# Patient Record
Sex: MALE | Race: OTHER | Employment: OTHER | URBAN - METROPOLITAN AREA
[De-identification: names, ages, dates, MRNs, and addresses within clinical notes are randomized per-mention and may not be internally consistent; named-entity substitution may affect disease eponyms.]

---

## 2017-01-11 ENCOUNTER — APPOINTMENT (OUTPATIENT)
Dept: LAB | Facility: CLINIC | Age: 82
End: 2017-01-11
Payer: MEDICARE

## 2017-01-11 ENCOUNTER — TRANSCRIBE ORDERS (OUTPATIENT)
Dept: LAB | Facility: CLINIC | Age: 82
End: 2017-01-11

## 2017-01-11 DIAGNOSIS — C61 MALIGNANT NEOPLASM OF PROSTATE (HCC): ICD-10-CM

## 2017-01-11 DIAGNOSIS — C61 MALIGNANT NEOPLASM OF PROSTATE (HCC): Primary | ICD-10-CM

## 2017-01-11 LAB — PSA SERPL-MCNC: <0.1 NG/ML (ref 0–4)

## 2017-01-11 PROCEDURE — 84153 ASSAY OF PSA TOTAL: CPT

## 2017-01-19 ENCOUNTER — GENERIC CONVERSION - ENCOUNTER (OUTPATIENT)
Dept: OTHER | Facility: OTHER | Age: 82
End: 2017-01-19

## 2017-03-06 ENCOUNTER — ALLSCRIPTS OFFICE VISIT (OUTPATIENT)
Dept: OTHER | Facility: OTHER | Age: 82
End: 2017-03-06

## 2017-03-06 ENCOUNTER — TRANSCRIBE ORDERS (OUTPATIENT)
Dept: ADMINISTRATIVE | Facility: HOSPITAL | Age: 82
End: 2017-03-06

## 2017-03-06 DIAGNOSIS — I10 ESSENTIAL HYPERTENSION, MALIGNANT: Primary | ICD-10-CM

## 2017-03-17 ENCOUNTER — ALLSCRIPTS OFFICE VISIT (OUTPATIENT)
Dept: OTHER | Facility: OTHER | Age: 82
End: 2017-03-17

## 2017-03-17 DIAGNOSIS — E78.00 PURE HYPERCHOLESTEROLEMIA: ICD-10-CM

## 2017-04-17 ENCOUNTER — TRANSCRIBE ORDERS (OUTPATIENT)
Dept: LAB | Facility: CLINIC | Age: 82
End: 2017-04-17

## 2017-04-17 ENCOUNTER — APPOINTMENT (OUTPATIENT)
Dept: LAB | Facility: CLINIC | Age: 82
End: 2017-04-17
Payer: MEDICARE

## 2017-04-17 DIAGNOSIS — C61 MALIGNANT NEOPLASM OF PROSTATE (HCC): ICD-10-CM

## 2017-04-17 DIAGNOSIS — C61 MALIGNANT NEOPLASM OF PROSTATE (HCC): Primary | ICD-10-CM

## 2017-04-17 LAB — PSA SERPL-MCNC: <0.1 NG/ML (ref 0–4)

## 2017-04-17 PROCEDURE — 84153 ASSAY OF PSA TOTAL: CPT

## 2017-04-24 ENCOUNTER — GENERIC CONVERSION - ENCOUNTER (OUTPATIENT)
Dept: OTHER | Facility: OTHER | Age: 82
End: 2017-04-24

## 2017-06-30 ENCOUNTER — APPOINTMENT (OUTPATIENT)
Dept: LAB | Facility: CLINIC | Age: 82
End: 2017-06-30
Payer: MEDICARE

## 2017-06-30 ENCOUNTER — GENERIC CONVERSION - ENCOUNTER (OUTPATIENT)
Dept: OTHER | Facility: OTHER | Age: 82
End: 2017-06-30

## 2017-06-30 DIAGNOSIS — E78.00 PURE HYPERCHOLESTEROLEMIA: ICD-10-CM

## 2017-06-30 DIAGNOSIS — D64.9 ANEMIA: ICD-10-CM

## 2017-06-30 LAB
ALBUMIN SERPL BCP-MCNC: 3.6 G/DL (ref 3.5–5)
ALP SERPL-CCNC: 56 U/L (ref 46–116)
ALT SERPL W P-5'-P-CCNC: 23 U/L (ref 12–78)
ANION GAP SERPL CALCULATED.3IONS-SCNC: 4 MMOL/L (ref 4–13)
AST SERPL W P-5'-P-CCNC: 19 U/L (ref 5–45)
BASOPHILS # BLD AUTO: 0.02 THOUSANDS/ΜL (ref 0–0.1)
BASOPHILS NFR BLD AUTO: 0 % (ref 0–1)
BILIRUB SERPL-MCNC: 0.52 MG/DL (ref 0.2–1)
BUN SERPL-MCNC: 12 MG/DL (ref 5–25)
CALCIUM SERPL-MCNC: 9.4 MG/DL (ref 8.3–10.1)
CHLORIDE SERPL-SCNC: 105 MMOL/L (ref 100–108)
CHOLEST SERPL-MCNC: 164 MG/DL (ref 50–200)
CO2 SERPL-SCNC: 27 MMOL/L (ref 21–32)
CREAT SERPL-MCNC: 0.9 MG/DL (ref 0.6–1.3)
EOSINOPHIL # BLD AUTO: 0.17 THOUSAND/ΜL (ref 0–0.61)
EOSINOPHIL NFR BLD AUTO: 3 % (ref 0–6)
ERYTHROCYTE [DISTWIDTH] IN BLOOD BY AUTOMATED COUNT: 15.6 % (ref 11.6–15.1)
GFR SERPL CREATININE-BSD FRML MDRD: >60 ML/MIN/1.73SQ M
GLUCOSE P FAST SERPL-MCNC: 116 MG/DL (ref 65–99)
HCT VFR BLD AUTO: 32.5 % (ref 36.5–49.3)
HDLC SERPL-MCNC: 46 MG/DL (ref 40–60)
HGB BLD-MCNC: 10.3 G/DL (ref 12–17)
LDLC SERPL CALC-MCNC: 91 MG/DL (ref 0–100)
LYMPHOCYTES # BLD AUTO: 2.53 THOUSANDS/ΜL (ref 0.6–4.47)
LYMPHOCYTES NFR BLD AUTO: 41 % (ref 14–44)
MCH RBC QN AUTO: 22.8 PG (ref 26.8–34.3)
MCHC RBC AUTO-ENTMCNC: 31.7 G/DL (ref 31.4–37.4)
MCV RBC AUTO: 72 FL (ref 82–98)
MONOCYTES # BLD AUTO: 0.46 THOUSAND/ΜL (ref 0.17–1.22)
MONOCYTES NFR BLD AUTO: 8 % (ref 4–12)
NEUTROPHILS # BLD AUTO: 2.98 THOUSANDS/ΜL (ref 1.85–7.62)
NEUTS SEG NFR BLD AUTO: 48 % (ref 43–75)
NRBC BLD AUTO-RTO: 0 /100 WBCS
PLATELET # BLD AUTO: 184 THOUSANDS/UL (ref 149–390)
PMV BLD AUTO: 10.8 FL (ref 8.9–12.7)
POTASSIUM SERPL-SCNC: 4.3 MMOL/L (ref 3.5–5.3)
PROT SERPL-MCNC: 6.6 G/DL (ref 6.4–8.2)
RBC # BLD AUTO: 4.51 MILLION/UL (ref 3.88–5.62)
SODIUM SERPL-SCNC: 136 MMOL/L (ref 136–145)
TRIGL SERPL-MCNC: 135 MG/DL
TSH SERPL DL<=0.05 MIU/L-ACNC: 1.91 UIU/ML (ref 0.36–3.74)
WBC # BLD AUTO: 6.17 THOUSAND/UL (ref 4.31–10.16)

## 2017-06-30 PROCEDURE — 85025 COMPLETE CBC W/AUTO DIFF WBC: CPT

## 2017-06-30 PROCEDURE — 80053 COMPREHEN METABOLIC PANEL: CPT

## 2017-06-30 PROCEDURE — 84443 ASSAY THYROID STIM HORMONE: CPT

## 2017-06-30 PROCEDURE — 80061 LIPID PANEL: CPT

## 2017-06-30 PROCEDURE — 36415 COLL VENOUS BLD VENIPUNCTURE: CPT

## 2017-07-10 ENCOUNTER — ALLSCRIPTS OFFICE VISIT (OUTPATIENT)
Dept: OTHER | Facility: OTHER | Age: 82
End: 2017-07-10

## 2017-07-10 ENCOUNTER — GENERIC CONVERSION - ENCOUNTER (OUTPATIENT)
Dept: OTHER | Facility: OTHER | Age: 82
End: 2017-07-10

## 2017-07-10 LAB — OCCULT BLD, FECAL IMMUNOLOGICAL (HISTORICAL): NEGATIVE

## 2017-07-11 ENCOUNTER — TRANSCRIBE ORDERS (OUTPATIENT)
Dept: LAB | Facility: CLINIC | Age: 82
End: 2017-07-11

## 2017-07-11 ENCOUNTER — APPOINTMENT (OUTPATIENT)
Dept: LAB | Facility: CLINIC | Age: 82
End: 2017-07-11
Payer: MEDICARE

## 2017-07-11 DIAGNOSIS — D64.9 ANEMIA: ICD-10-CM

## 2017-07-11 DIAGNOSIS — C61 MALIGNANT NEOPLASM OF PROSTATE (HCC): Primary | ICD-10-CM

## 2017-07-11 LAB
ALBUMIN SERPL BCP-MCNC: 3.6 G/DL (ref 3.5–5)
ALP SERPL-CCNC: 55 U/L (ref 46–116)
ALT SERPL W P-5'-P-CCNC: 22 U/L (ref 12–78)
AST SERPL W P-5'-P-CCNC: 22 U/L (ref 5–45)
BASOPHILS # BLD AUTO: 0.03 THOUSANDS/ΜL (ref 0–0.1)
BASOPHILS NFR BLD AUTO: 1 % (ref 0–1)
BILIRUB DIRECT SERPL-MCNC: 0.11 MG/DL (ref 0–0.2)
BILIRUB SERPL-MCNC: 0.44 MG/DL (ref 0.2–1)
BUN SERPL-MCNC: 11 MG/DL (ref 5–25)
CALCIUM SERPL-MCNC: 9.6 MG/DL (ref 8.3–10.1)
CREAT SERPL-MCNC: 0.94 MG/DL (ref 0.6–1.3)
EOSINOPHIL # BLD AUTO: 0.2 THOUSAND/ΜL (ref 0–0.61)
EOSINOPHIL NFR BLD AUTO: 4 % (ref 0–6)
ERYTHROCYTE [DISTWIDTH] IN BLOOD BY AUTOMATED COUNT: 15.7 % (ref 11.6–15.1)
FOLATE SERPL-MCNC: 14.5 NG/ML (ref 3.1–17.5)
GFR SERPL CREATININE-BSD FRML MDRD: >60 ML/MIN/1.73SQ M
HCT VFR BLD AUTO: 31.9 % (ref 36.5–49.3)
HGB BLD-MCNC: 10.2 G/DL (ref 12–17)
IRON SERPL-MCNC: 33 UG/DL (ref 65–175)
LYMPHOCYTES # BLD AUTO: 2 THOUSANDS/ΜL (ref 0.6–4.47)
LYMPHOCYTES NFR BLD AUTO: 36 % (ref 14–44)
MCH RBC QN AUTO: 22.9 PG (ref 26.8–34.3)
MCHC RBC AUTO-ENTMCNC: 32 G/DL (ref 31.4–37.4)
MCV RBC AUTO: 72 FL (ref 82–98)
MONOCYTES # BLD AUTO: 0.39 THOUSAND/ΜL (ref 0.17–1.22)
MONOCYTES NFR BLD AUTO: 7 % (ref 4–12)
NEUTROPHILS # BLD AUTO: 2.96 THOUSANDS/ΜL (ref 1.85–7.62)
NEUTS SEG NFR BLD AUTO: 52 % (ref 43–75)
NRBC BLD AUTO-RTO: 0 /100 WBCS
PLATELET # BLD AUTO: 180 THOUSANDS/UL (ref 149–390)
PMV BLD AUTO: 10.6 FL (ref 8.9–12.7)
PROT SERPL-MCNC: 6.7 G/DL (ref 6.4–8.2)
PSA SERPL-MCNC: <0.1 NG/ML (ref 0–4)
RBC # BLD AUTO: 4.46 MILLION/UL (ref 3.88–5.62)
TESTOST SERPL-MCNC: 26.3 NG/DL (ref 241–827)
VIT B12 SERPL-MCNC: 251 PG/ML (ref 100–900)
WBC # BLD AUTO: 5.6 THOUSAND/UL (ref 4.31–10.16)

## 2017-07-11 PROCEDURE — 82607 VITAMIN B-12: CPT

## 2017-07-11 PROCEDURE — 82565 ASSAY OF CREATININE: CPT

## 2017-07-11 PROCEDURE — 36415 COLL VENOUS BLD VENIPUNCTURE: CPT

## 2017-07-11 PROCEDURE — 80076 HEPATIC FUNCTION PANEL: CPT

## 2017-07-11 PROCEDURE — 82746 ASSAY OF FOLIC ACID SERUM: CPT

## 2017-07-11 PROCEDURE — 83540 ASSAY OF IRON: CPT

## 2017-07-11 PROCEDURE — 84403 ASSAY OF TOTAL TESTOSTERONE: CPT

## 2017-07-11 PROCEDURE — 82310 ASSAY OF CALCIUM: CPT

## 2017-07-11 PROCEDURE — G0103 PSA SCREENING: HCPCS

## 2017-07-11 PROCEDURE — 85025 COMPLETE CBC W/AUTO DIFF WBC: CPT

## 2017-07-11 PROCEDURE — 84520 ASSAY OF UREA NITROGEN: CPT

## 2017-07-21 ENCOUNTER — ALLSCRIPTS OFFICE VISIT (OUTPATIENT)
Dept: OTHER | Facility: OTHER | Age: 82
End: 2017-07-21

## 2017-07-24 ENCOUNTER — GENERIC CONVERSION - ENCOUNTER (OUTPATIENT)
Dept: OTHER | Facility: OTHER | Age: 82
End: 2017-07-24

## 2017-08-04 ENCOUNTER — ALLSCRIPTS OFFICE VISIT (OUTPATIENT)
Dept: OTHER | Facility: OTHER | Age: 82
End: 2017-08-04

## 2017-09-01 ENCOUNTER — ALLSCRIPTS OFFICE VISIT (OUTPATIENT)
Dept: OTHER | Facility: OTHER | Age: 82
End: 2017-09-01

## 2017-10-02 ENCOUNTER — ALLSCRIPTS OFFICE VISIT (OUTPATIENT)
Dept: OTHER | Facility: OTHER | Age: 82
End: 2017-10-02

## 2017-10-17 ENCOUNTER — TRANSCRIBE ORDERS (OUTPATIENT)
Dept: LAB | Facility: CLINIC | Age: 82
End: 2017-10-17

## 2017-10-17 ENCOUNTER — APPOINTMENT (OUTPATIENT)
Dept: LAB | Facility: CLINIC | Age: 82
End: 2017-10-17
Payer: MEDICARE

## 2017-10-17 DIAGNOSIS — C61 MALIGNANT NEOPLASM OF PROSTATE (HCC): ICD-10-CM

## 2017-10-17 DIAGNOSIS — D64.9 ANEMIA, UNSPECIFIED TYPE: ICD-10-CM

## 2017-10-17 DIAGNOSIS — D64.9 ANEMIA, UNSPECIFIED TYPE: Primary | ICD-10-CM

## 2017-10-17 LAB
BASOPHILS # BLD AUTO: 0.03 THOUSANDS/ΜL (ref 0–0.1)
BASOPHILS NFR BLD AUTO: 0 % (ref 0–1)
BUN SERPL-MCNC: 12 MG/DL (ref 5–25)
CREAT SERPL-MCNC: 0.89 MG/DL (ref 0.6–1.3)
EOSINOPHIL # BLD AUTO: 0.2 THOUSAND/ΜL (ref 0–0.61)
EOSINOPHIL NFR BLD AUTO: 3 % (ref 0–6)
ERYTHROCYTE [DISTWIDTH] IN BLOOD BY AUTOMATED COUNT: 17.6 % (ref 11.6–15.1)
GFR SERPL CREATININE-BSD FRML MDRD: 79 ML/MIN/1.73SQ M
HCT VFR BLD AUTO: 38 % (ref 36.5–49.3)
HGB BLD-MCNC: 12.3 G/DL (ref 12–17)
IRON SERPL-MCNC: 57 UG/DL (ref 65–175)
LYMPHOCYTES # BLD AUTO: 3.12 THOUSANDS/ΜL (ref 0.6–4.47)
LYMPHOCYTES NFR BLD AUTO: 42 % (ref 14–44)
MCH RBC QN AUTO: 25.5 PG (ref 26.8–34.3)
MCHC RBC AUTO-ENTMCNC: 32.4 G/DL (ref 31.4–37.4)
MCV RBC AUTO: 79 FL (ref 82–98)
MONOCYTES # BLD AUTO: 0.56 THOUSAND/ΜL (ref 0.17–1.22)
MONOCYTES NFR BLD AUTO: 8 % (ref 4–12)
NEUTROPHILS # BLD AUTO: 3.52 THOUSANDS/ΜL (ref 1.85–7.62)
NEUTS SEG NFR BLD AUTO: 47 % (ref 43–75)
NRBC BLD AUTO-RTO: 0 /100 WBCS
PLATELET # BLD AUTO: 189 THOUSANDS/UL (ref 149–390)
PMV BLD AUTO: 10.5 FL (ref 8.9–12.7)
PSA SERPL-MCNC: <0.1 NG/ML (ref 0–4)
RBC # BLD AUTO: 4.82 MILLION/UL (ref 3.88–5.62)
VIT B12 SERPL-MCNC: 3117 PG/ML (ref 100–900)
WBC # BLD AUTO: 7.44 THOUSAND/UL (ref 4.31–10.16)

## 2017-10-17 PROCEDURE — 85025 COMPLETE CBC W/AUTO DIFF WBC: CPT

## 2017-10-17 PROCEDURE — 82607 VITAMIN B-12: CPT

## 2017-10-17 PROCEDURE — 82565 ASSAY OF CREATININE: CPT

## 2017-10-17 PROCEDURE — 84520 ASSAY OF UREA NITROGEN: CPT

## 2017-10-17 PROCEDURE — G0103 PSA SCREENING: HCPCS

## 2017-10-17 PROCEDURE — 36415 COLL VENOUS BLD VENIPUNCTURE: CPT

## 2017-10-17 PROCEDURE — 83540 ASSAY OF IRON: CPT

## 2017-10-18 ENCOUNTER — GENERIC CONVERSION - ENCOUNTER (OUTPATIENT)
Dept: OTHER | Facility: OTHER | Age: 82
End: 2017-10-18

## 2017-10-24 ENCOUNTER — GENERIC CONVERSION - ENCOUNTER (OUTPATIENT)
Dept: OTHER | Facility: OTHER | Age: 82
End: 2017-10-24

## 2017-11-08 ENCOUNTER — ALLSCRIPTS OFFICE VISIT (OUTPATIENT)
Dept: OTHER | Facility: OTHER | Age: 82
End: 2017-11-08

## 2018-01-12 NOTE — RESULT NOTES
Discussion/Summary   Please call patient's wife Petty Mayers  Blood work shows patient has mild anemia  He would require further blood work to investigate excessive source of anemia  I would also recommend checking Hemoccults Ã3 on  separate days to check for any possible bleeding and stool that he is unable to see with the naked eye  I will printout blood work prescription and please provide Hemoccults Ã3     Verified Results  (1) CBC/PLT/DIFF 62XSX7129 00:07VS Alissa Davis Order Number: [id number]     Test Name Result Flag Reference   WBC COUNT 6 17 Thousand/uL  4 31-10 16   RBC COUNT 4 51 Million/uL  3 88-5 62   HEMOGLOBIN 10 3 g/dL L 12 0-17 0   HEMATOCRIT 32 5 % L 36 5-49 3   MCV 72 fL L 82-98   MCH 22 8 pg L 26 8-34 3   MCHC 31 7 g/dL  31 4-37 4   RDW 15 6 % H 11 6-15 1   MPV 10 8 fL  8 9-12 7   PLATELET COUNT 544 Thousands/uL  149-390   nRBC AUTOMATED 0 /100 WBCs     NEUTROPHILS RELATIVE PERCENT 48 %  43-75   LYMPHOCYTES RELATIVE PERCENT 41 %  14-44   MONOCYTES RELATIVE PERCENT 8 %  4-12   EOSINOPHILS RELATIVE PERCENT 3 %  0-6   BASOPHILS RELATIVE PERCENT 0 %  0-1   NEUTROPHILS ABSOLUTE COUNT 2 98 Thousands/? ??L  1 85-7 62   LYMPHOCYTES ABSOLUTE COUNT 2 53 Thousands/? ??L  0 60-4 47   MONOCYTES ABSOLUTE COUNT 0 46 Thousand/? ??L  0 17-1 22   EOSINOPHILS ABSOLUTE COUNT 0 17 Thousand/? ??L  0 00-0 61   BASOPHILS ABSOLUTE COUNT 0 02 Thousands/? ??L  0 00-0 10   - Patient Instructions: This bloodwork is non-fasting  Please drink two glasses of water morning of bloodwork       (1) COMPREHENSIVE METABOLIC PANEL 91KXQ3343 45:80WD Alissa Davis Order Number: [id number]     Test Name Result Flag Reference   SODIUM 136 mmol/L  136-145   POTASSIUM 4 3 mmol/L  3 5-5 3   CHLORIDE 105 mmol/L  100-108   CARBON DIOXIDE 27 mmol/L  21-32   ANION GAP (CALC) 4 mmol/L  4-13   BLOOD UREA NITROGEN 12 mg/dL  5-25   CREATININE 0 90 mg/dL  0 60-1 30   Standardized to IDMS reference method   CALCIUM 9 4 mg/dL  8 3-10 1   BILI, TOTAL 0 52 mg/dL  0 20-1 00   ALK PHOSPHATAS 56 U/L     ALT (SGPT) 23 U/L  12-78   AST(SGOT) 19 U/L  5-45   ALBUMIN 3 6 g/dL  3 5-5 0   TOTAL PROTEIN 6 6 g/dL  6 4-8 2   eGFR Non-African American      >60 0 ml/min/1 73sq m   [facility] Disease Education Program recommendations are as follows:  GFR calculation is accurate only with a steady state creatinine  Chronic Kidney disease less than 60 ml/min/1 73 sq  meters  Kidney failure less than 15 ml/min/1 73 sq  meters  GLUCOSE FASTING 116 mg/dL H 65-99     (1) TSH 26YGQ5563 27:92WU Luizamanjit Srinath Order Number: [id number]     Test Name Result Flag Reference   TSH 1 910 uIU/mL  0 358-3 740   - Patient Instructions: This bloodwork is non-fasting  Please drink two glasses of water morning of bloodwork  Patients undergoing fluorescein dye angiography may retain small amounts of fluorescein in the body for 48-72 hours post procedure  Samples containing fluorescein can produce falsely depressed TSH values  If the patient had this procedure,a specimen should be resubmitted post fluorescein clearance  (1) LIPID PANEL, FASTING 38WBV2959 16:09NL Luizamanjit Srinath Order Number: [id number]     Test Name Result Flag Reference   CHOLESTEROL 164 mg/dL     HDL,DIRECT 46 mg/dL  40-60   Specimen collection should occur prior to Metamizole administration due to the potential for falsely depressed results  LDL CHOLESTEROL CALCULATED 91 mg/dL  0-100   - Patient Instructions:  This is a fasting blood test  Water,black tea or black  coffee only after 9:00pm the night before test   Drink 2 glasses of water the morning of test       Triglyceride:         Normal              <150 mg/dl       Borderline High    150-199 mg/dl       High               200-499 mg/dl       Very High          >499 mg/dl  Cholesterol:         Desirable        <200 mg/dl      Borderline High  200-239 mg/dl      High             >239 mg/dl  HDL Cholesterol:        High    >59 mg/dL      Low     <41 mg/dL  LDL CALCULATED:    This screening LDL is a calculated result  It does not have the accuracy of the Direct Measured LDL in the monitoring of patients with hyperlipidemia and/or statin therapy  Direct Measure LDL (JXW437) must be ordered separately in these patients  TRIGLYCERIDES 135 mg/dL  <=150   Specimen collection should occur prior to N-Acetylcysteine or Metamizole administration due to the potential for falsely depressed results  Plan  Anemia    · (1) FOLATE; Status:Active; Requested CARINA:[id number];    · (1) IRON; Status:Active; Requested QRE:[id number];    · (1) VITAMIN B12; Status:Active;  Requested BBI:[id number];

## 2018-01-12 NOTE — PROGRESS NOTES
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)   12  Chronic pain syndrome (338 4) (G89 4)   13  Depression (311) (F32 9)   14  Depression with anxiety (300 4) (F41 8)   15  Dizziness and giddiness (780 4) (R42)   16  Dysphagia (787 20) (R13 10)   17  Epistaxis (784 7) (R04 0)   18  Esophageal reflux (530 81) (K21 9)   19  Fatigue (780 79) (R53 83)   20  Headache (784 0) (R51)   21  Heart murmur (785 2) (R01 1)   22  Hypercholesteremia (272 0) (E78 00)   23  Hyperlipidemia (272 4) (E78 5)   24  Insomnia (780 52) (G47 00)   25  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)   26  Lumbosacral radiculopathy (724 4) (M54 17)   27  Lumbosacral spinal stenosis (724 02) (M48 07)   28  Need for immunization against influenza (V04 81) (Z23)   29  Need for pneumococcal vaccination (V03 82) (Z23)   30  Need for prophylactic vaccination and inoculation against influenza (V04 81) (Z23)   31  Postlaminectomy syndrome, lumbar (722 83) (M96 1)   32  Preoperative evaluation of a medical condition to rule out surgical contraindications (TAR    required) (V72 83) (Z01 818)   33  Shortness of breath (786 05) (R06 02)    Current Meds   1  Aspirin 81 MG TABS; Take 1 tablet daily; Therapy: (Recorded:17Mar2017) to Recorded   2  Enalapril Maleate 2 5 MG Oral Tablet; Take 1 tablet by mouth  twice a day;    Therapy: 71PCD1788 to (Adali Escobedo)  Requested for: 11UJE6755; Last   Rx:03Jun2017 Ordered   3  Metoprolol Tartrate 25 MG Oral Tablet; Take 1 tablet by mouth in  the morning and 1   tablet by mouth in the evening; Therapy: 91KZX5270 to ([facility])  Requested for: 46IOP0792; Last   Rx:05Jun2017 Ordered   4  Omeprazole 40 MG Oral Capsule Delayed Release; Take 1 capsule twice daily; Therapy: (Recorded:08Mar2016) to Recorded   5  Sertraline HCl - 50 MG Oral Tablet; Take 1 tablet by mouth  every day; Therapy: 21YMN7519 to ([facility])  Requested for: 35ZGY4793; Last   Rx:05Jun2017 Ordered   6  Simvastatin 80 MG Oral Tablet; Take 1 tablet by mouth  daily; Therapy: 42TOQ0919 to (Derek Mayen)  Requested for: 66FGP2047; Last   DE:38TGR0722 Ordered   7  Zolpidem Tartrate 5 MG Oral Tablet; TAKE 1 TABLET AT BEDTIME; Therapy: 01Apr2013 to (Evaluate:18Jun2017); Last Rx:52Kma0793 Ordered    Allergies    1  Lipitor TABS   2  Niacin TABS    Signatures   Electronically signed by :  JONATHON Andrew ; Jul 10 2017  5:14PM EST                       (Author)

## 2018-01-12 NOTE — PROGRESS NOTES
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)   12  Chronic pain syndrome (338 4) (G89 4)   13  Depression (311) (F32 9)   14  Depression with anxiety (300 4) (F41 8)   15  Dizziness and giddiness (780 4) (R42)   16  Dysphagia (787 20) (R13 10)   17  Epistaxis (784 7) (R04 0)   18  Esophageal reflux (530 81) (K21 9)   19  Fatigue (780 79) (R53 83)   20  Headache (784 0) (R51)   21  Heart murmur (785 2) (R01 1)   22  Hypercholesteremia (272 0) (E78 00)   23  Hyperlipidemia (272 4) (E78 5)   24  Insomnia (780 52) (G47 00)   25  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)   26  Lumbosacral radiculopathy (724 4) (M54 17)   27  Lumbosacral spinal stenosis (724 02) (M48 07)   28  Need for immunization against influenza (V04 81) (Z23)   29  Need for pneumococcal vaccination (V03 82) (Z23)   30  Need for prophylactic vaccination and inoculation against influenza (V04 81) (Z23)   31  Postlaminectomy syndrome, lumbar (722 83) (M96 1)   32  Preoperative evaluation of a medical condition to rule out surgical contraindications (TAR    required) (V72 83) (Z01 818)   33  Shortness of breath (786 05) (R06 02)   34  Vitamin B12 deficiency (266 2) (E53 8)    Current Meds   1  Aspirin 81 MG TABS; Take 1 tablet daily; Therapy: (Recorded:17Mar2017) to Recorded   2  Enalapril Maleate 2 5 MG Oral Tablet; Take 1 tablet by mouth  twice a day;    Therapy: 27OGL9770 to (Evaluate:19Nov2017)  Requested for: 65Xmp5765; Last   Rx:80Fgg9230 Ordered   3  Ferrous Sulfate 325 (65 Fe) MG Oral Tablet; TAKE 1 TABLET DAILY; Therapy: 79FEU6285 to (Chelsi Fernández)  Requested for: 31Oct2017; Last   Rx:31Oct2017 Ordered   4  Metoprolol Tartrate 25 MG Oral Tablet; Take 1 tablet by mouth in  the morning and 1   tablet by mouth in the evening; Therapy: 88RXN1274 to (Nick Olmos)  Requested for: 05EFG4991; Last   Rx:05Jun2017 Ordered   5  Omeprazole 40 MG Oral Capsule Delayed Release; Take 1 capsule twice daily; Therapy: (Recorded:08Mar2016) to Recorded   6  Sertraline HCl - 50 MG Oral Tablet; Take 1 tablet by mouth  every day; Therapy: 02MNR0755 to (Nick Olmos)  Requested for: 28HQG9481; Last   Rx:05Jun2017 Ordered   7  Simvastatin 80 MG Oral Tablet; Take 1 tablet by mouth  daily; Therapy: 76KYX3050 to (Evaluate:19Nov2017)  Requested for: 97Mny4048; Last   Rx:26Gmy4370 Ordered   8  Zolpidem Tartrate 5 MG Oral Tablet; TAKE 1 TABLET AT BEDTIME; Therapy: 01Apr2013 to (Evaluate:07Jan2018); Last Rx:23Jtq9187 Ordered    Allergies    1  Lipitor TABS   2   Niacin TABS    Vitals  Signs    Temperature: 96 8 F    Plan  Need for prophylactic vaccination and inoculation against influenza    · Fluzone High-Dose 0 5 ML Intramuscular Suspension Prefilled Syringe    Signatures   Electronically signed by : JONATHON Valencia ; Nov 8 9956  7:51PM EST                       (Author)

## 2018-01-13 VITALS
DIASTOLIC BLOOD PRESSURE: 58 MMHG | TEMPERATURE: 97.3 F | WEIGHT: 165 LBS | SYSTOLIC BLOOD PRESSURE: 126 MMHG | HEART RATE: 76 BPM | BODY MASS INDEX: 24.44 KG/M2 | HEIGHT: 69 IN

## 2018-01-13 VITALS
WEIGHT: 168 LBS | HEART RATE: 83 BPM | BODY MASS INDEX: 24.88 KG/M2 | DIASTOLIC BLOOD PRESSURE: 60 MMHG | HEIGHT: 69 IN | SYSTOLIC BLOOD PRESSURE: 120 MMHG

## 2018-01-13 VITALS
SYSTOLIC BLOOD PRESSURE: 126 MMHG | DIASTOLIC BLOOD PRESSURE: 60 MMHG | BODY MASS INDEX: 24.88 KG/M2 | HEART RATE: 74 BPM | WEIGHT: 168 LBS | TEMPERATURE: 97.1 F | HEIGHT: 69 IN | RESPIRATION RATE: 16 BRPM

## 2018-01-13 NOTE — RESULT NOTES
Verified Results  Hemoccult Screening - POC 37VPG4044 02:07PM Layo Wilde     Test Name Result Flag Reference   Hemoccult Negative         Signatures   Electronically signed by :  JONATHON Mcpherson ; Jul 10 2017  5:14PM EST                       (Author)

## 2018-01-13 NOTE — PROGRESS NOTES
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)   12  Chronic pain syndrome (338 4) (G89 4)   13  Depression (311) (F32 9)   14  Depression with anxiety (300 4) (F41 8)   15  Dizziness and giddiness (780 4) (R42)   16  Dysphagia (787 20) (R13 10)   17  Epistaxis (784 7) (R04 0)   18  Esophageal reflux (530 81) (K21 9)   19  Fatigue (780 79) (R53 83)   20  Headache (784 0) (R51)   21  Heart murmur (785 2) (R01 1)   22  Hypercholesteremia (272 0) (E78 00)   23  Hyperlipidemia (272 4) (E78 5)   24  Insomnia (780 52) (G47 00)   25  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)   26  Lumbosacral radiculopathy (724 4) (M54 17)   27  Lumbosacral spinal stenosis (724 02) (M48 07)   28  Need for immunization against influenza (V04 81) (Z23)   29  Need for pneumococcal vaccination (V03 82) (Z23)   30  Need for prophylactic vaccination and inoculation against influenza (V04 81) (Z23)   31  Postlaminectomy syndrome, lumbar (722 83) (M96 1)   32  Preoperative evaluation of a medical condition to rule out surgical contraindications (TAR    required) (V72 83) (Z01 818)   33  Shortness of breath (786 05) (R06 02)   34  Vitamin B12 deficiency (266 2) (E53 8)    Current Meds   1  Aspirin 81 MG TABS; Take 1 tablet daily; Therapy: (Recorded:17Mar2017) to Recorded   2  Enalapril Maleate 2 5 MG Oral Tablet; Take 1 tablet by mouth  twice a day;    Therapy: 39GGL7934 to (Evaluate:19Nov2017)  Requested for: 19Rbc7347; Last Rx: 88Kln9574 Ordered   3  Metoprolol Tartrate 25 MG Oral Tablet; Take 1 tablet by mouth in  the morning and 1   tablet by mouth in the evening; Therapy: 76LPN7613 to (Wagner Burgess)  Requested for: 36UPD8673; Last   Rx:05Jun2017 Ordered   4  Omeprazole 40 MG Oral Capsule Delayed Release; Take 1 capsule twice daily; Therapy: (Recorded:08Mar2016) to Recorded   5  Sertraline HCl - 50 MG Oral Tablet; Take 1 tablet by mouth  every day; Therapy: 23BGR7573 to (Wagner Burgess)  Requested for: 10DER3301; Last   Rx:05Jun2017 Ordered   6  Simvastatin 80 MG Oral Tablet; Take 1 tablet by mouth  daily; Therapy: 29FMG0139 to (Evaluate:19Nov2017)  Requested for: 02Sjo2531; Last   Rx:09Cfm3577 Ordered   7  Zolpidem Tartrate 5 MG Oral Tablet; TAKE 1 TABLET AT BEDTIME; Therapy: 01Apr2013 to (Evaluate:07Jan2018); Last Rx:98Jue9511 Ordered    Allergies    1  Lipitor TABS   2   Niacin TABS    Plan  Vitamin B12 deficiency    · Cyanocobalamin 1000 MCG/ML Injection Solution    Future Appointments    Date/Time Provider Specialty Site   11/02/2017 01:30 PM [facility], Nurse Schedule  [facility]     Signatures   Electronically signed by : JONATHON Browning ; Oct  2 2748  2:47PM EST                       (Author)

## 2018-01-14 VITALS — TEMPERATURE: 96.8 F

## 2018-01-15 NOTE — RESULT NOTES
Discussion/Summary   please call wife  bw shows iron improved from 33 to 57 but this is still below normal of 65  cont  with iron tab daily  Vit b12 well above normal and he should hold any more B12 injections for at least 3 mos  Verified Results  (1) CBC/PLT/DIFF 33BGK6006 50:47MF Radha Campbell     Test Name Result Flag Reference   WBC COUNT 7 44 Thousand/uL  4 31-10 16   RBC COUNT 4 82 Million/uL  3 88-5 62   HEMOGLOBIN 12 3 g/dL  12 0-17 0   HEMATOCRIT 38 0 %  36 5-49 3   MCV 79 fL L 82-98   MCH 25 5 pg L 26 8-34 3   MCHC 32 4 g/dL  31 4-37 4   RDW 17 6 % H 11 6-15 1   MPV 10 5 fL  8 9-12 7   PLATELET COUNT 449 Thousands/uL  149-390   nRBC AUTOMATED 0 /100 WBCs     NEUTROPHILS RELATIVE PERCENT 47 %  43-75   LYMPHOCYTES RELATIVE PERCENT 42 %  14-44   MONOCYTES RELATIVE PERCENT 8 %  4-12   EOSINOPHILS RELATIVE PERCENT 3 %  0-6   BASOPHILS RELATIVE PERCENT 0 %  0-1   NEUTROPHILS ABSOLUTE COUNT 3 52 Thousands/? ??L  1 85-7 62   LYMPHOCYTES ABSOLUTE COUNT 3 12 Thousands/? ??L  0 60-4 47   MONOCYTES ABSOLUTE COUNT 0 56 Thousand/? ??L  0 17-1 22   EOSINOPHILS ABSOLUTE COUNT 0 20 Thousand/? ??L  0 00-0 61   BASOPHILS ABSOLUTE COUNT 0 03 Thousands/? ??L  0 00-0 10   This is a patient instruction: This test is non-fasting  Please drink two glasses of water morning of bloodwork  (1) VITAMIN B12 31WZK5478 70:48WN Radha Campbell     Test Name Result Flag Reference   VITAMIN B12 3117 pg/mL H 100-900     (1) IRON 40MZB7389 22:68KH Radha Campbell     Test Name Result Flag Reference   IRON 57 ug/dL L    Slightly Hemolyzed; Results May be Affected  Patients treated with metal-binding drugs (ie  Deferoxamine) may have depressed iron values

## 2018-01-15 NOTE — PROGRESS NOTES
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)   11  Chronic pain syndrome (338 4) (G89 4)   12  Depression (311) (F32 9)   13  Depression with anxiety (300 4) (F41 8)   14  Dizziness and giddiness (780 4) (R42)   15  Dysphagia (787 20) (R13 10)   16  Epistaxis (784 7) (R04 0)   17  Esophageal reflux (530 81) (K21 9)   18  Fatigue (780 79) (R53 83)   19  Headache (784 0) (R51)   20  Heart murmur (785 2) (R01 1)   21  Hypercholesteremia (272 0) (E78 00)   22  Hyperlipidemia (272 4) (E78 5)   23  Insomnia (780 52) (G47 00)   24  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)   25  Lumbosacral radiculopathy (724 4) (M54 17)   26  Lumbosacral spinal stenosis (724 02) (M48 07)   27  Need for immunization against influenza (V04 81) (Z23)   28  Need for pneumococcal vaccination (V03 82) (Z23)   29  Need for prophylactic vaccination and inoculation against influenza (V04 81) (Z23)   30  Postlaminectomy syndrome, lumbar (722 83) (M96 1)   31  Preoperative evaluation of a medical condition to rule out surgical contraindications (TAR    required) (V72 83) (Z01 818)   32  Shortness of breath (786 05) (R06 02)    Current Meds   1  Aspirin 81 MG TABS; Take 1 tablet daily; Therapy: (Recorded:62Ubd4503) to Recorded   2  Enalapril Maleate 2 5 MG Oral Tablet; One po BID; Therapy: 55CNR5191 to (Yazmin Pickard)  Requested for: 11JZQ0146; Last   Rx:25Jan2016 Ordered   3   Metoprolol Tartrate 25 MG Oral Tablet; TAKE 1 TABLET IN THE MORNING AND 1   TABLET IN THE EVENING; Therapy: 08ZED2546 to (Macoluzma CrandallPari)  Requested for: 20UAN4300; Last   Rx:28Jan2016 Ordered   4  Omeprazole 40 MG Oral Capsule Delayed Release; Take 1 capsule twice daily; Therapy: (Recorded:08Mar2016) to Recorded   5  Sertraline HCl - 50 MG Oral Tablet; take 1 tablet every day; Therapy: 84OCA8839 to (Evaluate:03Jul2017)  Requested for: 11ZBB7946; Last   Rx:27Dwv7663 Ordered   6  Simvastatin 80 MG Oral Tablet; TAKE 1 TABLET DAILY; Therapy: 60YOU4000 to (Evaluate:20Jan2017)  Requested for: 43TPK6238; Last   Rx:26Jan2016 Ordered   7  TraZODone HCl - 50 MG Oral Tablet; Take 1 tablet daily; Therapy: 77VEA1984 to (Evaluate:07Jun2016)  Requested for: 31WXM1903; Last   Rx:47Nrj0477 Ordered   8  Zolpidem Tartrate 5 MG Oral Tablet; TAKE 1 TABLET AT BEDTIME; Therapy: 01Apr2013 to (Evaluate:15Jan2017); Last Rx:57Zaw5374 Ordered    Allergies    1  Lipitor TABS   2   Niacin TABS    Vitals  Signs    Temperature: 96 9 F    Plan  Need for prophylactic vaccination and inoculation against influenza    · Fluzone High-Dose 0 5 ML Intramuscular Suspension Prefilled Syringe    Signatures   Electronically signed by : JONATHON Ferguson ; Nov 22 9344  8:30PM EST                       (Author)

## 2018-01-15 NOTE — PROGRESS NOTES
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)   12  Chronic pain syndrome (338 4) (G89 4)   13  Depression (311) (F32 9)   14  Depression with anxiety (300 4) (F41 8)   15  Dizziness and giddiness (780 4) (R42)   16  Dysphagia (787 20) (R13 10)   17  Epistaxis (784 7) (R04 0)   18  Esophageal reflux (530 81) (K21 9)   19  Fatigue (780 79) (R53 83)   20  Headache (784 0) (R51)   21  Heart murmur (785 2) (R01 1)   22  Hypercholesteremia (272 0) (E78 00)   23  Hyperlipidemia (272 4) (E78 5)   24  Insomnia (780 52) (G47 00)   25  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)   26  Lumbosacral radiculopathy (724 4) (M54 17)   27  Lumbosacral spinal stenosis (724 02) (M48 07)   28  Need for immunization against influenza (V04 81) (Z23)   29  Need for pneumococcal vaccination (V03 82) (Z23)   30  Need for prophylactic vaccination and inoculation against influenza (V04 81) (Z23)   31  Postlaminectomy syndrome, lumbar (722 83) (M96 1)   32  Preoperative evaluation of a medical condition to rule out surgical contraindications (TAR    required) (V72 83) (Z01 818)   33  Shortness of breath (786 05) (R06 02)   34  Vitamin B12 deficiency (266 2) (E53 8)    Current Meds   1  Aspirin 81 MG TABS; Take 1 tablet daily; Therapy: (Recorded:17Mar2017) to Recorded   2  Enalapril Maleate 2 5 MG Oral Tablet; Take 1 tablet by mouth  twice a day;    Therapy: 34MSI6257 to (Evaluate:19Nov2017)  Requested for: 99Unp5403; Last Rx: 50Hzo4194 Ordered   3  Metoprolol Tartrate 25 MG Oral Tablet; Take 1 tablet by mouth in  the morning and 1   tablet by mouth in the evening; Therapy: 55RMT8764 to (Slick Armas)  Requested for: 99YOV6031; Last   Rx:05Jun2017 Ordered   4  Omeprazole 40 MG Oral Capsule Delayed Release; Take 1 capsule twice daily; Therapy: (Recorded:08Mar2016) to Recorded   5  Sertraline HCl - 50 MG Oral Tablet; Take 1 tablet by mouth  every day; Therapy: 71XCI5514 to (Slick Armas)  Requested for: 82IVY2390; Last   Rx:05Jun2017 Ordered   6  Simvastatin 80 MG Oral Tablet; Take 1 tablet by mouth  daily; Therapy: 08EDU2105 to (Evaluate:19Nov2017)  Requested for: 49Gmw8068; Last   Rx:66Vos9794 Ordered   7  Zolpidem Tartrate 5 MG Oral Tablet; TAKE 1 TABLET AT BEDTIME; Therapy: 01Apr2013 to (Evaluate:07Jan2018); Last Rx:27Qle8034 Ordered    Allergies    1  Lipitor TABS   2   Niacin TABS    Future Appointments    Date/Time Provider Specialty Site   10/02/2017 02:00 PM [address] [facility], Nurse Schedule  [address]     Signatures   Electronically signed by : JONATHON Malave ; Sep  1 5727  1:31PM EST                       (Author)

## 2018-01-15 NOTE — PROGRESS NOTES
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)   12  Chronic pain syndrome (338 4) (G89 4)   13  Depression (311) (F32 9)   14  Depression with anxiety (300 4) (F41 8)   15  Dizziness and giddiness (780 4) (R42)   16  Dysphagia (787 20) (R13 10)   17  Epistaxis (784 7) (R04 0)   18  Esophageal reflux (530 81) (K21 9)   19  Fatigue (780 79) (R53 83)   20  Headache (784 0) (R51)   21  Heart murmur (785 2) (R01 1)   22  Hypercholesteremia (272 0) (E78 00)   23  Hyperlipidemia (272 4) (E78 5)   24  Insomnia (780 52) (G47 00)   25  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)   26  Lumbosacral radiculopathy (724 4) (M54 17)   27  Lumbosacral spinal stenosis (724 02) (M48 07)   28  Need for immunization against influenza (V04 81) (Z23)   29  Need for pneumococcal vaccination (V03 82) (Z23)   30  Need for prophylactic vaccination and inoculation against influenza (V04 81) (Z23)   31  Postlaminectomy syndrome, lumbar (722 83) (M96 1)   32  Preoperative evaluation of a medical condition to rule out surgical contraindications (TAR    required) (V72 83) (Z01 818)   33  Shortness of breath (786 05) (R06 02)   34  Vitamin B12 deficiency (266 2) (E53 8)    Current Meds   1  Aspirin 81 MG TABS; Take 1 tablet daily; Therapy: (Recorded:17Mar2017) to Recorded   2  Enalapril Maleate 2 5 MG Oral Tablet; Take 1 tablet by mouth  twice a day;    Therapy: 46FDS7662 to (Henna Murrell)  Requested for: 15CEZ6490; Last Rx: [id number] Ordered   3  Metoprolol Tartrate 25 MG Oral Tablet; Take 1 tablet by mouth in  the morning and 1   tablet by mouth in the evening; Therapy: 30WYP8264 to (Maricel Maharaj)  Requested for: 19XBQ9111; Last   Rx:05Jun2017 Ordered   4  Omeprazole 40 MG Oral Capsule Delayed Release; Take 1 capsule twice daily; Therapy: (Recorded:08Mar2016) to Recorded   5  Sertraline HCl - 50 MG Oral Tablet; Take 1 tablet by mouth  every day; Therapy: 06NQA1762 to (Maricel Maharaj)  Requested for: 13FEV5991; Last   Rx:05Jun2017 Ordered   6  Simvastatin 80 MG Oral Tablet; Take 1 tablet by mouth  daily; Therapy: 76QRF0198 to (Shaan Johnson)  Requested for: 08BNA1820; Last   UN:[id number] Ordered   7  Zolpidem Tartrate 5 MG Oral Tablet; TAKE 1 TABLET AT BEDTIME; Therapy: 01Apr2013 to (Evaluate:07Jan2018); Last Rx:[id number] Ordered    Allergies    1  Lipitor TABS   2   Niacin TABS    Plan  Vitamin B12 deficiency    · Cyanocobalamin 1000 MCG/ML Injection Solution    Future Appointments    Date/Time Provider Specialty Site   09/01/2017 01:00 PM [facility], Nurse Schedule  [facility]     Signatures   Electronically signed by : JONATHON Baez ; Aug  4 8544  3:27PM EST                       (Author)

## 2018-01-16 ENCOUNTER — APPOINTMENT (OUTPATIENT)
Dept: LAB | Facility: CLINIC | Age: 83
End: 2018-01-16
Payer: MEDICARE

## 2018-01-16 ENCOUNTER — TRANSCRIBE ORDERS (OUTPATIENT)
Dept: LAB | Facility: CLINIC | Age: 83
End: 2018-01-16

## 2018-01-16 DIAGNOSIS — C61 MALIGNANT NEOPLASM OF PROSTATE (HCC): ICD-10-CM

## 2018-01-16 DIAGNOSIS — C61 MALIGNANT NEOPLASM OF PROSTATE (HCC): Primary | ICD-10-CM

## 2018-01-16 LAB — PSA SERPL-MCNC: 0.1 NG/ML (ref 0–4)

## 2018-01-16 PROCEDURE — 84153 ASSAY OF PSA TOTAL: CPT

## 2018-01-25 ENCOUNTER — OFFICE VISIT (OUTPATIENT)
Dept: FAMILY MEDICINE CLINIC | Facility: CLINIC | Age: 83
End: 2018-01-25
Payer: MEDICARE

## 2018-01-25 VITALS
DIASTOLIC BLOOD PRESSURE: 58 MMHG | TEMPERATURE: 96.3 F | WEIGHT: 162 LBS | SYSTOLIC BLOOD PRESSURE: 114 MMHG | HEART RATE: 70 BPM | HEIGHT: 69 IN | BODY MASS INDEX: 23.99 KG/M2

## 2018-01-25 DIAGNOSIS — K59.00 CONSTIPATION, UNSPECIFIED CONSTIPATION TYPE: ICD-10-CM

## 2018-01-25 DIAGNOSIS — E61.1 IRON DEFICIENCY: Primary | ICD-10-CM

## 2018-01-25 PROBLEM — K59.09 OTHER CONSTIPATION: Status: ACTIVE | Noted: 2018-01-25

## 2018-01-25 PROCEDURE — 99213 OFFICE O/P EST LOW 20 MIN: CPT | Performed by: FAMILY MEDICINE

## 2018-01-25 RX ORDER — ZOLPIDEM TARTRATE 5 MG/1
1 TABLET ORAL DAILY
COMMUNITY
Start: 2013-04-01 | End: 2018-05-10 | Stop reason: SDUPTHER

## 2018-01-25 RX ORDER — ENALAPRIL MALEATE 2.5 MG/1
1 TABLET ORAL 2 TIMES DAILY
COMMUNITY
Start: 2013-06-05 | End: 2018-04-10 | Stop reason: SDUPTHER

## 2018-01-25 RX ORDER — OMEPRAZOLE 40 MG/1
1 CAPSULE, DELAYED RELEASE ORAL DAILY
COMMUNITY
End: 2018-05-10 | Stop reason: SDUPTHER

## 2018-01-25 RX ORDER — SIMVASTATIN 80 MG
1 TABLET ORAL DAILY
COMMUNITY
Start: 2013-09-27 | End: 2018-04-10 | Stop reason: SDUPTHER

## 2018-01-25 RX ORDER — FERROUS SULFATE 325(65) MG
1 TABLET ORAL DAILY
COMMUNITY
Start: 2017-10-31 | End: 2018-08-14 | Stop reason: SDUPTHER

## 2018-01-25 NOTE — ASSESSMENT & PLAN NOTE
Iron deficiency  Patient will have blood work as ordered for further evaluation     We will make further recommendations pending results of test

## 2018-01-25 NOTE — PROGRESS NOTES
FAMILY PRACTICE OFFICE VISIT       NAME: Marixa Moore  AGE: 80 y o  SEX: male       : 1933        MRN: [id number]    DATE: 2018  TIME: 5:00 PM    Assessment and Plan     Problem List Items Addressed This Visit     Constipation     Constipation  I suspect patient's symptoms are mainly related to his iron tablet  He was advised to hold his current dose of ferrous sulfate and have blood work as ordered for iron panel  We will make further recommendations pending results of test          Iron deficiency - Primary     Iron deficiency  Patient will have blood work as ordered for further evaluation  We will make further recommendations pending results of test          Relevant Orders    Iron Panel        Patient was instructed to call should he develops any new symptoms over this next weekend  There are no Patient Instructions on file for this visit  Chief Complaint     Chief Complaint   Patient presents with    Cold Like Symptoms     Sore throat, constipation, vomiting this morning        History of Present Illness     Patient states he will this morning with some scratchy throat and biggest complaint was upset stomach where he had 1 episode of vomiting  He believes his biggest concern is constipation related to his iron tablet  He has been using Colace and laxatives to try and stay regular despite the use of Metamucil  Review of Systems   Review of Systems   Constitutional: Positive for fatigue  HENT: Negative  Respiratory: Negative  Cardiovascular: Negative  Gastrointestinal: Positive for abdominal pain, nausea and vomiting  Negative for constipation and rectal pain  Genitourinary: Negative  Skin: Negative  Active Problem List     Patient Active Problem List   Diagnosis    Constipation    Iron deficiency    Other constipation       Past Medical History:  No past medical history on file      Past Surgical History:  No past surgical history on file     Family History:  No family history on file  Social History:  Social History     Social History    Marital status: /Civil Union     Spouse name: N/A    Number of children: N/A    Years of education: N/A     Occupational History    Not on file  Social History Main Topics    Smoking status: Former Smoker     Quit date: 1991    Smokeless tobacco: Never Used    Alcohol use No    Drug use: No    Sexual activity: Not on file     Other Topics Concern    Not on file     Social History Narrative    No narrative on file       Objective     Vitals:    01/25/18 1617   BP: 114/58   Pulse: 70   Temp: (!) 96 3 °F (35 7 °C)     Wt Readings from Last 3 Encounters:   01/25/18 73 5 kg (162 lb)   07/21/17 74 8 kg (165 lb)   03/17/17 76 2 kg (168 lb)       Physical Exam   Constitutional:   Patient no acute distress   HENT:   Right Ear: External ear normal    Left Ear: External ear normal    Mouth/Throat: Oropharynx is clear and moist    Cardiovascular: Normal rate, regular rhythm and normal heart sounds  Pulmonary/Chest: Effort normal and breath sounds normal    Abdominal: Soft  Bowel sounds are normal  He exhibits no distension  There is no tenderness  There is no rebound and no guarding         Pertinent Laboratory/Diagnostic Studies:  Lab Results   Component Value Date    GLUCOSE 134 03/18/2016    BUN 12 10/17/2017    CREATININE 0 89 10/17/2017    CALCIUM 9 6 07/11/2017     06/30/2017    K 4 3 06/30/2017    CO2 27 06/30/2017     06/30/2017     Lab Results   Component Value Date    ALT 22 07/11/2017    AST 22 07/11/2017    ALKPHOS 55 07/11/2017    BILITOT 0 44 07/11/2017       Lab Results   Component Value Date    WBC 7 44 10/17/2017    HGB 12 3 10/17/2017    HCT 38 0 10/17/2017    MCV 79 (L) 10/17/2017     10/17/2017       No results found for: TSH    Lab Results   Component Value Date    CHOL 164 06/30/2017     Lab Results   Component Value Date    TRIG 135 06/30/2017     Lab Results   Component Value Date    HDL 46 06/30/2017     Lab Results   Component Value Date    LDLCALC 91 06/30/2017     No results found for: HGBA1C    Results for orders placed or performed in visit on 01/16/18   PSA Total, Diagnostic   Result Value Ref Range    PSA 0 1 0 0 - 4 0 ng/mL       No orders of the defined types were placed in this encounter  ALLERGIES:  Allergies   Allergen Reactions    Atorvastatin Myalgia, Dizziness and Headache    Niacin Other (See Comments)     unknown       Current Medications     Current Outpatient Prescriptions   Medication Sig Dispense Refill    aspirin 81 MG tablet Take 1 tablet by mouth daily      enalapril (VASOTEC) 2 5 mg tablet Take 1 tablet by mouth 2 (two) times a day      ferrous sulfate 325 (65 Fe) mg tablet Take 1 tablet by mouth daily      metoprolol tartrate (LOPRESSOR) 25 mg tablet Take 25 mg by mouth 2 (two) times a day      omeprazole (PriLOSEC) 40 MG capsule Take 1 capsule by mouth 2 (two) times a day      sertraline (ZOLOFT) 50 mg tablet Take 1 tablet by mouth daily      simvastatin (ZOCOR) 80 mg tablet Take 1 tablet by mouth daily      zolpidem (AMBIEN) 5 mg tablet Take 1 tablet by mouth daily       No current facility-administered medications for this visit            Health Maintenance     Health Maintenance   Topic Date Due    SLP PLAN OF CARE  1933    DTaP,Tdap,and Td Vaccines (1 - Tdap) 01/16/1940    GLAUCOMA SCREENING 67+ YR  01/16/2000    INFLUENZA VACCINE  Completed    PNEUMOCOCCAL POLYSACCHARIDE VACCINE AGE 65 AND OVER  Completed     Immunization History   Administered Date(s) Administered    Influenza Split High Dose Preservative Free IM 11/10/2014, 01/12/2016, 11/22/2016, 11/08/2017    Influenza TIV (IM) 10/01/2007, 11/01/2009, 11/02/2009, 11/10/2010, 12/17/2012    Pneumococcal Conjugate 13-Valent 01/12/2016    Pneumococcal Polysaccharide PPV23 27/50/8933       Tam Serrano MD

## 2018-01-25 NOTE — ASSESSMENT & PLAN NOTE
Constipation  I suspect patient's symptoms are mainly related to his iron tablet  He was advised to hold his current dose of ferrous sulfate and have blood work as ordered for iron panel    We will make further recommendations pending results of test

## 2018-02-01 ENCOUNTER — APPOINTMENT (OUTPATIENT)
Dept: LAB | Facility: CLINIC | Age: 83
End: 2018-02-01
Payer: MEDICARE

## 2018-02-01 ENCOUNTER — TRANSCRIBE ORDERS (OUTPATIENT)
Dept: LAB | Facility: CLINIC | Age: 83
End: 2018-02-01

## 2018-02-01 DIAGNOSIS — E61.1 IRON DEFICIENCY: ICD-10-CM

## 2018-02-01 LAB
FERRITIN SERPL-MCNC: 36 NG/ML (ref 8–388)
IRON SATN MFR SERPL: 14 %
IRON SERPL-MCNC: 47 UG/DL (ref 65–175)
TIBC SERPL-MCNC: 344 UG/DL (ref 250–450)

## 2018-02-01 PROCEDURE — 83540 ASSAY OF IRON: CPT

## 2018-02-01 PROCEDURE — 36415 COLL VENOUS BLD VENIPUNCTURE: CPT

## 2018-02-01 PROCEDURE — 82728 ASSAY OF FERRITIN: CPT

## 2018-02-01 PROCEDURE — 83550 IRON BINDING TEST: CPT

## 2018-02-06 DIAGNOSIS — R94.31 ABNORMAL ELECTROCARDIOGRAM: ICD-10-CM

## 2018-02-06 DIAGNOSIS — I10 ESSENTIAL (PRIMARY) HYPERTENSION: ICD-10-CM

## 2018-02-06 DIAGNOSIS — I25.10 ATHEROSCLEROTIC HEART DISEASE OF NATIVE CORONARY ARTERY WITHOUT ANGINA PECTORIS: ICD-10-CM

## 2018-02-07 ENCOUNTER — TELEPHONE (OUTPATIENT)
Dept: FAMILY MEDICINE CLINIC | Facility: CLINIC | Age: 83
End: 2018-02-07

## 2018-02-07 NOTE — TELEPHONE ENCOUNTER
----- Message from Charlene Almanza MD sent at 9/2/1765  8:50 AM EST -----  Please call wife  Recent blood test shows iron level still low at 47  Recommended ranges     I would try to continue with current regimen iron tablet once daily

## 2018-02-09 ENCOUNTER — HOSPITAL ENCOUNTER (OUTPATIENT)
Dept: NON INVASIVE DIAGNOSTICS | Facility: CLINIC | Age: 83
Discharge: HOME | End: 2018-02-09
Payer: MEDICARE

## 2018-02-09 DIAGNOSIS — R94.31 ABNORMAL ELECTROCARDIOGRAM: ICD-10-CM

## 2018-02-09 DIAGNOSIS — I10 ESSENTIAL (PRIMARY) HYPERTENSION: ICD-10-CM

## 2018-02-09 DIAGNOSIS — I25.10 ATHEROSCLEROTIC HEART DISEASE OF NATIVE CORONARY ARTERY WITHOUT ANGINA PECTORIS: ICD-10-CM

## 2018-02-09 PROCEDURE — 93306 TTE W/DOPPLER COMPLETE: CPT

## 2018-02-09 PROCEDURE — 93306 TTE W/DOPPLER COMPLETE: CPT | Performed by: INTERNAL MEDICINE

## 2018-02-19 ENCOUNTER — TELEPHONE (OUTPATIENT)
Dept: FAMILY MEDICINE CLINIC | Facility: CLINIC | Age: 83
End: 2018-02-19

## 2018-02-19 DIAGNOSIS — G60.9 IDIOPATHIC PERIPHERAL NEUROPATHY: Primary | ICD-10-CM

## 2018-02-19 RX ORDER — GABAPENTIN 100 MG/1
100 CAPSULE ORAL 3 TIMES DAILY
Qty: 90 CAPSULE | Refills: 3 | Status: SHIPPED | OUTPATIENT
Start: 2018-02-19 | End: 2018-04-12 | Stop reason: SDUPTHER

## 2018-02-19 NOTE — TELEPHONE ENCOUNTER
Re: Gabapentin  I spoke to you about my  last week & you said you would be able to call a prescription into [facility] Can you please?

## 2018-02-19 NOTE — TELEPHONE ENCOUNTER
I will send to pharmacy  Please notify patient's wife with instructions that we discussed earlier  He will start with 1 at bedtime for 5 days if symptoms persist he will increase to 2 tablets at bedtime for 5 days if symptoms persist she will increase to 3 tablets at bedtime    Call if any questions

## 2018-03-28 ENCOUNTER — OFFICE VISIT (OUTPATIENT)
Dept: CARDIOLOGY CLINIC | Facility: CLINIC | Age: 83
End: 2018-03-28
Payer: MEDICARE

## 2018-03-28 VITALS
HEART RATE: 66 BPM | BODY MASS INDEX: 23.62 KG/M2 | SYSTOLIC BLOOD PRESSURE: 130 MMHG | HEIGHT: 70 IN | WEIGHT: 165 LBS | DIASTOLIC BLOOD PRESSURE: 76 MMHG

## 2018-03-28 DIAGNOSIS — I10 ESSENTIAL (PRIMARY) HYPERTENSION: Primary | ICD-10-CM

## 2018-03-28 DIAGNOSIS — R94.31 ABNORMAL EKG: ICD-10-CM

## 2018-03-28 DIAGNOSIS — E78.00 PURE HYPERCHOLESTEROLEMIA: ICD-10-CM

## 2018-03-28 DIAGNOSIS — I25.10 CORONARY ARTERIOSCLEROSIS: ICD-10-CM

## 2018-03-28 PROCEDURE — 99214 OFFICE O/P EST MOD 30 MIN: CPT | Performed by: INTERNAL MEDICINE

## 2018-03-28 NOTE — PROGRESS NOTES
Cardiology Follow Up    George Kebede  1933  [id number]  [address] [facility]  [address] [place] [address] [address]    1  Essential (primary) hypertension     2  Pure hypercholesterolemia     3  Coronary arteriosclerosis     4  Abnormal EKG         Interval History:  The patient is here for a follow-up visit  He was last seen by me in March of last year  He has a prior history of remote coronary artery bypass graft surgery more than 10 years ago  His echocardiogram done in February of this year demonstrated preserved LV systolic function with an inferior wall motion abnormality  He had mild concentric left ventricular hypertrophy and mild mitral regurgitation with no significant change compared to a prior study done June of 2015  He has been feeling well  He has had no chest pain or significant dyspnea  As usual he is here today with his wife  Patient Active Problem List   Diagnosis    Constipation    Iron deficiency    Other constipation     No past medical history on file  Social History     Social History    Marital status: /Civil Union     Spouse name: N/A    Number of children: N/A    Years of education: N/A     Occupational History    Not on file  Social History Main Topics    Smoking status: Former Smoker     Quit date: 1991    Smokeless tobacco: Never Used    Alcohol use No    Drug use: No    Sexual activity: Not on file     Other Topics Concern    Not on file     Social History Narrative    No narrative on file      No family history on file  No past surgical history on file      Current Outpatient Prescriptions:     aspirin 81 MG tablet, Take 1 tablet by mouth daily, Disp: , Rfl:     enalapril (VASOTEC) 2 5 mg tablet, Take 1 tablet by mouth 2 (two) times a day, Disp: , Rfl:     ferrous sulfate 325 (65 Fe) mg tablet, Take 1 tablet by mouth daily, Disp: , Rfl:     gabapentin (NEURONTIN) 100 mg capsule, Take 1 capsule (100 mg total) by mouth 3 (three) times a day, Disp: 90 capsule, Rfl: 3    metoprolol tartrate (LOPRESSOR) 25 mg tablet, Take 25 mg by mouth 2 (two) times a day, Disp: , Rfl:     omeprazole (PriLOSEC) 40 MG capsule, Take 1 capsule by mouth 2 (two) times a day, Disp: , Rfl:     sertraline (ZOLOFT) 50 mg tablet, Take 1 tablet by mouth daily, Disp: , Rfl:     simvastatin (ZOCOR) 80 mg tablet, Take 1 tablet by mouth daily, Disp: , Rfl:     zolpidem (AMBIEN) 5 mg tablet, Take 1 tablet by mouth daily, Disp: , Rfl:   Allergies   Allergen Reactions    Atorvastatin Myalgia, Dizziness and Headache    Niacin Other (See Comments)     unknown       Labs:not applicable  Imaging: No results found  Review of Systems:  Review of Systems   All other systems reviewed and are negative  Physical Exam:  Physical Exam   Constitutional: He is oriented to person, place, and time  He appears well-developed and well-nourished  HENT:   Head: Normocephalic and atraumatic  Eyes: Conjunctivae are normal  Pupils are equal, round, and reactive to light  Neck: Normal range of motion  Neck supple  Cardiovascular: Normal rate and normal heart sounds  Pulmonary/Chest: Effort normal and breath sounds normal    Neurological: He is alert and oriented to person, place, and time  Skin: Skin is warm and dry  Psychiatric: He has a normal mood and affect  Vitals reviewed  Discussion/Summary:I will continue the patient's present medical regimen  Patient appears well compensated  I have asked the patient to call if there is a problem in the interim otherwise I will see the patient in one years time

## 2018-03-29 ENCOUNTER — OFFICE VISIT (OUTPATIENT)
Dept: FAMILY MEDICINE CLINIC | Facility: CLINIC | Age: 83
End: 2018-03-29
Payer: MEDICARE

## 2018-03-29 VITALS
HEART RATE: 72 BPM | TEMPERATURE: 96.9 F | DIASTOLIC BLOOD PRESSURE: 50 MMHG | SYSTOLIC BLOOD PRESSURE: 102 MMHG | WEIGHT: 165 LBS | BODY MASS INDEX: 24.44 KG/M2 | RESPIRATION RATE: 16 BRPM | HEIGHT: 69 IN

## 2018-03-29 DIAGNOSIS — E61.1 IRON DEFICIENCY: Primary | ICD-10-CM

## 2018-03-29 DIAGNOSIS — G60.9 IDIOPATHIC PERIPHERAL NEUROPATHY: ICD-10-CM

## 2018-03-29 DIAGNOSIS — I10 BENIGN ESSENTIAL HYPERTENSION: ICD-10-CM

## 2018-03-29 DIAGNOSIS — D50.9 IRON DEFICIENCY ANEMIA, UNSPECIFIED IRON DEFICIENCY ANEMIA TYPE: ICD-10-CM

## 2018-03-29 PROBLEM — D64.9 ANEMIA: Status: ACTIVE | Noted: 2017-06-30

## 2018-03-29 PROBLEM — E53.8 VITAMIN B12 DEFICIENCY: Status: ACTIVE | Noted: 2017-07-21

## 2018-03-29 PROCEDURE — G0439 PPPS, SUBSEQ VISIT: HCPCS | Performed by: FAMILY MEDICINE

## 2018-03-29 PROCEDURE — 99214 OFFICE O/P EST MOD 30 MIN: CPT | Performed by: FAMILY MEDICINE

## 2018-03-29 NOTE — PROGRESS NOTES
HPI:  Mata Maxwell is a 80 y o  male here for his Subsequent Wellness Visit  Patient Active Problem List   Diagnosis    Constipation    Iron deficiency    Other constipation     No past medical history on file  No past surgical history on file  No family history on file  History   Smoking Status    Former Smoker    Quit date: 1991   Smokeless Tobacco    Never Used     History   Alcohol Use No      History   Drug Use No     /50 (BP Location: Left arm, Patient Position: Sitting, Cuff Size: Standard)   Pulse 72   Temp (!) 96 9 °F (36 1 °C) (Tympanic)   Resp 16   Ht 5' 9" (1 753 m)   Wt 74 8 kg (165 lb)   BMI 24 37 kg/m²       Current Outpatient Prescriptions   Medication Sig Dispense Refill    aspirin 81 MG tablet Take 1 tablet by mouth daily      enalapril (VASOTEC) 2 5 mg tablet Take 1 tablet by mouth 2 (two) times a day      ferrous sulfate 325 (65 Fe) mg tablet Take 1 tablet by mouth daily      gabapentin (NEURONTIN) 100 mg capsule Take 1 capsule (100 mg total) by mouth 3 (three) times a day 90 capsule 3    metoprolol tartrate (LOPRESSOR) 25 mg tablet Take 25 mg by mouth 2 (two) times a day      omeprazole (PriLOSEC) 40 MG capsule Take 1 capsule by mouth daily        sertraline (ZOLOFT) 50 mg tablet Take 1 tablet by mouth daily      simvastatin (ZOCOR) 80 mg tablet Take 1 tablet by mouth daily      zolpidem (AMBIEN) 5 mg tablet Take 1 tablet by mouth daily       No current facility-administered medications for this visit        Allergies   Allergen Reactions    Atorvastatin Myalgia, Dizziness and Headache    Niacin Other (See Comments)     unknown     Immunization History   Administered Date(s) Administered    Influenza Split High Dose Preservative Free IM 11/10/2014, 01/12/2016, 11/22/2016, 11/08/2017    Influenza TIV (IM) 10/01/2007, 11/01/2009, 11/02/2009, 11/10/2010, 12/17/2012    Pneumococcal Conjugate 13-Valent 01/12/2016    Pneumococcal Polysaccharide PPV23 10/01/2007       Patient Care Team:  Sana Fry MD as PCP - MD Sana Castaneda MD Bobetta Carrion, MD    Medicare Screening Tests and Risk Assessments:  AWV Clinical     ISAR:   Previous hospitalizations?:  No       Once in a Lifetime Medicare Screening:   EKG performed?:  No    AAA screening performed? (if performed, please add date to Health Maintenance):  No       Medicare Screening Tests and Risk Assessment:   AAA Risk Assessment    Age over 72 (males only):  Yes    Osteoporosis Risk Assessment     Female:  No   :  Yes :  No   Age over 48:  Yes Low body weight (<127lbs):  No   Tobacco use:  No Alcohol use:  Yes   Low calcium diet:  No PMHX of fractures:  Yes   FHX of fractures:  Yes    HIV Risk Assessment    None indicated:  Yes        Drug and Alcohol Use:   Tobacco use    Cigarettes:  former smoker    Quit date:  1/1/1991   Smokeless:  never used smokeless tobacco    Tobacco use duration    Tobacco Cessation Readiness    Alcohol use    Alcohol use:  rare use    Concern about alcohol use:  No    Alcohol Treatment Readiness   Illicit Drug Use    Drug use:  never    Drug type:  no sedative use       Diet & Exercise:   Diet   What is your diet?:  Regular, Low Saturated Fat, No Added Salt   How many servings a day of the following:   Fruits and Vegetables:  1-2 Meat:  0   Whole Grains:  1 Simple Carbs:  0   Dairy:  0 Soda:  0   Coffee:  2 Tea:  1   Exercise    Do you currently exercise?:  yes    Minutes per day:  45   Times per week:  3     Type of exercise:  walking, cardio, weights       Cognitive Impairment Screening:   Depression screening preformed:  Yes     PHQ-9 Depression scale score:  5   Depression screening results:  mild to moderate symptoms   Cognitive Impairment Screening    Do you have difficulty learning or retaining new information?:  No Do you have difficulty handling new tasks?:  No   Do you have difficulty with reasoning?:  No Do you have difficulty with spatial ability and orientation?:  No   Do you have difficulty with language?:  No Do you have difficulty with behavior?:  No       Functional Ability/Level of Safety:   Hearing    Hearing difficulties:  Yes    Left:  slightly decreased Right:  significantly decreased   Hearing aid:  Yes    Hearing Impairment Assessment    Hearing status:  Hard of hearing   Current Activities    Status:  unlimited ADL's, unlimited IADL's, unlimited social activities, unlimited driving   Help needed with the folllowing:    Using the phone:  No Transportation:  No   Shopping:  No Preparing Meals:  No   Doing Housework:  No Doing Laundry:  No   Managing Medications:  No Managing Money:  No   ADL    Feeding:  Independant   Oral hygiene and Facial grooming:  Independant   Bathing:  Independant   Upper Body Dressing:  Independant   Lower Body Dressing:  Independant   Toileting:  Independant   Bed Mobility:  Independant   Fall Risk   Have you fallen in the last 12 months?:  No Are you unsteady on your feet?:  Yes    Are you taking any medications that may cause fatigue or dizziness?:  Yes   Do you have any chronic conditions that may contribute to a fall?:  Neuopathy, Arthritis Do you rush to the bathroom potentially risking a fall?:  No   Injury History   Polypharmacy:  Yes Antidepressant Use:  Yes   Sedative Use:  No Antihypertensive Use:  Yes   Previous Fall:  No Alcohol Use:  Yes   Deconditioning:  Yes Visual Impairment:  No   Cogitive Impairment:  No Mmobility Impairment:  Yes    Urinary Incontinence:  Yes       Home Safety:   Are there hazards in your environment?:  No   Home Safety Risk Factors   Unfamilar with surroundings:  No Uneven floors:  No   Stairs or handrail saftey risk:  No Loose rugs:  No   Household clutter:  No Poor household lighting:  No   No grab bars in bathroom:  No Further evaluation needed:  No       Advanced Directives:   Advanced Directives    Living Will:  Yes Durable POA for healthcare:   Yes   Advanced directive:  Yes    Patient's End of Life Decisions        Urinary Incontinence:   Do you have urinary incontinence?:  No        Glaucoma:            Provider Screening     Preventative Screening/Counseling:   Cardiovascular Screening/Counseling:   (Labs Q5 years, EKG optional one-time)         Diabetes Screening/Counseling:   (2 tests/year if Pre-Diabetes or 1 test/year if no Diabetes)         Colorectal Cancer Screening/Counseling:   (FOBT Q1 yr; Flex Sig Q4 yrs or Q10 yrs after Screening Colonoscopy; Screening Colonoscpy Q2 yrs High Risk or Q10 yrs Low Risk; Barium Enema Q2 yrs High Risk or Q4 yrs Low Risk)         Prostate Cancer Screening/Counseling:   (Annual)          Breast Cancer Screening/Counseling:   (Baseline Age 28 - 43; Annual Age 36+)         Cervical Cancer Screening/Counseling:   (Annual for High Risk or Childbearing Age with Abnormal Pap in Last 3 yrs; Every 2 all others)         Osteoporosis Screening/Counseling:   (Every 2 Yrs if at risk or more if medically necessary)         AAA Screening/Counseling:   (Once per Lifetime with risk factors)     Age over 72 (males only):  Yes          Glaucoma Screening/Counseling:   (Annual)         HIV Screening/Counseling:   (Voluntary; Once annually for high risk OR 3 times for Pregnancy at diagnosis of IUP; 3rd trimester; and at Labor         Hepatitis C Screening:             Immunizations: Other Preventative Couseling (Non-Medicare Wellness Visit Required):       Referrals (Non-Medicare Wellness Visit Required):       Medical Equipment/Suppliers:           No exam data present    Physical Exam : MMSE=30  Physical Exam    Reviewed Updated [facility] Prior Wellness Visits:   Last Medicare wellness visit information was reviewed, patient interviewed , no change since last AWVyes  Last Medicare wellness visit information was reviewed, patient interviewed and updates made to the record today yes    Assessment and Plan:  No diagnosis found      Health Maintenance Due   Topic Date Due    SLP PLAN OF CARE  1933    DTaP,Tdap,and Td Vaccines (1 - Tdap) 01/16/1954    Fall Risk  01/16/1998    GLAUCOMA SCREENING 67+ YR  01/16/2000

## 2018-03-30 NOTE — ASSESSMENT & PLAN NOTE
Anemia  Patient with history of low iron anemia  He will obtain blood work as ordered for further evaluation    He will continue with his iron supplement once daily and counteract constipation by using over-the-counter Colace once daily and continue with his Citrucel use once daily

## 2018-03-30 NOTE — PROGRESS NOTES
FAMILY PRACTICE OFFICE VISIT       NAME: Francesca Miller  AGE: 80 y o  SEX: male       : 1933        MRN: [id number]    DATE: 3/29/2018  TIME: 8:23 PM    Assessment and Plan     Problem List Items Addressed This Visit     Iron deficiency - Primary    Anemia     Anemia  Patient with history of low iron anemia  He will obtain blood work as ordered for further evaluation  He will continue with his iron supplement once daily and counteract constipation by using over-the-counter Colace once daily and continue with his Citrucel use once daily         Benign essential hypertension     Hypertension  Patient's blood pressure is stable at this time he will continue with current regimen of medications         Idiopathic peripheral neuropathy     Neuropathy  I had a long discussion with the patient and his wife regarding titrating gabapentin by 100 mg every 5 days until symptoms improved and patient is sleeping better or he reaches 600 mg at bedtime  There are no Patient Instructions on file for this visit  Chief Complaint     Chief Complaint   Patient presents with    Follow-up    Medicare Wellness Visit     subsequent       History of Present Illness     Patient states he continues to have difficulties with sleeping at night despite using 300 mg of gabapentin  He does get intermittent neuropathy symptoms  Unfortunately patient often plays on a computer or watching television prior to trying to go to bed at 11 o'clock at night and often ends up staying awake longer hours  Review of Systems   Review of Systems   Constitutional: Positive for fatigue  Negative for unexpected weight change  HENT: Negative  Respiratory: Negative  Cardiovascular: Negative  Gastrointestinal: Negative  Musculoskeletal:        Chronic intermittent back pain from degenerative joint disease   Neurological: Positive for weakness and numbness     Psychiatric/Behavioral: Positive for sleep disturbance  The patient is nervous/anxious  Active Problem List     Patient Active Problem List   Diagnosis    Constipation    Iron deficiency    Other constipation    Anemia    Arteriosclerotic cardiovascular disease    Backache    Benign essential hypertension    Cervical spinal stenosis    Depression    Esophageal reflux    Hyperlipidemia    Insomnia    Spinal stenosis of lumbar region with neurogenic claudication    Vitamin B12 deficiency    Idiopathic peripheral neuropathy    Iron deficiency anemia       Past Medical History:  No past medical history on file  Past Surgical History:  No past surgical history on file  Family History:  No family history on file  Social History:  Social History     Social History    Marital status: /Civil Union     Spouse name: N/A    Number of children: N/A    Years of education: N/A     Occupational History    Not on file  Social History Main Topics    Smoking status: Former Smoker     Quit date: 1991    Smokeless tobacco: Never Used    Alcohol use No    Drug use: No    Sexual activity: Not on file     Other Topics Concern    Not on file     Social History Narrative    No narrative on file       Objective     Vitals:    03/29/18 1428   BP: 102/50   Pulse: 72   Resp: 16   Temp: (!) 96 9 °F (36 1 °C)     Wt Readings from Last 3 Encounters:   03/29/18 74 8 kg (165 lb)   03/28/18 74 8 kg (165 lb)   01/25/18 73 5 kg (162 lb)       Physical Exam   Constitutional: No distress  HENT:   Mouth/Throat: Oropharynx is clear and moist  No oropharyngeal exudate  Neck:   No carotid bruits   Cardiovascular:   Regular rate and rhythm with no murmurs   Pulmonary/Chest:   Lungs are clear to auscultation without wheezes,rales, or rhonchi   Musculoskeletal: He exhibits no edema  Lymphadenopathy:     He has no cervical adenopathy  Psychiatric: He has a normal mood and affect   His behavior is normal  Judgment and thought content normal  Pertinent Laboratory/Diagnostic Studies:  Lab Results   Component Value Date    GLUCOSE 134 03/18/2016    BUN 12 10/17/2017    CREATININE 0 89 10/17/2017    CALCIUM 9 6 07/11/2017     06/30/2017    K 4 3 06/30/2017    CO2 27 06/30/2017     06/30/2017     Lab Results   Component Value Date    ALT 22 07/11/2017    AST 22 07/11/2017    ALKPHOS 55 07/11/2017    BILITOT 0 44 07/11/2017       Lab Results   Component Value Date    WBC 7 44 10/17/2017    HGB 12 3 10/17/2017    HCT 38 0 10/17/2017    MCV 79 (L) 10/17/2017     10/17/2017       No results found for: TSH    Lab Results   Component Value Date    CHOL 164 06/30/2017     Lab Results   Component Value Date    TRIG 135 06/30/2017     Lab Results   Component Value Date    HDL 46 06/30/2017     Lab Results   Component Value Date    LDLCALC 91 06/30/2017     No results found for: HGBA1C    Results for orders placed or performed in visit on 02/01/18   Iron Saturation %   Result Value Ref Range    Iron Saturation 14 %    TIBC 344 250 - 450 ug/dL    Iron 47 (L) 65 - 175 ug/dL   Ferritin   Result Value Ref Range    Ferritin 36 8 - 388 ng/mL       No orders of the defined types were placed in this encounter        ALLERGIES:  Allergies   Allergen Reactions    Atorvastatin Myalgia, Dizziness and Headache    Niacin Other (See Comments)     unknown       Current Medications     Current Outpatient Prescriptions   Medication Sig Dispense Refill    aspirin 81 MG tablet Take 1 tablet by mouth daily      enalapril (VASOTEC) 2 5 mg tablet Take 1 tablet by mouth 2 (two) times a day      ferrous sulfate 325 (65 Fe) mg tablet Take 1 tablet by mouth daily      gabapentin (NEURONTIN) 100 mg capsule Take 1 capsule (100 mg total) by mouth 3 (three) times a day 90 capsule 3    metoprolol tartrate (LOPRESSOR) 25 mg tablet Take 25 mg by mouth 2 (two) times a day      omeprazole (PriLOSEC) 40 MG capsule Take 1 capsule by mouth daily        sertraline (ZOLOFT) 50 mg tablet Take 1 tablet by mouth daily      simvastatin (ZOCOR) 80 mg tablet Take 1 tablet by mouth daily      zolpidem (AMBIEN) 5 mg tablet Take 1 tablet by mouth daily       No current facility-administered medications for this visit            Health Maintenance     Health Maintenance   Topic Date Due    SLP PLAN OF CARE  1933    DTaP,Tdap,and Td Vaccines (1 - Tdap) 01/16/1954    Fall Risk  01/16/1998    GLAUCOMA SCREENING 67+ YR  01/16/2000    Depression Screening PHQ-9  01/25/2019    INFLUENZA VACCINE  Completed    PNEUMOCOCCAL POLYSACCHARIDE VACCINE AGE 72 AND OVER  Completed     Immunization History   Administered Date(s) Administered    Influenza Split High Dose Preservative Free IM 11/10/2014, 01/12/2016, 11/22/2016, 11/08/2017    Influenza TIV (IM) 10/01/2007, 11/01/2009, 11/02/2009, 11/10/2010, 12/17/2012    Pneumococcal Conjugate 13-Valent 01/12/2016    Pneumococcal Polysaccharide PPV23 28/99/9958       Brigid Diaz MD

## 2018-03-30 NOTE — ASSESSMENT & PLAN NOTE
Neuropathy  I had a long discussion with the patient and his wife regarding titrating gabapentin by 100 mg every 5 days until symptoms improved and patient is sleeping better or he reaches 600 mg at bedtime

## 2018-03-30 NOTE — ASSESSMENT & PLAN NOTE
Hypertension    Patient's blood pressure is stable at this time he will continue with current regimen of medications

## 2018-04-09 ENCOUNTER — TELEPHONE (OUTPATIENT)
Dept: FAMILY MEDICINE CLINIC | Facility: CLINIC | Age: 83
End: 2018-04-09

## 2018-04-09 NOTE — TELEPHONE ENCOUNTER
Re: gabapentin     Mrs Alessandra Mancilla wants to remind you that when it's time to refill this medication that you had stated you would up the mg to 300 mgs to see if that would work better for her   Also she stated that when they were here in March that you didn't give either of them a script to have labwork done  Were you going to wait for their next appointment to have it done or did you forget to give it to them  If you forgot, can you print it & take it next door to the lab so they can go get it done  Please call to advise

## 2018-04-10 DIAGNOSIS — E78.00 PURE HYPERCHOLESTEROLEMIA: ICD-10-CM

## 2018-04-10 DIAGNOSIS — I10 ESSENTIAL (PRIMARY) HYPERTENSION: Primary | ICD-10-CM

## 2018-04-10 RX ORDER — SIMVASTATIN 80 MG
TABLET ORAL DAILY
Qty: 90 TABLET | Refills: 3 | Status: SHIPPED | OUTPATIENT
Start: 2018-04-10 | End: 2019-06-11 | Stop reason: SDUPTHER

## 2018-04-10 RX ORDER — ENALAPRIL MALEATE 2.5 MG/1
TABLET ORAL
Qty: 180 TABLET | Refills: 3 | Status: SHIPPED | OUTPATIENT
Start: 2018-04-10 | End: 2018-05-30 | Stop reason: HOSPADM

## 2018-04-12 ENCOUNTER — TELEPHONE (OUTPATIENT)
Dept: FAMILY MEDICINE CLINIC | Facility: CLINIC | Age: 83
End: 2018-04-12

## 2018-04-12 DIAGNOSIS — G60.9 IDIOPATHIC PERIPHERAL NEUROPATHY: ICD-10-CM

## 2018-04-12 RX ORDER — GABAPENTIN 300 MG/1
CAPSULE ORAL
Qty: 180 CAPSULE | Refills: 3 | Status: SHIPPED | OUTPATIENT
Start: 2018-04-12 | End: 2018-04-12 | Stop reason: SDUPTHER

## 2018-04-12 RX ORDER — GABAPENTIN 300 MG/1
CAPSULE ORAL
Qty: 20 CAPSULE | Refills: 0 | Status: ON HOLD | OUTPATIENT
Start: 2018-04-12 | End: 2018-05-30

## 2018-04-12 NOTE — TELEPHONE ENCOUNTER
Re: Gabapentin  Mrs Angela Sauer states she called the other day also & her  only has enough for 1 day because he's been taking 600 mgs & they go quicker than just taking 300 mgs  Can you call a prescription into [facility] but a small supply to to [facility] in [place] to last til the 90 day supply mail order comes  Please call

## 2018-04-23 ENCOUNTER — LAB (OUTPATIENT)
Dept: LAB | Facility: CLINIC | Age: 83
End: 2018-04-23
Payer: MEDICARE

## 2018-04-23 ENCOUNTER — TRANSCRIBE ORDERS (OUTPATIENT)
Dept: LAB | Facility: CLINIC | Age: 83
End: 2018-04-23

## 2018-04-23 DIAGNOSIS — C61 MALIGNANT NEOPLASM OF PROSTATE (HCC): ICD-10-CM

## 2018-04-23 DIAGNOSIS — C61 MALIGNANT NEOPLASM OF PROSTATE (HCC): Primary | ICD-10-CM

## 2018-04-23 LAB — PSA SERPL-MCNC: <0.1 NG/ML (ref 0–4)

## 2018-04-23 PROCEDURE — 36415 COLL VENOUS BLD VENIPUNCTURE: CPT

## 2018-04-23 PROCEDURE — G0103 PSA SCREENING: HCPCS

## 2018-05-10 DIAGNOSIS — G47.00 INSOMNIA, UNSPECIFIED TYPE: Primary | ICD-10-CM

## 2018-05-10 DIAGNOSIS — K21.9 GASTROESOPHAGEAL REFLUX DISEASE, ESOPHAGITIS PRESENCE NOT SPECIFIED: ICD-10-CM

## 2018-05-10 RX ORDER — OMEPRAZOLE 40 MG/1
40 CAPSULE, DELAYED RELEASE ORAL DAILY
Qty: 90 CAPSULE | Refills: 0 | Status: SHIPPED | OUTPATIENT
Start: 2018-05-10 | End: 2018-06-28 | Stop reason: SDUPTHER

## 2018-05-10 RX ORDER — ZOLPIDEM TARTRATE 5 MG/1
5 TABLET ORAL DAILY
Qty: 90 TABLET | Refills: 0 | Status: SHIPPED | OUTPATIENT
Start: 2018-05-10 | End: 2018-07-02

## 2018-05-21 ENCOUNTER — APPOINTMENT (EMERGENCY)
Dept: RADIOLOGY | Facility: HOSPITAL | Age: 83
DRG: 057 | End: 2018-05-21
Payer: MEDICARE

## 2018-05-21 ENCOUNTER — HOSPITAL ENCOUNTER (INPATIENT)
Facility: HOSPITAL | Age: 83
LOS: 1 days | Discharge: HOME | DRG: 057 | End: 2018-05-23
Attending: EMERGENCY MEDICINE | Admitting: INTERNAL MEDICINE
Payer: MEDICARE

## 2018-05-21 ENCOUNTER — OFFICE VISIT (OUTPATIENT)
Dept: FAMILY MEDICINE CLINIC | Facility: CLINIC | Age: 83
End: 2018-05-21
Payer: MEDICARE

## 2018-05-21 VITALS
TEMPERATURE: 96.6 F | RESPIRATION RATE: 18 BRPM | WEIGHT: 167.4 LBS | DIASTOLIC BLOOD PRESSURE: 74 MMHG | SYSTOLIC BLOOD PRESSURE: 150 MMHG | HEIGHT: 69 IN | BODY MASS INDEX: 24.79 KG/M2 | HEART RATE: 66 BPM

## 2018-05-21 DIAGNOSIS — R41.82 ALTERED MENTAL STATUS: Primary | ICD-10-CM

## 2018-05-21 DIAGNOSIS — G93.89 FRONTAL MASS OF BRAIN: ICD-10-CM

## 2018-05-21 DIAGNOSIS — R41.0 CONFUSION: Primary | ICD-10-CM

## 2018-05-21 PROBLEM — Z86.018 HISTORY OF MENINGIOMA: Status: ACTIVE | Noted: 2018-05-21

## 2018-05-21 LAB
ALBUMIN SERPL BCP-MCNC: 3.7 G/DL (ref 3.5–5)
ALP SERPL-CCNC: 59 U/L (ref 46–116)
ALT SERPL W P-5'-P-CCNC: 24 U/L (ref 12–78)
ANION GAP SERPL CALCULATED.3IONS-SCNC: 5 MMOL/L (ref 4–13)
AST SERPL W P-5'-P-CCNC: 19 U/L (ref 5–45)
ATRIAL RATE: 67 BPM
BASOPHILS # BLD AUTO: 0.03 THOUSANDS/ΜL (ref 0–0.1)
BASOPHILS NFR BLD AUTO: 0 % (ref 0–1)
BILIRUB SERPL-MCNC: 0.49 MG/DL (ref 0.2–1)
BILIRUB UR QL STRIP: NEGATIVE
BUN SERPL-MCNC: 12 MG/DL (ref 5–25)
CALCIUM SERPL-MCNC: 9.7 MG/DL (ref 8.3–10.1)
CHLORIDE SERPL-SCNC: 106 MMOL/L (ref 100–108)
CLARITY UR: CLEAR
CO2 SERPL-SCNC: 27 MMOL/L (ref 21–32)
COLOR UR: YELLOW
COLOR, POC: NORMAL
CREAT SERPL-MCNC: 0.92 MG/DL (ref 0.6–1.3)
EOSINOPHIL # BLD AUTO: 0.11 THOUSAND/ΜL (ref 0–0.61)
EOSINOPHIL NFR BLD AUTO: 2 % (ref 0–6)
ERYTHROCYTE [DISTWIDTH] IN BLOOD BY AUTOMATED COUNT: 14.4 % (ref 11.6–15.1)
GFR SERPL CREATININE-BSD FRML MDRD: 76 ML/MIN/1.73SQ M
GLUCOSE SERPL-MCNC: 127 MG/DL (ref 65–140)
GLUCOSE UR STRIP-MCNC: NEGATIVE MG/DL
HCT VFR BLD AUTO: 37.1 % (ref 36.5–49.3)
HGB BLD-MCNC: 12.7 G/DL (ref 12–17)
HGB UR QL STRIP.AUTO: NEGATIVE
KETONES UR STRIP-MCNC: NEGATIVE MG/DL
LEUKOCYTE ESTERASE UR QL STRIP: NEGATIVE
LYMPHOCYTES # BLD AUTO: 2.31 THOUSANDS/ΜL (ref 0.6–4.47)
LYMPHOCYTES NFR BLD AUTO: 34 % (ref 14–44)
MCH RBC QN AUTO: 27.5 PG (ref 26.8–34.3)
MCHC RBC AUTO-ENTMCNC: 34.2 G/DL (ref 31.4–37.4)
MCV RBC AUTO: 80 FL (ref 82–98)
MONOCYTES # BLD AUTO: 0.49 THOUSAND/ΜL (ref 0.17–1.22)
MONOCYTES NFR BLD AUTO: 7 % (ref 4–12)
NEUTROPHILS # BLD AUTO: 3.93 THOUSANDS/ΜL (ref 1.85–7.62)
NEUTS SEG NFR BLD AUTO: 57 % (ref 43–75)
NITRITE UR QL STRIP: NEGATIVE
NRBC BLD AUTO-RTO: 0 /100 WBCS
P AXIS: 34 DEGREES
PH UR STRIP.AUTO: 7.5 [PH] (ref 4.5–8)
PLATELET # BLD AUTO: 173 THOUSANDS/UL (ref 149–390)
PMV BLD AUTO: 10.1 FL (ref 8.9–12.7)
POTASSIUM SERPL-SCNC: 4.2 MMOL/L (ref 3.5–5.3)
PR INTERVAL: 184 MS
PROT SERPL-MCNC: 6.9 G/DL (ref 6.4–8.2)
PROT UR STRIP-MCNC: NEGATIVE MG/DL
QRS AXIS: -2 DEGREES
QRSD INTERVAL: 108 MS
QT INTERVAL: 432 MS
QTC INTERVAL: 456 MS
RBC # BLD AUTO: 4.62 MILLION/UL (ref 3.88–5.62)
SL AMB  POCT GLUCOSE, UA: NORMAL
SL AMB LEUKOCYTE ESTERASE,UA: NORMAL
SL AMB POCT BILIRUBIN,UA: NORMAL
SL AMB POCT BLOOD,UA: NORMAL
SL AMB POCT CLARITY,UA: CLEAR
SL AMB POCT COLOR,UA: YELLOW
SL AMB POCT KETONES,UA: NORMAL
SL AMB POCT NITRITE,UA: NORMAL
SL AMB POCT PH,UA: 6
SL AMB POCT SPECIFIC GRAVITY,UA: 1.01
SL AMB POCT URINE PROTEIN: NORMAL
SL AMB POCT UROBILINOGEN: 0.2
SODIUM SERPL-SCNC: 138 MMOL/L (ref 136–145)
SP GR UR STRIP.AUTO: 1.01 (ref 1–1.03)
T WAVE AXIS: 26 DEGREES
TROPONIN I SERPL-MCNC: <0.02 NG/ML
TSH SERPL DL<=0.05 MIU/L-ACNC: 1.88 UIU/ML (ref 0.36–3.74)
UROBILINOGEN UR QL STRIP.AUTO: 0.2 E.U./DL
VENTRICULAR RATE: 67 BPM
WBC # BLD AUTO: 6.88 THOUSAND/UL (ref 4.31–10.16)

## 2018-05-21 PROCEDURE — 81002 URINALYSIS NONAUTO W/O SCOPE: CPT | Performed by: NURSE PRACTITIONER

## 2018-05-21 PROCEDURE — 71046 X-RAY EXAM CHEST 2 VIEWS: CPT

## 2018-05-21 PROCEDURE — 70470 CT HEAD/BRAIN W/O & W/DYE: CPT

## 2018-05-21 PROCEDURE — 80053 COMPREHEN METABOLIC PANEL: CPT | Performed by: EMERGENCY MEDICINE

## 2018-05-21 PROCEDURE — 93010 ELECTROCARDIOGRAM REPORT: CPT | Performed by: INTERNAL MEDICINE

## 2018-05-21 PROCEDURE — 99213 OFFICE O/P EST LOW 20 MIN: CPT | Performed by: NURSE PRACTITIONER

## 2018-05-21 PROCEDURE — 84484 ASSAY OF TROPONIN QUANT: CPT | Performed by: EMERGENCY MEDICINE

## 2018-05-21 PROCEDURE — 93005 ELECTROCARDIOGRAM TRACING: CPT

## 2018-05-21 PROCEDURE — 81003 URINALYSIS AUTO W/O SCOPE: CPT

## 2018-05-21 PROCEDURE — 99285 EMERGENCY DEPT VISIT HI MDM: CPT

## 2018-05-21 PROCEDURE — 99220 PR INITIAL OBSERVATION CARE/DAY 70 MINUTES: CPT | Performed by: INTERNAL MEDICINE

## 2018-05-21 PROCEDURE — 85025 COMPLETE CBC W/AUTO DIFF WBC: CPT | Performed by: EMERGENCY MEDICINE

## 2018-05-21 PROCEDURE — 84443 ASSAY THYROID STIM HORMONE: CPT | Performed by: INTERNAL MEDICINE

## 2018-05-21 PROCEDURE — 36415 COLL VENOUS BLD VENIPUNCTURE: CPT

## 2018-05-21 PROCEDURE — 81002 URINALYSIS NONAUTO W/O SCOPE: CPT | Performed by: STUDENT IN AN ORGANIZED HEALTH CARE EDUCATION/TRAINING PROGRAM

## 2018-05-21 RX ORDER — NICOTINE 21 MG/24HR
1 PATCH, TRANSDERMAL 24 HOURS TRANSDERMAL DAILY
Status: DISCONTINUED | OUTPATIENT
Start: 2018-05-22 | End: 2018-05-23 | Stop reason: HOSPADM

## 2018-05-21 RX ORDER — ACETAMINOPHEN 325 MG/1
650 TABLET ORAL EVERY 6 HOURS PRN
Status: DISCONTINUED | OUTPATIENT
Start: 2018-05-21 | End: 2018-05-23 | Stop reason: HOSPADM

## 2018-05-21 RX ORDER — PANTOPRAZOLE SODIUM 40 MG/1
40 TABLET, DELAYED RELEASE ORAL
Status: DISCONTINUED | OUTPATIENT
Start: 2018-05-22 | End: 2018-05-23 | Stop reason: HOSPADM

## 2018-05-21 RX ORDER — ONDANSETRON 2 MG/ML
4 INJECTION INTRAMUSCULAR; INTRAVENOUS EVERY 6 HOURS PRN
Status: DISCONTINUED | OUTPATIENT
Start: 2018-05-21 | End: 2018-05-23 | Stop reason: HOSPADM

## 2018-05-21 RX ORDER — GABAPENTIN 300 MG/1
600 CAPSULE ORAL
Status: DISCONTINUED | OUTPATIENT
Start: 2018-05-21 | End: 2018-05-23 | Stop reason: HOSPADM

## 2018-05-21 RX ORDER — ASPIRIN 81 MG/1
81 TABLET, CHEWABLE ORAL DAILY
Status: DISCONTINUED | OUTPATIENT
Start: 2018-05-22 | End: 2018-05-23 | Stop reason: HOSPADM

## 2018-05-21 RX ORDER — DEXAMETHASONE SODIUM PHOSPHATE 10 MG/ML
10 INJECTION, SOLUTION INTRAMUSCULAR; INTRAVENOUS ONCE
Status: COMPLETED | OUTPATIENT
Start: 2018-05-21 | End: 2018-05-21

## 2018-05-21 RX ORDER — PRAVASTATIN SODIUM 40 MG
40 TABLET ORAL
Status: DISCONTINUED | OUTPATIENT
Start: 2018-05-22 | End: 2018-05-23 | Stop reason: HOSPADM

## 2018-05-21 RX ORDER — MAGNESIUM HYDROXIDE/ALUMINUM HYDROXICE/SIMETHICONE 120; 1200; 1200 MG/30ML; MG/30ML; MG/30ML
15 SUSPENSION ORAL EVERY 6 HOURS PRN
Status: DISCONTINUED | OUTPATIENT
Start: 2018-05-21 | End: 2018-05-23 | Stop reason: HOSPADM

## 2018-05-21 RX ORDER — DOCUSATE SODIUM 100 MG/1
100 CAPSULE, LIQUID FILLED ORAL 2 TIMES DAILY PRN
Status: DISCONTINUED | OUTPATIENT
Start: 2018-05-21 | End: 2018-05-23 | Stop reason: HOSPADM

## 2018-05-21 RX ORDER — ZOLPIDEM TARTRATE 5 MG/1
5 TABLET ORAL DAILY
Status: DISCONTINUED | OUTPATIENT
Start: 2018-05-21 | End: 2018-05-23 | Stop reason: HOSPADM

## 2018-05-21 RX ORDER — ENALAPRIL MALEATE 2.5 MG/1
2.5 TABLET ORAL 2 TIMES DAILY
Status: DISCONTINUED | OUTPATIENT
Start: 2018-05-22 | End: 2018-05-23 | Stop reason: HOSPADM

## 2018-05-21 RX ORDER — FERROUS SULFATE 325(65) MG
325 TABLET ORAL DAILY
Status: DISCONTINUED | OUTPATIENT
Start: 2018-05-22 | End: 2018-05-23 | Stop reason: HOSPADM

## 2018-05-21 RX ADMIN — DEXAMETHASONE SODIUM PHOSPHATE 10 MG: 10 INJECTION, SOLUTION INTRAMUSCULAR; INTRAVENOUS at 20:20

## 2018-05-21 RX ADMIN — IOHEXOL 85 ML: 350 INJECTION, SOLUTION INTRAVENOUS at 19:19

## 2018-05-21 NOTE — PROGRESS NOTES
FAMILY PRACTICE OFFICE VISIT       NAME: Fariha Zafar  AGE: 80 y o  SEX: male       : 1933        MRN: [id number]    DATE: 2018  TIME: 4:41 PM    Assessment and Plan     Problem List Items Addressed This Visit     None      Visit Diagnoses     Confusion    -  Primary    Relevant Orders    POCT urine dip (Completed)                1  Confusion  POCT urine dip     Patient presents today for confusion that began 2 days ago  He has needed his wife to tell him how and when to do basic daily activities and has difficulty following directions at times  In office urine dip negative  Neurological exam unremarkable  Significant history of benign brain tumor removed in   Unclear etiology of confusion  Discussed with patient and his wife, I would like him to have further evaluation in the emergency room at this time  They are both agreeable  PACS called and made aware they will go to the [address] [place] [address] at this time  Chief Complaint     Chief Complaint   Patient presents with    Altered Mental Status     Patient presents today with confusion since saturday  History of Present Illness     Patient presents today with confusion that began on Saturday morning when he woke up  He is accompanied by his wife today  Over the past 2 days has required his wife to tell him how to do basic daily activities  Has had moments of talking about nonsensical topics, such as speaking as if he were playing a card game, when he clearly is not  2 days ago he was driving to a wedding reception with his wife  He was supposed to follow his son to the reception, instead he went in a different direction  His wife kept telling him what roads to turn on, but he seemed to be having a very difficult time following directions  Wife states he does have some short term memory loss, but not like this  No new medications   He was taking Naproxen for a few days for right leg pain after a recent fall at home, stopped taking Naproxen 2 days ago  Fall at home related to getting up too fast and twisting his ankle  He is taking Gabapentin for neuropathy, 600 mg daily at bedtime  Has been taking this for several weeks now  Review of Systems   Review of Systems   Constitutional: Negative for chills, diaphoresis, fatigue and fever  HENT: Negative for congestion, postnasal drip, rhinorrhea, sneezing and sore throat  Respiratory: Negative for cough, chest tightness, shortness of breath and wheezing  Cardiovascular: Negative for chest pain, palpitations and leg swelling  Gastrointestinal: Negative for diarrhea, nausea and vomiting  Genitourinary: Negative for difficulty urinating and dysuria  Musculoskeletal: Positive for gait problem (ambulates with either a cane or walker)  Neurological: Negative for dizziness, syncope, speech difficulty and headaches  Psychiatric/Behavioral: Positive for confusion         Active Problem List     Patient Active Problem List   Diagnosis    Constipation    Iron deficiency    Other constipation    Anemia    Arteriosclerotic cardiovascular disease    Backache    Benign essential hypertension    Cervical spinal stenosis    Depression    Esophageal reflux    Hyperlipidemia    Insomnia    Spinal stenosis of lumbar region with neurogenic claudication    Vitamin B12 deficiency    Idiopathic peripheral neuropathy    Iron deficiency anemia       Past Medical History:  Past Medical History:   Diagnosis Date    [facility] infarction [facility] [address] )     Anxiety     Arthritis     Brain neoplasm ([facility] [address] ) 11/1999    brain tumor    Depression     Hypercholesterolemia     Narcolepsy     daytime drowsiness and dozing off occasionally    Palpitations     Prostate cancer [facility])        Past Surgical History:  Past Surgical History:   Procedure Laterality Date    BACK SURGERY      BRAIN SURGERY  11/08/1999    CORONARY ARTERY BYPASS GRAFT      x5       Family History:  Family History   Problem Relation Age of Onset    Hypertension Mother      benign essential    Cancer Mother     Osteoporosis Mother     Suicidality Father     Diabetes Family     Heart disease Family     Neuropathy Family      peripheral    Prostate cancer Family     Thyroid disease Family        Social History:  Social History     Social History    Marital status: /Civil Union     Spouse name: N/A    Number of children: N/A    Years of education: N/A     Occupational History    retired      Social History Main Topics    Smoking status: Current Every Day Smoker     Types: Cigars    Smokeless tobacco: Never Used      Comment: former cig smoker- quit in [address] Alcohol use No      Comment: (history)    Drug use: No    Sexual activity: Not on file     Other Topics Concern    Not on file     Social History Narrative    No narrative on file       I have reviewed the patient's medical history in detail; there are no changes to the history as noted in the electronic medical record  Objective     Vitals:    05/21/18 0335 05/21/18 1454   BP: 130/74 150/74   BP Location:  Left arm   Patient Position:  Sitting   Cuff Size:  Standard   Pulse:  66   Resp:  18   Temp:  (!) 96 6 °F (35 9 °C)   TempSrc:  Tympanic   Weight:  75 9 kg (167 lb 6 4 oz)   Height:  5' 9" (1 753 m)     Wt Readings from Last 3 Encounters:   05/21/18 75 9 kg (167 lb 6 4 oz)   03/29/18 74 8 kg (165 lb)   03/28/18 74 8 kg (165 lb)     Body mass index is 24 72 kg/m²  Physical Exam   Constitutional: He is oriented to person, place, and time  He appears well-developed and well-nourished  No distress  HENT:   Head: Normocephalic and atraumatic  Right Ear: Tympanic membrane and ear canal normal    Left Ear: Tympanic membrane and ear canal normal    Nose: Nose normal    Mouth/Throat: Oropharynx is clear and moist    Eyes: Conjunctivae and EOM are normal  Pupils are equal, round, and reactive to light     Neck: Normal range of motion  Neck supple  No carotid bruits auscultated   Cardiovascular: Normal rate, regular rhythm and normal heart sounds  Pulmonary/Chest: Effort normal and breath sounds normal    Musculoskeletal: He exhibits no edema  Lymphadenopathy:     He has no cervical adenopathy  Neurological: He is alert and oriented to person, place, and time  No cranial nerve deficit  Gait (ambulating with cane) abnormal  Coordination normal    Skin: No rash noted  Psychiatric: He has a normal mood and affect  ALLERGIES:  Allergies   Allergen Reactions    Atorvastatin Myalgia, Dizziness and Headache    Niacin Other (See Comments)     unknown       Current Medications     Current Outpatient Prescriptions   Medication Sig Dispense Refill    aspirin 81 MG tablet Take 1 tablet by mouth daily      enalapril (VASOTEC) 2 5 mg tablet TAKE 1 TABLET BY MOUTH  TWICE A  tablet 3    gabapentin (NEURONTIN) 300 mg capsule Take 2 tablets q hs 20 capsule 0    metoprolol tartrate (LOPRESSOR) 25 mg tablet Take 25 mg by mouth 2 (two) times a day      omeprazole (PriLOSEC) 40 MG capsule Take 1 capsule (40 mg total) by mouth daily 90 capsule 0    sertraline (ZOLOFT) 50 mg tablet Take 1 tablet by mouth daily      simvastatin (ZOCOR) 80 mg tablet TAKE 1 TABLET BY MOUTH  DAILY 90 tablet 3    zolpidem (AMBIEN) 5 mg tablet Take 1 tablet (5 mg total) by mouth daily 90 tablet 0    ferrous sulfate 325 (65 Fe) mg tablet Take 1 tablet by mouth daily       No current facility-administered medications for this visit            Health Maintenance     Health Maintenance   Topic Date Due    SLP PLAN OF CARE  1933    DTaP,Tdap,and Td Vaccines (1 - Tdap) 01/16/1954    Fall Risk  01/16/1998    GLAUCOMA SCREENING 67+ YR  01/16/2000    INFLUENZA VACCINE  09/01/2018    Depression Screening PHQ-9  01/25/2019    PNEUMOCOCCAL POLYSACCHARIDE VACCINE AGE 72 AND OVER  Completed     Immunization History   Administered Date(s) Administered    Influenza Split High Dose Preservative Free IM 11/10/2014, 01/12/2016, 11/22/2016, 11/08/2017    Influenza TIV (IM) 10/01/2007, 11/01/2009, 11/02/2009, 11/10/2010, 12/17/2012    Pneumococcal Conjugate 13-Valent 01/12/2016    Pneumococcal Polysaccharide PPV23 10/01/2007       Viola Leal CRNP

## 2018-05-21 NOTE — ED ATTENDING ATTESTATION
Lea Holstein Nappe, DO, saw and evaluated the patient  I have discussed the patient with the resident/non-physician practitioner and agree with the resident's/non-physician practitioner's findings, Plan of Care, and MDM as documented in the resident's/non-physician practitioner's note, except where noted  All available labs and Radiology studies were reviewed  At this point I agree with the current assessment done in the Emergency Department  I have conducted an independent evaluation of this patient a history and physical is as follows:    80 yom with AMS  He has been getting lost driving and forgetting how to start his car  Also has been reporting worsening HA upon awakening  Past Medical History:   Diagnosis Date    Acute myocardial infarction ([facility] [address] )     Anxiety     Arthritis     Brain neoplasm ([facility] [address] ) 11/1999    brain tumor    Depression     Hypercholesterolemia     Narcolepsy     daytime drowsiness and dozing off occasionally    Palpitations     Prostate cancer [facility])      Past Surgical History:   Procedure Laterality Date    BACK SURGERY      BRAIN SURGERY  11/08/1999    CORONARY ARTERY BYPASS GRAFT      x5   brain surgery in 1999 removing neoplasm     BP (!) 171/78 (BP Location: Left arm)   Pulse 61   Temp 97 5 °F (36 4 °C) (Oral)   Resp 18   SpO2 94%    A&Ox4  Neuro intact  Crackles in left lung base  Ab soft, NT  Ext w/o edema    Labs unremarkable    CXR unremarkable  CT head showed intracranial mass  It appeared stable versus prior imaging, however, discussion with Neurosurgery led to recommendation for further evaluation is inpatient as patient has new symptoms of headache and confusion      Dx  HA  AMS  Intracranial mass          Critical Care Time  CritCare Time    Procedures

## 2018-05-22 ENCOUNTER — APPOINTMENT (OUTPATIENT)
Dept: RADIOLOGY | Facility: HOSPITAL | Age: 83
DRG: 057 | End: 2018-05-22
Payer: MEDICARE

## 2018-05-22 ENCOUNTER — APPOINTMENT (INPATIENT)
Dept: NEUROLOGY | Facility: AMBULATORY SURGERY CENTER | Age: 83
DRG: 057 | End: 2018-05-22
Payer: MEDICARE

## 2018-05-22 LAB
ALBUMIN SERPL BCP-MCNC: 3.6 G/DL (ref 3.5–5)
ALP SERPL-CCNC: 60 U/L (ref 46–116)
ALT SERPL W P-5'-P-CCNC: 23 U/L (ref 12–78)
ANION GAP SERPL CALCULATED.3IONS-SCNC: 5 MMOL/L (ref 4–13)
APTT PPP: 29 SECONDS (ref 24–36)
AST SERPL W P-5'-P-CCNC: 16 U/L (ref 5–45)
BASOPHILS # BLD AUTO: 0.01 THOUSANDS/ΜL (ref 0–0.1)
BASOPHILS NFR BLD AUTO: 0 % (ref 0–1)
BILIRUB SERPL-MCNC: 0.46 MG/DL (ref 0.2–1)
BUN SERPL-MCNC: 13 MG/DL (ref 5–25)
CALCIUM SERPL-MCNC: 9.9 MG/DL (ref 8.3–10.1)
CHLORIDE SERPL-SCNC: 106 MMOL/L (ref 100–108)
CO2 SERPL-SCNC: 27 MMOL/L (ref 21–32)
CREAT SERPL-MCNC: 0.9 MG/DL (ref 0.6–1.3)
EOSINOPHIL # BLD AUTO: 0.01 THOUSAND/ΜL (ref 0–0.61)
EOSINOPHIL NFR BLD AUTO: 0 % (ref 0–6)
ERYTHROCYTE [DISTWIDTH] IN BLOOD BY AUTOMATED COUNT: 13.8 % (ref 11.6–15.1)
EST. AVERAGE GLUCOSE BLD GHB EST-MCNC: 123 MG/DL
GFR SERPL CREATININE-BSD FRML MDRD: 78 ML/MIN/1.73SQ M
GLUCOSE SERPL-MCNC: 157 MG/DL (ref 65–140)
HBA1C MFR BLD: 5.9 % (ref 4.2–6.3)
HCT VFR BLD AUTO: 38.8 % (ref 36.5–49.3)
HGB BLD-MCNC: 12.8 G/DL (ref 12–17)
INR PPP: 1.01 (ref 0.86–1.17)
LYMPHOCYTES # BLD AUTO: 1.62 THOUSANDS/ΜL (ref 0.6–4.47)
LYMPHOCYTES NFR BLD AUTO: 30 % (ref 14–44)
MAGNESIUM SERPL-MCNC: 2.1 MG/DL (ref 1.6–2.6)
MCH RBC QN AUTO: 27.1 PG (ref 26.8–34.3)
MCHC RBC AUTO-ENTMCNC: 33 G/DL (ref 31.4–37.4)
MCV RBC AUTO: 82 FL (ref 82–98)
MONOCYTES # BLD AUTO: 0.08 THOUSAND/ΜL (ref 0.17–1.22)
MONOCYTES NFR BLD AUTO: 2 % (ref 4–12)
NEUTROPHILS # BLD AUTO: 3.6 THOUSANDS/ΜL (ref 1.85–7.62)
NEUTS SEG NFR BLD AUTO: 68 % (ref 43–75)
NRBC BLD AUTO-RTO: 0 /100 WBCS
PHOSPHATE SERPL-MCNC: 1.8 MG/DL (ref 2.3–4.1)
PLATELET # BLD AUTO: 158 THOUSANDS/UL (ref 149–390)
PMV BLD AUTO: 10 FL (ref 8.9–12.7)
POTASSIUM SERPL-SCNC: 4 MMOL/L (ref 3.5–5.3)
PROT SERPL-MCNC: 6.7 G/DL (ref 6.4–8.2)
PROTHROMBIN TIME: 13.4 SECONDS (ref 11.8–14.2)
RBC # BLD AUTO: 4.73 MILLION/UL (ref 3.88–5.62)
SODIUM SERPL-SCNC: 138 MMOL/L (ref 136–145)
WBC # BLD AUTO: 5.33 THOUSAND/UL (ref 4.31–10.16)

## 2018-05-22 PROCEDURE — 83735 ASSAY OF MAGNESIUM: CPT | Performed by: INTERNAL MEDICINE

## 2018-05-22 PROCEDURE — 99223 1ST HOSP IP/OBS HIGH 75: CPT | Performed by: PHYSICIAN ASSISTANT

## 2018-05-22 PROCEDURE — 84100 ASSAY OF PHOSPHORUS: CPT | Performed by: INTERNAL MEDICINE

## 2018-05-22 PROCEDURE — 95816 EEG AWAKE AND DROWSY: CPT | Performed by: PSYCHIATRY & NEUROLOGY

## 2018-05-22 PROCEDURE — 99232 SBSQ HOSP IP/OBS MODERATE 35: CPT | Performed by: PHYSICIAN ASSISTANT

## 2018-05-22 PROCEDURE — 95816 EEG AWAKE AND DROWSY: CPT

## 2018-05-22 PROCEDURE — 99255 IP/OBS CONSLTJ NEW/EST HI 80: CPT | Performed by: PSYCHIATRY & NEUROLOGY

## 2018-05-22 PROCEDURE — 83036 HEMOGLOBIN GLYCOSYLATED A1C: CPT | Performed by: INTERNAL MEDICINE

## 2018-05-22 PROCEDURE — A9585 GADOBUTROL INJECTION: HCPCS | Performed by: INTERNAL MEDICINE

## 2018-05-22 PROCEDURE — 85610 PROTHROMBIN TIME: CPT | Performed by: INTERNAL MEDICINE

## 2018-05-22 PROCEDURE — 80053 COMPREHEN METABOLIC PANEL: CPT | Performed by: INTERNAL MEDICINE

## 2018-05-22 PROCEDURE — 85025 COMPLETE CBC W/AUTO DIFF WBC: CPT | Performed by: INTERNAL MEDICINE

## 2018-05-22 PROCEDURE — 70553 MRI BRAIN STEM W/O & W/DYE: CPT

## 2018-05-22 PROCEDURE — 85730 THROMBOPLASTIN TIME PARTIAL: CPT | Performed by: INTERNAL MEDICINE

## 2018-05-22 RX ORDER — METOPROLOL TARTRATE 5 MG/5ML
5 INJECTION INTRAVENOUS EVERY 6 HOURS PRN
Status: DISCONTINUED | OUTPATIENT
Start: 2018-05-22 | End: 2018-05-23 | Stop reason: HOSPADM

## 2018-05-22 RX ORDER — ENALAPRIL MALEATE 2.5 MG/1
2.5 TABLET ORAL ONCE
Status: COMPLETED | OUTPATIENT
Start: 2018-05-22 | End: 2018-05-22

## 2018-05-22 RX ADMIN — GADOBUTROL 7 ML: 604.72 INJECTION INTRAVENOUS at 09:13

## 2018-05-22 RX ADMIN — ENALAPRIL MALEATE 2.5 MG: 2.5 TABLET ORAL at 15:23

## 2018-05-22 RX ADMIN — PRAVASTATIN SODIUM 40 MG: 40 TABLET ORAL at 17:34

## 2018-05-22 RX ADMIN — GABAPENTIN 600 MG: 300 CAPSULE ORAL at 21:41

## 2018-05-22 RX ADMIN — ENALAPRIL MALEATE 2.5 MG: 2.5 TABLET ORAL at 17:34

## 2018-05-22 RX ADMIN — Medication 325 MG: at 10:06

## 2018-05-22 RX ADMIN — ASPIRIN 81 MG 81 MG: 81 TABLET ORAL at 10:06

## 2018-05-22 RX ADMIN — SERTRALINE HYDROCHLORIDE 50 MG: 50 TABLET ORAL at 10:06

## 2018-05-22 RX ADMIN — ZOLPIDEM TARTRATE 5 MG: 5 TABLET ORAL at 21:41

## 2018-05-22 RX ADMIN — ENALAPRIL MALEATE 2.5 MG: 2.5 TABLET ORAL at 10:06

## 2018-05-22 RX ADMIN — ZOLPIDEM TARTRATE 5 MG: 5 TABLET ORAL at 01:05

## 2018-05-22 RX ADMIN — PANTOPRAZOLE SODIUM 40 MG: 40 TABLET, DELAYED RELEASE ORAL at 05:46

## 2018-05-22 RX ADMIN — METOPROLOL TARTRATE 25 MG: 25 TABLET, FILM COATED ORAL at 10:06

## 2018-05-22 RX ADMIN — GABAPENTIN 600 MG: 300 CAPSULE ORAL at 01:05

## 2018-05-22 RX ADMIN — METOPROLOL TARTRATE 25 MG: 25 TABLET, FILM COATED ORAL at 17:34

## 2018-05-22 RX ADMIN — NICOTINE 1 PATCH: 21 PATCH, EXTENDED RELEASE TRANSDERMAL at 10:07

## 2018-05-22 RX ADMIN — ENOXAPARIN SODIUM 40 MG: 40 INJECTION SUBCUTANEOUS at 10:06

## 2018-05-22 NOTE — PROGRESS NOTES
Progress Note - Lavern Soulier 1933, 80 y o  male MRN: [id number]    Unit/Bed#: [facility] 216-02 Encounter: [id number]    Primary Care Provider: Effie James MD   Date and time admitted to hospital: 5/21/2018  5:43 PM        * Confusion   Assessment & Plan    · Confusion has been for the past 2 days with note of some memory problems especially short-term for approximately a month or so  · Patient states he feels better, but still is not at his baseline  He feels at this point that the confusion is waxing and waning  · Neurology and neuruosurgery input appreciated  · Await MRI and EEG  · B12 and folate ordered  · Will need outpatient neurocognitive testing        Essential hypertension   Assessment & Plan    · BP has been consistently elevated this admission  · Past records report good control  · May be elevated secondary to anxiety  · Will give a one time dose of additional Vasotec and add prn Lopressor and monitor  · Doubt uncontrolled BP cause of confusion  Depression   Assessment & Plan    · Patient will continue Zoloft and Ambien  History of meningioma   Assessment & Plan    · Neurosurgery consult appreciated  · Does not appear to need any neurosurgical intervention at this time, but await final read of MRI brain  VTE Pharmacologic Prophylaxis:   Pharmacologic: Enoxaparin (Lovenox)  Mechanical VTE Prophylaxis in Place: Yes    Patient Centered Rounds: I have performed bedside rounds with nursing staff today  Discussions with Specialists or Other Care Team Provider: Neurology, Neurosurgery    Education and Discussions with Family / Patient: patient, wife updated at nurses station    Time Spent for Care: 30 minutes  More than 50% of total time spent on counseling and coordination of care as described above      Current Length of Stay: 0 day(s)    Current Patient Status: Inpatient   Certification Statement: The patient, admitted on an observation basis, will now require > 2 midnight hospital stay due to Will need EEG as inpatient per Neurology  BP control    Discharge Plan: anticipate discharge in am    Code Status: Level 1 - Full Code      Subjective:   Doesn't quite feel back to his baseline  Patient states he still has confusion at times  Objective:     Vitals:   Temp (24hrs), Av 4 °F (36 3 °C), Min:96 6 °F (35 9 °C), Max:97 8 °F (36 6 °C)    HR:  [] 105  Resp:  [18] 18  BP: (150-218)/() 177/81  SpO2:  [93 %-98 %] 98 %  Body mass index is 24 06 kg/m²  Input and Output Summary (last 24 hours): Intake/Output Summary (Last 24 hours) at 18 1406  Last data filed at 18 1352   Gross per 24 hour   Intake              120 ml   Output             1275 ml   Net            -1155 ml       Physical Exam:     Physical Exam   Constitutional: He is oriented to person, place, and time  He appears well-developed  No distress  HENT:   Head: Normocephalic and atraumatic  Neck: Normal range of motion  Neck supple  Cardiovascular: Normal rate and regular rhythm  No murmur heard  Pulmonary/Chest: Effort normal and breath sounds normal  No respiratory distress  He has no wheezes  He has no rales  Abdominal: Soft  Bowel sounds are normal  He exhibits no distension  Musculoskeletal: He exhibits no edema  Neurological: He is alert and oriented to person, place, and time  No cranial nerve deficit  Skin: Skin is warm and dry  No rash noted  Psychiatric: He has a normal mood and affect           Additional Data:     Labs:      Results from last 7 days  Lab Units 18  0457   WBC Thousand/uL 5 33   HEMOGLOBIN g/dL 12 8   HEMATOCRIT % 38 8   PLATELETS Thousands/uL 158   NEUTROS PCT % 68   LYMPHS PCT % 30   MONOS PCT % 2*   EOS PCT % 0       Results from last 7 days  Lab Units 18  0457   SODIUM mmol/L 138   POTASSIUM mmol/L 4 0   CHLORIDE mmol/L 106   CO2 mmol/L 27   BUN mg/dL 13   CREATININE mg/dL 0 90   CALCIUM mg/dL 9 9   TOTAL PROTEIN g/dL 6 7 BILIRUBIN TOTAL mg/dL 0 46   ALK PHOS U/L 60   ALT U/L 23   AST U/L 16   GLUCOSE RANDOM mg/dL 157*       Results from last 7 days  Lab Units 05/22/18  0023   INR  1 01           Results from last 7 days  Lab Units 05/22/18  0023   HEMOGLOBIN A1C % 5 9         * I Have Reviewed All Lab Data Listed Above  * Additional Pertinent Lab Tests Reviewed: [address] [facility] Admission Reviewed    Imaging:    Imaging Reports Reviewed Today Include: CT head  Imaging Personally Reviewed by Myself Includes:  none    Recent Cultures (last 7 days):           Last 24 Hours Medication List:     Current Facility-Administered Medications:  acetaminophen 650 mg Oral Q6H PRN Conrad Garduno MD   aluminum-magnesium hydroxide-simethicone 15 mL Oral Q6H PRN Conrad Garduno MD   aspirin 81 mg Oral Daily Conrad Garduno MD   docusate sodium 100 mg Oral BID PRN Conrad Garduno MD   enalapril 2 5 mg Oral BID Conrad Garduno MD   enalapril 2 5 mg Oral Once Pao Vann PA-C   enoxaparin 40 mg Subcutaneous Daily Conrad Garduno MD   ferrous sulfate 325 mg Oral Daily Conrad Garduno MD   gabapentin 600 mg Oral HS Conrad Garduno MD   metoprolol 5 mg Intravenous Q6H PRN Pao Vann PA-C   metoprolol tartrate 25 mg Oral BID Conrad Garduno MD   nicotine 1 patch Transdermal Daily Conrad Garduno MD   ondansetron 4 mg Intravenous Q6H PRN Conrad Garduno MD   pantoprazole 40 mg Oral Early Morning Conrad Garduno MD   pravastatin 40 mg Oral Daily With Chelsea Ramos MD   sertraline 50 mg Oral Daily Conrad Garduno MD   zolpidem 5 mg Oral Daily Conrad Garduno MD        Today, Patient Was Seen By: Pao Vann PA-C    ** Please Note: Dictation voice to text software may have been used in the creation of this document   **

## 2018-05-22 NOTE — ASSESSMENT & PLAN NOTE
· BP has been consistently elevated this admission  · Past records report good control  · May be elevated secondary to anxiety  · Will give a one time dose of additional Vasotec and add prn Lopressor and monitor  · Doubt uncontrolled BP cause of confusion

## 2018-05-22 NOTE — ASSESSMENT & PLAN NOTE
· Neurosurgery consult appreciated  · Does not appear to need any neurosurgical intervention at this time, but await final read of MRI brain

## 2018-05-22 NOTE — ASSESSMENT & PLAN NOTE
Confusion has been for the past 2 days with note of some memory problems especially short-term for approximately a month or so  Neurosurgery consulted because of presence of retained meningioma  Will get MRI for tomorrow  Neurosurgical consult  Otherwise if patient is not having these symptoms because of the mass, other considerations may include a beginnings of dementia  May probably need to have Neurology/neuropsychiatry involved

## 2018-05-22 NOTE — ED NOTES
Patient admission orders noted  Patient currently awaiting bed placement          Sebastien Cheng RN  05/21/18 [id number]

## 2018-05-22 NOTE — H&P
H&P- Vivian Sahu 1933, 80 y o  male MRN: [id number]    Unit/Bed#: ED 26 Encounter: [id number]    Primary Care Provider: Denise Mendenhall MD   Date and time admitted to hospital: 5/21/2018  5:43 PM        * Confusion   Assessment & Plan    Confusion has been for the past 2 days with note of some memory problems especially short-term for approximately a month or so  Neurosurgery consulted because of presence of retained meningioma  Will get MRI for tomorrow  Neurosurgical consult  Otherwise if patient is not having these symptoms because of the mass, other considerations may include a beginnings of dementia  May probably need to have Neurology/neuropsychiatry involved  History of meningioma   Assessment & Plan    We will get MRI tomorrow  Neurosurgery consultation  Depression   Assessment & Plan    Patient will continue Zoloft and Ambien  VTE Prophylaxis: Enoxaparin (Lovenox)  / sequential compression device   Code Status: No Order full code as discussed with wife with patient and room  POLST: There is no POLST form on file for this patient (pre-hospital)    Anticipated Length of Stay:  Patient will be admitted on an Observation basis with an anticipated length of stay of  less than 2 midnights  Justification for Hospital Stay: Please see detailed plans noted above  Chief Complaint:     Confusion  History of Present Illness:  Vivian Sahu is a 80 y o  male who has a history significant for CAD, gastroesophageal reflux disease, chronic pain syndrome with back pain; hyperlipidemia and depression  The patient also has a history of CAD in the past   However the patient comes in with a 2 day confusion that was described by the wife as doing certain nonsensical things such as wearing his slippers and then putting them in his shoes  He remains to be active and in fact he does walking on the treadmill  Normally he spends his time talking to his friends in the gym  However also within the past 2 days he was noted to be mumbling some nonsensical things such as some numbers in the middle of room while not doing any other thing somewhat he was in the middle of playing cards and bridge  ("Give me a two   ") the wife denies any fever  No chest pain  No loss of consciousness or headaches  With that, the patient went to the emergency room to be evaluated where a CT scan of the head showed "encephalomalacia in the left frontal region consistent with postsurgical change from prior bilateral frontal craniotomy and tumor resection  It is grossly stable extra-axial mass in the right anterior cingulate parafalcine region measuring 1 9 x 2 x 1 4 centimeters      Noted that there was no hemorrhage or acute mass effect or extra-axial collection  This finding was then referred to Neurosurgery who then recommended an MRI but meanwhile patient need to be started on dexamethasone  Noted is that the patient does not have any new onset focal signs  No history of dropping things  No numbness nor tingling  Also there was no loss of consciousness or black outs  Currently, patient is resting in the room and communicative though hard of hearing  Otherwise the patient does not have any acute neurologic symptoms  Reportedly patient had his well person check approximately March of this year and was said to be unremarkable  Review of Systems:    Constitutional:  Denies fever or chills   Eyes:  Denies change in visual acuity   HENT:  Denies nasal congestion or sore throat   Respiratory:  Denies cough or shortness of breath   Cardiovascular:  Denies chest pain or edema   GI:  Denies abdominal pain, nausea, vomiting, bloody stools or diarrhea   :  Denies dysuria   Musculoskeletal:  Denies back pain or joint pain   Integument:  Denies rash   Neurologic:  Denies headache, focal weakness or sensory changes ; noted change in the level of consciousness as above    Endocrine:  Denies polyuria or polydipsia   Lymphatic:  Denies swollen glands   Psychiatric:  Denies depression or anxiety     Past Medical and Surgical History:   Past Medical History:   Diagnosis Date    [facility] [address] )     Anxiety     Arthritis     [facility]/1999    brain tumor    Depression     Hypercholesterolemia     Narcolepsy     daytime drowsiness and dozing off occasionally    Palpitations     Prostate cancer ([facility] )      Past Surgical History:   Procedure Laterality Date    BACK SURGERY      BRAIN SURGERY  11/08/1999    CORONARY ARTERY BYPASS GRAFT      x5       Meds/Allergies:  aspirin 81 MG tablet Take 1 tablet by mouth daily Win Greco MD Reordered   Ordered as: aspirin tablet 81 mg - 81 mg, Oral, Daily, First dose on Tue 5/22/18 at 0900   enalapril (VASOTEC) 2 5 mg tablet TAKE 1 TABLET BY MOUTH  TWICE A DAY Win Greco MD Reordered   Ordered as: enalapril (VASOTEC) tablet 2 5 mg - 2 5 mg, Oral, 2 times daily, First dose on Mon 5/21/18 at 6605 Hold for systolic blood pressure less than (mmHg): 110   ferrous sulfate 325 (65 Fe) mg tablet Take 1 tablet by mouth daily Win Greco MD Reordered   Ordered as: ferrous sulfate tablet 325 mg - 325 mg, Oral, Daily, First dose on Tue 5/22/18 at [address] and drug interaction, teaching and documentation must be done; Administer 2 hours before or 4 hours after antacids;  Avoid administration w/ cereals,dietary fiber,tea,coffee,eggs,or milk     gabapentin (NEURONTIN) 300 mg capsule Take 2 tablets q hs Win Greco MD Reordered   Ordered as: gabapentin (NEURONTIN) capsule 600 mg - 600 mg, Oral, Daily at bedtime, [facility]    metoprolol tartrate (LOPRESSOR) 25 mg tablet Take 25 mg by mouth 2 (two) times a day Win Greco MD Reordered   Ordered as: metoprolol tartrate (LOPRESSOR) tablet 25 mg - 25 mg, Oral, 2 times daily, First dose on Mon 5/21/18 at 2030 Hold for heart rate less than 50 beats per minute  LOOK ALIKE S[employer] ALIKE MED Hold for systolic blood pressure less than (mmHg): 110   omeprazole (PriLOSEC) 40 MG capsule Take 1 capsule (40 mg total) by mouth daily Kylah Orellana MD Reordered   Ordered as: pantoprazole (PROTONIX) EC tablet 40 mg - 40 mg, Oral, Daily (early morning), First dose on Tue 5/22/18 at 0600 Swallow whole; do not crush, chew or split  LOOK ALIKE SOUND ALIKE MED    sertraline (ZOLOFT) 50 mg tablet Take 1 tablet by mouth daily Kylah Orellana MD Reordered   Ordered as: sertraline (ZOLOFT) tablet 50 mg - 50 mg, Oral, Daily, First dose on Tue 5/22/18 at 0900 LOOK ALIKE SOUND ALIKE MED    simvastatin (ZOCOR) 80 mg tablet TAKE 1 TABLET BY MOUTH  DAILY Kylah Orellana MD Reordered   Ordered as: pravastatin (PRAVACHOL) tablet 40 mg - 40 mg, Oral, Daily with dinner, First dose on Tue 5/22/18 at 1630   zolpidem (AMBIEN) 5 mg tablet Take 1 tablet (5 mg total) by mouth daily Kylah Orellana MD Reordered   Ordered as: zolpidem [facility] tablet 5 mg - 5 mg, Oral, Daily, First dose on Tue 5/22/18 at 0900 High alert medication  Allergies: Allergies   Allergen Reactions    Atorvastatin Myalgia, Dizziness and Headache    Niacin Other (See Comments)     unknown     History:  Marital Status: /Civil Union   Occupation:  Worked as part of [employer]    Patient Pre-hospital Living Situation:  Lives at home  Patient Pre-hospital Level of Mobility:  Mobile but may need assistance  Patient Pre-hospital Diet Restrictions:  Regular diet  Substance Use History:   History   Alcohol Use No     Comment: (history)     History   Smoking Status    Current Every Day Smoker    Types: Cigars   Smokeless Tobacco    Never Used     Comment: former cig smoker- quit in 1992     History   Drug Use No       Family History:  Family History   Problem Relation Age of Onset    Hypertension Mother      benign essential    Cancer Mother     Osteoporosis Mother     Suicidality Father  Diabetes Family     Heart disease Family     Neuropathy Family      peripheral    Prostate cancer Family     Thyroid disease Family        Physical Exam:     Vitals:   Blood Pressure: 153/88 (05/21/18 2145)  Pulse: 64 (05/21/18 2145)  Temperature: 97 5 °F (36 4 °C) (05/21/18 1639)  Temp Source: Oral (05/21/18 1639)  Respirations: 18 (05/21/18 2124)  SpO2: 96 % (05/21/18 2145)    Constitutional:  Well developed, well nourished, no acute distress, non-toxic appearance ; elderly and frail  Otherwise, the patient is mobile and communicative  Hard of hearing  Eyes:  PERRL, conjunctiva normal   HENT:  Atraumatic, external ears normal, nose normal, oropharynx moist, no pharyngeal exudates  Neck- normal range of motion, no tenderness, supple   Respiratory:  No respiratory distress, normal breath sounds, no rales, no wheezing   Cardiovascular:  Normal rate, normal rhythm, no murmurs, no gallops, no rubs   GI:  Soft, nondistended, normal bowel sounds, nontender, no organomegaly, no mass, no rebound, no guarding   :  No costovertebral angle tenderness   Musculoskeletal:  No edema, no tenderness, no deformities  Back- no tenderness  Integument:  Well hydrated, no rash   Lymphatic:  No lymphadenopathy noted   Neurologic:  Alert &awake, communicative, CN 2-12 normal, normal motor function, normal sensory function, no focal deficits noted   Psychiatric:  Speech and behavior appropriate though hard of hearing  The patient is able to have a decent conversation  He knows where he is and his name and year  Patient has difficulty with remembering recent lists (bell, oneil, notebook - recalls only first two)      Lab Results: I have personally reviewed pertinent reports          Results from last 7 days  Lab Units 05/21/18  1650   WBC Thousand/uL 6 88   HEMOGLOBIN g/dL 12 7   HEMATOCRIT % 37 1   PLATELETS Thousands/uL 173   NEUTROS PCT % 57   LYMPHS PCT % 34   MONOS PCT % 7   EOS PCT % 2       Results from last 7 days  Lab Units 05/21/18  1650   SODIUM mmol/L 138   POTASSIUM mmol/L 4 2   CHLORIDE mmol/L 106   CO2 mmol/L 27   BUN mg/dL 12   CREATININE mg/dL 0 92   CALCIUM mg/dL 9 7   TOTAL PROTEIN g/dL 6 9   BILIRUBIN TOTAL mg/dL 0 49   ALK PHOS U/L 59   ALT U/L 24   AST U/L 19   GLUCOSE RANDOM mg/dL 127           EKG: Normal sinus rhythm  Incomplete right bundle branch block  Borderline ECG  When compared with ECG of 08-MAY-2015 10:47,  T wave inversion no longer evident in Anterior leads  Confirmed by [facility] Charisse DANIELS ([id number]) on 5/21/2018 4:55:49 PM    Imaging: I have personally reviewed pertinent reports  Ct Head With And Without Contrast    Result Date: 5/21/2018  Narrative: CT BRAIN - WITH AND WITHOUT CONTRAST INDICATION:   Altered mental status  COMPARISON:  6/9/2014  TECHNIQUE:  CT examination of the brain was performed both prior to and after the administration of intravenous contrast   In addition to axial images, coronal reformatted images were created and submitted for interpretation  Radiation dose length product (DLP) for this visit:  1947 06 mGy-cm   This examination, like all CT scans performed in the [facility], was performed utilizing techniques to minimize radiation dose exposure, including the use of iterative reconstruction and automated exposure control  IV Contrast:  85 mL of iohexol (OMNIPAQUE)  IMAGE QUALITY:  Diagnostic  FINDINGS: PARENCHYMA: Encephalomalacia in the left frontal region consistent with postsurgical change from prior bilateral frontal craniotomy and tumor resection  Grossly stable extra-axial mass in the right anterior cingulate parafalcine region measuring 1 9 x 2  x 1 4 cm  Prominent white matter hypoattenuation in the bilateral anterior frontal region, also likely postsurgical and/or secondary to chronic ischemic insult VENTRICLES AND EXTRA-AXIAL SPACES:  Stable exophytic vacuo dilatation of the frontal horns of the lateral ventricles    No hydrocephalus or extra-axial collection  VISUALIZED ORBITS AND PARANASAL SINUSES:  Unremarkable  CALVARIUM:  Postsurgical change from bilateral frontal craniotomy  No lytic or blastic lesion     Impression: 1  Stable right anterior cingulate and parafalcine meningioma measuring 2 cm surrounding encephalomalacia and gliosis in the bilateral anterior frontal region  2   No evidence of acute intracranial hemorrhage, acute mass effect or extra-axial collection  Workstation performed: [id number]         ** Please Note: Dragon 360 Dictation voice to text software was used in the creation of this document   **

## 2018-05-22 NOTE — CONSULTS
Consultation - Neurosurgery   Quin Rodriguez 80 y o  male MRN: [id number]  Unit/Bed#: [facility] 710-60 Encounter: [id number]      Inpatient consult to Neurosurgery  Consult performed by: James Freitas ordered by: Caryle Burn          Assessment/Plan     Assessment:  1  Right anterior cingulate and parafalcine meningioma  2  Bifrontal encephalomalacia and gliosis   3  Confusion  4  Hypertension  5  Depression    Plan:  · Exam reveals GCS 15 though tangential responses at times  Moving all extremities without focal weakness  Able to follow multistep commands  · Images reviewed personally by attending  Final results below  · CT head without contrast May 21, 2018:  Stable right anterior cingulate and parafalcine meningioma measuring 2 cm with surrounding encephalomalacia and gliosis in bilateral anterior frontal areas  No evidence of acute intracranial hemorrhage, acute mass effect or extra-axial collection  · MRI brain with without contrast May 22, 2018: Final report pending  Overall grossly stable residual meningioma known to patient  Grossly stable surrounding changes and mass effect  · Neurology consult appreciated  · Will evaluate EEG rule out seizures  Checking reversible dementia labs including B12, TSH and folate  · Ongoing medical management per primary team     · Rule out metabolic or infectious etiology along blood pressure management  · Mobilize physical and occupational therapy  · DVT prophylaxis:  SCDs and Lovenox  · Recommend neurocognitive testing on outpatient basis  · Consider geriatric consultation  · New onset confusion not likely related to brain imaging as findings grossly stable compared to 2014  · Unlikely related to ventricular size as this is likely ex vacuo related to prior surgical intervention  Patient denies any gait decline or incontinence  · No neurosurgical intervention indicated at this juncture  Will sign off and see patient on as needed basis  Please call if any questions or concerns  ·   History of Present Illness     HPI: Monica Abreu is a 80 y  o male with PMH including prostate cancer, history of meningioma resection 1999, CAD, history of MI, anxiety and depression  who presents with complaint of confusion x3 days  Patient states he presented to the hospital as he was unable to recall directions while driving to a wedding reception  This is abnormal for the patient and he generally tries without difficulties  Expresses significant frustration since with this episode along with declining memory  Patient difficulty providing detailed history  Per records, patient was independent and going to the gym several times  Over the past several months, he had progressive short-term memory difficulties with more acute confusion over the last several days  His wife noted abnormal behavior such is attempting to put his shoes on over his clippers  He also was noted to make tangential comments the which were relevant to conversation  He was seen by his PCP on May 21, 2018 and given above complaints, he was directed to the emergency department  Workup included a CT head which revealed stable meningioma along with encephalomalacia and gliosis  Given history of meningioma and acute behavioral changes and confusion, neurosurgical evaluation was requested  Patient states gait overall stable  He admits to use of roller walker while in his home but generally uses a cane when out in public  Review of Systems   Constitutional: Negative for activity change, fatigue and fever  HENT: Positive for hearing loss (chronic)  Negative for trouble swallowing and voice change  Eyes: Negative for photophobia and visual disturbance  Respiratory: Negative for cough and shortness of breath  Cardiovascular: Negative for chest pain and leg swelling  Gastrointestinal: Negative for abdominal pain, nausea and vomiting     Genitourinary: Negative for decreased urine volume and difficulty urinating  Musculoskeletal: Negative for back pain, gait problem (stable) and neck pain  Skin: Negative for pallor, rash and wound  Neurological: Positive for headaches (intermittent)  Negative for dizziness, tremors, seizures, speech difficulty, weakness, light-headedness and numbness  Psychiatric/Behavioral: Positive for agitation, confusion and decreased concentration         Historical Information   Past Medical History:   Diagnosis Date    Acute myocardial infarction ([place] [address] )     Anxiety     Arthritis     Brain neoplasm ([place] [address] ) 11/1999    brain tumor    Depression     Hypercholesterolemia     Narcolepsy     daytime drowsiness and dozing off occasionally    Palpitations     Prostate cancer ([place] [address] )      Past Surgical History:   Procedure Laterality Date    BACK SURGERY      BRAIN SURGERY  11/08/1999    CORONARY ARTERY BYPASS GRAFT      x5     History   Alcohol Use No     Comment: (history)     History   Drug Use No     History   Smoking Status    Current Every Day Smoker    Types: Cigars   Smokeless Tobacco    Never Used     Comment: former cig smoker- quit in 1992     Family History   Problem Relation Age of Onset    Hypertension Mother      benign essential    Cancer Mother     Osteoporosis Mother     Suicidality Father     Diabetes Family     Heart disease Family     Neuropathy Family      peripheral    Prostate cancer Family     Thyroid disease Family        Meds/Allergies   all current active meds have been reviewed, current meds:   Current Facility-Administered Medications   Medication Dose Route Frequency    acetaminophen (TYLENOL) tablet 650 mg  650 mg Oral Q6H PRN    aluminum-magnesium hydroxide-simethicone (MYLANTA) 200-200-20 mg/5 mL oral suspension 15 mL  15 mL Oral Q6H PRN    aspirin chewable tablet 81 mg  81 mg Oral Daily    docusate sodium (COLACE) capsule 100 mg  100 mg Oral BID PRN    enalapril (VASOTEC) tablet 2 5 mg  2 5 mg Oral BID    enalapril (VASOTEC) tablet 2 5 mg  2 5 mg Oral Once    enoxaparin (LOVENOX) subcutaneous injection 40 mg  40 mg Subcutaneous Daily    ferrous sulfate tablet 325 mg  325 mg Oral Daily    gabapentin (NEURONTIN) capsule 600 mg  600 mg Oral HS    metoprolol (LOPRESSOR) injection 5 mg  5 mg Intravenous Q6H PRN    metoprolol tartrate (LOPRESSOR) tablet 25 mg  25 mg Oral BID    nicotine (NICODERM CQ) 21 mg/24 hr TD 24 hr patch 1 patch  1 patch Transdermal Daily    ondansetron (ZOFRAN) injection 4 mg  4 mg Intravenous Q6H PRN    pantoprazole (PROTONIX) EC tablet 40 mg  40 mg Oral Early Morning    pravastatin (PRAVACHOL) tablet 40 mg  40 mg Oral Daily With Dinner    sertraline (ZOLOFT) tablet 50 mg  50 mg Oral Daily    zolpidem (AMBIEN) tablet 5 mg  5 mg Oral Daily    and PTA meds:   Prior to Admission Medications   Prescriptions Last Dose Informant Patient Reported?  Taking?   aspirin 81 MG tablet  Spouse/Significant Other Yes No   Sig: Take 1 tablet by mouth daily   enalapril (VASOTEC) 2 5 mg tablet   No No   Sig: TAKE 1 TABLET BY MOUTH  TWICE A DAY   ferrous sulfate 325 (65 Fe) mg tablet  Spouse/Significant Other Yes No   Sig: Take 1 tablet by mouth daily   gabapentin (NEURONTIN) 300 mg capsule   No No   Sig: Take 2 tablets q hs   metoprolol tartrate (LOPRESSOR) 25 mg tablet  Spouse/Significant Other Yes No   Sig: Take 25 mg by mouth 2 (two) times a day   omeprazole (PriLOSEC) 40 MG capsule   No No   Sig: Take 1 capsule (40 mg total) by mouth daily   sertraline (ZOLOFT) 50 mg tablet  Spouse/Significant Other Yes No   Sig: Take 1 tablet by mouth daily   simvastatin (ZOCOR) 80 mg tablet   No No   Sig: TAKE 1 TABLET BY MOUTH  DAILY   zolpidem (AMBIEN) 5 mg tablet   No No   Sig: Take 1 tablet (5 mg total) by mouth daily      Facility-Administered Medications: None     Allergies   Allergen Reactions    Atorvastatin Myalgia, Dizziness and Headache    Niacin Other (See Comments)     unknown       Objective   I/O 05/20 0701 - 05/21 0700 05/21 0701 - 05/22 0700 05/22 0701 - 05/23 0700    P  O    120    Total Intake(mL/kg)   120 (1 6)    Urine (mL/kg/hr)  775 500 (0 8)    Total Output   775 500    Net   -775 [phone]                 Physical Exam   Constitutional: He is oriented to person, place, and time  He appears well-developed and well-nourished  No distress  HENT:   Head: Normocephalic and atraumatic  Eyes: Conjunctivae and EOM are normal  Pupils are equal, round, and reactive to light  No scleral icterus  Neck: Normal range of motion  Neck supple  Cardiovascular: Tachycardia present  Pulmonary/Chest: Effort normal  No respiratory distress  Abdominal: Soft  He exhibits no distension  There is no tenderness  Musculoskeletal: He exhibits no tenderness  Neurological: He is oriented to person, place, and time  He has normal strength  He has a normal Finger-Nose-Finger Test  Gait normal    Reflex Scores:       Bicep reflexes are 2+ on the right side and 2+ on the left side  Brachioradialis reflexes are 2+ on the right side and 2+ on the left side  Patellar reflexes are 2+ on the right side and 2+ on the left side  Achilles reflexes are 2+ on the right side and 2+ on the left side  Skin: Skin is warm and dry  Psychiatric: He has a normal mood and affect  His speech is normal and behavior is normal  Judgment and thought content normal  Cognition and memory are impaired  Neurologic Exam     Mental Status   Oriented to person, place, and time  Oriented to person  Oriented to place  Oriented to city  Oriented to year and month  Follows 2 step commands  Attention: decreased  Concentration: normal    Speech: speech is normal   Level of consciousness: alert  Able to perform simple calculations  Able to name object  Normal comprehension  Requires repeat questioning at times     Cranial Nerves     CN III, IV, VI   Pupils are equal, round, and reactive to light    Extraocular motions are normal    Nystagmus: none   Upgaze: normal  Conjugate gaze: present    CN V   Facial sensation intact  CN VII   Facial expression full, symmetric  CN VIII   Hearing: impaired    CN XI   Right trapezius strength: normal  Left trapezius strength: normal    CN XII   Tongue: not atrophic  Fasciculations: absent    Motor Exam   Muscle bulk: normal  Overall muscle tone: normal  Right arm pronator drift: absent  Left arm pronator drift: absent    Strength   Strength 5/5 throughout  Sensory Exam   Pinprick normal      Gait, Coordination, and Reflexes     Gait  Gait: normal (with RW)    Coordination   Finger to nose coordination: normal    Tremor   Resting tremor: absent  Intention tremor: absent  Action tremor: absent    Reflexes   Right brachioradialis: 2+  Left brachioradialis: 2+  Right biceps: 2+  Left biceps: 2+  Right patellar: 2+  Left patellar: 2+  Right achilles: 2+  Left achilles: 2+  Right Hendricks: absent  Left Hendricks: absent  Right ankle clonus: absent  Left ankle clonus: absent      Vitals:Blood pressure (!) 177/81, pulse 105, temperature 97 8 °F (36 6 °C), temperature source Oral, resp  rate 18, height 5' 9" (1 753 m), weight 73 9 kg (162 lb 14 7 oz), SpO2 98 %  ,Body mass index is 24 06 kg/m²       Lab Results:     Results from last 7 days  Lab Units 05/22/18  0457 05/21/18  1650   WBC Thousand/uL 5 33 6 88   HEMOGLOBIN g/dL 12 8 12 7   HEMATOCRIT % 38 8 37 1   PLATELETS Thousands/uL 158 173   NEUTROS PCT % 68 57   MONOS PCT % 2* 7       Results from last 7 days  Lab Units 05/22/18  0457 05/21/18  1650   SODIUM mmol/L 138 138   POTASSIUM mmol/L 4 0 4 2   CHLORIDE mmol/L 106 106   CO2 mmol/L 27 27   BUN mg/dL 13 12   CREATININE mg/dL 0 90 0 92   CALCIUM mg/dL 9 9 9 7   TOTAL PROTEIN g/dL 6 7 6 9   BILIRUBIN TOTAL mg/dL 0 46 0 49   ALK PHOS U/L 60 59   ALT U/L 23 24   AST U/L 16 19   GLUCOSE RANDOM mg/dL 157* 127       Results from last 7 days  Lab Units 05/22/18  0457   MAGNESIUM mg/dL 2 1 Results from last 7 days  Lab Units 05/22/18  0457   PHOSPHORUS mg/dL 1 8*       Results from last 7 days  Lab Units 05/22/18  0023   INR  1 01   PTT seconds 29     No results found for: TROPONINT  ABG:No results found for: PHART, OBC5XYM, PO2ART, OCQ2IPI, Y6XONSEW, BEART, SOURCE    Imaging Studies: I have personally reviewed pertinent reports  and I have personally reviewed pertinent films in PACS    X-ray Chest 2 Views    Result Date: 5/22/2018  Narrative: CHEST INDICATION:   chest pain  COMPARISON:  5/8/2015 EXAM PERFORMED/VIEWS:  XR CHEST PA & LATERAL FINDINGS: Cardiomediastinal silhouette appears unremarkable  The lungs are clear  No pneumothorax or pleural effusion  Osseous structures appear within normal limits for patient age  Impression: No acute cardiopulmonary disease  Workstation performed: [id number]     Ct Head With And Without Contrast    Result Date: 5/21/2018  Narrative: CT BRAIN - WITH AND WITHOUT CONTRAST INDICATION:   Altered mental status  COMPARISON:  6/9/2014  TECHNIQUE:  CT examination of the brain was performed both prior to and after the administration of intravenous contrast   In addition to axial images, coronal reformatted images were created and submitted for interpretation  Radiation dose length product (DLP) for this visit:  1947 06 mGy-cm   This examination, like all CT scans performed in the [facility], was performed utilizing techniques to minimize radiation dose exposure, including the use of iterative reconstruction and automated exposure control  IV Contrast:  85 mL of iohexol (OMNIPAQUE)  IMAGE QUALITY:  Diagnostic  FINDINGS: PARENCHYMA: Encephalomalacia in the left frontal region consistent with postsurgical change from prior bilateral frontal craniotomy and tumor resection  Grossly stable extra-axial mass in the right anterior cingulate parafalcine region measuring 1 9 x 2  x 1 4 cm   Prominent white matter hypoattenuation in the bilateral anterior frontal region, also likely postsurgical and/or secondary to chronic ischemic insult VENTRICLES AND EXTRA-AXIAL SPACES:  Stable exophytic vacuo dilatation of the frontal horns of the lateral ventricles  No hydrocephalus or extra-axial collection  VISUALIZED ORBITS AND PARANASAL SINUSES:  Unremarkable  CALVARIUM:  Postsurgical change from bilateral frontal craniotomy  No lytic or blastic lesion     Impression: 1  Stable right anterior cingulate and parafalcine meningioma measuring 2 cm surrounding encephalomalacia and gliosis in the bilateral anterior frontal region  2   No evidence of acute intracranial hemorrhage, acute mass effect or extra-axial collection  Workstation performed: [id number]     Mri Brain W Wo Contrast    Result Date: 5/22/2018  Narrative: MRI BRAIN WITH AND WITHOUT CONTRAST INDICATION: History of meningioma  confusion  Mental status change  COMPARISON:  MRI dated 6/9/2014  CT dated 5/21/2018  TECHNIQUE: Sagittal T1, axial T2, axial FLAIR, axial T1, axial Gradient, axial diffusion  Sagittal, axial T1 postcontrast   Axial bravo postcontrast with coronal reconstructions  IV Contrast:  7 mL of gadobutrol injection (MULTI-DOSE)  IMAGE QUALITY:   Diagnostic  FINDINGS: BRAIN PARENCHYMA:  Previous superior frontal craniotomy  Extensive encephalomalacia within the superior medial left frontal lobe with adjacent gliosis  This extends across the corpus callosum  There is gliosis identified within the opposite right frontal lobe  Inseparable from the anterior intrahemispheric fissure is a heterogeneously enhancing extra-axial mass which appears slightly smaller than the prior examination measuring 1 6 x 2 6 cm on series 11 image 18  Previously this measured 1 8 x 3 1 cm  Stable basal ganglia, basilar cisterns, brainstem and cerebellum  Focal volume loss of the anterior aspect of the corpus callosum  VENTRICLES:  Stable ventricles    Ex vacuo dilatation of the frontal horns of the lateral ventricles, left more so than right  SELLA AND PITUITARY GLAND:  Normal  ORBITS:  Normal  PARANASAL SINUSES:  Normal  VASCULATURE:  Evaluation of the major intracranial vasculature demonstrates appropriate flow voids  CALVARIUM AND SKULL BASE:  Normal  EXTRACRANIAL SOFT TISSUES:  Normal      Impression: 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 best seen on series 11 image 18  This residual tumor has slightly decreased in size compared to prior examination with stable surrounding encephalomalacia and gliosis more pronounced within the left frontal lobe  Workstation performed: [id number]     EKG, Pathology, and Other Studies: I have personally reviewed pertinent reports  VTE Prophylaxis: Sequential compression device (Venodyne)  and Enoxaparin (Lovenox)    Code Status: Level 1 - Full Code  Advance Directive and Living Will:      Power of :    POLST:      Counseling / Coordination of Care  I spent 45 minutes with the patient

## 2018-05-22 NOTE — CASE MANAGEMENT
Initial Clinical Review    Admission: Date/Time/Statement: OBSERVATION 5/21/18 @ 2014 CHANGED TO INPATIENT 5/22/18 @ 9767 per PA decision     Orders Placed This Encounter   Procedures     05/22/18 1359  Inpatient Admission Once     Transfer Service: General Medicine    Expected Discharge Date: 05/23/18       Question Answer Comment   Admitting Physician Corrine Yarbrough    Level of Care Med Surg    Estimated length of stay More than 2 Midnights    Certification I certify that inpatient services are medically necessary for this patient for a duration of greater than two midnights  See H&P and MD Progress Notes for additional information about the patient's course of treatment  05/22/18 1354     ED: Date/Time/Mode of Arrival:   ED Arrival Information     Expected Arrival Acuity Means of Arrival Escorted By Service Admission Type    5/21/2018 15:44 5/21/2018 16:30 Emergent Walk-In Family Member General Medicine Emergency    Arrival Complaint    CONFUSION -         Chief Complaint:   Chief Complaint   Patient presents with    Altered Mental Status     pt wife states pt has had increased confusion since Saturday  pt was sent in from doctors office  History of Illness: Alex Estrada is a 80 y o  male who has a history significant for CAD, gastroesophageal reflux disease, chronic pain syndrome with back pain; hyperlipidemia and depression  The patient also has a history of CAD in the past   However the patient comes in with a 2 day confusion that was described by the wife as doing certain nonsensical things such as wearing his slippers and then putting them in his shoes  He remains to be active and in fact he does walking on the treadmill  Normally he spends his time talking to his friends in the gym    However also within the past 2 days he was noted to be mumbling some nonsensical things such as some numbers in the middle of room while not doing any other thing somewhat he was in the middle of playing cards and bridge  ("Give me a two   ") the wife denies any fever  No chest pain  No loss of consciousness or headaches  With that, the patient went to the emergency room to be evaluated where a CT scan of the head showed "encephalomalacia in the left frontal region consistent with postsurgical change from prior bilateral frontal craniotomy and tumor resection  It is grossly stable extra-axial mass in the right anterior cingulate parafalcine region measuring 1 9 x 2 x 1 4 centimeters    Noted that there was no hemorrhage or acute mass effect or extra-axial collection  This finding was then referred to Neurosurgery who then recommended an MRI but meanwhile patient need to be started on dexamethasone  Noted is that the patient does not have any new onset focal signs  No history of dropping things  No numbness nor tingling  Also there was no loss of consciousness or black outs      ED Vital Signs:   ED Triage Vitals   Temperature Pulse Respirations Blood Pressure SpO2   05/21/18 1639 05/21/18 1639 05/21/18 1639 05/21/18 1639 05/21/18 1639   97 5 °F (36 4 °C) 69 18 (!) 171/77 98 %      Temp Source Heart Rate Source Patient Position - Orthostatic VS BP Location FiO2 (%)   05/21/18 1639 05/21/18 1639 05/21/18 1639 05/21/18 1751 --   Oral Monitor Sitting Left arm       Pain Score       05/21/18 1639       No Pain        Wt Readings from Last 1 Encounters:   05/21/18 73 9 kg (162 lb 14 7 oz)     Vital Signs (abnormal):   05/22/18 0743  97 8 °F (36 6 °C)  105  18   177/81  98 %  None (Room air)  Sitting   05/21/18 2300  97 8 °F (36 6 °C)  64  18   178/82  97 %  None (Room air)  Lying   05/21/18 2201  --  64  18   175/75  93 %  None (Room air)  Lying   05/21/18 2124  --  73  18   188/83  93 %  None (Room air)  Lying   05/21/18 2031  --  67  18   218/97  94 %  None (Room air)  Lying   05/21/18 2001  --  66  18   214/100  98 %  None (Room air)  Lying   05/21/18 1932  --  64  18   206/85  98 %  None (Room air)  Lying 05/21/18 1821  --  63  18   188/65  95 %  None (Room air)  Sitting   05/21/18 1751  --  61  18   171/78  94 %  None (Room air)  Sitting   05/21/18 1639  97 5 °F (36 4 °C)  69  18   171/77  98 %  None (Room air)  Sitting     Abnormal Labs:    Glucose 157  Phos 1 8  MCV 80    Diagnostic Test Results:     5/21 EKG - Normal sinus rhythm  Incomplete right bundle branch block  Borderline ECG  When compared with ECG of 08-MAY-2015 10:47,  T wave inversion no longer evident in Anterior leads    5/21 CT Head - 1  Stable right anterior cingulate and parafalcine meningioma measuring 2 cm surrounding encephalomalacia and gliosis in the bilateral anterior frontal region  2   No evidence of acute intracranial hemorrhage, acute mass effect or extra-axial collection  5/22 MRI Brain - results pending     ED Treatment:   Medication Administration from 05/21/2018 1544 to 05/21/2018 2257       Date/Time Order Dose Route Action     05/21/2018 1919 iohexol (OMNIPAQUE) 350 MG/ML injection (MULTI-DOSE) 85 mL 85 mL Intravenous Given     05/21/2018 2020 dexamethasone (PF) (DECADRON) injection 10 mg 10 mg Intravenous Given        Past Medical/Surgical History:    Active Ambulatory Problems     Diagnosis Date Noted    Constipation 01/25/2018    Iron deficiency 01/25/2018    Other constipation 01/25/2018    Anemia 06/30/2017    Arteriosclerotic cardiovascular disease 02/03/2015    Backache 06/05/2013    Benign essential hypertension 06/03/2013    Cervical spinal stenosis 12/30/2013    Depression 04/01/2013    Esophageal reflux 09/30/2014    Hyperlipidemia 06/03/2013    Insomnia 02/05/2016    Spinal stenosis of lumbar region with neurogenic claudication 11/05/2013    Vitamin B12 deficiency 07/21/2017    Idiopathic peripheral neuropathy 03/29/2018    Iron deficiency anemia 03/29/2018     Resolved Ambulatory Problems     Diagnosis Date Noted    No Resolved Ambulatory Problems     Past Medical History:   Diagnosis Date    [facility]     Anxiety     Arthritis     Brain neoplasm ([facility] ) 11/1999    Depression     Hypercholesterolemia     Narcolepsy     Palpitations     Prostate cancer ([facility] )      Admitting Diagnosis: Confusion [R41 0]  Altered mental status [R41 82]  Frontal mass of brain [G93 9]    Age/Sex: 80 y o  male    Assessment/Plan:          * Confusion   Assessment & Plan     Confusion has been for the past 2 days with note of some memory problems especially short-term for approximately a month or so  Neurosurgery consulted because of presence of retained meningioma  Will get MRI for tomorrow  Neurosurgical consult  Otherwise if patient is not having these symptoms because of the mass, other considerations may include a beginnings of dementia  May probably need to have Neurology/neuropsychiatry involved           History of meningioma   Assessment & Plan     We will get MRI tomorrow  Neurosurgery consultation           Depression   Assessment & Plan     Patient will continue Zoloft and Ambien           VTE Prophylaxis: Enoxaparin (Lovenox)  / sequential compression device   Code Status: No Order full code as discussed with wife with patient and room  Anticipated Length of Stay:  Patient will be admitted on an Observation basis with an anticipated length of stay of  less than 2 midnights  Justification for Hospital Stay: Please see detailed plans noted above      Admission Orders:  Scheduled Meds:   Current Facility-Administered Medications:  acetaminophen 650 mg Oral Q6H PRN   aluminum-magnesium hydroxide-simethicone 15 mL Oral Q6H PRN   aspirin 81 mg Oral Daily   docusate sodium 100 mg Oral BID PRN   enalapril 2 5 mg Oral BID   enoxaparin 40 mg Subcutaneous Daily   ferrous sulfate 325 mg Oral Daily   gabapentin 600 mg Oral HS   metoprolol tartrate 25 mg Oral BID   nicotine 1 patch Transdermal Daily   ondansetron 4 mg Intravenous Q6H PRN   pantoprazole 40 mg Oral Early Morning   pravastatin 40 mg Oral Daily With Dinner   sertraline 50 mg Oral Daily   zolpidem 5 mg Oral Daily     PRN Meds:   acetaminophen    aluminum-magnesium hydroxide-simethicone    docusate sodium    ondansetron    SCDs  Up w/ assist   MRI Brain   Diet cardiac TLC 2 3 Gm Na, step 1  Cons Neurology   Cons Neurosurgery   Cons CM   __________________  5/22 Neurology Consult   41-year-old male with history of anterior fossa meningioma initially resected in 1999, as well as prostate cancer, anxiety/depression, who presents with subacute duration of short-term memory loss (several months per wife), with a more acute (several day) onset of altered mental status/confusion/strange behavior  No significant metabolic or infectious derangements identified thus far  Does have risk factors for partial seizures?       Plan:  1  MRI brain with and without contrast performed this morning, final read pending  2   Neurosurgery has been consulted to evaluate patient regarding meningioma history; given one time dose of Decadron  3  Recommend EEG routine to rule out partial seizure activity (given meningioma history and possible dementia with short term memory issues the past year)              -No role of AED unless epileptiform activity on EEG  4  Recommend check reversible dementia labs including B12, TSH, Folate  5  Metabolic/infectious derangement monitoring per primary team    6  Supportive care  7  Formal neurocognitive assessment as outpatient (will request this through [facility])  8  Recommend PT/OT evaluations  9  Will discuss plan of care further with attending neurologist      __________________  5/22 Medicine Progress Note  * Confusion   Assessment & Plan     · Confusion has been for the past 2 days with note of some memory problems especially short-term for approximately a month or so  · Patient states he feels better, but still is not at his baseline  He feels at this point that the confusion is waxing and waning    · Neurology and neuruosurgery input appreciated  · Await MRI and EEG  · B12 and folate ordered  · Will need outpatient neurocognitive testing          Essential hypertension   Assessment & Plan     · BP has been consistently elevated this admission  · Past records report good control  · May be elevated secondary to anxiety  · Will give a one time dose of additional Vasotec and add prn Lopressor and monitor  · Doubt uncontrolled BP cause of confusion           Depression   Assessment & Plan     · Patient will continue Zoloft and Ambien           History of meningioma   Assessment & Plan     · Neurosurgery consult appreciated  · Does not appear to need any neurosurgical intervention at this time, but await final read of MRI brain           VTE Pharmacologic Prophylaxis:   Pharmacologic: Enoxaparin (Lovenox)  Mechanical VTE Prophylaxis in Place: Yes  Certification Statement: The patient, admitted on an observation basis, will now require > 2 midnight hospital stay due to Will need EEG as inpatient per Neurology   BP control

## 2018-05-22 NOTE — CONSULTS
Consultation - Neurology   Martina Guerrero 80 y o  male MRN: [id number]  Unit/Bed#: CW2 216-02 Encounter: [id number]      Assessment/Plan   Assessment:  17-year-old male with history of anterior fossa meningioma initially resected in [address] [place] [address], as well as prostate cancer, anxiety/depression, who presents with subacute duration of short-term memory loss (several months per wife), with a more acute (several day) onset of altered mental status/confusion/strange behavior  No significant metabolic or infectious derangements identified thus far  Does have risk factors for partial seizures? Plan:  1  MRI brain with and without contrast performed this morning, final read pending  2   Neurosurgery has been consulted to evaluate patient regarding meningioma history; given one time dose of Decadron  3  Recommend EEG routine to rule out partial seizure activity (given meningioma history and possible dementia with short term memory issues the past year)   -No role of AED unless epileptiform activity on EEG  4  Recommend check reversible dementia labs including B12, TSH, Folate  5  Metabolic/infectious derangement monitoring per primary team    6  Supportive care  7  Formal neurocognitive assessment as outpatient ([facility])  8  Recommend PT/OT evaluations  9  Will discuss plan of care further with attending neurologist        History of Present Illness     Reason for Consult / Principal Problem:  Confusion/altered mental status    HPI: Martina Guerrero is a 80 y o  male with past medical history of meningioma status post resection in 1999, CAD, GERD, chronic pain syndrome, hyperlipidemia, depression who neurology is asked to evaluate regarding several day duration of confusion and altered mentation, as well as difficulty with activities of living  Patient's recent course was discussed further with patient's wife via phone call this morning    At baseline patient quite independent, going to the gym several times a week independent with activities  Over the course of the past several months he has struggled with some short-term memory issues  This past day he exhibited a more acute change neurologic late exhibiting some confusion/strange behavior  Patient on Saturday attempted to put his slippers on; following this he attempted to put his shoes on over his slippers  Patient wife attended their grandson's wedding Saturday night; the patient drove from the [employer] to the reception, but got lost on the way there and was not following his wife's directions  He was also saying strange/out of context things on 1 or 2 occasions over the course of the weekend  Patient had an appointment with his PCP yesterday, during which they discussed the confusion/strange behavior; the advanced practitioner evaluating the patient advised evaluation in the ED  Workup thus far during the admission revealed CT head revealing stable meningioma findings compared to prior neuro imaging  MRI brain with without contrast was completed this morning, read pending  Has been markedly hypertensive throughout admission; patient's wife states normally his systolic readings are in the 110s to 120s at doctor's appointments  Wife states he did have a a fall several weeks ago due to standing up too quickly becoming lightheaded  Other than that no significant recent or acute neurologic symptoms  Inpatient consult to Neurology  Consult performed by: Oscar Gong ordered by: Ferdinand Slider          Review of Systems   Constitutional: Negative  HENT: Negative for trouble swallowing  Eyes: Negative for photophobia and visual disturbance  Respiratory: Negative for chest tightness and shortness of breath  Cardiovascular: Negative for chest pain and palpitations  Gastrointestinal: Negative for abdominal pain, nausea and vomiting  Musculoskeletal: Negative  Skin: Negative      Neurological: Positive for light-headedness  Negative for dizziness, tremors, seizures, syncope, facial asymmetry, speech difficulty, weakness, numbness and headaches  Psychiatric/Behavioral: Positive for confusion  Historical Information   Past Medical History:   Diagnosis Date    [facility]     Anxiety     Arthritis     [facility]    brain tumor    Depression     Hypercholesterolemia     Narcolepsy     daytime drowsiness and dozing off occasionally    Palpitations     Prostate cancer [facility])      Past Surgical History:   Procedure Laterality Date    BACK SURGERY      BRAIN SURGERY  11/08/1999    CORONARY ARTERY BYPASS GRAFT      x5     Social History   History   Alcohol Use No     Comment: (history)     History   Drug Use No     History   Smoking Status    Current Every Day Smoker    Types: Cigars   Smokeless Tobacco    Never Used     Comment: former cig smoker- quit in 1992     Family History:   Family History   Problem Relation Age of Onset    Hypertension Mother      benign essential    Cancer Mother     Osteoporosis Mother     Suicidality Father     Diabetes Family     Heart disease Family     Neuropathy Family      peripheral    Prostate cancer Family     Thyroid disease Family        Review of previous medical records was completed      Meds/Allergies   current meds:   Current Facility-Administered Medications   Medication Dose Route Frequency    acetaminophen (TYLENOL) tablet 650 mg  650 mg Oral Q6H PRN    aluminum-magnesium hydroxide-simethicone (MYLANTA) 200-200-20 mg/5 mL oral suspension 15 mL  15 mL Oral Q6H PRN    aspirin chewable tablet 81 mg  81 mg Oral Daily    docusate sodium (COLACE) capsule 100 mg  100 mg Oral BID PRN    enalapril (VASOTEC) tablet 2 5 mg  2 5 mg Oral BID    enoxaparin (LOVENOX) subcutaneous injection 40 mg  40 mg Subcutaneous Daily    ferrous sulfate tablet 325 mg  325 mg Oral Daily    gabapentin (NEURONTIN) capsule 600 mg  600 mg Oral HS    metoprolol tartrate (LOPRESSOR) tablet 25 mg  25 mg Oral BID    nicotine (NICODERM CQ) 21 mg/24 hr TD 24 hr patch 1 patch  1 patch Transdermal Daily    ondansetron (ZOFRAN) injection 4 mg  4 mg Intravenous Q6H PRN    pantoprazole (PROTONIX) EC tablet 40 mg  40 mg Oral Early Morning    pravastatin (PRAVACHOL) tablet 40 mg  40 mg Oral Daily With Dinner    sertraline (ZOLOFT) tablet 50 mg  50 mg Oral Daily    zolpidem (AMBIEN) tablet 5 mg  5 mg Oral Daily    and PTA meds:   Prior to Admission Medications   Prescriptions Last Dose Informant Patient Reported? Taking?   aspirin 81 MG tablet  Spouse/Significant Other Yes No   Sig: Take 1 tablet by mouth daily   enalapril (VASOTEC) 2 5 mg tablet   No No   Sig: TAKE 1 TABLET BY MOUTH  TWICE A DAY   ferrous sulfate 325 (65 Fe) mg tablet  Spouse/Significant Other Yes No   Sig: Take 1 tablet by mouth daily   gabapentin (NEURONTIN) 300 mg capsule   No No   Sig: Take 2 tablets q hs   metoprolol tartrate (LOPRESSOR) 25 mg tablet  Spouse/Significant Other Yes No   Sig: Take 25 mg by mouth 2 (two) times a day   omeprazole (PriLOSEC) 40 MG capsule   No No   Sig: Take 1 capsule (40 mg total) by mouth daily   sertraline (ZOLOFT) 50 mg tablet  Spouse/Significant Other Yes No   Sig: Take 1 tablet by mouth daily   simvastatin (ZOCOR) 80 mg tablet   No No   Sig: TAKE 1 TABLET BY MOUTH  DAILY   zolpidem (AMBIEN) 5 mg tablet   No No   Sig: Take 1 tablet (5 mg total) by mouth daily      Facility-Administered Medications: None       Allergies   Allergen Reactions    Atorvastatin Myalgia, Dizziness and Headache    Niacin Other (See Comments)     unknown       Objective   Vitals:Blood pressure (!) 177/81, pulse 105, temperature 97 8 °F (36 6 °C), temperature source Oral, resp  rate 18, height 5' 9" (1 753 m), weight 73 9 kg (162 lb 14 7 oz), SpO2 98 %  ,Body mass index is 24 06 kg/m²      Intake/Output Summary (Last 24 hours) at 05/22/18 0932  Last data filed at 05/22/18 6396   Gross per 24 hour   Intake              120 ml   Output             1075 ml   Net             -955 ml       Invasive Devices: Invasive Devices     Peripheral Intravenous Line            Peripheral IV 05/21/18 Right Antecubital less than 1 day                Physical Exam   Constitutional: He is oriented to person, place, and time  He appears well-developed and well-nourished  HENT:   Head: Normocephalic and atraumatic  Eyes: Conjunctivae and EOM are normal  Pupils are equal, round, and reactive to light  Neck: Normal range of motion  Neck supple  Cardiovascular: Regular rhythm  Tachycardia present  Pulmonary/Chest: Effort normal and breath sounds normal    Abdominal: Soft  Bowel sounds are normal    Musculoskeletal: Normal range of motion  Neurological: He is alert and oriented to person, place, and time  He has a normal Finger-Nose-Finger Test and a normal Heel to [employer]    Reflex Scores:       Bicep reflexes are 2+ on the right side and 2+ on the left side  Brachioradialis reflexes are 2+ on the right side and 2+ on the left side  Patellar reflexes are 2+ on the right side and 2+ on the left side  Skin: Skin is warm and dry  Psychiatric: His speech is normal      Neurologic Exam     Mental Status   Oriented to person, place, and time  Follows 2 step commands  Attention: normal  Concentration: normal    Speech: speech is normal   Knowledge: good  Able to perform simple calculations  Able to read  Able to repeat  Normal comprehension  Alert, oriented to self, notes it is the middle of May in 2018  Oriented to place  Notes the president, and number of nickels in one dollar  Names most objects on NIHSS card  Able to state he is here due to "trouble with memory/remembering things"  Following commands pretty fluently, but at times will require some repeated cuing (might be hard of hearing as well)     Cranial Nerves     CN II   Visual fields full to confrontation  CN III, IV, VI   Pupils are equal, round, and reactive to light  Extraocular motions are normal      CN V   Facial sensation intact  CN VII   Facial expression full, symmetric  CN VIII   CN VIII normal      CN IX, X   CN IX normal    CN X normal      CN XI   CN XI normal      CN XII   CN XII normal      Motor Exam   Muscle bulk: normal  Overall muscle tone: normal  Right arm pronator drift: absent  Left arm pronator drift: absent  5/5 UE/LE strength throughout  Sensory Exam   Light touch normal    Vibration normal      Temperature sensation intact throughout          Gait, Coordination, and Reflexes     Coordination   Finger to nose coordination: normal  Heel to shin coordination: normal    Tremor   Resting tremor: absent  Intention tremor: absent    Reflexes   Right brachioradialis: 2+  Left brachioradialis: 2+  Right biceps: 2+  Left biceps: 2+  Right patellar: 2+  Left patellar: 2+  Right plantar: normal  Left plantar: normal  Right ankle clonus: absent  Left ankle clonus: absent      Lab Results:   CBC:   Results from last 7 days  Lab Units 05/22/18  0457 05/21/18  1650   WBC Thousand/uL 5 33 6 88   RBC Million/uL 4 73 4 62   HEMOGLOBIN g/dL 12 8 12 7   HEMATOCRIT % 38 8 37 1   MCV fL 82 80*   PLATELETS Thousands/uL 158 173   , BMP/CMP:   Results from last 7 days  Lab Units 05/22/18  0457 05/21/18  1650   SODIUM mmol/L 138 138   POTASSIUM mmol/L 4 0 4 2   CHLORIDE mmol/L 106 106   CO2 mmol/L 27 27   ANION GAP mmol/L 5 5   BUN mg/dL 13 12   CREATININE mg/dL 0 90 0 92   GLUCOSE RANDOM mg/dL 157* 127   CALCIUM mg/dL 9 9 9 7   AST U/L 16 19   ALT U/L 23 24   ALK PHOS U/L 60 59   TOTAL PROTEIN g/dL 6 7 6 9   BILIRUBIN TOTAL mg/dL 0 46 0 49   EGFR ml/min/1 73sq m 78 76   , Vitamin B12:   , HgBA1C:   Results from last 7 days  Lab Units 05/22/18  0023   HEMOGLOBIN A1C % 5 9   , TSH:   Results from last 7 days  Lab Units 05/21/18  1650   TSH 3RD GENERATON uIU/mL 1 880   , Coagulation:   Results from last 7 days  Lab Units 05/22/18  0023   INR  1 01   , Lipid Profile:   , Urinalysis:   Results from last 7 days  Lab Units 05/21/18 2005 05/21/18  1930   COLOR UA  Yellow see chart   CLARITY UA  Clear  --    SPEC GRAV UA  1 015  --    PH UA  7 5  --    LEUKOCYTES UA  Negative  --    NITRITE UA  Negative  --    PROTEIN UA mg/dl Negative  --    GLUCOSE UA mg/dl Negative  --    KETONES UA mg/dl Negative  --    BILIRUBIN UA  Negative  --    BLOOD UA  Negative  --      Imaging Studies: I have personally reviewed pertinent films in PACS   CT head 5/21/18: 1  Stable right anterior cingulate and parafalcine meningioma measuring 2 cm surrounding encephalomalacia and gliosis in the bilateral anterior frontal region  2   No evidence of acute intracranial hemorrhage, acute mass effect or extra-axial collection  EKG, Pathology, and Other Studies: I have personally reviewed pertinent reports  VTE Prophylaxis: Sequential compression device (Venodyne)  and Enoxaparin (Lovenox)    Code Status: Level 1 - Full Code    Counseling / Coordination of Care  Total time spent today 60 minutes  Greater than 50% of total time was spent with the patient and / or family counseling and / or coordination of care  A description of the counseling / coordination of care: Discussed plan of care of patient/family

## 2018-05-22 NOTE — ASSESSMENT & PLAN NOTE
· Confusion has been for the past 2 days with note of some memory problems especially short-term for approximately a month or so  · Patient states he feels better, but still is not at his baseline  He feels at this point that the confusion is waxing and waning  · Neurology and neuruosurgery input appreciated     · Await MRI and EEG  · B12 and folate ordered  · Will need outpatient neurocognitive testing

## 2018-05-23 VITALS
SYSTOLIC BLOOD PRESSURE: 125 MMHG | WEIGHT: 162.92 LBS | TEMPERATURE: 98.2 F | HEIGHT: 69 IN | OXYGEN SATURATION: 97 % | HEART RATE: 74 BPM | BODY MASS INDEX: 24.13 KG/M2 | RESPIRATION RATE: 16 BRPM | DIASTOLIC BLOOD PRESSURE: 71 MMHG

## 2018-05-23 LAB
FOLATE SERPL-MCNC: 7.9 NG/ML (ref 3.1–17.5)
VIT B12 SERPL-MCNC: 519 PG/ML (ref 100–900)

## 2018-05-23 PROCEDURE — 82746 ASSAY OF FOLIC ACID SERUM: CPT | Performed by: PHYSICIAN ASSISTANT

## 2018-05-23 PROCEDURE — 99239 HOSP IP/OBS DSCHRG MGMT >30: CPT | Performed by: PHYSICIAN ASSISTANT

## 2018-05-23 PROCEDURE — 82607 VITAMIN B-12: CPT | Performed by: PHYSICIAN ASSISTANT

## 2018-05-23 RX ADMIN — METOPROLOL TARTRATE 25 MG: 25 TABLET, FILM COATED ORAL at 08:39

## 2018-05-23 RX ADMIN — PANTOPRAZOLE SODIUM 40 MG: 40 TABLET, DELAYED RELEASE ORAL at 05:23

## 2018-05-23 RX ADMIN — SERTRALINE HYDROCHLORIDE 50 MG: 50 TABLET ORAL at 08:39

## 2018-05-23 RX ADMIN — ENOXAPARIN SODIUM 40 MG: 40 INJECTION SUBCUTANEOUS at 08:39

## 2018-05-23 RX ADMIN — Medication 325 MG: at 08:39

## 2018-05-23 RX ADMIN — ASPIRIN 81 MG 81 MG: 81 TABLET ORAL at 08:39

## 2018-05-23 RX ADMIN — METOROPROLOL TARTRATE 5 MG: 5 INJECTION, SOLUTION INTRAVENOUS at 01:52

## 2018-05-23 RX ADMIN — ENALAPRIL MALEATE 2.5 MG: 2.5 TABLET ORAL at 08:39

## 2018-05-23 NOTE — PROGRESS NOTES
Patient repeat BP post PRN intervention was 160/82, no parameters for BP management in orders outside of PRN direction  Notified [facility] and was informed that no further intervention is required and that BP is to be maintained under SBP of 175  Will continue to monitor

## 2018-05-23 NOTE — DISCHARGE INSTRUCTIONS
Avoid driving until seen in follow up by neurology or your family doctor  Take your blood pressures twice a day and record

## 2018-05-23 NOTE — ASSESSMENT & PLAN NOTE
· Confusion has been for the past 2 days with note of some memory problems especially short-term for approximately a month or so  · Patient states he feels better  · Neurology and neuruosurgery input appreciated  · MRI and EEG unremarkable  Suspect component of cognitive decline  Neurology recommending outpatient neuropsych evaluation for cognitive decline  · B12 and folate WNL  · Will need outpatient neurocognitive testing  · Discussed with patient to avoid driving at this time until further workup complete

## 2018-05-23 NOTE — ASSESSMENT & PLAN NOTE
· BP has been consistently elevated this admission  · Past records report good control  · May be elevated secondary to anxiety  · Will give a one time dose of additional Vasotec and add prn Lopressor and monitor  · Doubt uncontrolled BP cause of confusion  · BP under better control today  Will not change home regimen  Patient instructed to take BP readings at home and follow up with PCP

## 2018-05-23 NOTE — DISCHARGE SUMMARY
Discharge- Daniel Ramirez 1933, 80 y o  male MRN: [id number]    Unit/Bed#: [facility] 216-02 Encounter: [id number]    Primary Care Provider: Antonio Carey MD   Date and time admitted to hospital: 5/21/2018  5:43 PM        * Confusion   Assessment & Plan    · Confusion has been for the past 2 days with note of some memory problems especially short-term for approximately a month or so  · Patient states he feels better  · Neurology and neuruosurgery input appreciated  · MRI and EEG unremarkable  Suspect component of cognitive decline  Neurology recommending outpatient neuropsych evaluation for cognitive decline  · B12 and folate WNL  · Will need outpatient neurocognitive testing  · Discussed with patient to avoid driving at this time until further workup complete  Essential hypertension   Assessment & Plan    · BP has been consistently elevated this admission  · Past records report good control  · May be elevated secondary to anxiety  · Will give a one time dose of additional Vasotec and add prn Lopressor and monitor  · Doubt uncontrolled BP cause of confusion  · BP under better control today  Will not change home regimen  Patient instructed to take BP readings at home and follow up with PCP  Depression   Assessment & Plan    · Patient will continue Zoloft and Ambien          History of meningioma   Assessment & Plan    · Neurosurgery consult appreciated  · Stable            Discharging Physician / Practitioner: Dawood Jj PA-C  PCP: Antonio Carey MD  Admission Date:   Admission Orders     Ordered        05/22/18 1359  Inpatient Admission  Once         05/21/18 2014  Place in Observation (expected length of stay for this patient is less than two midnights)  Once             Discharge Date: 05/23/18    Resolved Problems  Date Reviewed: 5/23/2018    None          Consultations During Hospital Stay:  · Dr Triston Dc  · Dr Fidelia Andrade    Procedures Performed:     · MRI brain - 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 best seen on series 11 image 18  This residual tumor has slightly decreased in size compared to prior examination with stable surrounding encephalomalacia and gliosis more pronounced within the left frontal lobe  · CT head with/without contrast - 1  Stable right anterior cingulate and parafalcine meningioma measuring 2 cm surrounding encephalomalacia and gliosis in the bilateral anterior frontal region  2  No evidence of acute intracranial hemorrhage, acute mass effect or extra-axial collection  · EEG - This Routine EEG recorded during wakefulness and drowsiness is abnormal  Intermittent left fronto-temporal theta and delta activities suggest an area of underlying neuronal dysfunction    Significant Findings / Test Results:     · none    Incidental Findings:   · none     Test Results Pending at Discharge (will require follow up):   · none     Outpatient Tests Requested:  · Outpatient Neuropsych testing recommended by Neurology    Complications:  None    Reason for Admission: Confusion    Hospital Course:     Alex Estrada is a 80 y o  male patient who originally presented to the hospital on 5/21/2018 due to episodes of confusion worse 2 days prior to admission  EEG and MRI were unremarkable  Neurology felt patient may be experiencing some cognitive decline and recommended outpatient workup  Did discuss with the patient that it is likely not safe to drive until further workup is completed  Patient was having confusion while driving prior to admission, and was getting lost   Patient was also noted to have elevated blood pressures during admission  Patient checks his blood pressures regularly at home and they are always within normal limits  Outpatient records were also reviewed which showed normal blood pressures    Suspect elevated blood pressure secondary to anxiety from being in the hospital   No changes in blood pressure medications were made, the patient was instructed to monitor his blood pressures the next few days  Please see above list of diagnoses and related plan for additional information  Condition at Discharge: good     Discharge Day Visit / Exam:     Subjective:  Offers no complaints  Vitals: Blood Pressure: 125/71 (05/23/18 0742)  Pulse: 74 (05/23/18 0742)  Temperature: 98 2 °F (36 8 °C) (05/23/18 0742)  Temp Source: Oral (05/23/18 0742)  Respirations: 16 (05/23/18 0742)  Height: 5' 9" (175 3 cm) (05/21/18 2300)  Weight - Scale: 73 9 kg (162 lb 14 7 oz) (05/21/18 2300)  SpO2: 97 % (05/23/18 0742)  Exam:   Physical Exam   Constitutional: He is oriented to person, place, and time  He appears well-developed  No distress  HENT:   Head: Normocephalic and atraumatic  Neck: Normal range of motion  Neck supple  Cardiovascular: Normal rate and regular rhythm  No murmur heard  Pulmonary/Chest: Effort normal and breath sounds normal  No respiratory distress  He has no wheezes  He has no rales  Abdominal: Soft  Bowel sounds are normal  He exhibits no distension  Musculoskeletal: He exhibits no edema  Neurological: He is alert and oriented to person, place, and time  No cranial nerve deficit  Skin: Skin is warm and dry  No rash noted  Psychiatric: He has a normal mood and affect  Discussion with Family: Wife called with update    Discharge instructions/Information to patient and family:   See after visit summary for information provided to patient and family  Provisions for Follow-Up Care:  See after visit summary for information related to follow-up care and any pertinent home health orders  Disposition:     Home    For Discharges to   [facility]:   · Not Applicable to this Patient - Not Applicable to this Patient    Planned Readmission: none     Discharge Statement:  I spent 50 minutes discharging the patient  This time was spent on the day of discharge   I had direct contact with the patient on the day of discharge  Greater than 50% of the total time was spent examining patient, answering all patient questions, arranging and discussing plan of care with patient as well as directly providing post-discharge instructions  Additional time then spent on discharge activities  Discharge Medications:  See after visit summary for reconciled discharge medications provided to patient and family        ** Please Note: This note has been constructed using a voice recognition system **

## 2018-05-23 NOTE — PHYSICIAN ADVISOR
Current patient class: Inpatient  The patient is currently on Hospital Day: 3      The patient was admitted to the hospital at 94 31 11 on 5/22/18 for the following diagnosis:  Confusion [R41 0]  Altered mental status [R41 82]  Frontal mass of brain [G93 9]       There is documentation in the medical record of an expected length of stay of at least 2 midnights  The patient is therefore expected to satisfy the 2 midnight benchmark and given the 2 midnight presumption is appropriate for INPATIENT ADMISSION  Given this expectation of a satisfying stay, CMS instructs us that the patient is most often appropriate for inpatient admission under part A provided medical necessity is documented in the chart  After review of the relevant documentation, labs, vital signs and test results, the patient is appropriate for INPATIENT ADMISSION  Admission to the hospital as an inpatient is a complex decision making process which requires the practitioner to consider the patients presenting complaint, history and physical examination and all relevant testing  With this in mind, in this case, the patient was deemed appropriate for INPATIENT ADMISSION  After review of the documentation and testing available at the time of the admission I concur with this clinical determination of medical necessity  Rationale is as follows: The patient is a 80 yrs old Male who presented to the ED at 5/21/2018  5:43 PM with a chief complaint of Altered Mental Status (pt wife states pt has had increased confusion since Saturday  pt was sent in from doctors office   )     Given the need for further hospitalization, and along with the documentation of medical necessity present in the chart, the patient is appropriate for inpatient admission  The patient is expected to satisfy the 2 midnight benchmark, and will require further acute medical care   The patient does have comorbid conditions which increases the risk for significant adverse outcome  Given this the patient is appropriate for inpatient admission        The patients vitals on arrival were ED Triage Vitals   Temperature Pulse Respirations Blood Pressure SpO2   05/21/18 1639 05/21/18 1639 05/21/18 1639 05/21/18 1639 05/21/18 1639   97 5 °F (36 4 °C) 69 18 (!) 171/77 98 %      Temp Source Heart Rate Source Patient Position - Orthostatic VS BP Location FiO2 (%)   05/21/18 1639 05/21/18 1639 05/21/18 1639 05/21/18 1751 --   Oral Monitor Sitting Left arm       Pain Score       05/21/18 1639       No Pain           Past Medical History:   Diagnosis Date    Acute myocardial infarction ([facility] [address] )     Anxiety     Arthritis     Brain neoplasm ([facility] [address] ) 11/1999    brain tumor    Depression     Hypercholesterolemia     Narcolepsy     daytime drowsiness and dozing off occasionally    Palpitations     Prostate cancer ([facility] [address] )      Past Surgical History:   Procedure Laterality Date    BACK SURGERY      BRAIN SURGERY  11/08/1999    CORONARY ARTERY BYPASS GRAFT      x5           Consults have been placed to:   IP CONSULT TO CASE MANAGEMENT  IP CONSULT TO NEUROSURGERY  IP CONSULT TO NEUROLOGY    Vitals:    05/21/18 2300 05/22/18 0743 05/22/18 1522 05/23/18 0002   BP: (!) 178/82 (!) 177/81 135/64 162/70   BP Location: Left arm Right arm Right arm Right arm   Pulse: 64 105 66 88   Resp: 18 18 18 18   Temp: 97 8 °F (36 6 °C) 97 8 °F (36 6 °C) 98 7 °F (37 1 °C) 98 4 °F (36 9 °C)   TempSrc: Oral Oral Oral Oral   SpO2: 97% 98% 93% 99%   Weight: 73 9 kg (162 lb 14 7 oz)      Height: 5' 9" (1 753 m)          Most recent labs:    Recent Labs      05/21/18   1650  05/22/18   0023  05/22/18   0457   WBC  6 88   --   5 33   HGB  12 7   --   12 8   HCT  37 1   --   38 8   PLT  173   --   158   K  4 2   --   4 0   NA  138   --   138   CALCIUM  9 7   --   9 9   BUN  12   --   13   CREATININE  0 92   --   0 90   INR   --   1 01   --    TROPONINI  <0 02   --    --    AST  19   --   16   ALT  24   --   23   ALKPHOS  59   -- 60   BILITOT  0 49   --   0 46       Scheduled Meds:  Current Facility-Administered Medications:  acetaminophen 650 mg Oral Q6H PRN Hu Lopez MD   aluminum-magnesium hydroxide-simethicone 15 mL Oral Q6H PRN Hu Lopez MD   aspirin 81 mg Oral Daily Hu Lopez MD   docusate sodium 100 mg Oral BID PRN Hu Lopez MD   enalapril 2 5 mg Oral BID Hu Lopez MD   enoxaparin 40 mg Subcutaneous Daily Hu Lopez MD   ferrous sulfate 325 mg Oral Daily Hu Lopez MD   gabapentin 600 mg Oral HS Hu Lopez MD   metoprolol 5 mg Intravenous Q6H PRN Kenneth Goldmann, PA-C   metoprolol tartrate 25 mg Oral BID Hu Lopez MD   nicotine 1 patch Transdermal Daily Hu Lopez MD   ondansetron 4 mg Intravenous Q6H PRN Hu Lopez MD   pantoprazole 40 mg Oral Early Morning Hu Lopez MD   pravastatin 40 mg Oral Daily With Angeles Blanco MD   sertraline 50 mg Oral Daily Hu Lopez MD   zolpidem 5 mg Oral Daily Hu Lopez MD     Continuous Infusions:   PRN Meds:   acetaminophen    aluminum-magnesium hydroxide-simethicone    docusate sodium    metoprolol    ondansetron    Surgical procedures (if appropriate):

## 2018-05-24 ENCOUNTER — TRANSITIONAL CARE MANAGEMENT (OUTPATIENT)
Dept: FAMILY MEDICINE CLINIC | Facility: CLINIC | Age: 83
End: 2018-05-24

## 2018-05-24 NOTE — ED PROVIDER NOTES
History  Chief Complaint   Patient presents with    Altered Mental Status     pt wife states pt has had increased confusion since Saturday  pt was sent in from doctors office  This is an 44-year-old male with a past medical history of a meningioma resected from his left frontal lobe in 1999 who presents to the emergency department this afternoon with three day history of altered mental status  Patient presents with his wife who provides most of the history  Patient's wife states that since Saturday the patient has been having difficulty doing everyday tasks such as being able to correctly put his car in gear, follows simple driving directions, or remembering things in the short term  The wife states that short-term memory has been slowly fading over the past few months these more significant changes only been going on for three days  Patient notes that he has been having headaches that are worse in the morning when he wakes up and improve as he gets out of bed  Patient denies any current headaches, change in vision, tinnitus, sore throat, recent illnesses, fever, shortness of breath, chest pain, nausea/vomiting, abdominal pain, dysuria, hematuria, diarrhea, constipation, or any numbness/weakness/tingling  Prior to Admission Medications   Prescriptions Last Dose Informant Patient Reported?  Taking?   aspirin 81 MG tablet  Spouse/Significant Other Yes No   Sig: Take 1 tablet by mouth daily   enalapril (VASOTEC) 2 5 mg tablet   No No   Sig: TAKE 1 TABLET BY MOUTH  TWICE A DAY   ferrous sulfate 325 (65 Fe) mg tablet  Spouse/Significant Other Yes No   Sig: Take 1 tablet by mouth daily   gabapentin (NEURONTIN) 300 mg capsule   No No   Sig: Take 2 tablets q hs   metoprolol tartrate (LOPRESSOR) 25 mg tablet  Spouse/Significant Other Yes No   Sig: Take 25 mg by mouth 2 (two) times a day   omeprazole (PriLOSEC) 40 MG capsule   No No   Sig: Take 1 capsule (40 mg total) by mouth daily   sertraline (ZOLOFT) 50 mg tablet  Spouse/Significant Other Yes No   Sig: Take 1 tablet by mouth daily   simvastatin (ZOCOR) 80 mg tablet   No No   Sig: TAKE 1 TABLET BY MOUTH  DAILY   zolpidem (AMBIEN) 5 mg tablet   No No   Sig: Take 1 tablet (5 mg total) by mouth daily      Facility-Administered Medications: None       Past Medical History:   Diagnosis Date    [facility] ([facility] )     Anxiety     Arthritis     Brain neoplasm ([facility] ) 11/1999    brain tumor    Depression     Hypercholesterolemia     Narcolepsy     daytime drowsiness and dozing off occasionally    Palpitations     [facility]state cancer [facility])        Past Surgical History:   Procedure Laterality Date    BACK SURGERY      BRAIN SURGERY  11/08/1999    CORONARY ARTERY BYPASS GRAFT      x5       Family History   Problem Relation Age of Onset    Hypertension Mother      benign essential    Cancer Mother     Osteoporosis Mother     Suicidality Father     Diabetes Family     Heart disease Family     Neuropathy Family      peripheral    Prostate cancer Family     Thyroid disease Family      I have reviewed and agree with the history as documented  Social History   Substance Use Topics    Smoking status: Current Every Day Smoker     Types: Cigars    Smokeless tobacco: Never Used      Comment: former cig smoker- quit in 1992    Alcohol use No      Comment: (history)        Review of Systems   Constitutional: Negative for chills, diaphoresis and fever  HENT: Negative for congestion, rhinorrhea, sinus pressure and sore throat  Eyes: Negative for visual disturbance  Respiratory: Negative for cough, chest tightness and shortness of breath  Cardiovascular: Negative for chest pain  Gastrointestinal: Negative for abdominal pain, constipation, diarrhea, nausea and vomiting  Genitourinary: Negative for dysuria, frequency, hematuria and urgency  Musculoskeletal: Negative for arthralgias and myalgias     Skin: Negative for color change and rash  Neurological: Positive for headaches  Negative for dizziness, tremors, seizures, syncope, speech difficulty, weakness and numbness  Psychiatric/Behavioral: Positive for confusion  Physical Exam  ED Triage Vitals   Temperature Pulse Respirations Blood Pressure SpO2   05/21/18 1639 05/21/18 1639 05/21/18 1639 05/21/18 1639 05/21/18 1639   97 5 °F (36 4 °C) 69 18 (!) 171/77 98 %      Temp Source Heart Rate Source Patient Position - Orthostatic VS BP Location FiO2 (%)   05/21/18 1639 05/21/18 1639 05/21/18 1639 05/21/18 1751 --   Oral Monitor Sitting Left arm       Pain Score       05/21/18 1639       No Pain           Orthostatic Vital Signs  Vitals:    05/23/18 0002 05/23/18 0135 05/23/18 0330 05/23/18 0742   BP: 162/70 (!) 212/100 160/82 125/71   Pulse: 88   74   Patient Position - Orthostatic VS: Lying  Lying Lying       Physical Exam   Constitutional: He is oriented to person, place, and time  He appears well-developed and well-nourished  No distress  HENT:   Head: Normocephalic and atraumatic  Eyes: Conjunctivae are normal  Pupils are equal, round, and reactive to light  Cardiovascular: Normal rate, regular rhythm and normal heart sounds  Exam reveals no gallop and no friction rub  No murmur heard  Pulmonary/Chest: Effort normal and breath sounds normal  No respiratory distress  He has no wheezes  He has no rales  Abdominal: Soft  Bowel sounds are normal  He exhibits no distension  There is no tenderness  There is no guarding  Musculoskeletal: Normal range of motion  Neurological: He is alert and oriented to person, place, and time  No cranial nerve deficit or sensory deficit  He exhibits normal muscle tone  Patient is alert and oriented x3 and is able to correctly identify the President  Skin: Skin is warm and dry  Capillary refill takes less than 2 seconds  Psychiatric: He has a normal mood and affect  His behavior is normal    Nursing note and vitals reviewed        ED Medications  Medications   iohexol (OMNIPAQUE) 350 MG/ML injection (MULTI-DOSE) 85 mL (85 mL Intravenous Given 5/21/18 1919)   dexamethasone (PF) (DECADRON) injection 10 mg (10 mg Intravenous Given 5/21/18 2020)   gadobutrol injection (MULTI-DOSE) SOLN 7 mL (7 mL Intravenous Given 5/22/18 0913)   enalapril (VASOTEC) tablet 2 5 mg (2 5 mg Oral Given 5/22/18 1523)       Diagnostic Studies  Results Reviewed     Procedure Component Value Units Date/Time    TSH, 3rd generation [[id number]]  (Normal) Collected:  05/21/18 1650    Lab Status:  Final result Specimen:  Blood from Arm, Left Updated:  05/21/18 2230     TSH 3RD GENERATON 1 880 uIU/mL     Narrative:         Patients undergoing fluorescein dye angiography may retain small amounts of fluorescein in the body for 48-72 hours post procedure  Samples containing fluorescein can produce falsely depressed TSH values  If the patient had this procedure,a specimen should be resubmitted post fluorescein clearance      POCT urinalysis dipstick [[id number]]  (Normal) Resulted:  05/21/18 1930    Lab Status:  Final result Specimen:  Urine Updated:  05/21/18 2030     Color, UA see chart    ED Urine Macroscopic [[id number]] Collected:  05/21/18 2005    Lab Status:  Final result Specimen:  Urine Updated:  05/21/18 1959     Color, UA Yellow     Clarity, UA Clear     pH, UA 7 5     Leukocytes, UA Negative     Nitrite, UA Negative     Protein, UA Negative mg/dl      Glucose, UA Negative mg/dl      Ketones, UA Negative mg/dl      Urobilinogen, UA 0 2 E U /dl      Bilirubin, UA Negative     Blood, UA Negative     Specific Gravity, UA 1 015    Narrative:       CLINITEK RESULT    Troponin I [[id number]]  (Normal) Collected:  05/21/18 1650    Lab Status:  Final result Specimen:  Blood from Arm, Left Updated:  05/21/18 1719     Troponin I <0 02 ng/mL     Narrative:         [employer] Chemistry analyzer 99% cutoff is > 0 04 ng/mL in network labs    o cTnI 99% cutoff is useful only when applied to patients in the clinical setting of myocardial ischemia  o cTnI 99% cutoff should be interpreted in the context of clinical history, ECG findings and possibly cardiac imaging to establish correct diagnosis  o cTnI 99% cutoff may be suggestive but clearly not indicative of a coronary event without the clinical setting of myocardial ischemia  Comprehensive metabolic panel [[id number]] Collected:  05/21/18 1650    Lab Status:  Final result Specimen:  Blood from Arm, Left Updated:  05/21/18 1715     Sodium 138 mmol/L      Potassium 4 2 mmol/L      Chloride 106 mmol/L      CO2 27 mmol/L      Anion Gap 5 mmol/L      BUN 12 mg/dL      Creatinine 0 92 mg/dL      Glucose 127 mg/dL      Calcium 9 7 mg/dL      AST 19 U/L      ALT 24 U/L      Alkaline Phosphatase 59 U/L      Total Protein 6 9 g/dL      Albumin 3 7 g/dL      Total Bilirubin 0 49 mg/dL      eGFR 76 ml/min/1 73sq m     Narrative:         National Kidney Disease Education Program recommendations are as follows:  GFR calculation is accurate only with a steady state creatinine  Chronic Kidney disease less than 60 ml/min/1 73 sq  meters  Kidney failure less than 15 ml/min/1 73 sq  meters      CBC and differential [[id number]]  (Abnormal) Collected:  05/21/18 1650    Lab Status:  Final result Specimen:  Blood from Arm, Left Updated:  05/21/18 1701     WBC 6 88 Thousand/uL      RBC 4 62 Million/uL      Hemoglobin 12 7 g/dL      Hematocrit 37 1 %      MCV 80 (L) fL      MCH 27 5 pg      MCHC 34 2 g/dL      RDW 14 4 %      MPV 10 1 fL      Platelets 265 Thousands/uL      nRBC 0 /100 WBCs      Neutrophils Relative 57 %      Lymphocytes Relative 34 %      Monocytes Relative 7 %      Eosinophils Relative 2 %      Basophils Relative 0 %      Neutrophils Absolute 3 93 Thousands/µL      Lymphocytes Absolute 2 31 Thousands/µL      Monocytes Absolute 0 49 Thousand/µL      Eosinophils Absolute 0 11 Thousand/µL      Basophils Absolute 0 03 Thousands/µL                  MRI brain w wo contrast   Final Result by Valentina Martines DO (05/22 1529)      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 best seen on series 11 image 18  This residual tumor has slightly decreased in size compared to prior examination with stable surrounding encephalomalacia and gliosis more    pronounced within the left frontal lobe  Workstation performed: [id number]         X-ray chest 2 views   Final Result by Mir Edward MD (05/22 0443)      No acute cardiopulmonary disease  Workstation performed: [id number]         CT head with and without contrast   ED Interpretation by Lisa Dubon DO (05/21 1930)   Abnormal   +intracranial mass      Final Result by Ambreen Vail MD (05/21 2002)         1  Stable right anterior cingulate and parafalcine meningioma measuring 2 cm surrounding encephalomalacia and gliosis in the bilateral anterior frontal region  2   No evidence of acute intracranial hemorrhage, acute mass effect or extra-axial collection           Workstation performed: [id number]               Procedures  ECG 12 Lead Documentation  Date/Time: 5/23/2018 11:00 PM  Performed by: Chayito Waddell  Authorized by: Chayito Waddell     Indications / Diagnosis:  Altered mental status  ECG reviewed by me, the ED Provider: yes    Patient location:  ED  Previous ECG:     Previous ECG:  Compared to current    Similarity:  No change    Comparison to cardiac monitor: Yes    Interpretation:     Interpretation: abnormal    Rate:     ECG rate assessment: normal    Rhythm:     Rhythm: sinus rhythm    Ectopy:     Ectopy: none    QRS:     QRS axis:  Normal    QRS intervals:  Normal  Conduction:     Conduction: abnormal      Abnormal conduction: complete RBBB    ST segments:     ST segments:  Normal  T waves:     T waves: normal            Phone Consults  ED Phone Contact    ED Course           Identification of Seniors at Risk      Most Recent Value   (ISAR) Identification of Seniors at Risk   Before the illness or injury that brought you to the Emergency, did you need someone to help you on a regular basis? 1 Filed at: 05/21/2018 1639   In the last 24 hours, have you needed more help than usual?  1 Filed at: 05/21/2018 1639   Have you been hospitalized for one or more nights during the past 6 months? 0 Filed at: 05/21/2018 1639   In general, do you see well?  0 Filed at: 05/21/2018 1639   In general, do you have serious problems with your memory? 1 Filed at: 05/21/2018 1639   Do you take more than three different medications every day? 1 Filed at: 05/21/2018 1639   ISAR Score  4 Filed at: 05/21/2018 1639                          [facility]  Number of Diagnoses or Management Options  Altered mental status:   Frontal mass of brain:   Diagnosis management comments: Patient seen to a mass in between the frontal lobes on his CT scan  Mass appears unchanged from previous imaging  Spoke with Neurosurgery suggested that the patient be admitted for further workup  Neurosurgery also recommended dose of Decadron given in the emergency room  Patient was admitted to [facility] for further workup  CritCare Time    Disposition  Final diagnoses: Altered mental status   Frontal mass of brain     Time reflects when diagnosis was documented in both MDM as applicable and the Disposition within this note     Time User Action Codes Description Comment    5/21/2018  8:13 PM Amaris Izquierdo Add [R41 82] Altered mental status     5/21/2018  8:13 PM Amaris Izquierdo Add [G93 9] Frontal mass of brain       ED Disposition     ED Disposition Condition Comment    Admit  Case was discussed with [facility] and the patient's admission status was agreed to be Admission Status: observation status to the service of Dr Clement Osgood           Follow-up Information     Follow up With Specialties Details Why Contact Info    Madhavi Cid MD Family Medicine Follow up  [address] [place] [address]      Carson Genao Roberto Bravo MD Neurology Follow up Call and have the office help you set up neuro-cognitive testing [address]  [phone]            Discharge Medication List as of 5/23/2018  1:04 PM      CONTINUE these medications which have NOT CHANGED    Details   aspirin 81 MG tablet Take 1 tablet by mouth daily, Historical Med      enalapril (VASOTEC) 2 5 mg tablet TAKE 1 TABLET BY MOUTH  TWICE A DAY, Normal      ferrous sulfate 325 (65 Fe) mg tablet Take 1 tablet by mouth daily, Starting Tue 10/31/2017, Historical Med      gabapentin (NEURONTIN) 300 mg capsule Take 2 tablets q hs, Normal      metoprolol tartrate (LOPRESSOR) 25 mg tablet Take 25 mg by mouth 2 (two) times a day, Starting Mon 12/29/2014, Historical Med      omeprazole (PriLOSEC) 40 MG capsule Take 1 capsule (40 mg total) by mouth daily, Starting Thu 5/10/2018, Normal      sertraline (ZOLOFT) 50 mg tablet Take 1 tablet by mouth daily, Starting Thu 11/17/2011, Historical Med      simvastatin (ZOCOR) 80 mg tablet TAKE 1 TABLET BY MOUTH  DAILY, Starting Tue 4/10/2018, Normal      zolpidem (AMBIEN) 5 mg tablet Take 1 tablet (5 mg total) by mouth daily, Starting Thu 5/10/2018, Print             Outpatient Discharge Orders  Discharge Diet     Activity as tolerated         ED Provider  Attending physically available and evaluated Quin Jennifer MAO managed the patient along with the ED Attending      Electronically Signed by         Jenise Cantu MD  05/23/18 0390

## 2018-05-25 ENCOUNTER — APPOINTMENT (EMERGENCY)
Dept: RADIOLOGY | Facility: HOSPITAL | Age: 83
DRG: 093 | End: 2018-05-25
Payer: MEDICARE

## 2018-05-25 ENCOUNTER — HOSPITAL ENCOUNTER (INPATIENT)
Facility: HOSPITAL | Age: 83
LOS: 5 days | Discharge: NURSING, REHAB OR LONG-TERM CARE | DRG: 093 | End: 2018-05-30
Attending: EMERGENCY MEDICINE | Admitting: INTERNAL MEDICINE
Payer: MEDICARE

## 2018-05-25 DIAGNOSIS — R26.2 AMBULATORY DYSFUNCTION: ICD-10-CM

## 2018-05-25 DIAGNOSIS — R29.6 FREQUENT FALLS: Primary | ICD-10-CM

## 2018-05-25 DIAGNOSIS — G60.9 IDIOPATHIC PERIPHERAL NEUROPATHY: ICD-10-CM

## 2018-05-25 DIAGNOSIS — I10 ESSENTIAL HYPERTENSION: ICD-10-CM

## 2018-05-25 DIAGNOSIS — K59.00 CONSTIPATION, UNSPECIFIED CONSTIPATION TYPE: ICD-10-CM
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
## 2018-05-25 PROBLEM — R41.89 COGNITIVE DECLINE: Status: ACTIVE | Noted: 2018-05-25

## 2018-05-25 PROBLEM — R41.3 MEMORY DEFICITS: Status: ACTIVE | Noted: 2018-05-25

## 2018-05-25 LAB
ANION GAP SERPL CALCULATED.3IONS-SCNC: 5 MMOL/L (ref 4–13)
ATRIAL RATE: 59 BPM
ATRIAL RATE: 88 BPM
BASOPHILS # BLD AUTO: 0.02 THOUSANDS/ΜL (ref 0–0.1)
BASOPHILS NFR BLD AUTO: 0 % (ref 0–1)
BILIRUB UR QL STRIP: NEGATIVE
BUN SERPL-MCNC: 16 MG/DL (ref 5–25)
CALCIUM SERPL-MCNC: 9.6 MG/DL (ref 8.3–10.1)
CHLORIDE SERPL-SCNC: 107 MMOL/L (ref 100–108)
CLARITY UR: CLEAR
CO2 SERPL-SCNC: 25 MMOL/L (ref 21–32)
COLOR UR: YELLOW
COLOR, POC: NORMAL
CREAT SERPL-MCNC: 0.96 MG/DL (ref 0.6–1.3)
EOSINOPHIL # BLD AUTO: 0.13 THOUSAND/ΜL (ref 0–0.61)
EOSINOPHIL NFR BLD AUTO: 2 % (ref 0–6)
ERYTHROCYTE [DISTWIDTH] IN BLOOD BY AUTOMATED COUNT: 14.1 % (ref 11.6–15.1)
GFR SERPL CREATININE-BSD FRML MDRD: 72 ML/MIN/1.73SQ M
GLUCOSE SERPL-MCNC: 144 MG/DL (ref 65–140)
GLUCOSE UR STRIP-MCNC: NEGATIVE MG/DL
HCT VFR BLD AUTO: 38.8 % (ref 36.5–49.3)
HGB BLD-MCNC: 13.2 G/DL (ref 12–17)
HGB UR QL STRIP.AUTO: NEGATIVE
KETONES UR STRIP-MCNC: NEGATIVE MG/DL
LEUKOCYTE ESTERASE UR QL STRIP: NEGATIVE
LYMPHOCYTES # BLD AUTO: 2.84 THOUSANDS/ΜL (ref 0.6–4.47)
LYMPHOCYTES NFR BLD AUTO: 33 % (ref 14–44)
MCH RBC QN AUTO: 27.8 PG (ref 26.8–34.3)
MCHC RBC AUTO-ENTMCNC: 34 G/DL (ref 31.4–37.4)
MCV RBC AUTO: 82 FL (ref 82–98)
MONOCYTES # BLD AUTO: 0.67 THOUSAND/ΜL (ref 0.17–1.22)
MONOCYTES NFR BLD AUTO: 8 % (ref 4–12)
NEUTROPHILS # BLD AUTO: 4.99 THOUSANDS/ΜL (ref 1.85–7.62)
NEUTS SEG NFR BLD AUTO: 58 % (ref 43–75)
NITRITE UR QL STRIP: NEGATIVE
NRBC BLD AUTO-RTO: 0 /100 WBCS
P AXIS: 34 DEGREES
PH UR STRIP.AUTO: 7 [PH] (ref 4.5–8)
PLATELET # BLD AUTO: 165 THOUSANDS/UL (ref 149–390)
PMV BLD AUTO: 10.2 FL (ref 8.9–12.7)
POTASSIUM SERPL-SCNC: 4.5 MMOL/L (ref 3.5–5.3)
PR INTERVAL: 192 MS
PROT UR STRIP-MCNC: NEGATIVE MG/DL
QRS AXIS: 15 DEGREES
QRS AXIS: 20 DEGREES
QRSD INTERVAL: 116 MS
QRSD INTERVAL: 90 MS
QT INTERVAL: 360 MS
QT INTERVAL: 420 MS
QTC INTERVAL: 410 MS
QTC INTERVAL: 415 MS
RBC # BLD AUTO: 4.75 MILLION/UL (ref 3.88–5.62)
SODIUM SERPL-SCNC: 137 MMOL/L (ref 136–145)
SP GR UR STRIP.AUTO: 1.01 (ref 1–1.03)
T WAVE AXIS: 28 DEGREES
T WAVE AXIS: 87 DEGREES
UROBILINOGEN UR QL STRIP.AUTO: 0.2 E.U./DL
VENTRICULAR RATE: 59 BPM
VENTRICULAR RATE: 78 BPM
WBC # BLD AUTO: 8.66 THOUSAND/UL (ref 4.31–10.16)

## 2018-05-25 PROCEDURE — 80048 BASIC METABOLIC PNL TOTAL CA: CPT | Performed by: EMERGENCY MEDICINE

## 2018-05-25 PROCEDURE — G8979 MOBILITY GOAL STATUS: HCPCS

## 2018-05-25 PROCEDURE — 81003 URINALYSIS AUTO W/O SCOPE: CPT

## 2018-05-25 PROCEDURE — 85025 COMPLETE CBC W/AUTO DIFF WBC: CPT | Performed by: EMERGENCY MEDICINE

## 2018-05-25 PROCEDURE — 70450 CT HEAD/BRAIN W/O DYE: CPT

## 2018-05-25 PROCEDURE — 99285 EMERGENCY DEPT VISIT HI MDM: CPT

## 2018-05-25 PROCEDURE — 36415 COLL VENOUS BLD VENIPUNCTURE: CPT | Performed by: EMERGENCY MEDICINE

## 2018-05-25 PROCEDURE — 81002 URINALYSIS NONAUTO W/O SCOPE: CPT | Performed by: EMERGENCY MEDICINE

## 2018-05-25 PROCEDURE — 93005 ELECTROCARDIOGRAM TRACING: CPT

## 2018-05-25 PROCEDURE — 93010 ELECTROCARDIOGRAM REPORT: CPT | Performed by: INTERNAL MEDICINE

## 2018-05-25 PROCEDURE — 99223 1ST HOSP IP/OBS HIGH 75: CPT | Performed by: INTERNAL MEDICINE

## 2018-05-25 PROCEDURE — 71046 X-RAY EXAM CHEST 2 VIEWS: CPT

## 2018-05-25 PROCEDURE — G8978 MOBILITY CURRENT STATUS: HCPCS

## 2018-05-25 PROCEDURE — 97163 PT EVAL HIGH COMPLEX 45 MIN: CPT

## 2018-05-25 RX ORDER — PRAVASTATIN SODIUM 20 MG
20 TABLET ORAL
Status: DISCONTINUED | OUTPATIENT
Start: 2018-05-25 | End: 2018-05-30 | Stop reason: HOSPADM

## 2018-05-25 RX ORDER — FERROUS SULFATE 325(65) MG
325 TABLET ORAL DAILY
Status: DISCONTINUED | OUTPATIENT
Start: 2018-05-26 | End: 2018-05-30 | Stop reason: HOSPADM

## 2018-05-25 RX ORDER — ENALAPRIL MALEATE 5 MG/1
2.5 TABLET ORAL 2 TIMES DAILY
Status: DISCONTINUED | OUTPATIENT
Start: 2018-05-25 | End: 2018-05-26

## 2018-05-25 RX ORDER — ASPIRIN 81 MG/1
81 TABLET, CHEWABLE ORAL DAILY
Status: DISCONTINUED | OUTPATIENT
Start: 2018-05-26 | End: 2018-05-30 | Stop reason: HOSPADM

## 2018-05-25 RX ORDER — PANTOPRAZOLE SODIUM 20 MG/1
20 TABLET, DELAYED RELEASE ORAL
Status: DISCONTINUED | OUTPATIENT
Start: 2018-05-26 | End: 2018-05-30 | Stop reason: HOSPADM

## 2018-05-25 RX ORDER — ZOLPIDEM TARTRATE 5 MG/1
5 TABLET ORAL DAILY
Status: DISCONTINUED | OUTPATIENT
Start: 2018-05-26 | End: 2018-05-30 | Stop reason: HOSPADM

## 2018-05-25 RX ORDER — GABAPENTIN 300 MG/1
300 CAPSULE ORAL
Status: DISCONTINUED | OUTPATIENT
Start: 2018-05-25 | End: 2018-05-30 | Stop reason: HOSPADM

## 2018-05-25 RX ORDER — SODIUM CHLORIDE 9 MG/ML
100 INJECTION, SOLUTION INTRAVENOUS CONTINUOUS
Status: DISCONTINUED | OUTPATIENT
Start: 2018-05-25 | End: 2018-05-28

## 2018-05-25 RX ORDER — ACETAMINOPHEN 325 MG/1
650 TABLET ORAL EVERY 6 HOURS PRN
Status: DISCONTINUED | OUTPATIENT
Start: 2018-05-25 | End: 2018-05-30 | Stop reason: HOSPADM

## 2018-05-25 RX ADMIN — ENALAPRIL MALEATE 2.5 MG: 5 TABLET ORAL at 17:46

## 2018-05-25 RX ADMIN — METOPROLOL TARTRATE 25 MG: 25 TABLET, FILM COATED ORAL at 17:46

## 2018-05-25 RX ADMIN — PRAVASTATIN SODIUM 20 MG: 20 TABLET ORAL at 17:46

## 2018-05-25 RX ADMIN — SODIUM CHLORIDE 100 ML/HR: 0.9 INJECTION, SOLUTION INTRAVENOUS at 19:29

## 2018-05-25 RX ADMIN — GABAPENTIN 300 MG: 300 CAPSULE ORAL at 21:38

## 2018-05-25 NOTE — ED NOTES
Per wife and pt, these falls are chronic in nature but has had 1 a day for the last 3 days  Pt denies dizziness or any symptoms prior to the falls  Wife is concerned about leaving the pt alone due to the falls        Sofi Rincno RN  05/25/18 [facility], [address] [place] [address]  05/25/18 5606

## 2018-05-25 NOTE — ED PROVIDER NOTES
History  Chief Complaint   Patient presents with    Fall     pt was admitted monday for changed in mental status  pt dc'd wednesday  pt has had multiple falls since his dc     HPI  Pt is an 79 yo man who presents after multiple falls  Pt was admitted recently for confusion and was discharged  Since discharge, pt has had 3 falls over 3 days  Pt and pt wife describe fall as his upper body is going faster than his lower body, so then he falls  Denies any prodrome of cp, or syncope  Unclear if pt hit his head or not  Prior to Admission Medications   Prescriptions Last Dose Informant Patient Reported?  Taking?   aspirin 81 MG tablet 5/25/2018 at Unknown time Spouse/Significant Other Yes Yes   Sig: Take 1 tablet by mouth daily   enalapril (VASOTEC) 2 5 mg tablet 5/25/2018 at Unknown time  No Yes   Sig: TAKE 1 TABLET BY MOUTH  TWICE A DAY   ferrous sulfate 325 (65 Fe) mg tablet 5/25/2018 at Unknown time Spouse/Significant Other Yes Yes   Sig: Take 1 tablet by mouth daily   gabapentin (NEURONTIN) 300 mg capsule 5/25/2018 at Unknown time  No Yes   Sig: Take 2 tablets q hs   metoprolol tartrate (LOPRESSOR) 25 mg tablet 5/25/2018 at Unknown time Spouse/Significant Other Yes Yes   Sig: Take 25 mg by mouth 2 (two) times a day   omeprazole (PriLOSEC) 40 MG capsule 5/25/2018 at Unknown time  No Yes   Sig: Take 1 capsule (40 mg total) by mouth daily   sertraline (ZOLOFT) 50 mg tablet 5/25/2018 at Unknown time Spouse/Significant Other Yes Yes   Sig: Take 1 tablet by mouth daily at bedtime     simvastatin (ZOCOR) 80 mg tablet 5/25/2018 at Unknown time  No Yes   Sig: TAKE 1 TABLET BY MOUTH  DAILY   zolpidem (AMBIEN) 5 mg tablet 5/24/2018 at Unknown time  No Yes   Sig: Take 1 tablet (5 mg total) by mouth daily      Facility-Administered Medications: None       Past Medical History:   Diagnosis Date    [facility] infarction ([facility] [address] )     Anxiety     Arthritis     [facility]    brain tumor    Depression     Hypercholesterolemia     Narcolepsy     daytime drowsiness and dozing off occasionally    Palpitations     Prostate cancer [facility])        Past Surgical History:   Procedure Laterality Date    BACK SURGERY      BRAIN SURGERY  11/08/1999    CORONARY ARTERY BYPASS GRAFT      x5       Family History   Problem Relation Age of Onset    Hypertension Mother      benign essential    Cancer Mother     Osteoporosis Mother     Suicidality Father     Diabetes Family     Heart disease Family     Neuropathy Family      peripheral    Prostate cancer Family     Thyroid disease Family      I have reviewed and agree with the history as documented  Social History   Substance Use Topics    Smoking status: Current Every Day Smoker     Types: Cigars    Smokeless tobacco: Never Used      Comment: former cig smoker- quit in 1992    Alcohol use No      Comment: (history)        Review of Systems   Constitutional: Negative  HENT: Negative  Eyes: Negative  Respiratory: Negative  Cardiovascular: Negative  Gastrointestinal: Negative  Endocrine: Negative  Genitourinary: Negative  Musculoskeletal:        Multiple falls   Skin: Negative  Allergic/Immunologic: Negative  Neurological:        Multiple falls   Hematological: Negative  Psychiatric/Behavioral: Negative          Physical Exam  ED Triage Vitals   Temperature Pulse Respirations Blood Pressure SpO2   05/25/18 1217 05/25/18 1217 05/25/18 1217 05/25/18 1217 05/25/18 1217   98 °F (36 7 °C) 77 16 112/52 98 %      Temp Source Heart Rate Source Patient Position - Orthostatic VS BP Location FiO2 (%)   05/25/18 1217 05/25/18 1217 05/25/18 1358 05/25/18 1358 --   Oral Monitor Sitting Right arm       Pain Score       05/25/18 1217       No Pain           Orthostatic Vital Signs  Vitals:    05/29/18 1900 05/30/18 0014 05/30/18 0811 05/30/18 0950   BP: 162/74 (!) 180/90 (!) 192/82 120/56   Pulse: 71 76 71    Patient Position - Orthostatic VS: Lying Lying Lying        Physical Exam   Constitutional: He is oriented to person, place, and time  He appears well-developed and well-nourished  No distress  HENT:   Head: Normocephalic and atraumatic  Right Ear: External ear normal    Left Ear: External ear normal    Mouth/Throat: Oropharynx is clear and moist    Eyes: Conjunctivae and EOM are normal  Pupils are equal, round, and reactive to light  Right eye exhibits no discharge  Left eye exhibits no discharge  No scleral icterus  Neck: Normal range of motion  Neck supple  No tracheal deviation present  No thyromegaly present  Cardiovascular: Normal rate, regular rhythm and intact distal pulses  Exam reveals no gallop and no friction rub  No murmur heard  Pulmonary/Chest: Effort normal and breath sounds normal  No stridor  No respiratory distress  He has no wheezes  He has no rales  Abdominal: Soft  Bowel sounds are normal  He exhibits no distension  There is no tenderness  There is no rebound and no guarding  Musculoskeletal: Normal range of motion  He exhibits no edema or deformity  Neurological: He is alert and oriented to person, place, and time  No cranial nerve deficit  Skin: Skin is warm and dry  No rash noted  He is not diaphoretic  No erythema  Psychiatric: He has a normal mood and affect  His behavior is normal  Thought content normal    Nursing note and vitals reviewed        ED Medications  Medications - No data to display    Diagnostic Studies  Results Reviewed     Procedure Component Value Units Date/Time    POCT urinalysis dipstick [[id number]]  (Normal) Resulted:  05/25/18 1603    Lab Status:  Final result Specimen:  Urine Updated:  05/25/18 1603     Color, UA see chart    ED Urine Macroscopic [[id number]] Collected:  05/25/18 1440    Lab Status:  Final result Specimen:  Urine Updated:  05/25/18 1435     Color, UA Yellow     Clarity, UA Clear     pH, UA 7 0     Leukocytes, UA Negative     Nitrite, UA Negative     Protein, UA Negative mg/dl      Glucose, UA Negative mg/dl      Ketones, UA Negative mg/dl      Urobilinogen, UA 0 2 E U /dl      Bilirubin, UA Negative     Blood, UA Negative     Specific Gravity, UA 1 015    Narrative:       CLINITEK RESULT    Basic metabolic panel [[id number]]  (Abnormal) Collected:  05/25/18 1346    Lab Status:  Final result Specimen:  Blood from Hand, Left Updated:  05/25/18 1418     Sodium 137 mmol/L      Potassium 4 5 mmol/L      Chloride 107 mmol/L      CO2 25 mmol/L      Anion Gap 5 mmol/L      BUN 16 mg/dL      Creatinine 0 96 mg/dL      Glucose 144 (H) mg/dL      Calcium 9 6 mg/dL      eGFR 72 ml/min/1 73sq m     Narrative:         National Kidney Disease Education Program recommendations are as follows:  GFR calculation is accurate only with a steady state creatinine  Chronic Kidney disease less than 60 ml/min/1 73 sq  meters  Kidney failure less than 15 ml/min/1 73 sq  meters  CBC and differential [[id number]] Collected:  05/25/18 1346    Lab Status:  Final result Specimen:  Blood from Hand, Left Updated:  05/25/18 1401     WBC 8 66 Thousand/uL      RBC 4 75 Million/uL      Hemoglobin 13 2 g/dL      Hematocrit 38 8 %      MCV 82 fL      MCH 27 8 pg      MCHC 34 0 g/dL      RDW 14 1 %      MPV 10 2 fL      Platelets 658 Thousands/uL      nRBC 0 /100 WBCs      Neutrophils Relative 58 %      Lymphocytes Relative 33 %      Monocytes Relative 8 %      Eosinophils Relative 2 %      Basophils Relative 0 %      Neutrophils Absolute 4 99 Thousands/µL      Lymphocytes Absolute 2 84 Thousands/µL      Monocytes Absolute 0 67 Thousand/µL      Eosinophils Absolute 0 13 Thousand/µL      Basophils Absolute 0 02 Thousands/µL                  XR chest 2 views   Final Result by Arnav Hercules MD (05/25 0009)      No acute cardiopulmonary disease  Workstation performed: [id number]         CT head without contrast   Final Result by Adrián Charles DO (05/25 9693)   1    Cerebral atrophy with chronic small vessel ischemic white matter disease  2   Stable bifrontal encephalomalacia and gliosis  3   No acute intracranial abnormality  Workstation performed: [id number]               Procedures  ECG 12 Lead Documentation  Date/Time: 5/25/2018 1:44 PM  Performed by: Marito Boggs  Authorized by: Marito Boggs     Indications / Diagnosis:  Fall  ECG reviewed by me, the ED Provider: yes    Patient location:  ED  Previous ECG:     Previous ECG:  Compared to current    Similarity:  No change    Comparison to cardiac monitor: Yes    Interpretation:     Interpretation: normal    Rate:     ECG rate:  59    ECG rate assessment: normal    Rhythm:     Rhythm: sinus rhythm    Ectopy:     Ectopy: none    QRS:     QRS axis:  Normal  Conduction:     Conduction: normal    ST segments:     ST segments:  Normal  T waves:     T waves: normal            Phone Consults  ED Phone Contact    ED Course           Identification of Seniors at Risk      Most Recent Value   (ISAR) Identification of Seniors at Risk   Before the illness or injury that brought you to the Emergency, did you need someone to help you on a regular basis? 1 Filed at: 05/25/2018 1218   In the last 24 hours, have you needed more help than usual?  1 Filed at: 05/25/2018 1218   Have you been hospitalized for one or more nights during the past 6 months? 1 Filed at: 05/25/2018 1218   In general, do you see well?  0 Filed at: 05/25/2018 1218   In general, do you have serious problems with your memory? 1 Filed at: 05/25/2018 1218   Do you take more than three different medications every day? 1 Filed at: 05/25/2018 1218   ISAR Score  5 Filed at: 05/25/2018 1218                          MDM  Number of Diagnoses or Management Options  Ambulatory dysfunction:   Frequent falls:   Diagnosis management comments: 79 yo presents with ambulatory dysfunction  Will evaluate with labs, ct head, chest xray  Will have pt evaluated by PT in the Emergency Department   Likely admission    CritCare Time    Disposition  Final diagnoses:   Frequent falls   Ambulatory dysfunction     Time reflects when diagnosis was documented in both MDM as applicable and the Disposition within this note     Time User Action Codes Description Comment    5/25/2018  4:01 PM Vickie Jesus Manuel Add [R29 6] Frequent falls     5/25/2018  4:01 PM Vickie Jesus Manuel Add [R26 2] Ambulatory dysfunction     5/29/2018  3:45 PM Cristina Blakely, [address] [facility] [address] [facility] [address] [G60 9] Idiopathic peripheral neuropathy     5/29/2018  3:45 PM Nina Paul Modify [G60 9] Idiopathic peripheral neuropathy     5/30/2018 10:00 AM Cristina Reddy Tannerlachelle Sal Add [K59 00] Constipation, unspecified constipation type     5/30/2018 10:00 AM Nina Paul Add [I10] Essential hypertension       ED Disposition     ED Disposition Condition Comment    Admit  [facility] inpatient        MD Documentation      Most Recent Value   Accepting Facility Name, [address] and [address])  Wendy Martin  [id number]      RN Documentation      [facility] Name, [address] and [address])  Wendy Martin  [id number]   Patient Belongings Disposition  Sent with patient   Transfer Date  05/30/18   Transfer Time  1100      Follow-up Information     Follow up With Specialties Details Why Contact Info    Queenie Stock MD Family Medicine Follow up in 1 week(s)  [address]  [phone]            Discharge Medication List as of 5/30/2018 10:17 AM      START taking these medications    Details   docusate sodium (COLACE) 100 mg capsule Take 1 capsule (100 mg total) by mouth 2 (two) times a day, Starting Wed 5/30/2018, Print      polyethylene glycol (MIRALAX) 17 g packet Take 17 g by mouth daily as needed (constipation), Starting Wed 5/30/2018, Print      senna (SENOKOT) 8 6 mg Take 1 tablet (8 6 mg total) by mouth daily at bedtime, Starting Wed 5/30/2018, Print         CONTINUE these medications which have CHANGED    Details   enalapril (VASOTEC) 20 mg tablet Take 1 tablet (20 mg total) by mouth daily, Starting Thu 5/31/2018, Print      gabapentin (NEURONTIN) 300 mg capsule Take 1 capsule (300 mg total) by mouth daily at bedtime Take 2 tablets q hs, Starting Wed 5/30/2018, No Print         CONTINUE these medications which have NOT CHANGED    Details   aspirin 81 MG tablet Take 1 tablet by mouth daily, Historical Med      ferrous sulfate 325 (65 Fe) mg tablet Take 1 tablet by mouth daily, Starting Tue 10/31/2017, Historical Med      metoprolol tartrate (LOPRESSOR) 25 mg tablet Take 25 mg by mouth 2 (two) times a day, Starting Mon 12/29/2014, Historical Med      omeprazole (PriLOSEC) 40 MG capsule Take 1 capsule (40 mg total) by mouth daily, Starting Thu 5/10/2018, Normal      sertraline (ZOLOFT) 50 mg tablet Take 1 tablet by mouth daily at bedtime  , Starting Thu 11/17/2011, Historical Med      simvastatin (ZOCOR) 80 mg tablet TAKE 1 TABLET BY MOUTH  DAILY, Starting Tue 4/10/2018, Normal      zolpidem (AMBIEN) 5 mg tablet Take 1 tablet (5 mg total) by mouth daily, Starting Thu 5/10/2018, Print           No discharge procedures on file  ED Provider  Attending physically available and evaluated Claude Eliazar MAO managed the patient along with the ED Attending      Electronically Signed by         Soraya Larry MD  06/05/18 9473

## 2018-05-25 NOTE — PHYSICAL THERAPY NOTE
Physical Therapy Evaluation     Patient's Name: Lavern Soulier    Admitting Diagnosis  Fall [W19  XXXA]    Problem List  Patient Active Problem List   Diagnosis    Constipation    Iron deficiency    Other constipation    Anemia    Arteriosclerotic cardiovascular disease    Backache    Essential hypertension    Cervical spinal stenosis    Depression    Esophageal reflux    Hyperlipidemia    Insomnia    Spinal stenosis of lumbar region with neurogenic claudication    Vitamin B12 deficiency    Idiopathic peripheral neuropathy    Iron deficiency anemia    History of meningioma    Confusion    Altered mental status       Past Medical History  Past Medical History:   Diagnosis Date    Acute [facility] infarction ([facility] [address] )     Anxiety     Arthritis     [facility]    brain tumor    Depression     Hypercholesterolemia     Narcolepsy     daytime drowsiness and dozing off occasionally    Palpitations     Prostate cancer [facility])        Past Surgical History  Past Surgical History:   Procedure Laterality Date    BACK SURGERY      BRAIN SURGERY  11/08/1999    CORONARY ARTERY BYPASS GRAFT      x5      05/25/18 1500   Note Type   Note type Eval only   Pain Assessment   Pain Assessment No/denies pain   Pain Score No Pain   Home Living   Type of Home Apartment   Home Layout One level  (0 SELENA)   Bathroom Equipment Commode; Shower chair   Home Equipment Walker;Cane   Prior Function   Level of [place] Independent with ADLs and functional mobility   Lives With Spouse   ADL Assistance Needs assistance   IADLs Needs assistance   Falls in the last 6 months 1 to 4  (3 FALLS IN PAST 3 DAYS )   Vocational Retired   Restrictions/Precautions   [place] Bearing Precautions Per Order No   Braces or Orthoses (NONE)   Other Precautions Fall Risk;Telemetry;Multiple lines;Cognitive   General   Family/Caregiver Present No   Cognition   Overall Cognitive Status Impaired   Arousal/Participation Alert Orientation Level Oriented X4   RUE Assessment   RUE Assessment WFL   LUE Assessment   LUE Assessment WFL   RLE Assessment   RLE Assessment X   Strength RLE   RLE Overall Strength 4-/5   LLE Assessment   LLE Assessment X   Strength LLE   LLE Overall Strength 4-/5   Bed Mobility   Supine to Sit 5  Supervision   Additional items Increased time required   Sit to Supine 5  Supervision   Additional items Increased time required   Transfers   Sit to Stand 4  Minimal assistance  (CONTACT GUARD)   Additional items Assist x 1; Increased time required   Stand to Sit 4  Minimal assistance  (CONTACT GUARD)   Additional items Assist x 1;Verbal cues   Ambulation/Elevation   Gait pattern Excessively slow; Step to;Shuffling;Decreased foot clearance   Gait Assistance 4  Minimal assist   Additional items Assist x 1   Assistive Device Rolling walker   Distance 20 FEET X 2   Balance   Static Sitting Good   Static Standing Fair -   Ambulatory Poor +   Endurance Deficit   Endurance Deficit Yes   Endurance Deficit Description GROSS DECONDITIONING    Activity Tolerance   Activity Tolerance Patient tolerated treatment well   Medical Staff Made Aware REHAB  [facility]   Assessment   Prognosis Good   Problem List Decreased strength;Decreased endurance; Impaired balance;Decreased mobility   Assessment PT COMPLETED EVALUATION OF 80YEAR OLD MALE ADMITTED TO [facility] S/P MULTIPLE FALLS (3 IN PAST 3 DAYS)  CURRENT MEDICAL AND PHYSICAL INSTABILITIES INCLUDE PERFORMANCE OF EVALUATION IN ED, CONTINUOUS O2/HR/BP MONITORING, FALLS RISK, AND A REGRESSION IN FUNCTIONAL STATUS FROM BASELINE  PMH IS SIGNIFICANT FOR R ANTERIOR CINGULATE AND PARAFALCINE MENINGIOMA, HTN, AND DEPRESSION  PRIOR TO THIS ADMISSION PATIENT RESIDED IN ONE LEVEL APARTMENT (0 SELENA) WHERE HE WAS PREVIOUSLY I WITH MOBILITY (RW VS SPC) AND SPOUSE ASSISTED WITH ADLS/IADLS  CURRENT IMPAIRMENTS INCLUDE DECREASED BALANCE, STRENGTH, AND OVERALL ACTIVITY TOLERANCE, FALLS RISK, AND GAIT DEVIATIONS  DURING PT EVALUATION PATIENT REQUIRED S FOR BED MOBILITY, CONTACT GUARD ASSISTANCE FOR SIT<-->STAND TRANSFERS, AND MIN-AX1 FOR AMBULATION  HE AMBULATED 10 FEET X 2 W/ RW PRESENTING WITH SHUFFLED GAIT PATTERN  PATIENT REMAINS HIGH FALLS RISK  WIFE REPORTS SHE IS UNABLE TO OFFER PATIENT INCREASED ASSISTANCE AT HOME  RECOMMEND D/C TO STR  PATIENT WILL BENEFIT FROM CONTINUED SKILLED INPT PT THIS ADMISSION TO ACHIEVE MAXIMAL FUNCTION AND SAFETY  Barriers to Discharge Decreased caregiver support   Barriers to Discharge Comments WIFE DOES NOT FEEL SAFE W/ PATIENT HOME ALONE    Goals   Patient Goals NONE EXPRESSED   LTG Expiration Date 06/08/18   Long Term Goal #1 10-14 DAYS: 1) COMPLETE BED MOBILITY MOD-I; 2) PERFORM SIT<-->STAND TRANSFER MOD-I; 3) AMBULATE 200 FEET MOD-I W/ RW; 4) IMPROVE B/L LE STRENGTH BY 1/2 GRADE; 5) IMPROVE BALANCE BY 1 GRADE   Treatment Day 0   Plan   Treatment/Interventions Functional transfer training;LE strengthening/ROM; Therapeutic exercise;Patient/family training;Equipment eval/education; Bed mobility;Gait training;Spoke to nursing   PT Frequency 5x/wk   Recommendation   Recommendation Short-term skilled PT   Equipment Recommended Walker  (RW)   PT - OK to Discharge Yes  (TO STR WHEN MED CLIFFODR )   Barthel Index   Feeding 10   Bathing 0   Grooming Score 5   Dressing Score 5   Bladder Score 10   Bowels Score 10   Toilet Use Score 5   Transfers (Bed/Chair) Score 10   Mobility (Level Surface) Score 0   Stairs Score 0   Barthel Index Score 55       Laurie Butterfield PT

## 2018-05-25 NOTE — SOCIAL WORK
CM met with Pt to discuss SNF vs C  CM met with Pt and wife, Charisma Fat at bedside  Per wife, Pt has been falling recently at home  Per Charisma Post, Pt uses a walker or cane, but stated Pt is unsteady and is worried about leaving him home alone  Pt and wife live in a 1st floor apartment with 0 SELENA  Pt has hx with [facility] and [facility]  Pt is agreeable to SNF if recommend  CM provided Pt and wife with SNF list  Charisma Fat also interested in [facility]  [facility] handout provided including Blanca Alayna Beth's contact information

## 2018-05-25 NOTE — PLAN OF CARE
Problem: DISCHARGE PLANNING - CARE MANAGEMENT  Goal: Discharge to post-acute care or home with appropriate resources  INTERVENTIONS:  - Conduct assessment to determine patient/family and health care team treatment goals, and need for post-acute services based on payer coverage, community resources, and patient preferences, and barriers to discharge  - Address psychosocial, clinical, and financial barriers to discharge as identified in assessment in conjunction with the patient/family and health care team  - Arrange appropriate level of post-acute services according to patient's   needs and preference and payer coverage in collaboration with the physician and health care team  - Communicate with and update the patient/family, physician, and health care team regarding progress on the discharge plan  - Arrange appropriate transportation to post-acute venues  -Pt likely to d/c to skilled/acute rehab when medically stable   Outcome: Progressing

## 2018-05-25 NOTE — ED ATTENDING ATTESTATION
Nell Seip, MD, saw and evaluated the patient  I have discussed the patient with the resident/non-physician practitioner and agree with the resident's/non-physician practitioner's findings, Plan of Care, and MDM as documented in the resident's/non-physician practitioner's note, except where noted  All available labs and Radiology studies were reviewed  At this point I agree with the current assessment done in the Emergency Department  I have conducted an independent evaluation of this patient a history and physical is as follows:  Pt has history of meningioma in past Pt was discharged on Wednesday Now he has fallen daily Pt was using walker at the time  Pt states he feels off balance Pt denies syncope PE: alert heart reg lung clear abd soft nontender neuro nonfocal MDM: will chek labs ct pt eval    Critical Care Time  CritCare Time    Procedures

## 2018-05-25 NOTE — PLAN OF CARE
Problem: PHYSICAL THERAPY ADULT  Goal: Performs mobility at highest level of function for planned discharge setting  See evaluation for individualized goals  Treatment/Interventions: Functional transfer training, LE strengthening/ROM, Therapeutic exercise, Patient/family training, Equipment eval/education, Bed mobility, Gait training, Spoke to nursing  Equipment Recommended: Pratima Locke (RW)       See flowsheet documentation for full assessment, interventions and recommendations  Prognosis: Good  Problem List: Decreased strength, Decreased endurance, Impaired balance, Decreased mobility  Assessment: PT COMPLETED EVALUATION OF 80YEAR OLD MALE ADMITTED TO [facility] S/P MULTIPLE FALLS (3 IN PAST 3 DAYS)  CURRENT MEDICAL AND PHYSICAL INSTABILITIES INCLUDE PERFORMANCE OF EVALUATION IN ED, CONTINUOUS O2/HR/BP MONITORING, FALLS RISK, AND A REGRESSION IN FUNCTIONAL STATUS FROM BASELINE  PMH IS SIGNIFICANT FOR R ANTERIOR CINGULATE AND PARAFALCINE MENINGIOMA, HTN, AND DEPRESSION  PRIOR TO THIS ADMISSION PATIENT RESIDED IN ONE LEVEL APARTMENT (0 SELENA) WHERE HE WAS PREVIOUSLY I WITH MOBILITY (RW VS SPC) AND SPOUSE ASSISTED WITH ADLS/IADLS  CURRENT IMPAIRMENTS INCLUDE DECREASED BALANCE, STRENGTH, AND OVERALL ACTIVITY TOLERANCE, FALLS RISK, AND GAIT DEVIATIONS  DURING PT EVALUATION PATIENT REQUIRED S FOR BED MOBILITY, CONTACT GUARD ASSISTANCE FOR SIT<-->STAND TRANSFERS, AND MIN-AX1 FOR AMBULATION  HE AMBULATED 10 FEET X 2 W/ RW PRESENTING WITH SHUFFLED GAIT PATTERN  PATIENT REMAINS HIGH FALLS RISK  WIFE REPORTS SHE IS UNABLE TO OFFER PATIENT INCREASED ASSISTANCE AT HOME  RECOMMEND D/C TO STR  PATIENT WILL BENEFIT FROM CONTINUED SKILLED INPT PT THIS ADMISSION TO ACHIEVE MAXIMAL FUNCTION AND SAFETY     Barriers to Discharge: Decreased caregiver support  Barriers to Discharge Comments: WIFE DOES NOT FEEL SAFE W/ PATIENT HOME ALONE   Recommendation: (S) Short-term skilled PT     PT - OK to Discharge: (S) Yes (TO STR WHEN MED CLIFFORD )    See flowsheet documentation for full assessment     Payton Conrad, PT

## 2018-05-25 NOTE — H&P
History and Physical - [facility]    Patient Information: Quin Rodriguez 80 y o  male MRN: [id number]  Unit/Bed#: ED 15 Encounter: [id number]  Admitting Physician: Cholo Atkins MD  PCP: Sourav Padron MD  Date of Admission:  05/25/18    Assessment/Plan:    Hospital Problem List:     Principal Problem:    Fall  Active Problems:    Essential hypertension    Spinal stenosis of lumbar region with neurogenic claudication    History of meningioma    Cognitive decline      Plan for the Primary Problem(s):  · Multiple falls  · Seen by PT in ED, recommending short-term rehab  · Case management consult  · Continue physical therapy while inpatient  · Check orthostatic blood pressures  · Wife and patient agreeable for short-term rehab     Plan for Additional Problems:   · Cognitive impairment:  Recently seen in hospital by Neurosurgery and Neurology  Recommended outpatient neuropsychiatric testing  No acute findings noted on EEG/MRI  · Hypertension:  Continue with outpatient regimen  Monitor blood pressure  · Depression :continue Zoloft  · History of meningioma status post resection  · History of neuropathy:  Continue gabapentin at q h s  VTE Prophylaxis: Enoxaparin (Lovenox)  / sequential compression device   Code Status: full code  POLST: There is no POLST form on file for this patient (pre-hospital)    Anticipated Length of Stay:  Patient will be admitted on an Inpatient basis with an anticipated length of stay of  > 2 midnights  Justification for Hospital Stay:  Multiple falls    Total Time for Visit, including Counseling / Coordination of Care: 70 minutes  Greater than 50% of this total time spent on direct patient counseling and coordination of care  Chief Complaint:   Falls    History of Present Illness:    Quin Rodriguez is a 80 y o  male who presents with multiple falls since discharge from hospital 2 days ago    Patient was admitted recently for memory deficits and workup was essentially negative  It was suspected that he may have underlying dementia, and recommended outpatient neuro cognitive testing  However since being home, he had 3 falls, mostly from standing position  No dizziness/lightheadedness  Denies any chest pain shortness of breath or palpitation  No loss of consciousness  Review of Systems:    Review of Systems  Twelve point review systems negative except noted above  Past Medical and Surgical History:     Past Medical History:   Diagnosis Date    Acute myocardial infarction ([facility] [address] )     Anxiety     Arthritis     Brain neoplasm ([facility] [address] ) 11/1999    brain tumor    Depression     Hypercholesterolemia     Narcolepsy     daytime drowsiness and dozing off occasionally    Palpitations     Prostate cancer [facility])        Past Surgical History:   Procedure Laterality Date    BACK SURGERY      BRAIN SURGERY  11/08/1999    CORONARY ARTERY BYPASS GRAFT      x5       Meds/Allergies:    Prior to Admission medications    Medication Sig Start Date End Date Taking?  Authorizing Provider   aspirin 81 MG tablet Take 1 tablet by mouth daily   Yes Historical Provider, MD   enalapril (VASOTEC) 2 5 mg tablet TAKE 1 TABLET BY MOUTH  TWICE A DAY 4/10/18  Yes Lewis Phillips MD   ferrous sulfate 325 (65 Fe) mg tablet Take 1 tablet by mouth daily 10/31/17  Yes Historical Provider, MD   gabapentin (NEURONTIN) 300 mg capsule Take 2 tablets q hs 0/91/76  Yes Nakia Calles MD   metoprolol tartrate (LOPRESSOR) 25 mg tablet Take 25 mg by mouth 2 (two) times a day 12/29/14  Yes Historical Provider, MD   omeprazole (PriLOSEC) 40 MG capsule Take 1 capsule (40 mg total) by mouth daily 5/47/76  Yes Nakia Calles MD   sertraline (ZOLOFT) 50 mg tablet Take 1 tablet by mouth daily 11/17/11  Yes Historical Provider, MD   simvastatin (ZOCOR) 80 mg tablet TAKE 1 TABLET BY MOUTH  DAILY 4/10/18  Yes Lewis Phillips MD   zolpidem (AMBIEN) 5 mg tablet Take 1 tablet (5 mg total) by mouth daily 5/10/18  Yes Saritha Pedro MD     I have reviewed home medications with patient family member  Allergies: Allergies   Allergen Reactions    Atorvastatin Myalgia, Dizziness and Headache    Niacin Other (See Comments)     unknown       Social History:     Marital Status: /Civil Union   Occupation:  Retired  Patient Pre-hospital Living Situation:  With Family  Patient Pre-hospital Level of Mobility:  Limited  Patient Pre-hospital Diet Restrictions:  None  Substance Use History:   History   Alcohol Use No     Comment: (history)     History   Smoking Status    Current Every Day Smoker    Types: Cigars   Smokeless Tobacco    Never Used     Comment: former cig smoker- quit in 1992     History   Drug Use No       Family History:    non-contributory    Physical Exam:     Vitals:   Blood Pressure: 164/70 (05/25/18 1610)  Pulse: 58 (05/25/18 1610)  Temperature: 98 °F (36 7 °C) (05/25/18 1217)  Temp Source: Oral (05/25/18 1217)  Respirations: 16 (05/25/18 1610)  Weight - Scale: 73 9 kg (162 lb 14 7 oz) (05/25/18 1217)  SpO2: 97 % (05/25/18 1610)    Physical Exam     Gen -Patient comfortable   Neck- Supple  No thyromegaly or lymphadenopathy  Lungs-Clear bilaterally without any wheeze or rales   Heart S1-S2, regular rate and rhythm, no murmurs  Abdomen-soft nontender, no organomegaly  Bowel sounds present  Extremities-no cyanosi,  clubbing or edema  Skin- no rash  Neuro-nonfocal    Additional Data:     Lab Results: I have personally reviewed pertinent reports          Results from last 7 days  Lab Units 05/25/18  1346   WBC Thousand/uL 8 66   HEMOGLOBIN g/dL 13 2   HEMATOCRIT % 38 8   PLATELETS Thousands/uL 165   NEUTROS PCT % 58   LYMPHS PCT % 33   MONOS PCT % 8   EOS PCT % 2       Results from last 7 days  Lab Units 05/25/18  1346 05/22/18  0457   SODIUM mmol/L 137 138   POTASSIUM mmol/L 4 5 4 0   CHLORIDE mmol/L 107 106   CO2 mmol/L 25 27   BUN mg/dL 16 13   CREATININE mg/dL 0 96 0 90   CALCIUM mg/dL 9 6 9 9   TOTAL PROTEIN g/dL  --  6 7   BILIRUBIN TOTAL mg/dL  --  0 46   ALK PHOS U/L  --  60   ALT U/L  --  23   AST U/L  --  16   GLUCOSE RANDOM mg/dL 144* 157*       Results from last 7 days  Lab Units 05/22/18  0023   INR  1 01       Imaging: I have personally reviewed pertinent reports  X-ray Chest 2 Views    Result Date: 5/22/2018  Narrative: CHEST INDICATION:   chest pain  COMPARISON:  5/8/2015 EXAM PERFORMED/VIEWS:  XR CHEST PA & LATERAL FINDINGS: Cardiomediastinal silhouette appears unremarkable  The lungs are clear  No pneumothorax or pleural effusion  Osseous structures appear within normal limits for patient age  Impression: No acute cardiopulmonary disease  Workstation performed: [id number]     Ct Head Without Contrast    Result Date: 5/25/2018  Narrative: CT BRAIN - WITHOUT CONTRAST INDICATION:   falls  COMPARISON:  CT brain May 21, 2018 and MR brain May 22, 2018  TECHNIQUE:  CT examination of the brain was performed  In addition to axial images, coronal 2D reformatted images were created and submitted for interpretation  Radiation dose length product (DLP) for this visit:  995 mGy-cm   This examination, like all CT scans performed in the [facility], was performed utilizing techniques to minimize radiation dose exposure, including the use of iterative reconstruction and automated exposure control  IMAGE QUALITY:  Diagnostic  FINDINGS: PARENCHYMA:  No acute intraparenchymal hemorrhage or extra-axial fluid collections  No acute infarctions  Stable encephalomalacia and gliosis within the frontal lobes bilaterally, left side greater than right  Postoperative changes with prior left frontal craniotomy  Decreased attenuation in the periventricular regions and centrum semiovale bilaterally as well as the central dale  VENTRICLES AND EXTRA-AXIAL SPACES:  Ventricles and cerebral sulci moderately dilated  Bilateral internal carotid artery and vertebral artery calcifications   VISUALIZED ORBITS AND PARANASAL SINUSES:  Unremarkable  CALVARIUM AND EXTRACRANIAL SOFT TISSUES:  Normal      Impression: 1  Cerebral atrophy with chronic small vessel ischemic white matter disease  2   Stable bifrontal encephalomalacia and gliosis  3   No acute intracranial abnormality  Workstation performed: [id number]     Ct Head With And Without Contrast    Result Date: 5/21/2018  Narrative: CT BRAIN - WITH AND WITHOUT CONTRAST INDICATION:   Altered mental status  COMPARISON:  6/9/2014  TECHNIQUE:  CT examination of the brain was performed both prior to and after the administration of intravenous contrast   In addition to axial images, coronal reformatted images were created and submitted for interpretation  Radiation dose length product (DLP) for this visit:  1947 06 mGy-cm   This examination, like all CT scans performed in the [place], was performed utilizing techniques to minimize radiation dose exposure, including the use of iterative reconstruction and automated exposure control  IV Contrast:  85 mL of iohexol (OMNIPAQUE)  IMAGE QUALITY:  Diagnostic  FINDINGS: PARENCHYMA: Encephalomalacia in the left frontal region consistent with postsurgical change from prior bilateral frontal craniotomy and tumor resection  Grossly stable extra-axial mass in the right anterior cingulate parafalcine region measuring 1 9 x 2  x 1 4 cm  Prominent white matter hypoattenuation in the bilateral anterior frontal region, also likely postsurgical and/or secondary to chronic ischemic insult VENTRICLES AND EXTRA-AXIAL SPACES:  Stable exophytic vacuo dilatation of the frontal horns of the lateral ventricles  No hydrocephalus or extra-axial collection  VISUALIZED ORBITS AND PARANASAL SINUSES:  Unremarkable  CALVARIUM:  Postsurgical change from bilateral frontal craniotomy  No lytic or blastic lesion     Impression: 1    Stable right anterior cingulate and parafalcine meningioma measuring 2 cm surrounding encephalomalacia and gliosis in the bilateral anterior frontal region  2   No evidence of acute intracranial hemorrhage, acute mass effect or extra-axial collection  Workstation performed: [id number]     Mri Brain W Wo Contrast    Result Date: 5/22/2018  Narrative: MRI BRAIN WITH AND WITHOUT CONTRAST INDICATION: History of meningioma  confusion  Mental status change  COMPARISON:  MRI dated 6/9/2014  CT dated 5/21/2018  TECHNIQUE: Sagittal T1, axial T2, axial FLAIR, axial T1, axial Gradient, axial diffusion  Sagittal, axial T1 postcontrast   Axial bravo postcontrast with coronal reconstructions  IV Contrast:  7 mL of gadobutrol injection (MULTI-DOSE)  IMAGE QUALITY:   Diagnostic  FINDINGS: BRAIN PARENCHYMA:  Previous superior frontal craniotomy  Extensive encephalomalacia within the superior medial left frontal lobe with adjacent gliosis  This extends across the corpus callosum  There is gliosis identified within the opposite right frontal lobe  Inseparable from the anterior intrahemispheric fissure is a heterogeneously enhancing extra-axial mass which appears slightly smaller than the prior examination measuring 1 6 x 2 6 cm on series 11 image 18  Previously this measured 1 8 x 3 1 cm  Stable basal ganglia, basilar cisterns, brainstem and cerebellum  Focal volume loss of the anterior aspect of the corpus callosum  VENTRICLES:  Stable ventricles  Ex vacuo dilatation of the frontal horns of the lateral ventricles, left more so than right  SELLA AND PITUITARY GLAND:  Normal  ORBITS:  Normal  PARANASAL SINUSES:  Normal  VASCULATURE:  Evaluation of the major intracranial vasculature demonstrates appropriate flow voids  CALVARIUM AND SKULL BASE:  Normal  EXTRACRANIAL SOFT TISSUES:  Normal      Impression: 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 best seen on series 11 image 18    This residual tumor has slightly decreased in size compared to prior examination with stable surrounding encephalomalacia and gliosis more pronounced within the left frontal lobe  Workstation performed: [id number]       EKG, Pathology, and Other Studies Reviewed on Admission:   · EKG:  Sinus bradycardia, right bundle branch block    Allscripts / Epic Records Reviewed: Yes     ** Please Note: This note has been constructed using a voice recognition system   **

## 2018-05-26 LAB
ANION GAP SERPL CALCULATED.3IONS-SCNC: 5 MMOL/L (ref 4–13)
BUN SERPL-MCNC: 14 MG/DL (ref 5–25)
CALCIUM SERPL-MCNC: 9.2 MG/DL (ref 8.3–10.1)
CHLORIDE SERPL-SCNC: 110 MMOL/L (ref 100–108)
CO2 SERPL-SCNC: 26 MMOL/L (ref 21–32)
CREAT SERPL-MCNC: 0.92 MG/DL (ref 0.6–1.3)
GFR SERPL CREATININE-BSD FRML MDRD: 76 ML/MIN/1.73SQ M
GLUCOSE SERPL-MCNC: 107 MG/DL (ref 65–140)
POTASSIUM SERPL-SCNC: 3.7 MMOL/L (ref 3.5–5.3)
SODIUM SERPL-SCNC: 141 MMOL/L (ref 136–145)

## 2018-05-26 PROCEDURE — G8987 SELF CARE CURRENT STATUS: HCPCS

## 2018-05-26 PROCEDURE — G8988 SELF CARE GOAL STATUS: HCPCS

## 2018-05-26 PROCEDURE — 80048 BASIC METABOLIC PNL TOTAL CA: CPT | Performed by: INTERNAL MEDICINE

## 2018-05-26 PROCEDURE — 97166 OT EVAL MOD COMPLEX 45 MIN: CPT

## 2018-05-26 PROCEDURE — 97535 SELF CARE MNGMENT TRAINING: CPT

## 2018-05-26 PROCEDURE — 99232 SBSQ HOSP IP/OBS MODERATE 35: CPT | Performed by: INTERNAL MEDICINE

## 2018-05-26 RX ORDER — ENALAPRIL MALEATE 5 MG/1
5 TABLET ORAL 2 TIMES DAILY
Status: DISCONTINUED | OUTPATIENT
Start: 2018-05-26 | End: 2018-05-29

## 2018-05-26 RX ORDER — HYDRALAZINE HYDROCHLORIDE 20 MG/ML
5 INJECTION INTRAMUSCULAR; INTRAVENOUS EVERY 6 HOURS PRN
Status: DISCONTINUED | OUTPATIENT
Start: 2018-05-26 | End: 2018-05-29

## 2018-05-26 RX ORDER — LANOLIN ALCOHOL/MO/W.PET/CERES
6 CREAM (GRAM) TOPICAL
Status: DISCONTINUED | OUTPATIENT
Start: 2018-05-26 | End: 2018-05-30 | Stop reason: HOSPADM

## 2018-05-26 RX ADMIN — HYDRALAZINE HYDROCHLORIDE 5 MG: 20 INJECTION INTRAMUSCULAR; INTRAVENOUS at 14:43

## 2018-05-26 RX ADMIN — MELATONIN TAB 3 MG 6 MG: 3 TAB at 01:07

## 2018-05-26 RX ADMIN — SERTRALINE HYDROCHLORIDE 50 MG: 50 TABLET ORAL at 08:54

## 2018-05-26 RX ADMIN — SODIUM CHLORIDE 100 ML/HR: 0.9 INJECTION, SOLUTION INTRAVENOUS at 04:17

## 2018-05-26 RX ADMIN — ENALAPRIL MALEATE 5 MG: 5 TABLET ORAL at 17:04

## 2018-05-26 RX ADMIN — ACETAMINOPHEN 650 MG: 325 TABLET ORAL at 23:15

## 2018-05-26 RX ADMIN — SODIUM CHLORIDE 100 ML/HR: 0.9 INJECTION, SOLUTION INTRAVENOUS at 23:16

## 2018-05-26 RX ADMIN — ACETAMINOPHEN 650 MG: 325 TABLET ORAL at 00:46

## 2018-05-26 RX ADMIN — ENOXAPARIN SODIUM 40 MG: 40 INJECTION SUBCUTANEOUS at 08:54

## 2018-05-26 RX ADMIN — METOPROLOL TARTRATE 25 MG: 25 TABLET, FILM COATED ORAL at 17:04

## 2018-05-26 RX ADMIN — ENALAPRIL MALEATE 2.5 MG: 5 TABLET ORAL at 08:54

## 2018-05-26 RX ADMIN — GABAPENTIN 300 MG: 300 CAPSULE ORAL at 21:30

## 2018-05-26 RX ADMIN — ZOLPIDEM TARTRATE 5 MG: 5 TABLET ORAL at 21:30

## 2018-05-26 RX ADMIN — PANTOPRAZOLE SODIUM 20 MG: 20 TABLET, DELAYED RELEASE ORAL at 05:10

## 2018-05-26 RX ADMIN — PRAVASTATIN SODIUM 20 MG: 20 TABLET ORAL at 17:04

## 2018-05-26 RX ADMIN — METOPROLOL TARTRATE 25 MG: 25 TABLET, FILM COATED ORAL at 08:53

## 2018-05-26 RX ADMIN — FERROUS SULFATE TAB 325 MG (65 MG ELEMENTAL FE) 325 MG: 325 (65 FE) TAB at 08:54

## 2018-05-26 RX ADMIN — ASPIRIN 81 MG 81 MG: 81 TABLET ORAL at 08:54

## 2018-05-26 RX ADMIN — SODIUM CHLORIDE 100 ML/HR: 0.9 INJECTION, SOLUTION INTRAVENOUS at 13:14

## 2018-05-26 NOTE — ASSESSMENT & PLAN NOTE
Patient presenting with recurrent falls  Patient recently discharged 2 days ago for symptoms associated with cognitive decline  At this particular juncture the patient has been having recurrent falls at home  Physical therapy recommending inpatient rehab  Will discuss with case management

## 2018-05-26 NOTE — PROGRESS NOTES
Progress Note - Lavern Soulier 1933, 80 y o  male MRN: [id number]    Unit/Bed#: [facility] 651-35 Encounter: [id number]    Primary Care Provider: Effie James MD   Date and time admitted to hospital: 2018 12:19 PM        Cognitive decline   Assessment & Plan    Will need outpatient cognitive testing as previously planned  Essential hypertension   Assessment & Plan    Continue with home medications  Patient on enalapril and metoprolol  Given patient's presentation recurrent falls will ask for orthostatics blood pressure checks and physical therapy follow through  * Fall   Assessment & Plan    Patient presenting with recurrent falls  Patient recently discharged 2 days ago for symptoms associated with cognitive decline  At this particular juncture the patient has been having recurrent falls at home  Physical therapy recommending inpatient rehab  Will discuss with case management  VTE Pharmacologic Prophylaxis:   Pharmacologic: Enoxaparin (Lovenox)  Mechanical VTE Prophylaxis in Place: Yes    Patient Centered Rounds: I have performed bedside rounds with nursing staff today  Time Spent for Care: 15 minutes  More than 50% of total time spent on counseling and coordination of care as described above  Current Length of Stay: 1 day(s)    Current Patient Status: Inpatient   Certification Statement: The patient will continue to require additional inpatient hospital stay due to Need to monitor symptoms        Code Status: Level 1 - Full Code      Subjective:   nad    Objective:     Vitals:   Temp (24hrs), Av 5 °F (36 9 °C), Min:98 °F (36 7 °C), Max:98 8 °F (37 1 °C)    HR:  [56-80] 80  Resp:  [16-18] 18  BP: ()/() 170/82  SpO2:  [95 %-98 %] 96 %  Body mass index is 23 68 kg/m²  Input and Output Summary (last 24 hours):        Intake/Output Summary (Last 24 hours) at 18 0936  Last data filed at 18 0837   Gross per 24 hour   Intake           998 33 ml   Output              150 ml   Net           848 33 ml       Physical Exam:     Physical Exam   Constitutional: He is oriented to person, place, and time  He appears well-developed and well-nourished  HENT:   Head: Normocephalic and atraumatic  Eyes: Conjunctivae are normal  Pupils are equal, round, and reactive to light  Neck: Normal range of motion  Neck supple  No tracheal deviation present  No thyromegaly present  Cardiovascular: Normal rate and regular rhythm  Pulmonary/Chest: Effort normal and breath sounds normal  No respiratory distress  He has no wheezes  Abdominal: Soft  Bowel sounds are normal  He exhibits no distension  There is no tenderness  Musculoskeletal: Normal range of motion  He exhibits no edema  Neurological: He is alert and oriented to person, place, and time  No cranial nerve deficit  Skin: Skin is warm and dry  Psychiatric: He has a normal mood and affect  Additional Data:     Labs:      Results from last 7 days  Lab Units 05/25/18  1346   WBC Thousand/uL 8 66   HEMOGLOBIN g/dL 13 2   HEMATOCRIT % 38 8   PLATELETS Thousands/uL 165   NEUTROS PCT % 58   LYMPHS PCT % 33   MONOS PCT % 8   EOS PCT % 2       Results from last 7 days  Lab Units 05/26/18  0455  05/22/18  0457   SODIUM mmol/L 141  < > 138   POTASSIUM mmol/L 3 7  < > 4 0   CHLORIDE mmol/L 110*  < > 106   CO2 mmol/L 26  < > 27   BUN mg/dL 14  < > 13   CREATININE mg/dL 0 92  < > 0 90   CALCIUM mg/dL 9 2  < > 9 9   TOTAL PROTEIN g/dL  --   --  6 7   BILIRUBIN TOTAL mg/dL  --   --  0 46   ALK PHOS U/L  --   --  60   ALT U/L  --   --  23   AST U/L  --   --  16   GLUCOSE RANDOM mg/dL 107  < > 157*   < > = values in this interval not displayed  Results from last 7 days  Lab Units 05/22/18  0023   INR  1 01       * I Have Reviewed All Lab Data Listed Above  * Additional Pertinent Lab Tests Reviewed:  All Labs Within Last 24 Hours Reviewed      Recent Cultures (last 7 days):           Last 24 Hours Medication List:     Current Facility-Administered Medications:  acetaminophen 650 mg Oral Q6H PRN Chong Lance MD    aspirin 81 mg Oral Daily Chong Lance MD    enalapril 2 5 mg Oral BID Chong Lance MD    enoxaparin 40 mg Subcutaneous Daily Chong Lance MD    ferrous sulfate 325 mg Oral Daily Chong Lance MD    gabapentin 300 mg Oral HS Chong Lance MD    melatonin 6 mg Oral HS PRN Aliya Sanders PA-C    metoprolol tartrate 25 mg Oral BID Chong Lance MD    pantoprazole 20 mg Oral Early Morning Chong Lance MD    pravastatin 20 mg Oral Daily With Dinner hCong Lance MD    sertraline 50 mg Oral Daily Chong Lance MD    sodium chloride 100 mL/hr Intravenous Continuous Chong Lance MD Last Rate: 100 mL/hr (05/26/18 9614)   zolpidem 5 mg Oral Daily Chong Lance MD         Today, Patient Was Seen By: Maris Orta DO    ** Please Note: Dictation voice to text software may have been used in the creation of this document   **

## 2018-05-26 NOTE — PLAN OF CARE
Problem: OCCUPATIONAL THERAPY ADULT  Goal: Performs self-care activities at highest level of function for planned discharge setting  See evaluation for individualized goals  Treatment Interventions: ADL retraining, Functional transfer training, Endurance training, Cognitive reorientation, Patient/family training, Equipment evaluation/education, Compensatory technique education, Activityengagement, Energy conservation          See flowsheet documentation for full assessment, interventions and recommendations  Limitation: Decreased ADL status, Decreased cognition, Decreased endurance, Decreased high-level ADLs, Decreased self-care trans  Prognosis: Good  Assessment: Pt is a 80 y o  male who was admitted to [facility] on 5/25/2018 s/p a fall at home  Pt w/ frequent fall over the past few days leading to admission (3 falls)  CT of head revealed "Cerebral atrophy with chronic small vessel ischemic white matter disease  Stable bifrontal encephalomalacia and gliosis  No acute intracranial abnormality " Pt's active problems upon admission include iron deficiency, anemia, Arteriosclerotic cardiovascular disease, HTN, cervical spinal stenosis, HLD, lumbar spinal stenosis, and altered mental status  Pt's PMH includes MI, arthritis, brain tumor, and prostate cancer  At baseline pt was I with ADLs and received assist from spouse for IADLs  Pt uses RW  Pt lives w/ supportive spouse in one level apartment  Currently pt requires S-min A for overall ADLS and min A for functional mobility/transfers  Pt currently presents with impairments in the following categories -difficulty performing ADLS, difficulty performing IADLS, limited insight into deficits, activity tolerance, endurance, standing balance/tolerance, sitting balance/tolerance, memory, insight, safety and judgement   These impairments, as well as pt's fatigue and risk for falls  limit pt's ability to safely engage in all baseline areas of occupation, including grooming, bathing, dressing, toileting, functional mobility/transfers and leisure activities  From OT standpoint, recommend inpatient rehab upon D/C  OT will continue to follow to address the below stated goals        OT Discharge Recommendation: Short Term Rehab  OT - OK to Discharge: Yes (when medically stable to STR pending progress)

## 2018-05-26 NOTE — SOCIAL WORK
CM met wit patient and wife [facility] [phone] at bedside to discuss recommendation for inpatient rehab when patient is medically clear for d/c  Pt and wife are agreeable and provided the following choices  [address]  [facility] [address]  [facility]  Referrals placed in [facility]  CM will follow

## 2018-05-26 NOTE — OCCUPATIONAL THERAPY NOTE
[address] Evaluation & Treatment     Patient Name: Ruth Vazquez  LIMTB'A Date: 5/26/2018  Problem List  Patient Active Problem List   Diagnosis    Constipation    Iron deficiency    Other constipation    Anemia    Arteriosclerotic cardiovascular disease    Backache    Essential hypertension    Cervical spinal stenosis    Depression    Esophageal reflux    Hyperlipidemia    Insomnia    Spinal stenosis of lumbar region with neurogenic claudication    Vitamin B12 deficiency    Idiopathic peripheral neuropathy    Iron deficiency anemia    History of meningioma    Confusion    Altered mental status    Fall    Cognitive decline     Past Medical History  Past Medical History:   Diagnosis Date    Acute myocardial infarction ([facility] [address] )     Anxiety     Arthritis     [facility] 11/1999    brain tumor    Depression     Hypercholesterolemia     Narcolepsy     daytime drowsiness and dozing off occasionally    Palpitations     Prostate cancer [facility])      Past Surgical History  Past Surgical History:   Procedure Laterality Date    BACK SURGERY      BRAIN SURGERY  11/08/1999    CORONARY ARTERY BYPASS GRAFT      x5         05/26/18 1430   Note Type   Note type Eval/Treat   Restrictions/Precautions   Weight Bearing Precautions Per Order No   Other Precautions Cognitive; Fall Risk;Multiple lines   Pain Assessment   Pain Assessment No/denies pain   Pain Score No Pain   Home Living   Type of Home Apartment   Home Layout One level  (0 SELENA)   Bathroom Shower/Tub Tub/shower unit   Bathroom Toilet Standard   Bathroom Equipment Commode; Shower chair  (getting a toilet grab bar soon)   [address] Walker;Cane   Prior Function   Level of [place] Independent with ADLs and functional mobility   Lives With Spouse   Receives Help From Family   ADL Assistance Independent   IADLs Needs assistance   Falls in the last 6 months 1 to 4  (3 FALLS IN PAST 3 DAYS)   Vocational Retired   Lifestyle   Autonomy Pt I with ADLs and receives assist for IADLs from spouse  Pt recently stopped driving 2* increased confusion  Uses a RW at baseline  Reciprocal Relationships Pt lives w/ supportive spouse who assists as needed   Service to Others Retired   Intrinsic Gratification Pt I PTA   Psychosocial   Psychosocial (WDL) WDL   ADL   Where Assessed Chair   Eating Assistance 5  Supervision/Setup   Eating Deficit Setup   Grooming Assistance 5  [address] [facility] 5  Supervision/Setup   [address] 4  [address] 5  Supervision/Setup    [facility] 4  [address] [facility] 4  [address] to [address]ine 5  Supervision   Additional items Increased time required   Transfers   Sit to Stand 4  Minimal assistance   Additional items Assist x 1;Verbal cues; Increased time required   Stand to Sit 4  Minimal assistance   Additional items Verbal cues; Increased time required   Toilet transfer 4  Minimal assistance   Additional items Assist x 1; Increased time required   Functional Mobility   Functional Mobility 4  Minimal assistance   Additional Comments CGA for safety   Additional items Rolling walker   Balance   Static Sitting Good   Dynamic Sitting Fair   Static Standing Fair -   Dynamic Standing Poor +   Activity Tolerance   Activity Tolerance Patient tolerated treatment well;Treatment limited secondary to medical complications (Comment)  (pt's /88 - informed RN)   Nurse Made Aware Okay to see per RN and RN made aware of pt's BP    RUE Assessment   RUE Assessment WFL   LUE Assessment   LUE Assessment WFL   Hand Function   Gross Motor Coordination Functional   Fine Motor Coordination Functional   Cognition   Overall Cognitive Status Impaired   Arousal/Participation Alert; Responsive; Cooperative   Attention Within functional limits   Orientation Level Oriented X4   Memory Decreased recall of precautions;Decreased recall of recent events   Following Commands Follows one step commands with increased time or repetition   Comments Pt pleasant and cooperative  Able to communicate beyond basic needs and is oriented x4  Spouse reports pt has been increasingly more confused over the past few days  Pt reports that he feels confused/forgetful sometimes  Pt overall slow to procress, requiring increased time to complete tasks  Pt also requires cues for carry over of RW safety  Assessment   Limitation Decreased ADL status; Decreased cognition;Decreased endurance;Decreased high-level ADLs; Decreased self-care trans   Prognosis Good   Assessment Pt is a 80 y o  male who was admitted to [facility] on 5/25/2018 s/p a fall at home  Pt w/ frequent fall over the past few days leading to admission (3 falls)  CT of head revealed "Cerebral atrophy with chronic small vessel ischemic white matter disease  Stable bifrontal encephalomalacia and gliosis  No acute intracranial abnormality " Pt's active problems upon admission include iron deficiency, anemia, Arteriosclerotic cardiovascular disease, HTN, cervical spinal stenosis, HLD, lumbar spinal stenosis, and altered mental status  Pt's PMH includes MI, arthritis, brain tumor, and prostate cancer  At baseline pt was I with ADLs and received assist from spouse for IADLs  Pt uses RW  Pt lives w/ supportive spouse in one level apartment  Currently pt requires S-min A for overall ADLS and min A for functional mobility/transfers  Pt currently presents with impairments in the following categories -difficulty performing ADLS, difficulty performing IADLS, limited insight into deficits, activity tolerance, endurance, standing balance/tolerance, sitting balance/tolerance, memory, insight, safety and judgement   These impairments, as well as pt's fatigue and risk for falls  limit pt's ability to safely engage in all baseline areas of occupation, including grooming, bathing, dressing, toileting, functional mobility/transfers and leisure activities  From OT standpoint, recommend inpatient rehab upon D/C  OT will continue to follow to address the below stated goals  Goals   Patient Goals to go for a walk   LTG Time Frame 10-14   Long Term Goal see below goals   Plan   Treatment Interventions ADL retraining;Functional transfer training; Endurance training;Cognitive reorientation;Patient/family training;Equipment evaluation/education; Compensatory technique education; Activityengagement; Energy conservation   Goal Expiration Date 06/09/18   OT Frequency 3-5x/wk   Additional Treatment Session   Start Time 4345   End Time 1430   Treatment Assessment Pt seen for OT treatment session following initial evaluation for functional transfer training and ADL training w/ use of proper body mechanics  Pt amb into bathroom w/ RW and vc for safety of lines and RW mngmt  Pt required min A and vc for sit <->stand from toilet  Educated pt on toilet grab bar use and hand placement w/ RW  Pt then sat in chair to don underwear w/ min A for threading over L foot  Pt required steadying during pull up  Educated pt on ankle pumps exercise to complete before standing 2* pt's spouse reporting he has been having low BP  Also educated pt on safety w/ change of position for transfers  Pt required vc for carry over of education  Pt requested to go for a walk, however pt's BP was 200/88 while seated in chair, therefore did not ambulate at this time  Informed RN of pt's BP  Pt returned to supine w/ S for safety w/ all devices w/i reach at end of session  Rec inpatient rehab at time of d/c Will cont to follow to address previously determined goals      Additional Treatment Day 1   Recommendation   OT Discharge Recommendation Short Term Rehab   OT - OK to Discharge Yes  (when medically stable to STR pending progress)   Barthel Index   Feeding 10   Bathing 0 Grooming Score 5   Dressing Score 5   Bladder Score 10   Bowels Score 10   Toilet Use Score 5   Transfers (Bed/Chair) Score 10   Mobility (Level Surface) Score 0   Stairs Score 0   Barthel Index Score 55   Modified Naveen Scale   Modified Stevens Scale 4     LTG (10-14 DAYS)  Pt will perform all ADL's at SBA level with G balance and DME/AE as needed      Pt will perform functional transfers, including toilet transfer, at SBA level w/ use of DME/AE as needed       Pt will perform functional mobility at a SBA level w/ use of DME and G balance      Pt will demonstrate good carry over of RW safety and energy conservation techniques      Pt will demonstrate good carry over of pt/family education and training w/ 100% attention to task to assist w/ safe d/c planning      Pt will improve activity tolerance to G for 30-45 min treatment session      Pt will improve standing balance to G for 10-15 minutes of purposeful activity w/ G endurance  Pt will engage in further cog assessment as needed w/ 100% attention to task to assist w/ participation in ADLs/IADLs and safe d/c planning  Pt will attend to 1 task or activity for 5-10 min without need for re-direction           Lori Cons, OTR/L

## 2018-05-26 NOTE — ASSESSMENT & PLAN NOTE
Continue with home medications  Patient on enalapril and metoprolol  Given patient's presentation recurrent falls will ask for orthostatics blood pressure checks and physical therapy follow through

## 2018-05-27 PROCEDURE — 99232 SBSQ HOSP IP/OBS MODERATE 35: CPT | Performed by: INTERNAL MEDICINE

## 2018-05-27 RX ORDER — GABAPENTIN 300 MG/1
300 CAPSULE ORAL
Qty: 30 CAPSULE | Refills: 0 | Status: CANCELLED | OUTPATIENT
Start: 2018-05-27

## 2018-05-27 RX ADMIN — SERTRALINE HYDROCHLORIDE 50 MG: 50 TABLET ORAL at 08:00

## 2018-05-27 RX ADMIN — SODIUM CHLORIDE 100 ML/HR: 0.9 INJECTION, SOLUTION INTRAVENOUS at 08:02

## 2018-05-27 RX ADMIN — ASPIRIN 81 MG 81 MG: 81 TABLET ORAL at 08:00

## 2018-05-27 RX ADMIN — ENOXAPARIN SODIUM 40 MG: 40 INJECTION SUBCUTANEOUS at 08:00

## 2018-05-27 RX ADMIN — PANTOPRAZOLE SODIUM 20 MG: 20 TABLET, DELAYED RELEASE ORAL at 05:10

## 2018-05-27 RX ADMIN — METOPROLOL TARTRATE 25 MG: 25 TABLET, FILM COATED ORAL at 16:44

## 2018-05-27 RX ADMIN — ENALAPRIL MALEATE 5 MG: 5 TABLET ORAL at 16:44

## 2018-05-27 RX ADMIN — PRAVASTATIN SODIUM 20 MG: 20 TABLET ORAL at 16:44

## 2018-05-27 RX ADMIN — ENALAPRIL MALEATE 5 MG: 5 TABLET ORAL at 08:00

## 2018-05-27 RX ADMIN — HYDRALAZINE HYDROCHLORIDE 5 MG: 20 INJECTION INTRAMUSCULAR; INTRAVENOUS at 20:47

## 2018-05-27 RX ADMIN — ACETAMINOPHEN 650 MG: 325 TABLET ORAL at 08:01

## 2018-05-27 RX ADMIN — SODIUM CHLORIDE 100 ML/HR: 0.9 INJECTION, SOLUTION INTRAVENOUS at 20:49

## 2018-05-27 RX ADMIN — METOPROLOL TARTRATE 25 MG: 25 TABLET, FILM COATED ORAL at 08:00

## 2018-05-27 RX ADMIN — ZOLPIDEM TARTRATE 5 MG: 5 TABLET ORAL at 21:29

## 2018-05-27 RX ADMIN — GABAPENTIN 300 MG: 300 CAPSULE ORAL at 21:29

## 2018-05-27 RX ADMIN — FERROUS SULFATE TAB 325 MG (65 MG ELEMENTAL FE) 325 MG: 325 (65 FE) TAB at 08:00

## 2018-05-27 NOTE — CASE MANAGEMENT
Initial Clinical Review    Admission: Date/Time/Statement: 5/25/18 @ 1602     Orders Placed This Encounter   Procedures    Inpatient Admission (expected length of stay for this patient is greater than two midnights)     Standing Status:   Standing     Number of Occurrences:   1     Order Specific Question:   Admitting Physician     Answer:   Christopher Rankin     Order Specific Question:   Level of Care     Answer:   Med Surg [16]     Order Specific Question:   Estimated length of stay     Answer:   More than 2 Midnights     Order Specific Question:   Certification     Answer:   I certify that inpatient services are medically necessary for this patient for a duration of greater than two midnights  See H&P and MD Progress Notes for additional information about the patient's course of treatment  ED: Date/Time/Mode of Arrival:   ED Arrival Information     Expected Arrival Acuity Means of Arrival Escorted By Service Admission Type    5/25/2018 11:12 5/25/2018 12:09 Emergent Walk-In Self General Medicine Emergency    Arrival Complaint    falls          Chief Complaint:   Chief Complaint   Patient presents with    Fall     pt was admitted monday for changed in mental status  pt dc'd wednesday  pt has had multiple falls since his dc       History of Illness:  80 y o  male who presents with multiple falls since discharge from hospital 2 days ago  Patient was admitted recently for memory deficits and workup was essentially negative  It was suspected that he may have underlying dementia, and recommended outpatient neuro cognitive testing  However since being home, he had 3 falls, mostly from standing position  No dizziness/lightheadedness  Denies any chest pain shortness of breath or palpitation    No loss of consciousness        ED Vital Signs:   ED Triage Vitals   Temperature Pulse Respirations Blood Pressure SpO2   05/25/18 1217 05/25/18 1217 05/25/18 1217 05/25/18 1217 05/25/18 1217   98 °F (36 7 °C) 77 16 112/52 98 %      Temp Source Heart Rate Source Patient Position - Orthostatic VS BP Location FiO2 (%)   05/25/18 1217 05/25/18 1217 05/25/18 1358 05/25/18 1358 --   Oral Monitor Sitting Right arm       Pain Score       05/25/18 1217       No Pain        Wt Readings from Last 1 Encounters:   05/25/18 74 8 kg (165 lb)       Vital Signs (abnormal): //82    Abnormal Labs/Diagnostic Test Results: Glucose 157    CXR 5/25 -- No acute cardiopulmonary disease    CT head 5/25 -- 1  Cerebral atrophy with chronic small vessel ischemic white matter disease  2   Stable bifrontal encephalomalacia and gliosis  3   No acute intracranial abnormality  EKG:  Sinus bradycardia, right bundle branch block    ED Treatment:   Medication Administration from 05/25/2018 1111 to 05/25/2018 1656     None          Past Medical/Surgical History: Active Ambulatory Problems     Diagnosis Date Noted    Constipation 01/25/2018    Iron deficiency 01/25/2018    Other constipation 01/25/2018    Anemia 06/30/2017    Arteriosclerotic cardiovascular disease 02/03/2015    Backache 06/05/2013    Essential hypertension 06/03/2013    Cervical spinal stenosis 12/30/2013    Depression 04/01/2013    Esophageal reflux 09/30/2014    Hyperlipidemia 06/03/2013    Insomnia 02/05/2016    Spinal stenosis of lumbar region with neurogenic claudication 11/05/2013    Vitamin B12 deficiency 07/21/2017    Idiopathic peripheral neuropathy 03/29/2018    Iron deficiency anemia 03/29/2018    History of meningioma 05/21/2018    Confusion 05/21/2018    Altered mental status      Past Medical History:   Diagnosis Date    Acute [facility] infarction ([facility] [address]     Anxiety     Arthritis     Brain neoplasm ([facility] [address] ) 11/1999    Depression     Hypercholesterolemia     Narcolepsy     Palpitations     [facility])        Admitting Diagnosis: Fall [W19  XXXA]  Frequent falls [R29 6]  Ambulatory dysfunction [R26 2]    Age/Sex: 80 y o  male    Assessment/Plan:   Hospital Problem List:      Principal Problem:    Fall  Active Problems:    Essential hypertension    Spinal stenosis of lumbar region with neurogenic claudication    History of meningioma    Cognitive decline        Plan for the Primary Problem(s):  · Multiple falls  ? Seen by PT in ED, recommending short-term rehab  ? Case management consult  ? Continue physical therapy while inpatient  ? Check orthostatic blood pressures  ? Wife and patient agreeable for short-term rehab      Plan for Additional Problems:   · Cognitive impairment:  Recently seen in hospital by Neurosurgery and Neurology  Recommended outpatient neuropsychiatric testing  No acute findings noted on EEG/MRI  · Hypertension:  Continue with outpatient regimen  Monitor blood pressure  · Depression :continue Zoloft  · History of meningioma status post resection  · History of neuropathy:  Continue gabapentin at q h s      VTE Prophylaxis: Enoxaparin (Lovenox)  / sequential compression device   Code Status: full code  POLST: There is no POLST form on file for this patient (pre-hospital)     Anticipated Length of Stay:  Patient will be admitted on an Inpatient basis with an anticipated length of stay of  > 2 midnights     Justification for Hospital Stay:  Multiple falls      Admission Orders:  Admit to M/S/Tele unit  Telem  VS q 4h  IS q 1h  SCD's  Up with assistance  Regular diet  PT/OT eval & tx    Scheduled Meds:   Current Facility-Administered Medications:  acetaminophen 650 mg Oral Q6H PRN Chong Lance MD    aspirin 81 mg Oral Daily Chong Lance MD    enalapril 5 mg Oral BID Florin Pedersen, DO    enoxaparin 40 mg Subcutaneous Daily Chong Lance MD    ferrous sulfate 325 mg Oral Daily Chong Lance MD    gabapentin 300 mg Oral HS Chong Lance MD    hydrALAZINE 5 mg Intravenous Q6H PRN Florin Pedersen, DO    melatonin 6 mg Oral HS PRN Aliya Sanders PA-C    metoprolol tartrate 25 mg Oral BID Chong Lance MD    pantoprazole 20 mg Oral Early Morning Chong Lance MD    pravastatin 20 mg Oral Daily With Dinner Chong Lance MD    sertraline 50 mg Oral Daily Chong Lance MD    sodium chloride 100 mL/hr Intravenous Continuous Chong Lance MD Last Rate: 100 mL/hr (05/27/18 0802)   zolpidem 5 mg Oral Daily Chong Lance MD      Continuous Infusions:   sodium chloride 100 mL/hr Last Rate: 100 mL/hr (05/27/18 0802)     PRN Meds:   acetaminophen    hydrALAZINE    melatonin

## 2018-05-27 NOTE — PROGRESS NOTES
Progress Note - Jacque Vasquez 1933, 80 y o  male MRN: [id number]    Unit/Bed#: [facility] [address] Encounter: [id number]    Primary Care Provider: Alberto Richard MD   Date and time admitted to hospital: 2018 12:19 PM        Cognitive decline   Assessment & Plan    Will need outpatient cognitive testing as previously planned  Essential hypertension   Assessment & Plan    Continue with home medications  Patient on enalapril and metoprolol  Given patient's presentation recurrent falls will ask for orthostatics blood pressure checks and physical therapy follow through  * Fall   Assessment & Plan    Patient presenting with recurrent falls  Patient recently discharged 2 days ago for symptoms associated with cognitive decline  At this particular juncture the patient has been having recurrent falls at home  Physical therapy recommending inpatient rehab  Will discuss with case management  VTE Pharmacologic Prophylaxis:   Pharmacologic: Enoxaparin (Lovenox)  Mechanical VTE Prophylaxis in Place: No    Patient Centered Rounds: I have performed bedside rounds with nursing staff today  Time Spent for Care: 15 minutes  More than 50% of total time spent on counseling and coordination of care as described above  Current Length of Stay: 2 day(s)    Current Patient Status: Inpatient   Certification Statement: The patient will continue to require additional inpatient hospital stay due to Need to monitor symptoms    Discharge Plan:  Awaiting placement    Code Status: Level 1 - Full Code      Subjective:    patient comfortable  Objective:     Vitals:   Temp (24hrs), Av 2 °F (36 8 °C), Min:97 9 °F (36 6 °C), Max:98 5 °F (36 9 °C)    HR:  [70-78] 72  Resp:  [16-18] 16  BP: (180-182)/(70-98) 182/98  SpO2:  [92 %-99 %] 96 %  Body mass index is 23 68 kg/m²  Input and Output Summary (last 24 hours):        Intake/Output Summary (Last 24 hours) at 18 9353  Last data filed at 05/27/18 0746   Gross per 24 hour   Intake          3206 67 ml   Output             1575 ml   Net          1631 67 ml       Physical Exam:     Physical Exam    Additional Data:     Labs:      Results from last 7 days  Lab Units 05/25/18  1346   WBC Thousand/uL 8 66   HEMOGLOBIN g/dL 13 2   HEMATOCRIT % 38 8   PLATELETS Thousands/uL 165   NEUTROS PCT % 58   LYMPHS PCT % 33   MONOS PCT % 8   EOS PCT % 2       Results from last 7 days  Lab Units 05/26/18  0455  05/22/18  0457   SODIUM mmol/L 141  < > 138   POTASSIUM mmol/L 3 7  < > 4 0   CHLORIDE mmol/L 110*  < > 106   CO2 mmol/L 26  < > 27   BUN mg/dL 14  < > 13   CREATININE mg/dL 0 92  < > 0 90   CALCIUM mg/dL 9 2  < > 9 9   TOTAL PROTEIN g/dL  --   --  6 7   BILIRUBIN TOTAL mg/dL  --   --  0 46   ALK PHOS U/L  --   --  60   ALT U/L  --   --  23   AST U/L  --   --  16   GLUCOSE RANDOM mg/dL 107  < > 157*   < > = values in this interval not displayed  Results from last 7 days  Lab Units 05/22/18  0023   INR  1 01       * I Have Reviewed All Lab Data Listed Above  * Additional Pertinent Lab Tests Reviewed:  All Labs Within Last 24 Hours Reviewed        Recent Cultures (last 7 days):           Last 24 Hours Medication List:     Current Facility-Administered Medications:  acetaminophen 650 mg Oral Q6H PRN Chong Lance MD    aspirin 81 mg Oral Daily Chong Lance MD    enalapril 5 mg Oral BID Florin Pedersen DO    enoxaparin 40 mg Subcutaneous Daily Chong Lance MD    ferrous sulfate 325 mg Oral Daily Chong Lance MD    gabapentin 300 mg Oral HS Chong Lance MD    hydrALAZINE 5 mg Intravenous Q6H PRN Florin Pedersen DO    melatonin 6 mg Oral HS PRN Aliya Sanders PA-C    metoprolol tartrate 25 mg Oral BID Chong Lance MD    pantoprazole 20 mg Oral Early Morning Chong Lance MD    pravastatin 20 mg Oral Daily With Dinner Chong Lance MD    sertraline 50 mg Oral Daily Chong Lance MD    sodium chloride 100 mL/hr Intravenous Continuous Chong Lance MD Last Rate: 100 mL/hr (05/27/18 0802)   zolpidem 5 mg Oral Daily Chong Lance MD         Today, Patient Was Seen By: Sri Mobley DO    ** Please Note: Dictation voice to text software may have been used in the creation of this document   **

## 2018-05-28 PROCEDURE — 99232 SBSQ HOSP IP/OBS MODERATE 35: CPT | Performed by: INTERNAL MEDICINE

## 2018-05-28 RX ADMIN — GABAPENTIN 300 MG: 300 CAPSULE ORAL at 21:26

## 2018-05-28 RX ADMIN — METOPROLOL TARTRATE 25 MG: 25 TABLET, FILM COATED ORAL at 09:03

## 2018-05-28 RX ADMIN — METOPROLOL TARTRATE 25 MG: 25 TABLET, FILM COATED ORAL at 17:44

## 2018-05-28 RX ADMIN — HYDRALAZINE HYDROCHLORIDE 5 MG: 20 INJECTION INTRAMUSCULAR; INTRAVENOUS at 21:25

## 2018-05-28 RX ADMIN — ASPIRIN 81 MG 81 MG: 81 TABLET ORAL at 09:03

## 2018-05-28 RX ADMIN — PRAVASTATIN SODIUM 20 MG: 20 TABLET ORAL at 15:37

## 2018-05-28 RX ADMIN — ZOLPIDEM TARTRATE 5 MG: 5 TABLET ORAL at 21:26

## 2018-05-28 RX ADMIN — ENALAPRIL MALEATE 5 MG: 5 TABLET ORAL at 17:44

## 2018-05-28 RX ADMIN — FERROUS SULFATE TAB 325 MG (65 MG ELEMENTAL FE) 325 MG: 325 (65 FE) TAB at 09:03

## 2018-05-28 RX ADMIN — SODIUM CHLORIDE 100 ML/HR: 0.9 INJECTION, SOLUTION INTRAVENOUS at 05:04

## 2018-05-28 RX ADMIN — HYDRALAZINE HYDROCHLORIDE 5 MG: 20 INJECTION INTRAMUSCULAR; INTRAVENOUS at 15:36

## 2018-05-28 RX ADMIN — ENOXAPARIN SODIUM 40 MG: 40 INJECTION SUBCUTANEOUS at 09:03

## 2018-05-28 RX ADMIN — SERTRALINE HYDROCHLORIDE 50 MG: 50 TABLET ORAL at 09:03

## 2018-05-28 RX ADMIN — ENALAPRIL MALEATE 5 MG: 5 TABLET ORAL at 09:03

## 2018-05-28 RX ADMIN — HYDRALAZINE HYDROCHLORIDE 5 MG: 20 INJECTION INTRAMUSCULAR; INTRAVENOUS at 03:27

## 2018-05-28 RX ADMIN — PANTOPRAZOLE SODIUM 20 MG: 20 TABLET, DELAYED RELEASE ORAL at 05:04

## 2018-05-28 NOTE — PHYSICIAN ADVISOR
Current patient class: Inpatient  The patient is currently on Hospital Day: 3      The patient was admitted to the hospital at [phone] on 5/25/18 for the following diagnosis:  Fall [W19  XXXA]  Frequent falls [R29 6]  Ambulatory dysfunction [R26 2]       There is documentation in the medical record of an expected length of stay of at least 2 midnights  The patient is therefore expected to satisfy the 2 midnight benchmark and given the 2 midnight presumption is appropriate for INPATIENT ADMISSION  Given this expectation of a satisfying stay, CMS instructs us that the patient is most often appropriate for inpatient admission under part A provided medical necessity is documented in the chart  After review of the relevant documentation, labs, vital signs and test results, the patient is appropriate for INPATIENT ADMISSION  Admission to the hospital as an inpatient is a complex decision making process which requires the practitioner to consider the patients presenting complaint, history and physical examination and all relevant testing  With this in mind, in this case, the patient was deemed appropriate for INPATIENT ADMISSION  After review of the documentation and testing available at the time of the admission I concur with this clinical determination of medical necessity  Rationale is as follows: The patient is a 80 yrs old Male who presented to the ED at 5/25/2018 12:19 PM with a chief complaint of Fall (pt was admitted monday for changed in mental status  pt dc'd wednesday  pt has had multiple falls since his dc)     The patient presented with multiple falls since hospital discharge  The plan of care includes orthostatic blood pressure checks, PT/OT consultation, case mgt consultation for possible rehab placement  The concern is that this patient is not stable nor safe for home discharge given multiple falls This admission is RELATED to his prior admission as the patient was readmitted within 48 hours  The patients vitals on arrival were ED Triage Vitals   Temperature Pulse Respirations Blood Pressure SpO2   05/25/18 1217 05/25/18 1217 05/25/18 1217 05/25/18 1217 05/25/18 1217   98 °F (36 7 °C) 77 16 112/52 98 %      Temp Source Heart Rate Source Patient Position - Orthostatic VS BP Location FiO2 (%)   05/25/18 1217 05/25/18 1217 05/25/18 1358 05/25/18 1358 --   Oral Monitor Sitting Right arm       Pain Score       05/25/18 1217       No Pain           Past Medical History:   Diagnosis Date    Acute myocardial infarction ([facility] [address] )     Anxiety     Arthritis     Brain neoplasm ([facility] [address] ) 11/1999    brain tumor    Depression     Hypercholesterolemia     Narcolepsy     daytime drowsiness and dozing off occasionally    Palpitations     Prostate cancer [facility])      Past Surgical History:   Procedure Laterality Date    BACK SURGERY      BRAIN SURGERY  11/08/1999    CORONARY ARTERY BYPASS GRAFT      x5           Consults have been placed to:   None    Vitals:    05/27/18 0747 05/27/18 1100 05/27/18 1500 05/27/18 1900   BP: (!) 182/98 162/74 (!) 182/76 (!) 189/80   BP Location: Right arm Right arm Right arm Right arm   Pulse: 72  69    Resp: 16  16    Temp: 97 9 °F (36 6 °C)  98 4 °F (36 9 °C)    TempSrc: Oral  Oral    SpO2: 96%  98%    Weight:       Height:           Most recent labs:    Recent Labs      05/25/18   1346  05/26/18   0455   WBC  8 66   --    HGB  13 2   --    HCT  38 8   --    PLT  165   --    K  4 5  3 7   NA  137  141   CALCIUM  9 6  9 2   BUN  16  14   CREATININE  0 96  0 92       Scheduled Meds:  Current Facility-Administered Medications:  acetaminophen 650 mg Oral Q6H PRN Chong Lance MD    aspirin 81 mg Oral Daily Chong Lance MD    enalapril 5 mg Oral BID Florin Pedersen DO    enoxaparin 40 mg Subcutaneous Daily Chong Lance MD    ferrous sulfate 325 mg Oral Daily Chong Lance MD    gabapentin 300 mg Oral HS Chong Lance MD    hydrALAZINE 5 mg Intravenous Q6H PRN Florin Pedersen DO    melatonin 6 mg Oral HS PRN Aliya Sanders PA-C    metoprolol tartrate 25 mg Oral BID Chong Lance MD    pantoprazole 20 mg Oral Early Morning Chong Lance MD    pravastatin 20 mg Oral Daily With Dinner Chong Lance MD    sertraline 50 mg Oral Daily Chong Lance MD    sodium chloride 100 mL/hr Intravenous Continuous Chong Lance MD Last Rate: 100 mL/hr (05/27/18 2049)   zolpidem 5 mg Oral Daily Chong Lance MD      Continuous Infusions:  sodium chloride 100 mL/hr Last Rate: 100 mL/hr (05/27/18 2049)     PRN Meds:   acetaminophen    hydrALAZINE    melatonin    Surgical procedures (if appropriate):

## 2018-05-28 NOTE — PROGRESS NOTES
[facility] Internal Medicine Progress Note  Patient: Raymond Mayorga 80 y o  male   MRN: [id number]  PCP: Kaylen Weiss MD  [facility] Encounter: [id number]  Date Of Visit: 18    Assessment:    Principal Problem:    Fall  Active Problems:    Essential hypertension    Spinal stenosis of lumbar region with neurogenic claudication    History of meningioma    Cognitive decline      Plan:    · Ambulatory dysfunction status post fall  Await rehabilitation set up prior to discharge  · Accelerated hypertension  Currently on Enalapril and metoprolol  Continue with as needed hydralazine  · Cognitive decline  Will need outpatient cognitive evaluation  · Dyslipidemia  Continue with statin therapy  VTE Pharmacologic Prophylaxis:   Pharmacologic: Enoxaparin (Lovenox)  Mechanical VTE Prophylaxis in Place: No    Patient Centered Rounds: I have performed bedside rounds with nursing staff today  Discussions with Specialists or Other Care Team Provider:  Discussed with nursing    Education and Discussions with Family / Patient:  Discussed with patient    Time Spent for Care: 30 minutes  More than 50% of total time spent on counseling and coordination of care as described above  Current Length of Stay: 3 day(s)    Current Patient Status: Inpatient   Certification Statement: The patient will continue to require additional inpatient hospital stay due to Await inpatient rehabilitation set up prior to discharge    Discharge Plan / Estimated Discharge Date:  Await inpatient rehabilitation  Code Status: Level 1 - Full Code      Subjective:   Patient examined at bedside  Denies any acute events  Remains afebrile and stable hemodynamically  Objective:     Vitals:   Temp (24hrs), Av 5 °F (36 9 °C), Min:98 4 °F (36 9 °C), Max:98 6 °F (37 °C)    HR:  [64-69] 66  Resp:  [16-18] 18  BP: (180-190)/(62-80) 190/80  SpO2:  [98 %] 98 %  Body mass index is 23 68 kg/m²       Input and Output Summary (last 24 hours): Intake/Output Summary (Last 24 hours) at 05/28/18 1152  Last data filed at 05/28/18 7260   Gross per 24 hour   Intake          2808 33 ml   Output             1950 ml   Net           858 33 ml       Physical Exam:     Physical Exam   Constitutional: He is oriented to person, place, and time  No distress  HENT:   Head: Normocephalic  Mouth/Throat: Oropharynx is clear and moist    Eyes: Conjunctivae are normal  Pupils are equal, round, and reactive to light  Neck: Normal range of motion  Neck supple  Cardiovascular: Normal rate and regular rhythm  Pulmonary/Chest: Effort normal and breath sounds normal    Abdominal: Soft  Bowel sounds are normal    Musculoskeletal: He exhibits no edema  Neurological: He is alert and oriented to person, place, and time  Skin: Skin is warm  He is not diaphoretic  Additional Data:     Labs:      Results from last 7 days  Lab Units 05/25/18  1346   WBC Thousand/uL 8 66   HEMOGLOBIN g/dL 13 2   HEMATOCRIT % 38 8   PLATELETS Thousands/uL 165   NEUTROS PCT % 58   LYMPHS PCT % 33   MONOS PCT % 8   EOS PCT % 2       Results from last 7 days  Lab Units 05/26/18  0455  05/22/18  0457   SODIUM mmol/L 141  < > 138   POTASSIUM mmol/L 3 7  < > 4 0   CHLORIDE mmol/L 110*  < > 106   CO2 mmol/L 26  < > 27   BUN mg/dL 14  < > 13   CREATININE mg/dL 0 92  < > 0 90   CALCIUM mg/dL 9 2  < > 9 9   TOTAL PROTEIN g/dL  --   --  6 7   BILIRUBIN TOTAL mg/dL  --   --  0 46   ALK PHOS U/L  --   --  60   ALT U/L  --   --  23   AST U/L  --   --  16   GLUCOSE RANDOM mg/dL 107  < > 157*   < > = values in this interval not displayed  Results from last 7 days  Lab Units 05/22/18  0023   INR  1 01       * I Have Reviewed All Lab Data Listed Above  * Additional Pertinent Lab Tests Reviewed:  [facility] Admission Reviewed    Imaging:    Imaging Reports Reviewed Today Include: NA  Recent Cultures (last 7 days):           Last 24 Hours Medication List:     Current Facility-Administered Medications:  acetaminophen 650 mg Oral Q6H PRN Chong Lance MD    aspirin 81 mg Oral Daily Chong Lance MD    enalapril 5 mg Oral BID Florin Pedersen DO    enoxaparin 40 mg Subcutaneous Daily Chong Lance MD    ferrous sulfate 325 mg Oral Daily Chong Lance MD    gabapentin 300 mg Oral HS Chong Lance MD    hydrALAZINE 5 mg Intravenous Q6H PRN Florin Pedersen DO    melatonin 6 mg Oral HS PRN Aliya Sanders PA-C    metoprolol tartrate 25 mg Oral BID Chong Lance MD    pantoprazole 20 mg Oral Early Morning Chong Lance MD    pravastatin 20 mg Oral Daily With Dinner Chong Lance MD    sertraline 50 mg Oral Daily Chong Lance MD    sodium chloride 100 mL/hr Intravenous Continuous Chong Lance MD Last Rate: 100 mL/hr (05/28/18 0504)   zolpidem 5 mg Oral Daily Chong Lance MD         Today, Patient Was Seen By: Christina Dong MD    ** Please Note: This note has been constructed using a voice recognition system   **

## 2018-05-29 LAB
ANION GAP SERPL CALCULATED.3IONS-SCNC: 7 MMOL/L (ref 4–13)
BUN SERPL-MCNC: 11 MG/DL (ref 5–25)
CALCIUM SERPL-MCNC: 10.1 MG/DL (ref 8.3–10.1)
CHLORIDE SERPL-SCNC: 106 MMOL/L (ref 100–108)
CO2 SERPL-SCNC: 23 MMOL/L (ref 21–32)
CREAT SERPL-MCNC: 0.71 MG/DL (ref 0.6–1.3)
GFR SERPL CREATININE-BSD FRML MDRD: 86 ML/MIN/1.73SQ M
GLUCOSE SERPL-MCNC: 115 MG/DL (ref 65–140)
POTASSIUM SERPL-SCNC: 3.2 MMOL/L (ref 3.5–5.3)
SODIUM SERPL-SCNC: 136 MMOL/L (ref 136–145)

## 2018-05-29 PROCEDURE — 97530 THERAPEUTIC ACTIVITIES: CPT

## 2018-05-29 PROCEDURE — 97535 SELF CARE MNGMENT TRAINING: CPT

## 2018-05-29 PROCEDURE — 99232 SBSQ HOSP IP/OBS MODERATE 35: CPT | Performed by: INTERNAL MEDICINE

## 2018-05-29 PROCEDURE — 97116 GAIT TRAINING THERAPY: CPT

## 2018-05-29 PROCEDURE — 80048 BASIC METABOLIC PNL TOTAL CA: CPT | Performed by: INTERNAL MEDICINE

## 2018-05-29 RX ORDER — POTASSIUM CHLORIDE 20 MEQ/1
20 TABLET, EXTENDED RELEASE ORAL DAILY
Status: DISCONTINUED | OUTPATIENT
Start: 2018-05-29 | End: 2018-05-30 | Stop reason: HOSPADM

## 2018-05-29 RX ORDER — ENALAPRIL MALEATE 5 MG/1
20 TABLET ORAL DAILY
Status: DISCONTINUED | OUTPATIENT
Start: 2018-05-29 | End: 2018-05-30 | Stop reason: HOSPADM

## 2018-05-29 RX ORDER — HYDRALAZINE HYDROCHLORIDE 20 MG/ML
10 INJECTION INTRAMUSCULAR; INTRAVENOUS EVERY 6 HOURS PRN
Status: DISCONTINUED | OUTPATIENT
Start: 2018-05-29 | End: 2018-05-30 | Stop reason: HOSPADM

## 2018-05-29 RX ORDER — SENNOSIDES 8.6 MG
1 TABLET ORAL
Status: DISCONTINUED | OUTPATIENT
Start: 2018-05-29 | End: 2018-05-30 | Stop reason: HOSPADM

## 2018-05-29 RX ORDER — POLYETHYLENE GLYCOL 3350 17 G/17G
17 POWDER, FOR SOLUTION ORAL DAILY PRN
Status: DISCONTINUED | OUTPATIENT
Start: 2018-05-29 | End: 2018-05-30 | Stop reason: HOSPADM

## 2018-05-29 RX ORDER — DOCUSATE SODIUM 100 MG/1
100 CAPSULE, LIQUID FILLED ORAL 2 TIMES DAILY
Status: DISCONTINUED | OUTPATIENT
Start: 2018-05-29 | End: 2018-05-30 | Stop reason: HOSPADM

## 2018-05-29 RX ADMIN — METOPROLOL TARTRATE 25 MG: 25 TABLET, FILM COATED ORAL at 16:51

## 2018-05-29 RX ADMIN — ENOXAPARIN SODIUM 40 MG: 40 INJECTION SUBCUTANEOUS at 08:44

## 2018-05-29 RX ADMIN — METOPROLOL TARTRATE 25 MG: 25 TABLET, FILM COATED ORAL at 08:43

## 2018-05-29 RX ADMIN — POLYETHYLENE GLYCOL 3350 17 G: 17 POWDER, FOR SOLUTION ORAL at 11:06

## 2018-05-29 RX ADMIN — DOCUSATE SODIUM 100 MG: 100 CAPSULE, LIQUID FILLED ORAL at 11:06

## 2018-05-29 RX ADMIN — ENALAPRIL MALEATE 20 MG: 5 TABLET ORAL at 08:43

## 2018-05-29 RX ADMIN — DOCUSATE SODIUM 100 MG: 100 CAPSULE, LIQUID FILLED ORAL at 16:51

## 2018-05-29 RX ADMIN — SERTRALINE HYDROCHLORIDE 50 MG: 50 TABLET ORAL at 08:43

## 2018-05-29 RX ADMIN — HYDRALAZINE HYDROCHLORIDE 5 MG: 20 INJECTION INTRAMUSCULAR; INTRAVENOUS at 03:31

## 2018-05-29 RX ADMIN — SENNOSIDES 8.6 MG: 8.6 TABLET, FILM COATED ORAL at 23:09

## 2018-05-29 RX ADMIN — FERROUS SULFATE TAB 325 MG (65 MG ELEMENTAL FE) 325 MG: 325 (65 FE) TAB at 08:43

## 2018-05-29 RX ADMIN — PANTOPRAZOLE SODIUM 20 MG: 20 TABLET, DELAYED RELEASE ORAL at 05:33

## 2018-05-29 RX ADMIN — ZOLPIDEM TARTRATE 5 MG: 5 TABLET ORAL at 23:09

## 2018-05-29 RX ADMIN — POTASSIUM CHLORIDE 20 MEQ: 1500 TABLET, EXTENDED RELEASE ORAL at 08:43

## 2018-05-29 RX ADMIN — ASPIRIN 81 MG 81 MG: 81 TABLET ORAL at 08:43

## 2018-05-29 RX ADMIN — PRAVASTATIN SODIUM 20 MG: 20 TABLET ORAL at 16:51

## 2018-05-29 RX ADMIN — GABAPENTIN 300 MG: 300 CAPSULE ORAL at 23:09

## 2018-05-29 NOTE — SOCIAL WORK
Cm rounded with SLIM and pt tentative d/c for tomorrow pending blood pressures and bowel function  Cm set pt up with cetronia w/c Ce Romano for 5/30/18 at 6 am   Cm notified abby wuor , pt , pt wife , pt nurse and MD Deepti Ferreira will follow

## 2018-05-29 NOTE — PLAN OF CARE
Problem: PHYSICAL THERAPY ADULT  Goal: Performs mobility at highest level of function for planned discharge setting  See evaluation for individualized goals  Treatment/Interventions: Functional transfer training, LE strengthening/ROM, Therapeutic exercise, Patient/family training, Equipment eval/education, Bed mobility, Gait training, Spoke to nursing  Equipment Recommended: Conan Boxer (MIRELLA)       See flowsheet documentation for full assessment, interventions and recommendations  Outcome: Progressing  Prognosis: Good  Problem List: Decreased strength, Decreased range of motion, Decreased endurance, Impaired balance, Decreased mobility, Decreased cognition, Impaired judgement, Decreased safety awareness  Assessment: pt  progressing well with mobility  Pt  needed repeated cues to stay within the RW while ambualting  Pt  needed 100% cues for hand placement for transfers  Second ambulation noted with improved gait quality and no cues given  Pt  positioned in bed supien with bed alarm on and all needs within reach  Wife present in room,  pt  noted with unsafe mobility practices and needed A with all levels of mobility  WIll continue with skilled PT to address the mobility deficits  Barriers to Discharge: Decreased caregiver support  Barriers to Discharge Comments: WIFE DOES NOT FEEL SAFE W/ PATIENT HOME ALONE   Recommendation: Short-term skilled PT     PT - OK to Discharge: Yes    See flowsheet documentation for full assessment

## 2018-05-29 NOTE — PLAN OF CARE
Problem: OCCUPATIONAL THERAPY ADULT  Goal: Performs self-care activities at highest level of function for planned discharge setting  See evaluation for individualized goals  Treatment Interventions: ADL retraining, Functional transfer training, Endurance training, Cognitive reorientation, Patient/family training, Equipment evaluation/education, Compensatory technique education, Activityengagement, Energy conservation          See flowsheet documentation for full assessment, interventions and recommendations  Outcome: Progressing  Limitation: Decreased ADL status, Decreased cognition, Decreased endurance, Decreased high-level ADLs, Decreased self-care trans  Prognosis: Good  Assessment: Patient participated in skilled OT with focus on ADL skill training inc  dressing techniques, transfer skill techniques, compensatory technique skills  Patient presents in chair reading newspaper, just completing lunch, wife present  Patient is cooperative requiring mod vc's to initiate certain tasks and mod vc's with compensatory adaptations for dressing and transferring  Patient attempts to stand reaching with both hands towards RW and requires repetition for carryover presenting with some decreased planning and delayed processing  When donning socks, pt initiates crossover technique, however, requires above normal amount amount of time to plan uncrossing leg to don on opposite foot  Patient attempted donning both socks on same foot  Patient able to problem solve when given extra time and verbal cueing  Patient's wife reports "seeing this type of behavior at times at home (confusion) intermittently  Patient would benefit from short term rehab with focus on increasing functional strength and independence skill for safe carryover into his daily routine        OT Discharge Recommendation: Short Term Rehab  OT - OK to Discharge: Yes (when medically cleared)  Vitaliy Burdick

## 2018-05-29 NOTE — PROGRESS NOTES
Rounding done with dr Jeanine Vicente; rehab on dc; start bowel regimen; pt/ot; continue current treatment

## 2018-05-29 NOTE — ASSESSMENT & PLAN NOTE
· BP continues to be elevated  · Increase vasotec to 20 mg PO daily  · Patient on enalapril and metoprolol

## 2018-05-29 NOTE — ASSESSMENT & PLAN NOTE
· Patient presenting with recurrent falls  · Patient recently discharged for symptoms associated with cognitive decline  · At this particular juncture the patient has been having recurrent falls at home  · Physical therapy recommending inpatient rehab

## 2018-05-29 NOTE — PHYSICAL THERAPY NOTE
Physical Therapy Progress Note     05/29/18 1510   Pain Assessment   Pain Assessment No/denies pain   Restrictions/Precautions   Weight Bearing Precautions Per Order No   Other Precautions Chair Alarm; Bed Alarm; Fall Risk;Cognitive   General   Chart Reviewed Yes   Family/Caregiver Present Yes   Cognition   Overall Cognitive Status Impaired   Subjective   Subjective Agreeable to PT  Pt  coming out of bathroom and agreeable to PT  Bed Mobility   Sit to Supine 5  Supervision   Additional items Assist x 1; Increased time required; Bedrails   Transfers   Sit to Stand 4  Minimal assistance   Additional items Assist x 1;Verbal cues;Armrests; Increased time required   Stand to Sit 4  Minimal assistance   Additional items Assist x 1;Verbal cues; Increased time required;Armrests   Ambulation/Elevation   Gait pattern Excessively slow; Short stride; Inconsistent esther;Decreased foot clearance; Forward Flexion   Gait Assistance 4  Minimal assist   Additional items Assist x 1;Verbal cues   Assistive Device Rolling walker   Distance 200ft x 2   Endurance Deficit   Endurance Deficit Yes   Endurance Deficit Description Fatigue   Activity Tolerance   Activity Tolerance Patient tolerated treatment well   Nurse Made Aware Yes   Assessment   Prognosis Good   Problem List Decreased strength;Decreased range of motion;Decreased endurance; Impaired balance;Decreased mobility; Decreased cognition; Impaired judgement;Decreased safety awareness   Assessment pt  progressing well with mobility  Pt  needed repeated cues to stay within the RW while ambualting  Pt  needed 100% cues for hand placement for transfers  Second ambulation noted with improved gait quality and no cues given  Pt  positioned in bed supien with bed alarm on and all needs within reach  Wife present in room,  pt  noted with unsafe mobility practices and needed A with all levels of mobility  WIll continue with skilled PT to address the mobility deficits     Barriers to Discharge Decreased caregiver support   Goals   Patient Goals None reported   LTG Expiration Date 06/08/18   Treatment Day 1   Plan   Treatment/Interventions Functional transfer training;Patient/family training;Equipment eval/education; Bed mobility;Gait training;Spoke to nursing;Family   Progress Progressing toward goals   PT Frequency 5x/wk   Recommendation   Recommendation Short-term skilled PT   Equipment Recommended Walker; Other (Comment)  (RW)   PT - OK to Discharge Yes         Harjit Payor, PTA

## 2018-05-29 NOTE — SOCIAL WORK
Cm uploaded PASRR to [address] [place] [address] for HFM per their request   Janes following

## 2018-05-29 NOTE — OCCUPATIONAL THERAPY NOTE
Occupational Therapy Treatment Note:       05/29/18 1305   Pain Assessment   Pain Assessment No/denies pain   Pain Score No Pain   ADL   Where Assessed Chair   Eating Assistance 5  Supervision/Setup   Eating Deficit Setup;Verbal cueing   Grooming Assistance 5  Supervision/Setup   Grooming Deficit Setup;Verbal cueing;Standing with assistive device; Wash/dry hands; Wash/dry face   UB Bathing Assistance 4  Minimal Assistance   UB Bathing Deficit Setup;Verbal cueing;Supervision/safety; Increased time to complete  (assist with back area)   LB Bathing Assistance 3  Moderate Assistance   LB Bathing Deficit Setup;Verbal cueing;Supervision/safety; Increased time to complete; Buttocks;Right lower leg including foot; Left lower leg including foot   UB Dressing Assistance 4  Minimal Assistance   UB Dressing Deficit Setup;Verbal cueing; Increased time to complete   LB Dressing Assistance 3  Moderate Assistance   LB Dressing Deficit Setup;Steadying; Requires assistive device for steadying;Verbal cueing;Supervision/safety; Increased time to complete; Don/doff R sock; Don/doff L sock   Transfers   Sit to Stand 4  Minimal assistance   Additional items Assist x 1; Armrests; Increased time required;Verbal cues   Stand to Sit 4  Minimal assistance   Additional items Assist x 1; Armrests; Increased time required;Verbal cues   Functional Mobility   Functional Mobility 4  Minimal assistance   Additional Comments mod vc's for technique  (mostly CGA)   Additional items Rolling walker   Cognition   Overall Cognitive Status Impaired   Arousal/Participation Alert; Cooperative   Attention Attends with cues to redirect   Orientation Level Oriented X4   Memory Decreased recall of recent events;Decreased recall of precautions   Following Commands Follows one step commands with increased time or repetition   Assessment   Assessment Patient participated in skilled OT with focus on ADL skill training inc  dressing techniques, transfer skill techniques, compensatory technique skills  Patient presents in chair reading newspaper, just completing lunch, wife present  Patient is cooperative requiring mod vc's to initiate certain tasks and mod vc's with compensatory adaptations for dressing and transferring  Patient attempts to stand reaching with both hands towards RW and requires repetition for carryover presenting with some decreased planning and delayed processing  When donning socks, pt initiates crossover technique, however, requires above normal amount amount of time to plan uncrossing leg to don on opposite foot  Patient attempted donning both socks on same foot  Patient able to problem solve when given extra time and verbal cueing  Patient's wife reports "seeing this type of behavior at times at home (confusion) intermittently  Patient would benefit from short term rehab with focus on increasing functional strength and independence skill for safe carryover into his daily routine      Plan   Goal Expiration Date 06/09/18   Treatment Day 1   OT Frequency 3-5x/wk   Recommendation   OT Discharge Recommendation Short Term Rehab   OT - OK to Discharge Yes  (when medically cleared)   Barthel Index   Feeding 10   Bathing 0   Grooming Score 5   Dressing Score 5   Bladder Score 10   Bowels Score 10   Toilet Use Score 5   Transfers (Bed/Chair) Score 10   Mobility (Level Surface) Score 0   Stairs Score 0   Barthel Index Score 55   Modified Naveen Scale   Modified Naveen Scale 4   BRIANNA Little

## 2018-05-29 NOTE — CASE MANAGEMENT
Continued Stay Review    Date: 5/29/18    Vital Signs: /52 (BP Location: Right arm)   Pulse 81   Temp 97 5 °F (36 4 °C) (Oral)   Resp 18   Ht 5' 10" (1 778 m)   Wt 74 8 kg (165 lb)   SpO2 95%   BMI 23 68 kg/m²     Medications:   Scheduled Meds:   Current Facility-Administered Medications:  acetaminophen 650 mg Oral Q6H PRN Chong Lance MD   aspirin 81 mg Oral Daily Chong Lance MD   docusate sodium 100 mg Oral BID Holly Laurent MD   enalapril 20 mg Oral Daily Holly Laurent MD   enoxaparin 40 mg Subcutaneous Daily Chong Lance MD   ferrous sulfate 325 mg Oral Daily Chong Lance MD   gabapentin 300 mg Oral HS Chong Lance MD   hydrALAZINE 10 mg Intravenous Q6H PRN Holly Saleem MD   melatonin 6 mg Oral HS PRN Aliya Sanders PA-C   metoprolol tartrate 25 mg Oral BID Chong Lance MD   pantoprazole 20 mg Oral Early Morning Chong Lance MD   polyethylene glycol 17 g Oral Daily PRN Holly Saleem MD   potassium chloride 20 mEq Oral Daily Holly Saleem MD   pravastatin 20 mg Oral Daily With Dinner Chong Lance MD   senna 1 tablet Oral HS Holly Saleem MD   sertraline 50 mg Oral Daily Chong Lance MD   zolpidem 5 mg Oral Daily Chong Lance MD     PRN Meds:   acetaminophen    hydrALAZINE IV x 3 in last 24 hrs     melatonin    polyethylene glycol x1    Abnormal Labs/Diagnostic Results:     K 3 2    Age/Sex: 80 y o  male     Assessment/Plan:   5/29 Medicine Progress Note  * Fall   Assessment & Plan     · Patient presenting with recurrent falls  · Patient recently discharged for symptoms associated with cognitive decline  · At this particular juncture the patient has been having recurrent falls at home  · Physical therapy recommending inpatient rehab            Essential hypertension   Assessment & Plan     · BP continues to be elevated  · Increase vasotec to 20 mg PO daily    · Patient on enalapril and metoprolol             Spinal stenosis of lumbar region with neurogenic claudication   Assessment & Plan     PTOT           History of meningioma   Assessment & Plan     · No active issues           Cognitive decline   Assessment & Plan     · Will need outpatient cognitive testing as previously planned           Constipation   Assessment & Plan     · No stools for 4 days  · Start colace, senna and miralax as needed          Hypokalemia  Replace with PO KDur  Discharge Plan: PASRR done and uploaded to [facility]

## 2018-05-29 NOTE — PROGRESS NOTES
Progress Note - Alex Estrada 1933, 80 y o  male MRN: [id number]    Unit/Bed#: [facility] 850-26 Encounter: [id number]    Primary Care Provider: Meseret Garcia MD   Date and time admitted to hospital: 2018 12:19 PM        * [address] [place] [address]    · Patient presenting with recurrent falls  · Patient recently discharged for symptoms associated with cognitive decline  · At this particular juncture the patient has been having recurrent falls at home  · Physical therapy recommending inpatient rehab  Essential hypertension   Assessment & Plan    · BP continues to be elevated  · Increase vasotec to 20 mg PO daily  · Patient on enalapril and metoprolol  Spinal stenosis of lumbar region with neurogenic claudication   Assessment & Plan    PTOT  History of meningioma   Assessment & Plan    · No active issues  Cognitive decline   Assessment & Plan    · Will need outpatient cognitive testing as previously planned  Constipation   Assessment & Plan    · No stools for 4 days  · Start colace, senna and miralax as needed  Hypokalemia  Replace with PO KDur      ______________________________________________________________________    SUBJECTIVE:   Patient seen and examined  He appears comfortable  No stools for 4 days  Patient denies chest pain, PND, orthopnea, nausea, vomiting, diarrhea, constipation, blood in urine, fevers, chills, abdominal pain,diarrhea, dysuria, new onset weakness, slurred speech, seizure-like activity, recent travel, dizziness, syncope, fall, trauma to the head      OBJECTIVE:     Vitals:   HR:  [66-96] 96  Resp:  [18] 18  BP: ()/(54-82) 99/54  SpO2:  [91 %-98 %] 95 %  Temp (24hrs), Av 3 °F (36 8 °C), Min:97 6 °F (36 4 °C), Max:98 7 °F (37 1 °C)  Current: Temperature: 97 6 °F (36 4 °C)    Intake/Output Summary (Last 24 hours) at 18 0979  Last data filed at 18 0710   Gross per 24 hour   Intake              700 ml   Output 561 ml   Net              139 ml       Physical Exam:   GENERAL: AAO x 3  HEENT: atraumatic, normocephalic  Oral mucosa moist, no icterus, pallor  PERRLA +  Neck supple, no JVD, no lymphadenopathy, no thryomegaly  CHEST: B/L breath sounds heard, occasional wheezing  CVS: S1, S2   ABDOMEN: Soft/obese/NT/BS heard  NEUROLOGICAL: CN II -XI grossly intact  No focal motor or sensory deficits  No signs of meningeal irritation or cerebellar dysfunction  EXTREMITIES: No cyanosis/clubbing or edema      LABS:  Results for Shari Owens (MRN [id number]) as of 5/29/2018 09:39   5/29/2018 05:46   eGFR 86   Sodium 136   Chloride 106   CO2 23   Anion Gap 7   BUN 11   Creatinine 0 71   Glucose 115   Calcium 10 1     Scheduled Meds:    Current Facility-Administered Medications:  acetaminophen 650 mg Oral Q6H PRN Chong Lance MD   aspirin 81 mg Oral Daily Chong Lance MD   docusate sodium 100 mg Oral BID Holly Bowie MD   enalapril 20 mg Oral Daily Holly Bertrand MD   enoxaparin 40 mg Subcutaneous Daily Chong Lance MD   ferrous sulfate 325 mg Oral Daily Chong Lance MD   gabapentin 300 mg Oral HS Chong Lance MD   hydrALAZINE 10 mg Intravenous Q6H PRN Holly Bowie MD   melatonin 6 mg Oral HS PRN Aliya Sanders PA-C   metoprolol tartrate 25 mg Oral BID Chong Lance MD   pantoprazole 20 mg Oral Early Morning Chong Lance MD   polyethylene glycol 17 g Oral Daily PRN Erma Barclay MD   potassium chloride 20 mEq Oral Daily Holly Bowie MD   pravastatin 20 mg Oral Daily With Dinner Chong Lance MD   senna 1 tablet Oral HS Holly Bowie MD   sertraline 50 mg Oral Daily Chong Lance MD   zolpidem 5 mg Oral Daily Chong Lance MD     PRN Meds:    acetaminophen    hydrALAZINE    melatonin    polyethylene glycol    VTE Prophylaxis: Enoxaparin    Education and Discussions with Family / Patient: Family will be updated       Current Length of Stay: 4 days     Current Patient Status: Inpatient     Certification Statement: The patient will continue to require additional inpatient hospital stay due to ongoing goals of care discussion      Discharge Plan / Estimated Discharge Date: Pending rehab placement      Code Status: Level 1 - Full Code    Time Spent for Care: 20 minutes   More than 50% of total time spent on counseling and coordination of care as described above  Dequan Danielle MD  HOSPITALIST SERVICES  5/29/2018    PLEASE NOTE:  This encounter was completed utilizing the [employer] Direct Speech Voice Recognition Software  Grammatical errors, random word insertions, pronoun errors and incomplete sentences are occasional consequences of the system due to software limitations, ambient noise and hardware issues  These may be missed by proof reading prior to affixing electronic signature  Any questions or concerns about the content, text or information contained within the body of this dictation should be directly addressed to the physician for clarification  Please do not hesitate to call me directly if you have any any questions or concerns

## 2018-05-30 VITALS
WEIGHT: 165 LBS | TEMPERATURE: 98.9 F | BODY MASS INDEX: 23.62 KG/M2 | HEIGHT: 70 IN | OXYGEN SATURATION: 94 % | SYSTOLIC BLOOD PRESSURE: 120 MMHG | DIASTOLIC BLOOD PRESSURE: 56 MMHG | HEART RATE: 71 BPM | RESPIRATION RATE: 16 BRPM

## 2018-05-30 PROCEDURE — 99238 HOSP IP/OBS DSCHRG MGMT 30/<: CPT | Performed by: INTERNAL MEDICINE

## 2018-05-30 RX ORDER — SENNOSIDES 8.6 MG
1 TABLET ORAL
Qty: 120 EACH | Refills: 0 | Status: SHIPPED | OUTPATIENT
Start: 2018-05-30 | End: 2018-06-14

## 2018-05-30 RX ORDER — DOCUSATE SODIUM 100 MG/1
100 CAPSULE, LIQUID FILLED ORAL 2 TIMES DAILY
Qty: 10 CAPSULE | Refills: 0 | Status: SHIPPED | OUTPATIENT
Start: 2018-05-30 | End: 2022-07-14

## 2018-05-30 RX ORDER — POLYETHYLENE GLYCOL 3350 17 G/17G
17 POWDER, FOR SOLUTION ORAL DAILY PRN
Qty: 14 EACH | Refills: 0 | Status: SHIPPED | OUTPATIENT
Start: 2018-05-30 | End: 2018-06-14

## 2018-05-30 RX ORDER — ENALAPRIL MALEATE 20 MG/1
20 TABLET ORAL DAILY
Qty: 30 TABLET | Refills: 0 | Status: SHIPPED | OUTPATIENT
Start: 2018-05-31 | End: 2018-07-02

## 2018-05-30 RX ORDER — GABAPENTIN 300 MG/1
300 CAPSULE ORAL
Qty: 20 CAPSULE | Refills: 0
Start: 2018-05-30 | End: 2018-08-07 | Stop reason: SDUPTHER

## 2018-05-30 RX ADMIN — ENALAPRIL MALEATE 20 MG: 5 TABLET ORAL at 08:13

## 2018-05-30 RX ADMIN — ENOXAPARIN SODIUM 40 MG: 40 INJECTION SUBCUTANEOUS at 08:13

## 2018-05-30 RX ADMIN — FERROUS SULFATE TAB 325 MG (65 MG ELEMENTAL FE) 325 MG: 325 (65 FE) TAB at 08:13

## 2018-05-30 RX ADMIN — ASPIRIN 81 MG 81 MG: 81 TABLET ORAL at 08:13

## 2018-05-30 RX ADMIN — PANTOPRAZOLE SODIUM 20 MG: 20 TABLET, DELAYED RELEASE ORAL at 05:38

## 2018-05-30 RX ADMIN — DOCUSATE SODIUM 100 MG: 100 CAPSULE, LIQUID FILLED ORAL at 08:13

## 2018-05-30 RX ADMIN — HYDRALAZINE HYDROCHLORIDE 10 MG: 20 INJECTION INTRAMUSCULAR; INTRAVENOUS at 08:18

## 2018-05-30 RX ADMIN — HYDRALAZINE HYDROCHLORIDE 10 MG: 20 INJECTION INTRAMUSCULAR; INTRAVENOUS at 01:11

## 2018-05-30 RX ADMIN — SERTRALINE HYDROCHLORIDE 50 MG: 50 TABLET ORAL at 08:13

## 2018-05-30 RX ADMIN — POTASSIUM CHLORIDE 20 MEQ: 1500 TABLET, EXTENDED RELEASE ORAL at 08:13

## 2018-05-30 RX ADMIN — METOPROLOL TARTRATE 25 MG: 25 TABLET, FILM COATED ORAL at 08:13

## 2018-05-30 NOTE — DISCHARGE SUMMARY
[facility] [address]     NAME: Silva Rodriguez  AGE: 80 y o  SEX: male   MRN: [id number]   Encounter: [id number]   Unit/Bed: [place] [address]     Admitting Provider:  Carmelita Lozoya MD  Discharge Provider:  Shannen Kc MD  Admission Date: 5/25/2018       Discharge Date: 05/30/18   LOS: 5  Primary Care Physician at Discharge: Lashon Aceves -[phone]    DISCHARGE DIAGNOSES  · Status post mechanical recurrent falls  · Cognitive decline  · Essential hypertension  · Spinal stenosis of lumbar region with neurogenic claudication  · History of meningioma  · Constipation  · Hypokalemia    HOSPITAL COURSE:  Silva Rodriguez is a 80 y o  male who presents with multiple falls since discharge from hospital 2 days ago  Patient was admitted recently for memory deficits and workup was essentially negative  It was suspected that he may have underlying dementia, and recommended outpatient neuro cognitive testing  However since being home, he had 3 falls, mostly from standing position  No dizziness/lightheadedness  Denies any chest pain shortness of breath or palpitation  No loss of consciousness  Below is the short summary of hospital stay:  [address] [place] [address]     · Patient presenting with recurrent falls  · Patient recently discharged for symptoms associated with cognitive decline  · At this particular juncture the patient has been having recurrent falls at home  · Physical therapy recommending inpatient rehab  · Patient is stable to transfer to inpatient rehab today           Essential hypertension   Assessment & Plan     · BP continues to be elevated  · Increase vasotec to 20 mg PO daily    · Patient on enalapril and metoprolol          Spinal stenosis of lumbar region with neurogenic claudication   Assessment & Plan     PTOT           History of meningioma   Assessment & Plan     · No active issues           Cognitive decline   Assessment & Plan     · Will need outpatient cognitive testing as previously planned           Constipation   Assessment & Plan     · No stools for 4 days  · Start colace, senna and miralax as needed          Hypokalemia  Replace with PO KDur           CONSULTING PROVIDERS   None  PROCEDURES PERFORMED  Xr Chest 2 Views    Result Date: 5/25/2018  Narrative: CHEST INDICATION:   falls  "PATIENT HAS HAD MULTIPLE FALL IN THE LAST COUPLE OF DAYS, STATES NO SOB OR CHEST PAIN" COMPARISON:  Two-view chest 5/21/2018  EXAM PERFORMED/VIEWS:  XR CHEST PA & LATERAL  dual FINDINGS:  Median sternotomy wires  Cardiomediastinal silhouette appears unremarkable  The lungs are clear  No pneumothorax or pleural effusion  Osseous structures appear within normal limits for patient age  Impression: No acute cardiopulmonary disease  Workstation performed: [id number]     X-ray Chest 2 Views    Result Date: 5/22/2018  Narrative: CHEST INDICATION:   chest pain  COMPARISON:  5/8/2015 EXAM PERFORMED/VIEWS:  XR CHEST PA & LATERAL FINDINGS: Cardiomediastinal silhouette appears unremarkable  The lungs are clear  No pneumothorax or pleural effusion  Osseous structures appear within normal limits for patient age  Impression: No acute cardiopulmonary disease  Workstation performed: [id number]     Ct Head Without Contrast    Result Date: 5/25/2018  Narrative: CT BRAIN - WITHOUT CONTRAST INDICATION:   falls  COMPARISON:  CT brain May 21, 2018 and MR brain May 22, 2018  TECHNIQUE:  CT examination of the brain was performed  In addition to axial images, coronal 2D reformatted images were created and submitted for interpretation  Radiation dose length product (DLP) for this visit:  995 mGy-cm     This examination, like all CT scans performed in the [facility], was performed utilizing techniques to minimize radiation dose exposure, including the use of iterative reconstruction and automated exposure control  IMAGE QUALITY:  Diagnostic  FINDINGS: PARENCHYMA:  No acute intraparenchymal hemorrhage or extra-axial fluid collections  No acute infarctions  Stable encephalomalacia and gliosis within the frontal lobes bilaterally, left side greater than right  Postoperative changes with prior left frontal craniotomy  Decreased attenuation in the periventricular regions and centrum semiovale bilaterally as well as the central dale  VENTRICLES AND EXTRA-AXIAL SPACES:  Ventricles and cerebral sulci moderately dilated  Bilateral internal carotid artery and vertebral artery calcifications  VISUALIZED ORBITS AND PARANASAL SINUSES:  Unremarkable  CALVARIUM AND EXTRACRANIAL SOFT TISSUES:  Normal      Impression: 1  Cerebral atrophy with chronic small vessel ischemic white matter disease  2   Stable bifrontal encephalomalacia and gliosis  3   No acute intracranial abnormality  Workstation performed: [id number]     Ct Head With And Without Contrast    Result Date: 5/21/2018  Narrative: CT BRAIN - WITH AND WITHOUT CONTRAST INDICATION:   Altered mental status  COMPARISON:  6/9/2014  TECHNIQUE:  CT examination of the brain was performed both prior to and after the administration of intravenous contrast   In addition to axial images, coronal reformatted images were created and submitted for interpretation  Radiation dose length product (DLP) for this visit:  1947 06 mGy-cm   This examination, like all CT scans performed in the [facility], was performed utilizing techniques to minimize radiation dose exposure, including the use of iterative reconstruction and automated exposure control  IV Contrast:  85 mL of iohexol (OMNIPAQUE)  IMAGE QUALITY:  Diagnostic  FINDINGS: PARENCHYMA: Encephalomalacia in the left frontal region consistent with postsurgical change from prior bilateral frontal craniotomy and tumor resection    Grossly stable extra-axial mass in the right anterior cingulate parafalcine region measuring 1 9 x 2  x 1 4 cm  Prominent white matter hypoattenuation in the bilateral anterior frontal region, also likely postsurgical and/or secondary to chronic ischemic insult VENTRICLES AND EXTRA-AXIAL SPACES:  Stable exophytic vacuo dilatation of the frontal horns of the lateral ventricles  No hydrocephalus or extra-axial collection  VISUALIZED ORBITS AND PARANASAL SINUSES:  Unremarkable  CALVARIUM:  Postsurgical change from bilateral frontal craniotomy  No lytic or blastic lesion     Impression: 1  Stable right anterior cingulate and parafalcine meningioma measuring 2 cm surrounding encephalomalacia and gliosis in the bilateral anterior frontal region  2   No evidence of acute intracranial hemorrhage, acute mass effect or extra-axial collection  Workstation performed: [id number]     Mri Brain W Wo Contrast    Result Date: 5/22/2018  Narrative: MRI BRAIN WITH AND WITHOUT CONTRAST INDICATION: History of meningioma  confusion  Mental status change  COMPARISON:  MRI dated 6/9/2014  CT dated 5/21/2018  TECHNIQUE: Sagittal T1, axial T2, axial FLAIR, axial T1, axial Gradient, axial diffusion  Sagittal, axial T1 postcontrast   Axial bravo postcontrast with coronal reconstructions  IV Contrast:  7 mL of gadobutrol injection (MULTI-DOSE)  IMAGE QUALITY:   Diagnostic  FINDINGS: BRAIN PARENCHYMA:  Previous superior frontal craniotomy  Extensive encephalomalacia within the superior medial left frontal lobe with adjacent gliosis  This extends across the corpus callosum  There is gliosis identified within the opposite right frontal lobe  Inseparable from the anterior intrahemispheric fissure is a heterogeneously enhancing extra-axial mass which appears slightly smaller than the prior examination measuring 1 6 x 2 6 cm on series 11 image 18  Previously this measured 1 8 x 3 1 cm  Stable basal ganglia, basilar cisterns, brainstem and cerebellum    Focal volume loss of the anterior aspect of the corpus callosum  VENTRICLES:  Stable ventricles  Ex vacuo dilatation of the frontal horns of the lateral ventricles, left more so than right  SELLA AND PITUITARY GLAND:  Normal  ORBITS:  Normal  PARANASAL SINUSES:  Normal  VASCULATURE:  Evaluation of the major intracranial vasculature demonstrates appropriate flow voids  CALVARIUM AND SKULL BASE:  Normal  EXTRACRANIAL SOFT TISSUES:  Normal      Impression: 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 best seen on series 11 image 18  This residual tumor has slightly decreased in size compared to prior examination with stable surrounding encephalomalacia and gliosis more pronounced within the left frontal lobe  Workstation performed: [id number]       PERTINENT LAB DATAS  Results for Whitney Teague (MRN [id number]) as of 5/30/2018 10:06   5/29/2018 05:46   eGFR 86   Sodium 136   Potassium 3 2 (L)   Chloride 106   CO2 23   Anion Gap 7   BUN 11   Creatinine 0 71   Calcium 10 1         PHYSICAL EXAM:  Vitals:   Blood Pressure: 120/56 (05/30/18 0950)  Pulse: 71 (05/30/18 0811)  Temperature: 98 9 °F (37 2 °C) (05/30/18 0811)  Temp Source: Oral (05/30/18 4424)  Respirations: 16 (05/30/18 0811)  Height: 5' 10" (177 8 cm) (05/25/18 1840)  Weight - Scale: 74 8 kg (165 lb) (05/25/18 1840)  SpO2: 94 % (05/30/18 0811)    GENERAL: AAO x 3  HEENT: atraumatic, normocephalic  Oral mucosa moist, no icterus, pallor  PERRLA +  Neck supple, no JVD, no lymphadenopathy, no thryomegaly  CHEST: B/L breath sounds heard, occasional wheezing  CVS: S1, S2  No cyanosis/clubbing or edema  ABDOMEN: Soft/obese/NT/BS heard  NEUROLOGICAL: CN II -XI grossly intact  No focal motor or sensory deficits  No signs of meningeal irritation or cerebellar dysfunction  EXTREMITIES: No cyanosis/clubbing or edema      Discharge Disposition: [facility]    Test Results Pending at Discharge:  none    Medications   Please see After Visit Summary for reconciled discharge medications provided to patient and family  Diet restrictions:       Diet Orders            Start     Ordered    05/25/18 1639  Diet Regular; Regular House  Diet effective now     Question Answer Comment   Diet Type Regular    Regular Regular House    RD to adjust diet per protocol? Yes        05/25/18 1638        Activity restrictions: No strenuous activity  Discharge Condition: good    Outpatient Follow-Up  1  Stephanie De Leon MD [address] [place] [phone] - follow-up within one week  Code Status: Level 1 - Full Code    Discharge Statement   I spent 26 minutes discharging the patient  This time was spent on the day of discharge  Greater than 50% of total time was spent with the patient and / or family counseling and / or coordination of care  Nadiya Sun MD  HOSPITALIST SERVICES  5/30/2018    PLEASE NOTE:  This encounter was completed utilizing the [employer] Direct Speech Voice Recognition Software  Grammatical errors, random word insertions, pronoun errors and incomplete sentences are occasional consequences of the system due to software limitations, ambient noise and hardware issues  These may be missed by proof reading prior to affixing electronic signature  Any questions or concerns about the content, text or information contained within the body of this dictation should be directly addressed to the physician for clarification  Please do not hesitate to call me directly if you have any any questions or concerns

## 2018-05-31 ENCOUNTER — TRANSITIONAL CARE MANAGEMENT (OUTPATIENT)
Dept: FAMILY MEDICINE CLINIC | Facility: CLINIC | Age: 83
End: 2018-05-31

## 2018-06-11 ENCOUNTER — TRANSITIONAL CARE MANAGEMENT (OUTPATIENT)
Dept: FAMILY MEDICINE CLINIC | Facility: CLINIC | Age: 83
End: 2018-06-11

## 2018-06-11 ENCOUNTER — EVALUATION (OUTPATIENT)
Dept: PHYSICAL THERAPY | Facility: REHABILITATION | Age: 83
End: 2018-06-11
Payer: MEDICARE

## 2018-06-11 DIAGNOSIS — R26.9 GAIT ABNORMALITY: Primary | ICD-10-CM

## 2018-06-11 PROCEDURE — G8979 MOBILITY GOAL STATUS: HCPCS | Performed by: PHYSICAL THERAPIST

## 2018-06-11 PROCEDURE — 97163 PT EVAL HIGH COMPLEX 45 MIN: CPT | Performed by: PHYSICAL THERAPIST

## 2018-06-11 PROCEDURE — G8978 MOBILITY CURRENT STATUS: HCPCS | Performed by: PHYSICAL THERAPIST

## 2018-06-11 NOTE — PROGRESS NOTES
PT Evaluation     Today's date: 2018  Patient name: Daniel Ramirez  : 1933  MRN: [id number]  Referring provider: Skyler Garcia MD  Dx:   Encounter Diagnosis     ICD-10-CM    1  Gait abnormality R26 9                   Assessment  Impairments: abnormal gait, abnormal muscle tone, abnormal or restricted ROM, abnormal movement, activity intolerance, difficulty understanding, impaired balance, impaired physical strength, lacks appropriate home exercise program and weight-bearing intolerance    Assessment details: Daniel Ramirez is a 80 y o  male presenting to physical therapy with decreased range of motion, decreased strength, gait/balance dysfunction and decreased activity tolerance  Assessment reveals patient is at an increased risk for falls and continues to demonstrate impaired cognitive functioning as per the [address] [place] [address]  Patient requires one-on-one supervision secondary to unstable orthostatic blood pressure  Secondary to these impairments, patient has increased difficulty performing ADL's and household chores  Tiffany Prater would benefit from skilled PT to address these issues and maximize function  Thank you for the referral     Understanding of Dx/Px/POC: excellent  Goals  STG (4 weeks)  1  Patient will be independent with HEP  2  Increase 5-time sit-to-stand by 5 seconds  3  Patient will demonstrate ability to safely perform balance activities while performing cognitive tasks  LTG (8 weeks)  1  Increase all hip strength grades by 1 MMT grade  2  Patient will demonstrate ability to complete a 6' minute walk test   3  Increase FOTO from 48/100 to > or equal to 52/100  4  Decrease 5XSTS to < or equal to 13 5 seconds for reduced falls risk  5   Decrease TUG to < or equal to 13 5 seconds for reduced falls risk    Plan  Patient would benefit from: skilled PT  Planned therapy interventions: neuromuscular re-education, patient education, strengthening, stretching, therapeutic exercise, home exercise program, ADL training, balance, cognitive skills and gait training  Frequency: 2x week  Duration in weeks: 8  Treatment plan discussed with: patient        Subjective Evaluation    History of Present Illness  Mechanism of injury: Patient is an 80 y o  male with a history of four falls in the last year, three of them being recent  Two of these falls were when the patient tried to ambulate too soon upon standing, per his wife  The patient was evaluated for orthostatic hypotension during his last hospital visit  Patient also has a history of recent confusion for which he was admitted to the ER and all work up was negative  Patient reports bouts of SOB with short periods of walking  Patients goals for PT are to reduce occurrence of falls and return to PLOF  Quality of life: fair    Pain  No pain reported  Progression: worsening    Social Support  Steps to enter house: no  Stairs in house: no   Lives in: [facility]  Lives with: spouse    Treatments  Treatments tried: [place]  Patient Goals  Patient goals for therapy: improved balance, increased motion, increased strength and independence with ADLs/IADLs          Objective     Strength/Myotome Testing     Left Hip   Planes of Motion   Flexion: 4-    Right Hip   Planes of Motion   Flexion: 4-    Left Knee   Flexion: 4+  Extension: 5    Right Knee   Flexion: 4+  Extension: 5    Left Ankle/Foot   Dorsiflexion: 4    Right Ankle/Foot   Dorsiflexion: 4    Additional Strength Details  Tandem: > 30 seconds  Semi-tandem: ~ 15 seconds B/L  Full tandem: Unable  SLS: Unable    Ambulation     Ambulation: Level Surfaces   Ambulation with assistive device: contact guard assist    Additional Level Surfaces Ambulation Details  Patient utilizing a RW for ambulation    Observational Gait   Decreased walking speed and stride length       Functional Assessment     Comments  5-time STS: 28 52 seconds with B/L UE push off  TU 90 seconds with B/L  MOCA: 21/30    Orthostatic hypotension:  Supine - 148/60  Sitting - 124/50  Standing - 98/48  Standing for 2 minutes - 88/44      Flowsheet Rows      Most Recent Value   PT/OT G-Codes   Current Score  40   Projected Score  52   FOTO information reviewed  Yes   Assessment Type  Evaluation   G code set  Mobility: Walking & Moving Around   Mobility: Walking and Moving Around Current Status ()  CL   Mobility: Walking and Moving Around Goal Status ()  CK          Precautions: *ORTHOSTATIC HYPOTENSION, FALLS RISK, Recent change in cognitive status, Anxiety, History of M I, History of depression, History of prostate CA    Daily Treatment Diary     Manual                                                                                   Exercise Diary  6/11            VG Squats             Sit to stands             Foam balance             Gait training             Balance course             Cog task with balance                                                                                                                                                                                                       Modalities

## 2018-06-14 ENCOUNTER — APPOINTMENT (OUTPATIENT)
Dept: LAB | Facility: CLINIC | Age: 83
End: 2018-06-14
Payer: MEDICARE

## 2018-06-14 ENCOUNTER — OFFICE VISIT (OUTPATIENT)
Dept: FAMILY MEDICINE CLINIC | Facility: CLINIC | Age: 83
End: 2018-06-14
Payer: MEDICARE

## 2018-06-14 VITALS
DIASTOLIC BLOOD PRESSURE: 50 MMHG | TEMPERATURE: 95.7 F | HEART RATE: 68 BPM | BODY MASS INDEX: 23.42 KG/M2 | SYSTOLIC BLOOD PRESSURE: 110 MMHG | WEIGHT: 163.6 LBS | HEIGHT: 70 IN | RESPIRATION RATE: 16 BRPM

## 2018-06-14 DIAGNOSIS — I10 ESSENTIAL HYPERTENSION: ICD-10-CM

## 2018-06-14 DIAGNOSIS — G60.9 IDIOPATHIC PERIPHERAL NEUROPATHY: ICD-10-CM

## 2018-06-14 DIAGNOSIS — D50.9 IRON DEFICIENCY ANEMIA, UNSPECIFIED IRON DEFICIENCY ANEMIA TYPE: ICD-10-CM

## 2018-06-14 DIAGNOSIS — E61.1 IRON DEFICIENCY: ICD-10-CM

## 2018-06-14 DIAGNOSIS — R26.9 GAIT DISTURBANCE: Primary | ICD-10-CM

## 2018-06-14 DIAGNOSIS — R26.9 GAIT DISTURBANCE: ICD-10-CM

## 2018-06-14 LAB
ALBUMIN SERPL BCP-MCNC: 3.6 G/DL (ref 3.5–5)
ALP SERPL-CCNC: 60 U/L (ref 46–116)
ALT SERPL W P-5'-P-CCNC: 28 U/L (ref 12–78)
ANION GAP SERPL CALCULATED.3IONS-SCNC: 5 MMOL/L (ref 4–13)
AST SERPL W P-5'-P-CCNC: 18 U/L (ref 5–45)
BILIRUB SERPL-MCNC: 0.34 MG/DL (ref 0.2–1)
BUN SERPL-MCNC: 9 MG/DL (ref 5–25)
CALCIUM SERPL-MCNC: 9.7 MG/DL (ref 8.3–10.1)
CHLORIDE SERPL-SCNC: 104 MMOL/L (ref 100–108)
CO2 SERPL-SCNC: 29 MMOL/L (ref 21–32)
CREAT SERPL-MCNC: 0.99 MG/DL (ref 0.6–1.3)
ERYTHROCYTE [DISTWIDTH] IN BLOOD BY AUTOMATED COUNT: 13.6 % (ref 11.6–15.1)
GFR SERPL CREATININE-BSD FRML MDRD: 69 ML/MIN/1.73SQ M
GLUCOSE SERPL-MCNC: 111 MG/DL (ref 65–140)
HCT VFR BLD AUTO: 40.2 % (ref 36.5–49.3)
HGB BLD-MCNC: 13.2 G/DL (ref 12–17)
IRON SERPL-MCNC: 64 UG/DL (ref 65–175)
MCH RBC QN AUTO: 27.7 PG (ref 26.8–34.3)
MCHC RBC AUTO-ENTMCNC: 32.8 G/DL (ref 31.4–37.4)
MCV RBC AUTO: 84 FL (ref 82–98)
PLATELET # BLD AUTO: 199 THOUSANDS/UL (ref 149–390)
PMV BLD AUTO: 10.8 FL (ref 8.9–12.7)
POTASSIUM SERPL-SCNC: 4.7 MMOL/L (ref 3.5–5.3)
PROT SERPL-MCNC: 6.8 G/DL (ref 6.4–8.2)
RBC # BLD AUTO: 4.77 MILLION/UL (ref 3.88–5.62)
SODIUM SERPL-SCNC: 138 MMOL/L (ref 136–145)
TSH SERPL DL<=0.05 MIU/L-ACNC: 1.33 UIU/ML (ref 0.36–3.74)
WBC # BLD AUTO: 7.37 THOUSAND/UL (ref 4.31–10.16)

## 2018-06-14 PROCEDURE — 85027 COMPLETE CBC AUTOMATED: CPT

## 2018-06-14 PROCEDURE — 99213 OFFICE O/P EST LOW 20 MIN: CPT | Performed by: FAMILY MEDICINE

## 2018-06-14 PROCEDURE — 36415 COLL VENOUS BLD VENIPUNCTURE: CPT

## 2018-06-14 PROCEDURE — 80053 COMPREHEN METABOLIC PANEL: CPT

## 2018-06-14 PROCEDURE — 84443 ASSAY THYROID STIM HORMONE: CPT

## 2018-06-14 PROCEDURE — 83540 ASSAY OF IRON: CPT

## 2018-06-18 ENCOUNTER — OFFICE VISIT (OUTPATIENT)
Dept: PHYSICAL THERAPY | Facility: REHABILITATION | Age: 83
End: 2018-06-18
Payer: MEDICARE

## 2018-06-18 DIAGNOSIS — R26.9 GAIT ABNORMALITY: Primary | ICD-10-CM

## 2018-06-18 PROCEDURE — 97112 NEUROMUSCULAR REEDUCATION: CPT

## 2018-06-18 PROCEDURE — 97110 THERAPEUTIC EXERCISES: CPT

## 2018-06-18 NOTE — PROGRESS NOTES
Daily Note     Today's date: 2018  Patient name: James Arciniega  : 1933  MRN: [id number]  Referring provider: Pepe Salas MD  Dx:   Encounter Diagnosis     ICD-10-CM    1  Gait abnormality R26 9                   Subjective: Pt notes that he is doing well today noting no new changes since IE  Objective: See treatment diary below  Precautions: *ORTHOSTATIC HYPOTENSION, FALLS RISK, Recent change in cognitive status, Anxiety, History of M I, History of depression, History of prostate CA     Daily Treatment Diary      Manual                                                                                                                                                      Exercise Diary                       VG Squats  L5 3x10                     Sit to stands  10x chair                     Foam balance  tandem 30"x3, FT 30"x3                     Gait training  5'                     Balance course  5'                     Cog task with balance                                                                                                                                                                                                                                                                                                                                                                             Modalities                                                                                                          Assessment: Pt's wife was present the entire session today  Vcing was needed throughout treatment to take his time once he stands up to ensure safety  Tolerated treatment fair, with pt being challenged during balance course  Patient demonstrated fatigue post treatment, exhibited good technique with therapeutic exercises and would benefit from continued PT      Plan: Continue per plan of care

## 2018-06-19 ENCOUNTER — TELEPHONE (OUTPATIENT)
Dept: CARDIOLOGY CLINIC | Facility: CLINIC | Age: 83
End: 2018-06-19

## 2018-06-19 NOTE — ASSESSMENT & PLAN NOTE
Peripheral neuropathy  Patient will titrate gabapentin as ordered    Patient's wife is my air with dosing schedule

## 2018-06-19 NOTE — TELEPHONE ENCOUNTER
Elma Wood was hospitalized twice in May r/t falling at home  At second admission, his vasotec was increased to 20 mg daily, and continued on this dose while at [facility]  He is home now and wife said she put him back on 2 5 mg bid which was previous dose  Wife reports that at last ov with PCP on 6/14, BP was 110/50 and also had PT eval for gait abnormality and he was found to have orthostatic hypotension  Mrs Lidia Coyle wants you to know she put him back on the lower dose

## 2018-06-19 NOTE — PROGRESS NOTES
FAMILY PRACTICE OFFICE VISIT       NAME: Claude Perea  AGE: 80 y o  SEX: male       : 1933        MRN: [id number]    DATE: 2018  TIME: 10:39 AM    Assessment and Plan     Problem List Items Addressed This Visit     Iron deficiency    Relevant Orders    Iron (Completed)      Other Visit Diagnoses     Gait disturbance    -  Primary    Relevant Orders    Comprehensive metabolic panel (Completed)    TSH, 3rd generation (Completed)    CBC (Completed)        Date and time hospital follow up call was made:  2018 11:23 AM  Hospital care reviewed:  Records reviewed  Patient was hopsitalized at:  [facility]  Date of admission:  18  Date of discharge:  18  Diagnosis:  Fall, cognitive decline, HTN  Disposition:  Home  Were the patients medicaitons reviewed and updated:  Yes  Current symptoms:  None  Post hospital issues:  Reduced activity  Scheduled for follow up?:  Yes  Did you obtain your prescribed medications:  Yes  Do you need help managing your perscriptions or medications:  Yes  What type of assistance do you need:  Pt's wife manages meds  Is transportation to your appointments needed:  Yes  Specify why:  pt's wife transports pt to apts     I have advised the patient to call PCP with any new or worsening symptoms (please type in name along with any credentials):  Priscila Dsouza CMA  Living Arrangements:  Spouse or Significiant other  Support System:  Spouse  The type of support provided:  Emotional, Physical  Do you have social support:  Yes, as much as I need  Are you recieving outpatient services:  Yes  What type(s) of services:  Physical  Therapy  Are you recieving home care services:  No  Are you using any community resources:  No  Current waiver service:  No  Have you fallen in the last 12 months:  Yes  How many times:  4  Interperter language line required?:  No  Counseling:  Family  Counseling topics:  Importance of RX compliance  Comments:  Apt scheduled for 2018 @ 11: Mars Gallegos a 80 y  o  male who presents with multiple falls since discharge from hospital 2 days ago  Wendy Laboy was admitted recently for memory deficits and workup was essentially negative   It was suspected that he may have underlying dementia, and recommended outpatient neuro cognitive testing  However since being home, he had 3 falls, mostly from standing position  No dizziness/lightheadedness   Denies any chest pain shortness of breath or palpitation   No loss of consciousness  Below is the short summary of hospital stay:      [address] [place] [address]     · Patient presenting with recurrent falls     · Patient recently discharged for symptoms associated with cognitive decline     · At this particular juncture the patient has been having recurrent falls at home     · Physical therapy recommending inpatient rehab  · Patient is stable to transfer to inpatient rehab today           Essential hypertension   Assessment & Plan     · BP continues to be elevated  · Increase vasotec to 20 mg PO daily  · Patient on enalapril and metoprolol          Spinal stenosis of lumbar region with neurogenic claudication   Assessment & Plan     PTOT           History of meningioma   Assessment & Plan     · No active issues           Cognitive decline   Assessment & Plan     · Will need outpatient cognitive testing as previously planned           Constipation   Assessment & Plan     · No stools for 4 days  · Start colace, senna and miralax as needed  There are no Patient Instructions on file for this visit  Chief Complaint     Chief Complaint   Patient presents with    Transition of Care Management     Patient is here for a TCM visit due to falling and having low blood pressure  History of Present Illness     I reviewed the patient's discharge summary from his latest hospital stay  Patient feels is back to his baseline    No etiology discovered for his initial symptoms  Review of Systems   Review of Systems   Constitutional: Positive for fatigue  Negative for fever  HENT: Negative  Respiratory: Negative  Cardiovascular: Negative  Gastrointestinal: Negative  Genitourinary: Negative  Musculoskeletal:        Chronic back pain from degenerative joint disease   Neurological:        Mild memory lapses   Psychiatric/Behavioral: Negative          Active Problem List     Patient Active Problem List   Diagnosis    Constipation    Iron deficiency    Other constipation    Anemia    Arteriosclerotic cardiovascular disease    Backache    Essential hypertension    Cervical spinal stenosis    Depression    Esophageal reflux    Hyperlipidemia    Insomnia    Spinal stenosis of lumbar region with neurogenic claudication    Vitamin B12 deficiency    Idiopathic peripheral neuropathy    Iron deficiency anemia    History of meningioma    Confusion    Altered mental status    Fall    Cognitive decline       Past Medical History:  Past Medical History:   Diagnosis Date    [facility] infarction ([facility] )     Anxiety     Arthritis     Brain neoplasm ([facility] ) 11/1999    brain tumor    Depression     Hypercholesterolemia     Narcolepsy     daytime drowsiness and dozing off occasionally    Palpitations     Prostate cancer ([facility] )        Past Surgical History:  Past Surgical History:   Procedure Laterality Date    BACK SURGERY      BRAIN SURGERY  11/08/1999    CORONARY ARTERY BYPASS GRAFT      x5       Family History:  Family History   Problem Relation Age of Onset    Hypertension Mother         benign essential    Cancer Mother     Osteoporosis Mother     Suicidality Father     Diabetes Family     Heart disease Family     Neuropathy Family         peripheral    Prostate cancer Family     Thyroid disease Family        Social History:  Social History     Social History    Marital status: /Civil Union     Spouse name: N/A    Number of children: N/A    Years of education: N/A     Occupational History    retired      Social History Main Topics    Smoking status: Former Smoker     Types: Cigars    Smokeless tobacco: Never Used      Comment: former cig smoker- quit in [address] Alcohol use No      Comment: (history)    Drug use: No    Sexual activity: Not on file     Other Topics Concern    Not on file     Social History Narrative    No narrative on file       Objective     Vitals:    06/14/18 1158   BP: 110/50   Pulse:    Resp:    Temp:      Wt Readings from Last 3 Encounters:   06/14/18 74 2 kg (163 lb 9 6 oz)   05/25/18 74 8 kg (165 lb)   05/21/18 73 9 kg (162 lb 14 7 oz)       Physical Exam   Constitutional: He is oriented to person, place, and time  No distress  HENT:   Mouth/Throat: Oropharynx is clear and moist  No oropharyngeal exudate  Tympanic membranes within normal limits bilaterally   Neck:   No carotid bruits   Cardiovascular:   Regular rate and rhythm with no murmurs   Pulmonary/Chest:   Lungs are clear to auscultation without wheezes,rales, or rhonchi   Musculoskeletal: He exhibits no edema  Lymphadenopathy:     He has no cervical adenopathy  Neurological: He is alert and oriented to person, place, and time  No cranial nerve deficit  Psychiatric: He has a normal mood and affect   His behavior is normal  Judgment and thought content normal        Pertinent Laboratory/Diagnostic Studies:  Lab Results   Component Value Date    GLUCOSE 111 06/14/2018    BUN 9 06/14/2018    CREATININE 0 99 06/14/2018    CALCIUM 9 7 06/14/2018     06/14/2018    K 4 7 06/14/2018    CO2 29 06/14/2018     06/14/2018     Lab Results   Component Value Date    ALT 28 06/14/2018    AST 18 06/14/2018    ALKPHOS 60 06/14/2018    BILITOT 0 34 06/14/2018       Lab Results   Component Value Date    WBC 7 37 06/14/2018    HGB 13 2 06/14/2018    HCT 40 2 06/14/2018    MCV 84 06/14/2018     06/14/2018       No results found for: TSH    Lab Results   Component Value Date    CHOL 164 06/30/2017     Lab Results   Component Value Date    TRIG 135 06/30/2017     Lab Results   Component Value Date    HDL 46 06/30/2017     Lab Results   Component Value Date    LDLCALC 91 06/30/2017     Lab Results   Component Value Date    HGBA1C 5 9 05/22/2018       Results for orders placed or performed during the hospital encounter of 05/25/18   CBC and differential   Result Value Ref Range    WBC 8 66 4 31 - 10 16 Thousand/uL    RBC 4 75 3 88 - 5 62 Million/uL    Hemoglobin 13 2 12 0 - 17 0 g/dL    Hematocrit 38 8 36 5 - 49 3 %    MCV 82 82 - 98 fL    MCH 27 8 26 8 - 34 3 pg    MCHC 34 0 31 4 - 37 4 g/dL    RDW 14 1 11 6 - 15 1 %    MPV 10 2 8 9 - 12 7 fL    Platelets 624 509 - 101 Thousands/uL    nRBC 0 /100 WBCs    Neutrophils Relative 58 43 - 75 %    Lymphocytes Relative 33 14 - 44 %    Monocytes Relative 8 4 - 12 %    Eosinophils Relative 2 0 - 6 %    Basophils Relative 0 0 - 1 %    Neutrophils Absolute 4 99 1 85 - 7 62 Thousands/µL    Lymphocytes Absolute 2 84 0 60 - 4 47 Thousands/µL    Monocytes Absolute 0 67 0 17 - 1 22 Thousand/µL    Eosinophils Absolute 0 13 0 00 - 0 61 Thousand/µL    Basophils Absolute 0 02 0 00 - 0 10 Thousands/µL   Basic metabolic panel   Result Value Ref Range    Sodium 137 136 - 145 mmol/L    Potassium 4 5 3 5 - 5 3 mmol/L    Chloride 107 100 - 108 mmol/L    CO2 25 21 - 32 mmol/L    Anion Gap 5 4 - 13 mmol/L    BUN 16 5 - 25 mg/dL    Creatinine 0 96 0 60 - 1 30 mg/dL    Glucose 144 (H) 65 - 140 mg/dL    Calcium 9 6 8 3 - 10 1 mg/dL    eGFR 72 ml/min/1 73sq m   Basic metabolic panel   Result Value Ref Range    Sodium 141 136 - 145 mmol/L    Potassium 3 7 3 5 - 5 3 mmol/L    Chloride 110 (H) 100 - 108 mmol/L    CO2 26 21 - 32 mmol/L    Anion Gap 5 4 - 13 mmol/L    BUN 14 5 - 25 mg/dL    Creatinine 0 92 0 60 - 1 30 mg/dL    Glucose 107 65 - 140 mg/dL    Calcium 9 2 8 3 - 10 1 mg/dL    eGFR 76 ml/min/1 73sq m   Basic metabolic panel Result Value Ref Range    Sodium 136 136 - 145 mmol/L    Potassium 3 2 (L) 3 5 - 5 3 mmol/L    Chloride 106 100 - 108 mmol/L    CO2 23 21 - 32 mmol/L    Anion Gap 7 4 - 13 mmol/L    BUN 11 5 - 25 mg/dL    Creatinine 0 71 0 60 - 1 30 mg/dL    Glucose 115 65 - 140 mg/dL    Calcium 10 1 8 3 - 10 1 mg/dL    eGFR 86 ml/min/1 73sq m   POCT urinalysis dipstick   Result Value Ref Range    Color, UA see chart    ECG 12 lead   Result Value Ref Range    Ventricular Rate 59 BPM    Atrial Rate 59 BPM    IN Interval 192 ms    QRSD Interval 116 ms    QT Interval 420 ms    QTC Interval 415 ms    P Axis 34 degrees    QRS Axis 15 degrees    T Wave Axis 28 degrees   ECG 12 lead   Result Value Ref Range    Ventricular Rate 78 BPM    Atrial Rate 88 BPM    IN Interval  ms    QRSD Interval 90 ms    QT Interval 360 ms    QTC Interval 410 ms    P Axis  degrees    QRS Axis 20 degrees    T Wave Axis 87 degrees   ED Urine Macroscopic   Result Value Ref Range    Color, UA Yellow     Clarity, UA Clear     pH, UA 7 0 4 5 - 8 0    Leukocytes, UA Negative Negative    Nitrite, UA Negative Negative    Protein, UA Negative Negative mg/dl    Glucose, UA Negative Negative mg/dl    Ketones, UA Negative Negative mg/dl    Urobilinogen, UA 0 2 0 2, 1 0 E U /dl E U /dl    Bilirubin, UA Negative Negative    Blood, UA Negative Negative    Specific Gravity, UA 1 015 1 003 - 1 030       Orders Placed This Encounter   Procedures    Comprehensive metabolic panel    TSH, 3rd generation    CBC    Iron       ALLERGIES:  Allergies   Allergen Reactions    Atorvastatin Myalgia, Dizziness and Headache    Niacin Other (See Comments)     unknown       Current Medications     Current Outpatient Prescriptions   Medication Sig Dispense Refill    aspirin 81 MG tablet Take 1 tablet by mouth daily      Calcium Citrate (CITRACAL PO) Take by mouth      docusate sodium (COLACE) 100 mg capsule Take 1 capsule (100 mg total) by mouth 2 (two) times a day 10 capsule 0    enalapril (VASOTEC) 20 mg tablet Take 1 tablet (20 mg total) by mouth daily 30 tablet 0    ferrous sulfate 325 (65 Fe) mg tablet Take 1 tablet by mouth daily      gabapentin (NEURONTIN) 300 mg capsule Take 1 capsule (300 mg total) by mouth daily at bedtime Take 2 tablets q hs 20 capsule 0    metoprolol tartrate (LOPRESSOR) 25 mg tablet Take 25 mg by mouth 2 (two) times a day      omeprazole (PriLOSEC) 40 MG capsule Take 1 capsule (40 mg total) by mouth daily 90 capsule 0    sertraline (ZOLOFT) 50 mg tablet Take 1 tablet by mouth daily at bedtime        simvastatin (ZOCOR) 80 mg tablet TAKE 1 TABLET BY MOUTH  DAILY 90 tablet 3    zolpidem (AMBIEN) 5 mg tablet Take 1 tablet (5 mg total) by mouth daily (Patient taking differently: Take 2 5 mg by mouth daily at bedtime as needed for sleep  ) 90 tablet 0     No current facility-administered medications for this visit            Health Maintenance     Health Maintenance   Topic Date Due    SLP PLAN OF CARE  1933    DTaP,Tdap,and Td Vaccines (1 - Tdap) 01/16/1954    GLAUCOMA SCREENING 67+ YR  01/16/2000    INFLUENZA VACCINE  09/01/2018    Fall Risk  06/11/2019    PNEUMOCOCCAL POLYSACCHARIDE VACCINE AGE 65 AND OVER  Completed     Immunization History   Administered Date(s) Administered    Influenza Split High Dose Preservative Free IM 11/10/2014, 01/12/2016, 11/22/2016, 11/08/2017    Influenza TIV (IM) 10/01/2007, 11/01/2009, 11/02/2009, 11/10/2010, 12/17/2012    Pneumococcal Conjugate 13-Valent 01/12/2016    Pneumococcal Polysaccharide PPV23 67/87/9564       Nitesh Benitez MD

## 2018-06-21 ENCOUNTER — OFFICE VISIT (OUTPATIENT)
Dept: PHYSICAL THERAPY | Facility: REHABILITATION | Age: 83
End: 2018-06-21
Payer: MEDICARE

## 2018-06-21 DIAGNOSIS — R26.9 GAIT ABNORMALITY: Primary | ICD-10-CM

## 2018-06-21 PROCEDURE — 97112 NEUROMUSCULAR REEDUCATION: CPT | Performed by: PHYSICAL THERAPIST

## 2018-06-21 PROCEDURE — 97530 THERAPEUTIC ACTIVITIES: CPT | Performed by: PHYSICAL THERAPIST

## 2018-06-21 NOTE — PROGRESS NOTES
Daily Note     Today's date: 2018  Patient name: Lavern Soulier  : 1933  MRN: [id number]  Referring provider: Azar Yi MD  Dx:   Encounter Diagnosis     ICD-10-CM    1  Gait abnormality R26 9                   Subjective: Patient reports minimal symptoms of lightheadedness and no falls since last tx session  Patient also reports ambulating with a rollator at home  Objective: See treatment diary below  BP: 104/56, 124/60, 128/58      Assessment: Tolerated treatment well  Patient demonstrated fatigue post treatment and exhibited good technique with therapeutic exercises  BP stable throughout tx session but fatigue noted with LE weakness and elevated HR  When fatigue sets in, patient demonstrates reduced DF control and lateral trunk lean indicative of hip weakness  Plan: Continue per plan of care        Precautions: *ORTHOSTATIC HYPOTENSION, FALLS RISK, Recent change in cognitive status, Anxiety, History of M I, History of depression, History of prostate CA     Daily Treatment Diary      Manual                                                                                                                                                      Exercise Diary                     VG Squats  L5 3x10                     Sit to stands  10x chair  2x10 low mat                   Foam balance  tandem 30"x3, FT 30"x3  tandem 3x30" FT 3x30"                   Gait training  5'  450'                   Balance course  5'  5'                   Cog task with balance                                                                                                                                                                                                                                                                                                                                                                             Modalities

## 2018-06-25 ENCOUNTER — OFFICE VISIT (OUTPATIENT)
Dept: PHYSICAL THERAPY | Facility: REHABILITATION | Age: 83
End: 2018-06-25
Payer: MEDICARE

## 2018-06-25 DIAGNOSIS — R26.9 GAIT ABNORMALITY: Primary | ICD-10-CM

## 2018-06-25 PROCEDURE — 97110 THERAPEUTIC EXERCISES: CPT | Performed by: PHYSICAL THERAPIST

## 2018-06-25 PROCEDURE — 97112 NEUROMUSCULAR REEDUCATION: CPT | Performed by: PHYSICAL THERAPIST

## 2018-06-25 NOTE — PROGRESS NOTES
Daily Note     Today's date: 2018  Patient name: Nasima Acosta  : 1933  MRN: [id number]  Referring provider: Precious Alejandro MD  Dx:   Encounter Diagnosis     ICD-10-CM    1  Gait abnormality R26 9                   Subjective: Patient reports having no symptoms of lightheadedness over the weekend  Patient also reports increased energy levels since IE  Objective: See treatment diary below  BP: 124/60, HR 74-86    Assessment: Tolerated treatment well  Patient demonstrated fatigue post treatment and exhibited good technique with therapeutic exercises  Patients BP and HR demonstrated stability throughout tx session as noted above  Patient able to demonstrate improve endurance during tx session including ambulating 450 ft prior to rest and performing 3x10 sit to stands  Trendelenburg remains with prolonged ambulation and added hip abductor strengthening  Plan: Continue per plan of care       Precautions: *ORTHOSTATIC HYPOTENSION, FALLS RISK, Recent change in cognitive status, Anxiety, History of M I, History of depression, History of prostate CA     Daily Treatment Diary      Manual                                                                                                                                                      Exercise Diary                   VG Squats  L5 3x10                     Sit to stands  10x chair  2x10 low mat  3x10 low mat                 Foam balance  tandem 30"x3, FT 30"x3  tandem 3x30" FT 3x30"  FT 3x30"                 Gait training  5'  450'  450'                 Balance course  5'  5'  5'                 Cog task with balance                       Hip abduction with YTB     2x10 B/L                                                                                                                                                                                                                                                                                                                                               Modalities

## 2018-06-26 DIAGNOSIS — I10 ESSENTIAL HYPERTENSION: Primary | ICD-10-CM

## 2018-06-26 DIAGNOSIS — F41.9 ANXIETY: ICD-10-CM

## 2018-06-28 ENCOUNTER — OFFICE VISIT (OUTPATIENT)
Dept: PHYSICAL THERAPY | Facility: REHABILITATION | Age: 83
End: 2018-06-28
Payer: MEDICARE

## 2018-06-28 DIAGNOSIS — K21.9 GASTROESOPHAGEAL REFLUX DISEASE, ESOPHAGITIS PRESENCE NOT SPECIFIED: ICD-10-CM

## 2018-06-28 DIAGNOSIS — R26.9 GAIT ABNORMALITY: Primary | ICD-10-CM

## 2018-06-28 PROCEDURE — 97530 THERAPEUTIC ACTIVITIES: CPT | Performed by: PHYSICAL THERAPIST

## 2018-06-28 PROCEDURE — 97110 THERAPEUTIC EXERCISES: CPT | Performed by: PHYSICAL THERAPIST

## 2018-06-28 PROCEDURE — 97112 NEUROMUSCULAR REEDUCATION: CPT | Performed by: PHYSICAL THERAPIST

## 2018-06-28 RX ORDER — OMEPRAZOLE 40 MG/1
CAPSULE, DELAYED RELEASE ORAL
Qty: 90 CAPSULE | Refills: 5 | Status: SHIPPED | OUTPATIENT
Start: 2018-06-28 | End: 2019-09-09 | Stop reason: SDUPTHER

## 2018-06-28 NOTE — PROGRESS NOTES
Daily Note     Today's date: 2018  Patient name: Nasima Acosta  : 1933  MRN: [id number]  Referring provider: Precious Alejandro MD  Dx:   Encounter Diagnosis     ICD-10-CM    1  Gait abnormality R26 9                   Subjective: Patient reports falling this morning and injuring his right lateral ribcage and hip  Patient denies any pain with breathing, coughing, or sneezing  Objective: See treatment diary below  BP: 134/68 mmHg  O2: 99 SpO2  HR: 86 bpm    *Above at rest      Assessment: Tolerated treatment well  Patient demonstrated fatigue post treatment and would benefit from continued PT  Patient's vitals, right ribcage, and hip were assessed prior to treatment  Patient vitals were stable (as seen above) and palpation revealed tenderness through inferolateral floating ribs on right  HR monitored and patient continues to demonstrate significant hip abductor weakness with increased distance  Plan: Continue per plan of care       Precautions: *ORTHOSTATIC HYPOTENSION, FALLS RISK, Recent change in cognitive status, Anxiety, History of M I, History of depression, History of prostate CA     Daily Treatment Diary      Manual                                                                                                                                                      Exercise Diary                 VG Squats  L5 3x10                     Sit to stands  10x chair  2x10 low mat  3x10 low mat  2x10 low mat               Foam balance  tandem 30"x3, FT 30"x3  tandem 3x30" FT 3x30"  FT 3x30"  FT  3x30"               Gait training  5'  450'  450'  450', 300'               Balance course  5'  5'  5'  5'               Cog task with balance                       Hip abduction with YTB     2x10 B/L 2x10 B/L               Hip Abduction machine        65# 2x10                                                                                                                                                                                                                                                                                                                     Modalities

## 2018-07-02 ENCOUNTER — OFFICE VISIT (OUTPATIENT)
Dept: FAMILY MEDICINE CLINIC | Facility: CLINIC | Age: 83
End: 2018-07-02
Payer: MEDICARE

## 2018-07-02 ENCOUNTER — OFFICE VISIT (OUTPATIENT)
Dept: PHYSICAL THERAPY | Facility: REHABILITATION | Age: 83
End: 2018-07-02
Payer: MEDICARE

## 2018-07-02 ENCOUNTER — TRANSCRIBE ORDERS (OUTPATIENT)
Dept: RADIOLOGY | Facility: HOSPITAL | Age: 83
End: 2018-07-02

## 2018-07-02 ENCOUNTER — HOSPITAL ENCOUNTER (OUTPATIENT)
Dept: RADIOLOGY | Facility: HOSPITAL | Age: 83
Discharge: HOME | End: 2018-07-02
Payer: MEDICARE

## 2018-07-02 VITALS
SYSTOLIC BLOOD PRESSURE: 104 MMHG | RESPIRATION RATE: 16 BRPM | TEMPERATURE: 98.5 F | HEIGHT: 70 IN | WEIGHT: 163.4 LBS | BODY MASS INDEX: 23.39 KG/M2 | HEART RATE: 68 BPM | DIASTOLIC BLOOD PRESSURE: 56 MMHG

## 2018-07-02 DIAGNOSIS — H61.22 IMPACTED CERUMEN OF LEFT EAR: ICD-10-CM

## 2018-07-02 DIAGNOSIS — R07.81 RIB PAIN ON RIGHT SIDE: Primary | ICD-10-CM

## 2018-07-02 DIAGNOSIS — R26.9 GAIT ABNORMALITY: Primary | ICD-10-CM

## 2018-07-02 DIAGNOSIS — R07.81 RIB PAIN ON RIGHT SIDE: ICD-10-CM

## 2018-07-02 PROBLEM — S20.211A CONTUSION OF RIB ON RIGHT SIDE: Status: ACTIVE | Noted: 2018-07-02

## 2018-07-02 PROCEDURE — 99213 OFFICE O/P EST LOW 20 MIN: CPT | Performed by: FAMILY MEDICINE

## 2018-07-02 PROCEDURE — 71101 X-RAY EXAM UNILAT RIBS/CHEST: CPT

## 2018-07-02 PROCEDURE — 97116 GAIT TRAINING THERAPY: CPT | Performed by: PHYSICAL THERAPIST

## 2018-07-02 PROCEDURE — 97530 THERAPEUTIC ACTIVITIES: CPT | Performed by: PHYSICAL THERAPIST

## 2018-07-02 PROCEDURE — 97112 NEUROMUSCULAR REEDUCATION: CPT | Performed by: PHYSICAL THERAPIST

## 2018-07-02 RX ORDER — ENALAPRIL MALEATE 2.5 MG/1
2.5 TABLET ORAL 2 TIMES DAILY
COMMUNITY
End: 2018-08-13 | Stop reason: CLARIF

## 2018-07-02 NOTE — PROGRESS NOTES
Daily Note     Today's date: 2018  Patient name: Ruth Vazquez  : 1933  MRN: [id number]  Referring provider: Nikolay Eric MD  Dx:   Encounter Diagnosis     ICD-10-CM    1  Gait abnormality R26 9                   Subjective: Patient reports having no falls since last tx session but does report a worsening in right sided rib cage pain and patient was referred to PCP for further assessment  Objective: See treatment diary below    Pre-tx BP: 98/52; Post exercise BP: 102/62 ; Post ambulation BP: 90/54      Assessment: Tolerated treatment well  Patient demonstrated fatigue post treatment and exhibited good technique with therapeutic exercises  BP inconsistent throughout tx session as noted above but patient reports being asymptomatic  Reviewed mini squats for patients HEP and he demonstrated ability to perform properly and safely during tx session - patients wife was also educated for patient safety  Plan: Continue per plan of care       Precautions: *ORTHOSTATIC HYPOTENSION, FALLS RISK, Recent change in cognitive status, Anxiety, History of M I, History of depression, History of prostate CA     Daily Treatment Diary      Manual                                                                                                                                                      Exercise Diary               VG Squats  L5 3x10                     Sit to stands  10x chair  2x10 low mat  3x10 low mat  2x10 low mat 2x10 low mat             Foam balance  tandem 30"x3, FT 30"x3  tandem 3x30" FT 3x30"  FT 3x30"  FT  3x30"  FT 3x30"             Gait training  5'  450'  450'  450', 300'  450',300'             Balance course  5'  5'  5'  5'               Cog task with balance                       Hip abduction with YTB     2x10 B/L 2x10 B/L               Hip Abduction machine        65# 2x10  65#  2x10             Mini Squats          2x10                                                                                                                                                                                                                                                                                         Modalities

## 2018-07-02 NOTE — PROGRESS NOTES
FAMILY  OFFICE VISIT       NAME: Pedro Luis Pleitez  AGE: 80 y o  SEX: male       : 1933        MRN: [id number]    DATE: 7/3/2018  TIME: 8:10 AM    Assessment and Plan       Rib contusion  Patient was given a prescription to obtain x-ray of ribcage for further evaluation  We will make further recommendations pending results of tests    Cerumen impaction  Patient will make up a follow-up office visit to have left ear wax impaction removed  There are no Patient Instructions on file for this visit  Chief Complaint     Chief Complaint   Patient presents with    Fall     Pt is here w/ c/o right rib pain after falling in bathroom last week        History of Present Illness     Patient is accompanied by his wife who states approximately 4 days ago the patient had an episode where where he fell in the bathroom upon getting up at night  He was able to eventually managed to get back in bed but did not experience any significant discomfort told the following day where he described the right sided rib discomfort where he had fallen  He is currently receiving physical therapy for lower extremity weakness and gait disturbance  Patient had a syncopal episode with no etiology discovered  During hospitalization the patient was notified that he would be reported to the Department of transportation in [place]  He has not been driving since his initial hospitalization  Patient feels mentally he is able to drive and is aware of his surroundings and directions however he has difficulties with ambulating with a walker with wheels as well as Rollator walker  Prior to most recent hospitalization patient had used a walking cane        Review of Systems   Review of Systems   Constitutional: Negative  HENT:        Patient described muffled hearing especially on left ear canal   He does wear hearing aid on his right ear   Respiratory: Negative  Cardiovascular: Negative  Gastrointestinal: Negative  Musculoskeletal:        As per HPI   Neurological: Negative  Psychiatric/Behavioral: Negative          Active Problem List     Patient Active Problem List   Diagnosis    Constipation    Iron deficiency    Other constipation    Anemia    Arteriosclerotic cardiovascular disease    Backache    Essential hypertension    Cervical spinal stenosis    Depression    Esophageal reflux    Hyperlipidemia    Insomnia    Spinal stenosis of lumbar region with neurogenic claudication    Vitamin B12 deficiency    Idiopathic peripheral neuropathy    Iron deficiency anemia    History of meningioma    Confusion    Altered mental status    Fall    Cognitive decline    Rib pain on right side    Impacted cerumen of left ear    Contusion of rib on right side       Past Medical History:  Past Medical History:   Diagnosis Date    [facility] infarction ([facility] )     Anxiety     Arthritis     Brain neoplasm ([facility] ) 11/1999    brain tumor    Depression     Hypercholesterolemia     Narcolepsy     daytime drowsiness and dozing off occasionally    Palpitations     Prostate cancer ([facility] )        Past Surgical History:  Past Surgical History:   Procedure Laterality Date    BACK SURGERY      BRAIN SURGERY  11/08/1999    CORONARY ARTERY BYPASS GRAFT      x5       Family History:  Family History   Problem Relation Age of Onset    Hypertension Mother         benign essential    Cancer Mother     Osteoporosis Mother     Suicidality Father     Diabetes Family     Heart disease Family     Neuropathy Family         peripheral    Prostate cancer Family     Thyroid disease Family        Social History:  Social History     Social History    Marital status: /Civil Union     Spouse name: N/A    Number of children: N/A    Years of education: N/A     Occupational History    retired      Social History Main Topics    Smoking status: Former Smoker     Types: Cigars    Smokeless tobacco: Never Used Comment: former cig smoker- quit in 1992    Alcohol use No      Comment: (history)    Drug use: No    Sexual activity: Not on file     Other Topics Concern    Not on file     Social History Narrative    No narrative on file       Objective     Vitals:    07/02/18 1608   BP: 104/56   Pulse: 68   Resp: 16   Temp: 98 5 °F (36 9 °C)     Wt Readings from Last 3 Encounters:   07/02/18 74 1 kg (163 lb 6 4 oz)   06/14/18 74 2 kg (163 lb 9 6 oz)   05/25/18 74 8 kg (165 lb)       Physical Exam   Constitutional: He is oriented to person, place, and time  No distress  HENT:   Left-sided cerumen impaction   Eyes:   Visual acuity on Snellen chart L 20/40, R 20/70, B 20/40   Cardiovascular:   Regular rate and rhythm with no murmurs   Pulmonary/Chest:   Lungs are clear to auscultation without wheezes,rales, or rhonchi   Musculoskeletal: He exhibits no edema  Patient ambulates slowly with the use of a walker with wheels  Patient with significant tenderness along right lower ribcage along axillary line to palpation  Neurological: He is alert and oriented to person, place, and time  No cranial nerve deficit     Skin:   No redness or ecchymosis noted at the area in question of right ribcage       Pertinent Laboratory/Diagnostic Studies:  Lab Results   Component Value Date    GLUCOSE 111 06/14/2018    BUN 9 06/14/2018    CREATININE 0 99 06/14/2018    CALCIUM 9 7 06/14/2018     06/14/2018    K 4 7 06/14/2018    CO2 29 06/14/2018     06/14/2018     Lab Results   Component Value Date    ALT 28 06/14/2018    AST 18 06/14/2018    ALKPHOS 60 06/14/2018    BILITOT 0 34 06/14/2018       Lab Results   Component Value Date    WBC 7 37 06/14/2018    HGB 13 2 06/14/2018    HCT 40 2 06/14/2018    MCV 84 06/14/2018     06/14/2018       No results found for: TSH    Lab Results   Component Value Date    CHOL 164 06/30/2017     Lab Results   Component Value Date    TRIG 135 06/30/2017     Lab Results   Component Value Date HDL 46 06/30/2017     Lab Results   Component Value Date    LDLCALC 91 06/30/2017     Lab Results   Component Value Date    HGBA1C 5 9 05/22/2018       Results for orders placed or performed in visit on 06/14/18   Comprehensive metabolic panel   Result Value Ref Range    Sodium 138 136 - 145 mmol/L    Potassium 4 7 3 5 - 5 3 mmol/L    Chloride 104 100 - 108 mmol/L    CO2 29 21 - 32 mmol/L    Anion Gap 5 4 - 13 mmol/L    BUN 9 5 - 25 mg/dL    Creatinine 0 99 0 60 - 1 30 mg/dL    Glucose 111 65 - 140 mg/dL    Calcium 9 7 8 3 - 10 1 mg/dL    AST 18 5 - 45 U/L    ALT 28 12 - 78 U/L    Alkaline Phosphatase 60 46 - 116 U/L    Total Protein 6 8 6 4 - 8 2 g/dL    Albumin 3 6 3 5 - 5 0 g/dL    Total Bilirubin 0 34 0 20 - 1 00 mg/dL    eGFR 69 ml/min/1 73sq m   TSH, 3rd generation   Result Value Ref Range    TSH 3RD GENERATON 1 330 0 358 - 3 740 uIU/mL   CBC   Result Value Ref Range    WBC 7 37 4 31 - 10 16 Thousand/uL    RBC 4 77 3 88 - 5 62 Million/uL    Hemoglobin 13 2 12 0 - 17 0 g/dL    Hematocrit 40 2 36 5 - 49 3 %    MCV 84 82 - 98 fL    MCH 27 7 26 8 - 34 3 pg    MCHC 32 8 31 4 - 37 4 g/dL    RDW 13 6 11 6 - 15 1 %    Platelets 130 190 - 205 Thousands/uL    MPV 10 8 8 9 - 12 7 fL   Iron   Result Value Ref Range    Iron 64 (L) 65 - 175 ug/dL       No orders of the defined types were placed in this encounter        ALLERGIES:  Allergies   Allergen Reactions    Atorvastatin Myalgia, Dizziness and Headache    Niacin Other (See Comments)     unknown       Current Medications     Current Outpatient Prescriptions   Medication Sig Dispense Refill    aspirin 81 MG tablet Take 1 tablet by mouth daily      Calcium Citrate (CITRACAL PO) Take by mouth      docusate sodium (COLACE) 100 mg capsule Take 1 capsule (100 mg total) by mouth 2 (two) times a day 10 capsule 0    enalapril (VASOTEC) 2 5 mg tablet Take 2 5 mg by mouth 2 (two) times a day      ferrous sulfate 325 (65 Fe) mg tablet Take 1 tablet by mouth daily      gabapentin (NEURONTIN) 300 mg capsule Take 1 capsule (300 mg total) by mouth daily at bedtime Take 2 tablets q hs 20 capsule 0    metoprolol tartrate (LOPRESSOR) 25 mg tablet TAKE 1 TABLET BY MOUTH IN  THE MORNING AND 1 TABLET BY MOUTH IN THE EVENING 180 tablet 1    omeprazole (PriLOSEC) 40 MG capsule TAKE 1 CAPSULE BY MOUTH  DAILY 90 capsule 5    sertraline (ZOLOFT) 50 mg tablet TAKE 1 TABLET BY MOUTH  EVERY DAY 90 tablet 1    simvastatin (ZOCOR) 80 mg tablet TAKE 1 TABLET BY MOUTH  DAILY 90 tablet 3     No current facility-administered medications for this visit            Health Maintenance     Health Maintenance   Topic Date Due    PT PLAN OF CARE  1933    SLP PLAN OF CARE  1933    DTaP,Tdap,and Td Vaccines (1 - Tdap) 01/16/1954    GLAUCOMA SCREENING 65 + YR  01/16/2000    INFLUENZA VACCINE  06/01/2018    Fall Risk  06/11/2019    PNEUMOCOCCAL POLYSACCHARIDE VACCINE AGE 65 AND OVER  Completed     Immunization History   Administered Date(s) Administered    Influenza Split High Dose Preservative Free IM 11/10/2014, 01/12/2016, 11/22/2016, 11/08/2017    Influenza TIV (IM) 10/01/2007, 11/01/2009, 11/02/2009, 11/10/2010, 12/17/2012    Pneumococcal Conjugate 13-Valent 01/12/2016    Pneumococcal Polysaccharide PPV23 38/04/7088       Imtiaz Melendez MD

## 2018-07-03 ENCOUNTER — OFFICE VISIT (OUTPATIENT)
Dept: FAMILY MEDICINE CLINIC | Facility: CLINIC | Age: 83
End: 2018-07-03
Payer: MEDICARE

## 2018-07-03 ENCOUNTER — TELEPHONE (OUTPATIENT)
Dept: FAMILY MEDICINE CLINIC | Facility: CLINIC | Age: 83
End: 2018-07-03

## 2018-07-03 VITALS
TEMPERATURE: 97.4 F | RESPIRATION RATE: 16 BRPM | SYSTOLIC BLOOD PRESSURE: 118 MMHG | DIASTOLIC BLOOD PRESSURE: 62 MMHG | HEART RATE: 68 BPM

## 2018-07-03 DIAGNOSIS — H61.23 BILATERAL IMPACTED CERUMEN: Primary | ICD-10-CM

## 2018-07-03 PROCEDURE — 99212 OFFICE O/P EST SF 10 MIN: CPT | Performed by: FAMILY MEDICINE

## 2018-07-03 PROCEDURE — 69210 REMOVE IMPACTED EAR WAX UNI: CPT | Performed by: FAMILY MEDICINE

## 2018-07-03 NOTE — ASSESSMENT & PLAN NOTE
rib contusion  Patient was given a prescription to obtain x-ray of ribs to rule out any fracture    We will make further recommendations pending results of test

## 2018-07-03 NOTE — ASSESSMENT & PLAN NOTE
Rib contusion  Patient was given a prescription to obtain x-ray of his ribs for further evaluation    We will make further recommendations pending results of test

## 2018-07-03 NOTE — TELEPHONE ENCOUNTER
----- Message from Antonio Carey MD sent at 4/0/6194  9:24 AM EDT -----  Please call wife  Pt  Has fractures of 9th and 10 th ribs on R side   No further treatment needed but it will take up to 8 weeks to heal  She can notify his therapist so they can modify what exercises he can perform

## 2018-07-03 NOTE — ASSESSMENT & PLAN NOTE
Cerumen impaction    Patient will make follow-up office visit to have irrigation of left ear canal for cerumen impaction

## 2018-07-04 NOTE — PROGRESS NOTES
FAMILY PRACTICE OFFICE VISIT       NAME: Colette Avila  AGE: 80 y o  SEX: male       : 1933        MRN: [id number]    DATE: 2018  TIME: 1:15 PM    Assessment and Plan     Problem List Items Addressed This Visit     None            There are no Patient Instructions on file for this visit  Chief Complaint     Chief Complaint   Patient presents with    ear flush     Pt is here to have both ears flushed        History of Present Illness     Patient wears a right-sided hearing aid  He had bilateral cerumen impaction noticed on last office visit  He denies any ear pain        Review of Systems   Review of Systems   HENT:        As per HPI   Musculoskeletal:        Patient is recovering from 9th and 10th rib fractures on right side   Skin: Negative          Active Problem List     Patient Active Problem List   Diagnosis    Constipation    Iron deficiency    Other constipation    Anemia    Arteriosclerotic cardiovascular disease    Backache    Essential hypertension    Cervical spinal stenosis    Depression    Esophageal reflux    Hyperlipidemia    Insomnia    Spinal stenosis of lumbar region with neurogenic claudication    Vitamin B12 deficiency    Idiopathic peripheral neuropathy    Iron deficiency anemia    History of meningioma    Confusion    Altered mental status    Fall    Cognitive decline    Rib pain on right side    Impacted cerumen of left ear    Contusion of rib on right side       Past Medical History:  Past Medical History:   Diagnosis Date    [facility]     Anxiety     Arthritis     Brain neoplasm ([facility] ) 1999    brain tumor    Depression     Hypercholesterolemia     Narcolepsy     daytime drowsiness and dozing off occasionally    Palpitations     Prostate cancer [facility])        Past Surgical History:  Past Surgical History:   Procedure Laterality Date    BACK SURGERY      BRAIN SURGERY  1999    CORONARY ARTERY BYPASS GRAFT      x5       Family History:  Family History   Problem Relation Age of Onset    Hypertension Mother         benign essential    Cancer Mother     Osteoporosis Mother     Suicidality Father     Diabetes Family     Heart disease Family     Neuropathy Family         peripheral    Prostate cancer Family     Thyroid disease Family        Social History:  Social History     Social History    Marital status: /Civil Union     Spouse name: N/A    Number of children: N/A    Years of education: N/A     Occupational History    retired      Social History Main Topics    Smoking status: Former Smoker     Types: Cigars    Smokeless tobacco: Never Used      Comment: former cig smoker- quit in [address] Alcohol use No      Comment: (history)    Drug use: No    Sexual activity: Not on file     Other Topics Concern    Not on file     Social History Narrative    No narrative on file       Objective     Vitals:    07/03/18 1432   BP: 118/62   Pulse: 68   Resp: 16   Temp: (!) 97 4 °F (36 3 °C)     Wt Readings from Last 3 Encounters:   07/02/18 74 1 kg (163 lb 6 4 oz)   06/14/18 74 2 kg (163 lb 9 6 oz)   05/25/18 74 8 kg (165 lb)         Physical Exam   Constitutional: No distress  HENT:   Bilateral cerumen impaction     Ear cerumen removal  Date/Time: 7/4/2018 1:17 PM  Performed by: [employer]  Authorized by: [employer]     Consent:     Consent obtained:  Verbal    Consent given by:  Patient  Procedure details:     Location:  L ear and R ear    Procedure type comment:  Curetting and irrigation    Approach:  Natural orifice and external  Post-procedure details:     Complication:  None    Hearing quality:  Improved    Patient tolerance of procedure: Tolerated well, no immediate complications  Comments:      Large amounts of wax was removed from bilateral ear canals    TMs appear to be within normal limits      Pertinent Laboratory/Diagnostic Studies:  Lab Results   Component Value Date    GLUCOSE 111 06/14/2018    BUN 9 06/14/2018    CREATININE 0 99 06/14/2018    CALCIUM 9 7 06/14/2018     06/14/2018    K 4 7 06/14/2018    CO2 29 06/14/2018     06/14/2018     Lab Results   Component Value Date    ALT 28 06/14/2018    AST 18 06/14/2018    ALKPHOS 60 06/14/2018    BILITOT 0 34 06/14/2018       Lab Results   Component Value Date    WBC 7 37 06/14/2018    HGB 13 2 06/14/2018    HCT 40 2 06/14/2018    MCV 84 06/14/2018     06/14/2018       No results found for: TSH    Lab Results   Component Value Date    CHOL 164 06/30/2017     Lab Results   Component Value Date    TRIG 135 06/30/2017     Lab Results   Component Value Date    HDL 46 06/30/2017     Lab Results   Component Value Date    LDLCALC 91 06/30/2017     Lab Results   Component Value Date    HGBA1C 5 9 05/22/2018       Results for orders placed or performed in visit on 06/14/18   Comprehensive metabolic panel   Result Value Ref Range    Sodium 138 136 - 145 mmol/L    Potassium 4 7 3 5 - 5 3 mmol/L    Chloride 104 100 - 108 mmol/L    CO2 29 21 - 32 mmol/L    Anion Gap 5 4 - 13 mmol/L    BUN 9 5 - 25 mg/dL    Creatinine 0 99 0 60 - 1 30 mg/dL    Glucose 111 65 - 140 mg/dL    Calcium 9 7 8 3 - 10 1 mg/dL    AST 18 5 - 45 U/L    ALT 28 12 - 78 U/L    Alkaline Phosphatase 60 46 - 116 U/L    Total Protein 6 8 6 4 - 8 2 g/dL    Albumin 3 6 3 5 - 5 0 g/dL    Total Bilirubin 0 34 0 20 - 1 00 mg/dL    eGFR 69 ml/min/1 73sq m   TSH, 3rd generation   Result Value Ref Range    TSH 3RD GENERATON 1 330 0 358 - 3 740 uIU/mL   CBC   Result Value Ref Range    WBC 7 37 4 31 - 10 16 Thousand/uL    RBC 4 77 3 88 - 5 62 Million/uL    Hemoglobin 13 2 12 0 - 17 0 g/dL    Hematocrit 40 2 36 5 - 49 3 %    MCV 84 82 - 98 fL    MCH 27 7 26 8 - 34 3 pg    MCHC 32 8 31 4 - 37 4 g/dL    RDW 13 6 11 6 - 15 1 %    Platelets 937 488 - 331 Thousands/uL    MPV 10 8 8 9 - 12 7 fL   Iron   Result Value Ref Range    Iron 64 (L) 65 - 175 ug/dL       No orders of the defined types were placed in this encounter  ALLERGIES:  Allergies   Allergen Reactions    Atorvastatin Myalgia, Dizziness and Headache    Niacin Other (See Comments)     unknown       Current Medications     Current Outpatient Prescriptions   Medication Sig Dispense Refill    aspirin 81 MG tablet Take 1 tablet by mouth daily      Calcium Citrate (CITRACAL PO) Take by mouth      docusate sodium (COLACE) 100 mg capsule Take 1 capsule (100 mg total) by mouth 2 (two) times a day 10 capsule 0    enalapril (VASOTEC) 2 5 mg tablet Take 2 5 mg by mouth 2 (two) times a day      ferrous sulfate 325 (65 Fe) mg tablet Take 1 tablet by mouth daily      gabapentin (NEURONTIN) 300 mg capsule Take 1 capsule (300 mg total) by mouth daily at bedtime Take 2 tablets q hs 20 capsule 0    metoprolol tartrate (LOPRESSOR) 25 mg tablet TAKE 1 TABLET BY MOUTH IN  THE MORNING AND 1 TABLET BY MOUTH IN THE EVENING 180 tablet 1    omeprazole (PriLOSEC) 40 MG capsule TAKE 1 CAPSULE BY MOUTH  DAILY 90 capsule 5    sertraline (ZOLOFT) 50 mg tablet TAKE 1 TABLET BY MOUTH  EVERY DAY 90 tablet 1    simvastatin (ZOCOR) 80 mg tablet TAKE 1 TABLET BY MOUTH  DAILY 90 tablet 3     No current facility-administered medications for this visit            Health Maintenance     Health Maintenance   Topic Date Due    PT PLAN OF CARE  1933    SLP PLAN OF CARE  1933    DTaP,Tdap,and Td Vaccines (1 - Tdap) 01/16/1954    GLAUCOMA SCREENING 65 + YR  01/16/2000    INFLUENZA VACCINE  06/01/2018    Fall Risk  06/11/2019    PNEUMOCOCCAL POLYSACCHARIDE VACCINE AGE 65 AND OVER  Completed     Immunization History   Administered Date(s) Administered    Influenza Split High Dose Preservative Free IM 11/10/2014, 01/12/2016, 11/22/2016, 11/08/2017    Influenza TIV (IM) 10/01/2007, 11/01/2009, 11/02/2009, 11/10/2010, 12/17/2012    Pneumococcal Conjugate 13-Valent 01/12/2016    Pneumococcal Polysaccharide PPV23 48/55/8600       Nakia Calles MD

## 2018-07-05 ENCOUNTER — OFFICE VISIT (OUTPATIENT)
Dept: PHYSICAL THERAPY | Facility: REHABILITATION | Age: 83
End: 2018-07-05
Payer: MEDICARE

## 2018-07-05 DIAGNOSIS — R26.9 GAIT ABNORMALITY: Primary | ICD-10-CM

## 2018-07-05 PROCEDURE — 97110 THERAPEUTIC EXERCISES: CPT | Performed by: PHYSICAL THERAPIST

## 2018-07-05 PROCEDURE — 97112 NEUROMUSCULAR REEDUCATION: CPT | Performed by: PHYSICAL THERAPIST

## 2018-07-05 PROCEDURE — 97530 THERAPEUTIC ACTIVITIES: CPT | Performed by: PHYSICAL THERAPIST

## 2018-07-05 NOTE — PROGRESS NOTES
Daily Note     Today's date: 2018  Patient name: Mary Kc  : 1933  MRN: [id number]  Referring provider: Klaudia Qiu MD  Dx:   Encounter Diagnosis     ICD-10-CM    1  Gait abnormality R26 9                   Subjective: Patient reports no falls since last tx session and states that he has been mostly asymptomatic from hypotension  Patient reports 2 fractured ribs from his prior fall as per x-rays  Objective: See treatment diary below      Assessment: Tolerated treatment well  Patient demonstrated fatigue post treatment and exhibited good technique with therapeutic exercises  Patient continues with more pronounced Trendelenburg pattern with prolonged ambulation  Progressed proximal strengthening as noted and pt tolerated well but fatigue noted  Plan: Continue per plan of care       Precautions: *ORTHOSTATIC HYPOTENSION, FALLS RISK, Recent change in cognitive status, Anxiety, History of M I, History of depression, History of prostate CA     Daily Treatment Diary      Manual                                                                                                                                                      Exercise Diary             VG Squats  L5 3x10                     Sit to stands  10x chair  2x10 low mat  3x10 low mat  2x10 low mat 2x10 low mat  2x10 low mat           Foam balance  tandem 30"x3, FT 30"x3  tandem 3x30" FT 3x30"  FT 3x30"  FT  3x30"  FT 3x30"             Gait training  5'  450'  450'  450', 300'  450',300'  450',400'           Balance course  5'  5'  5'  5'               Cog task with balance                       Hip abduction with YTB     2x10 B/L 2x10 B/L    2x10 B/L           Hip Abduction machine        65# 2x10  65#  2x10             Mini Squats          2x10             Supine bridges            2x10           Slider abduction            2x10 B/L                                                                                                                                                                                                                                         Modalities

## 2018-07-09 ENCOUNTER — OFFICE VISIT (OUTPATIENT)
Dept: PHYSICAL THERAPY | Facility: REHABILITATION | Age: 83
End: 2018-07-09
Payer: MEDICARE

## 2018-07-09 DIAGNOSIS — R26.9 GAIT ABNORMALITY: Primary | ICD-10-CM

## 2018-07-09 PROCEDURE — 97112 NEUROMUSCULAR REEDUCATION: CPT

## 2018-07-09 PROCEDURE — 97530 THERAPEUTIC ACTIVITIES: CPT

## 2018-07-09 PROCEDURE — 97110 THERAPEUTIC EXERCISES: CPT

## 2018-07-09 NOTE — PROGRESS NOTES
Daily Note     Today's date: 2018  Patient name: Leidy Bishop  : 1933  MRN: [id number]  Referring provider: Adrienne Vazquez MD  Dx:   Encounter Diagnosis     ICD-10-CM    1  Gait abnormality R26 9             Subjective: Pt denies falls since LV and has been ambulating with Rollator around the house  He continues to report rib pain  Objective: See treatment diary below    Precautions: *ORTHOSTATIC HYPOTENSION, FALLS RISK, Recent change in cognitive status, Anxiety, History of M I, History of depression, History of prostate CA     Daily Treatment Diary   Exercise Diary           VG Squats  L5 3x10            -         Sit to stands  10x chair  2x10 low mat  3x10 low mat  2x10 low mat 2x10 low mat  2x10 low mat  2x10 low mat         Foam balance  tandem 30"x3, FT 30"x3  tandem 3x30" FT 3x30"  FT 3x30"  FT  3x30"  FT 3x30"    -         Gait training  5'  450'  450'  450', 300'  450',300'  450',400' 250', 350'         Balance course  5'  5'  5'  5'      -         Cog task with balance              -         Hip abduction with YTB     2x10 B/L 2x10 B/L    2x10 B/L 2x10 b/l          Hip Abduction machine        65# 2x10  65#  2x10    -         Mini Squats          2x10    -         Supine bridges            2x10  2x10         Slider abduction            2x10 B/L 2x10 ea                        BP                       Pre: 132/74                        Mid: 110/60                       Post: 126/58                                    Assessment: Tolerated treatment well  Pt req frequent rest breaks and was unwilling to ambulate as far as LV  Patient would benefit from continued PT to improve strength and stability as well as ambulation endurance  Plan: Progress treatment as tolerated        Kevin Ocampo PTA

## 2018-07-12 ENCOUNTER — OFFICE VISIT (OUTPATIENT)
Dept: PHYSICAL THERAPY | Facility: REHABILITATION | Age: 83
End: 2018-07-12
Payer: MEDICARE

## 2018-07-12 DIAGNOSIS — R26.9 GAIT ABNORMALITY: Primary | ICD-10-CM

## 2018-07-12 PROCEDURE — 97530 THERAPEUTIC ACTIVITIES: CPT | Performed by: PHYSICAL THERAPIST

## 2018-07-12 PROCEDURE — 97112 NEUROMUSCULAR REEDUCATION: CPT | Performed by: PHYSICAL THERAPIST

## 2018-07-12 PROCEDURE — G8978 MOBILITY CURRENT STATUS: HCPCS | Performed by: PHYSICAL THERAPIST

## 2018-07-12 PROCEDURE — G8979 MOBILITY GOAL STATUS: HCPCS | Performed by: PHYSICAL THERAPIST

## 2018-07-12 PROCEDURE — 97110 THERAPEUTIC EXERCISES: CPT | Performed by: PHYSICAL THERAPIST

## 2018-07-12 NOTE — PROGRESS NOTES
Daily Note     Today's date: 2018  Patient name: Claude Perea  : 1933  MRN: [id number]  Referring provider: Sherrill Carranza MD  Dx:   Encounter Diagnosis     ICD-10-CM    1  Gait abnormality R26 9                   Subjective: Patient reports having no falls since last tx session and patients wife reports that patient is beginning to ambulate short distances without an A D  Objective: See treatment diary below  Vitals pre-treatment:  Blood pressure: 118/58 mmHg  Pulse: 67 bpm  O2: 97 SpO2    Post-tx: BP: 110/50 mmHg but asymptomatic      Assessment: Tolerated treatment well  Patient demonstrated fatigue post treatment and would benefit from continued PT  Progressed sets and decreased rest breaks and patient was able to perform but evidence of fatigue noted in B/L LE's  Patient educated to continue utilizing an A D for safety with home ambulation  Plan: Continue per plan of care       Precautions: *ORTHOSTATIC HYPOTENSION, FALLS RISK, Recent change in cognitive status, Anxiety, History of M I, History of depression, History of prostate CA     Daily Treatment Diary   Exercise Diary          VG Squats  L5 3x10            -         Sit to stands  10x chair  2x10 low mat  3x10 low mat  2x10 low mat 2x10 low mat  2x10 low mat  2x10 low mat 3x10 low mat       Foam balance  tandem 30"x3, FT 30"x3  tandem 3x30" FT 3x30"  FT 3x30"  FT  3x30"  FT 3x30"    -         Gait training  5'  450'  450'  450', 300'  450',300'  450',400' 250', 350' 450', 300'        Balance course  5'  5'  5'  5'      -         Cog task with balance              -         Hip abduction with YTB     2x10 B/L 2x10 B/L    2x10 B/L 2x10 b/l   2x10 B/L       Hip Abduction machine        65# 2x10  65#  2x10    -         Mini Squats          2x10    -         Supine bridges            2x10  2x10 2x10        Slider abduction            2x10 B/L 2x10 ea                        BP                     Pre: 132/74                        Mid: 110/60                       Post: 126/58

## 2018-07-16 ENCOUNTER — OFFICE VISIT (OUTPATIENT)
Dept: PHYSICAL THERAPY | Facility: REHABILITATION | Age: 83
End: 2018-07-16
Payer: MEDICARE

## 2018-07-16 DIAGNOSIS — R26.9 GAIT ABNORMALITY: Primary | ICD-10-CM

## 2018-07-16 PROCEDURE — 97530 THERAPEUTIC ACTIVITIES: CPT | Performed by: PHYSICAL THERAPIST

## 2018-07-16 PROCEDURE — 97140 MANUAL THERAPY 1/> REGIONS: CPT | Performed by: PHYSICAL THERAPIST

## 2018-07-16 PROCEDURE — 97112 NEUROMUSCULAR REEDUCATION: CPT | Performed by: PHYSICAL THERAPIST

## 2018-07-19 ENCOUNTER — OFFICE VISIT (OUTPATIENT)
Dept: PHYSICAL THERAPY | Facility: REHABILITATION | Age: 83
End: 2018-07-19
Payer: MEDICARE

## 2018-07-19 DIAGNOSIS — R26.9 GAIT ABNORMALITY: Primary | ICD-10-CM

## 2018-07-19 PROCEDURE — G8978 MOBILITY CURRENT STATUS: HCPCS | Performed by: PHYSICAL THERAPIST

## 2018-07-19 PROCEDURE — G8979 MOBILITY GOAL STATUS: HCPCS | Performed by: PHYSICAL THERAPIST

## 2018-07-19 PROCEDURE — 97110 THERAPEUTIC EXERCISES: CPT | Performed by: PHYSICAL THERAPIST

## 2018-07-19 PROCEDURE — 97140 MANUAL THERAPY 1/> REGIONS: CPT | Performed by: PHYSICAL THERAPIST

## 2018-07-19 NOTE — LETTER
2018    [place] [address]    Patient: Daniel Ramirez   YOB: 1933   Date of Visit: 2018     Encounter Diagnosis     ICD-10-CM    1  Gait abnormality R26 9        Dear Dr Shane Velazquez:    Please review the attached Plan of Care from [facility] [place] recent visit  Please verify that you agree therapy should continue by signing the attached document and sending it back to our office  If you have any questions or concerns, please don't hesitate to call  Sincerely,    Nando Garcia, PT      Referring Provider:      I certify that I have read the below Plan of Care and certify the need for these services furnished under this plan of treatment while under my care  Dale King MD  [facility] & [address] [place] [address]  VIA Facsimile: [phone]          PT Evaluation     Today's date: 2018  Patient name: Daniel Ramirez  : 1933  MRN: [id number]  Referring provider: Skyler Garcia MD  Dx:   Encounter Diagnosis     ICD-10-CM    1  Gait abnormality R26 9                   Assessment  Impairments: abnormal gait, abnormal muscle tone, abnormal or restricted ROM, abnormal movement, activity intolerance, difficulty understanding, impaired balance, impaired physical strength, lacks appropriate home exercise program and weight-bearing intolerance    Assessment details: Patient is a 80y o  year old male who attended physical therapy for 10 treatment sessions regarding gait/balance dysfunction and LE weakness  Patient reports 70-80% improvement at this time which correlates to improved FOTO scoring  Patient has shown improvement throughout PT by demonstrating decreased pain, increased strength, improved tolerance to activity and improved gait/balance  Patient continues to present with pain, decreased ROM, decreased strength, and decreased tolerance to activity   Tiffany Prater would benefit from continued physical therapy to address these issues and to maximize function  Thank you  Understanding of Dx/Px/POC: excellent  Goals  STG (4 weeks)  1  Patient will be independent with HEP = PROGRESSING  2  Increase 5-time sit-to-stand by 5 seconds = MET  3  Patient will demonstrate ability to safely perform balance activities while performing cognitive tasks = PROGRESSING  LTG (8 weeks)  1  Increase all hip strength grades by 1 MMT grade = PROGRESSING  2  Patient will demonstrate ability to complete a 6' minute walk test = NOT MET  3  Increase FOTO from 48/100 to > or equal to 52/100 = PROGRESSING  4  Decrease 5XSTS to < or equal to 13 5 seconds for reduced falls risk = PROGRESSING  5  Decrease TUG to < or equal to 13 5 seconds for reduced falls risk = PROGRESSING    Plan  Patient would benefit from: skilled PT  Planned therapy interventions: neuromuscular re-education, patient education, strengthening, stretching, therapeutic exercise, home exercise program, ADL training, balance, cognitive skills and gait training  Frequency: 2x week  Duration in weeks: 8  Treatment plan discussed with: patient        Subjective Evaluation    History of Present Illness  Mechanism of injury: Patient is an 80 y o  male with a history of four falls in the last year, three of them being recent  Two of these falls were when the patient tried to ambulate too soon upon standing, per his wife  The patient was evaluated for orthostatic hypotension during his last hospital visit  Patient also has a history of recent confusion for which he was admitted to the ER and all work up was negative  Patient reports bouts of SOB with short periods of walking   Patients goals for PT are to reduce occurrence of falls and return to PLOF      07/19/2018: Patient reports that he continues to experience significant LE fatigue with prolonged standing/ambulation but noted improvement in strength has correlated to function improvements at home including sit to stands and toileting  Patient reported 1 fall since IE which occurred at night while trying to use the bathroom resulting in floating rib fractures  Patient reports that his goals for PT remain improving ambulatory/functional independence and to return to driving  Quality of life: fair    Pain  No pain reported  Location: 7/10 rib pain s/p fractures  Progression: worsening    Social Support  Steps to enter house: no  Stairs in house: no   Lives in: [place] [facility]  Lives with: spouse    Treatments  Treatments tried: [place]  Patient Goals  Patient goals for therapy: improved balance, increased motion, increased strength and independence with ADLs/IADLs          Objective     Strength/Myotome Testing     Left Hip   Planes of Motion   Flexion: 4    Right Hip   Planes of Motion   Flexion: 4    Left Knee   Flexion: 4+  Extension: 5    Right Knee   Flexion: 4+  Extension: 5    Left Ankle/Foot   Dorsiflexion: 4+    Right Ankle/Foot   Dorsiflexion: 4+    Additional Strength Details  Tandem: > 30 seconds (2018:  Semi-tandem: ~ 15 seconds B/L (2018: Full tandem: Unable (2018:  SLS: Unable (2018:    Ambulation     Ambulation: Level Surfaces   Ambulation with assistive device: independent    Additional Level Surfaces Ambulation Details  Patient utilizing a RW for ambulation (REMAINS)    Observational Gait   Walking speed and stride length within functional limits  Functional Assessment     Comments  5-time STS: 28 52 seconds with B/L UE push off (2018: 18 93 seconds)  TU 90 seconds with B/L UE push off (2018: 13 73 seconds without UE support)  MOCA:  (NP)  6MWT: 600 ft @ 3'13" - unable to complete    Orthostatic hypotension:  Supine - 148/60 (2018: 170/62)  Sitting - 124/50 (2018: 140/42)  Standing - 98/48 (2018: 106/58)       Precautions: *ORTHOSTATIC HYPOTENSION, FALLS RISK, Recent change in cognitive status, Anxiety, History of M  I, History of depression, History of prostate CA     Daily Treatment Diary   Exercise Diary  6/18 6/21 6/25 6/28  7/2 7/5 7/9 7/12 7/16 7/19    VG Squats  L5 3x10            -         Sit to stands  10x chair  2x10 low mat  3x10 low mat  2x10 low mat 2x10 low mat  2x10 low mat  2x10 low mat 3x10 low mat  3x12 low mat 2x10 low mat    Foam balance  tandem 30"x3, FT 30"x3  tandem 3x30" FT 3x30"  FT 3x30"  FT  3x30"  FT 3x30"    -      FT 3x30"   Gait training  5'  450'  450'  450', 300'  450',300'  450',400' 250', 350' 450', 300'   525',450'  6MWT - 600 ft     Balance course  5'  5'  5'  5'      -         Cog task with balance              -         Hip abduction with YTB     2x10 B/L 2x10 B/L    2x10 B/L 2x10 b/l   2x10 B/L 2x10 B/L     Hip Abduction machine        65# 2x10  65#  2x10    -         Mini Squats          2x10    -         Supine bridges            2x10  2x10 2x10   2x10x5"     Slider abduction            2x10 B/L 2x10 ea                        BP                       Pre: 132/74                        Mid: 110/60         Re-evaluation              Post: 126/58      30'    Manual Quad stretching                  3x30" B/L

## 2018-07-19 NOTE — PROGRESS NOTES
PT Evaluation     Today's date: 2018  Patient name: Nini Carroll  : 1933  MRN: [id number]  Referring provider: Desmond Chaudhari MD  Dx:   Encounter Diagnosis     ICD-10-CM    1  Gait abnormality R26 9                   Assessment  Impairments: abnormal gait, abnormal muscle tone, abnormal or restricted ROM, abnormal movement, activity intolerance, difficulty understanding, impaired balance, impaired physical strength, lacks appropriate home exercise program and weight-bearing intolerance    Assessment details: Patient is a 80y o  year old male who attended physical therapy for 10 treatment sessions regarding gait/balance dysfunction and LE weakness  Patient reports 70-80% improvement at this time which correlates to improved FOTO scoring  Patient has shown improvement throughout PT by demonstrating decreased pain, increased strength, improved tolerance to activity and improved gait/balance  Patient continues to present with pain, decreased ROM, decreased strength, and decreased tolerance to activity  Latoya Patel would benefit from continued physical therapy to address these issues and to maximize function  Thank you  Understanding of Dx/Px/POC: excellent  Goals  STG (4 weeks)  1  Patient will be independent with HEP = PROGRESSING  2  Increase 5-time sit-to-stand by 5 seconds = MET  3  Patient will demonstrate ability to safely perform balance activities while performing cognitive tasks = PROGRESSING  LTG (8 weeks)  1  Increase all hip strength grades by 1 MMT grade = PROGRESSING  2  Patient will demonstrate ability to complete a 6' minute walk test = NOT MET  3  Increase FOTO from 48/100 to > or equal to 52/100 = PROGRESSING  4  Decrease 5XSTS to < or equal to 13 5 seconds for reduced falls risk = PROGRESSING  5   Decrease TUG to < or equal to 13 5 seconds for reduced falls risk = PROGRESSING    Plan  Patient would benefit from: skilled PT  Planned therapy interventions: neuromuscular re-education, patient education, strengthening, stretching, therapeutic exercise, home exercise program, ADL training, balance, cognitive skills and gait training  Frequency: 2x week  Duration in weeks: 8  Treatment plan discussed with: patient        Subjective Evaluation    History of Present Illness  Mechanism of injury: Patient is an 80 y o  male with a history of four falls in the last year, three of them being recent  Two of these falls were when the patient tried to ambulate too soon upon standing, per his wife  The patient was evaluated for orthostatic hypotension during his last hospital visit  Patient also has a history of recent confusion for which he was admitted to the ER and all work up was negative  Patient reports bouts of SOB with short periods of walking  Patients goals for PT are to reduce occurrence of falls and return to PLOF      07/19/2018: Patient reports that he continues to experience significant LE fatigue with prolonged standing/ambulation but noted improvement in strength has correlated to function improvements at home including sit to stands and toileting  Patient reported 1 fall since IE which occurred at night while trying to use the bathroom resulting in floating rib fractures  Patient reports that his goals for PT remain improving ambulatory/functional independence and to return to driving  Quality of life: fair    Pain  No pain reported  Location: 7/10 rib pain s/p fractures  Progression: worsening    Social Support  Steps to enter house: no  Stairs in house: no   Lives in: [facility]  Lives with: spouse    Treatments  Treatments tried: [place]    Patient Goals  Patient goals for therapy: improved balance, increased motion, increased strength and independence with ADLs/IADLs          Objective     Strength/Myotome Testing     Left Hip   Planes of Motion   Flexion: 4    Right Hip   Planes of Motion   Flexion: 4    Left Knee   Flexion: 4+  Extension: 5    Right Knee   Flexion: 4+  Extension: 5    Left Ankle/Foot   Dorsiflexion: 4+    Right Ankle/Foot   Dorsiflexion: 4+    Additional Strength Details  Tandem: > 30 seconds (2018:  Semi-tandem: ~ 15 seconds B/L (2018: Full tandem: Unable (2018:  SLS: Unable (2018:    Ambulation     Ambulation: Level Surfaces   Ambulation with assistive device: independent    Additional Level Surfaces Ambulation Details  Patient utilizing a RW for ambulation (REMAINS)    Observational Gait   Walking speed and stride length within functional limits  Functional Assessment     Comments  5-time STS: 28 52 seconds with B/L UE push off (2018: 18 93 seconds)  TU 90 seconds with B/L UE push off (2018: 13 73 seconds without UE support)  MOCA:  (NP)  6MWT: 600 ft @ 3'13" - unable to complete    Orthostatic hypotension:  Supine - 148/60 (2018: 170/62)  Sitting - 124/50 (2018: 140/42)  Standing - 98/48 (2018: 106/58)       Precautions: *ORTHOSTATIC HYPOTENSION, FALLS RISK, Recent change in cognitive status, Anxiety, History of M I, History of depression, History of prostate CA     Daily Treatment Diary   Exercise Diary    7  7    VG Squats  L5 3x10            -         Sit to stands  10x chair  2x10 low mat  3x10 low mat  2x10 low mat 2x10 low mat  2x10 low mat  2x10 low mat 3x10 low mat  3x12 low mat 2x10 low mat    Foam balance  tandem 30"x3, FT 30"x3  tandem 3x30" FT 3x30"  FT 3x30"  FT  3x30"  FT 3x30"    -      FT 3x30"   Gait training  5'  450'  450'  450', 300'  450',300'  450',400' 250', 350' 450', 300'   525',450'  6MWT - 600 ft     Balance course  5'  5'  5'  5'      -         Cog task with balance              -         Hip abduction with YTB     2x10 B/L 2x10 B/L    2x10 B/L 2x10 b/l   2x10 B/L 2x10 B/L     Hip Abduction machine        65# 2x10  65#  2x10    -         Mini Squats          2x10    -         Supine bridges            2x10  2x10 2x10   2x10x5"     Slider abduction            2x10 B/L 2x10 ea                        BP                       Pre: 132/74                        Mid: 110/60         Re-evaluation              Post: 126/58      30'    Manual Quad stretching                  3x30" B/L

## 2018-07-23 ENCOUNTER — OFFICE VISIT (OUTPATIENT)
Dept: PHYSICAL THERAPY | Facility: REHABILITATION | Age: 83
End: 2018-07-23
Payer: MEDICARE

## 2018-07-23 DIAGNOSIS — R26.9 GAIT ABNORMALITY: Primary | ICD-10-CM

## 2018-07-23 PROCEDURE — 97112 NEUROMUSCULAR REEDUCATION: CPT | Performed by: PHYSICAL THERAPIST

## 2018-07-23 PROCEDURE — 97530 THERAPEUTIC ACTIVITIES: CPT | Performed by: PHYSICAL THERAPIST

## 2018-07-23 PROCEDURE — 97110 THERAPEUTIC EXERCISES: CPT | Performed by: PHYSICAL THERAPIST

## 2018-07-23 NOTE — PROGRESS NOTES
Daily Note     Today's date: 2018  Patient name: Daniel Ramirez  : 1933  MRN: [id number]  Referring provider: Skyler Garcia MD  Dx:   Encounter Diagnosis     ICD-10-CM    1  Gait abnormality R26 9                   Subjective: Patient reports having no increased soreness or fatigue pre-tx and states that he is "getting around a little better" at home  Objective: See treatment diary below  Vitals pre-tx: BP = 172/70 in sitting; 130/66 in standing; HR = 72-94  *Vitals fluctuated between numbers above during tx session      Assessment: Tolerated treatment well  Patient demonstrated fatigue post treatment and exhibited good technique with therapeutic exercises  With addition of SPC during ambulation patient demonstrated reduced Trendelenburg pattern and improved ambulatory endurance  LE strength/power improving as per patients ability to perform increased reps/speed of sit to stands  Plan: Continue per plan of care  Precautions: *ORTHOSTATIC HYPOTENSION, FALLS RISK, Recent change in cognitive status, Anxiety, History of M I, History of depression, History of prostate CA     Daily Treatment Diary   Exercise Diary      VG Squats              Sit to stands 3x15         2x10 low mat    Foam balance 4x30" tandem          FT 3x30"   Gait training W/ SPC  750'/600  '          6MWT - 600 ft     Balance course              Cog task with balance              Hip abduction with YTB OTB  2x10             Hip Abduction machine              Mini Squats              Supine bridges              Slider abduction                                                           Re-evaluation            30'    Manual Quad stretching

## 2018-07-24 ENCOUNTER — TRANSCRIBE ORDERS (OUTPATIENT)
Dept: LAB | Facility: CLINIC | Age: 83
End: 2018-07-24

## 2018-07-24 ENCOUNTER — APPOINTMENT (OUTPATIENT)
Dept: LAB | Facility: CLINIC | Age: 83
End: 2018-07-24
Payer: MEDICARE

## 2018-07-24 DIAGNOSIS — C61 MALIGNANT NEOPLASM OF PROSTATE (HCC): ICD-10-CM

## 2018-07-24 DIAGNOSIS — C61 MALIGNANT NEOPLASM OF PROSTATE (HCC): Primary | ICD-10-CM

## 2018-07-24 LAB — PSA SERPL-MCNC: <0.1 NG/ML (ref 0–4)

## 2018-07-24 PROCEDURE — 84153 ASSAY OF PSA TOTAL: CPT

## 2018-07-25 ENCOUNTER — OFFICE VISIT (OUTPATIENT)
Dept: PHYSICAL THERAPY | Facility: REHABILITATION | Age: 83
End: 2018-07-25
Payer: MEDICARE

## 2018-07-25 DIAGNOSIS — R26.9 GAIT ABNORMALITY: Primary | ICD-10-CM

## 2018-07-25 PROCEDURE — 97112 NEUROMUSCULAR REEDUCATION: CPT | Performed by: PHYSICAL THERAPIST

## 2018-07-25 PROCEDURE — 97530 THERAPEUTIC ACTIVITIES: CPT | Performed by: PHYSICAL THERAPIST

## 2018-07-25 NOTE — PROGRESS NOTES
Daily Note     Today's date: 2018  Patient name: Vivian Sahu  : 1933  MRN: [id number]  Referring provider: Wilian Wood MD  Dx:   Encounter Diagnosis     ICD-10-CM    1  Gait abnormality R26 9                   Subjective: Patient reports having no falls or fatigue pre-tx and states that he has been ambulating around his home with a SPC  Objective: See treatment diary below      Assessment: Tolerated treatment well  Patient demonstrated fatigue post treatment and exhibited good technique with therapeutic exercises  Fatigue noted with maze balance including difficulty with movement scaling with SLS cone taps  Progressed quad strengthening and patient tolerated well  Plan: Continue per plan of care  Precautions: *ORTHOSTATIC HYPOTENSION, FALLS RISK, Recent change in cognitive status, Anxiety, History of M I, History of depression, History of prostate CA     Daily Treatment Diary   Exercise Diary      VG Squats              Sit to stands 3x15         2x10 low mat    Foam balance 4x30" tandem          FT 3x30"   Gait training W/ SPC  750'/600  '          6MWT - 600 ft     Balance course  Cones, weaving, side stepping, SLS  x16            Cog task with balance              Hip abduction with YTB OTB  2x10 OTB  2x10 B/L            Hip Abduction machine              Mini Squats              Supine bridges              Slider abduction              Wall squats   2x10 w/ red ball                                          Re-evaluation            30'    Manual Quad stretching

## 2018-08-01 ENCOUNTER — APPOINTMENT (EMERGENCY)
Dept: RADIOLOGY | Facility: HOSPITAL | Age: 83
End: 2018-08-01
Payer: MEDICARE

## 2018-08-01 ENCOUNTER — OFFICE VISIT (OUTPATIENT)
Dept: PHYSICAL THERAPY | Facility: REHABILITATION | Age: 83
End: 2018-08-01
Payer: MEDICARE

## 2018-08-01 ENCOUNTER — HOSPITAL ENCOUNTER (EMERGENCY)
Facility: HOSPITAL | Age: 83
Discharge: HOME | End: 2018-08-01
Attending: EMERGENCY MEDICINE | Admitting: EMERGENCY MEDICINE
Payer: MEDICARE

## 2018-08-01 VITALS
OXYGEN SATURATION: 96 % | SYSTOLIC BLOOD PRESSURE: 195 MMHG | DIASTOLIC BLOOD PRESSURE: 79 MMHG | HEART RATE: 50 BPM | RESPIRATION RATE: 16 BRPM

## 2018-08-01 DIAGNOSIS — R55 NEAR SYNCOPE: Primary | ICD-10-CM

## 2018-08-01 DIAGNOSIS — I95.1 ORTHOSTATIC HYPOTENSION: ICD-10-CM

## 2018-08-01 DIAGNOSIS — R26.9 GAIT ABNORMALITY: Primary | ICD-10-CM

## 2018-08-01 LAB
ANION GAP SERPL CALCULATED.3IONS-SCNC: 7 MMOL/L (ref 4–13)
ATRIAL RATE: 60 BPM
BASOPHILS # BLD AUTO: 0.02 THOUSANDS/ΜL (ref 0–0.1)
BASOPHILS NFR BLD AUTO: 0 % (ref 0–1)
BUN SERPL-MCNC: 12 MG/DL (ref 5–25)
CALCIUM SERPL-MCNC: 10 MG/DL (ref 8.3–10.1)
CHLORIDE SERPL-SCNC: 103 MMOL/L (ref 100–108)
CO2 SERPL-SCNC: 28 MMOL/L (ref 21–32)
CREAT SERPL-MCNC: 0.85 MG/DL (ref 0.6–1.3)
EOSINOPHIL # BLD AUTO: 0.11 THOUSAND/ΜL (ref 0–0.61)
EOSINOPHIL NFR BLD AUTO: 2 % (ref 0–6)
ERYTHROCYTE [DISTWIDTH] IN BLOOD BY AUTOMATED COUNT: 13.7 % (ref 11.6–15.1)
GFR SERPL CREATININE-BSD FRML MDRD: 79 ML/MIN/1.73SQ M
GLUCOSE SERPL-MCNC: 109 MG/DL (ref 65–140)
HCT VFR BLD AUTO: 37.9 % (ref 36.5–49.3)
HGB BLD-MCNC: 12.4 G/DL (ref 12–17)
IMM GRANULOCYTES # BLD AUTO: 0.02 THOUSAND/UL (ref 0–0.2)
IMM GRANULOCYTES NFR BLD AUTO: 0 % (ref 0–2)
LYMPHOCYTES # BLD AUTO: 2.34 THOUSANDS/ΜL (ref 0.6–4.47)
LYMPHOCYTES NFR BLD AUTO: 32 % (ref 14–44)
MCH RBC QN AUTO: 27.2 PG (ref 26.8–34.3)
MCHC RBC AUTO-ENTMCNC: 32.7 G/DL (ref 31.4–37.4)
MCV RBC AUTO: 83 FL (ref 82–98)
MONOCYTES # BLD AUTO: 0.44 THOUSAND/ΜL (ref 0.17–1.22)
MONOCYTES NFR BLD AUTO: 6 % (ref 4–12)
NEUTROPHILS # BLD AUTO: 4.47 THOUSANDS/ΜL (ref 1.85–7.62)
NEUTS SEG NFR BLD AUTO: 60 % (ref 43–75)
NRBC BLD AUTO-RTO: 0 /100 WBCS
P AXIS: 26 DEGREES
PLATELET # BLD AUTO: 152 THOUSANDS/UL (ref 149–390)
PMV BLD AUTO: 10.6 FL (ref 8.9–12.7)
POTASSIUM SERPL-SCNC: 4.3 MMOL/L (ref 3.5–5.3)
PR INTERVAL: 190 MS
QRS AXIS: -14 DEGREES
QRSD INTERVAL: 112 MS
QT INTERVAL: 466 MS
QTC INTERVAL: 466 MS
RBC # BLD AUTO: 4.56 MILLION/UL (ref 3.88–5.62)
SODIUM SERPL-SCNC: 138 MMOL/L (ref 136–145)
T WAVE AXIS: 7 DEGREES
TROPONIN I SERPL-MCNC: <0.02 NG/ML
TSH SERPL DL<=0.05 MIU/L-ACNC: 1.48 UIU/ML (ref 0.36–3.74)
VENTRICULAR RATE: 60 BPM
WBC # BLD AUTO: 7.4 THOUSAND/UL (ref 4.31–10.16)

## 2018-08-01 PROCEDURE — 80048 BASIC METABOLIC PNL TOTAL CA: CPT | Performed by: EMERGENCY MEDICINE

## 2018-08-01 PROCEDURE — 97112 NEUROMUSCULAR REEDUCATION: CPT | Performed by: PHYSICAL THERAPIST

## 2018-08-01 PROCEDURE — 97530 THERAPEUTIC ACTIVITIES: CPT | Performed by: PHYSICAL THERAPIST

## 2018-08-01 PROCEDURE — G8979 MOBILITY GOAL STATUS: HCPCS

## 2018-08-01 PROCEDURE — G8980 MOBILITY D/C STATUS: HCPCS

## 2018-08-01 PROCEDURE — 85025 COMPLETE CBC W/AUTO DIFF WBC: CPT | Performed by: EMERGENCY MEDICINE

## 2018-08-01 PROCEDURE — 84484 ASSAY OF TROPONIN QUANT: CPT | Performed by: EMERGENCY MEDICINE

## 2018-08-01 PROCEDURE — 36415 COLL VENOUS BLD VENIPUNCTURE: CPT | Performed by: EMERGENCY MEDICINE

## 2018-08-01 PROCEDURE — 90471 IMMUNIZATION ADMIN: CPT

## 2018-08-01 PROCEDURE — 90715 TDAP VACCINE 7 YRS/> IM: CPT | Performed by: EMERGENCY MEDICINE

## 2018-08-01 PROCEDURE — 84443 ASSAY THYROID STIM HORMONE: CPT | Performed by: EMERGENCY MEDICINE

## 2018-08-01 PROCEDURE — 70450 CT HEAD/BRAIN W/O DYE: CPT

## 2018-08-01 PROCEDURE — 97163 PT EVAL HIGH COMPLEX 45 MIN: CPT

## 2018-08-01 PROCEDURE — 93010 ELECTROCARDIOGRAM REPORT: CPT | Performed by: INTERNAL MEDICINE

## 2018-08-01 PROCEDURE — 99285 EMERGENCY DEPT VISIT HI MDM: CPT

## 2018-08-01 PROCEDURE — 93005 ELECTROCARDIOGRAM TRACING: CPT

## 2018-08-01 PROCEDURE — 97110 THERAPEUTIC EXERCISES: CPT | Performed by: PHYSICAL THERAPIST

## 2018-08-01 PROCEDURE — G8978 MOBILITY CURRENT STATUS: HCPCS

## 2018-08-01 RX ADMIN — TETANUS TOXOID, REDUCED DIPHTHERIA TOXOID AND ACELLULAR PERTUSSIS VACCINE, ADSORBED 0.5 ML: 5; 2.5; 8; 8; 2.5 SUSPENSION INTRAMUSCULAR at 17:19

## 2018-08-01 NOTE — ED PROVIDER NOTES
History  Chief Complaint   Patient presents with    Hypotension     pt was at PT for orthostatic hypotension as he was leaving had an episode  was aided to ground [place] wife but has abbrasion to right arm     HPI     60-year-old with past male medical history with past medical history acute myocardial infarction status post CABG, depression hyperlipidemia presents with a near syncopal episodes  Patient has a history of orthostatic hypotension  Patient was at physical therapy for this and had a presyncopal episode  Patient was caught by his wife  Physical therapist took his blood pressure was found to be hypotensive 90/50  Patient states he has had multiple admissions over this year for this  Patient states he was diagnosed with this with his cardiologist through a "table test"  Patient does take metoprolol and ACE-inhibitor which she did not take he usually takes in the evening  Patient is asymptomatic at this time  Patient denies head strike or LOC  Patient remembers all events leading up to and after the fall  Patient admits to feeling lightheaded but denies other symptoms  Patient denies fever chills rigors headache lightheadedness dizziness vision changes chest pain palpitations shortness of breath cough pleurisy abdominal pain nausea vomiting diarrhea constipation urinary symptoms motor weakness numbness and tingling    Prior to Admission Medications   Prescriptions Last Dose Informant Patient Reported? Taking?    Calcium Citrate (CITRACAL PO)  Self Yes No   Sig: Take by mouth   aspirin 81 MG tablet  Spouse/Significant Other Yes No   Sig: Take 1 tablet by mouth daily   docusate sodium (COLACE) 100 mg capsule   No No   Sig: Take 1 capsule (100 mg total) by mouth 2 (two) times a day   enalapril (VASOTEC) 2 5 mg tablet   Yes No   Sig: Take 2 5 mg by mouth 2 (two) times a day   ferrous sulfate 325 (65 Fe) mg tablet  Spouse/Significant Other Yes No   Sig: Take 1 tablet by mouth daily   gabapentin (NEURONTIN) 300 mg capsule   No No   Sig: Take 1 capsule (300 mg total) by mouth daily at bedtime Take 2 tablets q hs   metoprolol tartrate (LOPRESSOR) 25 mg tablet   No No   Sig: TAKE 1 TABLET BY MOUTH IN  THE MORNING AND 1 TABLET BY MOUTH IN THE EVENING   omeprazole (PriLOSEC) 40 MG capsule   No No   Sig: TAKE 1 CAPSULE BY MOUTH  DAILY   sertraline (ZOLOFT) 50 mg tablet   No No   Sig: TAKE 1 TABLET BY MOUTH  EVERY DAY   simvastatin (ZOCOR) 80 mg tablet   No No   Sig: TAKE 1 TABLET BY MOUTH  DAILY      Facility-Administered Medications: None       Past Medical History:   Diagnosis Date    [facility] infarction ([facility] )     Anxiety     Arthritis     Brain neoplasm ([facility] ) 11/1999    brain tumor    Depression     Hypercholesterolemia     Narcolepsy     daytime drowsiness and dozing off occasionally    Palpitations     Prostate cancer (HCC)        Past Surgical History:   Procedure Laterality Date    BACK SURGERY      BRAIN SURGERY  11/08/1999    CORONARY ARTERY BYPASS GRAFT      x5       Family History   Problem Relation Age of Onset    Hypertension Mother         benign essential    Cancer Mother     Osteoporosis Mother     Suicidality Father     Diabetes Family     Heart disease Family     Neuropathy Family         peripheral    Prostate cancer Family     Thyroid disease Family      I have reviewed and agree with the history as documented  Social History   Substance Use Topics    Smoking status: Former Smoker     Types: Cigars    Smokeless tobacco: Never Used      Comment: former cig smoker- quit in 1992    Alcohol use No      Comment: (history)        Review of Systems   Constitutional: Negative for activity change, appetite change, chills, diaphoresis, fatigue, fever and unexpected weight change     HENT: Negative for congestion, ear discharge, ear pain, facial swelling, hearing loss, nosebleeds, postnasal drip, rhinorrhea, sinus pressure, sneezing, sore throat, tinnitus and trouble swallowing  Eyes: Negative for photophobia, pain, redness, itching and visual disturbance  Respiratory: Negative for cough, chest tightness, shortness of breath, wheezing and stridor  Cardiovascular: Negative for chest pain, palpitations and leg swelling  Gastrointestinal: Negative for abdominal distention, abdominal pain, anal bleeding, blood in stool, constipation, diarrhea, nausea and vomiting  Endocrine: Negative for cold intolerance, heat intolerance, polydipsia, polyphagia and polyuria  Genitourinary: Negative for decreased urine volume, difficulty urinating, dysuria, enuresis, flank pain, frequency, hematuria and urgency  Musculoskeletal: Negative for arthralgias, back pain, gait problem, joint swelling, myalgias, neck pain and neck stiffness  Skin: Negative for rash and wound  Allergic/Immunologic: Negative for environmental allergies, food allergies and immunocompromised state  Neurological: Positive for light-headedness  Negative for dizziness, tremors, seizures, syncope, facial asymmetry, speech difficulty, weakness, numbness and headaches  Hematological: Negative for adenopathy  Psychiatric/Behavioral: Negative for agitation, behavioral problems, confusion, decreased concentration, dysphoric mood, hallucinations, self-injury, sleep disturbance and suicidal ideas  The patient is not nervous/anxious and is not hyperactive  Physical Exam  ED Triage Vitals [08/01/18 1349]   Temp Pulse Respirations Blood Pressure SpO2   -- (!) 50 16 (!) 195/79 96 %      Temp src Heart Rate Source Patient Position - Orthostatic VS BP Location FiO2 (%)   -- Monitor Sitting Right arm --      Pain Score       No Pain           Orthostatic Vital Signs  Vitals:    08/01/18 1349   BP: (!) 195/79   Pulse: (!) 50   Patient Position - Orthostatic VS: Sitting       Physical Exam   Constitutional: He is oriented to person, place, and time  He appears well-developed and well-nourished  No distress     HENT: Head: Normocephalic and atraumatic  Right Ear: External ear normal    Left Ear: External ear normal    Nose: Nose normal    Mouth/Throat: Oropharynx is clear and moist  No oropharyngeal exudate  Eyes: Conjunctivae and EOM are normal  Pupils are equal, round, and reactive to light  Right eye exhibits no discharge  Left eye exhibits no discharge  No scleral icterus  Neck: Normal range of motion  Neck supple  No JVD present  No tracheal deviation present  No thyromegaly present  Cardiovascular: Normal rate, regular rhythm, normal heart sounds and intact distal pulses  No murmur heard  Pulmonary/Chest: Effort normal and breath sounds normal  No stridor  No respiratory distress  He has no wheezes  He has no rales  Abdominal: Soft  Bowel sounds are normal  He exhibits no distension and no mass  There is no tenderness  There is no rebound and no guarding  Musculoskeletal: Normal range of motion  He exhibits no edema, tenderness or deformity  Lymphadenopathy:     He has no cervical adenopathy  Neurological: He is alert and oriented to person, place, and time  No cranial nerve deficit or sensory deficit  He exhibits normal muscle tone  Coordination normal  GCS eye subscore is 4  GCS verbal subscore is 5  GCS motor subscore is 6  Reflex Scores:       Tricep reflexes are 2+ on the right side and 2+ on the left side  Bicep reflexes are 2+ on the right side and 2+ on the left side  Patellar reflexes are 2+ on the right side and 2+ on the left side  Achilles reflexes are 2+ on the right side and 2+ on the left side    5/5 strength in upper lower extremities, sensation intact throughout, cerebellar testing including finger-to-nose heel-to-shin rapid alternating movements are intact, cranial nerves 2-12 intact, patient is able to ambulate without difficulty, tandem gait intact with cane, patient uses cane at baseline, patient is at baseline, speech is normal, visual fields are intact, no pronator drift, Romberg negative   Skin: Skin is warm and dry  No rash noted  He is not diaphoretic  No erythema  Psychiatric: He has a normal mood and affect  His behavior is normal  Judgment and thought content normal    Nursing note and vitals reviewed  ED Medications  Medications   tetanus-diphtheria-acellular pertussis (BOOSTRIX) IM injection 0 5 mL (0 5 mL Intramuscular Given 8/1/18 1719)       Diagnostic Studies  Results Reviewed     Procedure Component Value Units Date/Time    Basic metabolic panel [[id number]] Collected:  08/01/18 1608    Lab Status:  Final result Specimen:  Blood from Arm, Left Updated:  08/01/18 1706     Sodium 138 mmol/L      Potassium 4 3 mmol/L      Chloride 103 mmol/L      CO2 28 mmol/L      Anion Gap 7 mmol/L      BUN 12 mg/dL      Creatinine 0 85 mg/dL      Glucose 109 mg/dL      Calcium 10 0 mg/dL      eGFR 79 ml/min/1 73sq m     Narrative:         National Kidney Disease Education Program recommendations are as follows:  GFR calculation is accurate only with a steady state creatinine  Chronic Kidney disease less than 60 ml/min/1 73 sq  meters  Kidney failure less than 15 ml/min/1 73 sq  meters  TSH, 3rd generation with Free T4 reflex [[id number]]  (Normal) Collected:  08/01/18 1608    Lab Status:  Final result Specimen:  Blood from Arm, Left Updated:  08/01/18 1706     TSH 3RD GENERATON 1 480 uIU/mL     Narrative:         Patients undergoing fluorescein dye angiography may retain small amounts of fluorescein in the body for 48-72 hours post procedure  Samples containing fluorescein can produce falsely depressed TSH values  If the patient had this procedure,a specimen should be resubmitted post fluorescein clearance      Troponin I [[id number]]  (Normal) Collected:  08/01/18 1608    Lab Status:  Final result Specimen:  Blood from Arm, Left Updated:  08/01/18 1632     Troponin I <0 02 ng/mL     CBC and differential [[id number]] Collected:  08/01/18 1608    Lab Status:  Final result Specimen:  Blood from Arm, Left Updated:  08/01/18 1615     WBC 7 40 Thousand/uL      RBC 4 56 Million/uL      Hemoglobin 12 4 g/dL      Hematocrit 37 9 %      MCV 83 fL      MCH 27 2 pg      MCHC 32 7 g/dL      RDW 13 7 %      MPV 10 6 fL      Platelets 921 Thousands/uL      nRBC 0 /100 WBCs      Neutrophils Relative 60 %      Immat GRANS % 0 %      Lymphocytes Relative 32 %      Monocytes Relative 6 %      Eosinophils Relative 2 %      Basophils Relative 0 %      Neutrophils Absolute 4 47 Thousands/µL      Immature Grans Absolute 0 02 Thousand/uL      Lymphocytes Absolute 2 34 Thousands/µL      Monocytes Absolute 0 44 Thousand/µL      Eosinophils Absolute 0 11 Thousand/µL      Basophils Absolute 0 02 Thousands/µL                  CT head without contrast   Final Result by Aleena Umanzor MD (08/01 1726)      No acute intracranial abnormality  Stable bifrontal encephalomalacia and gliosis, left greater than right  Stable anterior falx meningioma  No significant interval change when compared to a CT brain dated May 25, 2018              Workstation performed: [id number]               Procedures  ECG 12 Lead Documentation  Date/Time: 8/1/2018 5:00 PM  Performed by: Óscar Spence  Authorized by: Óscar Spence     Indications / Diagnosis:  Lightheadedness  Comments:      Ventricular Rate BPM 60   Atrial Rate BPM 60   TN Interval ms 190   QRSD Interval ms 112   QT Interval ms 466   QTC Interval ms 466   P Axis degrees 26   QRS Axis degrees -14   T Wave Axis degrees 7   Narrative       Normal sinus rhythm  Incomplete right bundle branch block  Borderline ECG  Incomplete right bundle branch block is now Present  Criteria for Anterior infarct are no longer Present                Phone Consults  ED Phone Contact    ED Course  ED Course as of Aug 03 0753   Wed Aug 01, 2018   1627 PT will evaluate patient,  will assess despite all at this time    7011 Impression:      No acute intracranial abnormality  Stable bifrontal encephalomalacia and gliosis, left greater than right   Stable anterior falx meningioma   No significant interval change when compared to a CT brain dated May 25, 2018         3911 Subjective: Patient reports no falls or symptoms since last tx session  Patient reports moderate compliance with quiet standing prior to ambulation          Objective: See treatment diary below     BP: 130/70 ; standing BP: 120/56        Assessment: Tolerated treatment well  Patient demonstrated fatigue post treatment and exhibited good technique with therapeutic exercises  Significant Trendelenburg remains with fatigue from prolonged ambulation  Improved SLS including neuro control without knocking down cones                                     MDM  Number of Diagnoses or Management Options  Near syncope:   Orthostatic hypotension:   Diagnosis management comments: 80-year-old female presenting with chief complaint near syncope  Patient fell  On exam vital signs show hypertension  Nonfocal neurologic exam   Differential diagnosis includes but not limited to orthostatic hypo tension, patient has a history of this, dehydration, ACS and arrhythmia  CT head no acute findings  No with me in noted on EKG troponin negative  Otherwise workup negative  Physical therapy consulted who recommended outpatient physical therapy, patient able to ambulate with physical therapy without difficulty and patient ambulated with myself without difficulty  ED return precautions discussed  Patient will follow up with PCP  Reviewed prior admissions, patient has been admitted multiple times orthostatic hypotension and falls  Patient will follow up with PCP and cardiologist for medication titration  Patient will continue with physical therapy      CritCare Time    Disposition  Final diagnoses:   Near syncope   Orthostatic hypotension     Time reflects when diagnosis was documented in both MDM as applicable and the Disposition within this note     Time User Action Codes Description Comment    8/1/2018  5:33 PM Desmond Antonio Add [R55] Near syncope     8/1/2018  5:33 PM Fernie Rivas Add [I10] Orthostatic hypertension     8/1/2018  5:33 PM Fernie Rivas Remove [I10] Orthostatic hypertension     8/1/2018  5:34 PM Fernie Rivas Add [I95 1] Orthostatic hypotension       ED Disposition     ED Disposition Condition Comment    Discharge  Vivian Sahu discharge to home/self care  Condition at discharge: Good    Return precautions were discussed with patient  Patient understands when to return to  Emergency department  Patient agrees to discharge plan and follow up care             Follow-up Information     Follow up With Specialties Details Why Contact Info Additional Information    Dory Martin MD Cardiology, Multidisciplinary Call in 1 day  [facility] [phone]       [facility] [address] Emergency Department Emergency Medicine Go to As needed, If symptoms worsen [address]  [phone]  ED, [address] [place] [address], [id number]          Discharge Medication List as of 8/1/2018  5:34 PM      CONTINUE these medications which have NOT CHANGED    Details   aspirin 81 MG tablet Take 1 tablet by mouth daily, Historical Med      Calcium Citrate (CITRACAL PO) Take by mouth, Historical Med      docusate sodium (COLACE) 100 mg capsule Take 1 capsule (100 mg total) by mouth 2 (two) times a day, Starting Wed 5/30/2018, Print      enalapril (VASOTEC) 2 5 mg tablet Take 2 5 mg by mouth 2 (two) times a day, Historical Med      ferrous sulfate 325 (65 Fe) mg tablet Take 1 tablet by mouth daily, Starting Tue 10/31/2017, Historical Med      gabapentin (NEURONTIN) 300 mg capsule Take 1 capsule (300 mg total) by mouth daily at bedtime Take 2 tablets q hs, Starting Wed 5/30/2018, No Print      metoprolol tartrate (LOPRESSOR) 25 mg tablet TAKE 1 TABLET BY MOUTH IN  THE MORNING AND 1 TABLET BY MOUTH IN THE EVENING, Normal      omeprazole (PriLOSEC) 40 MG capsule TAKE 1 CAPSULE BY MOUTH  DAILY, Normal      sertraline (ZOLOFT) 50 mg tablet TAKE 1 TABLET BY MOUTH  EVERY DAY, Normal      simvastatin (ZOCOR) 80 mg tablet TAKE 1 TABLET BY MOUTH  DAILY, Starting Tue 4/10/2018, Normal           No discharge procedures on file  ED Provider  Attending physically available and evaluated Katherine Dorado I managed the patient along with the ED Attending      Electronically Signed by         Ashley Jimenez DO  08/03/18 6766

## 2018-08-01 NOTE — DISCHARGE INSTRUCTIONS
Hypotension   WHAT YOU NEED TO KNOW:   Hypotension is a condition that causes your blood pressure (BP) to drop lower than it should be  Hypotension may be mild, serious, or life-threatening  DISCHARGE INSTRUCTIONS:   Medicines:   · Alpha-adrenoreceptor agonists: These medicines may increase your BP and decrease your symptoms  Take these medicines as directed  · Steroids: This medicine helps prevent salt loss from your body  Steroids may also help increase the amount of fluid in your body and raise your BP  · Vasopressors: These medicines help constrict (make smaller) your blood vessels and increase your BP  Vasopressor medicines may increase the blood flow to your brain and help decrease your symptoms  · Antidiuretic hormone: This medicine helps control your BP and helps decrease your need to urinate during the night  · Antiparkinson medicine: This medicine may help increase your standing BP and decrease your symptoms  · Take your medicine as directed  Contact your healthcare provider if you think your medicine is not helping or if you have side effects  Tell him of her if you are allergic to any medicine  Keep a list of the medicines, vitamins, and herbs you take  Include the amounts, and when and why you take them  Bring the list or the pill bottles to follow-up visits  Carry your medicine list with you in case of an emergency  Follow up with your healthcare provider or specialist as directed:  Write down your questions so you remember to ask them during your visits  Check your blood pressure: You may need to check your BP at home  Record the results and bring them with you to follow-up visits  Ask when and how often to check your BP  You may need to wear a BP monitor for up to 24 hours  This will record your blood pressure during your normal daily activities  The monitor will take your BP every 15 to 30 minutes  Try to keep still while your BP is taken   Avoid heavy activity, such as exercise, while you are wearing the monitor  Manage your symptoms:   · Change positions slowly:  When you get out of bed, sit up first, then slowly move your legs to the side of the bed  If you are not having any symptoms, slowly stand up  If you have symptoms, sit down right away  · Exercise and do physical counter maneuvers:  Ask your healthcare provider or specialist about the best exercise plan for you  Physical counter maneuvers may help to increase your BP and increase blood flow to your heart  They include crossing your legs, squatting, and bending at the waist  You can also rise up on your toes while you are standing, and tighten your thigh muscles  · Drink liquids as directed:  Ask your healthcare provider or specialist how much liquid to drink each day and which liquids are best for you  Drink 500 milliliters (½ liter) of liquid quickly in the morning or before meals to help increase your BP  Your healthcare provider may tell you to drink 2 cups of coffee with, or after, breakfast and lunch  The caffeine in the coffee can help prevent a drop in your BP  Your healthcare provider may also give you caffeine pills  · Change how you eat meals: If your BP drops after eating large meals, try to eat smaller meals more often  Eat foods low in carbohydrates and cholesterol to help prevent BP drops after you eat  Ask if you need to increase the amount of sodium (salt) you eat each day  · Raise the head of your bed:  Raise the head of your bed 4 to 8 inches  This may help prevent morning BP drops and decrease the need to urinate during the night  Avoid things that make your hypotension worse:   · Do not drink alcohol:  Alcohol can make your symptoms worse  Ask for information if you need help quitting  · Avoid straining:  Activities and movements that cause you to strain can cause a drop in your BP   Activities to avoid include lifting, coughing, and other movements that increase the feeling of pressure in your chest     · Avoid the heat: This can cause a decrease in your BP  Stay inside during very hot days, or limit the amount of time you are outside  Do not take hot baths  Contact your healthcare provider or specialist if:   · You vomit several times or have diarrhea, and you cannot drink liquid  · You have a fever  · You have new or increased symptoms, such as dizziness, weakness, or fainting  · Your legs, ankles, and feet are swollen, or you gain weight for no known reason  · You have questions or concerns about your condition or care  Return to the emergency department if:   · You become confused or cannot speak  · You urinate very little or not at all  · You have a seizure  · You have chest pain or trouble breathing  · You have changes in vision or cannot see  © 2017 [address] Information is for End User's use only and may not be sold, redistributed or otherwise used for commercial purposes  All illustrations and images included in CareNotes® are the copyrighted property of [employer]  or Agustín Ledezma  The above information is an  only  It is not intended as medical advice for individual conditions or treatments  Talk to your doctor, nurse or pharmacist before following any medical regimen to see if it is safe and effective for you

## 2018-08-01 NOTE — ED ATTENDING ATTESTATION
Mars Fatima DO, saw and evaluated the patient  I have discussed the patient with the resident/non-physician practitioner and agree with the resident's/non-physician practitioner's findings, Plan of Care, and MDM as documented in the resident's/non-physician practitioner's note, except where noted  All available labs and Radiology studies were reviewed  At this point I agree with the current assessment done in the Emergency Department  I have conducted an independent evaluation of this patient including a focused history and a physical exam     ED Note - Quin Rodriguez 80 y o  male MRN: [id number]  Unit/Bed#: ED 28 Encounter: [id number]    History of Present Illness   HPI  Quin Rodriguez is a 80 y o  male who presents for evaluation of near syncope that occurred while PT  Patient fell and landed on his wife  Initial blood pressure was noted to be 90/50 however the patient does have a history of chronic hypotension  Patient is asymptomatic at this time  No complaints  REVIEW OF SYSTEMS  See HPI for further details  12 systems reviewed and otherwise negative except as noted     Historical Information     PAST MEDICAL HISTORY  Past Medical History:   Diagnosis Date    Acute myocardial infarction ([facility] [address] )     Anxiety     Arthritis     Brain neoplasm ([facility] [address] ) 11/1999    brain tumor    Depression     Hypercholesterolemia     Narcolepsy     daytime drowsiness and dozing off occasionally    Palpitations     Prostate cancer ([facility] [address] )        FAMILY HISTORY  Family History   Problem Relation Age of Onset    Hypertension Mother         benign essential    Cancer Mother     Osteoporosis Mother     Suicidality Father     Diabetes Family     Heart disease Family     Neuropathy Family         peripheral    Prostate cancer Family     Thyroid disease Family        SOCIAL HISTORY  Social History     Social History    Marital status: /Civil Union     Spouse name: N/A    Number of children: N/A    Years of education: N/A     Occupational History    retired      Social History Main Topics    Smoking status: Former Smoker     Types: Cigars    Smokeless tobacco: Never Used      Comment: former cig smoker- quit in [address] Alcohol use No      Comment: (history)    Drug use: No    Sexual activity: Not Asked     Other Topics Concern    None     Social History Narrative    None       SURGICAL HISTORY  Past Surgical History:   Procedure Laterality Date    BACK SURGERY      BRAIN SURGERY  11/08/1999    CORONARY ARTERY BYPASS GRAFT      x5     Meds/Allergies     CURRENT MEDICATIONS  No current facility-administered medications for this encounter       Current Outpatient Prescriptions:     aspirin 81 MG tablet, Take 1 tablet by mouth daily, Disp: , Rfl:     Calcium Citrate (CITRACAL PO), Take by mouth, Disp: , Rfl:     docusate sodium (COLACE) 100 mg capsule, Take 1 capsule (100 mg total) by mouth 2 (two) times a day, Disp: 10 capsule, Rfl: 0    enalapril (VASOTEC) 2 5 mg tablet, Take 2 5 mg by mouth 2 (two) times a day, Disp: , Rfl:     ferrous sulfate 325 (65 Fe) mg tablet, Take 1 tablet by mouth daily, Disp: , Rfl:     gabapentin (NEURONTIN) 300 mg capsule, Take 1 capsule (300 mg total) by mouth daily at bedtime Take 2 tablets q hs, Disp: 20 capsule, Rfl: 0    metoprolol tartrate (LOPRESSOR) 25 mg tablet, TAKE 1 TABLET BY MOUTH IN  THE MORNING AND 1 TABLET BY MOUTH IN THE EVENING, Disp: 180 tablet, Rfl: 1    omeprazole (PriLOSEC) 40 MG capsule, TAKE 1 CAPSULE BY MOUTH  DAILY, Disp: 90 capsule, Rfl: 5    sertraline (ZOLOFT) 50 mg tablet, TAKE 1 TABLET BY MOUTH  EVERY DAY, Disp: 90 tablet, Rfl: 1    simvastatin (ZOCOR) 80 mg tablet, TAKE 1 TABLET BY MOUTH  DAILY, Disp: 90 tablet, Rfl: 3    (Not in a hospital admission)    ALLERGIES  Allergies   Allergen Reactions    Atorvastatin Myalgia, Dizziness and Headache    Niacin Other (See Comments)     unknown     Objective     PHYSICAL EXAM    VITAL SIGNS: Blood pressure (!) 195/79, pulse (!) 50, resp  rate 16, SpO2 96 %  Constitutional:  Appears well developed and well nourished, no acute distress, non-toxic appearance   Eyes:  PERRL, EOMI, conjunctivae pink, sclerae non-icteric, no nystagmus   HENT:  Normocephalic/Atraumatic, no rhinorrhea, mucous membranes moist  Neck: normal range of motion, no tenderness, supple   Respiratory:  No respiratory distress, normal breath sounds  Cardiovascular:  Normal rate, normal rhythm, no murmurs, no gallops, no rubs, peripheral pulses intact, no carotid bruits, no JVD  GI:  Soft, non-tender, non-distended, no organomegaly, no mass, no rebound, no guarding   :  No CVAT, no flank ecchymosis   Musculoskeletal:  No swelling or edema, no tenderness, no deformities  Integument:  Pink, warm, dry, Well hydrated, no rash, no erythema, no bullae   Lymphatic:  No cervical/ tonsillar/ submandibular lymphadenopathy noted   Neurologic:  Awake, Alert & oriented x 3, CN 2-12 intact, no focal neurological deficits, motor function intact, strength 5/5 all extremities, normal sensory function, reflexes within normal limits, intact finger to nose, intact heal to shin, negative dysdiadochokinesia  Able to ambulate  Psychiatric:  Speech and behavior appropriate       ED COURSE and MDM:    Assessment/Plan   Assessment:  Ruth Vazquez is a 80 y o  male presents for evaluation of near syncope  No complaints at this time  Plan:  Labs, IV fluids prn, imaging prn, symptom management, disposition as appropriate  CRITICAL CARE TIME: 0 minutes      Portions of the record may have been created with voice recognition software  Occasional wrong word or "sound a like" substitutions may have occurred due to the inherent limitations of voice recognition software       ED Provider  Electronically Signed by

## 2018-08-01 NOTE — PROGRESS NOTES
Daily Note     Today's date: 2018  Patient name: Daniel Ramirez  : 1933  MRN: [id number]  Referring provider: Skyler Garcia MD  Dx:   Encounter Diagnosis     ICD-10-CM    1  Gait abnormality R26 9                   Subjective: Patient reports no falls or symptoms since last tx session  Patient reports moderate compliance with quiet standing prior to ambulation  Objective: See treatment diary below    BP: 130/70 ; standing BP: 120/56      Assessment: Tolerated treatment well  Patient demonstrated fatigue post treatment and exhibited good technique with therapeutic exercises  Significant Trendelenburg remains with fatigue from prolonged ambulation  Improved SLS including neuro control without knocking down cones  Plan: Continue per plan of care  Precautions: *ORTHOSTATIC HYPOTENSION, FALLS RISK, Recent change in cognitive status, Anxiety, History of M I, History of depression, History of prostate CA     Daily Treatment Diary   Exercise Diary      VG Squats              Sit to stands 3x15         2x10 low mat    Foam balance 4x30" tandem          FT 3x30"   Gait training W/ SPC  750'/600  '  675' without SPC, 600' w/        6MWT - 600 ft     Balance course  Cones, weaving, side stepping, SLS  x16 Cones, weaving, side stepping, SLS x16 (2 cones)           Cog task with balance              Hip abduction with YTB OTB  2x10 OTB  2x10 B/L GTB  2x10 B/L           Hip Abduction machine   NV           Mini Squats              Supine bridges              Slider abduction              Wall squats   2x10 w/ red ball 2x11,10 w/ red ball                                         Re-evaluation            30'    Manual Quad stretching

## 2018-08-01 NOTE — PHYSICAL THERAPY NOTE
Physical Therapy Evaluation     Patient's Name: Quin Rodriguez    Admitting Diagnosis  Unspecified multiple injuries, initial encounter [T07  XXXA]    Problem List  Patient Active Problem List   Diagnosis    Constipation    Iron deficiency    Other constipation    Anemia    Arteriosclerotic cardiovascular disease    Backache    Essential hypertension    Cervical spinal stenosis    Depression    Esophageal reflux    Hyperlipidemia    Insomnia    Spinal stenosis of lumbar region with neurogenic claudication    Vitamin B12 deficiency    Idiopathic peripheral neuropathy    Iron deficiency anemia    History of meningioma    Confusion    Altered mental status    Fall    Cognitive decline    Rib pain on right side    Impacted cerumen of left ear    Contusion of rib on right side       Past Medical History  Past Medical History:   Diagnosis Date    [facility]     Anxiety     Arthritis     Brain neoplasm ([facility] ) 11/1999    brain tumor    Depression     Hypercholesterolemia     Narcolepsy     daytime drowsiness and dozing off occasionally    Palpitations     Prostate cancer [facility])        Past Surgical History  Past Surgical History:   Procedure Laterality Date    BACK SURGERY      BRAIN SURGERY  11/08/1999    CORONARY ARTERY BYPASS GRAFT      x5        08/01/18 1700   Note Type   Note type Eval only   Pain Assessment   Pain Assessment No/denies pain   Pain Score No Pain   Home Living   Type of [address] One level;Stairs to enter with rails  (1 SELENA)   Home Equipment Cane  (ROLLATOR)   Prior Function   Level of [place] Independent with ADLs and functional mobility   Lives With Spouse   ADL Assistance Independent   IADLs Independent   Falls in the last 6 months 1 to 4  (AT LEAST 4 IN 6 MONTHS )   Vocational Retired   Restrictions/Precautions   [facility] Bearing Precautions Per Order No   Braces or Orthoses (NONE)   Other Precautions Fall Risk;Multiple lines   General   Family/Caregiver Present No   Cognition   Overall Cognitive Status WFL   RUE Assessment   RUE Assessment WFL   LUE Assessment   LUE Assessment WFL   RLE Assessment   RLE Assessment WFL   LLE Assessment   LLE Assessment WFL   Bed Mobility   Supine to Sit 6  Modified independent   Transfers   Sit to Stand 6  Modified independent   Additional items Increased time required   Stand to Sit 6  Modified independent   Additional items Increased time required   Ambulation/Elevation   Gait pattern WNL   Gait Assistance 6  Modified independent   Assistive Device Straight cane   Distance 150 FEET   Balance   Static Sitting Good   Static Standing Fair   Ambulatory Fair  (W/ SPC)   Endurance Deficit   Endurance Deficit (BP: SUPINE 193/81, SITTING 209/79, STANDING 130/59)   Activity Tolerance   Activity Tolerance Patient tolerated treatment well   Medical Staff Made Aware ED RESIDENT   Nurse Made Aware YES   Assessment   Prognosis Good   Problem List Impaired balance   Assessment PT COMPLETED EVALUATION OF 80YEAR OLD MALE IN EMERGENCY DEPARTMENT ADMITTED TO [facility] ON 8/1/18 S/P MECHANICAL FALL  PER PATIENT HE WAS LEAVING HIS THERAPY CLINIC AFTER HE GOT UP TOO QUICKLY AND FELL TOWARD R SIDE WITH WIFE NEXT TO HIM  CT OF HEAD (-) FOR ACUTE ABNORMALITY  CURRENT MEDICAL AND PHYSICAL INSTABILITIES INCLUDE ONGOING MONITORING OF VITAL SIGNS AND FALLS RISK  PMH IS SIGNIFICANT  FOR ORTHOSTATIC HYPOTENSION, R ANTERIOR CINGULATE AND PARAFALCINE MENINGIOMA, AND HTN  PRIOR TO THIS ADMISSION PATIENT RESIDED WITH SPOUSE IN A ONE LEVEL APARTMENT (1 SELENA) WHERE HE WAS PREVIOUSLY I WITH MOBILITY (ROLLATOR OR SPC) AND ADLS  SPOUSE ASSISTS WITH IADLS  PATIENT WAS PREVIOUSLY RECEIVING OUT PT PT  DURING PT EVALUATION BP AS FOLLOWS: SUPINE 193/81, SITTIN 209/79, AND STANDING 130/59  PATIENT DENYING SYMPTOMS OF DIZZINESS  PATIENT PERFORMED SUPINE-->SIT TRANSFER, SIT<-->STAND TRANSFER, AND AMBULATION MOD-I W/ INCREASED TIME AND USE OF AD  PATIENT AMBULATED 150 FEET W/ SPC IN R UE PRESENTING W/O LOB  PT ENCOURAGED PATIENT TO SLOW DOWN PERFORMANCE OF POSITIONAL CHANGES (IE SUPINE-->SIT, SIT-->STAND) WHICH HE WAS AGREEABLE TO BUT ALSO STATED "I FORGET TO DO IT"  PT D/C RECOMMENDATION IS FOR HOME W/ USE OF SPC AND CONTINUED FOLLOW UP WITH OUTPATIENT PT  PATIENT DENIES QUESTIONS/CONCERNS ABOUT D/C HOME  D/C INPT PT SERVICES  Goals   Patient Goals TO GO HOME   Treatment Day 0   Recommendation   Recommendation Home with family support; Outpatient PT;D/C PT   Equipment Recommended Cane   PT - OK to Discharge Yes  (HOME, SPC, OUTPT PT )   Barthel Index   Feeding 10   Bathing 5   Grooming Score 5   Dressing Score 10   Bladder Score 10   Bowels Score 10   Toilet Use Score 10   Transfers (Bed/Chair) Score 15   Mobility (Level Surface) Score 15   Stairs Score 0   Barthel Index Score 90           Chelo Brice, PT

## 2018-08-01 NOTE — ED NOTES
Pt ambulated well with monitoring only (no assistance) to and from [employer], RN  08/01/18 0331

## 2018-08-01 NOTE — ED NOTES
Patient's wife wants to be assured that she is included conversations regarding patients care  Wife is also a patient who is currently in room 28 at this time       Lazaro Whiteside  08/01/18 1594

## 2018-08-03 ENCOUNTER — OFFICE VISIT (OUTPATIENT)
Dept: PHYSICAL THERAPY | Facility: REHABILITATION | Age: 83
End: 2018-08-03
Payer: MEDICARE

## 2018-08-03 DIAGNOSIS — R26.9 GAIT ABNORMALITY: Primary | ICD-10-CM

## 2018-08-03 PROCEDURE — 97110 THERAPEUTIC EXERCISES: CPT | Performed by: PHYSICAL THERAPIST

## 2018-08-03 PROCEDURE — 97530 THERAPEUTIC ACTIVITIES: CPT | Performed by: PHYSICAL THERAPIST

## 2018-08-03 PROCEDURE — 97112 NEUROMUSCULAR REEDUCATION: CPT | Performed by: PHYSICAL THERAPIST

## 2018-08-03 NOTE — PROGRESS NOTES
Daily Note     Today's date: 8/3/2018  Patient name: Colette Avila  : 1933  MRN: [id number]  Referring provider: Brodie Funes MD  Dx:   Encounter Diagnosis     ICD-10-CM    1  Gait abnormality R26 9                   Subjective: Patient reports       Objective: See treatment diary below  BP at rest: 140/70, standing 100/56  *Fluctuations noted throughout tx session  Assessment: Tolerated treatment well  Patient demonstrated fatigue post treatment and exhibited good technique with therapeutic exercises  Patient continues to demonstrate positive orthostatic hypotension throughout tx session but improved compliance with static standing prior to ambulation  Improved ambulatory endurance noted secondary to increased hip abductor strength  Plan: Continue per plan of care  Precautions: *ORTHOSTATIC HYPOTENSION, FALLS RISK, Recent change in cognitive status, Anxiety, History of M I, History of depression, History of prostate CA     Daily Treatment Diary   Exercise Diary  7/23 7/25 8/1 8/3      7/19    VG Squats              Sit to stands 3x15   2x15      2x10 low mat    Foam balance 4x30" tandem          FT 3x30"   Gait training W/ SPC  750'/600  '  675' without SPC, 600' w/ 750' w/ SPC, 675 w SPC       6MWT - 600 ft     Balance course  Cones, weaving, side stepping, SLS  x16 Cones, weaving, side stepping, SLS x16 (2 cones)           Cog task with balance              Hip abduction with YTB OTB  2x10 OTB  2x10 B/L GTB  2x10 B/L GTB  2x10 B/L          Hip Abduction machine   NV           Mini Squats              Supine bridges              Slider abduction              Wall squats   2x10 w/ red ball 2x11,10 w/ red ball                                         Re-evaluation            30'    Manual Quad stretching

## 2018-08-06 ENCOUNTER — OFFICE VISIT (OUTPATIENT)
Dept: PHYSICAL THERAPY | Facility: REHABILITATION | Age: 83
End: 2018-08-06
Payer: MEDICARE

## 2018-08-06 DIAGNOSIS — R26.9 GAIT ABNORMALITY: Primary | ICD-10-CM

## 2018-08-06 PROCEDURE — 97112 NEUROMUSCULAR REEDUCATION: CPT | Performed by: PHYSICAL THERAPIST

## 2018-08-06 NOTE — PROGRESS NOTES
Daily Note     Today's date: 2018  Patient name: Jacque Vasquez  : 1933  MRN: [id number]  Referring provider: Hue Castanon MD  Dx:   Encounter Diagnosis     ICD-10-CM    1  Gait abnormality R26 9        Start Time:   Stop Time:   Total time in clinic (min): 45 minutes    Subjective: Pt denies any issues (besides a scratch on his forearm) since the fall last week  He states he does not feel any sx's when his BP fluctuates  Objective: See treatment diary below  BP:    Pre-tx: 134/82  Immediately after a sit-to-stand = 83/53  After 2 minutes of standing = 114/61  Mid-tx: after standing balance = 95/60  After two minutes of sitting = 111/73  Seated = 96/56  Immediate standing = 80/60  After 1 minute of standing = 86/57  After 3 minutes of standing = 101/63  After a short walk = 99/62  Before leaving = 100/67  Assessment: Tolerated treatment fair  Patient would benefit from continued PT      Plan: progress tx when able based on BP       Precautions: *ORTHOSTATIC HYPOTENSION, FALLS RISK, Recent change in cognitive status, Anxiety, History of M I, History of depression, History of prostate CA     Daily Treatment Diary   Exercise Diary  7/23 7/25 8/1 8/3 08/06     7/19    VG Squats              Sit to stands 3x15   2x15      2x10 low mat    Foam balance 4x30" tandem    blk - 1'x2      FT 3x30"   Gait training W/ SPC  750'/600  '  675' without SPC, 600' w/ 750' w/ SPC, 675 w SPC 30' x 2 with RW      6MWT - 600 ft     Balance course  Cones, weaving, side stepping, SLS  x16 Cones, weaving, side stepping, SLS x16 (2 cones)           Cog task with balance              Hip abduction with YTB OTB  2x10 OTB  2x10 B/L GTB  2x10 B/L GTB  2x10 B/L          Hip Abduction machine   NV           Mini Squats              Supine bridges              Slider abduction              Wall squats   2x10 w/ red ball 2x11,10 w/ red ball           Seated on rockerboard - med/lat arom    20 ea Seated on rockerboard - balance    2'x2         Seated on rockerboard - balance with LE lift    30"x3 each                                                                 Re-evaluation            30'    Manual Quad stretching

## 2018-08-07 ENCOUNTER — OFFICE VISIT (OUTPATIENT)
Dept: FAMILY MEDICINE CLINIC | Facility: CLINIC | Age: 83
End: 2018-08-07
Payer: MEDICARE

## 2018-08-07 VITALS
HEART RATE: 56 BPM | RESPIRATION RATE: 16 BRPM | HEIGHT: 70 IN | WEIGHT: 166.8 LBS | TEMPERATURE: 96.9 F | SYSTOLIC BLOOD PRESSURE: 140 MMHG | DIASTOLIC BLOOD PRESSURE: 70 MMHG | BODY MASS INDEX: 23.88 KG/M2

## 2018-08-07 DIAGNOSIS — I95.1 ORTHOSTATIC HYPOTENSION: ICD-10-CM

## 2018-08-07 DIAGNOSIS — K58.2 IRRITABLE BOWEL SYNDROME WITH BOTH CONSTIPATION AND DIARRHEA: Primary | ICD-10-CM

## 2018-08-07 DIAGNOSIS — G60.9 IDIOPATHIC PERIPHERAL NEUROPATHY: ICD-10-CM

## 2018-08-07 PROCEDURE — 99214 OFFICE O/P EST MOD 30 MIN: CPT | Performed by: FAMILY MEDICINE

## 2018-08-07 RX ORDER — DICYCLOMINE HYDROCHLORIDE 10 MG/1
10 CAPSULE ORAL
Qty: 60 CAPSULE | Refills: 3 | Status: SHIPPED | OUTPATIENT
Start: 2018-08-07 | End: 2018-08-13 | Stop reason: ALTCHOICE

## 2018-08-07 RX ORDER — GABAPENTIN 300 MG/1
CAPSULE ORAL
Qty: 60 CAPSULE | Refills: 3 | Status: SHIPPED | OUTPATIENT
Start: 2018-08-07 | End: 2019-04-02

## 2018-08-08 ENCOUNTER — OFFICE VISIT (OUTPATIENT)
Dept: PHYSICAL THERAPY | Facility: REHABILITATION | Age: 83
End: 2018-08-08
Payer: MEDICARE

## 2018-08-08 DIAGNOSIS — R26.9 GAIT ABNORMALITY: Primary | ICD-10-CM

## 2018-08-08 PROBLEM — I95.1 ORTHOSTATIC HYPOTENSION: Status: ACTIVE | Noted: 2018-08-08

## 2018-08-08 PROCEDURE — G8979 MOBILITY GOAL STATUS: HCPCS | Performed by: PHYSICAL THERAPIST

## 2018-08-08 PROCEDURE — 97110 THERAPEUTIC EXERCISES: CPT | Performed by: PHYSICAL THERAPIST

## 2018-08-08 PROCEDURE — 97530 THERAPEUTIC ACTIVITIES: CPT | Performed by: PHYSICAL THERAPIST

## 2018-08-08 PROCEDURE — G8978 MOBILITY CURRENT STATUS: HCPCS | Performed by: PHYSICAL THERAPIST

## 2018-08-08 PROCEDURE — 97112 NEUROMUSCULAR REEDUCATION: CPT | Performed by: PHYSICAL THERAPIST

## 2018-08-08 NOTE — PROGRESS NOTES
Daily Note     Today's date: 2018  Patient name: Chuy Lambert  : 1933  MRN: [id number]  Referring provider: Minesh Matias MD  Dx:   Encounter Diagnosis     ICD-10-CM    1  Gait abnormality R26 9                   Subjective: Patient reports having no falls since last tx session and states that they continue to try and seek appointment with MD        Objective: See treatment diary below  /67, 68/47 post sitting for 5 minutes followed by standing (Positive for orthostatics)    Assessment: Tolerated treatment well  Patient demonstrated fatigue post treatment and exhibited good technique with therapeutic exercises  BP continued to fluctuate throughout tx session with frequent hypotension upon standing  Patient again educated regarding prolonged standing prior to ambulation and will seek cardiac f/u this week  Hip abductor fatigue continues with ambulation > 500 feet  Plan: Continue per plan of care  Precautions: *ORTHOSTATIC HYPOTENSION, FALLS RISK, Recent change in cognitive status, Anxiety, History of M I, History of depression, History of prostate CA     Daily Treatment Diary   Exercise Diary  7/23 7/25 8/1 8/3 08/06 8/8    7/19    VG Squats              Sit to stands 3x15   2x15  2x15    2x10 low mat    Foam balance 4x30" tandem    blk - 1'x2 4x30"     FT 3x30"   Gait training W/ SPC  750'/600  '  675' without SPC, 600' w/ 750' w/ SPC, 675 w SPC 30' x 2 with RW      6MWT - 600 ft     Balance course  Cones, weaving, side stepping, SLS  x16 Cones, weaving, side stepping, SLS x16 (2 cones)           Cog task with balance              Hip abduction with YTB OTB  2x10 OTB  2x10 B/L GTB  2x10 B/L GTB  2x10 B/L  GTB  2x10 B/L        Hip Abduction machine   NV           Mini Squats              Supine bridges              Slider abduction              Wall squats   2x10 w/ red ball 2x11,10 w/ red ball           Seated on rockerboard - med/lat arom    20 ea         Seated on rockerboard - balance    2'x2         Seated on rockerboard - balance with LE lift    30"x3 each                                                                 Re-evaluation            30'    Manual Quad stretching

## 2018-08-08 NOTE — PROGRESS NOTES
FAMILY PRACTICE OFFICE VISIT       NAME: Mary Kc  AGE: 80 y o  SEX: male       : 1933        MRN: [id number]    DATE: 2018  TIME: 6:53 AM    Assessment and Plan     Problem List Items Addressed This Visit     Idiopathic peripheral neuropathy    Relevant Medications    gabapentin (NEURONTIN) 300 mg capsule    Orthostatic hypotension     Orthostatic hypertension  Patient was asked to reduce his metoprolol to 1/2 tablet twice daily  He will continue with his enalapril twice daily at 2 5 mg  He will follow up with his cardiologist later this month for further evaluation           Other Visit Diagnoses     Irritable bowel syndrome with both constipation and diarrhea    -  Primary    Relevant Medications    dicyclomine (BENTYL) 10 mg capsule            There are no Patient Instructions on file for this visit  Chief Complaint     Chief Complaint   Patient presents with    Follow-up     Pt is here for ER follow up after falling  Pt has cut on right forearm        History of Present Illness     I reviewed the patient's emergency room report  He describes having a mechanical fall while leaving a physical therapy session  He states he felt like he lost his balance and subsequently fell sideways landing on his wife's knee who was trying to help him from falling  He denies any syncope  At this goal therapy has been noted to have blood pressures dipping as low as 80s over 50s upon ambulating or standing  He does try to take more time to start ambulating once he gets out of bed or stands  He has not been driving since his initial hospital stay a few months ago for evaluation of syncope episode  He is scheduled to see his cardiologist later this month  He denies any chest pain or shortness of breath  Review of Systems   Review of Systems   Constitutional: Negative  HENT: Negative  Respiratory: Negative  Cardiovascular: Negative  Gastrointestinal: Negative  Genitourinary: Negative  Musculoskeletal:        Chronic intermittent back pain from degenerative joint disease   Skin:        Patient did to sustain an abrasion on his right forearm area   Neurological:        Chronic peripheral neuropathy of his feet         Active Problem List     Patient Active Problem List   Diagnosis    Constipation    Iron deficiency    Other constipation    Anemia    Arteriosclerotic cardiovascular disease    Backache    Essential hypertension    Cervical spinal stenosis    Depression    Esophageal reflux    Hyperlipidemia    Insomnia    Spinal stenosis of lumbar region with neurogenic claudication    Vitamin B12 deficiency    Idiopathic peripheral neuropathy    Iron deficiency anemia    History of meningioma    Confusion    Altered mental status    Fall    Cognitive decline    Rib pain on right side    Impacted cerumen of left ear    Contusion of rib on right side    Orthostatic hypotension       Past Medical History:  Past Medical History:   Diagnosis Date    [facility] infarction [facility] [address] [facility]     Anxiety     Arthritis     Brain neoplasm ([facility] [address] ) 11/1999    brain tumor    Depression     Hypercholesterolemia     Narcolepsy     daytime drowsiness and dozing off occasionally    Palpitations     Prostate cancer ([facility] [address] )        Past Surgical History:  Past Surgical History:   Procedure Laterality Date    BACK SURGERY      BRAIN SURGERY  11/08/1999    CORONARY ARTERY BYPASS GRAFT      x5       Family History:  Family History   Problem Relation Age of Onset    Hypertension Mother         benign essential    Cancer Mother     Osteoporosis Mother     Suicidality Father     Diabetes Family     Heart disease Family     Neuropathy Family         peripheral    Prostate cancer Family     Thyroid disease Family        Social History:  Social History     Social History    Marital status: /Civil Union     Spouse name: N/A    Number of children: N/A    Years of education: N/A     Occupational History    retired      Social History Main Topics    Smoking status: Former Smoker     Types: Cigars    Smokeless tobacco: Never Used      Comment: former cig smoker- quit in [address] Alcohol use No      Comment: (history)    Drug use: No    Sexual activity: Not on file     Other Topics Concern    Not on file     Social History Narrative    No narrative on file       Objective     Vitals:    08/07/18 1306   BP: 140/70   Pulse: 56   Resp: 16   Temp: (!) 96 9 °F (36 1 °C)     Wt Readings from Last 3 Encounters:   08/07/18 75 7 kg (166 lb 12 8 oz)   07/02/18 74 1 kg (163 lb 6 4 oz)   06/14/18 74 2 kg (163 lb 9 6 oz)       Physical Exam   Constitutional: He is oriented to person, place, and time  No distress  The patient's blood pressure did drop from will 130/70 to 90/60 upon standing  Patient did not have any symptoms   Cardiovascular:   Regular rate and rhythm with no murmurs   Pulmonary/Chest:   Lungs are clear to auscultation without wheezes,rales, or rhonchi   Musculoskeletal: He exhibits no edema  Patient ambulating with a walker with wheels   Neurological: He is alert and oriented to person, place, and time  No cranial nerve deficit  Skin:   Patient does have a long superficial laceration extending the length of his forearm on his right side  There is no of signs of infection         Pertinent Laboratory/Diagnostic Studies:  Lab Results   Component Value Date    GLUCOSE 109 08/01/2018    BUN 12 08/01/2018    CREATININE 0 85 08/01/2018    CALCIUM 10 0 08/01/2018     08/01/2018    K 4 3 08/01/2018    CO2 28 08/01/2018     08/01/2018     Lab Results   Component Value Date    ALT 28 06/14/2018    AST 18 06/14/2018    ALKPHOS 60 06/14/2018    BILITOT 0 34 06/14/2018       Lab Results   Component Value Date    WBC 7 40 08/01/2018    HGB 12 4 08/01/2018    HCT 37 9 08/01/2018    MCV 83 08/01/2018     08/01/2018       No results found for: TSH    Lab Results   Component Value Date    CHOL 164 06/30/2017     Lab Results   Component Value Date    TRIG 135 06/30/2017     Lab Results   Component Value Date    HDL 46 06/30/2017     Lab Results   Component Value Date    LDLCALC 91 06/30/2017     Lab Results   Component Value Date    HGBA1C 5 9 05/22/2018       Results for orders placed or performed during the hospital encounter of 08/01/18   CBC and differential   Result Value Ref Range    WBC 7 40 4 31 - 10 16 Thousand/uL    RBC 4 56 3 88 - 5 62 Million/uL    Hemoglobin 12 4 12 0 - 17 0 g/dL    Hematocrit 37 9 36 5 - 49 3 %    MCV 83 82 - 98 fL    MCH 27 2 26 8 - 34 3 pg    MCHC 32 7 31 4 - 37 4 g/dL    RDW 13 7 11 6 - 15 1 %    MPV 10 6 8 9 - 12 7 fL    Platelets 212 548 - 124 Thousands/uL    nRBC 0 /100 WBCs    Neutrophils Relative 60 43 - 75 %    Immat GRANS % 0 0 - 2 %    Lymphocytes Relative 32 14 - 44 %    Monocytes Relative 6 4 - 12 %    Eosinophils Relative 2 0 - 6 %    Basophils Relative 0 0 - 1 %    Neutrophils Absolute 4 47 1 85 - 7 62 Thousands/µL    Immature Grans Absolute 0 02 0 00 - 0 20 Thousand/uL    Lymphocytes Absolute 2 34 0 60 - 4 47 Thousands/µL    Monocytes Absolute 0 44 0 17 - 1 22 Thousand/µL    Eosinophils Absolute 0 11 0 00 - 0 61 Thousand/µL    Basophils Absolute 0 02 0 00 - 0 10 Thousands/µL   Basic metabolic panel   Result Value Ref Range    Sodium 138 136 - 145 mmol/L    Potassium 4 3 3 5 - 5 3 mmol/L    Chloride 103 100 - 108 mmol/L    CO2 28 21 - 32 mmol/L    Anion Gap 7 4 - 13 mmol/L    BUN 12 5 - 25 mg/dL    Creatinine 0 85 0 60 - 1 30 mg/dL    Glucose 109 65 - 140 mg/dL    Calcium 10 0 8 3 - 10 1 mg/dL    eGFR 79 ml/min/1 73sq m   Troponin I   Result Value Ref Range    Troponin I <0 02 <=0 04 ng/mL   TSH, 3rd generation with Free T4 reflex   Result Value Ref Range    TSH 3RD GENERATON 1 480 0 358 - 3 740 uIU/mL   ECG 12 lead   Result Value Ref Range    Ventricular Rate 60 BPM    Atrial Rate 60 BPM    SC Interval 190 ms QRSD Interval 112 ms    QT Interval 466 ms    QTC Interval 466 ms    P Axis 26 degrees    QRS Axis -14 degrees    T Wave Axis 7 degrees       No orders of the defined types were placed in this encounter  ALLERGIES:  Allergies   Allergen Reactions    Atorvastatin Myalgia, Dizziness and Headache    Niacin Other (See Comments)     unknown       Current Medications     Current Outpatient Prescriptions   Medication Sig Dispense Refill    aspirin 81 MG tablet Take 1 tablet by mouth daily      Calcium Citrate (CITRACAL PO) Take by mouth      docusate sodium (COLACE) 100 mg capsule Take 1 capsule (100 mg total) by mouth 2 (two) times a day 10 capsule 0    enalapril (VASOTEC) 2 5 mg tablet Take 2 5 mg by mouth 2 (two) times a day      ferrous sulfate 325 (65 Fe) mg tablet Take 1 tablet by mouth daily      gabapentin (NEURONTIN) 300 mg capsule Take 2 tablets q hs 60 capsule 3    metoprolol tartrate (LOPRESSOR) 25 mg tablet TAKE 1 TABLET BY MOUTH IN  THE MORNING AND 1 TABLET BY MOUTH IN THE EVENING 180 tablet 1    omeprazole (PriLOSEC) 40 MG capsule TAKE 1 CAPSULE BY MOUTH  DAILY 90 capsule 5    sertraline (ZOLOFT) 50 mg tablet TAKE 1 TABLET BY MOUTH  EVERY DAY 90 tablet 1    simvastatin (ZOCOR) 80 mg tablet TAKE 1 TABLET BY MOUTH  DAILY 90 tablet 3    dicyclomine (BENTYL) 10 mg capsule Take 1 capsule (10 mg total) by mouth 4 (four) times a day (before meals and at bedtime) 60 capsule 3     No current facility-administered medications for this visit            Health Maintenance     Health Maintenance   Topic Date Due    PT PLAN OF CARE  1933    SLP PLAN OF CARE  1933    GLAUCOMA SCREENING 65 + YR  08/05/2019 (Originally 1/16/2000)    INFLUENZA VACCINE  09/01/2018    Fall Risk  06/11/2019    DTaP,Tdap,and Td Vaccines (2 - Td) 08/01/2028    PNEUMOCOCCAL POLYSACCHARIDE VACCINE AGE 72 AND OVER  Completed     Immunization History   Administered Date(s) Administered    Influenza Split High Dose Preservative Free IM 11/10/2014, 01/12/2016, 11/22/2016, 11/08/2017    Influenza TIV (IM) 10/01/2007, 11/01/2009, 11/02/2009, 11/10/2010, 12/17/2012    Pneumococcal Conjugate 13-Valent 01/12/2016    Pneumococcal Polysaccharide PPV23 10/01/2007    Tdap 54/60/5279       Nakia Calles MD

## 2018-08-08 NOTE — ASSESSMENT & PLAN NOTE
Orthostatic hypertension  Patient was asked to reduce his metoprolol to 1/2 tablet twice daily  He will continue with his enalapril twice daily at 2 5 mg    He will follow up with his cardiologist later this month for further evaluation

## 2018-08-13 ENCOUNTER — OFFICE VISIT (OUTPATIENT)
Dept: CARDIOLOGY CLINIC | Facility: CLINIC | Age: 83
End: 2018-08-13
Payer: MEDICARE

## 2018-08-13 ENCOUNTER — APPOINTMENT (OUTPATIENT)
Dept: PHYSICAL THERAPY | Facility: REHABILITATION | Age: 83
End: 2018-08-13
Payer: MEDICARE

## 2018-08-13 ENCOUNTER — TELEPHONE (OUTPATIENT)
Dept: CARDIOLOGY CLINIC | Facility: CLINIC | Age: 83
End: 2018-08-13

## 2018-08-13 VITALS
WEIGHT: 164.8 LBS | DIASTOLIC BLOOD PRESSURE: 58 MMHG | BODY MASS INDEX: 23.59 KG/M2 | HEIGHT: 70 IN | HEART RATE: 80 BPM | SYSTOLIC BLOOD PRESSURE: 134 MMHG

## 2018-08-13 DIAGNOSIS — I10 ESSENTIAL HYPERTENSION: ICD-10-CM

## 2018-08-13 DIAGNOSIS — E78.5 HYPERLIPIDEMIA, UNSPECIFIED HYPERLIPIDEMIA TYPE: ICD-10-CM

## 2018-08-13 DIAGNOSIS — I95.1 ORTHOSTATIC HYPOTENSION: Primary | ICD-10-CM
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
## 2018-08-13 PROCEDURE — 99215 OFFICE O/P EST HI 40 MIN: CPT | Performed by: NURSE PRACTITIONER

## 2018-08-13 RX ORDER — MIDODRINE HYDROCHLORIDE 10 MG/1
10 TABLET ORAL 3 TIMES DAILY
Qty: 84 TABLET | Refills: 3 | Status: SHIPPED | OUTPATIENT
Start: 2018-08-13 | End: 2018-10-08 | Stop reason: SDUPTHER

## 2018-08-13 NOTE — TELEPHONE ENCOUNTER
Mrs Grace Zavala called, Midodrine will not be ready until tomorrow and wanted to know if Callie Quiet should stop Enalapril  Advised to d/c Enalapril and start Midodrine when it is able to be picked up-- verbally understood

## 2018-08-13 NOTE — PROGRESS NOTES
Cardiology  Office Visit   Katherine Dorado 80 y o  male MRN: [id number]    [facility]  Mr Jong Rai is an 80year old male with a  Known past medical history of HTN, HLD, GERD, Depression, iron deficiency anemia, Cervical spinal stenosis, cognitive decline and falls  Mr Leyla Palomino was recently admitted to [facility] on 5/25-5/30/18 with a fall  He was discharged to [facility] for rehabilitation  He underwent rehabilitation for 10 days  He is now undergoing OP PT  Mr Leyla Palomino presented to [facility]  On 8/01/18 with near syncope and hypotension  He was at OP PT when he experienced a presyncope episode  2/09/18 TTE 75%, grade 1 diastolic dysfunction, RV systolic function normal, mild MR, no AS, no Tricuspid valve stenosis, trace TR  Today Mr Leyla Palomino presents to our office for a follow up visit due to orthostatic hypotension  He is undergoing OP PT and has had + orthostatic BP's  He was seen by Dr Fareed Negrete and confirmed +orthostatic BP's  Mr Leyla Palomino denies lightheadedness, dizziness, CP, palpitations, or dyspnea  He states his legs just give out  Patient Active Problem List   Diagnosis    Constipation    Iron deficiency    Other constipation    Anemia    Arteriosclerotic cardiovascular disease    Backache    Essential hypertension    Cervical spinal stenosis    Depression    Esophageal reflux    Hyperlipidemia    Insomnia    Spinal stenosis of lumbar region with neurogenic claudication    Vitamin B12 deficiency    Idiopathic peripheral neuropathy    Iron deficiency anemia    History of meningioma    Confusion    Altered mental status    Fall    Cognitive decline    Rib pain on right side    Impacted cerumen of left ear    Contusion of rib on right side    Orthostatic hypotension       Review of Systems   Eyes: Negative  Cardiovascular: Negative  Musculoskeletal: Positive for arthritis  Gastrointestinal: Negative  Genitourinary: Negative  Psychiatric/Behavioral: Positive for memory loss  Objective:   Vitals: lying 130/60, sitting 112/60, standing 82/40  Vitals:    08/13/18 1300   BP: 134/58   BP Location: Right arm   Patient Position: Sitting   Cuff Size: Standard   Pulse: 80   Weight: 74 8 kg (164 lb 12 8 oz)   Height: 5' 10" (1 778 m)     Body mass index is 23 65 kg/m²      Wt Readings from Last 3 Encounters:   08/13/18 74 8 kg (164 lb 12 8 oz)   08/07/18 75 7 kg (166 lb 12 8 oz)   07/02/18 74 1 kg (163 lb 6 4 oz)         Physical Exam:  GEN: Laura Navarro appears well, alert and oriented x 3, pleasant and cooperative   HEENT: pupils equal, round, and reactive to light; extraocular muscles intact  NECK: supple, no carotid bruits   HEART: regular rhythm, normal S1 and S2, no murmurs, clicks, gallops or rubs, JVP is falt   LUNGS: clear to auscultation bilaterally; no wheezes, rales, or rhonchi   ABDOMEN: normal bowel sounds, soft, no tenderness, no distention  EXTREMITIES: peripheral pulses normal; no clubbing, cyanosis, or edema  NEURO: no focal findings   SKIN: normal without suspicious lesions on exposed skin, pale       Current Outpatient Prescriptions:     aspirin 81 MG tablet, Take 1 tablet by mouth daily, Disp: , Rfl:     docusate sodium (COLACE) 100 mg capsule, Take 1 capsule (100 mg total) by mouth 2 (two) times a day, Disp: 10 capsule, Rfl: 0    ferrous sulfate 325 (65 Fe) mg tablet, Take 1 tablet by mouth daily, Disp: , Rfl:     gabapentin (NEURONTIN) 300 mg capsule, Take 2 tablets q hs, Disp: 60 capsule, Rfl: 3    metoprolol tartrate (LOPRESSOR) 25 mg tablet, TAKE 1 TABLET BY MOUTH IN  THE MORNING AND 1 TABLET BY MOUTH IN THE EVENING, Disp: 180 tablet, Rfl: 1    omeprazole (PriLOSEC) 40 MG capsule, TAKE 1 CAPSULE BY MOUTH  DAILY, Disp: 90 capsule, Rfl: 5    sertraline (ZOLOFT) 50 mg tablet, TAKE 1 TABLET BY MOUTH  EVERY DAY, Disp: 90 tablet, Rfl: 1    simvastatin (ZOCOR) 80 mg tablet, TAKE 1 TABLET BY MOUTH  DAILY, Disp: 90 tablet, Rfl: 3    midodrine (PROAMATINE) 10 MG tablet, Take 1 tablet (10 mg total) by mouth 3 (three) times a day, Disp: 84 tablet, Rfl: 3      Labs & Results:            Assessment/Plan:   1  Orthostatic hypotension- BP lying 130/60, sitting 112/60, standing 82/40  Stop Enalapril 2 5mg daily, continue on Metoprolol tartrate 12 5mg Q12 hours  Drink up to and not less than 1500 cc fluid daily  Wear compression stockings daily,take off at night  Midodrine 10mg TID  Follow up with Dr Edi Jin on 8/31/8  Continue with Physical therapy   2  Falls- multifactorial  3  HLD- continue on statin   4   HTN with orthostatic hypotension, ,  stop Enalapril today continue on Metoprolol PT continues to monitor his BP     MICHELE Troy  8/13/2018  1:25 PM

## 2018-08-13 NOTE — PATIENT INSTRUCTIONS
Drink up to 1500 cc fluid daily  Wear compression stockings daily  Take the compression stockings off at bedtime

## 2018-08-14 DIAGNOSIS — D64.9 ANEMIA, UNSPECIFIED TYPE: Primary | ICD-10-CM

## 2018-08-14 RX ORDER — FERROUS SULFATE 325(65) MG
TABLET ORAL
Qty: 100 TABLET | Refills: 1 | Status: SHIPPED | OUTPATIENT
Start: 2018-08-14 | End: 2019-03-11

## 2018-08-15 ENCOUNTER — TELEPHONE (OUTPATIENT)
Dept: CARDIOLOGY CLINIC | Facility: CLINIC | Age: 83
End: 2018-08-15

## 2018-08-15 ENCOUNTER — OFFICE VISIT (OUTPATIENT)
Dept: PHYSICAL THERAPY | Facility: REHABILITATION | Age: 83
End: 2018-08-15
Payer: MEDICARE

## 2018-08-15 DIAGNOSIS — R26.9 GAIT ABNORMALITY: Primary | ICD-10-CM

## 2018-08-15 PROCEDURE — 97112 NEUROMUSCULAR REEDUCATION: CPT | Performed by: PHYSICAL THERAPIST

## 2018-08-15 PROCEDURE — 97110 THERAPEUTIC EXERCISES: CPT | Performed by: PHYSICAL THERAPIST

## 2018-08-15 PROCEDURE — 97140 MANUAL THERAPY 1/> REGIONS: CPT | Performed by: PHYSICAL THERAPIST

## 2018-08-15 NOTE — PROGRESS NOTES
Daily Note     Today's date: 8/15/2018  Patient name: Raymond Mayorga  : 1933  MRN: [id number]  Referring provider: Juan Francisco Alcantar MD  Dx:   Encounter Diagnosis     ICD-10-CM    1  Gait abnormality R26 9                   Subjective: Patient reports recent medication change and patients wife reports that his BP's are running high  Objective: See treatment diary below  Supine: 162/79  Sittin/64  Standin/57  2 minutes of standin/61   W/ prolonged rest post exercise: 78/56      Assessment: Tolerated treatment well  Patient demonstrated fatigue post treatment and exhibited good technique with therapeutic exercises  Patient remains asymptomatic with orthostatic hypotension  BP appears more stable throughout tx session  Proximal weakness continues to limits patients ambulatory/standing endurance  Plan: Continue per plan of care  Precautions: *ORTHOSTATIC HYPOTENSION, FALLS RISK, Recent change in cognitive status, Anxiety, History of M I, History of depression, History of prostate CA       Manual   8/15                     Orthostatics + BP checks  15'                                                                                                                          Daily Treatment Diary   Exercise Diary  7/23 7/25 8/1 8/3 08/06 8/8 8/15   7/19    VG Squats              Sit to stands 3x15   2x15  2x15 2x15   2x10 low mat    Foam balance 4x30" tandem    blk - 1'x2 4x30" 4x30" semi-tandem    FT 3x30"   Gait training W/ SPC  750'/600  '  675' without SPC, 600' w/ 750' w/ SPC, 675 w SPC 30' x 2 with RW  600' without A D    6MWT - 600 ft     Balance course  Cones, weaving, side stepping, SLS  x16 Cones, weaving, side stepping, SLS x16 (2 cones)           Cog task with balance              Hip abduction with YTB OTB  2x10 OTB  2x10 B/L GTB  2x10 B/L GTB  2x10 B/L  GTB  2x10 B/L GTB  2x10 B/L       Hip Abduction machine   NV           Mini Squats              Supine bridges     Slider abduction              Wall squats   2x10 w/ red ball 2x11,10 w/ red ball           Seated on rockerboard - med/lat arom    20 ea         Seated on rockerboard - balance    2'x2         Seated on rockerboard - balance with LE lift    30"x3 each                                                                 Re-evaluation            30'    Manual Quad stretching

## 2018-08-15 NOTE — TELEPHONE ENCOUNTER
Pt's wife called this AM/ Patricia Bhatia started the midodrine yesterday afternon, and last night after second dose, wife states BP was 200/92 sitting  and 147/84 standing on right arm  She repeated on the left and was 209/90 and 162/85  Wife said they use a wrist BP cuff  Patricia Bhatia is going to PT this afternoon where his BP is checked  She is going to call me with the BP taken at PT  Also bringing her wrist cuff to compare readings  She is not giving him  any more midodrine until after BP checked at PT   FYI      Pt cb: [phone]

## 2018-08-15 NOTE — TELEPHONE ENCOUNTER
Patient's wife called again asking us to call and leave a message  I called left a detailed message explaining med  Change

## 2018-08-20 ENCOUNTER — TELEPHONE (OUTPATIENT)
Dept: CARDIOLOGY CLINIC | Facility: CLINIC | Age: 83
End: 2018-08-20

## 2018-08-20 ENCOUNTER — OFFICE VISIT (OUTPATIENT)
Dept: PHYSICAL THERAPY | Facility: REHABILITATION | Age: 83
End: 2018-08-20
Payer: MEDICARE

## 2018-08-20 DIAGNOSIS — R26.9 GAIT ABNORMALITY: Primary | ICD-10-CM

## 2018-08-20 PROCEDURE — 97116 GAIT TRAINING THERAPY: CPT

## 2018-08-20 PROCEDURE — 97110 THERAPEUTIC EXERCISES: CPT

## 2018-08-20 PROCEDURE — 97112 NEUROMUSCULAR REEDUCATION: CPT

## 2018-08-20 NOTE — PROGRESS NOTES
Daily Note     Today's date: 2018  Patient name: Pedro Luis Pleitez  : 1933  MRN: [id number]  Referring provider: Gill Arredondo MD  Dx:   Encounter Diagnosis     ICD-10-CM    1  Gait abnormality R26 9                   Subjective: Patient reports nothing new prior to this visit  Wife reports ongoing BP issues  Objective: See treatment diary below    Sitting - 107/57 mmHg  Standing - 108/53 mmHg  Sitting after 400' of walking - 82/55 mmHg  Sitting - 142/66 mmHg  After sit to stands - 166/89 mmHg  Termination of treatment - 103/67 mmHg        Assessment: Tolerated treatment fair  Patient demonstrated fatigue post treatment and exhibited good technique with therapeutic exercises  Ongoing drop in BP upon standing however patient notes no symptoms or dizziness  Cont  To assess BP and progress TE as marietta  Plan: Continue per plan of care  Precautions: *ORTHOSTATIC HYPOTENSION, FALLS RISK, Recent change in cognitive status, Anxiety, History of M I, History of depression, History of prostate CA        Manual   8/15  8/20                   Orthostatics + BP checks  15'                                                                                                                           Daily Treatment Diary   Exercise Diary  7/23 7/25 8/1 8/3 08/06 8/8 8/15  8/20   7/19    VG Squats                       Sit to stands 3x15     2x15   2x15 2x15 2x15    2x10 low mat    Foam balance 4x30" tandem       blk - 1'x2 4x30" 4x30" semi-tandem 4x30" semi-tandem     FT 3x30"   Gait training W/ SPC  750'/600  '   675' without SPC, 600' w/ 750' w/ SPC, 675 w SPC 30' x 2 with RW   600' without A D 700' w/o device     6MWT - 600 ft     Balance course   Cones, weaving, side stepping, SLS  x16 Cones, weaving, side stepping, SLS x16 (2 cones)         Marches 20x       Cog task with balance                       Hip abduction with YTB OTB  2x10 OTB  2x10 B/L GTB  2x10 B/L GTB  2x10 B/L   GTB  2x10 B/L GTB  2x10 B/L  GTB  2x10 B/L       Hip Abduction machine     NV                 Mini Squats                       Supine bridges                       Slider abduction                       Wall squats    2x10 w/ red ball 2x11,10 w/ red ball                 Seated on rockerboard - med/lat arom       20 ea               Seated on [employer] - balance       2'x2               Seated on [employer] - balance with LE lift       30"x3 each                                                                                                               Re-evaluation                     30'    Manual Quad stretching

## 2018-08-20 NOTE — TELEPHONE ENCOUNTER
Thank you  Can you confirm he is wearing compression stockings daily and drinking at least 2 liters of fluid daily

## 2018-08-20 NOTE — TELEPHONE ENCOUNTER
Wife called with BP update  Taking midodrine 5 mg tid  At PT last , per note   Supine: 162/79  Sittin/64  Standin/57  2 minutes of standin/61   W/ prolonged rest post exercise: 78/56    Mrs Timmy Harley brought her wrist BP cuff to check for accuracy and her supine BP was very close to result by physical therapist   At home, she has gotten as high as 209/102 sitting   Mostly in 160's, 170's sitting  For her, BP has dropped to 112/80 standing  He has been feeling ok  Next f/u is with Dr Edi Jin on

## 2018-08-20 NOTE — TELEPHONE ENCOUNTER
Mrs Elder Bee said he wears the stockings daily except at bedtime, and said he is drinking a lot more fluids than he had been  I reinforced at least 2 liters daily

## 2018-08-22 ENCOUNTER — APPOINTMENT (OUTPATIENT)
Dept: PHYSICAL THERAPY | Facility: REHABILITATION | Age: 83
End: 2018-08-22
Payer: MEDICARE

## 2018-08-23 ENCOUNTER — OFFICE VISIT (OUTPATIENT)
Dept: PHYSICAL THERAPY | Facility: REHABILITATION | Age: 83
End: 2018-08-23
Payer: MEDICARE

## 2018-08-23 DIAGNOSIS — R26.9 GAIT ABNORMALITY: Primary | ICD-10-CM

## 2018-08-23 PROCEDURE — 97110 THERAPEUTIC EXERCISES: CPT

## 2018-08-23 PROCEDURE — 97112 NEUROMUSCULAR REEDUCATION: CPT

## 2018-08-23 PROCEDURE — 97116 GAIT TRAINING THERAPY: CPT

## 2018-08-23 NOTE — PROGRESS NOTES
Daily Note     Today's date: 2018  Patient name: Monica Abreu  : 1933  MRN: [id number]  Referring provider: Sma Ndiaye MD  Dx:   Encounter Diagnosis     ICD-10-CM    1  Gait abnormality R26 9                   Subjective: Pt denies pain  States he and his wife have been keeping track of his BP  Has a f/u with his cardiologist next Friday  Objective: See treatment diary below    BP readings in mm/Hg:  Pre tx sitting 117/53  Pre tx standing 91/49  Post 400 ft amb sitting 110/53  Post 300 ft amb sitting 131/66  Post STS sitting 147/78  Post session sitting 77/46     Precautions: *ORTHOSTATIC HYPOTENSION, FALLS RISK, Recent change in cognitive status, Anxiety, History of M I, History of depression, History of prostate CA        Manual   8/15  8/20  8/23                 Orthostatics + BP checks  15'    LIDA                                                                                                                       Daily Treatment Diary   Exercise Diary  7/23 7/25 8/1 8/3 08/06 8/8 8/15  8/20 8/23  7/19    VG Squats                       Sit to stands 3x15     2x15   2x15 2x15 2x15  2x12 fatigue  2x10 low mat    Foam balance 4x30" tandem       blk - 1'x2 4x30" 4x30" semi-tandem 4x30" semi-tandem   4x30'' semi tandem  FT 3x30"   Gait training W/ SPC  750'/600  '   675' without SPC, 600' w/ 750' w/ SPC, 675 w SPC 30' x 2 with RW   600' without A D 700' w/o device  700ft w/o device  6T - 600 ft     Balance course   Cones, weaving, side stepping, SLS  x16 Cones, weaving, side stepping, SLS x16 (2 cones)         Marches 20x  np     Cog task with balance                       Hip abduction with YTB OTB  2x10 OTB  2x10 B/L GTB  2x10 B/L GTB  2x10 B/L   GTB  2x10 B/L GTB  2x10 B/L  GTB  2x10 B/L GTB 2x10 B/L      Hip Abduction machine     NV                 Mini Squats                       Supine bridges                       Slider abduction                       Wall squats    2x10 w/ red ball 2x11,10 w/ red ball                 Seated on rockerboard - med/lat arom       20 ea               Seated on rockerboard - balance       2'x2               Seated on rockerboard - balance with LE lift       30"x3 each                                                                                                               Re-evaluation                     30'    Manual Quad stretching                                Assessment: Tolerated treatment well  Patient demonstrated fatigue post treatment and would benefit from continued PT For improved strength, flexibility and overall function  Needed several seated rests for recovery  BP monitored throughout session and continues to fluctuate  Plan: Continue per plan of care

## 2018-08-27 ENCOUNTER — OFFICE VISIT (OUTPATIENT)
Dept: PHYSICAL THERAPY | Facility: REHABILITATION | Age: 83
End: 2018-08-27
Payer: MEDICARE

## 2018-08-27 DIAGNOSIS — R26.9 GAIT ABNORMALITY: Primary | ICD-10-CM

## 2018-08-27 PROCEDURE — G8979 MOBILITY GOAL STATUS: HCPCS | Performed by: PHYSICAL THERAPIST

## 2018-08-27 PROCEDURE — G8978 MOBILITY CURRENT STATUS: HCPCS | Performed by: PHYSICAL THERAPIST

## 2018-08-27 PROCEDURE — 97112 NEUROMUSCULAR REEDUCATION: CPT | Performed by: PHYSICAL THERAPIST

## 2018-08-27 PROCEDURE — 97140 MANUAL THERAPY 1/> REGIONS: CPT | Performed by: PHYSICAL THERAPIST

## 2018-08-27 NOTE — PROGRESS NOTES
PT Re-Evaluation     Today's date: 2018  Patient name: Fariha Zafar  : 1933  MRN: [id number]  Referring provider: Haris Langston MD  Dx:   Encounter Diagnosis     ICD-10-CM    1  Gait abnormality R26 9        Start Time: 1030  Stop Time: 1115  Total time in clinic (min): 45 minutes    Assessment  Impairments: abnormal gait, abnormal muscle tone, abnormal or restricted ROM, abnormal movement, activity intolerance, difficulty understanding, impaired balance, impaired physical strength, lacks appropriate home exercise program and weight-bearing intolerance    Assessment details: Patient is a 80y o  year old male who attended physical therapy for 20 treatment sessions regarding gait/balance dysfunction and LE weakness  Patient reports 70-80% improvement at this time which correlates to improved FOTO scoring  Patient has shown improvement throughout PT by demonstrating increased strength, improved tolerance to activity and improved gait/balance  Patients BP remains unstable requiring monitoring throughout treatment session and during TE  Patient continues to present with decreased strength, gait/balance dysfunction, and decreased tolerance to activity  Lilliamkingsley Bishopco would benefit from continued physical therapy to address these issues and to maximize function  Thank you  Understanding of Dx/Px/POC: good   Prognosis: good    Goals  STG (4 weeks)  1  Patient will be independent with HEP = PROGRESSING  2  Increase 5-time sit-to-stand by 5 seconds = MET  3  Patient will demonstrate ability to safely perform balance activities while performing cognitive tasks = PROGRESSING  LTG (8 weeks)  1  Increase all hip strength grades by 1 MMT grade = PROGRESSING  2  Patient will demonstrate ability to complete a 6' minute walk test = MET  3  Increase FOTO from 48/100 to > or equal to 52/100 = MET  4  Decrease 5XSTS to < or equal to 13 5 seconds for reduced falls risk = PROGRESSING  5   Decrease TUG to < or equal to 13 5 seconds for reduced falls risk = MET    Plan  Patient would benefit from: skilled PT  Planned therapy interventions: neuromuscular re-education, patient education, strengthening, stretching, therapeutic exercise, home exercise program, ADL training, balance, cognitive skills and gait training  Frequency: 2x week  Duration in weeks: 8  Treatment plan discussed with: patient        Subjective Evaluation    History of Present Illness  Mechanism of injury: Patient is an 80 y o  male with a history of four falls in the last year, three of them being recent  Two of these falls were when the patient tried to ambulate too soon upon standing, per his wife  The patient was evaluated for orthostatic hypotension during his last hospital visit  Patient also has a history of recent confusion for which he was admitted to the ER and all work up was negative  Patient reports bouts of SOB with short periods of walking  Patients goals for PT are to reduce occurrence of falls and return to PLOF      07/19/2018: Patient reports that he continues to experience significant LE fatigue with prolonged standing/ambulation but noted improvement in strength has correlated to function improvements at home including sit to stands and toileting  Patient reported 1 fall since IE which occurred at night while trying to use the bathroom resulting in floating rib fractures  Patient reports that his goals for PT remain improving ambulatory/functional independence and to return to driving  08/27/2018: Patient reports that he continues to feel asymptomatic from his orthostatic hypotension but notes no falls since quiet standing (> 30 seconds) prior to ambulation      Quality of life: fair    Pain  No pain reported  Location: 7/10 rib pain s/p fractures  Progression: worsening    Social Support  Steps to enter house: no  Stairs in house: no   Lives in: [facility]  Lives with: spouse    Treatments  Treatments tried: Skilled Nursing Facility  Patient Goals  Patient goals for therapy: improved balance, increased motion, increased strength and independence with ADLs/IADLs          Objective     Strength/Myotome Testing     Left Hip   Planes of Motion   Flexion: 4+    Right Hip   Planes of Motion   Flexion: 4+    Left Knee   Flexion: 4+  Extension: 5    Right Knee   Flexion: 4+  Extension: 5    Left Ankle/Foot   Dorsiflexion: 4+    Right Ankle/Foot   Dorsiflexion: 4+    Additional Strength Details  Tandem: > 30 seconds (18: > 30 seconds)  Semi-tandem: ~ 15 seconds B/L (18: > 30 seconds)  Full tandem: Unable (18: > 30 seconds)  SLS: Unable (18: 10 seconds R, 4 seconds L)    Ambulation     Ambulation: Level Surfaces   Ambulation with assistive device: independent    Additional Level Surfaces Ambulation Details  Patient utilizing a RW for ambulation (REMAINS)    Observational Gait   Walking speed and stride length within functional limits       Functional Assessment     Comments  5-time STS: 28 52 seconds with B/L UE push off (2018: 18 93 seconds) (18: 15 15 seconds without UE push off)  TU 90 seconds with B/L UE push off (2018: 13 73 seconds without UE support) (18: 11 86 seconds without UE push off)  MOCA:  (NP)  6MWT: 600 ft @ 3'13" - unable to complete (18: 910 ft  - 6 minute completion)    Orthostatic hypotension:  Supine - 148/60 (2018: 170/62) (18: 165/80)  Sitting - 124/50 (2018: 140/42) (18: 132/65)  Standing - 98/48 (2018: 106/58) (18: 110/57)    Precautions: *ORTHOSTATIC HYPOTENSION, FALLS RISK, Recent change in cognitive status, Anxiety, History of M I, History of depression, History of prostate CA        Manual   8/15  8/20  8/23                 Orthostatics + BP checks  15'   js 64                                                                                                                       Daily Treatment Diary   Exercise Diary 7/23 7/25 8/1 8/3 08/06 8/8 8/15  8/20 8/23  8/27   VG Squats                       Sit to stands 3x15     2x15   2x15 2x15 2x15  2x12 fatigue     Foam balance 4x30" tandem       blk - 1'x2 4x30" 4x30" semi-tandem 4x30" semi-tandem   4x30'' semi tandem 4x30" semi-tandem   Gait training W/ SPC  750'/600  '   675' without SPC, 600' w/ 750' w/ SPC, 675 w SPC 30' x 2 with RW   600' without A D 700' w/o device  700ft w/o device 6MWT   Balance course   Cones, weaving, side stepping, SLS  x16 Cones, weaving, side stepping, SLS x16 (2 cones)         Marches 20x  np     Cog task with balance                       Hip abduction with YTB OTB  2x10 OTB  2x10 B/L GTB  2x10 B/L GTB  2x10 B/L   GTB  2x10 B/L GTB  2x10 B/L  GTB  2x10 B/L GTB 2x10 B/L   GTB  2x10 B/L   Hip Abduction machine     NV                 Mini Squats                       Supine bridges                       Slider abduction                       Wall squats    2x10 w/ red ball 2x11,10 w/ red ball                 Seated on rockerboard - med/lat arom       20 ea               Seated on rockerboard - balance       2'x2               Seated on rockerboard - balance with LE lift       30"x3 each                                                                                                               Re-evaluation                     30'   Supine bridge + BTB abduction

## 2018-08-27 NOTE — LETTER
2018    Grace Fry [facility] [id number]    Patient: Nasima Acosta   YOB: 1933   Date of Visit: 2018     Encounter Diagnosis     ICD-10-CM    1  Gait abnormality R26 9        Dear Dr Concepción Ellis:    Please review the attached Plan of Care from Devora Collado recent visit  Please verify that you agree therapy should continue by signing the attached document and sending it back to our office  If you have any questions or concerns, please don't hesitate to call  Sincerely,    Nena Lopez PT      Referring Provider:      I certify that I have read the below Plan of Care and certify the need for these services furnished under this plan of treatment while under my care  Grace Fry MD  [address] [place] [address] Road: [phone]          PT Re-Evaluation     Today's date: 2018  Patient name: Nasima Acosta  : 1933  MRN: [id number]  Referring provider: Precious Alejandro MD  Dx:   Encounter Diagnosis     ICD-10-CM    1  Gait abnormality R26 9                   Assessment  Impairments: abnormal gait, abnormal muscle tone, abnormal or restricted ROM, abnormal movement, activity intolerance, difficulty understanding, impaired balance, impaired physical strength, lacks appropriate home exercise program and weight-bearing intolerance    Assessment details: Patient is a 80y o  year old male who attended physical therapy for 20 treatment sessions regarding gait/balance dysfunction and LE weakness  Patient reports 70-80% improvement at this time which correlates to improved FOTO scoring  Patient has shown improvement throughout PT by demonstrating increased strength, improved tolerance to activity and improved gait/balance  Patients BP remains unstable requiring monitoring throughout treatment session and during TE    Patient continues to present with decreased strength, gait/balance dysfunction, and decreased tolerance to activity  Latoya Patel would benefit from continued physical therapy to address these issues and to maximize function  Thank you  Understanding of Dx/Px/POC: good   Prognosis: good    Goals  STG (4 weeks)  1  Patient will be independent with HEP = PROGRESSING  2  Increase 5-time sit-to-stand by 5 seconds = MET  3  Patient will demonstrate ability to safely perform balance activities while performing cognitive tasks = PROGRESSING  LTG (8 weeks)  1  Increase all hip strength grades by 1 MMT grade = PROGRESSING  2  Patient will demonstrate ability to complete a 6' minute walk test = MET  3  Increase FOTO from 48/100 to > or equal to 52/100 = MET  4  Decrease 5XSTS to < or equal to 13 5 seconds for reduced falls risk = PROGRESSING  5  Decrease TUG to < or equal to 13 5 seconds for reduced falls risk = MET    Plan  Patient would benefit from: skilled PT  Planned therapy interventions: neuromuscular re-education, patient education, strengthening, stretching, therapeutic exercise, home exercise program, ADL training, balance, cognitive skills and gait training  Frequency: 2x week  Duration in weeks: 8  Treatment plan discussed with: patient        Subjective Evaluation    History of Present Illness  Mechanism of injury: Patient is an 80 y o  male with a history of four falls in the last year, three of them being recent  Two of these falls were when the patient tried to ambulate too soon upon standing, per his wife  The patient was evaluated for orthostatic hypotension during his last hospital visit  Patient also has a history of recent confusion for which he was admitted to the ER and all work up was negative  Patient reports bouts of SOB with short periods of walking   Patients goals for PT are to reduce occurrence of falls and return to PLOF      07/19/2018: Patient reports that he continues to experience significant LE fatigue with prolonged standing/ambulation but noted improvement in strength has correlated to function improvements at home including sit to stands and toileting  Patient reported 1 fall since IE which occurred at night while trying to use the bathroom resulting in floating rib fractures  Patient reports that his goals for PT remain improving ambulatory/functional independence and to return to driving  2018: Patient reports that he continues to feel asymptomatic from his orthostatic hypotension but notes no falls since quiet standing (> 30 seconds) prior to ambulation  Quality of life: fair    Pain  No pain reported  Location: 7/10 rib pain s/p fractures  Progression: worsening    Social Support  Steps to enter house: no  Stairs in house: no   Lives in: [facility] [place]  Lives with: spouse    Treatments  Treatments tried: [place]  Patient Goals  Patient goals for therapy: improved balance, increased motion, increased strength and independence with ADLs/IADLs          Objective     Strength/Myotome Testing     Left Hip   Planes of Motion   Flexion: 4+    Right Hip   Planes of Motion   Flexion: 4+    Left Knee   Flexion: 4+  Extension: 5    Right Knee   Flexion: 4+  Extension: 5    Left Ankle/Foot   Dorsiflexion: 4+    Right Ankle/Foot   Dorsiflexion: 4+    Additional Strength Details  Tandem: > 30 seconds (18: > 30 seconds)  Semi-tandem: ~ 15 seconds B/L (18: > 30 seconds)  Full tandem: Unable (18: > 30 seconds)  SLS: Unable (18: 10 seconds R, 4 seconds L)    Ambulation     Ambulation: Level Surfaces   Ambulation with assistive device: independent    Additional Level Surfaces Ambulation Details  Patient utilizing a RW for ambulation (REMAINS)    Observational Gait   Walking speed and stride length within functional limits       Functional Assessment     Comments  5-time STS: 28 52 seconds with B/L UE push off (2018: 18 93 seconds) (18: 15 15 seconds without UE push off)  TU 90 seconds with B/L UE push off (07/19/2018: 13 73 seconds without UE support) (08/27/18: 11 86 seconds without UE push off)  MOCA: 21/30 (NP)  6MWT: 600 ft @ 3'13" - unable to complete (08/27/18: 910 ft  - 6 minute completion)    Orthostatic hypotension:  Supine - 148/60 (07/19/2018: 170/62) (08/27/18: 165/80)  Sitting - 124/50 (07/19/2018: 140/42) (08/27/18: 132/65)  Standing - 98/48 (07/19/2018: 106/58) (08/27/18: 110/57)    Precautions: *ORTHOSTATIC HYPOTENSION, FALLS RISK, Recent change in cognitive status, Anxiety, History of M I, History of depression, History of prostate CA        Manual   8/15  8/20  8/23                 Orthostatics + BP checks  15'    LIDA                                                                                                                       Daily Treatment Diary   Exercise Diary  7/23 7/25 8/1 8/3 08/06 8/8 8/15  8/20 8/23  8/27   VG Squats                       Sit to stands 3x15     2x15   2x15 2x15 2x15  2x12 fatigue     Foam balance 4x30" tandem       blk - 1'x2 4x30" 4x30" semi-tandem 4x30" semi-tandem   4x30'' semi tandem 4x30" semi-tandem   Gait training W/ SPC  750'/600  '   675' without SPC, 600' w/ 750' w/ SPC, 675 w SPC 30' x 2 with RW   600' without A D 700' w/o device  700ft w/o device 6MWT   Balance course   Cones, weaving, side stepping, SLS  x16 Cones, weaving, side stepping, SLS x16 (2 cones)         Marches 20x  np     Cog task with balance                       Hip abduction with YTB OTB  2x10 OTB  2x10 B/L GTB  2x10 B/L GTB  2x10 B/L   GTB  2x10 B/L GTB  2x10 B/L  GTB  2x10 B/L GTB 2x10 B/L   GTB  2x10 B/L   Hip Abduction machine     NV                 Mini Squats                       Supine bridges                       Slider abduction                       Wall squats    2x10 w/ red ball 2x11,10 w/ red ball                 Seated on rockerboard - med/lat arom       20 ea               Seated on rockerboard - balance       2'x2               Seated on [employer] balance with LE lift       30"x3 each                                                                                                               Re-evaluation                     30'   Supine bridge + BTB abduction

## 2018-08-28 DIAGNOSIS — E78.5 HYPERLIPIDEMIA, UNSPECIFIED HYPERLIPIDEMIA TYPE: Primary | ICD-10-CM

## 2018-08-29 ENCOUNTER — OFFICE VISIT (OUTPATIENT)
Dept: PHYSICAL THERAPY | Facility: REHABILITATION | Age: 83
End: 2018-08-29
Payer: MEDICARE

## 2018-08-29 DIAGNOSIS — R26.9 GAIT ABNORMALITY: Primary | ICD-10-CM

## 2018-08-29 PROCEDURE — 97140 MANUAL THERAPY 1/> REGIONS: CPT

## 2018-08-29 PROCEDURE — 97112 NEUROMUSCULAR REEDUCATION: CPT

## 2018-08-29 PROCEDURE — 97110 THERAPEUTIC EXERCISES: CPT

## 2018-08-29 NOTE — PROGRESS NOTES
Daily Note     Today's date: 2018  Patient name: Sarah Guevara  : 1933  MRN: [id number]  Referring provider: Sandeep Carey MD  Dx:   Encounter Diagnosis     ICD-10-CM    1  Gait abnormality R26 9                   Subjective: pt reports he has no sx with vitals fluctuations      Objective: See treatment diary below  Assessment:  Pt [place] treatment fairly well with frequent rest periods  Vital cont to vary with change of positions  Cueing throughout session for pt to transition slowly for vitals to adapt  Plan: Continue per plan of care  Progress treatment as tolerated  Will cont to monitor vitals during PT session  Pt to see heart  18, pt was given copy of his vitals to take to  appt      Precautions: *ORTHOSTATIC HYPOTENSION, FALLS RISK, Recent change in cognitive status, Anxiety, History of M I, History of depression, History of prostate CA        Manual   8/15  8/20  8/23  8/29/18               Orthostatics + BP checks  15'   [address] 15'                                                                                                                     Daily Treatment Diary   Exercise Diary  8/29/18 7/25 8/1 8/3 08/06 8/8 8/15  8/20 8/23  8/27   VG Squats                       Sit to stands 2X10     2x15   2x15 2x15 2x15  2x12 fatigue     Foam balance 4x30" tandem       blk - 1'x2 4x30" 4x30" semi-tandem 4x30" semi-tandem   4x30'' semi tandem 4x30" semi-tandem   Gait training -500 FT   675' without SPC, 600' w/ 750' w/ SPC, 675 w SPC 30' x 2 with RW   600' without A D 700' w/o device  700ft w/o device 6MWT   Balance course   Cones, weaving, side stepping, SLS  x16 Cones, weaving, side stepping, SLS x16 (2 cones)         Marches 20x  np     Cog task with balance                       Hip abduction with YTB OTB  2x10 OTB  2x10 B/L GTB  2x10 B/L GTB  2x10 B/L   GTB  2x10 B/L GTB  2x10 B/L  GTB  2x10 B/L GTB 2x10 B/L   GTB  2x10 B/L   Hip Abduction machine     NV               Mini Squats                       Supine bridges                       Slider abduction                       Wall squats    2x10 w/ red ball 2x11,10 w/ red ball                 Seated on [employer] - med/lat arom       20 ea               Seated on rockerboard - balance       2'x2               Seated on rockerboard - balance with LE lift       30"x3 each                                                                                                               Re-evaluation                     30'   Supine bridge + BTB abduction

## 2018-08-31 ENCOUNTER — OFFICE VISIT (OUTPATIENT)
Dept: CARDIOLOGY CLINIC | Facility: CLINIC | Age: 83
End: 2018-08-31
Payer: MEDICARE

## 2018-08-31 VITALS
BODY MASS INDEX: 23.84 KG/M2 | DIASTOLIC BLOOD PRESSURE: 50 MMHG | OXYGEN SATURATION: 96 % | HEIGHT: 70 IN | WEIGHT: 166.5 LBS | HEART RATE: 62 BPM | SYSTOLIC BLOOD PRESSURE: 110 MMHG

## 2018-08-31 DIAGNOSIS — E78.00 PURE HYPERCHOLESTEROLEMIA: ICD-10-CM

## 2018-08-31 DIAGNOSIS — I25.10 CORONARY ARTERIOSCLEROSIS: ICD-10-CM

## 2018-08-31 DIAGNOSIS — R94.31 ABNORMAL EKG: ICD-10-CM

## 2018-08-31 DIAGNOSIS — I10 ESSENTIAL (PRIMARY) HYPERTENSION: Primary | ICD-10-CM

## 2018-08-31 PROCEDURE — 99214 OFFICE O/P EST MOD 30 MIN: CPT | Performed by: INTERNAL MEDICINE

## 2018-08-31 NOTE — PROGRESS NOTES
Cardiology Follow Up    Daniella Lundberg  1933  [id number]  [address] [place] [address]  [phone]    1  Essential (primary) hypertension     2  Pure hypercholesterolemia     3  Coronary arteriosclerosis     4  Abnormal EKG         Interval History:  Patient is here for a follow-up visit  He was most recently seen by me in March  He has a remote history of coronary artery bypass graft surgery  He was most recently seen by our office in August of this year  He had sustained a fall and was hospitalized in May  He is continuing to do physical therapy in reference to issues with orthostatic hypotension  He is on medical therapy for this and he does tell me that physical therapy has helped  He was previously on enalapril which has been discontinued  He is on midodrine  He pushes fluids and wears compression stockings during the day and takes these off at night  He was on 10 mg admitted drain but his blood pressure went up too high and he is now on 5 mg t i d  His dose of metoprolol was cut 12 5 mg twice a day  His numbers do seem better  He and his wife do think that he has been having less issue with lightheadedness      Patient Active Problem List   Diagnosis    Constipation    Iron deficiency    Other constipation    Anemia    Arteriosclerotic cardiovascular disease    Backache    Essential hypertension    Cervical spinal stenosis    Depression    Esophageal reflux    Hyperlipidemia    Insomnia    Spinal stenosis of lumbar region with neurogenic claudication    Vitamin B12 deficiency    Idiopathic peripheral neuropathy    Iron deficiency anemia    History of meningioma    Confusion    Altered mental status    Fall    Cognitive decline    Rib pain on right side    Impacted cerumen of left ear    Contusion of rib on right side    Orthostatic hypotension Past Medical History:   Diagnosis Date    [facility] myocardial infarction ([facility] )     Anxiety     Arthritis     Brain neoplasm ([facility] ) 11/1999    brain tumor    Depression     Hypercholesterolemia     Narcolepsy     daytime drowsiness and dozing off occasionally    Palpitations     Prostate cancer ([place] [id number] )      Social History     Social History    Marital status: /Civil Union     Spouse name: N/A    Number of children: N/A    Years of education: N/A     Occupational History    retired      Social History Main Topics    Smoking status: Former Smoker     Types: Cigars    Smokeless tobacco: Never Used      Comment: former cig smoker- quit in 1992    Alcohol use No      Comment: (history)    Drug use: No    Sexual activity: Not on file     Other Topics Concern    Not on file     Social History Narrative    No narrative on file      Family History   Problem Relation Age of Onset    Hypertension Mother         benign essential    Cancer Mother     Osteoporosis Mother     Suicidality Father     Diabetes Family     Heart disease Family     Neuropathy Family         peripheral    Prostate cancer Family     Thyroid disease Family      Past Surgical History:   Procedure Laterality Date    BACK SURGERY      BRAIN SURGERY  11/08/1999    CORONARY ARTERY BYPASS GRAFT      x5       Current Outpatient Prescriptions:     aspirin 81 MG tablet, Take 1 tablet by mouth daily, Disp: , Rfl:     docusate sodium (COLACE) 100 mg capsule, Take 1 capsule (100 mg total) by mouth 2 (two) times a day, Disp: 10 capsule, Rfl: 0    ferrous sulfate 325 (65 Fe) mg tablet, TAKE ONE TABLET BY MOUTH EVERY DAY, Disp: 100 tablet, Rfl: 1    gabapentin (NEURONTIN) 300 mg capsule, Take 2 tablets q hs, Disp: 60 capsule, Rfl: 3    metoprolol tartrate (LOPRESSOR) 25 mg tablet, TAKE 1 TABLET BY MOUTH IN  THE MORNING AND 1 TABLET BY MOUTH IN THE EVENING, Disp: 180 tablet, Rfl: 1    midodrine (PROAMATINE) 10 MG tablet, Take 1 tablet (10 mg total) by mouth 3 (three) times a day (Patient taking differently: Take 5 mg by mouth 3 (three) times a day  ), Disp: 84 tablet, Rfl: 3    omeprazole (PriLOSEC) 40 MG capsule, TAKE 1 CAPSULE BY MOUTH  DAILY, Disp: 90 capsule, Rfl: 5    sertraline (ZOLOFT) 50 mg tablet, TAKE 1 TABLET BY MOUTH  EVERY DAY, Disp: 90 tablet, Rfl: 1    simvastatin (ZOCOR) 80 mg tablet, TAKE 1 TABLET BY MOUTH  DAILY, Disp: 90 tablet, Rfl: 3  Allergies   Allergen Reactions    Atorvastatin Myalgia, Dizziness and Headache    Niacin Other (See Comments)     unknown       Labs:not applicable  Imaging: Ct Head Without Contrast    Result Date: 8/1/2018  Narrative: CT BRAIN - WITHOUT CONTRAST INDICATION:   fall, possible head strike  COMPARISON:  CT brain dated May 25, 2018  MRI brain dated May 22, 2018  TECHNIQUE:  CT examination of the brain was performed  In addition to axial images, coronal 2D reformatted images were created and submitted for interpretation  Radiation dose length product (DLP) for this visit:  1127 66 mGy-cm   This examination, like all CT scans performed in the [facility], was performed utilizing techniques to minimize radiation dose exposure, including the use of iterative reconstruction and automated exposure control  IMAGE QUALITY:  Diagnostic  FINDINGS: PARENCHYMA:  No intracranial mass effect or midline shift  No CT signs of acute infarction  No acute parenchymal hemorrhage  There is stable encephalomalacia and gliosis involving both frontal lobes, left greater than right  Postoperative changes of left frontal craniotomy are stable  There is mild periventricular white matter low attenuation which is nonspecific and most likely related to chronic small vessel ischemic changes  An anterior falx meningioma is stable measures 2 5 x 1 5 cm, stable  VENTRICLES AND EXTRA-AXIAL SPACES:  There is prominence of the ventricles and sulci related to mild volume loss   VISUALIZED ORBITS AND PARANASAL SINUSES:  Unremarkable  CALVARIUM AND EXTRACRANIAL SOFT TISSUES:  Stable postoperative changes of prior left frontal craniotomy  Impression: No acute intracranial abnormality  Stable bifrontal encephalomalacia and gliosis, left greater than right  Stable anterior falx meningioma  No significant interval change when compared to a CT brain dated May 25, 2018  Workstation performed: [id number]       Review of Systems:  Review of Systems   Cardiovascular:        Occasional lightheadedness   All other systems reviewed and are negative  Physical Exam:  Physical Exam   Constitutional: He is oriented to person, place, and time  He appears well-developed and well-nourished  HENT:   Head: Normocephalic and atraumatic  Eyes: Conjunctivae are normal  Pupils are equal, round, and reactive to light  Neck: Normal range of motion  Neck supple  Cardiovascular: Normal rate and normal heart sounds  Pulmonary/Chest: Effort normal and breath sounds normal    Neurological: He is alert and oriented to person, place, and time  Skin: Skin is warm and dry  Psychiatric: He has a normal mood and affect  Vitals reviewed  Discussion/Summary:I will continue the patient's present medical regimen  The patient appears well compensated  I have asked the patient to call if there is a problem in the interim otherwise I will see the patient in six months time

## 2018-09-04 ENCOUNTER — OFFICE VISIT (OUTPATIENT)
Dept: PHYSICAL THERAPY | Facility: REHABILITATION | Age: 83
End: 2018-09-04
Payer: MEDICARE

## 2018-09-04 DIAGNOSIS — R26.9 GAIT ABNORMALITY: Primary | ICD-10-CM

## 2018-09-04 PROCEDURE — 97110 THERAPEUTIC EXERCISES: CPT | Performed by: PHYSICAL THERAPIST

## 2018-09-04 PROCEDURE — 97112 NEUROMUSCULAR REEDUCATION: CPT | Performed by: PHYSICAL THERAPIST

## 2018-09-04 NOTE — PROGRESS NOTES
Daily Note     Today's date: 2018  Patient name: Ruth Vazquez  : 1933  MRN: [id number]  Referring provider: Nikolay Eric MD  Dx:   Encounter Diagnosis     ICD-10-CM    1  Gait abnormality R26 9                   Subjective: Patient reports having no syncopal events and states that he remains asymptomatic to orthostatic hypotension  Objective: See treatment diary below  *Negative orthostatics today: About [id number] drop of systolic throughout tx  Diastolic drop < 10 mmHg as well    Assessment:  Improved hip abductor strength noted with fatigued ambulation with less noticeable trendelenburg pattern  Balance and standing endurance improving but moderate gastroc tightness present post tx and patient responded well to self gastroc stretching  Tolerated treatment well  Patient demonstrated fatigue post treatment and exhibited good technique with therapeutic exercises  Plan: Continue per plan of care  Precautions: *ORTHOSTATIC HYPOTENSION, FALLS RISK, Recent change in cognitive status, Anxiety, History of M I, History of depression, History of prostate CA        Manual                    Orthostatics + BP checks Performed throughout treatment                                                                                                                        Daily Treatment Diary   Exercise Diary             VG Squats              Sit to stands 2X10 2x10           Foam balance 4x30" tandem 4x30" FT           Gait training -500 FT SPC  850 ft              Balance course              Cog task with balance              Hip abduction with YTB OTB  2x10 OTB  2x10           Hip Abduction machine              Mini Squats              Supine bridges              Slider abduction              Wall squats                                                                                                                     Re-evaluation               Supine bridge + BTB abduction

## 2018-09-07 ENCOUNTER — OFFICE VISIT (OUTPATIENT)
Dept: PHYSICAL THERAPY | Facility: REHABILITATION | Age: 83
End: 2018-09-07
Payer: MEDICARE

## 2018-09-07 DIAGNOSIS — R26.9 GAIT ABNORMALITY: Primary | ICD-10-CM

## 2018-09-07 PROCEDURE — 97112 NEUROMUSCULAR REEDUCATION: CPT | Performed by: PHYSICAL THERAPIST

## 2018-09-07 PROCEDURE — 97110 THERAPEUTIC EXERCISES: CPT | Performed by: PHYSICAL THERAPIST

## 2018-09-07 PROCEDURE — 97530 THERAPEUTIC ACTIVITIES: CPT | Performed by: PHYSICAL THERAPIST

## 2018-09-07 NOTE — PROGRESS NOTES
Daily Note     Today's date: 2018  Patient name: Vivian Sahu  : 1933  MRN: [id number]  Referring provider: Wilian Wood MD  Dx:   Encounter Diagnosis     ICD-10-CM    1  Gait abnormality R26 9                   Subjective: Patients wife reports inconsistent BP measures and states that he remains asymptomatic without falls  Objective: See treatment diary below      Assessment:  Patient demonstrating improved control with cone taps but continues to be limited by fatigue resulting in impaired neuro control  BP stable throughout treatment session without evidence of orthostatic hypotension  Tolerated treatment well  Patient demonstrated fatigue post treatment and exhibited good technique with therapeutic exercises  Plan: Continue per plan of care  Precautions: *ORTHOSTATIC HYPOTENSION, FALLS RISK, Recent change in cognitive status, Anxiety, History of M I, History of depression, History of prostate CA        Manual                   Orthostatics + BP checks Performed throughout treatment Performed                                                                                                                        Daily Treatment Diary   Exercise Diary            VG Squats              Sit to stands 2X10 2x10           Foam balance 4x30" tandem 4x30" FT           Gait training -500 FT SPC  850 ft  SPC  700 ft             Balance course              Cog task with balance              Hip abduction with YTB OTB  2x10 OTB  2x10 OTB  2x10          Hip Abduction machine              Mini Squats              Supine bridges              Slider abduction              Wall squats               Cone taps    3 - 2x20          Dotted Balance    x8 B/L          Ball toss on tramp with semi tandem    3x20 B/L                                                                      Re-evaluation               Supine bridge + BTB abduction

## 2018-09-10 ENCOUNTER — OFFICE VISIT (OUTPATIENT)
Dept: PHYSICAL THERAPY | Facility: REHABILITATION | Age: 83
End: 2018-09-10
Payer: MEDICARE

## 2018-09-10 DIAGNOSIS — R26.9 GAIT ABNORMALITY: Primary | ICD-10-CM

## 2018-09-10 PROCEDURE — 97110 THERAPEUTIC EXERCISES: CPT | Performed by: PHYSICAL THERAPIST

## 2018-09-10 PROCEDURE — 97140 MANUAL THERAPY 1/> REGIONS: CPT | Performed by: PHYSICAL THERAPIST

## 2018-09-10 PROCEDURE — 97112 NEUROMUSCULAR REEDUCATION: CPT | Performed by: PHYSICAL THERAPIST

## 2018-09-10 NOTE — PROGRESS NOTES
Daily Note     Today's date: 9/10/2018  Patient name: Colette Avila  : 1933  MRN: [id number]  Referring provider: Brodie Funes MD  Dx:   Encounter Diagnosis     ICD-10-CM    1  Gait abnormality R26 9                   Subjective: Patient reports no falls but one close call last week where he required a break to sit according to patients wife  Objective: See treatment diary below      Assessment: Patient demonstrated improved compliance with heel/toe raises prior to standing and noted prolonged standing (30 sec) prior to stand which increased BP to reduce orthostatic hypotension  Glut med remains weak as per ambulation upon fatigue  Tolerated treatment well  Patient demonstrated fatigue post treatment and exhibited good technique with therapeutic exercises  Plan: Continue per plan of care  Precautions: *ORTHOSTATIC HYPOTENSION, FALLS RISK, Recent change in cognitive status, Anxiety, History of M I, History of depression, History of prostate CA        Manual  9/4 9/7 9/10                Orthostatics + BP checks Performed throughout treatment Performed  performed                                                                                                                      Daily Treatment Diary   Exercise Diary  8/29 9/4 9/7 9/10         VG Squats              Sit to stands 2X10 2x10  2x12         Foam balance 4x30" tandem 4x30" FT           Gait training -500 FT SPC  850 ft  SPC  700 ft  SPC  750 ft            Balance course              Cog task with balance              Hip abduction with YTB OTB  2x10 OTB  2x10 OTB  2x10 OTB  3x8 B/L         Hip Abduction machine              Mini Squats              Supine bridges              Slider abduction              Wall squats               Cone taps    3 - 2x20          Dotted Balance    x8 B/L x8 B/L         Ball toss on tramp with semi tandem    3x20 B/L                                                           Re-evaluation               Supine bridge + BTB abduction

## 2018-09-12 ENCOUNTER — OFFICE VISIT (OUTPATIENT)
Dept: PHYSICAL THERAPY | Facility: REHABILITATION | Age: 83
End: 2018-09-12
Payer: MEDICARE

## 2018-09-12 DIAGNOSIS — R26.9 GAIT ABNORMALITY: Primary | ICD-10-CM

## 2018-09-12 PROCEDURE — 97140 MANUAL THERAPY 1/> REGIONS: CPT

## 2018-09-12 PROCEDURE — 97110 THERAPEUTIC EXERCISES: CPT

## 2018-09-12 PROCEDURE — 97112 NEUROMUSCULAR REEDUCATION: CPT

## 2018-09-12 NOTE — PROGRESS NOTES
Daily Note     Today's date: 2018  Patient name: Michelle Mendosa  : 1933  MRN: [id number]  Referring provider: Estefany Quiroga MD  Dx:   Encounter Diagnosis     ICD-10-CM    1  Gait abnormality R26 9                   Subjective:  Pt reports fatigued this morning"Didn't sleep well last night "    Objective: See treatment diary below      Assessment: Vitals fairly stable throughout session, BP changes with change of position but pt asymptomatic  Plan: Continue per plan of care  Precautions: *ORTHOSTATIC HYPOTENSION, FALLS RISK, Recent change in cognitive status, Anxiety, History of M I, History of depression, History of prostate CA        Manual  9/4 9/7 9/10 9/12/18               Orthostatics + BP checks Performed throughout treatment Performed  performed performed throughout treatment                                                                                                                     Daily Treatment Diary   Exercise Diary  8/29 9/4 9/7 9/10 9/12/18        VG Squats              Sit to stands 2X10 2x10  2x12 2x10        Foam balance 4x30" tandem 4x30" FT           Gait training -500 FT SPC  850 ft  SPC  700 ft  SPC  750 ft     ft        Balance course              Cog task with balance              Hip abduction with YTB OTB  2x10 OTB  2x10 OTB  2x10 OTB  3x8 B/L OTB 10x ea        Hip Abduction machine              Mini Squats              Supine bridges              Slider abduction              Wall squats               Cone taps    3 - 2x20          Dotted Balance    x8 B/L x8 B/L         Ball toss on tramp with semi tandem    3x20 B/L  normal stance 40x                                                                    Re-evaluation               Supine bridge + BTB abduction

## 2018-09-17 ENCOUNTER — OFFICE VISIT (OUTPATIENT)
Dept: PHYSICAL THERAPY | Facility: REHABILITATION | Age: 83
End: 2018-09-17
Payer: MEDICARE

## 2018-09-17 DIAGNOSIS — R26.9 GAIT ABNORMALITY: Primary | ICD-10-CM

## 2018-09-17 PROCEDURE — 97140 MANUAL THERAPY 1/> REGIONS: CPT | Performed by: PHYSICAL THERAPIST

## 2018-09-17 PROCEDURE — 97110 THERAPEUTIC EXERCISES: CPT | Performed by: PHYSICAL THERAPIST

## 2018-09-17 NOTE — PROGRESS NOTES
Daily Note     Today's date: 2018  Patient name: Colette Avila  : 1933  MRN: [id number]  Referring provider: Brodie Funes MD  Dx:   Encounter Diagnosis     ICD-10-CM    1  Gait abnormality R26 9                   Subjective: Patient reports continued improvement in function and patients wife reports no hip weakness while at [employer] causing patient to sit        Objective: See treatment diary below      Assessment:  Patient demonstrating improved activity tolerance and ambulatory endurance as per walk testing  Trendelenburg gait only present with fatigue and patient will continue with hip abductor strengthening  Tolerated treatment well  Patient demonstrated fatigue post treatment and exhibited good technique with therapeutic exercises  Plan: Continue per plan of care  Precautions: *ORTHOSTATIC HYPOTENSION, FALLS RISK, Recent change in cognitive status, Anxiety, History of M I, History of depression, History of prostate CA        Manual  9/4 9/7 9/10 9/12/18  9/17             Orthostatics + BP checks Performed throughout treatment Performed  performed performed throughout treatment  performed                                                                                                                   Daily Treatment Diary   Exercise Diary  8/29 9/4 9/7 9/10 9/12/18 9/17       VG Squats              Sit to stands 2X10 2x10  2x12 2x10 2x10       Foam balance 4x30" tandem 4x30" FT           Gait training -500 FT SPC  850 ft  SPC  700 ft  SPC  750 ft   ft SPC  1,000 ft , 500ft          Balance course              Cog task with balance              Hip abduction with YTB OTB  2x10 OTB  2x10 OTB  2x10 OTB  3x8 B/L OTB 10x ea GTB  3x10 B/L       Hip Abduction machine              Mini Squats              Supine bridges              Slider abduction              Wall squats               Cone taps    3 - 2x20          Dotted Balance    x8 B/L x8 B/L  x8 B/L       [place] toss on tramp with semi tandem    3x20 B/L  normal stance 40x                                                                    Re-evaluation               Supine bridge + BTB abduction

## 2018-09-20 ENCOUNTER — OFFICE VISIT (OUTPATIENT)
Dept: PHYSICAL THERAPY | Facility: REHABILITATION | Age: 83
End: 2018-09-20
Payer: MEDICARE

## 2018-09-20 DIAGNOSIS — R26.9 GAIT ABNORMALITY: Primary | ICD-10-CM

## 2018-09-20 PROCEDURE — G8978 MOBILITY CURRENT STATUS: HCPCS | Performed by: PHYSICAL THERAPIST

## 2018-09-20 PROCEDURE — G8979 MOBILITY GOAL STATUS: HCPCS | Performed by: PHYSICAL THERAPIST

## 2018-09-20 PROCEDURE — 97112 NEUROMUSCULAR REEDUCATION: CPT | Performed by: PHYSICAL THERAPIST

## 2018-09-20 PROCEDURE — 97110 THERAPEUTIC EXERCISES: CPT | Performed by: PHYSICAL THERAPIST

## 2018-09-20 NOTE — PROGRESS NOTES
Daily Note     Today's date: 2018  Patient name: Nora Fischer  : 1933  MRN: [id number]  Referring provider: Shari Ordoñez MD  Dx:   Encounter Diagnosis     ICD-10-CM    1  Gait abnormality R26 9                   Subjective: Patient reports       Objective: See treatment diary below    BP @ rest: 149/78  Standin/66      Assessment:  Patient demonstrating improved ambulatory endurance but remains limited by LE symptoms and hip abductor weakness  Improved semi-tandem balance noted with ball toss  Patient tolerated treatment well  Patient demonstrated fatigue post treatment and exhibited good technique with therapeutic exercises  Plan: Continue per plan of care  Precautions: *ORTHOSTATIC HYPOTENSION, FALLS RISK, Recent change in cognitive status, Anxiety, History of M I, History of depression, History of prostate CA        Manual  9/4 9/7 9/10 9/12  9/17  9/20           Orthostatics + BP checks Performed throughout treatment Performed  performed performed throughout treatment  performed  Performed                                                                                                                 Daily Treatment Diary   Exercise Diary  8/29 9/4 9/7 9/10 9/12/18 9/17 9/20      VG Squats              Sit to stands 2X10 2x10  2x12 2x10 2x10 2x10      Foam balance 4x30" tandem 4x30" FT           Gait training -500 FT SPC  850 ft  SPC  700 ft  SPC  750 ft   ft SPC  1,000 ft , 500ft  SPC 1,075 ft , 650 ft         Balance course              Cog task with balance              Hip abduction with YTB OTB  2x10 OTB  2x10 OTB  2x10 OTB  3x8 B/L OTB 10x ea GTB  3x10 B/L GTB  3x10   B/L      Hip Abduction machine              Mini Squats              Supine bridges              Slider abduction              Wall squats               Cone taps    3 - 2x20          Dotted Balance    x8 B/L x8 B/L  x8 B/L       Ball toss on tramp with semi tandem    3x20 B/L  normal stance 40x  Semi tandem  x20 ea         Slant board gastroc stretch         4x30"                                                   Re-evaluation               Supine bridge + BTB abduction

## 2018-09-24 ENCOUNTER — OFFICE VISIT (OUTPATIENT)
Dept: PHYSICAL THERAPY | Facility: REHABILITATION | Age: 83
End: 2018-09-24
Payer: MEDICARE

## 2018-09-24 DIAGNOSIS — R26.9 GAIT ABNORMALITY: Primary | ICD-10-CM

## 2018-09-24 PROCEDURE — 97140 MANUAL THERAPY 1/> REGIONS: CPT | Performed by: PHYSICAL THERAPIST

## 2018-09-24 PROCEDURE — 97530 THERAPEUTIC ACTIVITIES: CPT | Performed by: PHYSICAL THERAPIST

## 2018-09-24 PROCEDURE — 97110 THERAPEUTIC EXERCISES: CPT | Performed by: PHYSICAL THERAPIST

## 2018-09-24 NOTE — PROGRESS NOTES
Daily Note     Today's date: 2018  Patient name: Jhonny Hart  : 1933  MRN: [id number]  Referring provider: Mony Boyd MD  Dx:   Encounter Diagnosis     ICD-10-CM    1  Gait abnormality R26 9        Start Time:   Stop Time: 993  Total time in clinic (min): 45 minutes    Subjective: Patient reports feeling well over the past few days with no symptoms of lightheadedness of close falls  Objective: See treatment diary below    Sittin/78  Standin/62  Prolonged standin/59        Assessment:  Progressed functional squat to improve eccentric control and patient tolerated well but fatigued with less reps  Continued improvement in overall ambulatory endurance noted secondary to increased proximal LE strength  Tolerated treatment well  Patient demonstrated fatigue post treatment and exhibited good technique with therapeutic exercises  Plan: Continue per plan of care  Precautions: *ORTHOSTATIC HYPOTENSION, FALLS RISK, Recent change in cognitive status, Anxiety, History of M I, History of depression, History of prostate CA        Manual  9/4 9/7 9/10 9/12  9/17  9/20           Orthostatics + BP checks Performed throughout treatment Performed  performed performed throughout treatment  performed  Performed                                                                                                                 Daily Treatment Diary   Exercise Diary  8/29 9/4 9/7 9/10 9/12/18 9/17 9/20 9/24     VG Squats              Sit to stands 2X10 2x10  2x12 2x10 2x10 2x10 2x12     Foam balance 4x30" tandem 4x30" FT           Gait training -500 FT SPC  850 ft  SPC  700 ft  SPC  750 ft   ft SPC  1,000 ft , 500ft  SPC 1,075 ft , 650 ft   850 ft  , 700 ft        Balance course              Cog task with balance              Hip abduction with YTB OTB  2x10 OTB  2x10 OTB  2x10 OTB  3x8 B/L OTB 10x ea GTB  3x10 B/L GTB  3x10   B/L GTB  3x10    B/L     Hip Abduction machine              Mini Squats              Supine bridges              Slider abduction              Wall squats               Cone taps    3 - 2x20          Dotted Balance    x8 B/L x8 B/L  x8 B/L       Ball toss on tramp with semi tandem    3x20 B/L  normal stance 40x  Semi tandem  x20 ea  Semi-tandem   x20 ea        Slant board gastroc stretch         4x30"                                                   Re-evaluation               Supine bridge + BTB abduction

## 2018-09-26 ENCOUNTER — APPOINTMENT (OUTPATIENT)
Dept: PHYSICAL THERAPY | Facility: REHABILITATION | Age: 83
End: 2018-09-26
Payer: MEDICARE

## 2018-09-28 ENCOUNTER — OFFICE VISIT (OUTPATIENT)
Dept: PHYSICAL THERAPY | Facility: REHABILITATION | Age: 83
End: 2018-09-28
Payer: MEDICARE

## 2018-09-28 DIAGNOSIS — R26.9 GAIT ABNORMALITY: Primary | ICD-10-CM

## 2018-09-28 PROCEDURE — 97140 MANUAL THERAPY 1/> REGIONS: CPT | Performed by: PHYSICAL THERAPIST

## 2018-09-28 PROCEDURE — G8980 MOBILITY D/C STATUS: HCPCS | Performed by: PHYSICAL THERAPIST

## 2018-09-28 PROCEDURE — G8979 MOBILITY GOAL STATUS: HCPCS | Performed by: PHYSICAL THERAPIST

## 2018-09-28 PROCEDURE — 97110 THERAPEUTIC EXERCISES: CPT | Performed by: PHYSICAL THERAPIST

## 2018-09-28 NOTE — PROGRESS NOTES
PT Re-Evaluation  and PT Discharge    Today's date: 2018  Patient name: Wiliam Horan  : 1933  MRN: [id number]  Referring provider: John Leone MD  Dx:   Encounter Diagnosis     ICD-10-CM    1  Gait abnormality R26 9                   Assessment  Impairments: abnormal gait, abnormal muscle tone, abnormal or restricted ROM, abnormal movement, activity intolerance, difficulty understanding, impaired balance, impaired physical strength, lacks appropriate home exercise program and weight-bearing intolerance    Assessment details: Patient is a 80y o  year old male who attended physical therapy for 29 treatment sessions regarding gait/balance fysfunction  Patient reports 90% improvement at this time which correlates with FOTO scoring  Patient has shown improvement throughout PT by demonstrating increased strength, improved tolerance to activity and improved gait/balance  Secondary to achieving functional goals and independence with comprehensive Home Exercise Program, Chavez Dc will be discharged from PT at this time  Thank you  Understanding of Dx/Px/POC: good   Prognosis: good    Goals  STG (4 weeks)  1  Patient will be independent with HEP = MET  2  Increase 5-time sit-to-stand by 5 seconds = MET  3  Patient will demonstrate ability to safely perform balance activities while performing cognitive tasks = MET  LTG (8 weeks)  1  Increase all hip strength grades by 1 MMT grade = NOT MET  2  Patient will demonstrate ability to complete a 6' minute walk test = MET  3  Increase FOTO from 48/100 to > or equal to 52/100 = MET  4  Decrease 5XSTS to < or equal to 13 5 seconds for reduced falls risk = MET  5   Decrease TUG to < or equal to 13 5 seconds for reduced falls risk = MET    Plan  Patient would benefit from: skilled PT  Planned therapy interventions: neuromuscular re-education, patient education, strengthening, stretching, therapeutic exercise, home exercise program, ADL training, balance, cognitive skills and gait training  Frequency: 2x week  Duration in weeks: 8  Treatment plan discussed with: patient        Subjective Evaluation    History of Present Illness  Mechanism of injury: Patient is an 80 y o  male with a history of four falls in the last year, three of them being recent  Two of these falls were when the patient tried to ambulate too soon upon standing, per his wife  The patient was evaluated for orthostatic hypotension during his last hospital visit  Patient also has a history of recent confusion for which he was admitted to the ER and all work up was negative  Patient reports bouts of SOB with short periods of walking  Patients goals for PT are to reduce occurrence of falls and return to PLOF      07/19/2018: Patient reports that he continues to experience significant LE fatigue with prolonged standing/ambulation but noted improvement in strength has correlated to function improvements at home including sit to stands and toileting  Patient reported 1 fall since IE which occurred at night while trying to use the bathroom resulting in floating rib fractures  Patient reports that his goals for PT remain improving ambulatory/functional independence and to return to driving  08/27/2018: Patient reports that he continues to feel asymptomatic from his orthostatic hypotension but notes no falls since quiet standing (> 30 seconds) prior to ambulation  09/28/2018:  Patient continues to report orthostatic hypotension but remains asymptomatic  Patient and his wife also report no falls or changes in mental status since last re-evaluation  Quality of life: fair    Pain  No pain reported  Location: 7/10 rib pain s/p fractures  Progression: worsening    Social Support  Steps to enter house: no  Stairs in house: no   L[facility] in: [facility]  Lives with: spouse    Treatments  Treatments tried: [place]    Patient Goals  Patient goals for therapy: improved balance, increased motion, increased strength and independence with ADLs/IADLs          Objective     Strength/Myotome Testing     Left Hip   Planes of Motion   Flexion: 4+    Right Hip   Planes of Motion   Flexion: 4+    Left Knee   Flexion: 4+  Extension: 5    Right Knee   Flexion: 4+  Extension: 5    Left Ankle/Foot   Dorsiflexion: 4+    Right Ankle/Foot   Dorsiflexion: 4+    Additional Strength Details  Tandem: > 30 seconds (18: > 30 seconds)  Semi-tandem: ~ 15 seconds B/L (18: > 30 seconds)  Full tandem: Unable (18: > 30 seconds)  SLS: Unable (18: 10 seconds R, 4 seconds L)    Ambulation     Ambulation: Level Surfaces   Ambulation with assistive device: independent    Additional Level Surfaces Ambulation Details  Patient utilizing a RW for ambulation (REMAINS)    Observational Gait   Walking speed and stride length within functional limits       Functional Assessment     Comments  5-time STS: 28 52 seconds with B/L UE push off (2018: 18 93 seconds) (18: 15 15 seconds without UE push off) (2018: 12 0 seconds without UE push off)  TU 90 seconds with B/L UE push off (2018: 13 73 seconds without UE support) (18: 11 86 seconds without UE push off) (2018: 11 85 seconds without UE push off)  MOCA:  (NP)  6MWT: 600 ft @ 3'13" - unable to complete (18: 910 ft  - 6 minute completion) (2018: 1,038 ft)    Orthostatic hypotension:  Supine - 148/60 (2018: 170/62) (18: 165/80)   Sitting - 124/50 (2018: 140/42) (18: 132/65) (2018: 126/60)  Standing - 98/48 (2018: 106/58) (18: 110/57) (2018: 117/64)       Precautions: *ORTHOSTATIC HYPOTENSION, FALLS RISK, Recent change in cognitive status, Anxiety, History of M I, History of depression, History of prostate CA        Manual  9/4 9/7 9/10 9/12  9/17  9/20  9/28         Orthostatics + BP checks Performed throughout treatment Performed  performed performed throughout treatment  performed  Performed  Performed          Re-evaluation              25'                                                                                       Daily Treatment Diary   Exercise Diary  8/29 9/4 9/7 9/10 9/12/18 9/17 9/20 9/24 9/28    VG Squats              Sit to stands 2X10 2x10  2x12 2x10 2x10 2x10 2x12 2x12    Foam balance 4x30" tandem 4x30" FT           Gait training -500 FT SPC  850 ft  SPC  700 ft  SPC  750 ft   ft SPC  1,000 ft , 500ft  SPC 1,075 ft , 650 ft   850 ft  , 700 ft  Balance course              Cog task with balance              Hip abduction with YTB OTB  2x10 OTB  2x10 OTB  2x10 OTB  3x8 B/L OTB 10x ea GTB  3x10 B/L GTB  3x10   B/L GTB  3x10    B/L GTB  3x10 B/L    Hip Abduction machine              Mini Squats              Supine bridges              Slider abduction              Wall squats               Cone taps    3 - 2x20          Dotted Balance    x8 B/L x8 B/L  x8 B/L       Ball toss on tramp with semi tandem    3x20 B/L  normal stance 40x  Semi tandem  x20 ea  Semi-tandem   x20 ea        Slant board gastroc stretch         4x30"                                                   Re-evaluation               Supine bridge + BTB abduction

## 2018-10-05 ENCOUNTER — TELEPHONE (OUTPATIENT)
Dept: CARDIOLOGY CLINIC | Facility: CLINIC | Age: 83
End: 2018-10-05

## 2018-10-05 DIAGNOSIS — E78.5 HYPERLIPIDEMIA, UNSPECIFIED HYPERLIPIDEMIA TYPE: ICD-10-CM

## 2018-10-05 NOTE — TELEPHONE ENCOUNTER
thom reviewed bp log that mrs  had sent, advised pt continue midodrine 5mg tid, will send [id number]

## 2018-10-08 DIAGNOSIS — I95.9 HYPOTENSION, UNSPECIFIED HYPOTENSION TYPE: Primary | ICD-10-CM

## 2018-10-08 DIAGNOSIS — E78.5 HYPERLIPIDEMIA, UNSPECIFIED HYPERLIPIDEMIA TYPE: ICD-10-CM

## 2018-10-08 RX ORDER — MIDODRINE HYDROCHLORIDE 10 MG/1
5 TABLET ORAL 3 TIMES DAILY
Qty: 30 TABLET | Refills: 1 | Status: SHIPPED | OUTPATIENT
Start: 2018-10-08 | End: 2019-01-22 | Stop reason: SDUPTHER

## 2018-10-08 RX ORDER — MIDODRINE HYDROCHLORIDE 5 MG/1
5 TABLET ORAL 3 TIMES DAILY
Qty: 270 TABLET | Refills: 3 | Status: SHIPPED | OUTPATIENT
Start: 2018-10-08 | End: 2019-10-04 | Stop reason: SDUPTHER

## 2018-10-09 RX ORDER — MIDODRINE HYDROCHLORIDE 5 MG/1
5 TABLET ORAL 3 TIMES DAILY
Qty: 30 TABLET | Refills: 5 | Status: SHIPPED | OUTPATIENT
Start: 2018-10-09 | End: 2019-01-22 | Stop reason: SDUPTHER

## 2018-10-22 ENCOUNTER — APPOINTMENT (OUTPATIENT)
Dept: LAB | Facility: CLINIC | Age: 83
End: 2018-10-22
Payer: MEDICARE

## 2018-10-22 ENCOUNTER — TRANSCRIBE ORDERS (OUTPATIENT)
Dept: LAB | Facility: CLINIC | Age: 83
End: 2018-10-22

## 2018-10-22 DIAGNOSIS — E78.5 HYPERLIPIDEMIA, UNSPECIFIED HYPERLIPIDEMIA TYPE: ICD-10-CM

## 2018-10-22 DIAGNOSIS — N40.1 ENLARGED PROSTATE WITH URINARY OBSTRUCTION: Primary | ICD-10-CM

## 2018-10-22 DIAGNOSIS — N40.1 ENLARGED PROSTATE WITH URINARY OBSTRUCTION: ICD-10-CM

## 2018-10-22 DIAGNOSIS — N13.8 ENLARGED PROSTATE WITH URINARY OBSTRUCTION: ICD-10-CM

## 2018-10-22 DIAGNOSIS — N13.8 ENLARGED PROSTATE WITH URINARY OBSTRUCTION: Primary | ICD-10-CM

## 2018-10-22 LAB
ALBUMIN SERPL BCP-MCNC: 3.7 G/DL (ref 3.5–5)
ALP SERPL-CCNC: 57 U/L (ref 46–116)
ALT SERPL W P-5'-P-CCNC: 24 U/L (ref 12–78)
ANION GAP SERPL CALCULATED.3IONS-SCNC: 3 MMOL/L (ref 4–13)
AST SERPL W P-5'-P-CCNC: 22 U/L (ref 5–45)
BILIRUB SERPL-MCNC: 0.54 MG/DL (ref 0.2–1)
BUN SERPL-MCNC: 12 MG/DL (ref 5–25)
CALCIUM SERPL-MCNC: 10 MG/DL (ref 8.3–10.1)
CHLORIDE SERPL-SCNC: 105 MMOL/L (ref 100–108)
CHOLEST SERPL-MCNC: 176 MG/DL (ref 50–200)
CO2 SERPL-SCNC: 29 MMOL/L (ref 21–32)
CREAT SERPL-MCNC: 0.92 MG/DL (ref 0.6–1.3)
GFR SERPL CREATININE-BSD FRML MDRD: 76 ML/MIN/1.73SQ M
GLUCOSE P FAST SERPL-MCNC: 106 MG/DL (ref 65–99)
HDLC SERPL-MCNC: 40 MG/DL (ref 40–60)
LDLC SERPL CALC-MCNC: 101 MG/DL (ref 0–100)
NONHDLC SERPL-MCNC: 136 MG/DL
POTASSIUM SERPL-SCNC: 4.4 MMOL/L (ref 3.5–5.3)
PROT SERPL-MCNC: 6.9 G/DL (ref 6.4–8.2)
PSA SERPL-MCNC: <0.1 NG/ML (ref 0–4)
SODIUM SERPL-SCNC: 137 MMOL/L (ref 136–145)
TRIGL SERPL-MCNC: 173 MG/DL

## 2018-10-22 PROCEDURE — 80061 LIPID PANEL: CPT

## 2018-10-22 PROCEDURE — 80053 COMPREHEN METABOLIC PANEL: CPT

## 2018-10-22 PROCEDURE — 36415 COLL VENOUS BLD VENIPUNCTURE: CPT

## 2018-10-22 PROCEDURE — 84153 ASSAY OF PSA TOTAL: CPT

## 2018-11-14 ENCOUNTER — IMMUNIZATION (OUTPATIENT)
Dept: FAMILY MEDICINE CLINIC | Facility: CLINIC | Age: 83
End: 2018-11-14
Payer: MEDICARE

## 2018-11-14 DIAGNOSIS — Z23 ENCOUNTER FOR IMMUNIZATION: ICD-10-CM

## 2018-11-14 PROCEDURE — 90662 IIV NO PRSV INCREASED AG IM: CPT

## 2018-11-14 PROCEDURE — G0008 ADMIN INFLUENZA VIRUS VAC: HCPCS

## 2018-11-26 ENCOUNTER — TELEPHONE (OUTPATIENT)
Dept: CARDIOLOGY CLINIC | Facility: CLINIC | Age: 83
End: 2018-11-26

## 2018-11-26 DIAGNOSIS — I10 ESSENTIAL (PRIMARY) HYPERTENSION: Primary | ICD-10-CM

## 2018-11-26 RX ORDER — METOPROLOL SUCCINATE 25 MG/1
25 TABLET, EXTENDED RELEASE ORAL DAILY
Qty: 90 TABLET | Refills: 3 | Status: SHIPPED | OUTPATIENT
Start: 2018-11-26 | End: 2019-10-04 | Stop reason: SDUPTHER

## 2018-11-26 NOTE — TELEPHONE ENCOUNTER
Pt's spouse Thor Organ called, Thor Organ is unable to split Lopressor in half and would like to know if you can prescribe Toprol XL instead? She is taking Toprol and thinks it would be easier to split that instead of Lopressor  States since being on Midodrine, BP's have improved  Please advise     C/b # [phone] ok to leave message

## 2018-11-26 NOTE — TELEPHONE ENCOUNTER
Nataliia Handler advised-- verbally understood  Advised to call office if he has any issues w/ medication-- verbally understood

## 2019-01-22 ENCOUNTER — HOSPITAL ENCOUNTER (OUTPATIENT)
Dept: RADIOLOGY | Facility: HOSPITAL | Age: 84
Discharge: HOME | End: 2019-01-22
Attending: ORTHOPAEDIC SURGERY
Payer: MEDICARE

## 2019-01-22 ENCOUNTER — OFFICE VISIT (OUTPATIENT)
Dept: OBGYN CLINIC | Facility: HOSPITAL | Age: 84
End: 2019-01-22
Payer: MEDICARE

## 2019-01-22 VITALS
SYSTOLIC BLOOD PRESSURE: 166 MMHG | DIASTOLIC BLOOD PRESSURE: 81 MMHG | BODY MASS INDEX: 23.77 KG/M2 | HEART RATE: 75 BPM | HEIGHT: 70 IN | WEIGHT: 166 LBS

## 2019-01-22 DIAGNOSIS — M25.551 RIGHT HIP PAIN: ICD-10-CM

## 2019-01-22 DIAGNOSIS — M25.552 LEFT HIP PAIN: ICD-10-CM

## 2019-01-22 DIAGNOSIS — M48.062 SPINAL STENOSIS OF LUMBAR REGION WITH NEUROGENIC CLAUDICATION: Primary | ICD-10-CM

## 2019-01-22 PROCEDURE — 73522 X-RAY EXAM HIPS BI 3-4 VIEWS: CPT

## 2019-01-22 PROCEDURE — 72110 X-RAY EXAM L-2 SPINE 4/>VWS: CPT

## 2019-01-22 PROCEDURE — 99203 OFFICE O/P NEW LOW 30 MIN: CPT | Performed by: ORTHOPAEDIC SURGERY

## 2019-01-22 NOTE — PROGRESS NOTES
80 y o male presents for new evaluation of bilateral hip pain  Patient states that he has had laterally based hip pain for the past couple years  He states that during physical therapy when he walks for a distance of roughly 1 block he began to experience hip pain  This pain is quickly relieved with sitting down  Patient states that he also has symptom resolution when leaning over a shopping cart  He denies any weakness of the lower extremities though does have baseline neuropathy in the stocking distribution bilaterally  Patient walks with a cane at baseline  He also has a remote history of lumbar spine diskectomy performed at [place] [address]  for herniated disc  He has no other complaints at this time      Review of Systems  All systems reviewed and are otherwise negative    Past Medical History  Past Medical History:   Diagnosis Date    Acute myocardial infarction ([facility] )     Anxiety     Arthritis     Brain neoplasm ([facility] ) 11/1999    brain tumor    Depression     Hypercholesterolemia     Narcolepsy     daytime drowsiness and dozing off occasionally    Palpitations     Prostate cancer ([facility] )        Past Surgical History  Past Surgical History:   Procedure Laterality Date    BACK SURGERY      BRAIN SURGERY  11/08/1999    CORONARY ARTERY BYPASS GRAFT      x5       Current Medications  Current Outpatient Prescriptions on File Prior to Visit   Medication Sig Dispense Refill    aspirin 81 MG tablet Take 1 tablet by mouth daily      docusate sodium (COLACE) 100 mg capsule Take 1 capsule (100 mg total) by mouth 2 (two) times a day (Patient taking differently: Take 100 mg by mouth daily  ) 10 capsule 0    gabapentin (NEURONTIN) 300 mg capsule Take 2 tablets q hs 60 capsule 3    metoprolol succinate (TOPROL-XL) 25 mg 24 hr tablet Take 1 tablet (25 mg total) by mouth daily 90 tablet 3    midodrine (PROAMATINE) 5 mg tablet Take 1 tablet (5 mg total) by mouth 3 (three) times a day 270 tablet 3    omeprazole (PriLOSEC) 40 MG capsule TAKE 1 CAPSULE BY MOUTH  DAILY 90 capsule 5    sertraline (ZOLOFT) 50 mg tablet TAKE 1 TABLET BY MOUTH  EVERY DAY 90 tablet 1    simvastatin (ZOCOR) 80 mg tablet TAKE 1 TABLET BY MOUTH  DAILY 90 tablet 3    ferrous sulfate 325 (65 Fe) mg tablet TAKE ONE TABLET BY MOUTH EVERY DAY (Patient not taking: Reported on 1/22/2019) 100 tablet 1    [DISCONTINUED] midodrine (PROAMATINE) 10 MG tablet Take 0 5 tablets (5 mg total) by mouth 3 (three) times a day (Patient not taking: Reported on 1/22/2019 ) 30 tablet 1    [DISCONTINUED] midodrine (PROAMATINE) 5 mg tablet Take 1 tablet (5 mg total) by mouth 3 (three) times a day (Patient not taking: Reported on 1/22/2019 ) 30 tablet 5     No current facility-administered medications on file prior to visit  Recent Labs [facility])    0  Lab Value Date/Time   HCT 37 9 08/01/2018 1608   HCT 36 8 06/09/2015 1046   HGB 12 4 08/01/2018 1608   HGB 11 8 (L) 06/09/2015 1046   WBC 7 40 08/01/2018 1608   WBC 6 46 06/09/2015 1046   INR 1 01 05/22/2018 0023   INR 1 04 05/08/2015 1130   GLUCOSE 170 (H) 05/08/2015 1130   HGBA1C 5 9 05/22/2018 0023         Physical exam  · General: Awake, Alert, Oriented  · Eyes: Pupils equal, round and reactive to light  · Heart: regular rate and rhythm  · Lungs: No audible wheezing  Back exam  · No tenderness to palpation appreciated over the paraspinal musculature, midline of the back or greater trochanters bilaterally  · 5/5 strength EHL/FHL/knee extension/knee flexion  · Negative SANDOR and FADIR for groin or sacroiliac pain  · No pain with lumbar spine extension  · Bilateral lower extremities warm well-perfused    Procedure:  No procedures performed during this visit    Imaging  Radiographs of bilateral hips and lumbar spine were obtained today and personally reviewed by myself Dr Eliud Diego    The hip radiographs reveals mild degeneration with some osteophyte formation but generally well-preserved joint space  Views of the lumbar spine reveals near obliteration of the disc space between between the L4-5 vertebra, significant loss of disc height between L5-S1 and loss of lordosis     There is also significant spondylitic changes from L1S1     80year-old male with bilateral hip pain secondary to lumbar spine stenosis  · Weightbearing as tolerated bilateral lower extremities with activity modification to limit impact  · Patient was instructed to receive an MRI of lumbar spine is earliest convenience  · He has been referred to Spine and Pain for evaluation and treatment of his lumbar spine stenosis  · Patient may take anti-inflammatories as needed  · Isthmic suggested that he follow-up with his cardiologist regarding the calcifications of his aorta and obtained a ultrasound of the abdominal aorta if needed  · He may follow up on an as-needed basis  · Patient understands and agrees with the plan above

## 2019-01-23 ENCOUNTER — TELEPHONE (OUTPATIENT)
Dept: CARDIOLOGY CLINIC | Facility: CLINIC | Age: 84
End: 2019-01-23

## 2019-01-23 DIAGNOSIS — I70.0 CALCIFICATION OF AORTA (HCC): Primary | ICD-10-CM

## 2019-01-23 DIAGNOSIS — F41.9 ANXIETY: ICD-10-CM

## 2019-01-23 NOTE — TELEPHONE ENCOUNTER
Wife called states pt went to ortho yesterday, [facility] where done and Dr Fareed Leyva noted to F/U with cardiology for calcification or Aorta  ( US of abd Aorta)  His F/U from August should be Feb  No appt noted  Will call to set up F/U        Please advise

## 2019-01-28 ENCOUNTER — TRANSCRIBE ORDERS (OUTPATIENT)
Dept: LAB | Facility: CLINIC | Age: 84
End: 2019-01-28

## 2019-01-28 DIAGNOSIS — N13.8 ENLARGED PROSTATE WITH URINARY OBSTRUCTION: ICD-10-CM

## 2019-01-28 DIAGNOSIS — C61 PROSTATIC CANCER (HCC): Primary | ICD-10-CM

## 2019-01-28 DIAGNOSIS — N40.1 ENLARGED PROSTATE WITH URINARY OBSTRUCTION: ICD-10-CM

## 2019-01-29 ENCOUNTER — APPOINTMENT (OUTPATIENT)
Dept: LAB | Facility: CLINIC | Age: 84
End: 2019-01-29
Payer: MEDICARE

## 2019-01-29 DIAGNOSIS — C61 PROSTATIC CANCER (HCC): ICD-10-CM

## 2019-01-29 DIAGNOSIS — N13.8 ENLARGED PROSTATE WITH URINARY OBSTRUCTION: ICD-10-CM

## 2019-01-29 DIAGNOSIS — N40.1 ENLARGED PROSTATE WITH URINARY OBSTRUCTION: ICD-10-CM

## 2019-01-29 LAB
ALBUMIN SERPL BCP-MCNC: 3.5 G/DL (ref 3.5–5)
ALP SERPL-CCNC: 59 U/L (ref 46–116)
ALT SERPL W P-5'-P-CCNC: 21 U/L (ref 12–78)
AST SERPL W P-5'-P-CCNC: 17 U/L (ref 5–45)
BASOPHILS # BLD AUTO: 0.04 THOUSANDS/ΜL (ref 0–0.1)
BASOPHILS NFR BLD AUTO: 1 % (ref 0–1)
BILIRUB DIRECT SERPL-MCNC: 0.13 MG/DL (ref 0–0.2)
BILIRUB SERPL-MCNC: 0.43 MG/DL (ref 0.2–1)
BUN SERPL-MCNC: 11 MG/DL (ref 5–25)
CALCIUM SERPL-MCNC: 9.9 MG/DL (ref 8.3–10.1)
CREAT SERPL-MCNC: 0.96 MG/DL (ref 0.6–1.3)
EOSINOPHIL # BLD AUTO: 0.18 THOUSAND/ΜL (ref 0–0.61)
EOSINOPHIL NFR BLD AUTO: 3 % (ref 0–6)
ERYTHROCYTE [DISTWIDTH] IN BLOOD BY AUTOMATED COUNT: 14 % (ref 11.6–15.1)
GFR SERPL CREATININE-BSD FRML MDRD: 71 ML/MIN/1.73SQ M
HCT VFR BLD AUTO: 36 % (ref 36.5–49.3)
HGB BLD-MCNC: 11.9 G/DL (ref 12–17)
IMM GRANULOCYTES # BLD AUTO: 0.01 THOUSAND/UL (ref 0–0.2)
IMM GRANULOCYTES NFR BLD AUTO: 0 % (ref 0–2)
LYMPHOCYTES # BLD AUTO: 2.77 THOUSANDS/ΜL (ref 0.6–4.47)
LYMPHOCYTES NFR BLD AUTO: 47 % (ref 14–44)
MCH RBC QN AUTO: 27 PG (ref 26.8–34.3)
MCHC RBC AUTO-ENTMCNC: 33.1 G/DL (ref 31.4–37.4)
MCV RBC AUTO: 82 FL (ref 82–98)
MONOCYTES # BLD AUTO: 0.54 THOUSAND/ΜL (ref 0.17–1.22)
MONOCYTES NFR BLD AUTO: 9 % (ref 4–12)
NEUTROPHILS # BLD AUTO: 2.4 THOUSANDS/ΜL (ref 1.85–7.62)
NEUTS SEG NFR BLD AUTO: 40 % (ref 43–75)
NRBC BLD AUTO-RTO: 0 /100 WBCS
PLATELET # BLD AUTO: 157 THOUSANDS/UL (ref 149–390)
PMV BLD AUTO: 11.1 FL (ref 8.9–12.7)
PROT SERPL-MCNC: 6.7 G/DL (ref 6.4–8.2)
PSA SERPL-MCNC: <0.1 NG/ML (ref 0–4)
RBC # BLD AUTO: 4.4 MILLION/UL (ref 3.88–5.62)
TESTOST SERPL-MCNC: <8 NG/DL (ref 95–948)
WBC # BLD AUTO: 5.94 THOUSAND/UL (ref 4.31–10.16)

## 2019-01-29 PROCEDURE — 80076 HEPATIC FUNCTION PANEL: CPT

## 2019-01-29 PROCEDURE — 82565 ASSAY OF CREATININE: CPT

## 2019-01-29 PROCEDURE — 82310 ASSAY OF CALCIUM: CPT

## 2019-01-29 PROCEDURE — 84403 ASSAY OF TOTAL TESTOSTERONE: CPT

## 2019-01-29 PROCEDURE — 84153 ASSAY OF PSA TOTAL: CPT

## 2019-01-29 PROCEDURE — 36415 COLL VENOUS BLD VENIPUNCTURE: CPT

## 2019-01-29 PROCEDURE — 85025 COMPLETE CBC W/AUTO DIFF WBC: CPT

## 2019-01-29 PROCEDURE — 84520 ASSAY OF UREA NITROGEN: CPT

## 2019-02-06 ENCOUNTER — HOSPITAL ENCOUNTER (OUTPATIENT)
Dept: RADIOLOGY | Facility: HOSPITAL | Age: 84
Discharge: HOME | End: 2019-02-06
Payer: MEDICARE

## 2019-02-06 DIAGNOSIS — M48.062 SPINAL STENOSIS OF LUMBAR REGION WITH NEUROGENIC CLAUDICATION: ICD-10-CM

## 2019-02-06 PROCEDURE — 72148 MRI LUMBAR SPINE W/O DYE: CPT

## 2019-02-12 DIAGNOSIS — G60.9 IDIOPATHIC PERIPHERAL NEUROPATHY: ICD-10-CM

## 2019-02-12 RX ORDER — GABAPENTIN 300 MG/1
CAPSULE ORAL
Qty: 180 CAPSULE | Refills: 3 | Status: SHIPPED | OUTPATIENT
Start: 2019-02-12 | End: 2019-04-08 | Stop reason: SDUPTHER

## 2019-02-27 ENCOUNTER — TRANSCRIBE ORDERS (OUTPATIENT)
Dept: ADMISSIONS | Facility: HOSPITAL | Age: 84
End: 2019-02-27

## 2019-02-27 ENCOUNTER — HOSPITAL ENCOUNTER (OUTPATIENT)
Dept: RADIOLOGY | Facility: HOSPITAL | Age: 84
Discharge: HOME | End: 2019-02-27
Attending: INTERNAL MEDICINE
Payer: MEDICARE

## 2019-02-27 DIAGNOSIS — I70.0 CALCIFICATION OF AORTA (HCC): ICD-10-CM

## 2019-02-27 PROCEDURE — 76775 US EXAM ABDO BACK WALL LIM: CPT

## 2019-03-04 ENCOUNTER — OFFICE VISIT (OUTPATIENT)
Dept: FAMILY MEDICINE CLINIC | Facility: CLINIC | Age: 84
End: 2019-03-04
Payer: MEDICARE

## 2019-03-04 VITALS
WEIGHT: 166.8 LBS | DIASTOLIC BLOOD PRESSURE: 66 MMHG | RESPIRATION RATE: 16 BRPM | BODY MASS INDEX: 23.88 KG/M2 | HEART RATE: 72 BPM | TEMPERATURE: 96.9 F | SYSTOLIC BLOOD PRESSURE: 136 MMHG | HEIGHT: 70 IN

## 2019-03-04 DIAGNOSIS — J06.9 UPPER RESPIRATORY TRACT INFECTION, UNSPECIFIED TYPE: Primary | ICD-10-CM

## 2019-03-04 PROBLEM — R56.9 CONVULSIONS (HCC): Status: ACTIVE | Noted: 2019-03-04

## 2019-03-04 PROCEDURE — 99213 OFFICE O/P EST LOW 20 MIN: CPT | Performed by: FAMILY MEDICINE

## 2019-03-04 RX ORDER — AMOXICILLIN 875 MG/1
875 TABLET, COATED ORAL 2 TIMES DAILY
Qty: 20 TABLET | Refills: 0 | Status: SHIPPED | OUTPATIENT
Start: 2019-03-04 | End: 2019-03-14

## 2019-03-04 NOTE — PROGRESS NOTES
FAMILY PRACTICE OFFICE VISIT       NAME: Elly Caldwell  AGE: 80 y o  SEX: male       : 1933        MRN: [id number]    DATE: 3/4/2019  TIME: 1:07 PM    Assessment and Plan     Problem List Items Addressed This Visit        Respiratory    Upper respiratory tract infection - Primary    Relevant Medications    amoxicillin (AMOXIL) 875 mg tablet        Patient was given prescription for amoxicillin 875 mg to take 1 b i d  For 10 days  He may continue with over-the-counter Mucinex as needed  Patient will call if symptoms persist after medication completed    There are no Patient Instructions on file for this visit  Chief Complaint     Chief Complaint   Patient presents with    Cough     x's 5+ days    Wheezing    Nasal Congestion       History of Present Illness     HPI    Review of Systems   Review of Systems   Constitutional: Positive for fatigue  HENT: Positive for congestion and postnasal drip  Negative for sore throat  Respiratory: Positive for cough  Cardiovascular: Negative  Gastrointestinal: Negative  Genitourinary: Negative  Musculoskeletal:        Chronic gait disturbance from degenerative joint disease of lumbar spine  Patient does go to physical therapy to try and improve lower extremity leg strength   Psychiatric/Behavioral: Positive for sleep disturbance          Patient complains of insomnia despite taking over-the-counter melatonin as well as 600 mg of gabapentin       Active Problem List     Patient Active Problem List   Diagnosis    Constipation    Iron deficiency    Other constipation    Anemia    Arteriosclerotic cardiovascular disease    Backache    Essential hypertension    Cervical spinal stenosis    Depression    Esophageal reflux    Hyperlipidemia    Insomnia    Spinal stenosis of lumbar region with neurogenic claudication    Vitamin B12 deficiency    Idiopathic peripheral neuropathy    Iron deficiency anemia    History of meningioma    Confusion    Altered mental status    Fall    Cognitive decline    Rib pain on right side    Impacted cerumen of left ear    Contusion of rib on right side    Orthostatic hypotension    Convulsions (HCC)    Upper respiratory tract infection       Past Medical History:  Past Medical History:   Diagnosis Date    [facility] )     Anxiety     Arthritis     [facility] ) 11/1999    brain tumor    Depression     Hypercholesterolemia     Narcolepsy     daytime drowsiness and dozing off occasionally    Palpitations     Prostate cancer (HCC)        Past Surgical History:  Past Surgical History:   Procedure Laterality Date    BACK SURGERY      BRAIN SURGERY  11/08/1999    CORONARY ARTERY BYPASS GRAFT      x5       Family History:  Family History   Problem Relation Age of Onset    Hypertension Mother         benign essential    Cancer Mother     Osteoporosis Mother     Suicidality Father     Diabetes Family     Heart disease Family     Neuropathy Family         peripheral    Prostate cancer Family     Thyroid disease Family        Social History:  Social History     Socioeconomic History    Marital status: /Civil Union     Spouse name: Not on file    Number of children: Not on file    Years of education: Not on file    Highest education level: Not on file   Occupational History    Occupation: retired   Social Needs    Financial resource strain: Not on file    Food insecurity:     Worry: Not on file     Inability: Not on file   [employer] needs:     Medical: Not on file     Non-medical: Not on file   Tobacco Use    Smoking status: Former Smoker     Types: Cigars    Smokeless tobacco: Never Used    Tobacco comment: former cig smoker- quit in 1992   Substance and Sexual Activity    Alcohol use: No     Comment: (history)    Drug use: No    Sexual activity: Not on file   Lifestyle    Physical activity:     Days per week: Not on file     Minutes per session: Not on file    Stress: Not on file   Relationships    Social connections:     Talks on phone: Not on file     Gets together: Not on file     Attends Jew service: Not on file     Active member of club or organization: Not on file     Attends meetings of clubs or organizations: Not on file     Relationship status: Not on file    Intimate partner violence:     Fear of current or ex partner: Not on file     Emotionally abused: Not on file     Physically abused: Not on file     Forced sexual activity: Not on file   Other Topics Concern    Not on file   Social History Narrative    Not on file       Objective     Vitals:    03/04/19 1221   BP: 136/66   Pulse: 72   Resp: 16   Temp: (!) 96 9 °F (36 1 °C)     Wt Readings from Last 3 Encounters:   03/04/19 75 7 kg (166 lb 12 8 oz)   02/06/19 77 6 kg (171 lb)   01/22/19 75 3 kg (166 lb)       Physical Exam   Constitutional: He is oriented to person, place, and time  No distress  HENT:   Mouth/Throat: Oropharynx is clear and moist  No oropharyngeal exudate  Patient wears right-sided hearing aid  Tympanic membranes within normal limits   Neck:   No carotid bruit   Cardiovascular:   Regular rate and rhythm with no murmurs   Pulmonary/Chest:   Lungs are clear to auscultation without wheezes,rales, or rhonchi   Musculoskeletal: He exhibits no edema  Lymphadenopathy:     He has no cervical adenopathy  Neurological: He is alert and oriented to person, place, and time  No cranial nerve deficit  Psychiatric: He has a normal mood and affect   His behavior is normal  Judgment and thought content normal        Pertinent Laboratory/Diagnostic Studies:  Lab Results   Component Value Date    GLUCOSE 170 (H) 05/08/2015    BUN 11 01/29/2019    CREATININE 0 96 01/29/2019    CALCIUM 9 9 01/29/2019     05/08/2015    K 4 4 10/22/2018    CO2 29 10/22/2018     10/22/2018     Lab Results   Component Value Date    ALT 21 01/29/2019    AST 17 01/29/2019 ALKPHOS 59 01/29/2019    BILITOT 0 47 06/09/2015       Lab Results   Component Value Date    WBC 5 94 01/29/2019    HGB 11 9 (L) 01/29/2019    HCT 36 0 (L) 01/29/2019    MCV 82 01/29/2019     01/29/2019       No results found for: TSH    Lab Results   Component Value Date    CHOL 215 08/20/2014     Lab Results   Component Value Date    TRIG 173 (H) 10/22/2018     Lab Results   Component Value Date    HDL 40 10/22/2018     Lab Results   Component Value Date    LDLCALC 101 (H) 10/22/2018     Lab Results   Component Value Date    HGBA1C 5 9 05/22/2018       Results for orders placed or performed in visit on 01/29/19   CBC and differential   Result Value Ref Range    WBC 5 94 4 31 - 10 16 Thousand/uL    RBC 4 40 3 88 - 5 62 Million/uL    Hemoglobin 11 9 (L) 12 0 - 17 0 g/dL    Hematocrit 36 0 (L) 36 5 - 49 3 %    MCV 82 82 - 98 fL    MCH 27 0 26 8 - 34 3 pg    MCHC 33 1 31 4 - 37 4 g/dL    RDW 14 0 11 6 - 15 1 %    MPV 11 1 8 9 - 12 7 fL    Platelets 388 647 - 591 Thousands/uL    nRBC 0 /100 WBCs    Neutrophils Relative 40 (L) 43 - 75 %    Immat GRANS % 0 0 - 2 %    Lymphocytes Relative 47 (H) 14 - 44 %    Monocytes Relative 9 4 - 12 %    Eosinophils Relative 3 0 - 6 %    Basophils Relative 1 0 - 1 %    Neutrophils Absolute 2 40 1 85 - 7 62 Thousands/µL    Immature Grans Absolute 0 01 0 00 - 0 20 Thousand/uL    Lymphocytes Absolute 2 77 0 60 - 4 47 Thousands/µL    Monocytes Absolute 0 54 0 17 - 1 22 Thousand/µL    Eosinophils Absolute 0 18 0 00 - 0 61 Thousand/µL    Basophils Absolute 0 04 0 00 - 0 10 Thousands/µL   BUN   Result Value Ref Range    BUN 11 5 - 25 mg/dL   Creatinine, serum   Result Value Ref Range    Creatinine 0 96 0 60 - 1 30 mg/dL    eGFR 71 ml/min/1 73sq m   Calcium   Result Value Ref Range    Calcium 9 9 8 3 - 10 1 mg/dL   PSA Total, Diagnostic   Result Value Ref Range    PSA, Diagnostic <0 1 0 0 - 4 0 ng/mL   Testosterone   Result Value Ref Range    Testosterone <8 0 (L) 95 - 948 ng/dL Hepatic function panel   Result Value Ref Range    Total Bilirubin 0 43 0 20 - 1 00 mg/dL    Bilirubin, Direct 0 13 0 00 - 0 20 mg/dL    Alkaline Phosphatase 59 46 - 116 U/L    AST 17 5 - 45 U/L    ALT 21 12 - 78 U/L    Total Protein 6 7 6 4 - 8 2 g/dL    Albumin 3 5 3 5 - 5 0 g/dL       No orders of the defined types were placed in this encounter  ALLERGIES:  Allergies   Allergen Reactions    Atorvastatin Myalgia, Dizziness and Headache    Niacin Other (See Comments)     unknown       Current Medications     Current Outpatient Medications   Medication Sig Dispense Refill    aspirin 81 MG tablet Take 1 tablet by mouth daily      docusate sodium (COLACE) 100 mg capsule Take 1 capsule (100 mg total) by mouth 2 (two) times a day (Patient taking differently: Take 100 mg by mouth daily  ) 10 capsule 0    ferrous sulfate 325 (65 Fe) mg tablet TAKE ONE TABLET BY MOUTH EVERY  tablet 1    gabapentin (NEURONTIN) 300 mg capsule Take 2 tablets q hs 60 capsule 3    gabapentin (NEURONTIN) 300 mg capsule TAKE 2 CAPSULES BY MOUTH  EVERY NIGHT AT BEDTIME 180 capsule 3    metoprolol succinate (TOPROL-XL) 25 mg 24 hr tablet Take 1 tablet (25 mg total) by mouth daily 90 tablet 3    midodrine (PROAMATINE) 5 mg tablet Take 1 tablet (5 mg total) by mouth 3 (three) times a day 270 tablet 3    omeprazole (PriLOSEC) 40 MG capsule TAKE 1 CAPSULE BY MOUTH  DAILY 90 capsule 5    sertraline (ZOLOFT) 50 mg tablet TAKE 1 TABLET BY MOUTH  EVERY DAY 90 tablet 3    simvastatin (ZOCOR) 80 mg tablet TAKE 1 TABLET BY MOUTH  DAILY 90 tablet 3    amoxicillin (AMOXIL) 875 mg tablet Take 1 tablet (875 mg total) by mouth 2 (two) times a day for 10 days 20 tablet 0     No current facility-administered medications for this visit            Health Maintenance     Health Maintenance   Topic Date Due    SLP PLAN OF CARE  1933    Medicare Annual Wellness Visit (AWV)  03/29/2019    Fall Risk  06/11/2019    BMI: Adult  02/06/2020  DTaP,Tdap,and Td Vaccines (2 - Td) 08/01/2028    INFLUENZA VACCINE  Completed    Pneumococcal PPSV23/PCV13 65+ Years / High and Highest Risk  Completed    HEPATITIS B VACCINES  Aged Out     Immunization History   Administered Date(s) Administered    Influenza Split High Dose Preservative Free IM 11/10/2014, 01/12/2016, 11/22/2016, 11/08/2017    Influenza TIV (IM) 10/01/2007, 11/01/2009, 11/02/2009, 11/10/2010, 12/17/2012    Influenza, high dose seasonal 0 5 mL 11/14/2018    Pneumococcal Conjugate 13-Valent 01/12/2016    Pneumococcal Polysaccharide PPV23 10/01/2007    Tdap 05/40/0447       Sana Fry MD

## 2019-03-06 ENCOUNTER — TELEPHONE (OUTPATIENT)
Dept: CARDIOLOGY CLINIC | Facility: CLINIC | Age: 84
End: 2019-03-06

## 2019-03-06 NOTE — TELEPHONE ENCOUNTER
Wife called did let her know that US of abd aorta stated no aneurysm  Is there anything else she needs to know?       Please advise

## 2019-03-11 ENCOUNTER — CLINICAL SUPPORT (OUTPATIENT)
Dept: PAIN MEDICINE | Facility: CLINIC | Age: 84
End: 2019-03-11
Payer: MEDICARE

## 2019-03-11 VITALS
HEART RATE: 96 BPM | DIASTOLIC BLOOD PRESSURE: 60 MMHG | HEIGHT: 69 IN | BODY MASS INDEX: 24.44 KG/M2 | TEMPERATURE: 98.3 F | SYSTOLIC BLOOD PRESSURE: 117 MMHG | WEIGHT: 165 LBS

## 2019-03-11 DIAGNOSIS — M47.816 LUMBAR SPONDYLOSIS: ICD-10-CM

## 2019-03-11 DIAGNOSIS — M54.16 LUMBAR RADICULOPATHY: Primary | ICD-10-CM

## 2019-03-11 DIAGNOSIS — M48.062 SPINAL STENOSIS OF LUMBAR REGION WITH NEUROGENIC CLAUDICATION: ICD-10-CM

## 2019-03-11 PROCEDURE — 99204 OFFICE O/P NEW MOD 45 MIN: CPT | Performed by: ANESTHESIOLOGY

## 2019-03-11 NOTE — PROGRESS NOTES
Assessment  1  Lumbar radiculopathy    2  Spinal stenosis of lumbar region with neurogenic claudication    3  Lumbar spondylosis        Plan    43-year-old male with a history of previous L4 laminectomy more than 5 years ago presenting for initial consultation regarding a 2-3 year history of progressive lumbosacral back pain that radiates into the anterolateral aspects of his thighs with associated subjective weakness which is precipitated after short distances of ambulation  MRI of the lumbar spine reveals degenerative disc disease and spondylosis with a recurrent disc herniation verses scar tissue at L4-5 causing central and bilateral foraminal stenosis  The patient is currently taking gabapentin 900 mg q h s  He was taking 600 mg q h s  And was recently increased last week to 900 mg q h s  By his PCP  He does find mild relief with this  The patient's symptoms are consistent with lumbar stenosis with neurogenic claudication  The patient does have 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, however the patient seems to be asymptomatic from this  1  I did offer the patient bilateral L4 TFESI, however the patient would like to avoid interventional therapy at this time  2  I will prescribe [facility] based physical therapy for the patient's radicular symptoms  3  The patient may continue with gabapentin 900 mg q h s  He may benefit from further titration upwards on this medication, however since it was just increased last week we will hold off on further titration upwards to see his response at this dose  4  I will follow up the patient in 2 months       My impressions and treatment recommendations were discussed in detail with the patient who verbalized understanding and had no further questions  Discharge instructions were provided  I personally saw and examined the patient and I agree with the above discussed plan of care  No orders of the defined types were placed in this encounter      No orders of the defined types were placed in this encounter  History of Present Illness    Ruthie Stephens is a 80 y o  male with a history of previous L4 laminectomy more than 5 years ago presenting for initial consultation regarding a 2-3 year history of progressive lumbosacral back pain that radiates into the anterolateral aspects of his thighs with associated subjective weakness which is precipitated after short distances of ambulation  He denies any numbness, paresthesias, bladder bowel incontinence, or saddle anesthesia  He denies any recent trauma or inciting event  MRI of the lumbar spine reveals degenerative disc disease and spondylosis with a recurrent disc herniation verses scar tissue at L4-5 causing central and bilateral foraminal stenosis  The patient is currently taking gabapentin 900 mg q h s  He was taking 600 mg q h s  And was recently increased last week to 900 mg q h s  By his PCP  He does find mild relief with this  The patient rates pain a 2/10 on the pain is occasional   The pain does not follow any particular pattern throughout the day  The pain is described as throbbing  The pain is increased with standing, walking, and exercise  The pain is alleviated with sitting and lying down  I have personally reviewed and/or updated the patient's past medical history, past surgical history, family history, social history, current medications, allergies, and vital signs today  Other than as stated above, the patient denies any interval changes in medications, medical condition, mental condition, symptoms, or allergies since the last office visit  Review of Systems   Constitutional: Negative for fever and unexpected weight change  HENT: Negative for trouble swallowing  Eyes: Negative for visual disturbance  Respiratory: Negative for shortness of breath and wheezing  Cardiovascular: Negative for chest pain and palpitations     Gastrointestinal: Negative for constipation, diarrhea, nausea and vomiting  Endocrine: Negative for cold intolerance, heat intolerance and polydipsia  Genitourinary: Negative for difficulty urinating and frequency  Musculoskeletal: Positive for gait problem  Negative for arthralgias, joint swelling and myalgias  Pain in extremity   Skin: Negative for rash  Neurological: Positive for dizziness  Negative for seizures, syncope, weakness and headaches  Hematological: Does not bruise/bleed easily  Psychiatric/Behavioral: Negative for dysphoric mood  All other systems reviewed and are negative        Patient Active Problem List   Diagnosis    Constipation    Iron deficiency    Other constipation    Anemia    Arteriosclerotic cardiovascular disease    Backache    Essential hypertension    Cervical spinal stenosis    Depression    Esophageal reflux    Hyperlipidemia    Insomnia    Spinal stenosis of lumbar region with neurogenic claudication    Vitamin B12 deficiency    Idiopathic peripheral neuropathy    Iron deficiency anemia    History of meningioma    Confusion    Altered mental status    Fall    Cognitive decline    Rib pain on right side    Impacted cerumen of left ear    Contusion of rib on right side    Orthostatic hypotension    Convulsions ([address]    Upper respiratory tract infection       Past Medical History:   Diagnosis Date    Acute myocardial infarction ([address] )     Anxiety     Arthritis     Brain neoplasm ([address] ) 11/1999    brain tumor    Depression     Hypercholesterolemia     Narcolepsy     daytime drowsiness and dozing off occasionally    Palpitations     Prostate cancer ([address] )        Past Surgical History:   Procedure Laterality Date    BACK SURGERY      BRAIN SURGERY  11/08/1999    CORONARY ARTERY BYPASS GRAFT      x5       Family History   Problem Relation Age of Onset    Hypertension Mother         benign essential    Cancer Mother     Osteoporosis Mother     Suicidality Father     Diabetes Family     Heart disease Family     Neuropathy Family         peripheral    Prostate cancer Family     Thyroid disease Family        Social History     Occupational History    Occupation: retired   Tobacco Use    Smoking status: Former Smoker     Types: Cigars    Smokeless tobacco: Never Used    Tobacco comment: former cig smoker- quit in 1992   Substance and Sexual Activity    Alcohol use: No     Comment: (history)    Drug use: No    Sexual activity: Not on file       Current Outpatient Medications on File Prior to Visit   Medication Sig    aspirin 81 MG tablet Take 1 tablet by mouth daily    gabapentin (NEURONTIN) 300 mg capsule Take 2 tablets q hs    gabapentin (NEURONTIN) 300 mg capsule TAKE 2 CAPSULES BY MOUTH  EVERY NIGHT AT BEDTIME    metoprolol succinate (TOPROL-XL) 25 mg 24 hr tablet Take 1 tablet (25 mg total) by mouth daily    midodrine (PROAMATINE) 5 mg tablet Take 1 tablet (5 mg total) by mouth 3 (three) times a day    omeprazole (PriLOSEC) 40 MG capsule TAKE 1 CAPSULE BY MOUTH  DAILY    sertraline (ZOLOFT) 50 mg tablet TAKE 1 TABLET BY MOUTH  EVERY DAY    simvastatin (ZOCOR) 80 mg tablet TAKE 1 TABLET BY MOUTH  DAILY    amoxicillin (AMOXIL) 875 mg tablet Take 1 tablet (875 mg total) by mouth 2 (two) times a day for 10 days    docusate sodium (COLACE) 100 mg capsule Take 1 capsule (100 mg total) by mouth 2 (two) times a day (Patient taking differently: Take 100 mg by mouth daily  )    ferrous sulfate 325 (65 Fe) mg tablet TAKE ONE TABLET BY MOUTH EVERY DAY     No current facility-administered medications on file prior to visit  Allergies   Allergen Reactions    Atorvastatin Myalgia, Dizziness and Headache    Niacin Other (See Comments)     unknown       Physical Exam    Ht 5' 9" (1 753 m)   BMI 24 63 kg/m²     Constitutional: normal, well developed, well nourished, alert, in no distress and non-toxic and no overt pain behavior    Eyes: anicteric  HEENT: grossly intact  Neck: supple, symmetric, trachea midline and no masses   Pulmonary:even and unlabored  Cardiovascular:No edema or pitting edema present  Skin:Normal without rashes or lesions and well hydrated  Psychiatric:Mood and affect appropriate  Neurologic:Cranial Nerves II-XII grossly intact  Musculoskeletal:antalgic and shuffling gait  Well-healed midline vertical lumbar incision  Bilateral lumbar paraspinals nontender to palpation, but ropy in texture  Bilateral SI joints and greater trochanters nontender to palpation  Bilateral patellar reflexes 2/4  Bilateral Achilles reflexes 1/4  No clonus was noted bilaterally  Bilateral lower extremity strength 5/5 in all muscle groups  Sensation intact to light touch in the L3 through S1 dermatomes bilaterally  Negative straight leg raise bilaterally  Negative Kenji's test bilaterally  Negative Stinchfield test bilaterally  Imaging      PACS Images      Show images for MRI lumbar spine wo contrast   Study Result     MRI LUMBAR SPINE WITHOUT CONTRAST     INDICATION: M48 062: Spinal stenosis, lumbar region with neurogenic claudication  Low back pain radiating down both hips      COMPARISON:  11/8/2013     TECHNIQUE:  Sagittal T1, sagittal T2, sagittal inversion recovery, axial T1 and axial T2, coronal T2        IMAGE QUALITY:  Diagnostic     FINDINGS:     VERTEBRAL BODIES:  Normal alignment of the lumbar spine  No spondylolysis or spondylolisthesis  No scoliosis  No compression fracture  Scattered degenerative endplate changes  No focally suspicious marrow lesions  No bone marrow edema or   compression abnormality  A few scattered hemangiomata are redemonstrated  Suspect prior right hemilaminotomy L4-5      SACRUM:  Normal signal within the sacrum   No evidence of insufficiency or stress fracture      DISTAL CORD AND CONUS:  Normal size and signal within the distal cord and conus        PARASPINAL SOFT TISSUES:  Paraspinal soft tissues are unremarkable      LOWER THORACIC DISC SPACES:  Normal disc height and signal   No disc herniation, canal stenosis or foraminal narrowing      LUMBAR DISC SPACES:     L1-L2:  Normal      L2-L3:  There is loss of disc height and signal   There is a diffuse disk bulge  No significant central canal or neural foraminal narrowing  This level is similar to the prior study      L3-L4:  There is no focal disk herniation, central canal or neural foraminal narrowing  Bilateral facet hypertrophy noted  This level is similar to the prior study      L4-L5:  Residual soft tissue along the posterior margin of the intervertebral discs acentrically on the right may represent scar tissue or residual disc herniation  Associated bony spurring at the adjacent endplates noted  Mild central canal narrowing  Mild to moderate left neural foraminal narrowing  Moderate to severe right neural foraminal narrowing  Question small right sided synovial/facet cyst   The appearance of this level is similar to the prior study      L5-S1:  There is a diffuse disk bulge  No significant central canal or neural foraminal narrowing  Bilateral facet hypertrophy noted      IMPRESSION:     Stable postoperative and spondylotic changes with recurrent or residual disc herniation at L4-5 versus scar tissue unchanged since 2013  No new disc herniation         Workstation performed: [id number]           PACS Images      Show images for XR spine lumbar minimum 4 views non injury   Study Result     LUMBAR SPINE     INDICATION:   M25 552: Pain in left hip  M25 551: Pain in right hip  Low back pain, and bilateral hip pain      COMPARISON:  Lumbar spine MR from 11/8/2013      VIEWS:  XR SPINE LUMBAR MINIMUM 4 VIEWS NON INJURY        FINDINGS:     There is no evidence of acute fracture or destructive osseous lesion      Mild lumbar scoliosis convex right    Mild retrolisthesis of L2 on L3, stable in flexion and extension      Moderate degenerative facet disease at L3-L4, L4-L5, and L5-S1 bilaterally      Mild degenerative disc space narrowing throughout the lumbar spine      The pedicles appear intact      Soft tissues are unremarkable      IMPRESSION:     No acute osseous abnormality        Degenerative changes as described         Workstation performed: [id number]               PACS Images      Show images for XR hips bilateral 3-4 vw w pelvis if performed   Study Result     BILATERAL HIPS AND PELVIS     INDICATION:   M25 552: Pain in left hip  M25 551: Pain in right hip  Chronic bilateral lateral hip pain for 2 years      COMPARISON:  Plain radiographs September 10, 2013     VIEWS:  XR HIPS BILATERAL 3-4 VW W PELVIS IF PERFORMED  Images: 5     FINDINGS:  The bony pelvis appears intact  Degenerative changes visualized lower lumbar spine       LEFT HIP:  Moderate left hip osteoarthritis is seen  Mild joint space narrowing  Soft tissues are unremarkable      RIGHT HIP:  Moderate right hip osteoarthritis is seen  Mild joint space narrowing    Soft tissues are unremarkable      IMPRESSION:  Progressive, moderate degenerative changes of the bilateral hips      No acute osseous abnormality            Workstation performed: [id number]

## 2019-03-18 ENCOUNTER — EVALUATION (OUTPATIENT)
Dept: PHYSICAL THERAPY | Facility: REHABILITATION | Age: 84
End: 2019-03-18
Payer: MEDICARE

## 2019-03-18 DIAGNOSIS — M54.16 LUMBAR RADICULOPATHY: ICD-10-CM

## 2019-03-18 DIAGNOSIS — M48.062 SPINAL STENOSIS OF LUMBAR REGION WITH NEUROGENIC CLAUDICATION: Primary | ICD-10-CM

## 2019-03-18 PROCEDURE — 97110 THERAPEUTIC EXERCISES: CPT | Performed by: PHYSICAL THERAPIST

## 2019-03-18 PROCEDURE — 97162 PT EVAL MOD COMPLEX 30 MIN: CPT | Performed by: PHYSICAL THERAPIST

## 2019-03-18 NOTE — PROGRESS NOTES
PT Evaluation     Today's date: 3/18/2019  Patient name: Jennifer Frank  : 1933  MRN: [id number]  Referring provider: Andrews Nunez DO  Dx:   Encounter Diagnosis     ICD-10-CM    1  Spinal stenosis of lumbar region with neurogenic claudication M48 062    2  Lumbar radiculopathy M54 16                   Assessment  Assessment details: Jennifer Frank is a 80 y o  male presenting to physical therapy with LBP and presents with pain, decreased range of motion, decreased strength, gait/balance dysfunction and decreased activity tolerance  Assessment reveals a mild flexion directional preference with decreased core stability  Secondary to these impairments, patient has increased difficulty performing ADL's and household chores  [place] would benefit from skilled PT to address these issues and maximize function    Thank you for the referral     Impairments: abnormal muscle tone, abnormal or restricted ROM, abnormal movement, activity intolerance, impaired physical strength and pain with function  Understanding of Dx/Px/POC: good   Prognosis: good    Goals  Short Term Goals: to be achieved by 4 weeks  1) Patient to be independent with initial HEP  2) Decrease pain to < or equal to 6/10 at its worst  3) Increase lumbar spine ROM by 25% in all deficient planes  4) Increase LE strength by 1/2 MMT grade in all deficient planes  5) Patient to report decreased sleep interruption secondary to pain    Long Term Goals: to be achieved by discharge  1) Increase FOTO score to > or equal to expected outcome  2) Patient to be independent with comprehensive HEP  3) Abolish pain for improved quality of life  4) Lumbar spine AROM WNL all planes to improve a/iadls  5) Increase LE strength to 5/5 MMT grade in all planes to improve a/iadls  6) Patient to report no sleep interruption secondary to pain    Plan  Patient would benefit from: skilled PT  Planned modality interventions: TENS and thermotherapy: hydrocollator packs  Planned therapy interventions: joint mobilization, manual therapy, neuromuscular re-education, patient education, strengthening, stretching, therapeutic exercise, home exercise program, ADL training and abdominal trunk stabilization  Frequency: 2x week  Duration in weeks: 8  Treatment plan discussed with: patient        Subjective Evaluation    History of Present Illness  Mechanism of injury: Patient states that he has been experiencing chronic and persistent B/L hip pain and weakness with several falls about 6 months prior  Patient attended PT to address gait/balance deficits and reports no recent falls  Patient states that he continues to experience difficulty ambulating prolonged distances which his orthopedic MD attributes to lumbar dysfunction  Patient underwent an MRI 1 month ago which revealed no changes since last MRI but evidence of a prior disc herniation and spinal stenosis  Patient ambulates all distances with a SPC and states that his goals for PT are to increase his ambulatory tolerance for improved ADL function and for community ambulation  Recurrent probem    Quality of life: fair    Pain  Current pain ratin  At best pain ratin  At worst pain ratin  Quality: radiating  Relieving factors: rest and relaxation  Aggravating factors: walking and standing  Progression: worsening    Social Support  Steps to enter house: no  Stairs in house: no   Lives in: apartment  Lives with: spouse    Employment status: not working    Diagnostic Tests  MRI studies: abnormal  Treatments  Previous treatment: physical therapy and medication  Patient Goals  Patient goals for therapy: decreased pain, improved balance, increased strength, independence with ADLs/IADLs and return to sport/leisure activities          Objective     Concurrent Complaints  Positive for night pain and disturbed sleep   Negative for bladder dysfunction, bowel dysfunction and saddle (S4) numbness    Neurological Testing Sensation     Lumbar   Left   Intact: light touch    Right   Intact: light touch    Reflexes   Left   Patellar (L4): brisk (3+)  Achilles (S1): normal (2+)  Clonus sign: negative    Right   Patellar (L4): brisk (3+)  Achilles (S1): normal (2+)  Clonus sign: negative    Active Range of Motion     Lumbar   Flexion:  Restriction level: moderate  Extension:  Restriction level: moderate  Left lateral flexion:  Restriction level: minimal  Right lateral flexion:  Restriction level: minimal  Left rotation:  Restriction level: minimal  Right rotation:  Restriction level: minimal    Passive Range of Motion     Lumbar   Left lateral flexion:  Restriction level: minimal  Right lateral flexion:  Restriction level: minimal  Left rotation:  Restriction level: minimal  Right rotation:  Restriction level: minimal  Mechanical Assessment    Cervical      Thoracic      Lumbar    Standing flexion: repeated movements   Pain location:no change  Pain intensity: better  Standing extension: repeated movements  Pain location: no change  Pain intensity: better    Strength/Myotome Testing     Left Hip   Planes of Motion   Flexion: 4  Abduction: 3-  External rotation: 4-    Right Hip   Planes of Motion   Flexion: 4  Abduction: 3-  External rotation: 4-    Left Knee   Flexion: 4+  Extension: 4+    Right Knee   Flexion: 4+  Extension: 4+    Left Ankle/Foot   Dorsiflexion: 4  Plantar flexion: 4  Great toe extension: 4    Right Ankle/Foot   Dorsiflexion: 4  Plantar flexion: 4  Great toe extension: 4    Muscle Activation     Additional Muscle Activation Details  MMT grades  TA: 3/5  Multifidus: 3/5    Tests     Lumbar     Left   Positive passive SLR  Negative quadrant  Right   Positive passive SLR  Negative quadrant       Ambulation     Observational Gait     Additional Observational Gait Details  6MWT: 750 ft w/ 2 standing rest breaks using SPC          Precautions: ORTHOSTATIC HYPOTENSION, FALLS RISK, Recent change in cognitive status, Anxiety, History of M I, History of depression, History of prostate CA    Daily Treatment Diary     Manual  3/18            B/L LAD             HS stretch             Sciatic nerve glides                                           Exercise Diary  3/18            TM ambulation on incline             VG squats w/ TA activation + TB             Multifidus press             Standing hip abduction             TA activation in supine HEP  2x10 x10"            DKTC 2x10 x10"                                                                                                                                                                                                      Modalities

## 2019-03-20 ENCOUNTER — OFFICE VISIT (OUTPATIENT)
Dept: PHYSICAL THERAPY | Facility: REHABILITATION | Age: 84
End: 2019-03-20
Payer: MEDICARE

## 2019-03-20 DIAGNOSIS — M48.062 SPINAL STENOSIS OF LUMBAR REGION WITH NEUROGENIC CLAUDICATION: Primary | ICD-10-CM

## 2019-03-20 DIAGNOSIS — M54.16 LUMBAR RADICULOPATHY: ICD-10-CM

## 2019-03-20 PROCEDURE — 97110 THERAPEUTIC EXERCISES: CPT | Performed by: PHYSICAL THERAPIST

## 2019-03-20 PROCEDURE — 97140 MANUAL THERAPY 1/> REGIONS: CPT | Performed by: PHYSICAL THERAPIST

## 2019-03-20 NOTE — PROGRESS NOTES
Daily Note     Today's date: 3/20/2019  Patient name: Ez Crystal  : 1933  MRN: [id number]  Referring provider: Greg Pandey DO  Dx:   Encounter Diagnosis     ICD-10-CM    1  Spinal stenosis of lumbar region with neurogenic claudication M48 062    2  Lumbar radiculopathy M54 16                   Subjective: Patient reports compliance with his HEP and notes difficulty performing TA activation secondary to holding his breath  Objective: See treatment diary below      Assessment: Improved TA activation noted but continued difficulty noted with breathing  Significant hamstring and sciatic nerve tension remain B/L  Plan: Continue per plan of care  Precautions: ORTHOSTATIC HYPOTENSION, FALLS RISK, Recent change in cognitive status, Anxiety, History of M I, History of depression, History of prostate CA    Daily Treatment Diary     Manual  3/20            B/L LAD Grade IV            HS stretch 4x30" B/L            Sciatic nerve glides 2x10 pumps                                          Exercise Diary  3/18 3/20           TM ambulation on incline             VG squats w/ TA activation + TB  L7  x30           Multifidus press             Standing hip abduction             TA activation in supine HEP  2x10 x10" 2x10 x10"           DKTC 2x10 x10" 2x10 x10"           Ambulation  675 ft   Prior to a rest                                                                                                                                                                                        Modalities

## 2019-03-21 ENCOUNTER — APPOINTMENT (OUTPATIENT)
Dept: PHYSICAL THERAPY | Facility: REHABILITATION | Age: 84
End: 2019-03-21
Payer: MEDICARE

## 2019-03-25 ENCOUNTER — OFFICE VISIT (OUTPATIENT)
Dept: PHYSICAL THERAPY | Facility: REHABILITATION | Age: 84
End: 2019-03-25
Payer: MEDICARE

## 2019-03-25 DIAGNOSIS — M54.16 LUMBAR RADICULOPATHY: ICD-10-CM

## 2019-03-25 DIAGNOSIS — M48.062 SPINAL STENOSIS OF LUMBAR REGION WITH NEUROGENIC CLAUDICATION: Primary | ICD-10-CM

## 2019-03-25 PROCEDURE — 97112 NEUROMUSCULAR REEDUCATION: CPT | Performed by: PHYSICAL THERAPIST

## 2019-03-25 PROCEDURE — 97110 THERAPEUTIC EXERCISES: CPT | Performed by: PHYSICAL THERAPIST

## 2019-03-25 PROCEDURE — 97140 MANUAL THERAPY 1/> REGIONS: CPT | Performed by: PHYSICAL THERAPIST

## 2019-03-25 NOTE — PROGRESS NOTES
Daily Note     Today's date: 3/25/2019  Patient name: Melyssa Carson  : 1933  MRN: [id number]  Referring provider: Jelly Barba DO  Dx:   Encounter Diagnosis     ICD-10-CM    1  Spinal stenosis of lumbar region with neurogenic claudication M48 062    2  Lumbar radiculopathy M54 16                   Subjective: Patient reports minimal DOMS following last tx session  Objective: See treatment diary below      Assessment: Patient demonstrating improved ambulatory endurance as per testing below  Began progression of core stabilization noted below and patient fatigued quickly  Will follow up with patients symptoms/DOMS next visit  Plan: Continue per plan of care  Precautions: ORTHOSTATIC HYPOTENSION, FALLS RISK, Recent change in cognitive status, Anxiety, History of M I, History of depression, History of prostate CA    Daily Treatment Diary     Manual  3/20 3/25           B/L LAD Grade IV Grade IV           HS stretch 4x30" B/L 4x30" B/L           Sciatic nerve glides 2x10 pumps 2x10 pumps                                         Exercise Diary  3/18 3/20 3/25          TM ambulation on incline             VG squats w/ TA activation + TB  L7  x30 L7  x30          Multifidus press   GTB  2x10 B/L          Standing hip abduction             TA activation in supine HEP  2x10 x10" 2x10 x10" 2x10 x10"          DKTC 2x10 x10" 2x10 x10" reviewed          Ambulation  675 ft  Prior to a rest 825 ft   Prior to rest                                                                                                                                                                                       Modalities

## 2019-03-27 ENCOUNTER — OFFICE VISIT (OUTPATIENT)
Dept: PHYSICAL THERAPY | Facility: REHABILITATION | Age: 84
End: 2019-03-27
Payer: MEDICARE

## 2019-03-27 DIAGNOSIS — M54.16 LUMBAR RADICULOPATHY: ICD-10-CM

## 2019-03-27 DIAGNOSIS — M48.062 SPINAL STENOSIS OF LUMBAR REGION WITH NEUROGENIC CLAUDICATION: Primary | ICD-10-CM

## 2019-03-27 PROCEDURE — 97140 MANUAL THERAPY 1/> REGIONS: CPT | Performed by: PHYSICAL THERAPIST

## 2019-03-27 PROCEDURE — 97110 THERAPEUTIC EXERCISES: CPT | Performed by: PHYSICAL THERAPIST

## 2019-03-27 PROCEDURE — 97112 NEUROMUSCULAR REEDUCATION: CPT | Performed by: PHYSICAL THERAPIST

## 2019-03-27 NOTE — PROGRESS NOTES
Daily Note     Today's date: 3/27/2019  Patient name: Elly Caldwell  : 1933  MRN: [id number]  Referring provider: Regino Whiting DO  Dx: No diagnosis found  Subjective: Patient reports compliance with HEP  Patient also notes improved steadiness on his feet  Objective: See treatment diary below      Assessment: Patient demonstrating continued improvement in ambulatory distance post manuals  Significant B/L hamstring and sciatic nerve tension remains B/L and patient was instructed in nerve glides  Progressed proximal strengthening noted below and patient tolerated well but fatigued quickly  Plan: Continue per plan of care  Precautions: ORTHOSTATIC HYPOTENSION, FALLS RISK, Recent change in cognitive status, Anxiety, History of M I, History of depression, History of prostate CA    Daily Treatment Diary     Manual  3/20 3/25 3/27          B/L LAD Grade IV Grade IV Grade IV          HS stretch 4x30" B/L 4x30" B/L 4x30" B/L          Sciatic nerve glides 2x10 pumps 2x10 pumps 2x10 pumps                                        Exercise Diary  3/18 3/20 3/25 3/27         TM ambulation on incline             VG squats w/ TA activation + TB  L7  x30 L7  x30 L7  x30         Multifidus press   GTB  2x10 B/L GTB  2x10         Standing hip abduction    YTB  3x10 B/L         TA activation in supine HEP  2x10 x10" 2x10 x10" 2x10 x10" 2x10 x10"         DKTC 2x10 x10" 2x10 x10" reviewed 2x10 x10"         Ambulation  675 ft  Prior to a rest 825 ft   Prior to rest 825 ft

## 2019-04-01 ENCOUNTER — OFFICE VISIT (OUTPATIENT)
Dept: PHYSICAL THERAPY | Facility: REHABILITATION | Age: 84
End: 2019-04-01
Payer: MEDICARE

## 2019-04-01 DIAGNOSIS — M48.062 SPINAL STENOSIS OF LUMBAR REGION WITH NEUROGENIC CLAUDICATION: Primary | ICD-10-CM

## 2019-04-01 PROCEDURE — 97112 NEUROMUSCULAR REEDUCATION: CPT | Performed by: PHYSICAL THERAPIST

## 2019-04-01 PROCEDURE — 97140 MANUAL THERAPY 1/> REGIONS: CPT | Performed by: PHYSICAL THERAPIST

## 2019-04-02 ENCOUNTER — OFFICE VISIT (OUTPATIENT)
Dept: FAMILY MEDICINE CLINIC | Facility: CLINIC | Age: 84
End: 2019-04-02
Payer: MEDICARE

## 2019-04-02 VITALS
RESPIRATION RATE: 16 BRPM | TEMPERATURE: 97.2 F | HEART RATE: 80 BPM | SYSTOLIC BLOOD PRESSURE: 140 MMHG | DIASTOLIC BLOOD PRESSURE: 70 MMHG | BODY MASS INDEX: 24.91 KG/M2 | HEIGHT: 69 IN | WEIGHT: 168.2 LBS

## 2019-04-02 DIAGNOSIS — I10 ESSENTIAL HYPERTENSION: Primary | ICD-10-CM

## 2019-04-02 DIAGNOSIS — M48.062 SPINAL STENOSIS OF LUMBAR REGION WITH NEUROGENIC CLAUDICATION: ICD-10-CM

## 2019-04-02 DIAGNOSIS — R41.82 ALTERED MENTAL STATUS, UNSPECIFIED ALTERED MENTAL STATUS TYPE: ICD-10-CM

## 2019-04-02 PROBLEM — J06.9 UPPER RESPIRATORY TRACT INFECTION: Status: RESOLVED | Noted: 2019-03-04 | Resolved: 2019-04-02

## 2019-04-02 PROBLEM — H61.22 IMPACTED CERUMEN OF LEFT EAR: Status: RESOLVED | Noted: 2018-07-02 | Resolved: 2019-04-02

## 2019-04-02 PROCEDURE — G0439 PPPS, SUBSEQ VISIT: HCPCS | Performed by: FAMILY MEDICINE

## 2019-04-02 PROCEDURE — 99213 OFFICE O/P EST LOW 20 MIN: CPT | Performed by: FAMILY MEDICINE

## 2019-04-03 ENCOUNTER — OFFICE VISIT (OUTPATIENT)
Dept: PHYSICAL THERAPY | Facility: REHABILITATION | Age: 84
End: 2019-04-03
Payer: MEDICARE

## 2019-04-03 DIAGNOSIS — M48.062 SPINAL STENOSIS OF LUMBAR REGION WITH NEUROGENIC CLAUDICATION: Primary | ICD-10-CM

## 2019-04-03 DIAGNOSIS — M54.16 LUMBAR RADICULOPATHY: ICD-10-CM

## 2019-04-03 PROCEDURE — 97112 NEUROMUSCULAR REEDUCATION: CPT | Performed by: PHYSICAL THERAPIST

## 2019-04-03 PROCEDURE — 97140 MANUAL THERAPY 1/> REGIONS: CPT | Performed by: PHYSICAL THERAPIST

## 2019-04-03 PROCEDURE — 97110 THERAPEUTIC EXERCISES: CPT | Performed by: PHYSICAL THERAPIST

## 2019-04-05 ENCOUNTER — OFFICE VISIT (OUTPATIENT)
Dept: CARDIOLOGY CLINIC | Facility: CLINIC | Age: 84
End: 2019-04-05
Payer: MEDICARE

## 2019-04-05 VITALS
HEART RATE: 74 BPM | WEIGHT: 168 LBS | HEIGHT: 69 IN | SYSTOLIC BLOOD PRESSURE: 132 MMHG | DIASTOLIC BLOOD PRESSURE: 64 MMHG | BODY MASS INDEX: 24.88 KG/M2

## 2019-04-05 DIAGNOSIS — I10 ESSENTIAL (PRIMARY) HYPERTENSION: Primary | ICD-10-CM

## 2019-04-05 DIAGNOSIS — I25.10 CORONARY ARTERIOSCLEROSIS: ICD-10-CM

## 2019-04-05 DIAGNOSIS — E78.5 HYPERLIPIDEMIA, UNSPECIFIED HYPERLIPIDEMIA TYPE: ICD-10-CM

## 2019-04-05 PROCEDURE — 99214 OFFICE O/P EST MOD 30 MIN: CPT | Performed by: INTERNAL MEDICINE

## 2019-04-08 ENCOUNTER — OFFICE VISIT (OUTPATIENT)
Dept: PHYSICAL THERAPY | Facility: REHABILITATION | Age: 84
End: 2019-04-08
Payer: MEDICARE

## 2019-04-08 ENCOUNTER — TELEPHONE (OUTPATIENT)
Dept: FAMILY MEDICINE CLINIC | Facility: CLINIC | Age: 84
End: 2019-04-08

## 2019-04-08 DIAGNOSIS — G60.9 IDIOPATHIC PERIPHERAL NEUROPATHY: ICD-10-CM

## 2019-04-08 DIAGNOSIS — M54.16 LUMBAR RADICULOPATHY: ICD-10-CM

## 2019-04-08 DIAGNOSIS — M48.062 SPINAL STENOSIS OF LUMBAR REGION WITH NEUROGENIC CLAUDICATION: Primary | ICD-10-CM

## 2019-04-08 PROCEDURE — 97140 MANUAL THERAPY 1/> REGIONS: CPT | Performed by: PHYSICAL THERAPIST

## 2019-04-08 PROCEDURE — 97110 THERAPEUTIC EXERCISES: CPT | Performed by: PHYSICAL THERAPIST

## 2019-04-08 PROCEDURE — 97530 THERAPEUTIC ACTIVITIES: CPT | Performed by: PHYSICAL THERAPIST

## 2019-04-08 RX ORDER — GABAPENTIN 300 MG/1
300 CAPSULE ORAL
Qty: 270 CAPSULE | Refills: 3 | Status: SHIPPED | OUTPATIENT
Start: 2019-04-08 | End: 2019-04-18 | Stop reason: SDUPTHER

## 2019-04-10 ENCOUNTER — OFFICE VISIT (OUTPATIENT)
Dept: PHYSICAL THERAPY | Facility: REHABILITATION | Age: 84
End: 2019-04-10
Payer: MEDICARE

## 2019-04-10 DIAGNOSIS — M48.062 SPINAL STENOSIS OF LUMBAR REGION WITH NEUROGENIC CLAUDICATION: Primary | ICD-10-CM

## 2019-04-10 DIAGNOSIS — M54.16 LUMBAR RADICULOPATHY: ICD-10-CM

## 2019-04-10 PROCEDURE — 97530 THERAPEUTIC ACTIVITIES: CPT | Performed by: PHYSICAL THERAPIST

## 2019-04-10 PROCEDURE — 97140 MANUAL THERAPY 1/> REGIONS: CPT | Performed by: PHYSICAL THERAPIST

## 2019-04-10 PROCEDURE — 97110 THERAPEUTIC EXERCISES: CPT | Performed by: PHYSICAL THERAPIST

## 2019-04-15 ENCOUNTER — OFFICE VISIT (OUTPATIENT)
Dept: PHYSICAL THERAPY | Facility: REHABILITATION | Age: 84
End: 2019-04-15
Payer: MEDICARE

## 2019-04-15 DIAGNOSIS — M48.062 SPINAL STENOSIS OF LUMBAR REGION WITH NEUROGENIC CLAUDICATION: Primary | ICD-10-CM

## 2019-04-15 DIAGNOSIS — M54.16 LUMBAR RADICULOPATHY: ICD-10-CM

## 2019-04-15 PROCEDURE — 97530 THERAPEUTIC ACTIVITIES: CPT | Performed by: PHYSICAL THERAPIST

## 2019-04-15 PROCEDURE — 97110 THERAPEUTIC EXERCISES: CPT | Performed by: PHYSICAL THERAPIST

## 2019-04-15 PROCEDURE — 97140 MANUAL THERAPY 1/> REGIONS: CPT | Performed by: PHYSICAL THERAPIST

## 2019-04-17 ENCOUNTER — TELEPHONE (OUTPATIENT)
Dept: FAMILY MEDICINE CLINIC | Facility: CLINIC | Age: 84
End: 2019-04-17

## 2019-04-17 ENCOUNTER — OFFICE VISIT (OUTPATIENT)
Dept: PHYSICAL THERAPY | Facility: REHABILITATION | Age: 84
End: 2019-04-17
Payer: MEDICARE

## 2019-04-17 DIAGNOSIS — M48.062 SPINAL STENOSIS OF LUMBAR REGION WITH NEUROGENIC CLAUDICATION: Primary | ICD-10-CM

## 2019-04-17 DIAGNOSIS — M54.16 LUMBAR RADICULOPATHY: ICD-10-CM

## 2019-04-17 PROCEDURE — 97140 MANUAL THERAPY 1/> REGIONS: CPT | Performed by: PHYSICAL THERAPIST

## 2019-04-17 PROCEDURE — 97110 THERAPEUTIC EXERCISES: CPT | Performed by: PHYSICAL THERAPIST

## 2019-04-17 PROCEDURE — 97530 THERAPEUTIC ACTIVITIES: CPT | Performed by: PHYSICAL THERAPIST

## 2019-04-18 DIAGNOSIS — G60.9 IDIOPATHIC PERIPHERAL NEUROPATHY: ICD-10-CM

## 2019-04-18 RX ORDER — GABAPENTIN 300 MG/1
CAPSULE ORAL
Qty: 180 CAPSULE | Refills: 3 | Status: SHIPPED | OUTPATIENT
Start: 2019-04-18 | End: 2019-05-02 | Stop reason: SDUPTHER

## 2019-04-22 ENCOUNTER — OFFICE VISIT (OUTPATIENT)
Dept: PHYSICAL THERAPY | Facility: REHABILITATION | Age: 84
End: 2019-04-22
Payer: MEDICARE

## 2019-04-22 DIAGNOSIS — M54.16 LUMBAR RADICULOPATHY: ICD-10-CM

## 2019-04-22 DIAGNOSIS — M48.062 SPINAL STENOSIS OF LUMBAR REGION WITH NEUROGENIC CLAUDICATION: Primary | ICD-10-CM

## 2019-04-22 PROCEDURE — 97140 MANUAL THERAPY 1/> REGIONS: CPT | Performed by: PHYSICAL THERAPIST

## 2019-04-22 PROCEDURE — 97110 THERAPEUTIC EXERCISES: CPT | Performed by: PHYSICAL THERAPIST

## 2019-04-24 ENCOUNTER — OFFICE VISIT (OUTPATIENT)
Dept: PHYSICAL THERAPY | Facility: REHABILITATION | Age: 84
End: 2019-04-24
Payer: MEDICARE

## 2019-04-24 DIAGNOSIS — M54.16 LUMBAR RADICULOPATHY: ICD-10-CM

## 2019-04-24 DIAGNOSIS — M48.062 SPINAL STENOSIS OF LUMBAR REGION WITH NEUROGENIC CLAUDICATION: Primary | ICD-10-CM

## 2019-04-24 PROCEDURE — 97140 MANUAL THERAPY 1/> REGIONS: CPT | Performed by: PHYSICAL THERAPIST

## 2019-04-24 PROCEDURE — 97112 NEUROMUSCULAR REEDUCATION: CPT | Performed by: PHYSICAL THERAPIST

## 2019-04-24 PROCEDURE — 97110 THERAPEUTIC EXERCISES: CPT | Performed by: PHYSICAL THERAPIST

## 2019-04-26 ENCOUNTER — OFFICE VISIT (OUTPATIENT)
Dept: FAMILY MEDICINE CLINIC | Facility: CLINIC | Age: 84
End: 2019-04-26
Payer: MEDICARE

## 2019-04-26 VITALS
SYSTOLIC BLOOD PRESSURE: 104 MMHG | DIASTOLIC BLOOD PRESSURE: 60 MMHG | HEART RATE: 72 BPM | RESPIRATION RATE: 16 BRPM | WEIGHT: 168.8 LBS | HEIGHT: 69 IN | TEMPERATURE: 97.4 F | BODY MASS INDEX: 25 KG/M2

## 2019-04-26 DIAGNOSIS — J06.9 UPPER RESPIRATORY TRACT INFECTION, UNSPECIFIED TYPE: Primary | ICD-10-CM

## 2019-04-26 PROCEDURE — 99213 OFFICE O/P EST LOW 20 MIN: CPT | Performed by: FAMILY MEDICINE

## 2019-04-26 RX ORDER — AZITHROMYCIN 250 MG/1
TABLET, FILM COATED ORAL
Qty: 6 TABLET | Refills: 0 | Status: SHIPPED | OUTPATIENT
Start: 2019-04-26 | End: 2019-05-02

## 2019-04-29 ENCOUNTER — APPOINTMENT (OUTPATIENT)
Dept: PHYSICAL THERAPY | Facility: REHABILITATION | Age: 84
End: 2019-04-29
Payer: MEDICARE

## 2019-04-30 ENCOUNTER — TELEPHONE (OUTPATIENT)
Dept: FAMILY MEDICINE CLINIC | Facility: CLINIC | Age: 84
End: 2019-04-30

## 2019-05-01 ENCOUNTER — APPOINTMENT (OUTPATIENT)
Dept: PHYSICAL THERAPY | Facility: REHABILITATION | Age: 84
End: 2019-05-01
Payer: MEDICARE

## 2019-05-01 ENCOUNTER — TRANSCRIBE ORDERS (OUTPATIENT)
Dept: LAB | Facility: CLINIC | Age: 84
End: 2019-05-01

## 2019-05-01 ENCOUNTER — APPOINTMENT (OUTPATIENT)
Dept: LAB | Facility: CLINIC | Age: 84
End: 2019-05-01
Payer: MEDICARE

## 2019-05-01 ENCOUNTER — TELEPHONE (OUTPATIENT)
Dept: FAMILY MEDICINE CLINIC | Facility: CLINIC | Age: 84
End: 2019-05-01

## 2019-05-01 DIAGNOSIS — C61 MALIGNANT NEOPLASM OF PROSTATE (HCC): Primary | ICD-10-CM

## 2019-05-01 DIAGNOSIS — N40.1 ENLARGED PROSTATE WITH URINARY OBSTRUCTION: ICD-10-CM

## 2019-05-01 DIAGNOSIS — N13.8 ENLARGED PROSTATE WITH URINARY OBSTRUCTION: ICD-10-CM

## 2019-05-01 DIAGNOSIS — C61 MALIGNANT NEOPLASM OF PROSTATE (HCC): ICD-10-CM

## 2019-05-01 LAB — PSA SERPL-MCNC: 0.1 NG/ML (ref 0–4)

## 2019-05-01 PROCEDURE — 84153 ASSAY OF PSA TOTAL: CPT

## 2019-05-02 ENCOUNTER — OFFICE VISIT (OUTPATIENT)
Dept: FAMILY MEDICINE CLINIC | Facility: CLINIC | Age: 84
End: 2019-05-02
Payer: MEDICARE

## 2019-05-02 VITALS
HEIGHT: 69 IN | WEIGHT: 167.25 LBS | DIASTOLIC BLOOD PRESSURE: 58 MMHG | SYSTOLIC BLOOD PRESSURE: 128 MMHG | OXYGEN SATURATION: 97 % | TEMPERATURE: 97.9 F | HEART RATE: 81 BPM | BODY MASS INDEX: 24.77 KG/M2

## 2019-05-02 DIAGNOSIS — G60.9 IDIOPATHIC PERIPHERAL NEUROPATHY: ICD-10-CM

## 2019-05-02 DIAGNOSIS — J06.9 UPPER RESPIRATORY TRACT INFECTION, UNSPECIFIED TYPE: Primary | ICD-10-CM

## 2019-05-02 PROCEDURE — 99213 OFFICE O/P EST LOW 20 MIN: CPT | Performed by: FAMILY MEDICINE

## 2019-05-02 RX ORDER — GABAPENTIN 300 MG/1
CAPSULE ORAL
Qty: 270 CAPSULE | Refills: 3 | Status: SHIPPED | OUTPATIENT
Start: 2019-05-02 | End: 2020-04-13 | Stop reason: SDUPTHER

## 2019-05-02 RX ORDER — PREDNISONE 20 MG/1
TABLET ORAL
Qty: 10 TABLET | Refills: 0 | Status: SHIPPED | OUTPATIENT
Start: 2019-05-02 | End: 2019-10-25 | Stop reason: ALTCHOICE

## 2019-05-10 ENCOUNTER — OFFICE VISIT (OUTPATIENT)
Dept: PHYSICAL THERAPY | Facility: REHABILITATION | Age: 84
End: 2019-05-10
Payer: MEDICARE

## 2019-05-10 DIAGNOSIS — M54.16 LUMBAR RADICULOPATHY: ICD-10-CM

## 2019-05-10 DIAGNOSIS — M48.062 SPINAL STENOSIS OF LUMBAR REGION WITH NEUROGENIC CLAUDICATION: Primary | ICD-10-CM

## 2019-05-10 PROCEDURE — 97110 THERAPEUTIC EXERCISES: CPT | Performed by: PHYSICAL THERAPIST

## 2019-05-10 PROCEDURE — 97112 NEUROMUSCULAR REEDUCATION: CPT | Performed by: PHYSICAL THERAPIST

## 2019-05-10 PROCEDURE — 97140 MANUAL THERAPY 1/> REGIONS: CPT | Performed by: PHYSICAL THERAPIST

## 2019-05-13 ENCOUNTER — OFFICE VISIT (OUTPATIENT)
Dept: PHYSICAL THERAPY | Facility: REHABILITATION | Age: 84
End: 2019-05-13
Payer: MEDICARE

## 2019-05-13 DIAGNOSIS — M54.16 LUMBAR RADICULOPATHY: ICD-10-CM

## 2019-05-13 DIAGNOSIS — M48.062 SPINAL STENOSIS OF LUMBAR REGION WITH NEUROGENIC CLAUDICATION: Primary | ICD-10-CM

## 2019-05-13 PROCEDURE — 97140 MANUAL THERAPY 1/> REGIONS: CPT | Performed by: PHYSICAL THERAPIST

## 2019-05-13 PROCEDURE — 97110 THERAPEUTIC EXERCISES: CPT | Performed by: PHYSICAL THERAPIST

## 2019-05-13 PROCEDURE — 97112 NEUROMUSCULAR REEDUCATION: CPT | Performed by: PHYSICAL THERAPIST

## 2019-05-15 ENCOUNTER — OFFICE VISIT (OUTPATIENT)
Dept: PHYSICAL THERAPY | Facility: REHABILITATION | Age: 84
End: 2019-05-15
Payer: MEDICARE

## 2019-05-15 DIAGNOSIS — M54.16 LUMBAR RADICULOPATHY: ICD-10-CM

## 2019-05-15 DIAGNOSIS — M48.062 SPINAL STENOSIS OF LUMBAR REGION WITH NEUROGENIC CLAUDICATION: Primary | ICD-10-CM

## 2019-05-15 PROCEDURE — 97110 THERAPEUTIC EXERCISES: CPT | Performed by: PHYSICAL THERAPIST

## 2019-05-15 PROCEDURE — 97112 NEUROMUSCULAR REEDUCATION: CPT | Performed by: PHYSICAL THERAPIST

## 2019-05-15 PROCEDURE — 97140 MANUAL THERAPY 1/> REGIONS: CPT | Performed by: PHYSICAL THERAPIST

## 2019-05-20 ENCOUNTER — OFFICE VISIT (OUTPATIENT)
Dept: PHYSICAL THERAPY | Facility: REHABILITATION | Age: 84
End: 2019-05-20
Payer: MEDICARE

## 2019-05-20 DIAGNOSIS — M54.16 LUMBAR RADICULOPATHY: ICD-10-CM

## 2019-05-20 DIAGNOSIS — M48.062 SPINAL STENOSIS OF LUMBAR REGION WITH NEUROGENIC CLAUDICATION: Primary | ICD-10-CM

## 2019-05-20 PROCEDURE — 97110 THERAPEUTIC EXERCISES: CPT | Performed by: PHYSICAL THERAPIST

## 2019-05-20 PROCEDURE — 97140 MANUAL THERAPY 1/> REGIONS: CPT | Performed by: PHYSICAL THERAPIST

## 2019-05-20 PROCEDURE — 97112 NEUROMUSCULAR REEDUCATION: CPT | Performed by: PHYSICAL THERAPIST

## 2019-05-24 ENCOUNTER — OFFICE VISIT (OUTPATIENT)
Dept: PHYSICAL THERAPY | Facility: REHABILITATION | Age: 84
End: 2019-05-24
Payer: MEDICARE

## 2019-05-24 DIAGNOSIS — M48.062 SPINAL STENOSIS OF LUMBAR REGION WITH NEUROGENIC CLAUDICATION: Primary | ICD-10-CM

## 2019-05-24 DIAGNOSIS — M54.16 LUMBAR RADICULOPATHY: ICD-10-CM

## 2019-05-24 PROCEDURE — 97110 THERAPEUTIC EXERCISES: CPT | Performed by: PHYSICAL THERAPIST

## 2019-05-24 PROCEDURE — 97112 NEUROMUSCULAR REEDUCATION: CPT | Performed by: PHYSICAL THERAPIST

## 2019-05-24 PROCEDURE — 97140 MANUAL THERAPY 1/> REGIONS: CPT | Performed by: PHYSICAL THERAPIST

## 2019-05-29 ENCOUNTER — OFFICE VISIT (OUTPATIENT)
Dept: PHYSICAL THERAPY | Facility: REHABILITATION | Age: 84
End: 2019-05-29
Payer: MEDICARE

## 2019-05-29 DIAGNOSIS — M54.16 LUMBAR RADICULOPATHY: ICD-10-CM

## 2019-05-29 DIAGNOSIS — M48.062 SPINAL STENOSIS OF LUMBAR REGION WITH NEUROGENIC CLAUDICATION: Primary | ICD-10-CM

## 2019-05-29 PROCEDURE — 97112 NEUROMUSCULAR REEDUCATION: CPT | Performed by: PHYSICAL THERAPIST

## 2019-05-29 PROCEDURE — 97110 THERAPEUTIC EXERCISES: CPT | Performed by: PHYSICAL THERAPIST

## 2019-05-29 PROCEDURE — 97140 MANUAL THERAPY 1/> REGIONS: CPT | Performed by: PHYSICAL THERAPIST

## 2019-05-31 ENCOUNTER — OFFICE VISIT (OUTPATIENT)
Dept: PHYSICAL THERAPY | Facility: REHABILITATION | Age: 84
End: 2019-05-31
Payer: MEDICARE

## 2019-05-31 DIAGNOSIS — M54.16 LUMBAR RADICULOPATHY: ICD-10-CM

## 2019-05-31 DIAGNOSIS — M48.062 SPINAL STENOSIS OF LUMBAR REGION WITH NEUROGENIC CLAUDICATION: Primary | ICD-10-CM

## 2019-05-31 PROCEDURE — 97112 NEUROMUSCULAR REEDUCATION: CPT | Performed by: PHYSICAL THERAPIST

## 2019-05-31 PROCEDURE — 97140 MANUAL THERAPY 1/> REGIONS: CPT | Performed by: PHYSICAL THERAPIST

## 2019-05-31 PROCEDURE — 97110 THERAPEUTIC EXERCISES: CPT | Performed by: PHYSICAL THERAPIST

## 2019-06-03 ENCOUNTER — OFFICE VISIT (OUTPATIENT)
Dept: PHYSICAL THERAPY | Facility: REHABILITATION | Age: 84
End: 2019-06-03
Payer: MEDICARE

## 2019-06-03 DIAGNOSIS — M54.16 LUMBAR RADICULOPATHY: ICD-10-CM

## 2019-06-03 DIAGNOSIS — M48.062 SPINAL STENOSIS OF LUMBAR REGION WITH NEUROGENIC CLAUDICATION: Primary | ICD-10-CM

## 2019-06-03 PROCEDURE — 97140 MANUAL THERAPY 1/> REGIONS: CPT | Performed by: PHYSICAL THERAPIST

## 2019-06-03 PROCEDURE — 97110 THERAPEUTIC EXERCISES: CPT | Performed by: PHYSICAL THERAPIST

## 2019-06-03 PROCEDURE — 97112 NEUROMUSCULAR REEDUCATION: CPT | Performed by: PHYSICAL THERAPIST

## 2019-06-05 ENCOUNTER — OFFICE VISIT (OUTPATIENT)
Dept: PHYSICAL THERAPY | Facility: REHABILITATION | Age: 84
End: 2019-06-05
Payer: MEDICARE

## 2019-06-05 DIAGNOSIS — M48.062 SPINAL STENOSIS OF LUMBAR REGION WITH NEUROGENIC CLAUDICATION: Primary | ICD-10-CM

## 2019-06-05 DIAGNOSIS — M54.16 LUMBAR RADICULOPATHY: ICD-10-CM

## 2019-06-05 PROCEDURE — 97112 NEUROMUSCULAR REEDUCATION: CPT | Performed by: PHYSICAL THERAPIST

## 2019-06-05 PROCEDURE — 97140 MANUAL THERAPY 1/> REGIONS: CPT | Performed by: PHYSICAL THERAPIST

## 2019-06-05 PROCEDURE — 97110 THERAPEUTIC EXERCISES: CPT | Performed by: PHYSICAL THERAPIST

## 2019-06-10 ENCOUNTER — OFFICE VISIT (OUTPATIENT)
Dept: PAIN MEDICINE | Facility: CLINIC | Age: 84
End: 2019-06-10
Payer: MEDICARE

## 2019-06-10 VITALS — HEIGHT: 69 IN | BODY MASS INDEX: 24.73 KG/M2 | WEIGHT: 167 LBS

## 2019-06-10 DIAGNOSIS — M47.816 LUMBAR SPONDYLOSIS: ICD-10-CM

## 2019-06-10 DIAGNOSIS — M48.062 SPINAL STENOSIS OF LUMBAR REGION WITH NEUROGENIC CLAUDICATION: ICD-10-CM

## 2019-06-10 DIAGNOSIS — M54.16 LUMBAR RADICULOPATHY: Primary | ICD-10-CM

## 2019-06-10 DIAGNOSIS — G60.9 IDIOPATHIC PERIPHERAL NEUROPATHY: ICD-10-CM

## 2019-06-10 PROCEDURE — 99213 OFFICE O/P EST LOW 20 MIN: CPT | Performed by: NURSE PRACTITIONER

## 2019-06-11 DIAGNOSIS — E78.00 PURE HYPERCHOLESTEROLEMIA: ICD-10-CM

## 2019-06-11 RX ORDER — SIMVASTATIN 80 MG
TABLET ORAL DAILY
Qty: 90 TABLET | Refills: 3 | Status: SHIPPED | OUTPATIENT
Start: 2019-06-11 | End: 2021-03-30

## 2019-06-12 ENCOUNTER — OFFICE VISIT (OUTPATIENT)
Dept: PHYSICAL THERAPY | Facility: REHABILITATION | Age: 84
End: 2019-06-12
Payer: MEDICARE

## 2019-06-12 DIAGNOSIS — M48.062 SPINAL STENOSIS OF LUMBAR REGION WITH NEUROGENIC CLAUDICATION: Primary | ICD-10-CM

## 2019-06-12 DIAGNOSIS — M54.16 LUMBAR RADICULOPATHY: ICD-10-CM

## 2019-06-12 PROCEDURE — 97110 THERAPEUTIC EXERCISES: CPT | Performed by: PHYSICAL THERAPIST

## 2019-06-12 PROCEDURE — 97140 MANUAL THERAPY 1/> REGIONS: CPT | Performed by: PHYSICAL THERAPIST

## 2019-06-12 PROCEDURE — 97112 NEUROMUSCULAR REEDUCATION: CPT | Performed by: PHYSICAL THERAPIST

## 2019-06-14 ENCOUNTER — OFFICE VISIT (OUTPATIENT)
Dept: PHYSICAL THERAPY | Facility: REHABILITATION | Age: 84
End: 2019-06-14
Payer: MEDICARE

## 2019-06-14 DIAGNOSIS — M54.16 LUMBAR RADICULOPATHY: ICD-10-CM

## 2019-06-14 DIAGNOSIS — M48.062 SPINAL STENOSIS OF LUMBAR REGION WITH NEUROGENIC CLAUDICATION: Primary | ICD-10-CM

## 2019-06-14 PROCEDURE — 97112 NEUROMUSCULAR REEDUCATION: CPT | Performed by: PHYSICAL THERAPIST

## 2019-06-14 PROCEDURE — 97140 MANUAL THERAPY 1/> REGIONS: CPT | Performed by: PHYSICAL THERAPIST

## 2019-06-14 PROCEDURE — 97110 THERAPEUTIC EXERCISES: CPT | Performed by: PHYSICAL THERAPIST

## 2019-06-17 ENCOUNTER — OFFICE VISIT (OUTPATIENT)
Dept: PHYSICAL THERAPY | Facility: REHABILITATION | Age: 84
End: 2019-06-17
Payer: MEDICARE

## 2019-06-17 DIAGNOSIS — M54.16 LUMBAR RADICULOPATHY: ICD-10-CM

## 2019-06-17 DIAGNOSIS — M48.062 SPINAL STENOSIS OF LUMBAR REGION WITH NEUROGENIC CLAUDICATION: Primary | ICD-10-CM

## 2019-06-17 PROCEDURE — 97112 NEUROMUSCULAR REEDUCATION: CPT | Performed by: PHYSICAL THERAPIST

## 2019-06-17 PROCEDURE — 97110 THERAPEUTIC EXERCISES: CPT | Performed by: PHYSICAL THERAPIST

## 2019-06-17 PROCEDURE — 97140 MANUAL THERAPY 1/> REGIONS: CPT | Performed by: PHYSICAL THERAPIST

## 2019-06-19 ENCOUNTER — OFFICE VISIT (OUTPATIENT)
Dept: PHYSICAL THERAPY | Facility: REHABILITATION | Age: 84
End: 2019-06-19
Payer: MEDICARE

## 2019-06-19 DIAGNOSIS — M48.062 SPINAL STENOSIS OF LUMBAR REGION WITH NEUROGENIC CLAUDICATION: Primary | ICD-10-CM

## 2019-06-19 DIAGNOSIS — M54.16 LUMBAR RADICULOPATHY: ICD-10-CM

## 2019-06-19 PROCEDURE — 97110 THERAPEUTIC EXERCISES: CPT | Performed by: PHYSICAL THERAPIST

## 2019-06-19 PROCEDURE — 97140 MANUAL THERAPY 1/> REGIONS: CPT | Performed by: PHYSICAL THERAPIST

## 2019-06-19 PROCEDURE — 97112 NEUROMUSCULAR REEDUCATION: CPT | Performed by: PHYSICAL THERAPIST

## 2019-06-27 ENCOUNTER — OFFICE VISIT (OUTPATIENT)
Dept: PHYSICAL THERAPY | Facility: REHABILITATION | Age: 84
End: 2019-06-27
Payer: MEDICARE

## 2019-06-27 DIAGNOSIS — M54.16 LUMBAR RADICULOPATHY: ICD-10-CM

## 2019-06-27 DIAGNOSIS — M48.062 SPINAL STENOSIS OF LUMBAR REGION WITH NEUROGENIC CLAUDICATION: Primary | ICD-10-CM

## 2019-06-27 PROCEDURE — 97112 NEUROMUSCULAR REEDUCATION: CPT

## 2019-06-27 PROCEDURE — 97110 THERAPEUTIC EXERCISES: CPT

## 2019-06-27 PROCEDURE — 97140 MANUAL THERAPY 1/> REGIONS: CPT

## 2019-07-01 ENCOUNTER — OFFICE VISIT (OUTPATIENT)
Dept: PHYSICAL THERAPY | Facility: REHABILITATION | Age: 84
End: 2019-07-01
Payer: MEDICARE

## 2019-07-01 DIAGNOSIS — M48.062 SPINAL STENOSIS OF LUMBAR REGION WITH NEUROGENIC CLAUDICATION: Primary | ICD-10-CM

## 2019-07-01 DIAGNOSIS — M54.16 LUMBAR RADICULOPATHY: ICD-10-CM

## 2019-07-01 PROCEDURE — 97140 MANUAL THERAPY 1/> REGIONS: CPT | Performed by: PHYSICAL THERAPIST

## 2019-07-01 PROCEDURE — 97110 THERAPEUTIC EXERCISES: CPT | Performed by: PHYSICAL THERAPIST

## 2019-07-01 NOTE — PROGRESS NOTES
PT Re-Evaluation  and PT Discharge    Today's date: 2019  Patient name: Samantha Nelson  : 1933  MRN: [id number]  Referring provider: Lyle Welch DO  Dx:   Encounter Diagnosis     ICD-10-CM    1  Spinal stenosis of lumbar region with neurogenic claudication M48 062    2  Lumbar radiculopathy M54 [id number]                   Assessment  Assessment details: Patient is a 80y o  year old male who attended physical therapy for 27 treatment sessions regarding gait/balance dysfunction and lumbar pain  Patient reports 70% improvement at this time which correlates with FOTO scoring  Patient has shown improvement throughout PT by demonstrating decreased pain, increased range of motion, increased strength, improved tolerance to activity and improved gait/balance  Secondary to achieving functional goals and independence with comprehensive Home Exercise Program, Susan Gustafson will be discharged from PT at this time  Thank you       Impairments: abnormal muscle tone, abnormal or restricted ROM, abnormal movement, activity intolerance, impaired physical strength and pain with function  Understanding of Dx/Px/POC: good   Prognosis: good    Goals  Short Term Goals: to be achieved by 4 weeks  1) Patient to be independent with initial HEP = MET  2) Decrease pain to < or equal to 6/10 at its worst = MET  3) Increase lumbar spine ROM by 25% in all deficient planes = MET  4) Increase LE strength by 1/2 MMT grade in all deficient planes = MET  5) Patient to report decreased sleep interruption secondary to pain = MET    Long Term Goals: to be achieved by discharge  1) Increase FOTO score to > or equal to expected outcome = MET  2) Patient to be independent with comprehensive HEP = MET  3) Abolish pain for improved quality of life = NOT MET  4) Lumbar spine AROM WNL all planes to improve a/iadls = PARTIALLY MET  5) Increase LE strength to 5/5 MMT grade in all planes to improve a/iadls = PARTIALLY MET  6) Patient to report no sleep interruption secondary to pain = MET    Plan  Plan details: Patient will be d/c from PT  Patient would benefit from: skilled PT  Planned modality interventions: TENS and thermotherapy: hydrocollator packs  Planned therapy interventions: joint mobilization, manual therapy, neuromuscular re-education, patient education, strengthening, stretching, therapeutic exercise, home exercise program, ADL training and abdominal trunk stabilization  Treatment plan discussed with: patient        Subjective Evaluation    History of Present Illness  Mechanism of injury: Patient states that he has been experiencing chronic and persistent B/L hip pain and weakness with several falls about 6 months prior  Patient attended PT to address gait/balance deficits and reports no recent falls  Patient states that he continues to experience difficulty ambulating prolonged distances which his orthopedic MD attributes to lumbar dysfunction  Patient underwent an MRI 1 month ago which revealed no changes since last MRI but evidence of a prior disc herniation and spinal stenosis  Patient ambulates all distances with a SPC and states that his goals for PT are to increase his ambulatory tolerance for improved ADL function and for community ambulation  2019: Patient demonstrating improved ambulatory safety and endurance as per 6MWT  Patient and his wife report improving ADL and iADL function with improved stability during gait/functional movement patterns  6/3/2019: Patient reports compliance with 30 second pause prior to ambulation secondary to orthostatics  Patient states that his functional mobility/ambulatory endurance is improving but remains limited by LE fatigue  2019: Patient reports no episodes of LOB or lightheadedness since last re-assessment  Patient also reports improving function with ambulatory endurance with ADL's             Recurrent probem    Quality of life: fair    Pain  Current pain ratin  At best pain ratin  At worst pain ratin  Quality: radiating  Relieving factors: rest and relaxation  Aggravating factors: walking and standing  Progression: worsening    Social Support  Steps to enter house: no  Stairs in house: no   Lives in: apartment  Lives with: spouse    Employment status: not working    Diagnostic Tests  MRI studies: abnormal  Treatments  Previous treatment: physical therapy and medication  Patient Goals  Patient goals for therapy: decreased pain, improved balance, increased strength, independence with ADLs/IADLs and return to sport/leisure activities          Objective     Concurrent Complaints  Negative for night pain, disturbed sleep, bladder dysfunction, bowel dysfunction and saddle (S4) numbness    Neurological Testing     Sensation     Lumbar   Left   Intact: light touch    Right   Intact: light touch    Reflexes   Left   Patellar (L4): brisk (3+)  Achilles (S1): normal (2+)  Clonus sign: negative    Right   Patellar (L4): brisk (3+)  Achilles (S1): normal (2+)  Clonus sign: negative    Active Range of Motion     Lumbar   Flexion:  Restriction level: minimal  Extension:  Restriction level: minimal  Left lateral flexion:  Restriction level: minimal  Right lateral flexion:  Restriction level: minimal  Left rotation:  Restriction level: minimal  Right rotation:  Restriction level: minimal  Mechanical Assessment    Cervical      Thoracic      Lumbar    Standing flexion: repeated movements   Pain location:no change  Pain intensity: better  Standing extension: repeated movements  Pain location: no change  Pain intensity: better    Strength/Myotome Testing     Left Hip   Planes of Motion   Flexion: 4  Abduction: 3+  External rotation: 4    Right Hip   Planes of Motion   Flexion: 4  Abduction: 3+  External rotation: 4    Left Knee   Flexion: 4+  Extension: 4+    Right Knee   Flexion: 4+  Extension: 4+    Left Ankle/Foot   Dorsiflexion: 4+  Plantar flexion: 4  Great toe extension: 4    Right Ankle/Foot   Dorsiflexion: 4+  Plantar flexion: 4  Great toe extension: 4    Muscle Activation     Additional Muscle Activation Details  MMT grades  TA: 3/5 (4/22/2019: 4/5) (6/3/2019: 4/5)  Multifidus: 3/5 (4/22/2019: 4/5) (6/3/2019: 4/5)    Tests     Lumbar     Left   Positive passive SLR  Negative quadrant  Right   Positive passive SLR  Negative quadrant       Ambulation     Observational Gait     Additional Observational Gait Details  6MWT: 750 ft w/ 2 standing rest breaks using SPC (4/22/2019:1,060 ft  W/ 1 rest break) (6/3/2019: 1,080 ft ) (7/1/2019: 1,125 ft )    Functional Assessment        Comments  Full tandem:  R = 26 seconds (7/1/2019: > 30 seconds)  L = 19 seconds (7/1/2019: > 30 seconds)            Precautions: ORTHOSTATIC HYPOTENSION, FALLS RISK, Recent change in cognitive status, Anxiety, History of M I, History of depression, History of prostate CA   BP prior to session: 156/82 HR 71 BPM  BP following treatment session: 158/80 HR: 83 BPM  Daily Treatment Diary      Manual  6/12 6/14 6/17 6/19 6/27 7/1 6/5   B/L LAD Grade IV Grade IV  Grade IV  Grade IV  Grade IV  LIDA, PT  grade IV       Grade IV   HS stretch 3x30" B/L 3x30" B/L  3x30" B/L 3x30" B/L  4x30" B/L  4x30" B/L       3x30" B/L   Sciatic nerve glides                       Quad stretching 3x30" B/L 3x30" B/L  3x30" B/L  3x30" B/L  4x30" B/L  4x30" B/L       3x30" B/L   Re-assessment                       B/L lumbar gapping Grade IV Grade IV  Grade IV  Grade IV Delorse Staple, PT  Grade IV       Grade IV   Re-assessment            15'                 Exercise Diary  6/5 6/12 6/14  6/17  6/19  6/27  7/1     6/3   TM ambulation on incline                       VG squats w/ TA activation + TB L7  5' L7  5' L7  5'  L7  5'  L7  5'  L7  5'  L7  5'     L7  5'   Multifidus press GTB  3x10 B/L in standing w/ squat GTB  3x10 B/L in standing w/ squat GTB  3x10 B/L in standing w/ squat  GTB  3x10 B/L in standing w/ squat  GTB  3x10 B/L in standing w/ squat  GTB  3x10 B/L in standing w/ squat  Reviewed     GTB  3x10 B/L in standing w/ squat   Standing hip abduction                      TA activation in supine                       DKTC                       Ambulation 1,100 ft   1,100 ft  1,200 ft   1,200 ft   1,125 ft   960  Ft  6MWT     1,050 ft     Sharpened romberg balance 4x30" B/L 4x30" B/L 4x30" B/L             4x30" B/L   Physioball roll outs - lumbar flexion 10x10" 10x10" 10x10"  10 x10"  10 x10"  10  x10"  10 x10"     10x10"    YTB hip abduction          2x10 B/L  2x10 B/L 2x10 B/L

## 2019-07-22 ENCOUNTER — OFFICE VISIT (OUTPATIENT)
Dept: PAIN MEDICINE | Facility: CLINIC | Age: 84
End: 2019-07-22
Payer: MEDICARE

## 2019-07-22 VITALS
DIASTOLIC BLOOD PRESSURE: 73 MMHG | HEART RATE: 83 BPM | HEIGHT: 69 IN | WEIGHT: 170 LBS | BODY MASS INDEX: 25.18 KG/M2 | SYSTOLIC BLOOD PRESSURE: 123 MMHG

## 2019-07-22 DIAGNOSIS — M54.16 LUMBAR RADICULOPATHY: Primary | ICD-10-CM

## 2019-07-22 DIAGNOSIS — M47.816 LUMBAR SPONDYLOSIS: ICD-10-CM

## 2019-07-22 PROCEDURE — 99214 OFFICE O/P EST MOD 30 MIN: CPT | Performed by: NURSE PRACTITIONER

## 2019-07-22 NOTE — PROGRESS NOTES
Assessment:  1  Lumbar radiculopathy    2  Lumbar spondylosis        Plan:  1  I will schedule the patient for bilateral L4 TFESI to address the inflammatory component of the patient's pain  Complete risks and benefits including bleeding, infection, tissue reaction, nerve injury and allergic reaction were discussed  The patient was agreeable and verbalized an understanding  2  Patient may continue gabapentin prescribed by his PCP  3  Patient will continue with his home exercise program  4  I will follow up patient after the procedure or sooner if needed  The patient was advised to contact the office should their symptoms worsen in the interim  The patient was agreeable and verbalized an understanding  Other than as stated above, the patient denies any interval changes in medications, medical condition, mental condition, symptoms, or allergies since the last office visit  M*Modal software was used to dictate this note  It may contain errors with dictating incorrect words or incorrect spelling  Please contact the provider directly with any questions  History of Present Illness: The patient is a 80 y o  male with history of previous L4 laminectomy last seen on 6/10/19 who presents for a follow up office visit in regards to chronic lumbosacral back with pain and paresthesias and subjective weakness into the anterior aspect of the thighs pain secondary to lumbar spondylosis and stenosis  The patient denies bowel or bladder incontinence or saddle anesthesia  He did recently complete a full course of physical therapy without any significant relief of his symptoms  He states when he is sitting, the pain is tolerable, however when he walks for any amount time, he will experience the pain which he rates an 8/10 on the numeric pain rating scale  He states his pain is occasional in nature and follows no particular pattern throughout the day  He characterizes the pain as dull aching      MRI of the lumbar spine does reveal degenerative disc disease and spondylosis with a recurrent disc herniation versus scar tissue at L4-5 causing central and bilateral foraminal stenosis  Current pain medications includes:  Gabapentin 900 mg daily as prescribed by his PCP     I have personally reviewed and/or updated the patient's past medical history, past surgical history, family history, social history, current medications, allergies, and vital signs today  Review of Systems:    Review of Systems   Respiratory: Negative for shortness of breath  Cardiovascular: Negative for chest pain  Gastrointestinal: Negative for constipation, diarrhea, nausea and vomiting  Musculoskeletal: Positive for gait problem  Negative for arthralgias, joint swelling and myalgias  Skin: Negative for rash  Neurological: Negative for dizziness, seizures and weakness  All other systems reviewed and are negative          Past Medical History:   Diagnosis Date    Acute myocardial infarction ([facility] [address] )     Anxiety     Arthritis     Brain neoplasm ([facility] [address] ) 11/1999    brain tumor    Depression     Hypercholesterolemia     Narcolepsy     daytime drowsiness and dozing off occasionally    Palpitations     Prostate cancer [facility])        Past Surgical History:   Procedure Laterality Date    BACK SURGERY      BRAIN SURGERY  11/08/1999    CORONARY ARTERY BYPASS GRAFT      x5       Family History   Problem Relation Age of Onset    Hypertension Mother         benign essential    Cancer Mother     Osteoporosis Mother     Suicidality Father     Diabetes Family     Heart disease Family     Neuropathy Family         peripheral    Prostate cancer Family     Thyroid disease Family        Social History     Occupational History    Occupation: retired   Tobacco Use    Smoking status: Former Smoker     Types: Cigars    Smokeless tobacco: Never Used    Tobacco comment: former cig smoker- quit in 1992   Substance and Sexual Activity    Alcohol use: No     Comment: (history)    Drug use: No    Sexual activity: Not on file         Current Outpatient Medications:     aspirin 81 MG tablet, Take 1 tablet by mouth daily, Disp: , Rfl:     gabapentin (NEURONTIN) 300 mg capsule, TAKE 3 CAPSULES BY MOUTH  EVERY NIGHT AT BEDTIME, Disp: 270 capsule, Rfl: 3    metoprolol succinate (TOPROL-XL) 25 mg 24 hr tablet, Take 1 tablet (25 mg total) by mouth daily, Disp: 90 tablet, Rfl: 3    midodrine (PROAMATINE) 5 mg tablet, Take 1 tablet (5 mg total) by mouth 3 (three) times a day, Disp: 270 tablet, Rfl: 3    omeprazole (PriLOSEC) 40 MG capsule, TAKE 1 CAPSULE BY MOUTH  DAILY, Disp: 90 capsule, Rfl: 5    sertraline (ZOLOFT) 50 mg tablet, TAKE 1 TABLET BY MOUTH  EVERY DAY, Disp: 90 tablet, Rfl: 3    simvastatin (ZOCOR) 80 mg tablet, TAKE 1 TABLET BY MOUTH  DAILY, Disp: 90 tablet, Rfl: 3    docusate sodium (COLACE) 100 mg capsule, Take 1 capsule (100 mg total) by mouth 2 (two) times a day (Patient not taking: Reported on 7/22/2019), Disp: 10 capsule, Rfl: 0    predniSONE 20 mg tablet, 2 x 3 days, 1 X 4 days (Patient not taking: Reported on 6/10/2019), Disp: 10 tablet, Rfl: 0    Allergies   Allergen Reactions    Atorvastatin Myalgia, Dizziness and Headache    Niacin Other (See Comments)     unknown       Physical Exam:    /73   Pulse 83   Ht 5' 9" (1 753 m)   Wt 77 1 kg (170 lb)   BMI 25 10 kg/m²     Constitutional:normal, well developed, well nourished, alert, in no distress and non-toxic and no overt pain behavior    Eyes:anicteric  HEENT:grossly intact  Neck:supple, symmetric, trachea midline and no masses   Pulmonary:even and unlabored  Cardiovascular:No edema or pitting edema present  Skin:Normal without rashes or lesions and well hydrated  Psychiatric:Mood and affect appropriate  Neurologic:Cranial Nerves II-XII grossly intact  Musculoskeletal:antalgic gait but steady with the use of a cane      Imaging  FL spine and pain procedure    (Results Pending)     MRI LUMBAR SPINE WITHOUT CONTRAST     INDICATION: M48 [id number]: Spinal stenosis, lumbar region with neurogenic claudication  Low back pain radiating down both hips      COMPARISON:  11/8/2013     TECHNIQUE:  Sagittal T1, sagittal T2, sagittal inversion recovery, axial T1 and axial T2, coronal T2        IMAGE QUALITY:  Diagnostic     FINDINGS:     VERTEBRAL BODIES:  Normal alignment of the lumbar spine  No spondylolysis or spondylolisthesis  No scoliosis  No compression fracture  Scattered degenerative endplate changes  No focally suspicious marrow lesions  No bone marrow edema or   compression abnormality  A few scattered hemangiomata are redemonstrated  Suspect prior right hemilaminotomy L4-5      SACRUM:  Normal signal within the sacrum  No evidence of insufficiency or stress fracture      DISTAL CORD AND CONUS:  Normal size and signal within the distal cord and conus        PARASPINAL SOFT TISSUES:  Paraspinal soft tissues are unremarkable      LOWER THORACIC DISC SPACES:  Normal disc height and signal   No disc herniation, canal stenosis or foraminal narrowing      LUMBAR DISC SPACES:     L1-L2:  Normal      L2-L3:  There is loss of disc height and signal   There is a diffuse disk bulge  No significant central canal or neural foraminal narrowing  This level is similar to the prior study      L3-L4:  There is no focal disk herniation, central canal or neural foraminal narrowing  Bilateral facet hypertrophy noted  This level is similar to the prior study      L4-L5:  Residual soft tissue along the posterior margin of the intervertebral discs acentrically on the right may represent scar tissue or residual disc herniation  Associated bony spurring at the adjacent endplates noted  Mild central canal narrowing  Mild to moderate left neural foraminal narrowing  Moderate to severe right neural foraminal narrowing    Question small right sided synovial/facet cyst   The appearance of this level is similar to the prior study      L5-S1:  There is a diffuse disk bulge  No significant central canal or neural foraminal narrowing  Bilateral facet hypertrophy noted      IMPRESSION:     Stable postoperative and spondylotic changes with recurrent or residual disc herniation at L4-5 versus scar tissue unchanged since 2013  No new disc herniation      Orders Placed This Encounter   Procedures    FL spine and pain procedure

## 2019-07-24 ENCOUNTER — HOSPITAL ENCOUNTER (OUTPATIENT)
Dept: RADIOLOGY | Facility: CLINIC | Age: 84
Discharge: HOME | End: 2019-07-24
Attending: ANESTHESIOLOGY
Payer: MEDICARE

## 2019-07-24 VITALS
TEMPERATURE: 98.8 F | HEART RATE: 79 BPM | SYSTOLIC BLOOD PRESSURE: 99 MMHG | OXYGEN SATURATION: 93 % | DIASTOLIC BLOOD PRESSURE: 63 MMHG | RESPIRATION RATE: 18 BRPM

## 2019-07-24 DIAGNOSIS — M54.16 LUMBAR RADICULOPATHY: ICD-10-CM

## 2019-07-24 PROCEDURE — 64483 NJX AA&/STRD TFRM EPI L/S 1: CPT | Performed by: ANESTHESIOLOGY

## 2019-07-24 RX ORDER — PAPAVERINE HCL 150 MG
20 CAPSULE, EXTENDED RELEASE ORAL ONCE
Status: COMPLETED | OUTPATIENT
Start: 2019-07-24 | End: 2019-07-24

## 2019-07-24 RX ORDER — LIDOCAINE HYDROCHLORIDE 10 MG/ML
5 INJECTION, SOLUTION EPIDURAL; INFILTRATION; INTRACAUDAL; PERINEURAL ONCE
Status: COMPLETED | OUTPATIENT
Start: 2019-07-24 | End: 2019-07-24

## 2019-07-24 RX ADMIN — LIDOCAINE HYDROCHLORIDE 4 ML: 10 INJECTION, SOLUTION EPIDURAL; INFILTRATION; INTRACAUDAL; PERINEURAL at 15:30

## 2019-07-24 RX ADMIN — LIDOCAINE HYDROCHLORIDE 2 ML: 20 INJECTION, SOLUTION EPIDURAL; INFILTRATION; INTRACAUDAL; PERINEURAL at 15:34

## 2019-07-24 RX ADMIN — IOHEXOL 2 ML: 300 INJECTION, SOLUTION INTRAVENOUS at 15:34

## 2019-07-24 RX ADMIN — DEXAMETHASONE SODIUM PHOSPHATE 15 MG: 10 INJECTION, SOLUTION INTRAMUSCULAR; INTRAVENOUS at 15:34

## 2019-07-24 NOTE — H&P
History of Present Illness: The patient is a 80 y o  male who presents with complaints of low back and leg pain      Patient Active Problem List   Diagnosis    Constipation    Iron deficiency    Other constipation    Anemia    Arteriosclerotic cardiovascular disease    Backache    Essential hypertension    Cervical spinal stenosis    Depression    Esophageal reflux    Hyperlipidemia    Insomnia    Spinal stenosis of lumbar region with neurogenic claudication    Vitamin B12 deficiency    Idiopathic peripheral neuropathy    Iron deficiency anemia    History of meningioma    Confusion    Altered mental status    Fall    Cognitive decline    Rib pain on right side    Contusion of rib on right side    Orthostatic hypotension    Convulsions ([address]    Upper respiratory tract infection    Lumbar spondylosis    Lumbar radiculopathy       Past Medical History:   Diagnosis Date    [facility] infarction [facility] [address] [facility]     Anxiety     Arthritis     Brain neoplasm ([facility] [address] ) 11/1999    brain tumor    Depression     Hypercholesterolemia     Narcolepsy     daytime drowsiness and dozing off occasionally    Palpitations     Prostate cancer ([facility] [address] )        Past Surgical History:   Procedure Laterality Date    BACK SURGERY      BRAIN SURGERY  11/08/1999    CORONARY ARTERY BYPASS GRAFT      x5         Current Outpatient Medications:     aspirin 81 MG tablet, Take 1 tablet by mouth daily, Disp: , Rfl:     docusate sodium (COLACE) 100 mg capsule, Take 1 capsule (100 mg total) by mouth 2 (two) times a day (Patient not taking: Reported on 7/22/2019), Disp: 10 capsule, Rfl: 0    gabapentin (NEURONTIN) 300 mg capsule, TAKE 3 CAPSULES BY MOUTH  EVERY NIGHT AT BEDTIME, Disp: 270 capsule, Rfl: 3    metoprolol succinate (TOPROL-XL) 25 mg 24 hr tablet, Take 1 tablet (25 mg total) by mouth daily, Disp: 90 tablet, Rfl: 3    midodrine (PROAMATINE) 5 mg tablet, Take 1 tablet (5 mg total) by mouth 3 (three) times a day, Disp: 270 tablet, Rfl: 3    omeprazole (PriLOSEC) 40 MG capsule, TAKE 1 CAPSULE BY MOUTH  DAILY, Disp: 90 capsule, Rfl: 5    predniSONE 20 mg tablet, 2 x 3 days, 1 X 4 days (Patient not taking: Reported on 6/10/2019), Disp: 10 tablet, Rfl: 0    sertraline (ZOLOFT) 50 mg tablet, TAKE 1 TABLET BY MOUTH  EVERY DAY, Disp: 90 tablet, Rfl: 3    simvastatin (ZOCOR) 80 mg tablet, TAKE 1 TABLET BY MOUTH  DAILY, Disp: 90 tablet, Rfl: 3    Allergies   Allergen Reactions    Atorvastatin Myalgia, Dizziness and Headache    Niacin Other (See Comments)     unknown       Physical Exam:   Vitals:    07/24/19 1510   BP: 157/81   Pulse: 75   Resp: 20   Temp: 98 8 °F (37 1 °C)   SpO2: 94%     General: Awake, Alert, Oriented x 3, Mood and affect appropriate  Respiratory: Respirations even and unlabored  Cardiovascular: Peripheral pulses intact; no edema  Musculoskeletal Exam:  Bilateral lumbar paraspinals tender to palpation  ASA Score: 3    Patient/Chart Verification  Patient ID Verified: Verbal  ID Band Applied: No  Consents Confirmed: Procedural  H&P( within 30 days) Verified: To be obtained in the Pre-Procedure area  Interval H&P(within 24 hr) Complete (required for Outpatients and Surgery Admit only): To be obtained in the Pre-Procedure area  Allergies Reviewed: Yes  Anticoag/NSAID held?: No  Currently on antibiotics?: No  Pre-op Lab/Test Results Available: N/A    Assessment:   1   Lumbar radiculopathy        Plan: B/L L4 TFESI

## 2019-07-24 NOTE — DISCHARGE INSTRUCTIONS
Epidural Steroid Injection   WHAT YOU NEED TO KNOW:   An epidural steroid injection (RAQUEL) is a procedure to inject steroid medicine into the epidural space  The epidural space is between your spinal cord and vertebrae  Steroids reduce inflammation and fluid buildup in your spine that may be causing pain  You may be given pain medicine along with the steroids  ACTIVITY  · Do not drive or operate machinery today  · No strenuous activity today - bending, lifting, etc   · You may resume normal activites starting tomorrow - start slowly and as tolerated  · You may shower today, but no tub baths or hot tubs  · You may have numbness for several hours from the local anesthetic  Please use caution and common sense, especially with weight-bearing activities  CARE OF THE INJECTION SITE  · If you have soreness or pain, apply ice to the area today (20 minutes on/20 minutes off)  · Starting tomorrow, you may use warm, moist heat or ice if needed  · You may have an increase or change in your discomfort for 36-48 hours after your treatment  · Apply ice and continue with any pain medication you have been prescribed  · Notify [facility] if you have any of the following: redness, drainage, swelling, headache, stiff neck or fever above 100°F     SPECIAL INSTRUCTIONS  · Our office will contact you in approximately 7 days for a progress report  MEDICATIONS  · Continue to take all routine medications  · Our office may have instructed you to hold some medications  If you have a problem specifically related to your procedure, please call our office at [phone]  Problems not related to your procedure should be directed to your primary care physician

## 2019-07-31 ENCOUNTER — APPOINTMENT (OUTPATIENT)
Dept: LAB | Facility: CLINIC | Age: 84
End: 2019-07-31
Payer: MEDICARE

## 2019-07-31 ENCOUNTER — TELEPHONE (OUTPATIENT)
Dept: PAIN MEDICINE | Facility: CLINIC | Age: 84
End: 2019-07-31

## 2019-07-31 ENCOUNTER — TRANSCRIBE ORDERS (OUTPATIENT)
Dept: LAB | Facility: CLINIC | Age: 84
End: 2019-07-31

## 2019-07-31 DIAGNOSIS — C61 MALIGNANT NEOPLASM OF PROSTATE (HCC): Primary | ICD-10-CM

## 2019-07-31 DIAGNOSIS — N13.8 ENLARGED PROSTATE WITH URINARY OBSTRUCTION: ICD-10-CM

## 2019-07-31 DIAGNOSIS — N40.1 ENLARGED PROSTATE WITH URINARY OBSTRUCTION: ICD-10-CM

## 2019-07-31 LAB — PSA SERPL-MCNC: 0.1 NG/ML (ref 0–4)

## 2019-07-31 PROCEDURE — 84153 ASSAY OF PSA TOTAL: CPT

## 2019-09-09 DIAGNOSIS — K21.9 GASTROESOPHAGEAL REFLUX DISEASE, ESOPHAGITIS PRESENCE NOT SPECIFIED: ICD-10-CM

## 2019-09-09 RX ORDER — OMEPRAZOLE 40 MG/1
CAPSULE, DELAYED RELEASE ORAL
Qty: 90 CAPSULE | Refills: 3 | Status: SHIPPED | OUTPATIENT
Start: 2019-09-09 | End: 2020-12-21 | Stop reason: SDUPTHER

## 2019-09-16 ENCOUNTER — TELEPHONE (OUTPATIENT)
Dept: FAMILY MEDICINE CLINIC | Facility: CLINIC | Age: 84
End: 2019-09-16

## 2019-09-16 PROBLEM — J06.9 UPPER RESPIRATORY TRACT INFECTION: Status: RESOLVED | Noted: 2019-03-04 | Resolved: 2019-09-16

## 2019-09-16 NOTE — TELEPHONE ENCOUNTER
Patient's wife states he is having problems getting around with his regular cane & would like to know if Dr Marzena Lassiter can get a Quad cane ordered through [facility] for him  Please call to advise

## 2019-10-04 DIAGNOSIS — I10 ESSENTIAL (PRIMARY) HYPERTENSION: ICD-10-CM

## 2019-10-04 DIAGNOSIS — I95.9 HYPOTENSION, UNSPECIFIED HYPOTENSION TYPE: ICD-10-CM

## 2019-10-04 RX ORDER — MIDODRINE HYDROCHLORIDE 5 MG/1
TABLET ORAL
Qty: 270 TABLET | Refills: 3 | Status: SHIPPED | OUTPATIENT
Start: 2019-10-04 | End: 2020-12-21 | Stop reason: SDUPTHER

## 2019-10-04 RX ORDER — METOPROLOL SUCCINATE 25 MG/1
TABLET, EXTENDED RELEASE ORAL
Qty: 90 TABLET | Refills: 3 | Status: SHIPPED | OUTPATIENT
Start: 2019-10-04 | End: 2020-08-23

## 2019-10-20 NOTE — PROGRESS NOTES
Cardiology Follow Up    Scott Castro  1933  [id number]  [facility] [place] [id number]  [phone]    1  Essential (primary) hypertension  POCT ECG   2  Pure hypercholesterolemia     3  Coronary arteriosclerosis  POCT ECG   4  Abnormal EKG  POCT ECG       Interval History:  Patient is here for a follow-up visit  He was most recently seen by me in April 2019  He has a remote history of coronary artery bypass graft surgery  He does have intermittent issues with orthostatic hypotension  He is on midodrine  Most recent echocardiogram done February 9, 2018 demonstrated an ejection fraction of 60% with a basal inferior wall motion abnormality noted  There was no significant valvular heart disease and no significant change compared to a prior study done May 2015  He has had no chest pain or significant dyspnea  His vital signs today are stable  He has had no syncope while on midodrine  EKG demonstrates sinus rhythm with an incomplete right bundle branch block with no significant change compared to a prior tracing done August 8, 2018      Patient Active Problem List   Diagnosis    Constipation    Iron deficiency    Other constipation    Anemia    Arteriosclerotic cardiovascular disease    Backache    Essential hypertension    Cervical spinal stenosis    Depression    Esophageal reflux    Hyperlipidemia    Insomnia    Spinal stenosis of lumbar region with neurogenic claudication    Vitamin B12 deficiency    Idiopathic peripheral neuropathy    Iron deficiency anemia    History of meningioma    Confusion    Altered mental status    Fall    Cognitive decline    Rib pain on right side    Contusion of rib on right side    Orthostatic hypotension    Convulsions (HCC)    Lumbar spondylosis    Lumbar radiculopathy     Past Medical History:   Diagnosis Date    Acute [facility] infarction ([facility] )     Anxiety     Arthritis     Brain neoplasm ([facility] ) 11/1999    brain tumor    Depression     Hypercholesterolemia     Narcolepsy     daytime drowsiness and dozing off occasionally    Palpitations     Prostate cancer (HCC)      Social History     Socioeconomic History    Marital status: /Civil Union     Spouse name: Not on file    Number of children: Not on file    Years of education: Not on file    Highest education level: Not on file   Occupational History    Occupation: retired   Social Needs    Financial resource strain: Not on file    Food insecurity:     Worry: Not on file     Inability: Not on file   [employer] needs:     Medical: Not on file     Non-medical: Not on file   Tobacco Use    Smoking status: Former Smoker     Types: Cigars    Smokeless tobacco: Never Used    Tobacco comment: former cig smoker- quit in 1992   Substance and Sexual Activity    Alcohol use: No     Comment: (history)    Drug use: No    Sexual activity: Not on file   Lifestyle    Physical activity:     Days per week: Not on file     Minutes per session: Not on file    Stress: Not on file   Relationships    Social connections:     Talks on phone: Not on file     Gets together: Not on file     Attends Muslim service: Not on file     Active member of club or organization: Not on file     Attends meetings of clubs or organizations: Not on file     Relationship status: Not on file    Intimate partner violence:     Fear of current or ex partner: Not on file     Emotionally abused: Not on file     Physically abused: Not on file     Forced sexual activity: Not on file   Other Topics Concern    Not on file   Social History Narrative    Pt lives with wife      Family History   Problem Relation Age of Onset    Hypertension Mother         benign essential    Cancer Mother     Osteoporosis Mother     Suicidality Father     Diabetes Family     Heart disease Family     Neuropathy Family peripheral    Prostate cancer Family     Thyroid disease Family      Past Surgical History:   Procedure Laterality Date    BACK SURGERY      BRAIN SURGERY  11/08/1999    CORONARY ARTERY BYPASS GRAFT      x5       Current Outpatient Medications:     aspirin 81 MG tablet, Take 1 tablet by mouth daily, Disp: , Rfl:     docusate sodium (COLACE) 100 mg capsule, Take 1 capsule (100 mg total) by mouth 2 (two) times a day, Disp: 10 capsule, Rfl: 0    gabapentin (NEURONTIN) 300 mg capsule, TAKE 3 CAPSULES BY MOUTH  EVERY NIGHT AT BEDTIME, Disp: 270 capsule, Rfl: 3    metoprolol succinate (TOPROL-XL) 25 mg 24 hr tablet, TAKE 1 TABLET BY MOUTH  DAILY, Disp: 90 tablet, Rfl: 3    midodrine (PROAMATINE) 5 mg tablet, TAKE 1 TABLET BY MOUTH 3  TIMES A DAY, Disp: 270 tablet, Rfl: 3    omeprazole (PriLOSEC) 40 MG capsule, TAKE 1 CAPSULE BY MOUTH  DAILY, Disp: 90 capsule, Rfl: 3    psyllium (METAMUCIL) 58 6 % packet, Take 1 packet by mouth daily, Disp: , Rfl:     sertraline (ZOLOFT) 50 mg tablet, TAKE 1 TABLET BY MOUTH  EVERY DAY, Disp: 90 tablet, Rfl: 3    simvastatin (ZOCOR) 80 mg tablet, TAKE 1 TABLET BY MOUTH  DAILY, Disp: 90 tablet, Rfl: 3  Allergies   Allergen Reactions    Atorvastatin Myalgia, Dizziness and Headache    Niacin Other (See Comments)     unknown       Labs:not applicable  Imaging: No results found  Review of Systems:  Review of Systems   All other systems reviewed and are negative  Physical Exam:  /58 (BP Location: Left arm, Patient Position: Sitting, Cuff Size: Standard)   Pulse 64   Ht 5' 9" (1 753 m)   Wt 77 kg (169 lb 12 8 oz)   BMI 25 08 kg/m²   Physical Exam   Constitutional: He is oriented to person, place, and time  He appears well-developed and well-nourished  HENT:   Head: Normocephalic and atraumatic  Eyes: Pupils are equal, round, and reactive to light  Conjunctivae are normal    Neck: Normal range of motion  Neck supple     Cardiovascular: Normal rate and normal heart sounds  Pulmonary/Chest: Effort normal and breath sounds normal    Neurological: He is alert and oriented to person, place, and time  Skin: Skin is warm and dry  Psychiatric: He has a normal mood and affect  Vitals reviewed  Discussion/Summary:I will continue the patient's present medical regimen  The patient appears well compensated  I have asked the patient to call if there is a problem in the interim otherwise I will see the patient in six months time

## 2019-10-25 ENCOUNTER — OFFICE VISIT (OUTPATIENT)
Dept: CARDIOLOGY CLINIC | Facility: CLINIC | Age: 84
End: 2019-10-25
Payer: MEDICARE

## 2019-10-25 VITALS
WEIGHT: 169.8 LBS | HEIGHT: 69 IN | SYSTOLIC BLOOD PRESSURE: 124 MMHG | HEART RATE: 64 BPM | BODY MASS INDEX: 25.15 KG/M2 | DIASTOLIC BLOOD PRESSURE: 58 MMHG

## 2019-10-25 DIAGNOSIS — I10 ESSENTIAL (PRIMARY) HYPERTENSION: Primary | ICD-10-CM

## 2019-10-25 DIAGNOSIS — I25.10 CORONARY ARTERIOSCLEROSIS: ICD-10-CM

## 2019-10-25 DIAGNOSIS — E78.00 PURE HYPERCHOLESTEROLEMIA: ICD-10-CM

## 2019-10-25 DIAGNOSIS — R94.31 ABNORMAL EKG: ICD-10-CM

## 2019-10-25 PROCEDURE — 99214 OFFICE O/P EST MOD 30 MIN: CPT | Performed by: INTERNAL MEDICINE

## 2019-10-25 PROCEDURE — 93000 ELECTROCARDIOGRAM COMPLETE: CPT | Performed by: INTERNAL MEDICINE

## 2019-10-29 ENCOUNTER — IMMUNIZATIONS (OUTPATIENT)
Dept: FAMILY MEDICINE CLINIC | Facility: CLINIC | Age: 84
End: 2019-10-29
Payer: MEDICARE

## 2019-10-29 VITALS — TEMPERATURE: 97.2 F

## 2019-10-29 DIAGNOSIS — Z23 FLU VACCINE NEED: Primary | ICD-10-CM

## 2019-10-29 PROCEDURE — 90662 IIV NO PRSV INCREASED AG IM: CPT

## 2019-10-29 PROCEDURE — G0008 ADMIN INFLUENZA VIRUS VAC: HCPCS

## 2019-11-06 ENCOUNTER — APPOINTMENT (OUTPATIENT)
Dept: LAB | Facility: CLINIC | Age: 84
DRG: 041 | End: 2019-11-06
Payer: MEDICARE

## 2019-11-06 ENCOUNTER — TRANSCRIBE ORDERS (OUTPATIENT)
Dept: LAB | Facility: CLINIC | Age: 84
End: 2019-11-06

## 2019-11-06 DIAGNOSIS — C61 MALIGNANT NEOPLASM OF PROSTATE (HCC): Primary | ICD-10-CM

## 2019-11-06 DIAGNOSIS — C61 MALIGNANT NEOPLASM OF PROSTATE (HCC): ICD-10-CM

## 2019-11-06 PROCEDURE — 84154 ASSAY OF PSA FREE: CPT

## 2019-11-06 PROCEDURE — 36415 COLL VENOUS BLD VENIPUNCTURE: CPT

## 2019-11-06 PROCEDURE — 84153 ASSAY OF PSA TOTAL: CPT

## 2019-11-07 ENCOUNTER — HOSPITAL ENCOUNTER (INPATIENT)
Facility: HOSPITAL | Age: 84
LOS: 5 days | Discharge: HOME | DRG: 041 | End: 2019-11-13
Attending: EMERGENCY MEDICINE | Admitting: INTERNAL MEDICINE
Payer: MEDICARE

## 2019-11-07 ENCOUNTER — APPOINTMENT (EMERGENCY)
Dept: RADIOLOGY | Facility: HOSPITAL | Age: 84
DRG: 041 | End: 2019-11-07
Payer: MEDICARE

## 2019-11-07 DIAGNOSIS — I63.9 ACUTE CVA (CEREBROVASCULAR ACCIDENT) (HCC): ICD-10-CM

## 2019-11-07 DIAGNOSIS — G45.9 TIA (TRANSIENT ISCHEMIC ATTACK): Primary | ICD-10-CM

## 2019-11-07 DIAGNOSIS — I69.365: ICD-10-CM

## 2019-11-07 PROBLEM — Z98.890 HISTORY OF RESECTION OF MENINGIOMA: Status: ACTIVE | Noted: 2019-11-07

## 2019-11-07 PROBLEM — E83.52 HYPERCALCEMIA: Status: ACTIVE | Noted: 2019-11-07

## 2019-11-07 PROBLEM — I16.0 HYPERTENSIVE URGENCY: Status: ACTIVE | Noted: 2019-11-07

## 2019-11-07 PROBLEM — Z86.03 HISTORY OF RESECTION OF MENINGIOMA: Status: ACTIVE | Noted: 2019-11-07

## 2019-11-07 LAB
ANION GAP SERPL CALCULATED.3IONS-SCNC: 3 MMOL/L (ref 4–13)
APTT PPP: 29 SECONDS (ref 23–37)
ATRIAL RATE: 68 BPM
BUN SERPL-MCNC: 9 MG/DL (ref 5–25)
CALCIUM SERPL-MCNC: 10.4 MG/DL (ref 8.3–10.1)
CHLORIDE SERPL-SCNC: 108 MMOL/L (ref 100–108)
CO2 SERPL-SCNC: 28 MMOL/L (ref 21–32)
CREAT SERPL-MCNC: 0.98 MG/DL (ref 0.6–1.3)
ERYTHROCYTE [DISTWIDTH] IN BLOOD BY AUTOMATED COUNT: 15.6 % (ref 11.6–15.1)
GFR SERPL CREATININE-BSD FRML MDRD: 70 ML/MIN/1.73SQ M
GLUCOSE SERPL-MCNC: 119 MG/DL (ref 65–140)
HCT VFR BLD AUTO: 34.3 % (ref 36.5–49.3)
HGB BLD-MCNC: 10.8 G/DL (ref 12–17)
INR PPP: 1.07 (ref 0.84–1.19)
MCH RBC QN AUTO: 23.9 PG (ref 26.8–34.3)
MCHC RBC AUTO-ENTMCNC: 31.5 G/DL (ref 31.4–37.4)
MCV RBC AUTO: 76 FL (ref 82–98)
P AXIS: 50 DEGREES
PLATELET # BLD AUTO: 161 THOUSANDS/UL (ref 149–390)
PMV BLD AUTO: 10.1 FL (ref 8.9–12.7)
POTASSIUM SERPL-SCNC: 4.2 MMOL/L (ref 3.5–5.3)
PR INTERVAL: 192 MS
PROTHROMBIN TIME: 13.5 SECONDS (ref 11.6–14.5)
PSA FREE MFR SERPL: NORMAL %
PSA FREE SERPL-MCNC: <0.01 NG/ML
PSA SERPL-MCNC: <0.1 NG/ML (ref 0–4)
QRS AXIS: -6 DEGREES
QRSD INTERVAL: 116 MS
QT INTERVAL: 394 MS
QTC INTERVAL: 418 MS
RBC # BLD AUTO: 4.51 MILLION/UL (ref 3.88–5.62)
SODIUM SERPL-SCNC: 139 MMOL/L (ref 136–145)
T WAVE AXIS: 46 DEGREES
TROPONIN I SERPL-MCNC: <0.02 NG/ML
VENTRICULAR RATE: 68 BPM
WBC # BLD AUTO: 5.65 THOUSAND/UL (ref 4.31–10.16)

## 2019-11-07 PROCEDURE — 93010 ELECTROCARDIOGRAM REPORT: CPT | Performed by: INTERNAL MEDICINE

## 2019-11-07 PROCEDURE — 71045 X-RAY EXAM CHEST 1 VIEW: CPT

## 2019-11-07 PROCEDURE — 99285 EMERGENCY DEPT VISIT HI MDM: CPT

## 2019-11-07 PROCEDURE — 70498 CT ANGIOGRAPHY NECK: CPT

## 2019-11-07 PROCEDURE — 85027 COMPLETE CBC AUTOMATED: CPT | Performed by: EMERGENCY MEDICINE

## 2019-11-07 PROCEDURE — 36415 COLL VENOUS BLD VENIPUNCTURE: CPT | Performed by: EMERGENCY MEDICINE

## 2019-11-07 PROCEDURE — 99285 EMERGENCY DEPT VISIT HI MDM: CPT | Performed by: EMERGENCY MEDICINE

## 2019-11-07 PROCEDURE — 70496 CT ANGIOGRAPHY HEAD: CPT

## 2019-11-07 PROCEDURE — 96374 THER/PROPH/DIAG INJ IV PUSH: CPT

## 2019-11-07 PROCEDURE — 99215 OFFICE O/P EST HI 40 MIN: CPT | Performed by: PSYCHIATRY & NEUROLOGY

## 2019-11-07 PROCEDURE — 84484 ASSAY OF TROPONIN QUANT: CPT | Performed by: EMERGENCY MEDICINE

## 2019-11-07 PROCEDURE — 93005 ELECTROCARDIOGRAM TRACING: CPT

## 2019-11-07 PROCEDURE — 80048 BASIC METABOLIC PNL TOTAL CA: CPT | Performed by: EMERGENCY MEDICINE

## 2019-11-07 PROCEDURE — 85730 THROMBOPLASTIN TIME PARTIAL: CPT | Performed by: EMERGENCY MEDICINE

## 2019-11-07 PROCEDURE — 1124F ACP DISCUSS-NO DSCNMKR DOCD: CPT | Performed by: PSYCHIATRY & NEUROLOGY

## 2019-11-07 PROCEDURE — 99220 PR INITIAL OBSERVATION CARE/DAY 70 MINUTES: CPT | Performed by: INTERNAL MEDICINE

## 2019-11-07 PROCEDURE — 85610 PROTHROMBIN TIME: CPT | Performed by: EMERGENCY MEDICINE

## 2019-11-07 RX ORDER — LABETALOL 20 MG/4 ML (5 MG/ML) INTRAVENOUS SYRINGE
10 ONCE
Status: COMPLETED | OUTPATIENT
Start: 2019-11-07 | End: 2019-11-07

## 2019-11-07 RX ORDER — HYDRALAZINE HYDROCHLORIDE 20 MG/ML
5 INJECTION INTRAMUSCULAR; INTRAVENOUS EVERY 6 HOURS PRN
Status: DISCONTINUED | OUTPATIENT
Start: 2019-11-07 | End: 2019-11-13 | Stop reason: HOSPADM

## 2019-11-07 RX ORDER — METOPROLOL SUCCINATE 25 MG/1
25 TABLET, EXTENDED RELEASE ORAL DAILY
Status: DISCONTINUED | OUTPATIENT
Start: 2019-11-08 | End: 2019-11-13 | Stop reason: HOSPADM

## 2019-11-07 RX ORDER — GABAPENTIN 300 MG/1
900 CAPSULE ORAL
Status: DISCONTINUED | OUTPATIENT
Start: 2019-11-07 | End: 2019-11-13 | Stop reason: HOSPADM

## 2019-11-07 RX ORDER — ASPIRIN 81 MG/1
81 TABLET ORAL DAILY
Status: DISCONTINUED | OUTPATIENT
Start: 2019-11-07 | End: 2019-11-13 | Stop reason: HOSPADM

## 2019-11-07 RX ORDER — DOCUSATE SODIUM 100 MG/1
100 CAPSULE, LIQUID FILLED ORAL 2 TIMES DAILY
Status: DISCONTINUED | OUTPATIENT
Start: 2019-11-07 | End: 2019-11-13 | Stop reason: HOSPADM

## 2019-11-07 RX ORDER — SODIUM CHLORIDE 9 MG/ML
100 INJECTION, SOLUTION INTRAVENOUS ONCE
Status: COMPLETED | OUTPATIENT
Start: 2019-11-07 | End: 2019-11-07

## 2019-11-07 RX ORDER — PANTOPRAZOLE SODIUM 40 MG/1
40 TABLET, DELAYED RELEASE ORAL
Status: DISCONTINUED | OUTPATIENT
Start: 2019-11-08 | End: 2019-11-13 | Stop reason: HOSPADM

## 2019-11-07 RX ORDER — CLOPIDOGREL BISULFATE 75 MG/1
75 TABLET ORAL DAILY
Status: DISCONTINUED | OUTPATIENT
Start: 2019-11-07 | End: 2019-11-13 | Stop reason: HOSPADM

## 2019-11-07 RX ORDER — PRAVASTATIN SODIUM 80 MG/1
80 TABLET ORAL DAILY
Status: DISCONTINUED | OUTPATIENT
Start: 2019-11-08 | End: 2019-11-13 | Stop reason: HOSPADM

## 2019-11-07 RX ADMIN — SODIUM CHLORIDE 100 ML/HR: 0.9 INJECTION, SOLUTION INTRAVENOUS at 19:48

## 2019-11-07 RX ADMIN — GABAPENTIN 900 MG: 300 CAPSULE ORAL at 21:42

## 2019-11-07 RX ADMIN — DOCUSATE SODIUM 100 MG: 100 CAPSULE, LIQUID FILLED ORAL at 19:48

## 2019-11-07 RX ADMIN — IOHEXOL 85 ML: 350 INJECTION, SOLUTION INTRAVENOUS at 15:12

## 2019-11-07 RX ADMIN — ASPIRIN 81 MG: 81 TABLET, COATED ORAL at 18:46

## 2019-11-07 RX ADMIN — CLOPIDOGREL BISULFATE 75 MG: 75 TABLET ORAL at 16:49

## 2019-11-07 RX ADMIN — LABETALOL 20 MG/4 ML (5 MG/ML) INTRAVENOUS SYRINGE 10 MG: at 16:49

## 2019-11-07 NOTE — ED PROVIDER NOTES
EMERGENCY DEPARTMENT ENCOUNTER NOTE  ? CHIEF COMPLAINT  Chief Complaint   Patient presents with    Weakness - Generalized     pt reports both legs felt weak for a few minutes and spech wasnt clear now feels fine  pt reports legs have felt weak in the past       HPI  Ronnald Holstein is a 80 y o  male with PMH of HLD, CAD s/p CABG, meningioma resection in 1999, presenting with an episode of RUE and RLE weakness, as well as of slurred speech  Today at 10:30 am, patient developed the aforementioned symptoms  His wife saw it, as patient dropped his oatmeal bowl and had to help him not fall down  Symptoms lasted for 5 minutes and are now resolved  He has not had a CVA previously  He has not been ill recently  He has no chest pain or shortness of breath  When wife attempted to call 911 for patient's symptoms, he declined and they arrive by private vehicle instead  Patient arrives very hypertensive  But he denies headache, changes in vision  There is no weakness or numbness any longer and he is able to move all extremities  He had a frontal meningioma resected 20 years ago but has essentially no deficits after that      REVIEW OF SYSTEMS  Constitutional: ?Denies fevers, chills  Eyes: ?Denies changes in vision  ENT: Denies earache or sore throat  CV: Denies chest pain   Resp: Denies shortness of breath or coughing  GI: ?Denies abdominal pain, vomiting, or diarrhea   Skin: No new rashes  Neuro: No headache  Ten systems were reviewed otherwise were unremarkable    PAST MEDICAL HISTORY  Past Medical History:   Diagnosis Date    [facility] infarction [facility] )     Anxiety     Arthritis     Brain neoplasm ([facility] ) 11/1999    brain tumor    Depression     Hypercholesterolemia     Narcolepsy     daytime drowsiness and dozing off occasionally    Palpitations     Prostate cancer [facility])        SURGICAL HISTORY  Past Surgical History:   Procedure Laterality Date    BACK SURGERY      BRAIN SURGERY  11/08/1999    CORONARY ARTERY BYPASS GRAFT      x5       FAMILY HISTORY  Family History   Problem Relation Age of Onset    Hypertension Mother         benign essential    Cancer Mother     Osteoporosis Mother     Suicidality Father     Diabetes Family     Heart disease Family     Neuropathy Family         peripheral    Prostate cancer Family     Thyroid disease Family         CURRENT MEDICATIONS  No current facility-administered medications on file prior to encounter        Current Outpatient Medications on File Prior to Encounter   Medication Sig    aspirin 81 MG tablet Take 1 tablet by mouth daily    docusate sodium (COLACE) 100 mg capsule Take 1 capsule (100 mg total) by mouth 2 (two) times a day    gabapentin (NEURONTIN) 300 mg capsule TAKE 3 CAPSULES BY MOUTH  EVERY NIGHT AT BEDTIME    metoprolol succinate (TOPROL-XL) 25 mg 24 hr tablet TAKE 1 TABLET BY MOUTH  DAILY    midodrine (PROAMATINE) 5 mg tablet TAKE 1 TABLET BY MOUTH 3  TIMES A DAY    omeprazole (PriLOSEC) 40 MG capsule TAKE 1 CAPSULE BY MOUTH  DAILY    psyllium (METAMUCIL) 58 6 % packet Take 1 packet by mouth daily    sertraline (ZOLOFT) 50 mg tablet TAKE 1 TABLET BY MOUTH  EVERY DAY    simvastatin (ZOCOR) 80 mg tablet TAKE 1 TABLET BY MOUTH  DAILY       ALLERGIES  Allergies   Allergen Reactions    Atorvastatin Myalgia, Dizziness and Headache    Niacin Other (See Comments)     unknown       SOCIAL HISTORY  Social History     Socioeconomic History    Marital status: /Civil Union     Spouse name: Not on file    Number of children: Not on file    Years of education: Not on file    Highest education level: Not on file   Occupational History    Occupation: retired   Social Needs    Financial resource strain: Not on file    Food insecurity:     Worry: Not on file     Inability: Not on file   [employer] needs:     Medical: Not on file     Non-medical: Not on file   Tobacco Use    Smoking status: Former Smoker     Types: Cigars    Smokeless tobacco: Never Used    Tobacco comment: former cig smoker- quit in 1992   Substance and Sexual Activity    Alcohol use: No     Comment: (history)    Drug use: No    Sexual activity: Not on file   Lifestyle    Physical activity:     Days per week: Not on file     Minutes per session: Not on file    Stress: Not on file   Relationships    Social connections:     Talks on phone: Not on file     Gets together: Not on file     Attends Gnosticism service: Not on file     Active member of club or organization: Not on file     Attends meetings of clubs or organizations: Not on file     Relationship status: Not on file    Intimate partner violence:     Fear of current or ex partner: Not on file     Emotionally abused: Not on file     Physically abused: Not on file     Forced sexual activity: Not on file   Other Topics Concern    Not on file   Social History Narrative    Pt lives with wife       PHYSICAL EXAM    BP (!) 185/84   Pulse 68   Temp 98 1 °F (36 7 °C) (Oral)   Resp (!) 24   SpO2 94%   Vital signs and nursing notes reviewed  CONSTITUTIONAL: male appearing stated age resting in bed, in no acute distress  HEENT: atraumatic, normocephalic  Sclera anicteric, conjunctiva are not injected  Moist oral mucosa  CARDIOVASCULAR/CHEST: RRR, no M/R/G  2+ radial pulses  PULMONARY: Breathing comfortably on RA  Breath sounds are equal and clear to auscultation  ABDOMEN: non-distended  BS present, normoactive  Non-tender  MSK: moves all extremities, no deformities, no peripheral edema  NEURO: Awake, alert, and oriented x 3  PERRL  EOMI  Face symmetric according to wife  Uvula elevates midline and tongue protrudes midline  Normal shoulder shrug  5/5 strength in BUE and BLE  No upper or lower extremity drift  No extinction  SKIN: Warm, appears well-perfused  MENTAL STATUS: Normal affect  ? NIHSS at 1215 is 0      LABS AND TESTS    Results Reviewed     Procedure Component Value Units Date/Time    Troponin I [[id number]] (Normal) Collected:  11/07/19 1322    Lab Status:  Final result Specimen:  Blood from Arm, Right Updated:  11/07/19 1352     Troponin I <0 02 ng/mL     Basic metabolic panel [[id number]]  (Abnormal) Collected:  11/07/19 1322    Lab Status:  Final result Specimen:  Blood from Arm, Right Updated:  11/07/19 1346     Sodium 139 mmol/L      Potassium 4 2 mmol/L      Chloride 108 mmol/L      CO2 28 mmol/L      ANION GAP 3 mmol/L      BUN 9 mg/dL      Creatinine 0 98 mg/dL      Glucose 119 mg/dL      Calcium 10 4 mg/dL      eGFR 70 ml/min/1 73sq m     Narrative:       [place] guidelines for Chronic Kidney Disease (CKD):     Stage 1 with normal or high GFR (GFR > 90 mL/min/1 73 square meters)    Stage 2 Mild CKD (GFR = 60-89 mL/min/1 73 square meters)    Stage 3A Moderate CKD (GFR = 45-59 mL/min/1 73 square meters)    Stage 3B Moderate CKD (GFR = 30-44 mL/min/1 73 square meters)    Stage 4 Severe CKD (GFR = 15-29 mL/min/1 73 square meters)    Stage 5 End Stage CKD (GFR <15 mL/min/1 73 square meters)  Note: GFR calculation is accurate only with a steady state creatinine    Protime-INR [[id number]]  (Normal) Collected:  11/07/19 1322    Lab Status:  Final result Specimen:  Blood from Arm, Right Updated:  11/07/19 1344     Protime 13 5 seconds      INR 1 07    APTT [[id number]]  (Normal) Collected:  11/07/19 1322    Lab Status:  Final result Specimen:  Blood from Arm, Right Updated:  11/07/19 1344     PTT 29 seconds     CBC and Platelet [[id number]]  (Abnormal) Collected:  11/07/19 1322    Lab Status:  Final result Specimen:  Blood from Arm, Right Updated:  11/07/19 1331     WBC 5 65 Thousand/uL      RBC 4 51 Million/uL      Hemoglobin 10 8 g/dL      Hematocrit 34 3 %      MCV 76 fL      MCH 23 9 pg      MCHC 31 5 g/dL      RDW 15 6 %      Platelets 728 Thousands/uL      MPV 10 1 fL           CTA head and neck with and without contrast   Final Result by Ray Maddox MD (11/07 1556)         1   No evidence of acute intracranial hemorrhage  2  Frontal craniotomy with bifrontal encephalomalacia  3  75% atherosclerotic plaque narrowing proximal right cervical ICA just beyond the bifurcation  Workstation performed: [id number]         X-ray chest 1 view portable   Final Result by Onesimo Hope MD (11/07 1405)      No acute cardiopulmonary disease  Workstation performed: [id number]         CT head without contrast    (Results Pending)       ED COURSE & MEDICAL DECISION MAKING  ECG 12 Lead Documentation Only  Date/Time: 11/7/2019 12:00 PM  Performed by: Joaquin Little MD  Authorized by: Joaquin Little MD     Comments:      NSR, VR 68,  c/w known RBBB, QTc 418, normal axis, no ST/T wave changes to suggest ischemia, no STEMI  Unchanged from prior dated 8/1/2018      Medications   iohexol (OMNIPAQUE) 350 MG/ML injection (MULTI-DOSE) 85 mL (85 mL Intravenous Given 11/7/19 1512)       ED Course as of Nov 07 1629   Thu Nov 07, 2019   1458 Blood Pressure(!): 190/85     80 y o  male presenting with neurologic deficit at 10:30 am which is now resolved  VS reviewed, hypertensive  Will permit permissive hypertension given concern for TIA  NIHSS 0, no indication for tPA  Doubt LVO  Given that symptoms resolved prior to arrival, patient is not a stroke alert  His CT head is without hemorrhage  His CTA head/neck is with a 75% right cervical ICA atherosclerotic plaque  His CXR is without cardiomediastinal widening to point to dissection as the etiology of neurologic deficit at 10:30 am  His labs reveal baseline renal function, negative troponin, and modest anemia with Hgb of 10 8 lower than 10 9 earlier this year  EKG is without atrial fibrillation  In discussion with Dr Roman Ocampo (Neurology), will allow permissive hypertension and admit the patient to [facility] for further evaluation of patient's TIA today  Patient remains neuro intact with NIHSS 0      MDM  Number of Diagnoses or Management Options  TIA (transient ischemic attack): new and requires workup     Amount and/or Complexity of Data Reviewed  Clinical lab tests: ordered and reviewed  Tests in the radiology section of CPT®: ordered and reviewed  Tests in the medicine section of CPT®: ordered and reviewed  Obtain history from someone other than the patient: yes  Review and summarize past medical records: yes  Discuss the patient with other providers: yes  Independent visualization of images, tracings, or specimens: yes    Risk of Complications, Morbidity, and/or Mortality  Presenting problems: high  Diagnostic procedures: high  Management options: high    Patient Progress  Patient progress: improved      CLINICAL IMPRESSION  Final diagnoses:   TIA (transient ischemic attack)       DISPOSITION  Time reflects when diagnosis was documented in both MDM as applicable and the Disposition within this note     Time User Action Codes Description Comment    11/7/2019  5:07 PM Fernando Hernandez Add [G45 9] TIA (transient ischemic attack)       ED Disposition     ED Disposition Condition Date/Time Comment    Admit Stable Thu Nov 7, 2019  5:07 PM Case was discussed with Dr Nury San and the patient's admission status was agreed to be Admission Status: observation status to the service of Dr Garth Vergara  Follow-up Information    None         This note has been generated using a voice recognition software  There may be typographic, grammatic, or word substitution errors that have escaped editorial review       Meeta Girard MD  11/08/19 7365

## 2019-11-07 NOTE — CONSULTS
Consultation - Neurology   Christopher Montes 80 y o  male MRN: [id number]  Unit/Bed#: ED 18 Encounter: [id number]      Assessment/Plan   Assessment:  Christopher Montes is a 80 y o  male with multiple vascular risk factors who presented for evaluation of transient right arm weakness, dysarthria, and bilateral leg weakness  Symptoms lasted approximately 5-10 minutes until patient returned to baseline  He was significantly hypertensive on arrival  Suspicious for TIA  Plan:  - Admit stroke pathway   MRI brain   Echocardiogram    Lipid Panel   Hemoglobin A1c   Dual antiplatelet with aspirin 81 mg and plavix 75 mg daily   Continue statin    Permissive hypertension  Labetalol if >708 systolic   Continue telemetry   PT/OT/ST   Frequent neuro checks  Continue to monitor and notify neurology with any changes  - Labetalol x1 now  - Medical management and supportive care per primary team  Correction of any metabolic or infectious disturbances  History of Present Illness     Reason for Consult / Principal Problem: Transient slurred speech, right arm weakness, bilateral leg weakness  HPI: Christopher Montes is a 80 y o  male with hypertension, hyperlipidemia, orthostatic hypotension on midodrine, peripheral neuropathy, CAD s/p bypass surgery, cervical spinal stenosis, prior anterior fossa meningioma s/p resection in 1999, prostate cancer, and anxiety/depression who presented to the ED this morning for transient stroke symptoms  The patient reports that he woke up in his normal state of health around 10 am   He went to make his breakfast   The patient reports that he typically places his bowl of oatmeal on the seat of his walker so that he can hold onto his walker as he ambulates over to the table  When he turned around to put the bowl of oatmeal on the seat, he developed weakness in his right arm and dropped the bowl  His wife heard level dropping came to the kitchen where he was    She noted significant dysarthria at that time, that was nearly incomprehensible  He exhibited bilateral lower extremity weakness at the time  His wife noted that his knees were buckling  The symptoms lasted for approximately 5-10 minutes before he returned to baseline  He denies any headache, visual disturbances, muffled sounds, tunnel vision, or numbness during this time  He denies any loss of bowel or bladder  The patient has noted episodes of bilateral leg weakness before, but they were never associated with upper extremity symptoms and speech disturbances  The patient does have a history of falls, thought to be secondary to orthostatic hypotension  He does take midodrine for orthostatic hypotension  The patient believes he took his midodrine this morning, but did not take any of his other medications  The patient does take aspirin 81 mg daily and a statin from a cardiac standpoint  He has not missed any doses of his medication, other than today  On arrival to the ED, the patient was significantly hypertensive at 226/90  He continued to remain hypertensive at 214/86 and 190/85  The patient's wife reports that his blood pressure is typically within the normal range  He does have the episodes where drops upon standing, but lately it has been normal   A CT/CTA was performed  Report is pending  He remained nonfocal in the ED  In 1999, the patient was diagnosed with a meningioma  At that time he presented with slurred speech  His wife reported that he became more withdrawn and confused over time  He had also been diagnosed with prostate cancer at that time and was undergoing radiation  His mother had moved from their home into a nursing home at that time  The patient was initially diagnosed with underlying depression, but once a CT scan was performed, the meningioma was discovered  He underwent surgery and did not have any further complications  The patient was last seen by Neurology in May 2018    At that time he was seen in evaluation due to several days of confusion and altered mental status  At that time, the patient was noted to be independent  He did have short-term memory issues at that time  He began exhibiting abnormal behavior, such as attempting to put his shoes on over his slippers  There was a question regarding partial seizures at that time due to his prior meningioma resection  His EEG revealed intermittent left frontotemporal theta and delta activities suggesting underlying neuronal dysfunction  He was not started on any AEDs at that time  He did have an MRI brain at that time, which revealed: "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 best seen on series 11 image 18  This residual tumor has slightly decreased in size compared to prior examination with stable surrounding encephalomalacia and gliosis more pronounced within the left frontal lobe "      Inpatient consult to Neurology  Consult performed by: aMrgarita Doran PA-C  Consult ordered by: Donny Carr MD          Review of Systems   A 12 point ROS was completed  Other than the above mentioned complaints, all remaining systems were negative       Historical Information   Past Medical History:   Diagnosis Date    Acute [facility] infarction ([facility] [address] )     Anxiety     Arthritis     Brain neoplasm ([facility] [address] ) 11/1999    brain tumor    Depression     Hypercholesterolemia     Narcolepsy     daytime drowsiness and dozing off occasionally    Palpitations     Prostate cancer [facility])      Past Surgical History:   Procedure Laterality Date    BACK SURGERY      BRAIN SURGERY  11/08/1999    CORONARY ARTERY BYPASS GRAFT      x5     Social History   Social History     Substance and Sexual Activity   Alcohol Use No    Comment: (history)     Social History     Substance and Sexual Activity   Drug Use No     Social History     Tobacco Use   Smoking Status Former Smoker    Types: Cigars   Smokeless Tobacco Never Used   Tobacco Comment    former cig smoker- quit in 1992     Family History:   Family History   Problem Relation Age of Onset    Hypertension Mother         benign essential    Cancer Mother     Osteoporosis Mother     Suicidality Father     Diabetes Family     Heart disease Family     Neuropathy Family         peripheral    Prostate cancer Family     Thyroid disease Family        Review of previous medical records was completed  Meds/Allergies   all current active meds have been reviewed, current meds:   No current facility-administered medications for this encounter  and PTA meds:   Prior to Admission Medications   Prescriptions Last Dose Informant Patient Reported? Taking?   aspirin 81 MG tablet  Self Yes No   Sig: Take 1 tablet by mouth daily   docusate sodium (COLACE) 100 mg capsule  Self No No   Sig: Take 1 capsule (100 mg total) by mouth 2 (two) times a day   gabapentin (NEURONTIN) 300 mg capsule  Self No No   Sig: TAKE 3 CAPSULES BY MOUTH  EVERY NIGHT AT BEDTIME   metoprolol succinate (TOPROL-XL) 25 mg 24 hr tablet  Self No No   Sig: TAKE 1 TABLET BY MOUTH  DAILY   midodrine (PROAMATINE) 5 mg tablet  Self No No   Sig: TAKE 1 TABLET BY MOUTH 3  TIMES A DAY   omeprazole (PriLOSEC) 40 MG capsule  Self No No   Sig: TAKE 1 CAPSULE BY MOUTH  DAILY   psyllium (METAMUCIL) 58 6 % packet  Self Yes No   Sig: Take 1 packet by mouth daily   sertraline (ZOLOFT) 50 mg tablet  Self No No   Sig: TAKE 1 TABLET BY MOUTH  EVERY DAY   simvastatin (ZOCOR) 80 mg tablet  Self No No   Sig: TAKE 1 TABLET BY MOUTH  DAILY      Facility-Administered Medications: None       Allergies   Allergen Reactions    Atorvastatin Myalgia, Dizziness and Headache    Niacin Other (See Comments)     unknown       Objective   Vitals:Blood pressure (!) 214/86, pulse 66, temperature 98 1 °F (36 7 °C), temperature source Oral, resp  rate 18, SpO2 95 %  ,There is no height or weight on file to calculate BMI    No intake or output data in the 24 hours ending 11/07/19 1408    Invasive Devices: Invasive Devices     Peripheral Intravenous Line            Peripheral IV 11/07/19 Left Antecubital less than 1 day                Physical Exam   Constitutional: He is oriented to person, place, and time  He appears well-developed and well-nourished  No distress  HENT:   Head: Normocephalic and atraumatic  Right Ear: External ear normal    Left Ear: External ear normal    Nose: Nose normal    Mouth/Throat: Oropharynx is clear and moist    Eyes: Pupils are equal, round, and reactive to light  Conjunctivae and EOM are normal  Right eye exhibits no discharge  Left eye exhibits no discharge  No scleral icterus  Ptosis on right  Neck: Normal range of motion  Neck supple  Cardiovascular: Normal rate  Pulmonary/Chest: Effort normal  No respiratory distress  Abdominal: Soft  Musculoskeletal: Normal range of motion  He exhibits no edema, tenderness or deformity  Neurological: He is alert and oriented to person, place, and time  He has normal strength  No cranial nerve deficit or sensory deficit  He has a normal Finger-Nose-Finger Test  Coordination normal    Reflex Scores:       Bicep reflexes are 2+ on the right side and 2+ on the left side  Brachioradialis reflexes are 2+ on the right side and 2+ on the left side  Patellar reflexes are 1+ on the right side and 1+ on the left side  Achilles reflexes are 0 on the right side and 0 on the left side  Skin: Skin is warm and dry  He is not diaphoretic  No erythema  No pallor  Psychiatric: He has a normal mood and affect  His behavior is normal  Judgment and thought content normal    Nursing note and vitals reviewed  Neurologic Exam     Mental Status   Oriented to person, place, and time  Level of consciousness: alert  Able to name object  Able to read  Able to repeat  Follows all commands without difficulty   Speech is slightly dysarthric as his dentures are not in place, but patient's wife reports this is normal       Cranial Nerves     CN II   Visual fields full to confrontation  Visual acuity: (Grossly intact)    CN III, IV, VI   Pupils are equal, round, and reactive to light  Extraocular motions are normal    Right pupil: Shape: regular  Left pupil: Shape: regular  Nystagmus: bilateral   Nystagmus type: horizontal  Conjugate gaze: present    CN V   Facial sensation intact  CN VII   Facial expression full, symmetric  Right facial weakness: Slight right ptosis  CN VIII   Hearing impaired: Slightly impaired as hearing aide is not in place  CN XI   Right sternocleidomastoid strength: normal  Left sternocleidomastoid strength: normal  Right trapezius strength: normal  Left trapezius strength: normal    CN XII   Tongue: not atrophic  Fasciculations: absent  Tongue deviation: none    Motor Exam   Muscle bulk: normal  Right arm pronator drift: absent  Left arm pronator drift: absent    Strength   Strength 5/5 throughout  Sensory Exam   Light touch normal    Right arm vibration: normal  Left arm vibration: normal  Right leg vibration: decreased from toes  Left leg vibration: decreased from toes    Gait, Coordination, and Reflexes     Gait  Gait: (Deferred)    Coordination   Finger to nose coordination: normal    Tremor   Resting tremor: absent  Intention tremor: absent  Action tremor: absent    Reflexes   Right brachioradialis: 2+  Left brachioradialis: 2+  Right biceps: 2+  Left biceps: 2+  Right patellar: 1+  Left patellar: 1+  Right achilles: 0  Left achilles: 0      Lab Results: I have personally reviewed pertinent reports  Imaging Studies: I have personally reviewed pertinent reports  and I have personally reviewed pertinent films in PACS (CT/CTA)  EKG, Pathology, and Other Studies: I have personally reviewed pertinent reports      VTE Prophylaxis: Sequential compression device [place])     Code Status: Prior

## 2019-11-07 NOTE — ASSESSMENT & PLAN NOTE
· Follows with Pain Management as an out-patient and has undergone several injections in the past   · PMED reviewed  · Continue Gabapentin 900 mg HS  Dose confirmed with patient

## 2019-11-07 NOTE — PROGRESS NOTES
Pt arrived in the room 733 via stretcher accompanied by PCA  Pt was transferred to the bed without complaints  Check the patient charts about orders  No orders currently in  Paged Dr Emperatriz Salgado and informed him that patient arrived from the ED  Per MD one of the PA's will be up to admit the patient  Pt is currently resting at his bed and offers no complaint at this time  Call bell within reach  Bed alarm in placed  Will continue to monitor

## 2019-11-07 NOTE — PLAN OF CARE
Problem: Potential for Falls  Goal: Patient will remain free of falls  Description  INTERVENTIONS:  - Assess patient frequently for physical needs  -  Identify cognitive and physical deficits and behaviors that affect risk of falls  -  [place] fall precautions as indicated by assessment   - Educate patient/family on patient safety including physical limitations  - Instruct patient to call for assistance with activity based on assessment  - Modify environment to reduce risk of injury  - Consider OT/PT consult to assist with strengthening/mobility  Outcome: Progressing     Problem: Neurological Deficit  Goal: Neurological status is stable or improving  Description  Interventions:  - Monitor and assess patient's level of consciousness, motor function, sensory function, and level of assistance needed for ADLs  - Monitor and report changes from baseline  Collaborate with interdisciplinary team to initiate plan and implement interventions as ordered  - Provide and maintain a safe environment  - Consider seizure precautions  - Consider fall precautions  - Consider aspiration precautions  - Consider bleeding precautions  Outcome: Progressing     Problem: Activity Intolerance/Impaired Mobility  Goal: Mobility/activity is maintained at optimum level for patient  Description  Interventions:  - Assess and monitor patient  barriers to mobility and need for assistive/adaptive devices  - Assess patient's emotional response to limitations  - Collaborate with interdisciplinary team and initiate plans and interventions as ordered  - Encourage independent activity per ability   - Maintain proper body alignment  - Perform active/passive rom as tolerated/ordered    - Plan activities to conserve energy   - Turn patient as appropriate  Outcome: Progressing     Problem: Communication Impairment  Goal: Ability to express needs and understand communication  Description  Assess patient's communication skills and ability to understand information  Patient will demonstrate use of effective communication techniques, alternative methods of communication and understanding even if not able to speak  - Encourage communication and provide alternate methods of communication as needed  - Collaborate with case management/ for discharge needs  - Include patient/family/caregiver in decisions related to communication  Outcome: Progressing     Problem: Potential for Aspiration  Goal: Non-ventilated patient's risk of aspiration is minimized  Description  Assess and monitor vital signs, respiratory status, and labs (WBC)  Monitor for signs of aspiration (tachypnea, cough, rales, wheezing, cyanosis, fever)  - Assess and monitor patient's ability to swallow  - Place patient up in chair to eat if possible  - HOB up at 90 degrees to eat if unable to get patient up into chair   - Supervise patient during oral intake  - Instruct patient/ family to take small bites  - Instruct patient/ family to take small single sips when taking liquids  - Follow patient-specific strategies generated by speech pathologist   Outcome: Progressing  Goal: Ventilated patient's risk of aspiration is minimized  Description  Assess and monitor vital signs, respiratory status, airway cuff pressure, and labs (WBC)  Monitor for signs of aspiration (tachypnea, cough, rales, wheezing, cyanosis, fever)  - Elevate head of bed 30 degrees if patient has tube feeding   - Monitor tube feeding  Outcome: Progressing     Problem: Nutrition  Goal: Nutrition/Hydration status is improving  Description  Monitor and assess patient's nutrition/hydration status for malnutrition (ex- brittle hair, bruises, dry skin, pale skin and conjunctiva, muscle wasting, smooth red tongue, and disorientation)  Collaborate with interdisciplinary team and initiate plan and interventions as ordered  Monitor patient's weight and dietary intake as ordered or per policy   Utilize nutrition screening tool and intervene per policy  Determine patient's food preferences and provide high-protein, high-caloric foods as appropriate  - Assist patient with eating   - Allow adequate time for meals   - Encourage patient to take dietary supplement as ordered  - Collaborate with clinical nutritionist   - Include patient/family/caregiver in decisions related to nutrition    Outcome: Progressing

## 2019-11-07 NOTE — ASSESSMENT & PLAN NOTE
· BPs have been significantly elevated since admission  Patient did not take his metoprolol this AM   · Hold Midodrine  · Continue metoprolol    · PRN Hydralazine for SBP >180

## 2019-11-07 NOTE — ASSESSMENT & PLAN NOTE
· Patient presents with transient episode of right arm weakness and dysarthria lasting approximately 10 minutes with spontaneous recovery  · CTA head/neck: No evidence of acute intracranial hemorrhage  Frontal craniotomy with bifrontal encephalomalacia  75% atherosclerotic plaque narrowing proximal right cervical ICA just beyond the bifurcation  · Patient seen by neurology in the ED  Stroke pathway initiated:  · MRI brain: pending  · ECHO: pending  · Lipid panel: pending  · A1c: pending  · Neuro checks  · PT/OT/ST evaluations  · Started on DAP: ASA, Plavix  · Continue statin  Patient does not tolerate atorvastatin secondary to myalgias  Will continue current statin therapy  · Permissive HTN  PRN hydralazine for SBP >180  · Telemetry x48 hours for acute stroke protocol

## 2019-11-08 ENCOUNTER — APPOINTMENT (OUTPATIENT)
Dept: NON INVASIVE DIAGNOSTICS | Facility: HOSPITAL | Age: 84
DRG: 041 | End: 2019-11-08
Payer: MEDICARE

## 2019-11-08 ENCOUNTER — APPOINTMENT (INPATIENT)
Dept: RADIOLOGY | Facility: HOSPITAL | Age: 84
DRG: 041 | End: 2019-11-08
Payer: MEDICARE

## 2019-11-08 LAB
ANION GAP SERPL CALCULATED.3IONS-SCNC: 5 MMOL/L (ref 4–13)
BASOPHILS # BLD AUTO: 0.03 THOUSANDS/ΜL (ref 0–0.1)
BASOPHILS NFR BLD AUTO: 0 % (ref 0–1)
BUN SERPL-MCNC: 9 MG/DL (ref 5–25)
CALCIUM SERPL-MCNC: 9.7 MG/DL (ref 8.3–10.1)
CHLORIDE SERPL-SCNC: 106 MMOL/L (ref 100–108)
CHOLEST SERPL-MCNC: 190 MG/DL (ref 50–200)
CO2 SERPL-SCNC: 27 MMOL/L (ref 21–32)
CREAT SERPL-MCNC: 0.88 MG/DL (ref 0.6–1.3)
EOSINOPHIL # BLD AUTO: 0.17 THOUSAND/ΜL (ref 0–0.61)
EOSINOPHIL NFR BLD AUTO: 2 % (ref 0–6)
ERYTHROCYTE [DISTWIDTH] IN BLOOD BY AUTOMATED COUNT: 15.9 % (ref 11.6–15.1)
EST. AVERAGE GLUCOSE BLD GHB EST-MCNC: 126 MG/DL
GFR SERPL CREATININE-BSD FRML MDRD: 78 ML/MIN/1.73SQ M
GLUCOSE P FAST SERPL-MCNC: 105 MG/DL (ref 65–99)
GLUCOSE SERPL-MCNC: 105 MG/DL (ref 65–140)
HBA1C MFR BLD: 6 % (ref 4.2–6.3)
HCT VFR BLD AUTO: 35 % (ref 36.5–49.3)
HDLC SERPL-MCNC: 45 MG/DL
HGB BLD-MCNC: 10.8 G/DL (ref 12–17)
IMM GRANULOCYTES # BLD AUTO: 0.03 THOUSAND/UL (ref 0–0.2)
IMM GRANULOCYTES NFR BLD AUTO: 0 % (ref 0–2)
LDLC SERPL CALC-MCNC: 115 MG/DL (ref 0–100)
LYMPHOCYTES # BLD AUTO: 2.5 THOUSANDS/ΜL (ref 0.6–4.47)
LYMPHOCYTES NFR BLD AUTO: 35 % (ref 14–44)
MCH RBC QN AUTO: 23.5 PG (ref 26.8–34.3)
MCHC RBC AUTO-ENTMCNC: 30.9 G/DL (ref 31.4–37.4)
MCV RBC AUTO: 76 FL (ref 82–98)
MONOCYTES # BLD AUTO: 0.7 THOUSAND/ΜL (ref 0.17–1.22)
MONOCYTES NFR BLD AUTO: 10 % (ref 4–12)
NEUTROPHILS # BLD AUTO: 3.7 THOUSANDS/ΜL (ref 1.85–7.62)
NEUTS SEG NFR BLD AUTO: 53 % (ref 43–75)
NRBC BLD AUTO-RTO: 0 /100 WBCS
PLATELET # BLD AUTO: 156 THOUSANDS/UL (ref 149–390)
PMV BLD AUTO: 10.6 FL (ref 8.9–12.7)
POTASSIUM SERPL-SCNC: 3.8 MMOL/L (ref 3.5–5.3)
RBC # BLD AUTO: 4.59 MILLION/UL (ref 3.88–5.62)
SODIUM SERPL-SCNC: 138 MMOL/L (ref 136–145)
TRIGL SERPL-MCNC: 150 MG/DL
WBC # BLD AUTO: 7.13 THOUSAND/UL (ref 4.31–10.16)

## 2019-11-08 PROCEDURE — 97163 PT EVAL HIGH COMPLEX 45 MIN: CPT

## 2019-11-08 PROCEDURE — 99232 SBSQ HOSP IP/OBS MODERATE 35: CPT | Performed by: PSYCHIATRY & NEUROLOGY

## 2019-11-08 PROCEDURE — G8988 SELF CARE GOAL STATUS: HCPCS

## 2019-11-08 PROCEDURE — 70551 MRI BRAIN STEM W/O DYE: CPT

## 2019-11-08 PROCEDURE — G8997 SWALLOW GOAL STATUS: HCPCS

## 2019-11-08 PROCEDURE — G8996 SWALLOW CURRENT STATUS: HCPCS

## 2019-11-08 PROCEDURE — 83036 HEMOGLOBIN GLYCOSYLATED A1C: CPT | Performed by: PHYSICIAN ASSISTANT

## 2019-11-08 PROCEDURE — 80061 LIPID PANEL: CPT | Performed by: PHYSICIAN ASSISTANT

## 2019-11-08 PROCEDURE — 92610 EVALUATE SWALLOWING FUNCTION: CPT

## 2019-11-08 PROCEDURE — G8987 SELF CARE CURRENT STATUS: HCPCS

## 2019-11-08 PROCEDURE — G8978 MOBILITY CURRENT STATUS: HCPCS

## 2019-11-08 PROCEDURE — 85025 COMPLETE CBC W/AUTO DIFF WBC: CPT | Performed by: PHYSICIAN ASSISTANT

## 2019-11-08 PROCEDURE — 97167 OT EVAL HIGH COMPLEX 60 MIN: CPT

## 2019-11-08 PROCEDURE — 93306 TTE W/DOPPLER COMPLETE: CPT

## 2019-11-08 PROCEDURE — G8979 MOBILITY GOAL STATUS: HCPCS

## 2019-11-08 PROCEDURE — 80048 BASIC METABOLIC PNL TOTAL CA: CPT | Performed by: PHYSICIAN ASSISTANT

## 2019-11-08 PROCEDURE — 99232 SBSQ HOSP IP/OBS MODERATE 35: CPT | Performed by: INTERNAL MEDICINE

## 2019-11-08 RX ADMIN — PSYLLIUM HUSK 1 PACKET: 3.4 POWDER ORAL at 08:29

## 2019-11-08 RX ADMIN — HYDRALAZINE HYDROCHLORIDE 5 MG: 20 INJECTION INTRAMUSCULAR; INTRAVENOUS at 22:20

## 2019-11-08 RX ADMIN — ASPIRIN 81 MG: 81 TABLET, COATED ORAL at 08:29

## 2019-11-08 RX ADMIN — HYDRALAZINE HYDROCHLORIDE 5 MG: 20 INJECTION INTRAMUSCULAR; INTRAVENOUS at 05:44

## 2019-11-08 RX ADMIN — PRAVASTATIN SODIUM 80 MG: 80 TABLET ORAL at 08:29

## 2019-11-08 RX ADMIN — CLOPIDOGREL BISULFATE 75 MG: 75 TABLET ORAL at 08:29

## 2019-11-08 RX ADMIN — PANTOPRAZOLE SODIUM 40 MG: 40 TABLET, DELAYED RELEASE ORAL at 05:38

## 2019-11-08 RX ADMIN — METOPROLOL SUCCINATE 25 MG: 25 TABLET, EXTENDED RELEASE ORAL at 08:29

## 2019-11-08 RX ADMIN — ENOXAPARIN SODIUM 40 MG: 40 INJECTION SUBCUTANEOUS at 08:29

## 2019-11-08 RX ADMIN — GABAPENTIN 900 MG: 300 CAPSULE ORAL at 22:12

## 2019-11-08 RX ADMIN — DOCUSATE SODIUM 100 MG: 100 CAPSULE, LIQUID FILLED ORAL at 18:21

## 2019-11-08 RX ADMIN — SERTRALINE HYDROCHLORIDE 50 MG: 50 TABLET ORAL at 08:29

## 2019-11-08 RX ADMIN — DOCUSATE SODIUM 100 MG: 100 CAPSULE, LIQUID FILLED ORAL at 08:29

## 2019-11-08 NOTE — ASSESSMENT & PLAN NOTE
· Patient with a known history of frontal meningioma resection in 1999  · CTA head and neck demonstrate bifrontal encephalomalacia

## 2019-11-08 NOTE — UTILIZATION REVIEW
Initial Clinical Review    Admission: Date/Time/Statement:  11/07/19 1708  Observation changed to inpatient 11-8-19 1505  For continued treatment of tia    Inpatient Admission Once     Transfer Service: General Medicine       Question Answer   Admitting Physician Lurlean Rubinstein   Level of Care Med Surg   Estimated length of stay More than 2 Midnights   Certification I certify that inpatient services are medically necessary for this patient for a duration of greater than two midnights  See H&P and MD Progress Notes for additional information about the patient's course of treatment              Orders Placed This Encounter   Procedures    Place in Observation (expected length of stay for this patient is less than two midnights)     Standing Status:   Standing     Number of Occurrences:   1     Order Specific Question:   Admitting Physician     Answer:   Jean Meredith [[id number]]     Order Specific Question:   Level of Care     Answer:   Med Surg [[id number]]     Order Specific Question:   Bed request comments     Answer:   Stroke pathway     ED Arrival Information     Expected Arrival Acuity Means of Arrival Escorted By Service Admission Type    - 11/7/2019 11:31 Urgent Ambulance [facility] Hospitalist Urgent    Arrival Complaint    weakness        Chief Complaint   Patient presents with    Weakness - Generalized     pt reports both legs felt weak for a few minutes and spech wasnt clear now feels fine  pt reports legs have felt weak in the past     Assessment/Plan:    80year old male presents to ed via ems  From home for evaluation of a transient episode of right upper extremity weakness,  Dysarthria and bilateral lower extremity weakness  He was walking and carring a bowel of food when his right arm became weak and he dropped with bowel on the floor  Symptoms lasted a few minutes and resolved spontaneously  PMHX  MI, brain neoplasm, prostate cancer  On arrival he is back to baseline  Hypertension present  CTA head and neck show  75% right ica narrowing  Treated in ed with iv labetalol and plavix  Admit to observation for Transient ischemic attack  Plan to monitor on telemetry and neuro checks  Obtain echo, lipid panel , hba1c,  Therapy evaluations, Mri brain,  Dysphagia assessment prior to po,     Reason for not initiating IV thrombolytic: Symptoms have resolved or are clearly non-disabling     Neurology consult    80 y o  male with multiple vascular risk factors who presented for evaluation of transient right arm weakness, dysarthria, and bilateral leg weakness  Symptoms lasted approximately 5-10 minutes until patient returned to baseline  He was significantly hypertensive on arrival  Suspicious for TIA       Plan:  - Admit stroke pathway  · MRI brain  · Echocardiogram   · Lipid Panel  · Hemoglobin A1c  · Dual antiplatelet with aspirin 81 mg and plavix 75 mg daily  · Continue statin   · Permissive hypertension  Labetalol if >593 systolic  · Continue telemetry  · PT/OT/ST  · Frequent neuro checks  Continue to monitor and notify neurology with any changes     - Labetalol x1 now    ED Triage Vitals   11/07/19 1300 11/07/19 1137 11/07/19 1137 11/07/19 1137 11/07/19 1137   98 1 °F (36 7 °C) 74 18 (!) 226/90 97 %      Oral          No Pain       11/07/19 74 3 kg (163 lb 12 8 oz)     Additional Vital Signs:    Date/Time  Temp  Pulse  Resp  BP  MAP (mmHg)  SpO2  O2 Device    11/08/19 11:07:38    78    159/69  99  96 %      11/08/19 09:42:19    76    174/74Abnormal   107  96 %      11/08/19 08:17:26    76  18  198/76Abnormal   117  95 %      11/08/19 0721        178/72Abnormal           11/08/19 07:18:54  97 9 °F (36 6 °C)  71  18  200/75Abnormal   117  97 %      11/08/19 06:59:24    74    119/77  91  98 %      11/08/19 05:42:37    68    189/74Abnormal   112  96 %      11/08/19 0523        190/70Abnormal           11/08/19 05:19:59    70    188/75Abnormal   113  95 %      11/08/19 05:08:34   72    191/75Abnormal   114  97 %      11/08/19 05:02:55    67    193/73Abnormal   113  96 %      11/08/19 03:00:28  98 1 °F (36 7 °C)  67    141/66  91  96 %      11/08/19 0300  98 1 °F (36 7 °C)  64    141/66          11/08/19 00:59:20  98 3 °F (36 8 °C)  70    139/58  85  95 %      11/07/19 23:10:26  98 3 °F (36 8 °C)  83    143/57  86  94 %      11/07/19 2300    79              11/07/19 2100    80    131/44Abnormal           11/07/19 20:15:34    88    180/75Abnormal   110  95 %      11/07/19 20:09:52  98 6 °F (37 °C)  87    194/83Abnormal   120  94 %      11/07/19 2000              None (Room air)    11/07/19 18:12:51    94    164/80  108  96 %      11/07/19 18:07:26  98 4 °F (36 9 °C)  69  20  146/82  103  97 %      11/07/19 1730    66  13  184/80Abnormal     95 %      11/07/19 1700    68  14  141/66    94 %  None (Room air)    11/07/19 1630    62  16  192/81Abnormal     96 %      11/07/19 1600    66  16  199/88Abnormal     94 %      11/07/19 1530    68  22  201/89Abnormal     95 %      11/07/19 1430    68  24Abnormal   185/84Abnormal     94 %      11/07/19 1417    66  18  190/85Abnormal     97 %  None (Room air)    11/07/19 1300  98 1 °F (36 7 °C)  66  18  214/86Abnormal     95 %  None (Room air)      Date and Time Eye Opening Best Verbal Response Best Motor Response Lucia Coma Scale Score   11/08/19 0500 4 5 6 15   11/08/19 0300 4 5 6 15   11/08/19 0100 4 5 6 15   11/07/19 2300 4 5 6 15   11/07/19 2100 4 5 6 15   11/07/19 2000 4 5 6 15   11/07/19 1900 4 5 6 15   11/07/19 1812 4 5 6 15   11/07/19 1730 4 5 6 15   11/07/19 1700 4 5 6 15   11/07/19 1630 4 5 6 15   11/07/19 1600 4 5 6 15   11/07/19 1530 4 5 6 15   11/07/19 1500 4 5 6 15   11/07/19 1430 4 5 6 15   11/07/19 1415 4 5 6 15   11/07/19 1400 4 5 6 15   11/07/19 1350 4 5 6 15   11/07/19 1325 4 5 6 15   11/07/19 1133 4 5 6 15           Pertinent Labs/Diagnostic Test Results:     CTA head and neck  11-7-19  1556    1  No evidence of acute intracranial hemorrhage  2  Frontal craniotomy with bifrontal encephalomalacia  3  75% atherosclerotic plaque narrowing proximal right cervical ICA just beyond the bifurcation  Collection Time Result Time Vent R Atrial R NC Int  QRSD Int  QT Int  QTC Int   P Axis QRS Axis T Wave Ax    11/07/19 11:39:54 11/07/19 17:46:27 68 68 192 116 394 418 50 -6 46     Normal sinus rhythm  Right bundle branch block  Abnormal ECG        Results from last 7 days   Lab Units 11/08/19  0502 11/07/19  1322   WBC Thousand/uL 7 13 5 65   HEMOGLOBIN g/dL 10 8* 10 8*   HEMATOCRIT % 35 0* 34 3*   PLATELETS Thousands/uL 156 161   NEUTROS ABS Thousands/µL 3 70  --          Results from last 7 days   Lab Units 11/08/19  0502 11/07/19  1322   SODIUM mmol/L 138 139   POTASSIUM mmol/L 3 8 4 2   CHLORIDE mmol/L 106 108   CO2 mmol/L 27 28   ANION GAP mmol/L 5 3*   BUN mg/dL 9 9   CREATININE mg/dL 0 88 0 98   EGFR ml/min/1 73sq m 78 70   CALCIUM mg/dL 9 7 10 4*             Results from last 7 days   Lab Units 11/08/19  0502 11/07/19  1322   GLUCOSE RANDOM mg/dL 105 119         Results from last 7 days   Lab Units 11/08/19  0502   HEMOGLOBIN A1C % 6 0   EAG mg/dl 126     Results from last 7 days   Lab Units 11/07/19  1322   TROPONIN I ng/mL <0 02         Results from last 7 days   Lab Units 11/07/19  1322   PROTIME seconds 13 5   INR  1 07   PTT seconds 29        ED Treatment:   Medication Administration from 11/07/2019 1127 to 11/07/2019 1758       Date/Time Order Dose Route Action     11/07/2019 1649 Labetalol HCl (NORMODYNE) injection 10 mg 10 mg Intravenous Given     11/07/2019 1649 clopidogrel (PLAVIX) tablet 75 mg 75 mg Oral Given        Past Medical History:   Diagnosis Date    Anxiety     Arthritis     Coronary artery disease involving native coronary artery of native heart without angina pectoris     Depression     Hypercholesterolemia     Meningioma ([facility] [address] ) 11/1999    brain tumor    Narcolepsy     daytime drowsiness and dozing off occasionally    Palpitations     Prostate cancer ([address] )      Present on Admission:   Orthostatic hypotension   Lumbar radiculopathy   Arteriosclerotic cardiovascular disease      Admitting Diagnosis:     TIA (transient ischemic attack) [G45 9]  Weakness [R53 1]    Age/Sex: 80 y o  male     Admission Orders:        Scheduled Medications:    Medications:  aspirin 81 mg Oral Daily   clopidogrel 75 mg Oral Daily   docusate sodium 100 mg Oral BID   enoxaparin 40 mg Subcutaneous Daily   gabapentin 900 mg Oral HS   metoprolol succinate 25 mg Oral Daily   pantoprazole 40 mg Oral Early Morning   pravastatin 80 mg Oral Daily   psyllium 1 packet Oral Daily   sertraline 50 mg Oral Daily     Continuous IV Infusions:     PRN Meds:    hydrALAZINE 5 mg Intravenous Q6H PRN       IP CONSULT TO NEUROLOGY  IP CONSULT TO CASE MANAGEMENT  IP CONSULT TO NUTRITION SERVICES    Network Utilization Review Department  [email or web address]  ATTENTION: Please call with any questions or concerns to [phone] and carefully listen to the prompts so that you are directed to the right person  All voicemails are confidential   Gasper Hallmark all requests for admission clinical reviews, approved or denied determinations and any other requests to dedicated fax number below belonging to the campus where the patient is receiving treatment    FACILITY NAME UR FAX NUMBER   ADMISSION DENIALS (Administrative/Medical Necessity) [address] [facility] (Maternity/NICU/Pediatrics) [phone]   [facility] [address] [facility] [phone]   145 [facility] 15[address] [facility] [phone]   Linette Lasso [address] [phone]

## 2019-11-08 NOTE — SPEECH THERAPY NOTE
Bedside Swallow Evaluation:    Summary:  Pt presents w/ a mild oral dysphagia characterized by decreased bite strength and prolonged mastication time with regular solids  Patient came to hospital without upper dentures, which he always wears at home  He states that his wife will be bringing them in this morning  RN reports occasional coughing with intake of thin liquids this am  None observed during assessment with large, consecutive sips of thin liquids  Patient had episode of transient expressive aphasia which he reports cleared within 5 minutes  Speech, language and cognitive skills appear adequate during screen  From H&P: Shital Baird is a 80 y o  male who presents with a transient episode of right upper extremity weakness, dysarthria and bilateral lower extremity weakness  Patient states he was walking this morning caring a bowl of oatmeal when his right arm became weak and he dropped the oatmeal on the floor  He then noticed some difficulty with expressive aphasia  His legs became very weak and his wife helped him to a chair  His symptoms lasted only a few minutes and spontaneously resolved without recurrence  Patient feels he has been back to baseline since this episode  He denies this ever happening in the past   He did not take any of his medications this morning  Currently, he denies any dizziness, lightheadedness, visual changes, weakness in his extremities, paresthesias or speech difficulty  He is complaining of a headache currently but states it is because he has eaten all day and is very hungry  Recommendations:  Diet: Regular-choose softer menu items until dentures are here  Pt educated  Liquid: Thin liquids   Meds: Whole with liquids  Supervision: Independent   Positioning:Upright  Strategies: Pt to take PO/Meds only when fully alert and upright     Oral care: As needed     Therapy Prognosis: Good  Prognosis considerations: No dysphagia at baseline  Frequency: 1-2 f/u sessions Consider consult w/:  None at this time     Reason for consult:  R/o aspiration  Nursing reported cough w/ PO    Precautions:  None     Current diet:  Regular with thin liquids   Premorbid diet[de-identified]  Regular with thin liquids   Previous VBS:  None   O2 requirement:  None   Voice/Speech:  WFL  Social:  Lives with wife   Follows commands:  WFL                        Cognitive Status:  WFL  Oral [facility] exam:  Dentition: Upper is edentulous  Has lower teeth   Labial strength and ROM: WFL  Lingual strength and ROM: WFL  Mandibular strength and ROM: WFL  Velum: WFL  Secretion management: WFL    Items administered:  hard solid, thin liquids  Liquids were taken by straw  Oral stage: Mild  Lip closure: WFL-decreased bite strength  Mastication: Min prolonged  Bolus formation: WFL  Bolus control: WFL  Transfer: Min prolonged  Oral residue: No  Pocketing: No    Pharyngeal stage: WFL  Swallow promptness: WFL  Laryngeal rise: Not palpated, observed to be adequate  Wet voice: No  Throat clear: No  Cough: No  Secondary swallows: No  Audible swallows: No  No overt s/s aspiration    Esophageal stage:  No s/s reported    Results d/w:  Pt, nursing    Goal(s):  Pt will tolerate least restrictive diet w/out s/s aspiration or oral/pharyngeal difficulties

## 2019-11-08 NOTE — PROGRESS NOTES
Progress Note - Scott Castro 1933, 80 y o  male MRN: [id number]    Unit/Bed#: [facility] 733-01 Encounter: [id number]    Primary Care Provider: Mi Bridges MD   Date and time admitted to hospital: 11/7/2019 11:32 AM        * TIA (transient ischemic attack)  Assessment & Plan  · Patient presents with transient episode of right arm weakness and dysarthria lasting approximately 10 minutes with spontaneous recovery  · CTA head/neck: No evidence of acute intracranial hemorrhage  Frontal craniotomy with bifrontal encephalomalacia  75% atherosclerotic plaque narrowing proximal right cervical ICA just beyond the bifurcation  · Patient seen by neurology in the ED  Stroke pathway initiated:  · MRI brain: pending  · ECHO: pending  · Lipid panel: pending  · A1c: pending  · Neuro checks  · PT/OT/ST evaluations  · Started on DAP: ASA, Plavix  · Continue statin  Patient does not tolerate atorvastatin secondary to myalgias  Will continue current statin therapy  · Permissive HTN  PRN hydralazine for SBP >180  · Telemetry x48 hours for acute stroke protocol  History of resection of meningioma  Assessment & Plan  · Patient with a known history of frontal meningioma resection in 1999  · CTA head and neck demonstrate bifrontal encephalomalacia  Hypercalcemia  Assessment & Plan  · Calcium on admission: 10 2  · Check ionized calcium level  · Gentle IVFs x1 liter only  Hypertensive urgency  Assessment & Plan  · BPs have been significantly elevated since admission  Patient did not take his metoprolol this AM   · Hold Midodrine  · Continue metoprolol  · PRN Hydralazine for SBP >180    Lumbar radiculopathy  Assessment & Plan  · Follows with Pain Management as an out-patient and has undergone several injections in the past   · PMED reviewed  · Continue Gabapentin 900 mg HS  Dose confirmed with patient      Orthostatic hypotension  Assessment & Plan  · Hold midodrine given hypertensive urgency  Arteriosclerotic cardiovascular disease  Assessment & Plan  · S/p CABG x5 in   · Continue ASA, statin  · Follows with Dr Ray Maddox as an out-patient  VTE Pharmacologic Prophylaxis:   Pharmacologic: Enoxaparin (Lovenox)  Mechanical VTE Prophylaxis in Place: No    Patient Centered Rounds: I have performed bedside rounds with nursing staff today  Time Spent for Care: 15 minutes  More than 50% of total time spent on counseling and coordination of care as described above  Current Length of Stay: 0 day(s)    Current Patient Status: Observation       Code Status: Level 1 - Full Code      Subjective:   nad    Objective:     Vitals:   Temp (24hrs), Av 2 °F (36 8 °C), Min:97 9 °F (36 6 °C), Max:98 6 °F (37 °C)    Temp:  [97 9 °F (36 6 °C)-98 6 °F (37 °C)] 97 9 °F (36 6 °C)  HR:  [62-94] 78  Resp:  [13-24] 18  BP: (119-201)/(44-89) 159/69  SpO2:  [94 %-98 %] 96 %  Body mass index is 24 19 kg/m²  Input and Output Summary (last 24 hours): Intake/Output Summary (Last 24 hours) at 2019 1323  Last data filed at 2019 1200  Gross per 24 hour   Intake 480 ml   Output    Net 480 ml       Physical Exam:     Physical Exam   Constitutional: He is oriented to person, place, and time  HENT:   Head: Normocephalic and atraumatic  Pulmonary/Chest: Effort normal and breath sounds normal  No respiratory distress  Abdominal: Soft  Bowel sounds are normal  He exhibits no distension  Musculoskeletal: Normal range of motion  He exhibits no edema  Neurological: He is alert and oriented to person, place, and time  Skin: Skin is warm         Additional Data:     Labs:    Results from last 7 days   Lab Units 19  0502   WBC Thousand/uL 7 13   HEMOGLOBIN g/dL 10 8*   HEMATOCRIT % 35 0*   PLATELETS Thousands/uL 156   NEUTROS PCT % 53   LYMPHS PCT % 35   MONOS PCT % 10   EOS PCT % 2     Results from last 7 days   Lab Units 19  0502   POTASSIUM mmol/L 3 8   CHLORIDE mmol/L 106   CO2 mmol/L 27   BUN mg/dL 9   CREATININE mg/dL 0 88   CALCIUM mg/dL 9 7     Results from last 7 days   Lab Units 11/07/19  1322   INR  1 07       * I Have Reviewed All Lab Data Listed Above  * Additional Pertinent Lab Tests Reviewed: All Labs Within Last 24 Hours Reviewed      Recent Cultures (last 7 days):           Last 24 Hours Medication List:     Current Facility-Administered Medications:  aspirin 81 mg Oral Daily JAVIER Chaudhari-TJ   clopidogrel 75 mg Oral Daily JAVIER Chaudhari-TJ   docusate sodium 100 mg Oral BID JAVIER Chaudhari-TJ   enoxaparin 40 mg Subcutaneous Daily JAVIER Chaudhari-TJ   gabapentin 900 mg Oral HS Trinity Whitaker PA-C   hydrALAZINE 5 mg Intravenous Q6H PRN Trinity Whitaker PA-C   metoprolol succinate 25 mg Oral Daily JAVIER Chaudhari-TJ   pantoprazole 40 mg Oral Early Morning JAVIER Chaudhari-TJ   pravastatin 80 mg Oral Daily JAVIER Chaudhari-TJ   psyllium 1 packet Oral Daily JAVIER Chaudhari-TJ   sertraline 50 mg Oral Daily Trinity Whitaker PA-C        Today, Patient Was Seen By: Tico Reyes DO    ** Please Note: Dictation voice to text software may have been used in the creation of this document   **

## 2019-11-08 NOTE — OCCUPATIONAL THERAPY NOTE
[address] Evaluation     Patient Name: Naomi FITZGERALD Date: 11/8/2019  Problem List  Principal Problem:    TIA (transient ischemic attack)  Active Problems:    Arteriosclerotic cardiovascular disease    Orthostatic hypotension    Lumbar radiculopathy    Hypertensive urgency    Hypercalcemia    History of resection of meningioma    Past Medical History  Past Medical History:   Diagnosis Date    Anxiety     Arthritis     Coronary artery disease involving native coronary artery of native heart without angina pectoris     Depression     Hypercholesterolemia     Meningioma ([address] ) 11/1999    brain tumor    Narcolepsy     daytime drowsiness and dozing off occasionally    Palpitations     Prostate cancer [facility])      Past Surgical History  Past Surgical History:   Procedure Laterality Date    BACK SURGERY      BRAIN SURGERY  11/08/1999    CORONARY ARTERY BYPASS GRAFT      x5        11/08/19 0957   Note Type   Note type Eval only   Restrictions/Precautions   Weight Bearing Precautions Per Order No   Other Precautions Cognitive; Chair Alarm; Bed Alarm; Fall Risk;Multiple lines;Telemetry   Pain Assessment   Pain Assessment No/denies pain   Pain Score No Pain   Home Living   Type of Home Apartment   Home Layout One level  (0STE)   Bathroom Shower/Tub   (sponge bathes PTA)   Bathroom Toilet Standard   Bathroom Equipment Commode   Home Equipment Walker;Cane   Additional Comments Pt resides with his wife in a 1st floor apartment with 0STE  Wife was recently hospitalized, but is able to provide some assist to pt  Pt reports that he doesn't shower anymore due to near fall during tub transfer   Prior Function   Level of [place] Independent with ADLs and functional mobility; Needs assistance with IADLs   Lives With Spouse   Receives Help From Family   ADL Assistance Independent   IADLs Needs assistance   Falls in the last 6 months 0   Vocational Retired   Lifestyle   Autonomy Pt reports being I with ADLs, requiring assistance with IADLs, and I with functional mobility with use of rw in the apartment and spc in the community  Pt is not a  and wife assists with meds   Reciprocal Relationships supportive wife   Service to Others retired   Intrinsic Gratification likes to watch TV, especially [address] ([place]) [address]  5  [address] [facility] 5  Supervision/Setup   [facility] [address] 5  Supervision/Setup   LB [address] 4  Minimal Assistance   [address] 5  Supervision/Setup    [facility] [address] 3  Moderate Assistance   [address]  4  Minimal Assistance   Bed Mobility   Supine to Sit 6  Modified independent   Transfers   Sit to Stand 5  Supervision   Stand to Sit 5  Supervision   Additional Comments Pt requires VCs and tactile cues for safe hand placement/positioning during functional transfers with use of rw (pulls up on rw)  Requires frequent repetition of safety cues   Functional Mobility   Functional Mobility 4  Minimal assistance   Additional Comments CGA with use of rw    Cues for safety, especially navigating smaller/more crowded areas   Balance   Static Sitting Fair +   Dynamic Sitting Fair +   Static Standing Fair -   Dynamic Standing Fair -   Ambulatory Poor +   Activity Tolerance   Activity Tolerance Patient limited by fatigue   Medical Staff Made Aware PT Marie President, CM   Nurse Made Aware Pt cleared by RN Joselyn Gaitan for OT evaluation and OOB mobility   RUE Assessment   RUE Assessment WFL   LUE Assessment   LUE Assessment WFL   Hand Function   Gross Motor Coordination Functional   Fine Motor Coordination Functional   Sensation   Light Touch No apparent deficits   Vision-Basic Assessment   Current Vision Wears glasses all the time   Vision - Complex Assessment   Ocular Range of Motion WFL   Head Position WDL   Tracking Able to track stimulus in all quads without difficulty   Acuity Able to read clock/calendar on wall without difficulty; Able to read employee name badge without difficulty   Additional Comments Reports vision is at his baseline   Cognition   Overall Cognitive Status Impaired   Arousal/Participation Responsive; Alert; Cooperative   Attention Attends with cues to redirect   Orientation Level Oriented X4   Memory Decreased recall of precautions;Decreased recall of recent events;Decreased short term memory   Following Commands Follows one step commands with increased time or repetition   Comments Pt is pleasant and cooperative, however requires freuqnet repetition of safety cues  Requires cues for safety and attention to task   Assessment   Limitation Decreased ADL status; Decreased Safe judgement during ADL;Decreased cognition;Decreased endurance;Decreased high-level ADLs; Decreased self-care trans   Prognosis Fair   Assessment Pt is an 80year old male seen for initial OT evaluation s/p admission to [facility] 11/7/19 with transient episode of R UE weakness and dysarthria  CTA negative for acute hemorrhage, but demonstrated frontal craniotomy with bifrontal encephalomalacia and narrowing R cervical ICA  MRI pending  Additional active problems include: h/o resection of meningioma, hypercalcemia, hypertensive urgency, lumbar radiculopathy, orthostatic hypotension, and arteriosclerotic cardiovascular disease  Pt with active OT orders and cleared by RN for OOB mobility  Pt is a questionable/poor historian secondary to cognitive deficits  Information obtained from chart and confirmed with patient  Pt resides with his wife in a 1st floor apartment with 0STE  Pt reports that he was I with ADLs, required assistance with IADLs, and I with functional mobility with use of rw in the apartment and spc in the community  Pt is not a  and wife assists with meds    Pt is currently demonstrating the following occupational deficits: eating with set-up, grooming with set-up, UB bathing with supervision, LB bathing with Reshma, UB dressing with set-up, LB dressing with modA, toileting with Reshma, functional transfers with SBA, and functional mobility with supervision  Factors currently limiting pt's independence in these areas include: cognitive deficits, balance, functional mobility, endurance, and unsupportive home environment  Overall, pt scored 65/100 on the Barthel Index  From an OT standpoint, recommend outpatient OT and continued 24/7 supervision/assist   Pt is to continue to benefit from skilled occupational therapy services while in the hospital to maximize functioning and independence with daily activities  See below for OT goals to be addressed 3-5x/wk  Goals   Patient Goals to go home   Plan   Treatment Interventions ADL retraining;Functional transfer training; Endurance training;Patient/family training;Cognitive reorientation;Equipment evaluation/education; Compensatory technique education;Continued evaluation; Activityengagement   Goal Expiration Date 11/18/19   OT Treatment Day 1   OT Frequency 3-5x/wk   Recommendation   OT Discharge Recommendation Outpatient OT   OT - OK to Discharge Yes  (when medically stable)   Barthel Index   Feeding 10   Bathing 0   Grooming Score 5   Dressing Score 5   Bladder Score 10   Bowels Score 10   Toilet Use Score 5   Transfers (Bed/Chair) Score 10   Mobility (Level Surface) Score 10   Stairs Score 0   Barthel Index Score 65     GOALS    Pt will improve activity tolerance to G for min 30 min txment sessions for increase engagement in functional tasks    Pt will complete all self care w/ mod I using adaptive device and DME as needed    Pt will improve functional transfers to Mod I on/off all surfaces using DME as needed w/ G balance/safety     Pt will improve functional mobility during ADL/IADL/leisure tasks to Mod I using DME as needed w/ G balance/safety     Pt will participate in simulated IADL management task to increase independence to Mod I w/ G safety and endurance    Pt will be attentive 100% of the time during ongoing cognitive assessment w/ G participation to assist w/ safe d/c planning/recommendations    Pt will demonstrate G carryover of pt/caregiver education and training as appropriate w/o cues w/ good tolerance to increase safety during functional tasks    Aaron Fernandez, SEN, OTR/L

## 2019-11-08 NOTE — SOCIAL WORK
Met with pt and pt's wife to discuss therapies recommendation for outpt therapy  Pt and wife are agreeable  Script and provider script given

## 2019-11-08 NOTE — PROGRESS NOTES
Progress Note - Neurology   Patito Wood 80 y o  male [id number]  Unit/Bed#: [facility] 733/[facility] 733-01    Assessment:  Patito Wood is a 80 y o  male who presented with transient right arm weakness, bilateral leg weakness, and dysarthria with a significantly elevated blood pressure  Suspicious for TIA  Plan as follows  Plan:  - Stroke pathway workup   · MRI brain pending   · Echocardiogram pending   · Continue dual antiplatelet with aspirin 81 mg and plavix 75 mg daily for 3 weeks, then continue on Plavix 75 mg only   · Okay to continue pravastatin 80 mg daily given documented intolerance to atorvastatin  (Can resume simvastatin 80 mg upon discharge)    · Continue telemetry  · PT/OT/ST  · Frequent neuro checks  Continue to monitor and notify neurology with any changes  - Recommend outpatient vascular surgery appointment regarding asymptomatic right carotid stenosis (75%)  - An outpatient hospital follow up appointment has been requested with the neurology team  The office will call the patient to help set up an appointment  - Medical management and supportive care per primary team  Correction of any metabolic or infectious disturbances  Results:   - Hemoglobin A1c 6 0  - Lipid panel WNL except for elevated         Subjective:   Blood pressures continued to be labile overnight  His most recent blood pressure was 174/74, and prior to that was 198/76  He denies any reoccurrence of his symptoms overnight  He has had some difficulty chewing as he did not have his dentures in place  He feels that he is at his baseline at this time  He denies any headache, visual disturbances, focal weakness, or paresthesias           Past Medical History:   Diagnosis Date    Anxiety     Arthritis     Coronary artery disease involving native coronary artery of native heart without angina pectoris     Depression     Hypercholesterolemia     M[address] ([facility] [address] ) 11/1999    brain tumor    Narcolepsy     daytime drowsiness and dozing off occasionally    Palpitations     Prostate cancer [facility])      Past Surgical History:   Procedure Laterality Date    BACK SURGERY      BRAIN SURGERY  11/08/1999    CORONARY ARTERY BYPASS GRAFT      x5     Family History   Problem Relation Age of Onset    Hypertension Mother         benign essential    Cancer Mother     Osteoporosis Mother     Suicidality Father     Diabetes Family     Heart disease Family     Neuropathy Family         peripheral    Prostate cancer Family     Thyroid disease Family      Social History     Socioeconomic History    Marital status: /Civil Union     Spouse name: None    Number of children: None    Years of education: None    Highest education level: None   Occupational History    Occupation: retired   Social Needs    Financial resource strain: None    Food insecurity:     Worry: None     Inability: None    Transportation needs:     Medical: None     Non-medical: None   Tobacco Use    Smoking status: Former Smoker     Types: Cigars    Smokeless tobacco: Never Used    Tobacco comment: former cig smoker- quit in 1992   Substance and Sexual Activity    Alcohol use: No     Frequency: Never     Binge frequency: Never     Comment: (history)    Drug use: No    Sexual activity: None   Lifestyle    Physical activity:     Days per week: None     Minutes per session: None    Stress: None   Relationships    Social connections:     Talks on phone: None     Gets together: None     Attends Jehovah's witness service: None     Active member of club or organization: None     Attends meetings of clubs or organizations: None     Relationship status: None    Intimate partner violence:     Fear of current or ex partner: None     Emotionally abused: None     Physically abused: None     Forced sexual activity: None   Other Topics Concern    None   Social History Narrative    Pt lives with wife         Medications:   All current active meds have been reviewed and current meds:  Scheduled Meds:  Current Facility-Administered Medications:  aspirin 81 mg Oral Daily [facility], PA-C   clopidogrel 75 mg Oral Daily [facility], PA-C   docusate sodium 100 mg Oral BID [facility], PA-C   enoxaparin 40 mg Subcutaneous Daily [facility], PA-C   gabapentin 900 mg Oral HS [facility], PA-C   hydrALAZINE 5 mg Intravenous Q6H PRN [facility], PA-C   metoprolol succinate 25 mg Oral Daily [facility], PA-C   pantoprazole 40 mg Oral Early Morning [facility], PA-C   pravastatin 80 mg Oral Daily [facility], PA-C   psyllium 1 packet Oral Daily [facility], PA-C   sertraline 50 mg Oral Daily [facility], PA-C     Continuous Infusions:   PRN Meds: hydrALAZINE       ROS:   A 12 point ROS was completed  Other than the above mentioned complaints in the HPI, all remaining systems were negative  Vitals:   BP (!) 174/74   Pulse 76   Temp 97 9 °F (36 6 °C)   Resp 18   Ht 5' 9" (1 753 m)   Wt 74 3 kg (163 lb 12 8 oz)   SpO2 96%   BMI 24 19 kg/m²     Physical Exam:    Constitutional: Patient is resting comfortably  Well developed, well nourished, in no acute distress  No diaphoresis  HENT: Normocephalic, atraumatic  Right and left external ear normal  Oropharynx clear and moist  Nose normal    Eyes: PERRL  EOMs intact without nystagmus  No scleral icterus or injection  No discharge  Slight ptosis on the right  Neck: Supple, normal ROM  No stridor noted  No tenderness to palpation  Cardiovascular: Regular rate and rhythm, without murmurs, rubs, or gallops  Pulmonary: No respiratory distress  Effort normal  Normal breath sounds bilaterally without wheezes, rales, or rhonchi  Abdominal: Soft, nontender  Musculoskeletal: Normal ROM  No tenderness, edema, or deformities noted  Neurological: A&Ox3  Follows all commands  Skin: Warm and dry  No erythema or rashes  Psychiatric: Pleasant and conversational  Normal mood and affect   Normal thought process and thought content, without hallucinations  Behavior and judgement normal     Neurologic Exam:  Mental Status: Patient is awake and alert  Oriented x 3  Follows all commands without difficulty  No dysarthria  No aphasia  Cranial Nerves: Cranial nerves 2-12 intact, with slight ptosis of the right eye  Motor: 5/5 strength throughout bilateral upper and lower extremities  Sensation: Sensation to light touch intact throughout  Coordination: Finger to nose testing normal    No tremors noted  Labs: I have personally reviewed pertinent reports     Recent Results (from the past 24 hour(s))   ECG 12 lead    Collection Time: 11/07/19 11:39 AM   Result Value Ref Range    Ventricular Rate 68 BPM    Atrial Rate 68 BPM    NJ Interval 192 ms    QRSD Interval 116 ms    QT Interval 394 ms    QTC Interval 418 ms    P Axis 50 degrees    QRS Axis -6 degrees    T Wave Axis 46 degrees   Basic metabolic panel    Collection Time: 11/07/19  1:22 PM   Result Value Ref Range    Sodium 139 136 - 145 mmol/L    Potassium 4 2 3 5 - 5 3 mmol/L    Chloride 108 100 - 108 mmol/L    CO2 28 21 - 32 mmol/L    ANION GAP 3 (L) 4 - 13 mmol/L    BUN 9 5 - 25 mg/dL    Creatinine 0 98 0 60 - 1 30 mg/dL    Glucose 119 65 - 140 mg/dL    Calcium 10 4 (H) 8 3 - 10 1 mg/dL    eGFR 70 ml/min/1 73sq m   CBC and Platelet    Collection Time: 11/07/19  1:22 PM   Result Value Ref Range    WBC 5 65 4 31 - 10 16 Thousand/uL    RBC 4 51 3 88 - 5 62 Million/uL    Hemoglobin 10 8 (L) 12 0 - 17 0 g/dL    Hematocrit 34 3 (L) 36 5 - 49 3 %    MCV 76 (L) 82 - 98 fL    MCH 23 9 (L) 26 8 - 34 3 pg    MCHC 31 5 31 4 - 37 4 g/dL    RDW 15 6 (H) 11 6 - 15 1 %    Platelets 813 841 - 164 Thousands/uL    MPV 10 1 8 9 - 12 7 fL   Protime-INR    Collection Time: 11/07/19  1:22 PM   Result Value Ref Range    Protime 13 5 11 6 - 14 5 seconds    INR 1 07 0 84 - 1 19   APTT    Collection Time: 11/07/19  1:22 PM   Result Value Ref Range    PTT 29 23 - 37 seconds   Troponin I Collection Time: 11/07/19  1:22 PM   Result Value Ref Range    Troponin I <0 02 <=0 04 ng/mL   Lipid Panel with Direct LDL reflex    Collection Time: 11/08/19  5:02 AM   Result Value Ref Range    Cholesterol 190 50 - 200 mg/dL    Triglycerides 150 <=150 mg/dL    HDL, Direct 45 >=40 mg/dL    LDL Calculated 115 (H) 0 - 100 mg/dL   Hemoglobin A1c w/EAG Estimation    Collection Time: 11/08/19  5:02 AM   Result Value Ref Range    Hemoglobin A1C 6 0 4 2 - 6 3 %     mg/dl   Basic metabolic panel    Collection Time: 11/08/19  5:02 AM   Result Value Ref Range    Sodium 138 136 - 145 mmol/L    Potassium 3 8 3 5 - 5 3 mmol/L    Chloride 106 100 - 108 mmol/L    CO2 27 21 - 32 mmol/L    ANION GAP 5 4 - 13 mmol/L    BUN 9 5 - 25 mg/dL    Creatinine 0 88 0 60 - 1 30 mg/dL    Glucose 105 65 - 140 mg/dL    Glucose, Fasting 105 (H) 65 - 99 mg/dL    Calcium 9 7 8 3 - 10 1 mg/dL    eGFR 78 ml/min/1 73sq m   CBC and differential    Collection Time: 11/08/19  5:02 AM   Result Value Ref Range    WBC 7 13 4 31 - 10 16 Thousand/uL    RBC 4 59 3 88 - 5 62 Million/uL    Hemoglobin 10 8 (L) 12 0 - 17 0 g/dL    Hematocrit 35 0 (L) 36 5 - 49 3 %    MCV 76 (L) 82 - 98 fL    MCH 23 5 (L) 26 8 - 34 3 pg    MCHC 30 9 (L) 31 4 - 37 4 g/dL    RDW 15 9 (H) 11 6 - 15 1 %    MPV 10 6 8 9 - 12 7 fL    Platelets 294 115 - 634 Thousands/uL    nRBC 0 /100 WBCs    Neutrophils Relative 53 43 - 75 %    Immat GRANS % 0 0 - 2 %    Lymphocytes Relative 35 14 - 44 %    Monocytes Relative 10 4 - 12 %    Eosinophils Relative 2 0 - 6 %    Basophils Relative 0 0 - 1 %    Neutrophils Absolute 3 70 1 85 - 7 62 Thousands/µL    Immature Grans Absolute 0 03 0 00 - 0 20 Thousand/uL    Lymphocytes Absolute 2 50 0 60 - 4 47 Thousands/µL    Monocytes Absolute 0 70 0 17 - 1 22 Thousand/µL    Eosinophils Absolute 0 17 0 00 - 0 61 Thousand/µL    Basophils Absolute 0 03 0 00 - 0 10 Thousands/µL       Imaging: I have personally reviewed pertinent imaging in PACS, including CTA,  and I have personally reviewed PACS reports  EKG, Pathology, and Other Studies: I have personally reviewed pertinent reports         VTE Prophylaxis: Sequential compression device (Venodyne)  and Enoxaparin (Lovenox)

## 2019-11-08 NOTE — PHYSICAL THERAPY NOTE
Physical Therapy Evaluation    Patient's Name: Anh Kumari    Admitting Diagnosis  TIA (transient ischemic attack) [G45 9]  Weakness [R53 1]    Problem List  Patient Active Problem List   Diagnosis    Constipation    Iron deficiency    Other constipation    Anemia    Arteriosclerotic cardiovascular disease    Backache    Essential hypertension    Cervical spinal stenosis    Depression    Esophageal reflux    Hyperlipidemia    Insomnia    Spinal stenosis of lumbar region with neurogenic claudication    Vitamin B12 deficiency    Idiopathic peripheral neuropathy    Iron deficiency anemia    History of meningioma    Confusion    Altered mental status    Fall    Cognitive decline    Rib pain on right side    Contusion of rib on right side    Orthostatic hypotension    Convulsions ([address] )    Lumbar spondylosis    Lumbar radiculopathy    Hypertensive urgency    TIA (transient ischemic attack)    Hypercalcemia    History of resection of meningioma       Past Medical History  Past Medical History:   Diagnosis Date    Anxiety     Arthritis     Coronary artery disease involving native coronary artery of native heart without angina pectoris     Depression     Hypercholesterolemia     Meningioma ([address] ) 11/1999    brain tumor    Narcolepsy     daytime drowsiness and dozing off occasionally    Palpitations     Prostate cancer [facility])        Past Surgical History  Past Surgical History:   Procedure Laterality Date    BACK SURGERY      BRAIN SURGERY  11/08/1999    CORONARY ARTERY BYPASS GRAFT      x5        11/08/19 0956   Note Type   Note type Eval only   Pain Assessment   Pain Assessment No/denies pain   Pain Score No Pain   Home Living   Type of Home Apartment   Home Layout One level;Performs ADLs on one level; Able to live on main level with bedroom/bathroom  (no stairs)   Bathroom Accessibility Accessible   Home Equipment Walker;Cane   Additional Comments Patient lives in a 1st floor apt w/wife; first floor setup   Prior Function   Level of [place] Independent with ADLs and functional mobility   Lives With Spouse   Receives Help From Family   ADL Assistance Independent   IADLs Independent   Falls in the last 6 months 0  (but hx of multiple falls in the past)   Vocational Retired   Restrictions/Precautions   [place] [place] Bearing Precautions Per Order No   Other Precautions Cognitive; Chair Alarm; Fall Risk   General   Family/Caregiver Present No   Cognition   Overall Cognitive Status Impaired   Arousal/Participation Responsive   Orientation Level Oriented to person;Oriented to place;Oriented to time   Memory Unable to assess   Following Commands Follows one step commands with increased time or repetition   RLE Assessment   RLE Assessment WFL  (grossly 4+/5)   LLE Assessment   LLE Assessment WFL  (grossly 4+/5)   Bed Mobility   Supine to Sit 7  Independent   Transfers   Sit to Stand 5  Supervision   Additional items Assist x 1; Impulsive   Stand to Sit 5  Supervision   Additional items Assist x 1   Ambulation/Elevation   Gait pattern Foward flexed;Decreased foot clearance   Gait Assistance 4  Minimal assist   Additional items Assist x 1   Assistive Device Rolling walker   Distance 75'x2 w/RW; seated rest break   Balance   Static Sitting Fair +   Dynamic Sitting Fair +   Static Standing Fair -   Dynamic Standing Fair -   Ambulatory Poor +   Endurance Deficit   Endurance Deficit Yes   Endurance Deficit Description fatigues quickly   Activity Tolerance   Activity Tolerance Patient limited by fatigue   Nurse Made Aware Yes   Assessment   Prognosis Good   Problem List Decreased endurance; Impaired balance;Decreased mobility; Impaired judgement;Decreased safety awareness   Assessment Pt is a 81 y/o male who is seen for high complexity PT evaluation presenting with suspected TIA w/symptoms of right upper extremity weakness, dysarthria and bilateral lower extremity weakness  MRI, ECG currently pending   CTA head & neck (-) for acute abnormality  Pt co-morbidities/PMHx consist of HTN, OH, peripheral neuropathy, anxiety, depression, and CAD  Pt currently lives in a one floor apartment with his wife  PTA, patient reports that he was independent with all ADLs, IADLs, and ambulation  Pt reports that he occasionally used the cane or RW for assistance  Within the last 6 months, patient has not had any falls  However, he does have a history of falls prior to 6 months  Upon evaluation, patient was received upright in bed  He was independent with bed mobility tasks and required supervision for transfers  However, note that patient was impulsive and did not demonstrate good safety awareness or judgment while performing transfers  Pt did require min A x 1 for ambulation as well  Upon return, patient was left with all needs left within reach  Pt demonstrates cognitive deficits that affect his ability to have good safety awareness and judgment  Additionally, the patient demonstrates balance deficits  Note unstable clinical picture due to co-morbidities/PMHx, presentation as a fall risk, and dependence on AD as compared to baseline  Recommend that pt receive skilled PT until medically cleared for D/C  Once medically cleared, recommend the pt be D/C home w/OPPT  Goals   Patient Goals to rest   STG Expiration Date 11/22/19   Short Term Goal #1 In 10-14 days, pt will be able to ambulate 250-300' w/RW <--> LRAD at mod I to improve his functional independence; pt will be able to perform all transfers independently to improve his ability to negotiate various surfaces; pt will be able to improve standing balance to fair+ to improve his ability to safely perform activities while in standing   PT Treatment Day 0   Plan   Treatment/Interventions Functional transfer training;LE strengthening/ROM; Therapeutic exercise; Endurance training;Bed mobility;Gait training;Spoke to nursing;OT   PT Frequency   (3-5x/wk)   Recommendation   Recommendation Outpatient PT   Equipment Recommended Walker;Cane   PT - OK to Discharge Yes  (once medically cleared)   Modified Naveen Scale   Modified [place] Scale 4   Barthel Index   Feeding 10   Bathing 5   Grooming Score 5   Dressing Score 10   Bladder Score 10   Bowels Score 10   Toilet Use Score 5   Transfers (Bed/Chair) Score 10   Mobility (Level Surface) Score 10   Stairs Score 0  (did not perform)   Barthel Index Score 75       Carol Herrera, SPT

## 2019-11-08 NOTE — RESTORATIVE TECHNICIAN NOTE
Restorative Specialist Mobility Note       Activity: Ambulate in munoz, Ambulate in room, Bathroom privileges, Dangle, Stand at bedside(Educated/encouraged pt to ambulate with assistance 3-4 x's/day  Bed alarm on   Pt callbell, phone/tray within reach )     Assistive Device: Other (Comment)(HHA x1)       Nabor ESTRADA, Restorative Technician, [employer]

## 2019-11-08 NOTE — PLAN OF CARE
Problem: Potential for Falls  Goal: Patient will remain free of falls  Description  INTERVENTIONS:  - Assess patient frequently for physical needs  -  Identify cognitive and physical deficits and behaviors that affect risk of falls  -  [place] fall precautions as indicated by assessment   - Educate patient/family on patient safety including physical limitations  - Instruct patient to call for assistance with activity based on assessment  - Modify environment to reduce risk of injury  - Consider OT/PT consult to assist with strengthening/mobility  Outcome: Progressing     Problem: Neurological Deficit  Goal: Neurological status is stable or improving  Description  Interventions:  - Monitor and assess patient's level of consciousness, motor function, sensory function, and level of assistance needed for ADLs  - Monitor and report changes from baseline  Collaborate with interdisciplinary team to initiate plan and implement interventions as ordered  - Provide and maintain a safe environment  - Consider fall precautions  - Consider aspiration precautions  Outcome: Progressing     Problem: Activity Intolerance/Impaired Mobility  Goal: Mobility/activity is maintained at optimum level for patient  Description  Interventions:  - Assess and monitor patient  barriers to mobility and need for assistive/adaptive devices  - Assess patient's emotional response to limitations  - Collaborate with interdisciplinary team and initiate plans and interventions as ordered  - Encourage independent activity per ability   - Perform active/passive rom as tolerated/ordered  - Plan activities to conserve energy   - Turn patient as appropriate   Outcome: Progressing     Problem: Communication Impairment  Goal: Ability to express needs and understand communication  Description  Assess patient's communication skills and ability to understand information    Patient will demonstrate use of effective communication techniques, alternative methods of communication and understanding even if not able to speak  - Encourage communication and provide alternate methods of communication as needed  - Collaborate with case management/ for discharge needs  - Include patient/family/caregiver in decisions related to communication  Outcome: Progressing     Problem: Potential for Aspiration  Goal: Non-ventilated patient's risk of aspiration is minimized  Description  Assess and monitor vital signs, respiratory status, and labs (WBC)  Monitor for signs of aspiration (tachypnea, cough, rales, wheezing, cyanosis, fever)  - Assess and monitor patient's ability to swallow  - Place patient up in chair to eat if possible  - HOB up at 90 degrees to eat if unable to get patient up into chair   - Supervise patient during oral intake  - Instruct patient/ family to take small bites  - Instruct patient/ family to take small single sips when taking liquids  - Follow patient-specific strategies generated by speech pathologist   Outcome: Progressing     Problem: Nutrition  Goal: Nutrition/Hydration status is improving  Description  Monitor and assess patient's nutrition/hydration status for malnutrition (ex- brittle hair, bruises, dry skin, pale skin and conjunctiva, muscle wasting, smooth red tongue, and disorientation)  Collaborate with interdisciplinary team and initiate plan and interventions as ordered  Monitor patient's weight and dietary intake as ordered or per policy  Utilize nutrition screening tool and intervene per policy  Determine patient's food preferences and provide high-protein, high-caloric foods as appropriate  - Assist patient with eating   - Allow adequate time for meals   - Encourage patient to take dietary supplement as ordered  - Collaborate with clinical nutritionist   - Include patient/family/caregiver in decisions related to nutrition    Outcome: Progressing     Problem: Prexisting or High Potential for Compromised Skin Integrity  Goal: Skin integrity is maintained or improved  Description  INTERVENTIONS:  - Identify patients at risk for skin breakdown  - Assess and monitor skin integrity  - Assess and monitor nutrition and hydration status  - Monitor labs   - Assess for incontinence   - Turn and reposition patient  - Assist with mobility/ambulation  - Relieve pressure over bony prominences  - Avoid friction and shearing  - Provide appropriate hygiene as needed including keeping skin clean and dry  - Evaluate need for skin moisturizer/barrier cream  - Collaborate with interdisciplinary team   - Patient/family teaching  - Consider wound care consult   Outcome: Progressing     Problem: INFECTION - ADULT  Goal: Absence or prevention of progression during hospitalization  Description  INTERVENTIONS:  - Assess and monitor for signs and symptoms of infection  - Monitor lab/diagnostic results  - Monitor all insertion sites, i e  indwelling lines, tubes, and drains  - Administer medications as ordered  - Instruct and encourage patient and family to use good hand hygiene technique  - Identify and instruct in appropriate isolation precautions for identified infection/condition   Outcome: Progressing     Problem: SAFETY ADULT  Goal: Patient will remain free of falls  Description  INTERVENTIONS:  - Assess patient frequently for physical needs  -  Identify cognitive and physical deficits and behaviors that affect risk of falls    -  [place] fall precautions as indicated by assessment   - Educate patient/family on patient safety including physical limitations  - Instruct patient to call for assistance with activity based on assessment  - Modify environment to reduce risk of injury  - Consider OT/PT consult to assist with strengthening/mobility  Outcome: Progressing  Goal: Maintain or return mobility status to optimal level  Description  INTERVENTIONS:  - Assess patient's baseline mobility status (ambulation, transfers, stairs, etc )    - Identify cognitive and physical deficits and behaviors that affect mobility  - Identify mobility aids required to assist with transfers and/or ambulation (gait belt, sit-to-stand, lift, walker, cane, etc )  - [place] fall precautions as indicated by assessment  - Record patient progress and toleration of activity level on Mobility SBAR; progress patient to next Phase/Stage  - Instruct patient to call for assistance with activity based on assessment  - Consider rehabilitation consult to assist with strengthening/weightbearing, etc   Outcome: Progressing     Problem: DISCHARGE PLANNING  Goal: Discharge to home or other facility with appropriate resources  Description  INTERVENTIONS:  - Identify barriers to discharge w/patient and caregiver  - Arrange for needed discharge resources and transportation as appropriate  - Identify discharge learning needs (meds, wound care, etc )  - Arrange for interpretive services to assist at discharge as needed  - Refer to Case Management Department for coordinating discharge planning if the patient needs post-hospital services based on physician/advanced practitioner order or complex needs related to functional status, cognitive ability, or social support system  Outcome: Progressing     Problem: Knowledge Deficit  Goal: Patient/family/caregiver demonstrates understanding of disease process, treatment plan, medications, and discharge instructions  Description  Complete learning assessment and assess knowledge base    Interventions:  - Provide teaching at level of understanding  - Provide teaching via preferred learning methods  Outcome: Progressing     Problem: NEUROSENSORY - ADULT  Goal: Achieves stable or improved neurological status  Description  INTERVENTIONS  - Monitor and report changes in neurological status  - Monitor vital signs such as temperature, blood pressure, glucose, and any other labs ordered   - Monitor for seizure activity and implement precautions if appropriate       Outcome: Progressing  Goal: Achieves maximal functionality and self care  Description  INTERVENTIONS  - Monitor swallowing and airway patency with patient fatigue and changes in neurological status  - Encourage and assist patient to increase activity and self care  - Encourage visually impaired, hearing impaired and aphasic patients to use assistive/communication devices  Outcome: Progressing     Problem: Nutrition/Hydration-ADULT  Goal: Nutrient/Hydration intake appropriate for improving, restoring or maintaining nutritional needs  Description  Monitor and assess patient's nutrition/hydration status for malnutrition  Collaborate with interdisciplinary team and initiate plan and interventions as ordered  Monitor patient's weight and dietary intake as ordered or per policy  Utilize nutrition screening tool and intervene as necessary  Determine patient's food preferences and provide high-protein, high-caloric foods as appropriate  INTERVENTIONS:  - Monitor oral intake, urinary output, labs, and treatment plans  - Assess nutrition and hydration status and recommend course of action  - Evaluate amount of meals eaten  - Assist patient with eating if necessary   - Allow adequate time for meals  - Recommend/ encourage appropriate diets, oral nutritional supplements, and vitamin/mineral supplements  - Assess need for intravenous fluids  - Provide specific nutrition/hydration education as appropriate  - Include patient/family/caregiver in decisions related to nutrition   Outcome: Progressing     Problem: Potential for Aspiration  Goal: Ventilated patient's risk of aspiration is minimized  Description  Assess and monitor vital signs, respiratory status, airway cuff pressure, and labs (WBC)  Monitor for signs of aspiration (tachypnea, cough, rales, wheezing, cyanosis, fever)  - Elevate head of bed 30 degrees if patient has tube feeding   - Monitor tube feeding    Outcome: Completed

## 2019-11-08 NOTE — PLAN OF CARE
Problem: OCCUPATIONAL THERAPY ADULT  Goal: Performs self-care activities at highest level of function for planned discharge setting  See evaluation for individualized goals  Description  Treatment Interventions: ADL retraining, Functional transfer training, Endurance training, Patient/family training, Cognitive reorientation, Equipment evaluation/education, Compensatory technique education, Continued evaluation, Activityengagement          See flowsheet documentation for full assessment, interventions and recommendations  Note:   Limitation: Decreased ADL status, Decreased Safe judgement during ADL, Decreased cognition, Decreased endurance, Decreased high-level ADLs, Decreased self-care trans  Prognosis: Fair  Assessment: Pt is an 80year old male seen for initial OT evaluation s/p admission to [facility] 11/7/19 with transient episode of R UE weakness and dysarthria  CTA negative for acute hemorrhage, but demonstrated frontal craniotomy with bifrontal encephalomalacia and narrowing R cervical ICA  MRI pending  Additional active problems include: h/o resection of meningioma, hypercalcemia, hypertensive urgency, lumbar radiculopathy, orthostatic hypotension, and arteriosclerotic cardiovascular disease  Pt with active OT orders and cleared by RN for OOB mobility  Pt is a questionable/poor historian secondary to cognitive deficits  Information obtained from chart and confirmed with patient  Pt resides with his wife in a 1st floor apartment with 0STE  Pt reports that he was I with ADLs, required assistance with IADLs, and I with functional mobility with use of rw in the apartment and spc in the community  Pt is not a  and wife assists with meds    Pt is currently demonstrating the following occupational deficits: eating with set-up, grooming with set-up, UB bathing with supervision, LB bathing with Reshma, UB dressing with set-up, LB dressing with modA, toileting with Reshma, functional transfers with SBA, and functional mobility with supervision  Factors currently limiting pt's independence in these areas include: cognitive deficits, balance, functional mobility, endurance, and unsupportive home environment  Overall, pt scored 65/100 on the Barthel Index  From an OT standpoint, recommend outpatient OT and continued 24/7 supervision/assist   Pt is to continue to benefit from skilled occupational therapy services while in the hospital to maximize functioning and independence with daily activities  See below for OT goals to be addressed 3-5x/wk       OT Discharge Recommendation: Outpatient OT  OT - OK to Discharge: Yes(when medically stable)

## 2019-11-08 NOTE — SOCIAL WORK
Initial interview:     CM met with the patient and his wife Mykel Mixon, at bedside, to review the CM role and discuss possible dc needs  Pt lives with his wife in a 1st floor Apt in [place], [place]  No SELENA  Pt uses a SPC outside, a RW inside and requires assist with bathing/dressing  Pt has a rollator, bsc and a shower seat  Hx of [facility]  No hx of IP rehab  Pt denied drug, etoh or mental illness history  Wife and Dtr are POA  No LW  Prescriptions are filled at [facility] for mail order  Main contact:   Wife Alisson Hurd / Eliana Epstein [phone], [phone]  Eldest Dtr / Alt POA Viet Hever [phone]    **Pt has 4 Dtrs, 1 Son, 1 Step-Son and a Sister [address] med progression  Wife is avail 24/7 but limited d/t scoliosis and limited movement / frailty  CM reviewed d/c planning process including the following: identifying help at home, patient preference for d/c planning needs, Discharge Lounge, [facility] Meds to Bed program, availability of treatment team to discuss questions or concerns patient and/or family may have regarding understanding medications and recognizing signs and symptoms once discharged  CM also encouraged patient to follow up with all recommended appointments after discharge   Patient advised of importance for patient and family to participate in managing patients medical well being   '

## 2019-11-08 NOTE — PROGRESS NOTES
Reviewed chart, patient on stroke pathway with rule out TIA  Met with patient regarding stroke education and support  Explained nurse navigator role  Provided him a stroke education binder and my card  Patient verbalizes competency on stroke symptoms, types, and some risk factors  Educated all personal risk factors, medications, and resources  He verbalizes understanding of information  Plan at this time is for patient to be discharged home with wife Dayanna Guzman  Answered all his questions  No further questions or concerns at this time

## 2019-11-08 NOTE — PROGRESS NOTES
Called cw2 about tele box  Per Marry Mclean RN no tele box right now available, per RN she will put us on the list  Incoming TYRESE Sarkar made aware

## 2019-11-08 NOTE — H&P
H&P- Tia Veronica 1933, 80 y o  male MRN: [id number]  Unit/Bed#: [facility] 733-01 Encounter: [id number]  Primary Care Provider: Sharon Veloz MD   Date and time admitted to hospital: 11/7/2019 11:32 AM    * TIA (transient ischemic attack)  Assessment & Plan  · Patient presents with transient episode of right arm weakness and dysarthria lasting approximately 10 minutes with spontaneous recovery  · CTA head/neck: No evidence of acute intracranial hemorrhage  Frontal craniotomy with bifrontal encephalomalacia  75% atherosclerotic plaque narrowing proximal right cervical ICA just beyond the bifurcation  · Patient seen by neurology in the ED  Stroke pathway initiated:  · MRI brain: pending  · ECHO: pending  · Lipid panel: pending  · A1c: pending  · Neuro checks  · PT/OT/ST evaluations  · Started on DAP: ASA, Plavix  · Continue statin  Patient does not tolerate atorvastatin secondary to myalgias  Will continue current statin therapy  · Permissive HTN  PRN hydralazine for SBP >180  · Telemetry x48 hours for acute stroke protocol  History of resection of meningioma  Assessment & Plan  · Patient with a known history of frontal meningioma resection in 1999  · CTA head and neck demonstrate bifrontal encephalomalacia  Hypercalcemia  Assessment & Plan  · Calcium on admission: 10 2  · Check ionized calcium level: pending  · Gentle IVFs x1 liter only  Hypertensive urgency  Assessment & Plan  · BPs have been significantly elevated since admission  Patient did not take his metoprolol this AM   · Hold Midodrine  · Continue metoprolol  · PRN Hydralazine for SBP >180    Lumbar radiculopathy  Assessment & Plan  · Follows with Pain Management as an out-patient and has undergone several injections in the past   · PMED reviewed  · Continue Gabapentin 900 mg HS  Dose confirmed with patient  Orthostatic hypotension  Assessment & Plan  · Hold midodrine given hypertensive urgency      Arteriosclerotic cardiovascular disease  Assessment & Plan  · S/p CABG x5 in 2007  · Continue ASA, statin  · Follows with Dr Marcelo Hager as an out-patient  VTE Prophylaxis: Enoxaparin (Lovenox)  / sequential compression device   Code Status: Full code (discussed with patient)  POLST: POLST form is not discussed and not completed at this time  Discussion with family: None    Anticipated Length of Stay:  Patient will be admitted on an Observation basis with an anticipated length of stay of  Less than 2 midnights  Justification for Hospital Stay: stroke work up    Total Time for Visit, including Counseling / Coordination of Care: 45 minutes  Greater than 50% of this total time spent on direct patient counseling and coordination of care  Chief Complaint:   Right arm weakness, difficulty speaking    History of Present Illness:    Misha Loya is a 80 y o  male who presents with a transient episode of right upper extremity weakness, dysarthria and bilateral lower extremity weakness  Patient states he was walking this morning caring a bowl of oatmeal when his right arm became weak and he dropped the oatmeal on the floor  He then noticed some difficulty with expressive aphasia  His legs became very weak and his wife helped him to a chair  His symptoms lasted only a few minutes and spontaneously resolved without recurrence  Patient feels he has been back to baseline since this episode  He denies this ever happening in the past   He did not take any of his medications this morning  Currently, he denies any dizziness, lightheadedness, visual changes, weakness in his extremities, paresthesias or speech difficulty  He is complaining of a headache currently but states it is because he has eaten all day and is very hungry  Review of Systems:    Review of Systems   Constitutional: Negative for appetite change, chills, diaphoresis, fatigue, fever and unexpected weight change     HENT: Negative for congestion and sore throat  Eyes: Negative for visual disturbance  Respiratory: Negative for cough, chest tightness, shortness of breath and wheezing  Cardiovascular: Negative for chest pain, palpitations and leg swelling  Gastrointestinal: Negative for abdominal distention, abdominal pain, blood in stool, constipation, diarrhea, nausea and vomiting  Genitourinary: Negative for difficulty urinating, dysuria, frequency, hematuria and urgency  Musculoskeletal: Negative for back pain, gait problem and neck pain  Skin: Negative for rash  Neurological: Positive for speech difficulty (resolved), weakness (RUE, bilateral lower extremities (resolved)) and numbness (RUE (resolved))  Negative for dizziness, tremors, light-headedness and headaches  Psychiatric/Behavioral: Negative for confusion  The patient is not nervous/anxious  All other systems reviewed and are negative  Past Medical and Surgical History:     Past Medical History:   Diagnosis Date    Anxiety     Arthritis     Coronary artery disease involving native coronary artery of native heart without angina pectoris     Depression     Hypercholesterolemia     Meningioma ([facility] [address] ) 11/1999    brain tumor    Narcolepsy     daytime drowsiness and dozing off occasionally    Palpitations     Prostate cancer [facility])        Past Surgical History:   Procedure Laterality Date    BACK SURGERY      BRAIN SURGERY  11/08/1999    CORONARY ARTERY BYPASS GRAFT      x5       Meds/Allergies:    Prior to Admission medications    Medication Sig Start Date End Date Taking?  Authorizing Provider   aspirin 81 MG tablet Take 1 tablet by mouth daily    Historical Provider, MD   docusate sodium (COLACE) 100 mg capsule Take 1 capsule (100 mg total) by mouth 2 (two) times a day 5/30/18   Digna Uribe MD   gabapentin (NEURONTIN) 300 mg capsule TAKE 3 CAPSULES BY MOUTH  EVERY NIGHT AT BEDTIME 7/7/32   Rich Bills MD   metoprolol succinate (TOPROL-XL) 25 mg 24 hr tablet TAKE 1 TABLET BY MOUTH  DAILY 10/4/19   Brando Bailey MD   midodrine (PROAMATINE) 5 mg tablet TAKE 1 TABLET BY MOUTH 3  TIMES A DAY 10/4/19   Brando Bailey MD   omeprazole (PriLOSEC) 40 MG capsule TAKE 1 CAPSULE BY MOUTH  DAILY 9/5/23   Rich Bills MD   psyllium (METAMUCIL) 58 6 % packet Take 1 packet by mouth daily    Historical Provider, MD   sertraline (ZOLOFT) 50 mg tablet TAKE 1 TABLET BY MOUTH  EVERY DAY 4/87/24   Rich Bills MD   simvastatin (ZOCOR) 80 mg tablet TAKE 1 TABLET BY MOUTH  DAILY 6/11/19   Brando Bailey MD     Reviewed home medications using EPIC and discussion with patient  Allergies: Allergies   Allergen Reactions    Atorvastatin Myalgia, Dizziness and Headache    Niacin Other (See Comments)     unknown       Social History:     Marital Status: /Civil Union   Occupation: Retired Research Supervisor  Patient Pre-hospital Living Situation:  Lives with his wife  Patient Pre-hospital Level of Mobility:  Uses a walker or cane for ambulatory assistance  Patient does not drive    Patient Pre-hospital Diet Restrictions:  None  Substance Use History:   Social History     Substance and Sexual Activity   Alcohol Use No    Frequency: Never    Binge frequency: Never    Comment: (history)     Social History     Tobacco Use   Smoking Status Former Smoker    Types: Cigars   Smokeless Tobacco Never Used   Tobacco Comment    former cig smoker- quit in 1992     Social History     Substance and Sexual Activity   Drug Use No       Family History:    Family History   Problem Relation Age of Onset    Hypertension Mother         benign essential    Cancer Mother     Osteoporosis Mother     Suicidality Father     Diabetes Family     Heart disease Family     Neuropathy Family         peripheral    Prostate cancer Family     Thyroid disease Family        Physical Exam:     Vitals:   Blood Pressure: 164/80 (11/07/19 1812)  Pulse: 94 (11/07/19 1812)  Temperature: 98 4 °F (36 9 °C) (11/07/19 1807)  Temp Source: Oral (11/07/19 1300)  Respirations: 20 (11/07/19 1807)  Height: 5' 9" (175 3 cm) (11/07/19 1807)  Weight - Scale: 74 3 kg (163 lb 12 8 oz) (11/07/19 1807)  SpO2: 96 % (11/07/19 1812)    Physical Exam   HENT:   Head: Normocephalic and atraumatic  Mouth/Throat: Oropharynx is clear and moist and mucous membranes are normal    Eyes: No scleral icterus  Cardiovascular: Normal rate and regular rhythm  No murmur heard  Pulmonary/Chest: Breath sounds normal  He has no wheezes  He has no rales  He exhibits no tenderness  Abdominal: Soft  Bowel sounds are normal  He exhibits no distension  There is no tenderness  Musculoskeletal: Normal range of motion  He exhibits no edema  Skin: Skin is warm and dry  No rash noted  Psychiatric: He has a normal mood and affect  Vitals reviewed  Additional Data:     Lab Results: I have personally reviewed pertinent reports  Results from last 7 days   Lab Units 11/07/19  1322   WBC Thousand/uL 5 65   HEMOGLOBIN g/dL 10 8*   HEMATOCRIT % 34 3*   PLATELETS Thousands/uL 161     Results from last 7 days   Lab Units 11/07/19  1322   SODIUM mmol/L 139   POTASSIUM mmol/L 4 2   CHLORIDE mmol/L 108   CO2 mmol/L 28   BUN mg/dL 9   CREATININE mg/dL 0 98   ANION GAP mmol/L 3*   CALCIUM mg/dL 10 4*   GLUCOSE RANDOM mg/dL 119     Results from last 7 days   Lab Units 11/07/19  1322   INR  1 07                   Imaging: I have personally reviewed pertinent reports  CTA head and neck with and without contrast   Final Result by Joe Renner MD (11/07 9465)         1  No evidence of acute intracranial hemorrhage  2  Frontal craniotomy with bifrontal encephalomalacia  3  75% atherosclerotic plaque narrowing proximal right cervical ICA just beyond the bifurcation  Workstation performed: [id number]         X-ray chest 1 view portable   Final Result by Onesimo Hope MD (11/07 2214)      No acute cardiopulmonary disease  Workstation performed: [id number]         MRI Inpatient Order    (Results Pending)     EKG, Pathology, and Other Studies Reviewed on Admission:   · EKG:  Normal sinus rhythm with right bundle branch block    Allscripts / Epic Records Reviewed: Yes     ** Please Note: This note has been constructed using a voice recognition system   **

## 2019-11-08 NOTE — PLAN OF CARE
Problem: PHYSICAL THERAPY ADULT  Goal: Performs mobility at highest level of function for planned discharge setting  See evaluation for individualized goals  Description  Treatment/Interventions: Functional transfer training, LE strengthening/ROM, Therapeutic exercise, Endurance training, Bed mobility, Gait training, Spoke to nursing, OT  Equipment Recommended: Clari Soto       See flowsheet documentation for full assessment, interventions and recommendations  Note:   Prognosis: Good  Problem List: Decreased endurance, Impaired balance, Decreased mobility, Impaired judgement, Decreased safety awareness  Assessment: Pt is a 81 y/o male who is seen for high complexity PT evaluation presenting with suspected TIA w/symptoms of right upper extremity weakness, dysarthria and bilateral lower extremity weakness  MRI, ECG currently pending  CTA head & neck (-) for acute abnormality  Pt co-morbidities/PMHx consist of HTN, OH, peripheral neuropathy, anxiety, depression, and CAD  Pt currently lives in a one floor apartment with his wife  PTA, patient reports that he was independent with all ADLs, IADLs, and ambulation  Pt reports that he occasionally used the cane or RW for assistance  Within the last 6 months, patient has not had any falls  However, he does have a history of falls prior to 6 months  Upon evaluation, patient was received upright in bed  He was independent with bed mobility tasks and required supervision for transfers  However, note that patient was impulsive and did not demonstrate good safety awareness or judgment while performing transfers  Pt did require min A x 1 for ambulation as well  Upon return, patient was left with all needs left within reach  Pt demonstrates cognitive deficits that affect his ability to have good safety awareness and judgment  Additionally, the patient demonstrates balance deficits   Note unstable clinical picture due to co-morbidities/PMHx, presentation as a fall risk, and dependence on AD as compared to baseline  Recommend that pt receive skilled PT until medically cleared for D/C  Once medically cleared, recommend the pt be D/C home w/OPPT  Recommendation: Outpatient PT     PT - OK to Discharge: Yes(once medically cleared)    See flowsheet documentation for full assessment

## 2019-11-09 PROBLEM — I63.9 ACUTE CVA (CEREBROVASCULAR ACCIDENT) (HCC): Status: ACTIVE | Noted: 2019-11-07

## 2019-11-09 PROCEDURE — 99232 SBSQ HOSP IP/OBS MODERATE 35: CPT | Performed by: PHYSICIAN ASSISTANT

## 2019-11-09 PROCEDURE — 99232 SBSQ HOSP IP/OBS MODERATE 35: CPT | Performed by: PSYCHIATRY & NEUROLOGY

## 2019-11-09 PROCEDURE — NC001 PR NO CHARGE: Performed by: INTERNAL MEDICINE

## 2019-11-09 PROCEDURE — 93306 TTE W/DOPPLER COMPLETE: CPT | Performed by: INTERNAL MEDICINE

## 2019-11-09 RX ADMIN — SERTRALINE HYDROCHLORIDE 50 MG: 50 TABLET ORAL at 22:01

## 2019-11-09 RX ADMIN — DOCUSATE SODIUM 100 MG: 100 CAPSULE, LIQUID FILLED ORAL at 17:26

## 2019-11-09 RX ADMIN — METOPROLOL SUCCINATE 25 MG: 25 TABLET, EXTENDED RELEASE ORAL at 08:25

## 2019-11-09 RX ADMIN — DOCUSATE SODIUM 100 MG: 100 CAPSULE, LIQUID FILLED ORAL at 08:25

## 2019-11-09 RX ADMIN — PRAVASTATIN SODIUM 80 MG: 80 TABLET ORAL at 08:25

## 2019-11-09 RX ADMIN — PANTOPRAZOLE SODIUM 40 MG: 40 TABLET, DELAYED RELEASE ORAL at 05:47

## 2019-11-09 RX ADMIN — CLOPIDOGREL BISULFATE 75 MG: 75 TABLET ORAL at 08:25

## 2019-11-09 RX ADMIN — GABAPENTIN 900 MG: 300 CAPSULE ORAL at 22:01

## 2019-11-09 RX ADMIN — ENOXAPARIN SODIUM 40 MG: 40 INJECTION SUBCUTANEOUS at 08:25

## 2019-11-09 RX ADMIN — PSYLLIUM HUSK 1 PACKET: 3.4 POWDER ORAL at 08:25

## 2019-11-09 RX ADMIN — ASPIRIN 81 MG: 81 TABLET, COATED ORAL at 08:25

## 2019-11-09 NOTE — PLAN OF CARE
Problem: Potential for Falls  Goal: Patient will remain free of falls  Description  INTERVENTIONS:  - Assess patient frequently for physical needs  -  Identify cognitive and physical deficits and behaviors that affect risk of falls  -  [place] fall precautions as indicated by assessment   - Educate patient/family on patient safety including physical limitations  - Instruct patient to call for assistance with activity based on assessment  - Modify environment to reduce risk of injury  - Consider OT/PT consult to assist with strengthening/mobility  Outcome: Progressing     Problem: Neurological Deficit  Goal: Neurological status is stable or improving  Description  Interventions:  - Monitor and assess patient's level of consciousness, motor function, sensory function, and level of assistance needed for ADLs  - Monitor and report changes from baseline  Collaborate with interdisciplinary team to initiate plan and implement interventions as ordered  - Provide and maintain a safe environment  - Consider fall precautions  - Consider aspiration precautions  Outcome: Progressing     Problem: Activity Intolerance/Impaired Mobility  Goal: Mobility/activity is maintained at optimum level for patient  Description  Interventions:  - Assess and monitor patient  barriers to mobility and need for assistive/adaptive devices  - Assess patient's emotional response to limitations  - Collaborate with interdisciplinary team and initiate plans and interventions as ordered  - Encourage independent activity per ability   - Perform active/passive rom as tolerated/ordered  - Plan activities to conserve energy   - Turn patient as appropriate   Outcome: Progressing     Problem: Communication Impairment  Goal: Ability to express needs and understand communication  Description  Assess patient's communication skills and ability to understand information    Patient will demonstrate use of effective communication techniques, alternative methods of communication and understanding even if not able to speak  - Encourage communication and provide alternate methods of communication as needed  - Collaborate with case management/ for discharge needs  - Include patient/family/caregiver in decisions related to communication  Outcome: Progressing     Problem: Potential for Aspiration  Goal: Non-ventilated patient's risk of aspiration is minimized  Description  Assess and monitor vital signs, respiratory status, and labs (WBC)  Monitor for signs of aspiration (tachypnea, cough, rales, wheezing, cyanosis, fever)  - Assess and monitor patient's ability to swallow  - Place patient up in chair to eat if possible  - HOB up at 90 degrees to eat if unable to get patient up into chair   - Supervise patient during oral intake  - Instruct patient/ family to take small bites  - Instruct patient/ family to take small single sips when taking liquids  - Follow patient-specific strategies generated by speech pathologist   Outcome: Progressing     Problem: Nutrition  Goal: Nutrition/Hydration status is improving  Description  Monitor and assess patient's nutrition/hydration status for malnutrition (ex- brittle hair, bruises, dry skin, pale skin and conjunctiva, muscle wasting, smooth red tongue, and disorientation)  Collaborate with interdisciplinary team and initiate plan and interventions as ordered  Monitor patient's weight and dietary intake as ordered or per policy  Utilize nutrition screening tool and intervene per policy  Determine patient's food preferences and provide high-protein, high-caloric foods as appropriate  - Assist patient with eating   - Allow adequate time for meals   - Encourage patient to take dietary supplement as ordered  - Collaborate with clinical nutritionist   - Include patient/family/caregiver in decisions related to nutrition    Outcome: Progressing     Problem: Prexisting or High Potential for Compromised Skin Integrity  Goal: Skin integrity is maintained or improved  Description  INTERVENTIONS:  - Identify patients at risk for skin breakdown  - Assess and monitor skin integrity  - Assess and monitor nutrition and hydration status  - Monitor labs   - Assess for incontinence   - Turn and reposition patient  - Assist with mobility/ambulation  - Relieve pressure over bony prominences  - Avoid friction and shearing  - Provide appropriate hygiene as needed including keeping skin clean and dry  - Evaluate need for skin moisturizer/barrier cream  - Collaborate with interdisciplinary team   - Patient/family teaching  - Consider wound care consult   Outcome: Progressing     Problem: INFECTION - ADULT  Goal: Absence or prevention of progression during hospitalization  Description  INTERVENTIONS:  - Assess and monitor for signs and symptoms of infection  - Monitor lab/diagnostic results  - Monitor all insertion sites, i e  indwelling lines, tubes, and drains  - Administer medications as ordered  - Instruct and encourage patient and family to use good hand hygiene technique  - Identify and instruct in appropriate isolation precautions for identified infection/condition   Outcome: Progressing     Problem: SAFETY ADULT  Goal: Patient will remain free of falls  Description  INTERVENTIONS:  - Assess patient frequently for physical needs  -  Identify cognitive and physical deficits and behaviors that affect risk of falls    -  [place] fall precautions as indicated by assessment   - Educate patient/family on patient safety including physical limitations  - Instruct patient to call for assistance with activity based on assessment  - Modify environment to reduce risk of injury  - Consider OT/PT consult to assist with strengthening/mobility  Outcome: Progressing  Goal: Maintain or return mobility status to optimal level  Description  INTERVENTIONS:  - Assess patient's baseline mobility status (ambulation, transfers, stairs, etc )    - Identify cognitive and physical deficits and behaviors that affect mobility  - Identify mobility aids required to assist with transfers and/or ambulation (gait belt, sit-to-stand, lift, walker, cane, etc )  - [place] fall precautions as indicated by assessment  - Record patient progress and toleration of activity level on Mobility SBAR; progress patient to next Phase/Stage  - Instruct patient to call for assistance with activity based on assessment  - Consider rehabilitation consult to assist with strengthening/weightbearing, etc   Outcome: Progressing     Problem: DISCHARGE PLANNING  Goal: Discharge to home or other facility with appropriate resources  Description  INTERVENTIONS:  - Identify barriers to discharge w/patient and caregiver  - Arrange for needed discharge resources and transportation as appropriate  - Identify discharge learning needs (meds, wound care, etc )  - Arrange for interpretive services to assist at discharge as needed  - Refer to Case Management Department for coordinating discharge planning if the patient needs post-hospital services based on physician/advanced practitioner order or complex needs related to functional status, cognitive ability, or social support system  Outcome: Progressing     Problem: Knowledge Deficit  Goal: Patient/family/caregiver demonstrates understanding of disease process, treatment plan, medications, and discharge instructions  Description  Complete learning assessment and assess knowledge base    Interventions:  - Provide teaching at level of understanding  - Provide teaching via preferred learning methods  Outcome: Progressing     Problem: NEUROSENSORY - ADULT  Goal: Achieves stable or improved neurological status  Description  INTERVENTIONS  - Monitor and report changes in neurological status  - Monitor vital signs such as temperature, blood pressure, glucose, and any other labs ordered   - Monitor for seizure activity and implement precautions if appropriate       Outcome: Progressing  Goal: Achieves maximal functionality and self care  Description  INTERVENTIONS  - Monitor swallowing and airway patency with patient fatigue and changes in neurological status  - Encourage and assist patient to increase activity and self care  - Encourage visually impaired, hearing impaired and aphasic patients to use assistive/communication devices  Outcome: Progressing     Problem: Nutrition/Hydration-ADULT  Goal: Nutrient/Hydration intake appropriate for improving, restoring or maintaining nutritional needs  Description  Monitor and assess patient's nutrition/hydration status for malnutrition  Collaborate with interdisciplinary team and initiate plan and interventions as ordered  Monitor patient's weight and dietary intake as ordered or per policy  Utilize nutrition screening tool and intervene as necessary  Determine patient's food preferences and provide high-protein, high-caloric foods as appropriate       INTERVENTIONS:  - Monitor oral intake, urinary output, labs, and treatment plans  - Assess nutrition and hydration status and recommend course of action  - Evaluate amount of meals eaten  - Assist patient with eating if necessary   - Allow adequate time for meals  - Recommend/ encourage appropriate diets, oral nutritional supplements, and vitamin/mineral supplements  - Assess need for intravenous fluids  - Provide specific nutrition/hydration education as appropriate  - Include patient/family/caregiver in decisions related to nutrition   Outcome: Progressing

## 2019-11-09 NOTE — PROGRESS NOTES
Progress Note - Izaiah Roche 1933, 80 y o  male MRN: [id number]    Unit/Bed#: [facility] 733-01 Encounter: [id number]    Primary Care Provider: Aparna Santiago MD   Date and time admitted to hospital: 11/7/2019 11:32 AM      * Acute CVA (cerebrovascular accident), suspected embolic in nature  Assessment & Plan  · Patient presents with transient episode of right arm weakness and dysarthria lasting approximately 10 minutes with spontaneous recovery  · CTA head/neck: No evidence of acute intracranial hemorrhage  Frontal craniotomy with bifrontal encephalomalacia  75% atherosclerotic plaque narrowing proximal right cervical ICA just beyond the bifurcation  · Patient seen by neurology in the ED  Stroke pathway initiated:  · MRI brain: There are bilateral small infarcts with  superficial cortical infarct noted in the left frontal region in the region of precentral and postcentral gyrus  Deep periventricular infarct is noted in the right frontal and left frontal region  ,    · ECHO: pending  · Will need FLORENCE per neuro recommendations and loop recorded-- d/w cardiology will likely happen Monday-- non-urgent consult placed  · Lipid panel: WNL except elevated LDL  · A1c: 6 0%  · Neuro checks  · PT/OT/ST evaluations  · Started on DAPT-- continue x3 weeks, then continue plavix 75mg PO QD  · Continue statin  Patient does not tolerate atorvastatin secondary to myalgias  Will continue current statin therapy  · Permissive HTN  PRN hydralazine for SBP >180  · Telemetry x48 hours for acute stroke protocol  History of resection of meningioma  Assessment & Plan  · Patient with a known history of frontal meningioma resection in 1999  · CTA head and neck demonstrate bifrontal encephalomalacia       Hypercalcemia  Assessment & Plan  · Calcium on admission: 10 2-- received IVF and levels improved  · Check ionized calcium level-- pending    Hypertensive urgency  Assessment & Plan  · BPs have been significantly elevated on admission admission  Now improving  · Hold Midodrine  · Continue metoprolol  · PRN Hydralazine for SBP >180    Lumbar radiculopathy  Assessment & Plan  · Follows with Pain Management as an out-patient and has undergone several injections in the past   · PMED reviewed  · Continue Gabapentin 900 mg HS  Dose confirmed with patient  Orthostatic hypotension  Assessment & Plan  · Hold midodrine given hypertensive urgency  Arteriosclerotic cardiovascular disease  Assessment & Plan  · S/p CABG x5 in   · Continue ASA, statin  · Follows with Dr Bell Wadsworth as an out-patient  VTE Pharmacologic Prophylaxis:   Pharmacologic: Enoxaparin (Lovenox)  Mechanical VTE Prophylaxis in Place: Yes    Patient Centered Rounds: I have performed bedside rounds with nursing staff today  Discussions with Specialists or Other Care Team Provider: RG, RN, Neuro    Education and Discussions with Family / Patient: patient and wife at bedside     Time Spent for Care: 30 minutes  More than 50% of total time spent on counseling and coordination of care as described above  Current Length of Stay: 1 day(s)    Current Patient Status: Inpatient   Certification Statement: The patient will continue to require additional inpatient hospital stay due to ongoing CVA evaluation     Discharge Plan: d/c to home pending further work up for CVA-- will be hospitalized through the weekend    Code Status: Level 1 - Full Code      Subjective:   Patient feels well today  No longer experiencing weakness in his hand or changes in speech  Objective:     Vitals:   Temp (24hrs), Av 4 °F (36 9 °C), Min:98 °F (36 7 °C), Max:99 1 °F (37 3 °C)    Temp:  [98 °F (36 7 °C)-99 1 °F (37 3 °C)] 98 3 °F (36 8 °C)  HR:  [77-96] 83  Resp:  [16-22] 16  BP: (120-204)/(54-90) 121/54  SpO2:  [93 %-97 %] 97 %  Body mass index is 24 19 kg/m²  Input and Output Summary (last 24 hours):        Intake/Output Summary (Last 24 hours) at 2019 1225  Last data filed at 11/9/2019 0734  Gross per 24 hour   Intake 540 ml   Output    Net 540 ml       Physical Exam:     Physical Exam   Constitutional: He is oriented to person, place, and time  No distress  HENT:   Head: Normocephalic and atraumatic  Eyes: Pupils are equal, round, and reactive to light  EOM are normal    Neck: No JVD present  Cardiovascular: Normal rate and regular rhythm  Exam reveals no gallop and no friction rub  No murmur heard  Pulmonary/Chest: Effort normal and breath sounds normal  No stridor  No respiratory distress  He has no wheezes  Abdominal: Soft  Bowel sounds are normal  He exhibits no distension  There is no tenderness  There is no guarding  Musculoskeletal: Normal range of motion  He exhibits no edema  Neurological: He is alert and oriented to person, place, and time  He displays normal reflexes  No cranial nerve deficit  Coordination normal    Skin: Skin is warm and dry  He is not diaphoretic  Psychiatric: He has a normal mood and affect  His behavior is normal        Additional Data:     Labs:    Results from last 7 days   Lab Units 11/08/19  0502   WBC Thousand/uL 7 13   HEMOGLOBIN g/dL 10 8*   HEMATOCRIT % 35 0*   PLATELETS Thousands/uL 156   NEUTROS PCT % 53   LYMPHS PCT % 35   MONOS PCT % 10   EOS PCT % 2     Results from last 7 days   Lab Units 11/08/19  0502   SODIUM mmol/L 138   POTASSIUM mmol/L 3 8   CHLORIDE mmol/L 106   CO2 mmol/L 27   BUN mg/dL 9   CREATININE mg/dL 0 88   ANION GAP mmol/L 5   CALCIUM mg/dL 9 7   GLUCOSE RANDOM mg/dL 105     Results from last 7 days   Lab Units 11/07/19  1322   INR  1 07         Results from last 7 days   Lab Units 11/08/19  0502   HEMOGLOBIN A1C % 6 0               * I Have Reviewed All Lab Data Listed Above  * Additional Pertinent Lab Tests Reviewed:  All Labs For Current Hospital Admission Reviewed    Imaging:    Imaging Reports Reviewed Today Include: CT head, CTA H/N, MRI brain  Imaging Personally Reviewed by Myself Includes: CT head, CTA H/N, MRI brain     Recent Cultures (last 7 days):           Last 24 Hours Medication List:     Current Facility-Administered Medications:  aspirin 81 mg Oral Daily Moses Gonzalez PA-C   clopidogrel 75 mg Oral Daily Moses Gonzalez PA-C   docusate sodium 100 mg Oral BID Moses Gonzalez PA-C   enoxaparin 40 mg Subcutaneous Daily Moses Gonzalez PA-C   gabapentin 900 mg Oral HS Moses Gonzalez PA-C   hydrALAZINE 5 mg Intravenous Q6H PRN Moses Gonzalez PA-C   metoprolol succinate 25 mg Oral Daily Moses Gonzalez PA-C   pantoprazole 40 mg Oral Early Morning Moses Gonzalez PA-C   pravastatin 80 mg Oral Daily Moses Gonzalez PA-C   psyllium 1 packet Oral Daily Moses Gonzalez PA-C   sertraline 50 mg Oral Daily Moses Gonzalez PA-C        Today, Patient Was Seen By: Mel Oro PA-C    ** Please Note: Dictation voice to text software may have been used in the creation of this document   **

## 2019-11-09 NOTE — PROGRESS NOTES
Progress Note - Neurology   Nissa Costa 80 y o  male MRN: [id number]  Unit/Bed#: [facility] 733-01 Encounter: [id number]    Assessment/ Plan:  1)  Bilateral acute infarctions in the superficial and deep left frontal, and deep right frontal regions, strongly suspect embolic etiology   -MRI brain as above   -CTA head and neck demonstrates 75% narrowing of the proximal right cervical ICA just beyond the bifurcation  Known sequela of bifrontal craniotomy noted  -Will recommend FLORENCE and loop placement   -echo pending   -continue to recommend vascular surgery evaluation as an outpatient   -continue dual antiplatelet therapy for a total of 3 weeks, then Plavix monotherapy   -will hold anticoagulation at this time   -continue statin   -continue tele   -PT/OT/speech   -supportive care and medication management per primary team   -will continue to follow, please monitor exam notify with changes   -the patient will follow up with the Stroke Clinic, appointment has been requested      Subjective:   Patient is an 80year-old male with HTN, HLD, orthostatic hypotension on midodrine, peripheral neuropathy, CAD status post bypass, cervical spinal stenosis, prior anterior fossa meningioma status post resection 1989, prostate cancer, anxiety and depression who initially presented to the [facility] Emergency Department on 11/07/2019 with a chief complaint of transient right arm weakness, bilateral leg weakness, as well as hypertension  MRI brain was completed overnight which demonstrates multifocal small acute infarctions, very likely embolic in etiology  On exam today, the patient is resting comfortably in bed in no acute distress  Twelve point review systems was completed and is negative  Patient demonstrates a nonfocal neurological exam as detailed below        ROS:  See subjective    Medications:  Scheduled Meds:  Current Facility-Administered Medications:  aspirin 81 mg Oral Daily Nghia Rai PA-C   clopidogrel 75 mg Oral Daily Didi Fess, PA-C   docusate sodium 100 mg Oral BID Didi Fess, PA-C   enoxaparin 40 mg Subcutaneous Daily Didi Fess, PA-C   gabapentin 900 mg Oral HS Didi Fess, PA-C   hydrALAZINE 5 mg Intravenous Q6H PRN Didi Fess, PA-C   metoprolol succinate 25 mg Oral Daily Didi Fess, PA-C   pantoprazole 40 mg Oral Early Morning Didi Fess, PA-C   pravastatin 80 mg Oral Daily Didi Fess, PA-C   psyllium 1 packet Oral Daily Didi Fess, PA-C   sertraline 50 mg Oral Daily Didi Fess, PA-C     Continuous Infusions:   PRN Meds: hydrALAZINE      Vitals: Blood pressure 121/54, pulse 83, temperature 98 3 °F (36 8 °C), temperature source Oral, resp  rate 16, height 5' 9" (1 753 m), weight 74 3 kg (163 lb 12 8 oz), SpO2 97 %  ,Body mass index is 24 19 kg/m²  Physical Exam:   Physical Exam   Constitutional: He is oriented to person, place, and time  Neurological: He is oriented to person, place, and time  He has normal strength  He has a normal Finger-Nose-Finger Test    Psychiatric: His speech is normal       Neurologic Exam     Mental Status   Oriented to person, place, and time  Follows 2 step commands  Attention: normal  Concentration: normal    Speech: speech is normal   Level of consciousness: alert  Knowledge: good  Normal comprehension  Cranial Nerves   Cranial nerves II through XII intact  Motor Exam   Muscle bulk: normal  Overall muscle tone: normal    Strength   Strength 5/5 throughout  Sensory Exam   Light touch normal      Gait, Coordination, and Reflexes     Gait  Gait: (Deferred for safety)    Coordination   Finger to nose coordination: normal    Tremor   Resting tremor: absent      Lab, Imaging and other studies: I have personally reviewed pertinent reports      I have personally reviewed pertinent imaging in PACs  No results found for this or any previous visit (from the past 24 hour(s)) ]      VTE Prophylaxis: Sequential compression device (Venodyne) and Enoxaparin (Lovenox)    Counseling / Coordination of Care  Total time spent today 25 minutes  Greater than 50% of total time was spent with the patient and / or family counseling and / or coordination of care  A description of the counseling / coordination of care: Elder Hernandez The pt was seen and examined by myself and the attending physician  The chart was reviewed thoroughly, including laboratory values and imaging studies  The pt was counseled in the room

## 2019-11-09 NOTE — ASSESSMENT & PLAN NOTE
· Patient presents with transient episode of right arm weakness and dysarthria lasting approximately 10 minutes with spontaneous recovery  · CTA head/neck: No evidence of acute intracranial hemorrhage  Frontal craniotomy with bifrontal encephalomalacia  75% atherosclerotic plaque narrowing proximal right cervical ICA just beyond the bifurcation  · Patient seen by neurology in the ED  Stroke pathway initiated:  · MRI brain: There are bilateral small infarcts with  superficial cortical infarct noted in the left frontal region in the region of precentral and postcentral gyrus  Deep periventricular infarct is noted in the right frontal and left frontal region  ,    · ECHO: pending  · Will need FLORENCE per neuro recommendations and loop recorded-- d/w cardiology will likely happen Monday-- non-urgent consult placed  · Lipid panel: WNL except elevated LDL  · A1c: 6 0%  · Neuro checks  · PT/OT/ST evaluations  · Started on DAPT-- continue x3 weeks, then continue plavix 75mg PO QD  · Continue statin  Patient does not tolerate atorvastatin secondary to myalgias  Will continue current statin therapy  · Permissive HTN  PRN hydralazine for SBP >180  · Telemetry x48 hours for acute stroke protocol

## 2019-11-09 NOTE — CONSULTS
Mr Timo Grubbs was admitted to the [address] [place] [address] w/stroke like symptoms on 11/7/19  He had resolution of symptoms on arrival to the ED  MRI stated bilateral small infarcts with superficial cortical infarct noted in the left frontal region in the region of precentral and postcentral gyrus; also a deep periventricular infarct was noted in the right frontal and left frontal region  Patient has no known history of atrial fibrillation, and no atrial fibrillation has been detected on telemetry monitoring during this hospital course  Neurology requesting FLORENCE and loop recorder monitoring to rule out cardioembolic etiology  Will plan for FLORENCE and loop recorder implantation on 11/11/2019  Patient will be made NPO after midnight on 11/11  This was discussed with the patient and his wife who is at the bedside  They were both agreeable to this plan      [address] [place] [address]

## 2019-11-09 NOTE — ASSESSMENT & PLAN NOTE
· Calcium on admission: 10 2-- received IVF and levels improved  · Check ionized calcium level-- pending

## 2019-11-09 NOTE — ASSESSMENT & PLAN NOTE
· BPs have been significantly elevated on admission admission  Now improving  · Hold Midodrine  · Continue metoprolol    · PRN Hydralazine for SBP >180

## 2019-11-09 NOTE — PLAN OF CARE
Problem: Nutrition/Hydration-ADULT  Goal: Nutrient/Hydration intake appropriate for improving, restoring or maintaining nutritional needs  Description  Monitor and assess patient's nutrition/hydration status for malnutrition  Collaborate with interdisciplinary team and initiate plan and interventions as ordered  Monitor patient's weight and dietary intake as ordered or per policy  Utilize nutrition screening tool and intervene as necessary  Determine patient's food preferences and provide high-protein, high-caloric foods as appropriate       INTERVENTIONS:  - Monitor oral intake, urinary output, labs, and treatment plans  - Assess nutrition and hydration status and recommend course of action  - Evaluate amount of meals eaten  - Assist patient with eating if necessary   - Allow adequate time for meals  - Recommend/ encourage appropriate diets, oral nutritional supplements, and vitamin/mineral supplements  - Assess need for intravenous fluids  - Provide specific nutrition/hydration education as appropriate  - Include patient/family/caregiver in decisions related to nutrition   Outcome: Adequate for Discharge

## 2019-11-10 LAB
ANION GAP SERPL CALCULATED.3IONS-SCNC: 5 MMOL/L (ref 4–13)
BUN SERPL-MCNC: 11 MG/DL (ref 5–25)
CALCIUM SERPL-MCNC: 10.2 MG/DL (ref 8.3–10.1)
CHLORIDE SERPL-SCNC: 110 MMOL/L (ref 100–108)
CO2 SERPL-SCNC: 26 MMOL/L (ref 21–32)
CREAT SERPL-MCNC: 0.98 MG/DL (ref 0.6–1.3)
ERYTHROCYTE [DISTWIDTH] IN BLOOD BY AUTOMATED COUNT: 15.9 % (ref 11.6–15.1)
GFR SERPL CREATININE-BSD FRML MDRD: 70 ML/MIN/1.73SQ M
GLUCOSE SERPL-MCNC: 100 MG/DL (ref 65–140)
HCT VFR BLD AUTO: 33.8 % (ref 36.5–49.3)
HGB BLD-MCNC: 10.5 G/DL (ref 12–17)
INR PPP: 1.09 (ref 0.84–1.19)
MCH RBC QN AUTO: 23.7 PG (ref 26.8–34.3)
MCHC RBC AUTO-ENTMCNC: 31.1 G/DL (ref 31.4–37.4)
MCV RBC AUTO: 76 FL (ref 82–98)
PLATELET # BLD AUTO: 170 THOUSANDS/UL (ref 149–390)
PMV BLD AUTO: 10.8 FL (ref 8.9–12.7)
POTASSIUM SERPL-SCNC: 3.8 MMOL/L (ref 3.5–5.3)
PROTHROMBIN TIME: 13.7 SECONDS (ref 11.6–14.5)
RBC # BLD AUTO: 4.43 MILLION/UL (ref 3.88–5.62)
SODIUM SERPL-SCNC: 141 MMOL/L (ref 136–145)
WBC # BLD AUTO: 7.67 THOUSAND/UL (ref 4.31–10.16)

## 2019-11-10 PROCEDURE — 80048 BASIC METABOLIC PNL TOTAL CA: CPT | Performed by: PHYSICIAN ASSISTANT

## 2019-11-10 PROCEDURE — 85610 PROTHROMBIN TIME: CPT | Performed by: PHYSICIAN ASSISTANT

## 2019-11-10 PROCEDURE — 92526 ORAL FUNCTION THERAPY: CPT

## 2019-11-10 PROCEDURE — 85027 COMPLETE CBC AUTOMATED: CPT | Performed by: PHYSICIAN ASSISTANT

## 2019-11-10 PROCEDURE — 99232 SBSQ HOSP IP/OBS MODERATE 35: CPT | Performed by: INTERNAL MEDICINE

## 2019-11-10 RX ADMIN — DOCUSATE SODIUM 100 MG: 100 CAPSULE, LIQUID FILLED ORAL at 17:05

## 2019-11-10 RX ADMIN — ASPIRIN 81 MG: 81 TABLET, COATED ORAL at 09:22

## 2019-11-10 RX ADMIN — PSYLLIUM HUSK 1 PACKET: 3.4 POWDER ORAL at 09:21

## 2019-11-10 RX ADMIN — DOCUSATE SODIUM 100 MG: 100 CAPSULE, LIQUID FILLED ORAL at 09:22

## 2019-11-10 RX ADMIN — PRAVASTATIN SODIUM 80 MG: 80 TABLET ORAL at 09:22

## 2019-11-10 RX ADMIN — METOPROLOL SUCCINATE 25 MG: 25 TABLET, EXTENDED RELEASE ORAL at 09:22

## 2019-11-10 RX ADMIN — GABAPENTIN 900 MG: 300 CAPSULE ORAL at 21:47

## 2019-11-10 RX ADMIN — PANTOPRAZOLE SODIUM 40 MG: 40 TABLET, DELAYED RELEASE ORAL at 06:09

## 2019-11-10 RX ADMIN — SERTRALINE HYDROCHLORIDE 50 MG: 50 TABLET ORAL at 21:47

## 2019-11-10 RX ADMIN — CLOPIDOGREL BISULFATE 75 MG: 75 TABLET ORAL at 09:22

## 2019-11-10 RX ADMIN — HYDRALAZINE HYDROCHLORIDE 5 MG: 20 INJECTION INTRAMUSCULAR; INTRAVENOUS at 22:40

## 2019-11-10 RX ADMIN — ENOXAPARIN SODIUM 40 MG: 40 INJECTION SUBCUTANEOUS at 09:20

## 2019-11-10 NOTE — PROGRESS NOTES
Progress Note - Naomi Rowe 1933, 80 y o  male MRN: [id number]    Unit/Bed#: [facility] 733-01 Encounter: [id number]    Primary Care Provider: Concepción Martinez MD   Date and time admitted to hospital: 11/7/2019 11:32 AM        * Acute CVA (cerebrovascular accident), suspected embolic in nature  Assessment & Plan  · Patient presents with transient episode of right arm weakness and dysarthria lasting approximately 10 minutes with spontaneous recovery  · CTA head/neck: No evidence of acute intracranial hemorrhage  Frontal craniotomy with bifrontal encephalomalacia  75% atherosclerotic plaque narrowing proximal right cervical ICA just beyond the bifurcation  · Patient seen by neurology in the ED  Stroke pathway initiated:  · MRI brain: There are bilateral small infarcts with  superficial cortical infarct noted in the left frontal region in the region of precentral and postcentral gyrus  Deep periventricular infarct is noted in the right frontal and left frontal region  ,    · ECHO: pending  · Will need FLORENCE per neuro recommendations and loop recorded--cardiology to proceed with workup on Monday  - non-urgent consult placed  · Lipid panel: WNL except elevated LDL  · A1c: 6 0%  · Neuro checks  · PT/OT/ST evaluations  · Started on DAPT-- continue x3 weeks, then continue plavix 75mg PO QD  · Continue statin  Patient does not tolerate atorvastatin secondary to myalgias  Will continue current statin therapy  · Permissive HTN  PRN hydralazine for SBP >180  · Telemetry x48 hours for acute stroke protocol  Hypercalcemia  Assessment & Plan  · Calcium on admission: 10 2-- received IVF and levels improved  · Check ionized calcium level-- pending    Orthostatic hypotension  Assessment & Plan  · Hold midodrine given hypertensive urgency  Arteriosclerotic cardiovascular disease  Assessment & Plan  · S/p CABG x5 in 2007  · Continue ASA, statin  · Follows with Dr Edmond Santacruz as an out-patient          VTE Pharmacologic Prophylaxis:   Pharmacologic: Enoxaparin (Lovenox)  Mechanical VTE Prophylaxis in Place: No    Patient Centered Rounds: I have performed bedside rounds with nursing staff today  Time Spent for Care: 15 minutes  More than 50% of total time spent on counseling and coordination of care as described above  Current Length of Stay: 2 day(s)    Current Patient Status: Inpatient   Certification Statement: The patient will continue to require additional inpatient hospital stay due to Need to monitor symptoms        Code Status: Level 1 - Full Code      Subjective:   No acute distress    Objective:     Vitals:   Temp (24hrs), Av 3 °F (36 8 °C), Min:97 4 °F (36 3 °C), Max:98 6 °F (37 °C)    Temp:  [97 4 °F (36 3 °C)-98 6 °F (37 °C)] 98 6 °F (37 °C)  HR:  [78-89] 89  Resp:  [16-20] 16  BP: (121-183)/(54-79) 150/77  SpO2:  [94 %-96 %] 95 %  Body mass index is 24 19 kg/m²  Input and Output Summary (last 24 hours): Intake/Output Summary (Last 24 hours) at 11/10/2019 0943  Last data filed at 11/10/2019 0809  Gross per 24 hour   Intake 480 ml   Output    Net 480 ml       Physical Exam:     Physical Exam   Constitutional: He is oriented to person, place, and time  HENT:   Head: Normocephalic and atraumatic  Eyes: Pupils are equal, round, and reactive to light  EOM are normal    Pulmonary/Chest: Effort normal  No stridor  No respiratory distress  Abdominal: Soft  Musculoskeletal: Normal range of motion  He exhibits no edema  Neurological: He is alert and oriented to person, place, and time         Additional Data:     Labs:    Results from last 7 days   Lab Units 11/10/19  0604 19  0502   WBC Thousand/uL 7 67 7 13   HEMOGLOBIN g/dL 10 5* 10 8*   HEMATOCRIT % 33 8* 35 0*   PLATELETS Thousands/uL 170 156   NEUTROS PCT %  --  53   LYMPHS PCT %  --  35   MONOS PCT %  --  10   EOS PCT %  --  2     Results from last 7 days   Lab Units 11/10/19  0604   POTASSIUM mmol/L 3 8   CHLORIDE mmol/L 110*   CO2 mmol/L 26   BUN mg/dL 11   CREATININE mg/dL 0 98   CALCIUM mg/dL 10 2*     Results from last 7 days   Lab Units 11/10/19  0604   INR  1 09       * I Have Reviewed All Lab Data Listed Above  * Additional Pertinent Lab Tests Reviewed: All Labs Within Last 24 Hours Reviewed      Recent Cultures (last 7 days):           Last 24 Hours Medication List:     Current Facility-Administered Medications:  aspirin 81 mg Oral Daily Ebro Payor, PA-C   clopidogrel 75 mg Oral Daily Raj Payor, PA-C   docusate sodium 100 mg Oral BID Ebro Payor, PA-C   enoxaparin 40 mg Subcutaneous Daily Ebro Payor, PA-C   gabapentin 900 mg Oral HS Ebro Payor, PA-C   hydrALAZINE 5 mg Intravenous Q6H PRN Raj Payor, PA-C   metoprolol succinate 25 mg Oral Daily Ebro Payor, PA-C   pantoprazole 40 mg Oral Early Morning Ebro Payor, PA-C   pravastatin 80 mg Oral Daily Ebro Payor, PA-C   psyllium 1 packet Oral Daily Ebro Payor, PA-C   sertraline 50 mg Oral Daily Raj Payor, PA-C        Today, Patient Was Seen By: Nathen Lopez DO    ** Please Note: Dictation voice to text software may have been used in the creation of this document   **

## 2019-11-10 NOTE — DISCHARGE INSTR - DIET
Softer foods that are easy to chew  Avoid dry/dense foods  Take sips of liquids in between bites  Remain upright one hour after meals   Keep head of bed elevated to ~ 30 degrees at rest

## 2019-11-10 NOTE — PLAN OF CARE
Problem: Potential for Falls  Goal: Patient will remain free of falls  Description  INTERVENTIONS:  - Assess patient frequently for physical needs  -  Identify cognitive and physical deficits and behaviors that affect risk of falls  -  [place] fall precautions as indicated by assessment   - Educate patient/family on patient safety including physical limitations  - Instruct patient to call for assistance with activity based on assessment  - Modify environment to reduce risk of injury  - Consider OT/PT consult to assist with strengthening/mobility  Outcome: Progressing     Problem: Neurological Deficit  Goal: Neurological status is stable or improving  Description  Interventions:  - Monitor and assess patient's level of consciousness, motor function, sensory function, and level of assistance needed for ADLs  - Monitor and report changes from baseline  Collaborate with interdisciplinary team to initiate plan and implement interventions as ordered  - Provide and maintain a safe environment  - Consider fall precautions  - Consider aspiration precautions  Outcome: Progressing     Problem: Activity Intolerance/Impaired Mobility  Goal: Mobility/activity is maintained at optimum level for patient  Description  Interventions:  - Assess and monitor patient  barriers to mobility and need for assistive/adaptive devices  - Assess patient's emotional response to limitations  - Collaborate with interdisciplinary team and initiate plans and interventions as ordered  - Encourage independent activity per ability   - Perform active/passive rom as tolerated/ordered  - Plan activities to conserve energy   - Turn patient as appropriate   Outcome: Progressing     Problem: Communication Impairment  Goal: Ability to express needs and understand communication  Description  Assess patient's communication skills and ability to understand information    Patient will demonstrate use of effective communication techniques, alternative methods of communication and understanding even if not able to speak  - Encourage communication and provide alternate methods of communication as needed  - Collaborate with case management/ for discharge needs  - Include patient/family/caregiver in decisions related to communication  Outcome: Progressing     Problem: Potential for Aspiration  Goal: Non-ventilated patient's risk of aspiration is minimized  Description  Assess and monitor vital signs, respiratory status, and labs (WBC)  Monitor for signs of aspiration (tachypnea, cough, rales, wheezing, cyanosis, fever)  - Assess and monitor patient's ability to swallow  - Place patient up in chair to eat if possible  - HOB up at 90 degrees to eat if unable to get patient up into chair   - Supervise patient during oral intake  - Instruct patient/ family to take small bites  - Instruct patient/ family to take small single sips when taking liquids  - Follow patient-specific strategies generated by speech pathologist   Outcome: Progressing     Problem: Nutrition  Goal: Nutrition/Hydration status is improving  Description  Monitor and assess patient's nutrition/hydration status for malnutrition (ex- brittle hair, bruises, dry skin, pale skin and conjunctiva, muscle wasting, smooth red tongue, and disorientation)  Collaborate with interdisciplinary team and initiate plan and interventions as ordered  Monitor patient's weight and dietary intake as ordered or per policy  Utilize nutrition screening tool and intervene per policy  Determine patient's food preferences and provide high-protein, high-caloric foods as appropriate  - Assist patient with eating   - Allow adequate time for meals   - Encourage patient to take dietary supplement as ordered  - Collaborate with clinical nutritionist   - Include patient/family/caregiver in decisions related to nutrition    Outcome: Progressing     Problem: Prexisting or High Potential for Compromised Skin Integrity  Goal: Skin integrity is maintained or improved  Description  INTERVENTIONS:  - Identify patients at risk for skin breakdown  - Assess and monitor skin integrity  - Assess and monitor nutrition and hydration status  - Monitor labs   - Assess for incontinence   - Turn and reposition patient  - Assist with mobility/ambulation  - Relieve pressure over bony prominences  - Avoid friction and shearing  - Provide appropriate hygiene as needed including keeping skin clean and dry  - Evaluate need for skin moisturizer/barrier cream  - Collaborate with interdisciplinary team   - Patient/family teaching  - Consider wound care consult   Outcome: Progressing     Problem: INFECTION - ADULT  Goal: Absence or prevention of progression during hospitalization  Description  INTERVENTIONS:  - Assess and monitor for signs and symptoms of infection  - Monitor lab/diagnostic results  - Monitor all insertion sites, i e  indwelling lines, tubes, and drains  - Administer medications as ordered  - Instruct and encourage patient and family to use good hand hygiene technique  - Identify and instruct in appropriate isolation precautions for identified infection/condition   Outcome: Progressing     Problem: SAFETY ADULT  Goal: Patient will remain free of falls  Description  INTERVENTIONS:  - Assess patient frequently for physical needs  -  Identify cognitive and physical deficits and behaviors that affect risk of falls    -  [place] fall precautions as indicated by assessment   - Educate patient/family on patient safety including physical limitations  - Instruct patient to call for assistance with activity based on assessment  - Modify environment to reduce risk of injury  - Consider OT/PT consult to assist with strengthening/mobility  Outcome: Progressing  Goal: Maintain or return mobility status to optimal level  Description  INTERVENTIONS:  - Assess patient's baseline mobility status (ambulation, transfers, stairs, etc )    - Identify cognitive and physical deficits and behaviors that affect mobility  - Identify mobility aids required to assist with transfers and/or ambulation (gait belt, sit-to-stand, lift, walker, cane, etc )  - [place] fall precautions as indicated by assessment  - Record patient progress and toleration of activity level on Mobility SBAR; progress patient to next Phase/Stage  - Instruct patient to call for assistance with activity based on assessment  - Consider rehabilitation consult to assist with strengthening/weightbearing, etc   Outcome: Progressing     Problem: DISCHARGE PLANNING  Goal: Discharge to home or other facility with appropriate resources  Description  INTERVENTIONS:  - Identify barriers to discharge w/patient and caregiver  - Arrange for needed discharge resources and transportation as appropriate  - Identify discharge learning needs (meds, wound care, etc )  - Arrange for interpretive services to assist at discharge as needed  - Refer to Case Management Department for coordinating discharge planning if the patient needs post-hospital services based on physician/advanced practitioner order or complex needs related to functional status, cognitive ability, or social support system  Outcome: Progressing     Problem: Knowledge Deficit  Goal: Patient/family/caregiver demonstrates understanding of disease process, treatment plan, medications, and discharge instructions  Description  Complete learning assessment and assess knowledge base    Interventions:  - Provide teaching at level of understanding  - Provide teaching via preferred learning methods  Outcome: Progressing     Problem: NEUROSENSORY - ADULT  Goal: Achieves stable or improved neurological status  Description  INTERVENTIONS  - Monitor and report changes in neurological status  - Monitor vital signs such as temperature, blood pressure, glucose, and any other labs ordered   - Monitor for seizure activity and implement precautions if appropriate       Outcome: Progressing  Goal: Achieves maximal functionality and self care  Description  INTERVENTIONS  - Monitor swallowing and airway patency with patient fatigue and changes in neurological status  - Encourage and assist patient to increase activity and self care  - Encourage visually impaired, hearing impaired and aphasic patients to use assistive/communication devices  Outcome: Progressing     Problem: Nutrition/Hydration-ADULT  Goal: Nutrient/Hydration intake appropriate for improving, restoring or maintaining nutritional needs  Description  Monitor and assess patient's nutrition/hydration status for malnutrition  Collaborate with interdisciplinary team and initiate plan and interventions as ordered  Monitor patient's weight and dietary intake as ordered or per policy  Utilize nutrition screening tool and intervene as necessary  Determine patient's food preferences and provide high-protein, high-caloric foods as appropriate       INTERVENTIONS:  - Monitor oral intake, urinary output, labs, and treatment plans  - Assess nutrition and hydration status and recommend course of action  - Evaluate amount of meals eaten  - Assist patient with eating if necessary   - Allow adequate time for meals  - Recommend/ encourage appropriate diets, oral nutritional supplements, and vitamin/mineral supplements  - Assess need for intravenous fluids  - Provide specific nutrition/hydration education as appropriate  - Include patient/family/caregiver in decisions related to nutrition   Outcome: Progressing

## 2019-11-10 NOTE — SPEECH THERAPY NOTE
Speech Language/Pathology    Speech/Language Pathology Progress Note    Patient Name: Anh Kumari  GVCUF'Y Date: 11/10/2019     Problem List  Principal Problem:    Acute CVA (cerebrovascular accident), suspected embolic in nature  Active Problems:    Arteriosclerotic cardiovascular disease    Orthostatic hypotension    Lumbar radiculopathy    Hypertensive urgency    Hypercalcemia    History of resection of meningioma       Past Medical History  Past Medical History:   Diagnosis Date    Anxiety     Arthritis     Coronary artery disease involving native coronary artery of native heart without angina pectoris     Depression     Hypercholesterolemia     Meningioma ([address] ) 11/1999    brain tumor    Narcolepsy     daytime drowsiness and dozing off occasionally    Palpitations     [facility]state [facility])         Past Surgical History  Past Surgical History:   Procedure Laterality Date    BACK SURGERY      BRAIN SURGERY  11/08/1999    CORONARY ARTERY BYPASS GRAFT      x5         Subjective:  Pt alert, feeding self lunch w/ wife at bedside  Objective:  Pt new to this SLP  Wife reports she was cutting pt's meat very small at home  Reports he has difficulty chewing  They have been ordering softer foods (fish for dinner, meatloaf the other day)  Pt reports  he has difficulty getting things like bread down  Cued to take sips in between  Told wife/pt they can request that meats be chopped or ground when they are ordering  Today the pt had soup and rice pudding  No s/s aspiration  Noted he had an EGD w/ Dr Sabina Jewell 9/12/14 and 2/29/16 at which time a schatzki's ring and hiatal hernia were noted on both Pt was dilated  Assessment:  Oral and esophageal dysphagia  No s/s pharyngeal dysphagia this meal    Plan/Recommendations:Soft foods from menu,  Alternate w/ liquids  Reflux precautions  Denied difficulty w/ pills if he takes them one at a time  Recommend GI consult/EGD  ?if he may need another dilatation  F/u 1-2x's

## 2019-11-10 NOTE — ASSESSMENT & PLAN NOTE
· Patient presents with transient episode of right arm weakness and dysarthria lasting approximately 10 minutes with spontaneous recovery  · CTA head/neck: No evidence of acute intracranial hemorrhage  Frontal craniotomy with bifrontal encephalomalacia  75% atherosclerotic plaque narrowing proximal right cervical ICA just beyond the bifurcation  · Patient seen by neurology in the ED  Stroke pathway initiated:  · MRI brain: There are bilateral small infarcts with  superficial cortical infarct noted in the left frontal region in the region of precentral and postcentral gyrus  Deep periventricular infarct is noted in the right frontal and left frontal region  ,    · ECHO: pending  · Will need FLORENCE per neuro recommendations and loop recorded--cardiology to proceed with workup on Monday  - non-urgent consult placed  · Lipid panel: WNL except elevated LDL  · A1c: 6 0%  · Neuro checks  · PT/OT/ST evaluations  · Started on DAPT-- continue x3 weeks, then continue plavix 75mg PO QD  · Continue statin  Patient does not tolerate atorvastatin secondary to myalgias  Will continue current statin therapy  · Permissive HTN  PRN hydralazine for SBP >180  · Telemetry x48 hours for acute stroke protocol

## 2019-11-11 ENCOUNTER — APPOINTMENT (INPATIENT)
Dept: RADIOLOGY | Facility: HOSPITAL | Age: 84
DRG: 041 | End: 2019-11-11
Payer: MEDICARE

## 2019-11-11 LAB
CA-I BLD-SCNC: 1.31 MMOL/L (ref 1.12–1.32)
DEPRECATED AT III PPP: 100 % OF NORMAL (ref 92–136)
PROT C AG ACT/NOR PPP IA: 139 % OF NORMAL (ref 60–150)

## 2019-11-11 PROCEDURE — 85732 THROMBOPLASTIN TIME PARTIAL: CPT | Performed by: PSYCHIATRY & NEUROLOGY

## 2019-11-11 PROCEDURE — 81241 F5 GENE: CPT | Performed by: PSYCHIATRY & NEUROLOGY

## 2019-11-11 PROCEDURE — 85670 THROMBIN TIME PLASMA: CPT | Performed by: PSYCHIATRY & NEUROLOGY

## 2019-11-11 PROCEDURE — 82330 ASSAY OF CALCIUM: CPT | Performed by: PHYSICIAN ASSISTANT

## 2019-11-11 PROCEDURE — 81240 F2 GENE: CPT | Performed by: PSYCHIATRY & NEUROLOGY

## 2019-11-11 PROCEDURE — 85300 ANTITHROMBIN III ACTIVITY: CPT | Performed by: PSYCHIATRY & NEUROLOGY

## 2019-11-11 PROCEDURE — 85613 RUSSELL VIPER VENOM DILUTED: CPT | Performed by: PSYCHIATRY & NEUROLOGY

## 2019-11-11 PROCEDURE — 97110 THERAPEUTIC EXERCISES: CPT

## 2019-11-11 PROCEDURE — 71260 CT THORAX DX C+: CPT

## 2019-11-11 PROCEDURE — 99232 SBSQ HOSP IP/OBS MODERATE 35: CPT | Performed by: INTERNAL MEDICINE

## 2019-11-11 PROCEDURE — 97530 THERAPEUTIC ACTIVITIES: CPT

## 2019-11-11 PROCEDURE — 74177 CT ABD & PELVIS W/CONTRAST: CPT

## 2019-11-11 PROCEDURE — 86147 CARDIOLIPIN ANTIBODY EA IG: CPT | Performed by: PSYCHIATRY & NEUROLOGY

## 2019-11-11 PROCEDURE — 85303 CLOT INHIBIT PROT C ACTIVITY: CPT | Performed by: PSYCHIATRY & NEUROLOGY

## 2019-11-11 PROCEDURE — 92526 ORAL FUNCTION THERAPY: CPT

## 2019-11-11 PROCEDURE — 86146 BETA-2 GLYCOPROTEIN ANTIBODY: CPT | Performed by: PSYCHIATRY & NEUROLOGY

## 2019-11-11 PROCEDURE — 85306 CLOT INHIBIT PROT S FREE: CPT | Performed by: PSYCHIATRY & NEUROLOGY

## 2019-11-11 PROCEDURE — 85705 THROMBOPLASTIN INHIBITION: CPT | Performed by: PSYCHIATRY & NEUROLOGY

## 2019-11-11 PROCEDURE — 99232 SBSQ HOSP IP/OBS MODERATE 35: CPT | Performed by: PSYCHIATRY & NEUROLOGY

## 2019-11-11 PROCEDURE — 85305 CLOT INHIBIT PROT S TOTAL: CPT | Performed by: PSYCHIATRY & NEUROLOGY

## 2019-11-11 RX ADMIN — ENOXAPARIN SODIUM 40 MG: 40 INJECTION SUBCUTANEOUS at 08:39

## 2019-11-11 RX ADMIN — PRAVASTATIN SODIUM 80 MG: 80 TABLET ORAL at 08:40

## 2019-11-11 RX ADMIN — SERTRALINE HYDROCHLORIDE 50 MG: 50 TABLET ORAL at 21:48

## 2019-11-11 RX ADMIN — METOPROLOL SUCCINATE 25 MG: 25 TABLET, EXTENDED RELEASE ORAL at 08:40

## 2019-11-11 RX ADMIN — DOCUSATE SODIUM 100 MG: 100 CAPSULE, LIQUID FILLED ORAL at 08:40

## 2019-11-11 RX ADMIN — CLOPIDOGREL BISULFATE 75 MG: 75 TABLET ORAL at 08:40

## 2019-11-11 RX ADMIN — ASPIRIN 81 MG: 81 TABLET, COATED ORAL at 08:40

## 2019-11-11 RX ADMIN — GABAPENTIN 900 MG: 300 CAPSULE ORAL at 21:48

## 2019-11-11 RX ADMIN — IOHEXOL 100 ML: 350 INJECTION, SOLUTION INTRAVENOUS at 20:09

## 2019-11-11 RX ADMIN — DOCUSATE SODIUM 100 MG: 100 CAPSULE, LIQUID FILLED ORAL at 17:10

## 2019-11-11 NOTE — SPEECH THERAPY NOTE
Speech Language/Pathology    Speech/Language Pathology Progress Note    Patient Name: Kelsi Landry  ABCLK'V Date: 11/11/2019     Problem List  Principal Problem:    Acute CVA (cerebrovascular accident), suspected embolic in nature  Active Problems:    Arteriosclerotic cardiovascular disease    Orthostatic hypotension    Lumbar radiculopathy    Hypertensive urgency    Hypercalcemia    History of resection of meningioma       Past Medical History  Past Medical History:   Diagnosis Date    Anxiety     Arthritis     Coronary artery disease involving native coronary artery of native heart without angina pectoris     Depression     Hypercholesterolemia     Meningioma ([address] ) 11/1999    brain tumor    Narcolepsy     daytime drowsiness and dozing off occasionally    Palpitations     Prostate [facility])         Past Surgical History  Past Surgical History:   Procedure Laterality Date    BACK SURGERY      BRAIN SURGERY  11/08/1999    CORONARY ARTERY BYPASS GRAFT      x5         Subjective:  "I'm not even going to touch the chicken"     Objective: The patient is seen for f/u dysphagia therapy at lunch meal  Wife has brought dentures from home, but the patient reports only wearing upper dentures  "I'd do better if I had bottom ones"  He is assessed with regular solids, including rice and brussel sprouts, with thin liquids  He feeds himself with adequate rate  Bite size can be large, especially as patient does not cut brussel sprouts and eats them whole  The patient has good oral control of bolus, but has decreased rotary chewing with a significant amount of oral residue in between bites  Liquid wash is not especially helpful with clearing residue  Despite this, the patient has good oral tolerance of mixed consistencies with no overt signs of aspiration  With time, the patient clears all residue  He reports eating softer foods at home   Discussed downgrade to a level 3 diet as patient would most likely benefit from softer solids  Patient agrees  RN reports good tolerance of medications with water today and the patient has no other complaints of esophageal dyspragia  Assessment:  Patient presents with increased oral dysphagia with regular solids today  Plan/Recommendations:  Recommend downgrade to level 3/soft solids with thin liquids  Will continue ST to further assess

## 2019-11-11 NOTE — PHYSICAL THERAPY NOTE
Physical Therapy Progress Note    Patient Name: Nissa Costa    YPXUX'M Date: 11/11/2019     Problem List  Principal Problem:    Acute CVA (cerebrovascular accident), suspected embolic in nature  Active Problems:    Arteriosclerotic cardiovascular disease    Orthostatic hypotension    Lumbar radiculopathy    Hypertensive urgency    Hypercalcemia    History of resection of meningioma       Past Medical History  Past Medical History:   Diagnosis Date    Anxiety     Arthritis     Coronary artery disease involving native coronary artery of native heart without angina pectoris     Depression     Hypercholesterolemia     Meningioma ([address] ) 11/1999    brain tumor    Narcolepsy     daytime drowsiness and dozing off occasionally    Palpitations     Prostate cancer [facility])         Past Surgical History  Past Surgical History:   Procedure Laterality Date    BACK SURGERY      BRAIN SURGERY  11/08/1999    CORONARY ARTERY BYPASS GRAFT      x5        11/11/19 1224   Pain Assessment   Pain Assessment No/denies pain   Pain Score No Pain   Restrictions/Precautions   Weight Bearing Precautions Per Order No   Other Precautions Cognitive; Chair Alarm; Bed Alarm; Fall Risk;Telemetry   General   Chart Reviewed Yes   Family/Caregiver Present No   Cognition   Overall Cognitive Status Impaired   Arousal/Participation Responsive   Attention Attends with cues to redirect   Orientation Level Oriented X4   Memory Decreased recall of recent events   Following Commands Follows one step commands with increased time or repetition   Comments Pt is pleasant and coopeartive t/o session   Subjective   Subjective Pt is agreeable to PT session   Bed Mobility   Supine to Sit 6  Modified independent   Additional items HOB elevated   Sit to Supine 6  Modified independent   Additional items HOB elevated   Transfers   Sit to Stand 5  Supervision   Additional items Assist x 1; Impulsive   Stand to Sit 5  Supervision   Additional items Assist x 1   Additional Comments Verbal cues for safety; peformed with RW    Ambulation/Elevation   Gait pattern Forward Flexion;Decreased foot clearance;Shuffling;Excessively slow; Short stride   Gait Assistance 4  Minimal assist   Additional items Assist x 1;Verbal cues   Assistive Device Rolling walker   Distance 10 feet, 70 feet x 2; 1 LOB, Pt reached for handrail and reported slight dizziness, chair pulled up and sat patient down, notified RN, RN took patients BP manaul: 120/58, symptoms subside within minutes; RN is ok with patient to continue with working therapist   Balance   Static Sitting Fair +   Dynamic Sitting Fair +   Static Standing Fair -   Dynamic Standing Poor +   Ambulatory Poor +   Endurance Deficit   Endurance Deficit Yes   Endurance Deficit Description fatigue   Activity Tolerance   Activity Tolerance Patient tolerated treatment well;Patient limited by fatigue   Nurse Made Aware Per TYRESE Braun, patient is appropriate for PT session   Exercises   Hip Flexion Sitting;10 reps;AROM; Bilateral  (x10 each)   Knee AROM Long Arc Quad Sitting;10 reps;AROM; Bilateral  (x10 each)   Assessment   Prognosis Good   Problem List Decreased strength;Decreased endurance; Impaired balance;Decreased mobility; Decreased coordination; Impaired judgement;Decreased safety awareness;Decreased cognition   Assessment Patient agreeable to physical session with a focus on there-ex, functional mobility, and activity tolerance  Per TYRESE Braun, patient reports patient is appropriate for PT session  1 LOB 2* mild dizziness after patient ambulated 10 feet, then reached for handrail, mod A to recover balance, pt sat in chair, RN notified, and BP taken by RN: 120/58 mmhg, symptoms subside  TYRESE Braun, reports patient is appropriate to continued with PT session   Pt ambulated additional 70 feet x 2 using RW and min A for balance, VCs for safety and energy conservation; Pt required seated rest break for several minutes 2* fatigue  Patient performed seated there-ex without c/o additional symptoms  Patient deferred further activity 2* fatigue  Patient would continue to benefit from skilled inpatient PT to maximize functional mobility and to decrease risk of falls  Upon discharge once medically stable; recommend OOPT to improve patient's independence and decrease burden of care  End of session, patient supine in bed with all needs in reach and bed alarm on  Goals   Patient Goals to go home   STG Expiration Date 11/22/19   PT Treatment Day 1   Plan   Treatment/Interventions Functional transfer training;LE strengthening/ROM; Elevations; Therapeutic exercise; Bed mobility;Gait training;Patient/family training; Endurance training   Progress Progressing toward goals   PT Frequency   (3-5x/wk)   Recommendation   Recommendation Outpatient PT   Equipment Recommended Walker   PT - OK to Discharge Yes  (once medically stable)       Jimena Green PT, DPT

## 2019-11-11 NOTE — PROGRESS NOTES
[address] Internal Medicine Progress Note  Patient: Karrie Myers 80 y o  male   MRN: [id number]  PCP: Sybil Lesch, MD  [facility] Encounter: [id number]  Date Of Visit: 11/11/19    Assessment:    Principal Problem:    Acute CVA (cerebrovascular accident), suspected embolic in nature  Active Problems:    Arteriosclerotic cardiovascular disease    Orthostatic hypotension    Lumbar radiculopathy    Hypertensive urgency    Hypercalcemia    History of resection of meningioma      Plan:    # Acute CVA - suspicious for cardio embolic, b/l infarcts   - neurology on board  - Planned for FLORENCE and loop recorder implantation  Loop tentatively planned for today and FLORENCE tomorrow  - on DAPT x 3 weeks then plavix alone therafter; on pravastatin  - s/p MRI, CTA head and neck with 75% narrowing in prox RT cervical ICA, to have vascular follow up as outpatient  - s/p PT/OT/Speech eval    # HTN urgency  - improving  - cont metoprolol  - cautious with aggressive bp lowering given acute cva    # Hypercalcemia  - reordered ionized calcium, pending  - cont to monitor    # Hx of orthostatic hypotension  - chronically on midodrine, on hold given bp    # Hx of CAD s/p cabg  - stable  - follows w/ Dr Queen Snowden as outpt     # Hx of microcytosis, chronic    # ? Dysphagia; hx of schatzki's ring  - s/p re-eval per speech today cleared no sign of aspiration, does better with soft diet because of his dentures    VTE Pharmacologic Prophylaxis:   Pharmacologic: Enoxaparin (Lovenox)  Mechanical VTE Prophylaxis in Place: Yes    Patient Centered Rounds: I have performed bedside rounds with nursing staff today  Discussions with Specialists or Other Care Team Provider:     Education and Discussions with Family / Patient: patient    Time Spent for Care: 30 minutes  More than 50% of total time spent on counseling and coordination of care as described above      Current Length of Stay: 3 day(s)    Current Patient Status: Inpatient Certification Statement: The patient will continue to require additional inpatient hospital stay due to pending FLORENCE    Discharge Plan / Estimated Discharge Date:     Code Status: Level 1 - Full Code      Subjective:   Patient seen and examined at bedside  No complaints  No acute events overnight per RN    Objective:     Vitals:   Temp (24hrs), Av 8 °F (37 1 °C), Min:98 2 °F (36 8 °C), Max:99 7 °F (37 6 °C)    Temp:  [98 2 °F (36 8 °C)-99 7 °F (37 6 °C)] 98 2 °F (36 8 °C)  HR:  [71-91] 81  Resp:  [16-22] 16  BP: (131-197)/(63-89) 161/78  SpO2:  [93 %-96 %] 96 %  Body mass index is 24 19 kg/m²  Input and Output Summary (last 24 hours): Intake/Output Summary (Last 24 hours) at 2019 0942  Last data filed at 11/10/2019 1821  Gross per 24 hour   Intake 420 ml   Output    Net 420 ml       Physical Exam:     Physical Exam   Constitutional: He is oriented to person, place, and time  He appears well-developed and well-nourished  Neck: Normal range of motion  Neck supple  Cardiovascular: Normal rate, regular rhythm, normal heart sounds and intact distal pulses  Exam reveals no gallop and no friction rub  No murmur heard  Pulmonary/Chest: No stridor  He is in respiratory distress  He has no wheezes  He has no rales  He exhibits no tenderness  Abdominal: Soft  Bowel sounds are normal  He exhibits no distension  There is no tenderness  There is no rebound and no guarding  Musculoskeletal: Normal range of motion  He exhibits no edema, tenderness or deformity  Neurological: He is alert and oriented to person, place, and time  He displays normal reflexes  No cranial nerve deficit or sensory deficit  He exhibits normal muscle tone  Coordination normal    Skin: Skin is warm and dry         Additional Data:     Labs:    Results from last 7 days   Lab Units 11/10/19  0604 19  0502   WBC Thousand/uL 7 67 7 13   HEMOGLOBIN g/dL 10 5* 10 8*   HEMATOCRIT % 33 8* 35 0*   PLATELETS Thousands/uL 170 156   NEUTROS PCT %  --  53   LYMPHS PCT %  --  35   MONOS PCT %  --  10   EOS PCT %  --  2     Results from last 7 days   Lab Units 11/10/19  0604   POTASSIUM mmol/L 3 8   CHLORIDE mmol/L 110*   CO2 mmol/L 26   BUN mg/dL 11   CREATININE mg/dL 0 98   CALCIUM mg/dL 10 2*     Results from last 7 days   Lab Units 11/10/19  0604   INR  1 09       * I Have Reviewed All Lab Data Listed Above  * Additional Pertinent Lab Tests Reviewed: All Labs Within Last 24 Hours Reviewed    Imaging:    Imaging Reports Reviewed Today Include:  Imaging Personally Reviewed by Myself Includes:      Recent Cultures (last 7 days):           Last 24 Hours Medication List:     Current Facility-Administered Medications:  aspirin 81 mg Oral Daily Moses Gonzalez, PA-C   clopidogrel 75 mg Oral Daily Moses Gonzalez, PA-C   docusate sodium 100 mg Oral BID Moses Gonzalez, PA-C   enoxaparin 40 mg Subcutaneous Daily Moses Gonzalez, PA-C   gabapentin 900 mg Oral HS Moses Gonzalez, PA-C   hydrALAZINE 5 mg Intravenous Q6H PRN Moses Gonzalez, PA-C   metoprolol succinate 25 mg Oral Daily Moses Gonzalez, PA-C   pantoprazole 40 mg Oral Early Morning Moses Gonzalez, PA-C   pravastatin 80 mg Oral Daily Moses Gonzalez, PA-C   psyllium 1 packet Oral Daily Moses Gonzalez, PA-C   sertraline 50 mg Oral Daily Moses Gonzalez, PA-C        Today, Patient Was Seen By: Trinidad Bell DO    ** Please Note: This note has been constructed using a voice recognition system   **

## 2019-11-11 NOTE — PROGRESS NOTES
Progress Note - Neurology   Sha Avilez 80 y o  male MRN: [id number]  Unit/Bed#: [facility] 733-01 Encounter: [id number]    Assessment/plan:    Sha Avilez is a 80 y o  male who presented to [facility] after a 5-10 min episode of right sided weakness with dysarthria and was found on imaging to have several acute infarctions  1  Bilateral acute infarctions  · As per MRI bilateral acute infarctions in the superficial and deep left frontal, and deep right frontal regions, suspicious for embolic etiology  · Patient is scheduled for FLORENCE and loop placement tomorrow  · CT pan scan ordered as well as thrombosis panel to rule out other causes embolic stroke  · Continue on telemetry, no atrial fibrillation noted as of yet  · PT/OT/PM&R  · Patient was previously on aspirin 81 mg daily, started on DAPT with Plavix for 3 weeks and then return to monotherapy with Plavix  · Patient ready follows with the patient Cardiology  · Patient has follow-up with vascular surgery outpatient  · Patient has outpatient Stroke Clinic follow-up    2  Meningioma s/p resection  · 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  · Residual areas of meningioma visible on MRI, sequela of craniotomy visible on MRI  · No history of seizure, patient followed up with Neurology until May of 2018    Subjective:   Patient is a an 63-year-old male who was seen and examined bedside by me this morning  Prior to presentation on 11/07 he endorsed 5-10 minutes of right-sided weakness with dysarthria which apparently resolved prior to admission, however on exam he still appears to be mildly weaker on the right side  PT consult had already been ordered  He had no other overnight events and has no questions, wife is at bedside also has no questions, FLORENCE and loop placement likely tomorrow  ROS:  Review of Systems   Constitutional: Negative for activity change, appetite change, fatigue and fever     HENT: Negative for congestion and sinus pain  Respiratory: Negative for cough, chest tightness and shortness of breath  Cardiovascular: Negative for chest pain, palpitations and leg swelling  Gastrointestinal: Negative for abdominal distention and abdominal pain  Genitourinary: Negative for difficulty urinating and dysuria  Musculoskeletal: Negative for back pain and gait problem  Neurological: Negative for dizziness, tremors, seizures, syncope, facial asymmetry, speech difficulty, weakness, light-headedness, numbness and headaches  Psychiatric/Behavioral: Negative for agitation  All other systems reviewed and are negative  Vitals: Blood pressure 122/58, pulse 87, temperature 98 2 °F (36 8 °C), resp  rate 16, height 5' 9" (1 753 m), weight 74 3 kg (163 lb 12 8 oz), SpO2 96 %  ,Body mass index is 24 19 kg/m²  Physical Exam: Physical Exam   Constitutional: He is oriented to person, place, and time  He appears well-developed and well-nourished  No distress  HENT:   Head: Normocephalic and atraumatic  Eyes: Pupils are equal, round, and reactive to light  Conjunctivae and EOM are normal    Neck: Normal range of motion  Neck supple  Cardiovascular: Normal rate, regular rhythm, normal heart sounds and intact distal pulses  Exam reveals no gallop and no friction rub  No murmur heard  Pulmonary/Chest: Effort normal and breath sounds normal    Neurological: He is alert and oriented to person, place, and time  He has normal strength  He has a normal Finger-Nose-Finger Test and a normal Heel to [employer]    Skin: Skin is warm and dry  Capillary refill takes less than 2 seconds  Psychiatric: He has a normal mood and affect  His speech is normal    Nursing note and vitals reviewed  Neurologic Exam     Mental Status   Oriented to person, place, and time  Follows 3 step commands  Attention: normal  Concentration: normal    Speech: speech is normal   Level of consciousness: alert  Knowledge: good     Able to name object  Able to read  Able to repeat  Normal comprehension  Cranial Nerves     CN II   Visual fields full to confrontation  CN III, IV, VI   Pupils are equal, round, and reactive to light  Extraocular motions are normal    Right pupil: Shape: regular  Reactivity: brisk  Consensual response: intact  Accommodation: intact  Left pupil: Shape: regular  Reactivity: brisk  Consensual response: intact  Accommodation: intact  CN III: no CN III palsy  CN VI: no CN VI palsy  Nystagmus: none   Diplopia: none  Ophthalmoparesis: none  Upgaze: normal  Downgaze: normal    CN V   Facial sensation intact  CN VII   Facial expression full, symmetric  CN VIII   Hearing: impaired    CN IX, X   CN IX normal    CN X normal      CN XI   CN XI normal      CN XII   CN XII normal      Motor Exam   Muscle bulk: decreased  Overall muscle tone: normal    Strength   Strength 5/5 throughout  Sensory Exam   Light touch normal    Pinprick normal      Gait, Coordination, and Reflexes     Coordination   Finger to nose coordination: normal  Heel to shin coordination: normal    Tremor   Resting tremor: absent  Intention tremor: absent  Action tremor: absent    Reflexes   Reflexes 2+ except as noted  Right plantar: normal  Left plantar: normal  Right ankle clonus: absent  Left ankle clonus: absent        Lab, Imaging and other studies:   I have personally reviewed pertinent reports    , CBC:   Results from last 7 days   Lab Units 11/10/19  0604 11/08/19  0502 11/07/19  1322   WBC Thousand/uL 7 67 7 13 5 65   RBC Million/uL 4 43 4 59 4 51   HEMOGLOBIN g/dL 10 5* 10 8* 10 8*   HEMATOCRIT % 33 8* 35 0* 34 3*   MCV fL 76* 76* 76*   PLATELETS Thousands/uL 170 156 161   , BMP/CMP:   Results from last 7 days   Lab Units 11/10/19  0604 11/08/19  0502 11/07/19  1322   SODIUM mmol/L 141 138 139   POTASSIUM mmol/L 3 8 3 8 4 2   CHLORIDE mmol/L 110* 106 108   CO2 mmol/L 26 27 28   BUN mg/dL 11 9 9   CREATININE mg/dL 0 98 0 88 0 98 CALCIUM mg/dL 10 2* 9 7 10 4*   EGFR ml/min/1 73sq m 70 78 70   , Vitamin B12:   , HgBA1C:   Results from last 7 days   Lab Units 11/08/19  0502   HEMOGLOBIN A1C % 6 0   , TSH:   , Coagulation:   Results from last 7 days   Lab Units 11/10/19  0604   INR  1 09   , Lipid Profile:   Results from last 7 days   Lab Units 11/08/19  0502   HDL mg/dL 45   LDL CALC mg/dL 115*   TRIGLYCERIDES mg/dL 150   , Ammonia:   , Urinalysis:       Invalid input(s): URIBILINOGEN, Drug Screen:   , Medication Drug Levels:       Invalid input(s): CARBAMAZEPINE,  PHENOBARB, LACOSAMIDE, OXCARBAZEPINE  VTE Prophylaxis: Sequential compression device (Venodyne)  and Heparin    Counseling / Coordination of Care  Total time spent today 20 minutes  Greater than 50% of total time was spent with the patient and / or family counseling and / or coordination of care   A description of the counseling / coordination of care: as above

## 2019-11-11 NOTE — PLAN OF CARE
Problem: Potential for Falls  Goal: Patient will remain free of falls  Description  INTERVENTIONS:  - Assess patient frequently for physical needs  -  Identify cognitive and physical deficits and behaviors that affect risk of falls  -  [place] fall precautions as indicated by assessment   - Educate patient/family on patient safety including physical limitations  - Instruct patient to call for assistance with activity based on assessment  - Modify environment to reduce risk of injury  - Consider OT/PT consult to assist with strengthening/mobility  Outcome: Progressing     Problem: Neurological Deficit  Goal: Neurological status is stable or improving  Description  Interventions:  - Monitor and assess patient's level of consciousness, motor function, sensory function, and level of assistance needed for ADLs  - Monitor and report changes from baseline  Collaborate with interdisciplinary team to initiate plan and implement interventions as ordered  - Provide and maintain a safe environment  - Consider fall precautions  - Consider aspiration precautions  Outcome: Progressing     Problem: Activity Intolerance/Impaired Mobility  Goal: Mobility/activity is maintained at optimum level for patient  Description  Interventions:  - Assess and monitor patient  barriers to mobility and need for assistive/adaptive devices  - Assess patient's emotional response to limitations  - Collaborate with interdisciplinary team and initiate plans and interventions as ordered  - Encourage independent activity per ability   - Perform active/passive rom as tolerated/ordered  - Plan activities to conserve energy   - Turn patient as appropriate   Outcome: Progressing     Problem: Communication Impairment  Goal: Ability to express needs and understand communication  Description  Assess patient's communication skills and ability to understand information    Patient will demonstrate use of effective communication techniques, alternative methods of communication and understanding even if not able to speak  - Encourage communication and provide alternate methods of communication as needed  - Collaborate with case management/ for discharge needs  - Include patient/family/caregiver in decisions related to communication  Outcome: Progressing     Problem: Potential for Aspiration  Goal: Non-ventilated patient's risk of aspiration is minimized  Description  Assess and monitor vital signs, respiratory status, and labs (WBC)  Monitor for signs of aspiration (tachypnea, cough, rales, wheezing, cyanosis, fever)  - Assess and monitor patient's ability to swallow  - Place patient up in chair to eat if possible  - HOB up at 90 degrees to eat if unable to get patient up into chair   - Supervise patient during oral intake  - Instruct patient/ family to take small bites  - Instruct patient/ family to take small single sips when taking liquids  - Follow patient-specific strategies generated by speech pathologist   Outcome: Progressing     Problem: Nutrition  Goal: Nutrition/Hydration status is improving  Description  Monitor and assess patient's nutrition/hydration status for malnutrition (ex- brittle hair, bruises, dry skin, pale skin and conjunctiva, muscle wasting, smooth red tongue, and disorientation)  Collaborate with interdisciplinary team and initiate plan and interventions as ordered  Monitor patient's weight and dietary intake as ordered or per policy  Utilize nutrition screening tool and intervene per policy  Determine patient's food preferences and provide high-protein, high-caloric foods as appropriate  - Assist patient with eating   - Allow adequate time for meals   - Encourage patient to take dietary supplement as ordered  - Collaborate with clinical nutritionist   - Include patient/family/caregiver in decisions related to nutrition    Outcome: Progressing     Problem: SAFETY ADULT  Goal: Patient will remain free of falls  Description  INTERVENTIONS:  - Assess patient frequently for physical needs  -  Identify cognitive and physical deficits and behaviors that affect risk of falls    -  [place] fall precautions as indicated by assessment   - Educate patient/family on patient safety including physical limitations  - Instruct patient to call for assistance with activity based on assessment  - Modify environment to reduce risk of injury  - Consider OT/PT consult to assist with strengthening/mobility  Outcome: Progressing  Goal: Maintain or return mobility status to optimal level  Description  INTERVENTIONS:  - Assess patient's baseline mobility status (ambulation, transfers, stairs, etc )    - Identify cognitive and physical deficits and behaviors that affect mobility  - Identify mobility aids required to assist with transfers and/or ambulation (gait belt, sit-to-stand, lift, walker, cane, etc )  - [place] fall precautions as indicated by assessment  - Record patient progress and toleration of activity level on Mobility SBAR; progress patient to next Phase/Stage  - Instruct patient to call for assistance with activity based on assessment  - Consider rehabilitation consult to assist with strengthening/weightbearing, etc   Outcome: Progressing     Problem: INFECTION - ADULT  Goal: Absence or prevention of progression during hospitalization  Description  INTERVENTIONS:  - Assess and monitor for signs and symptoms of infection  - Monitor lab/diagnostic results  - Monitor all insertion sites, i e  indwelling lines, tubes, and drains  - Administer medications as ordered  - Instruct and encourage patient and family to use good hand hygiene technique  - Identify and instruct in appropriate isolation precautions for identified infection/condition   Outcome: Progressing

## 2019-11-11 NOTE — RESTORATIVE TECHNICIAN NOTE
Restorative Specialist Mobility Note       Activity: Ambulate in munoz, Ambulate in room, Bathroom privileges, Chair, Dangle, Stand at bedside(Educated/encouraged pt to ambulate with assistance 3-4 x's/day  Bed alarm on   Pt callbell, phone/tray within reach )     Assistive Device: Other (Comment)(HHA x1)          Bettina ESTRADA, Restorative Technician, [employer]

## 2019-11-11 NOTE — PLAN OF CARE
Problem: PHYSICAL THERAPY ADULT  Goal: Performs mobility at highest level of function for planned discharge setting  See evaluation for individualized goals  Description  Treatment/Interventions: Functional transfer training, LE strengthening/ROM, Therapeutic exercise, Endurance training, Bed mobility, Gait training, Spoke to nursing, OT  Equipment Recommended: Ivis Stanford       See flowsheet documentation for full assessment, interventions and recommendations  Note:   Prognosis: Good  Problem List: Decreased strength, Decreased endurance, Impaired balance, Decreased mobility, Decreased coordination, Impaired judgement, Decreased safety awareness, Decreased cognition  Assessment: Patient agreeable to physical session with a focus on there-ex, functional mobility, and activity tolerance  Per TYRESE Cotto, patient reports patient is appropriate for PT session  1 LOB 2* mild dizziness after patient ambulated 10 feet, then reached for handrail, mod A to recover balance, pt sat in chair, RN notified, and BP taken by RN: 120/58 mmhg, symptoms subside  TYRESE Cotto, reports patient is appropriate to continued with PT session  Pt ambulated additional 70 feet x 2 using RW and min A for balance, VCs for safety and energy conservation; Pt required seated rest break for several minutes 2* fatigue  Patient performed seated there-ex without c/o additional symptoms  Patient deferred further activity 2* fatigue  Patient would continue to benefit from skilled inpatient PT to maximize functional mobility and to decrease risk of falls  Upon discharge once medically stable; recommend OOPT to improve patient's independence and decrease burden of care  End of session, patient supine in bed with all needs in reach and bed alarm on  Recommendation: Outpatient PT     PT - OK to Discharge: Yes(once medically stable)    See flowsheet documentation for full assessment

## 2019-11-12 ENCOUNTER — ANESTHESIA (INPATIENT)
Dept: NON INVASIVE DIAGNOSTICS | Facility: HOSPITAL | Age: 84
DRG: 041 | End: 2019-11-12
Payer: MEDICARE

## 2019-11-12 LAB
ANION GAP SERPL CALCULATED.3IONS-SCNC: 8 MMOL/L (ref 4–13)
BUN SERPL-MCNC: 13 MG/DL (ref 5–25)
CA-I BLD-SCNC: 1.31 MMOL/L (ref 1.12–1.32)
CALCIUM SERPL-MCNC: 10.1 MG/DL (ref 8.3–10.1)
CARDIOLIPIN IGA SER IA-ACNC: <9 APL U/ML (ref 0–11)
CARDIOLIPIN IGG SER IA-ACNC: <9 GPL U/ML (ref 0–14)
CARDIOLIPIN IGM SER IA-ACNC: 64 MPL U/ML (ref 0–12)
CHLORIDE SERPL-SCNC: 107 MMOL/L (ref 100–108)
CO2 SERPL-SCNC: 24 MMOL/L (ref 21–32)
CREAT SERPL-MCNC: 0.96 MG/DL (ref 0.6–1.3)
GFR SERPL CREATININE-BSD FRML MDRD: 71 ML/MIN/1.73SQ M
GLUCOSE SERPL-MCNC: 108 MG/DL (ref 65–140)
POTASSIUM SERPL-SCNC: 3.6 MMOL/L (ref 3.5–5.3)
SODIUM SERPL-SCNC: 139 MMOL/L (ref 136–145)

## 2019-11-12 PROCEDURE — 93320 DOPPLER ECHO COMPLETE: CPT | Performed by: INTERNAL MEDICINE

## 2019-11-12 PROCEDURE — 93325 DOPPLER ECHO COLOR FLOW MAPG: CPT | Performed by: INTERNAL MEDICINE

## 2019-11-12 PROCEDURE — 99232 SBSQ HOSP IP/OBS MODERATE 35: CPT | Performed by: INTERNAL MEDICINE

## 2019-11-12 PROCEDURE — 93312 ECHO TRANSESOPHAGEAL: CPT | Performed by: INTERNAL MEDICINE

## 2019-11-12 PROCEDURE — 82330 ASSAY OF CALCIUM: CPT | Performed by: INTERNAL MEDICINE

## 2019-11-12 PROCEDURE — 80048 BASIC METABOLIC PNL TOTAL CA: CPT | Performed by: INTERNAL MEDICINE

## 2019-11-12 PROCEDURE — 93312 ECHO TRANSESOPHAGEAL: CPT

## 2019-11-12 RX ORDER — PROPOFOL 10 MG/ML
INJECTION, EMULSION INTRAVENOUS AS NEEDED
Status: DISCONTINUED | OUTPATIENT
Start: 2019-11-12 | End: 2019-11-12 | Stop reason: SURG

## 2019-11-12 RX ORDER — SODIUM CHLORIDE 9 MG/ML
INJECTION, SOLUTION INTRAVENOUS CONTINUOUS PRN
Status: DISCONTINUED | OUTPATIENT
Start: 2019-11-12 | End: 2019-11-12 | Stop reason: SURG

## 2019-11-12 RX ADMIN — PROPOFOL 20 MG: 10 INJECTION, EMULSION INTRAVENOUS at 14:05

## 2019-11-12 RX ADMIN — GABAPENTIN 900 MG: 300 CAPSULE ORAL at 21:41

## 2019-11-12 RX ADMIN — DOCUSATE SODIUM 100 MG: 100 CAPSULE, LIQUID FILLED ORAL at 17:16

## 2019-11-12 RX ADMIN — PROPOFOL 20 MG: 10 INJECTION, EMULSION INTRAVENOUS at 14:00

## 2019-11-12 RX ADMIN — PHENYLEPHRINE HYDROCHLORIDE 100 MCG: 10 INJECTION INTRAVENOUS at 14:03

## 2019-11-12 RX ADMIN — SODIUM CHLORIDE: 0.9 INJECTION, SOLUTION INTRAVENOUS at 13:27

## 2019-11-12 RX ADMIN — METOPROLOL SUCCINATE 25 MG: 25 TABLET, EXTENDED RELEASE ORAL at 08:25

## 2019-11-12 RX ADMIN — ENOXAPARIN SODIUM 40 MG: 40 INJECTION SUBCUTANEOUS at 08:25

## 2019-11-12 RX ADMIN — PROPOFOL 70 MG: 10 INJECTION, EMULSION INTRAVENOUS at 13:57

## 2019-11-12 RX ADMIN — PROPOFOL 20 MG: 10 INJECTION, EMULSION INTRAVENOUS at 13:59

## 2019-11-12 RX ADMIN — DOCUSATE SODIUM 100 MG: 100 CAPSULE, LIQUID FILLED ORAL at 08:25

## 2019-11-12 RX ADMIN — PRAVASTATIN SODIUM 80 MG: 80 TABLET ORAL at 08:25

## 2019-11-12 RX ADMIN — SERTRALINE HYDROCHLORIDE 50 MG: 50 TABLET ORAL at 21:41

## 2019-11-12 RX ADMIN — ASPIRIN 81 MG: 81 TABLET, COATED ORAL at 08:25

## 2019-11-12 RX ADMIN — CLOPIDOGREL BISULFATE 75 MG: 75 TABLET ORAL at 08:25

## 2019-11-12 RX ADMIN — PROPOFOL 20 MG: 10 INJECTION, EMULSION INTRAVENOUS at 14:03

## 2019-11-12 NOTE — PHYSICAL THERAPY NOTE
Session deferred this PM due to sedation from FLORENCE  Unable to return later  Will follow    Yo Edmar PT, DPT CSRS

## 2019-11-12 NOTE — ASSESSMENT & PLAN NOTE
· S/p CABG x5 in 2007  · Continue ASA, statin  · Follows with Dr Humberto Montiel as an out-patient

## 2019-11-12 NOTE — ASSESSMENT & PLAN NOTE
· Patient presents with transient episode of right arm weakness and dysarthria lasting approximately 10 minutes with spontaneous recovery  · CTA head/neck: No evidence of acute intracranial hemorrhage  Frontal craniotomy with bifrontal encephalomalacia  75% atherosclerotic plaque narrowing proximal right cervical ICA just beyond the bifurcation  · Patient seen by neurology in the ED  Stroke pathway initiated:  · MRI brain: There are bilateral small infarcts with  superficial cortical infarct noted in the left frontal region in the region of precentral and postcentral gyrus  Deep periventricular infarct is noted in the right frontal and left frontal region  ,    · ECHO: pending  · Will need FLORENCE per neuro recommendations and loop recorded  · PT/OT/ST evaluations  · Started on DAPT-- continue x3 weeks, then continue plavix 75mg PO QD  · Continue statin  Patient does not tolerate atorvastatin secondary to myalgias  Will continue current statin therapy

## 2019-11-12 NOTE — ANESTHESIA POSTPROCEDURE EVALUATION
Post-Op Assessment Note    CV Status:  Stable  Pain Score: 0    Pain management: adequate     Mental Status:  Alert, awake and sleepy   Hydration Status:  Euvolemic   PONV Controlled:  Controlled   Airway Patency:  Patent   Post Op Vitals Reviewed: Yes      Staff: Anesthesiologist, CRNA           BP   100/55   Temp   97f   Pulse  67   Resp   12   SpO2   100

## 2019-11-12 NOTE — PLAN OF CARE
Problem: Potential for Falls  Goal: Patient will remain free of falls  Description  INTERVENTIONS:  - Assess patient frequently for physical needs  -  Identify cognitive and physical deficits and behaviors that affect risk of falls  -  [place] fall precautions as indicated by assessment   - Educate patient/family on patient safety including physical limitations  - Instruct patient to call for assistance with activity based on assessment  - Modify environment to reduce risk of injury  - Consider OT/PT consult to assist with strengthening/mobility  Outcome: Progressing     Problem: Neurological Deficit  Goal: Neurological status is stable or improving  Description  Interventions:  - Monitor and assess patient's level of consciousness, motor function, sensory function, and level of assistance needed for ADLs  - Monitor and report changes from baseline  Collaborate with interdisciplinary team to initiate plan and implement interventions as ordered  - Provide and maintain a safe environment  - Consider fall precautions  - Consider aspiration precautions  Outcome: Progressing     Problem: Activity Intolerance/Impaired Mobility  Goal: Mobility/activity is maintained at optimum level for patient  Description  Interventions:  - Assess and monitor patient  barriers to mobility and need for assistive/adaptive devices  - Assess patient's emotional response to limitations  - Collaborate with interdisciplinary team and initiate plans and interventions as ordered  - Encourage independent activity per ability   - Perform active/passive rom as tolerated/ordered  - Plan activities to conserve energy   - Turn patient as appropriate   Outcome: Progressing     Problem: Communication Impairment  Goal: Ability to express needs and understand communication  Description  Assess patient's communication skills and ability to understand information    Patient will demonstrate use of effective communication techniques, alternative methods of communication and understanding even if not able to speak  - Encourage communication and provide alternate methods of communication as needed  - Collaborate with case management/ for discharge needs  - Include patient/family/caregiver in decisions related to communication  Outcome: Progressing     Problem: Potential for Aspiration  Goal: Non-ventilated patient's risk of aspiration is minimized  Description  Assess and monitor vital signs, respiratory status, and labs (WBC)  Monitor for signs of aspiration (tachypnea, cough, rales, wheezing, cyanosis, fever)  - Assess and monitor patient's ability to swallow  - Place patient up in chair to eat if possible  - HOB up at 90 degrees to eat if unable to get patient up into chair   - Supervise patient during oral intake  - Instruct patient/ family to take small bites  - Instruct patient/ family to take small single sips when taking liquids  - Follow patient-specific strategies generated by speech pathologist   Outcome: Progressing     Problem: Nutrition  Goal: Nutrition/Hydration status is improving  Description  Monitor and assess patient's nutrition/hydration status for malnutrition (ex- brittle hair, bruises, dry skin, pale skin and conjunctiva, muscle wasting, smooth red tongue, and disorientation)  Collaborate with interdisciplinary team and initiate plan and interventions as ordered  Monitor patient's weight and dietary intake as ordered or per policy  Utilize nutrition screening tool and intervene per policy  Determine patient's food preferences and provide high-protein, high-caloric foods as appropriate  - Assist patient with eating   - Allow adequate time for meals   - Encourage patient to take dietary supplement as ordered  - Collaborate with clinical nutritionist   - Include patient/family/caregiver in decisions related to nutrition    Outcome: Progressing     Problem: Prexisting or High Potential for Compromised Skin Integrity  Goal: Skin integrity is maintained or improved  Description  INTERVENTIONS:  - Identify patients at risk for skin breakdown  - Assess and monitor skin integrity  - Assess and monitor nutrition and hydration status  - Monitor labs   - Assess for incontinence   - Turn and reposition patient  - Assist with mobility/ambulation  - Relieve pressure over bony prominences  - Avoid friction and shearing  - Provide appropriate hygiene as needed including keeping skin clean and dry  - Evaluate need for skin moisturizer/barrier cream  - Collaborate with interdisciplinary team   - Patient/family teaching  - Consider wound care consult   Outcome: Progressing     Problem: INFECTION - ADULT  Goal: Absence or prevention of progression during hospitalization  Description  INTERVENTIONS:  - Assess and monitor for signs and symptoms of infection  - Monitor lab/diagnostic results  - Monitor all insertion sites, i e  indwelling lines, tubes, and drains  - Administer medications as ordered  - Instruct and encourage patient and family to use good hand hygiene technique  - Identify and instruct in appropriate isolation precautions for identified infection/condition   Outcome: Progressing     Problem: SAFETY ADULT  Goal: Patient will remain free of falls  Description  INTERVENTIONS:  - Assess patient frequently for physical needs  -  Identify cognitive and physical deficits and behaviors that affect risk of falls    -  [place] fall precautions as indicated by assessment   - Educate patient/family on patient safety including physical limitations  - Instruct patient to call for assistance with activity based on assessment  - Modify environment to reduce risk of injury  - Consider OT/PT consult to assist with strengthening/mobility  Outcome: Progressing  Goal: Maintain or return mobility status to optimal level  Description  INTERVENTIONS:  - Assess patient's baseline mobility status (ambulation, transfers, stairs, etc )    - Identify cognitive and physical deficits and behaviors that affect mobility  - Identify mobility aids required to assist with transfers and/or ambulation (gait belt, sit-to-stand, lift, walker, cane, etc )  - [place] fall precautions as indicated by assessment  - Record patient progress and toleration of activity level on Mobility SBAR; progress patient to next Phase/Stage  - Instruct patient to call for assistance with activity based on assessment  - Consider rehabilitation consult to assist with strengthening/weightbearing, etc   Outcome: Progressing     Problem: DISCHARGE PLANNING  Goal: Discharge to home or other facility with appropriate resources  Description  INTERVENTIONS:  - Identify barriers to discharge w/patient and caregiver  - Arrange for needed discharge resources and transportation as appropriate  - Identify discharge learning needs (meds, wound care, etc )  - Arrange for interpretive services to assist at discharge as needed  - Refer to Case Management Department for coordinating discharge planning if the patient needs post-hospital services based on physician/advanced practitioner order or complex needs related to functional status, cognitive ability, or social support system  Outcome: Progressing     Problem: Knowledge Deficit  Goal: Patient/family/caregiver demonstrates understanding of disease process, treatment plan, medications, and discharge instructions  Description  Complete learning assessment and assess knowledge base    Interventions:  - Provide teaching at level of understanding  - Provide teaching via preferred learning methods  Outcome: Progressing     Problem: NEUROSENSORY - ADULT  Goal: Achieves stable or improved neurological status  Description  INTERVENTIONS  - Monitor and report changes in neurological status  - Monitor vital signs such as temperature, blood pressure, glucose, and any other labs ordered   - Monitor for seizure activity and implement precautions if appropriate       Outcome: Progressing  Goal: Achieves maximal functionality and self care  Description  INTERVENTIONS  - Monitor swallowing and airway patency with patient fatigue and changes in neurological status  - Encourage and assist patient to increase activity and self care  - Encourage visually impaired, hearing impaired and aphasic patients to use assistive/communication devices  Outcome: Progressing     Problem: Nutrition/Hydration-ADULT  Goal: Nutrient/Hydration intake appropriate for improving, restoring or maintaining nutritional needs  Description  Monitor and assess patient's nutrition/hydration status for malnutrition  Collaborate with interdisciplinary team and initiate plan and interventions as ordered  Monitor patient's weight and dietary intake as ordered or per policy  Utilize nutrition screening tool and intervene as necessary  Determine patient's food preferences and provide high-protein, high-caloric foods as appropriate       INTERVENTIONS:  - Monitor oral intake, urinary output, labs, and treatment plans  - Assess nutrition and hydration status and recommend course of action  - Evaluate amount of meals eaten  - Assist patient with eating if necessary   - Allow adequate time for meals  - Recommend/ encourage appropriate diets, oral nutritional supplements, and vitamin/mineral supplements  - Assess need for intravenous fluids  - Provide specific nutrition/hydration education as appropriate  - Include patient/family/caregiver in decisions related to nutrition   Outcome: Progressing

## 2019-11-12 NOTE — PLAN OF CARE
Problem: Potential for Aspiration  Goal: Non-ventilated patient's risk of aspiration is minimized  Description  Assess and monitor vital signs, respiratory status, and labs (WBC)  Monitor for signs of aspiration (tachypnea, cough, rales, wheezing, cyanosis, fever)  - Assess and monitor patient's ability to swallow  - Place patient up in chair to eat if possible  - HOB up at 90 degrees to eat if unable to get patient up into chair   - Supervise patient during oral intake  - Instruct patient/ family to take small bites  - Instruct patient/ family to take small single sips when taking liquids  - Follow patient-specific strategies generated by speech pathologist   11/12/2019 1417 by Benito Petersen RN  Outcome: Completed  11/12/2019 0736 by Benito Petersen RN  Outcome: Progressing     Problem: Nutrition  Goal: Nutrition/Hydration status is improving  Description  Monitor and assess patient's nutrition/hydration status for malnutrition (ex- brittle hair, bruises, dry skin, pale skin and conjunctiva, muscle wasting, smooth red tongue, and disorientation)  Collaborate with interdisciplinary team and initiate plan and interventions as ordered  Monitor patient's weight and dietary intake as ordered or per policy  Utilize nutrition screening tool and intervene per policy  Determine patient's food preferences and provide high-protein, high-caloric foods as appropriate  - Assist patient with eating   - Allow adequate time for meals   - Encourage patient to take dietary supplement as ordered  - Collaborate with clinical nutritionist   - Include patient/family/caregiver in decisions related to nutrition    11/12/2019 1417 by Benito Petersen RN  Outcome: Completed  11/12/2019 0736 by Benito Petersen RN  Outcome: Progressing

## 2019-11-12 NOTE — PROGRESS NOTES
Progress Note - Misha Loya 1933, 80 y o  male MRN: [id number]    Unit/Bed#: [facility] 733-01 Encounter: [id number]    Primary Care Provider: Larissa Caldwell MD   Date and time admitted to hospital: 11/7/2019 11:32 AM        * Acute CVA (cerebrovascular accident), suspected embolic in nature  Assessment & Plan  · Patient presents with transient episode of right arm weakness and dysarthria lasting approximately 10 minutes with spontaneous recovery  · CTA head/neck: No evidence of acute intracranial hemorrhage  Frontal craniotomy with bifrontal encephalomalacia  75% atherosclerotic plaque narrowing proximal right cervical ICA just beyond the bifurcation  · Patient seen by neurology in the ED  Stroke pathway initiated:  · MRI brain: There are bilateral small infarcts with  superficial cortical infarct noted in the left frontal region in the region of precentral and postcentral gyrus  Deep periventricular infarct is noted in the right frontal and left frontal region  ,    · ECHO: pending  · Will need FLORENCE per neuro recommendations and loop recorded  · PT/OT/ST evaluations  · Started on DAPT-- continue x3 weeks, then continue plavix 75mg PO QD  · Continue statin  Patient does not tolerate atorvastatin secondary to myalgias  Will continue current statin therapy  History of resection of meningioma  Assessment & Plan  · Patient with a known history of frontal meningioma resection in 1999  · CTA head and neck demonstrate bifrontal encephalomalacia       Hypercalcemia  Assessment & Plan  · Monitor calcium levels closely    Hypertensive urgency  Assessment & Plan  Monitor blood pressures  Avoid hypotension    Lumbar radiculopathy  Assessment & Plan  · Follows with Pain Management as an out-patient   · Continue Gabapentin 900 mg HS    Orthostatic hypotension  Assessment & Plan  · Monitor orthostatics    Arteriosclerotic cardiovascular disease  Assessment & Plan  · S/p CABG x5 in 2007  · Continue ASA, statin  · Follows with Dr Marlon Baird as an out-patient  VTE Pharmacologic Prophylaxis:   Pharmacologic: Enoxaparin (Lovenox)  Mechanical VTE Prophylaxis in Place: Yes    Patient Centered Rounds: I have performed bedside rounds with nursing staff today  Discussions with Specialists or Other Care Team Provider:     Education and Discussions with Family / Patient: pt    Time Spent for Care: 30 minutes  More than 50% of total time spent on counseling and coordination of care as described above  Current Length of Stay: 4 day(s)    Current Patient Status: Inpatient   Certification Statement: The patient will continue to require additional inpatient hospital stay due to As mentioned    Discharge Plan:  Likely discharge next 24-48 hours    Code Status: Level 1 - Full Code      Subjective:     Comfortably sitting up in chair  Reports feeling okay  History chart labs medications reviewed    Objective:     Vitals:   Temp (24hrs), Av 5 °F (36 9 °C), Min:98 2 °F (36 8 °C), Max:98 7 °F (37 1 °C)    Temp:  [98 2 °F (36 8 °C)-98 7 °F (37 1 °C)] 98 2 °F (36 8 °C)  HR:  [58-74] 73  Resp:  [15-22] 18  BP: (112-150)/(56-64) 138/63  SpO2:  [92 %-99 %] 93 %  Body mass index is 24 19 kg/m²  Input and Output Summary (last 24 hours):        Intake/Output Summary (Last 24 hours) at 2019 1557  Last data filed at 2019 1408  Gross per 24 hour   Intake 200 ml   Output 900 ml   Net -700 ml       Physical Exam:     Physical Exam    Comfortably sitting up in chair  Neck supple  Lungs diminished breath sounds bases  Heart sounds S1-S2 noted  Abdomen soft  Awake alert obeys simple commands  Pulses noted  No rash    Additional Data:     Labs:    Results from last 7 days   Lab Units 11/10/19  0604 19  0502   WBC Thousand/uL 7 67 7 13   HEMOGLOBIN g/dL 10 5* 10 8*   HEMATOCRIT % 33 8* 35 0*   PLATELETS Thousands/uL 170 156   NEUTROS PCT %  --  53   LYMPHS PCT %  --  35   MONOS PCT %  --  10   EOS PCT %  --  2 Results from last 7 days   Lab Units 11/12/19  0655   SODIUM mmol/L 139   POTASSIUM mmol/L 3 6   CHLORIDE mmol/L 107   CO2 mmol/L 24   BUN mg/dL 13   CREATININE mg/dL 0 96   ANION GAP mmol/L 8   CALCIUM mg/dL 10 1   GLUCOSE RANDOM mg/dL 108     Results from last 7 days   Lab Units 11/10/19  0604   INR  1 09         Results from last 7 days   Lab Units 11/08/19  0502   HEMOGLOBIN A1C % 6 0               * I Have Reviewed All Lab Data Listed Above  * Additional Pertinent Lab Tests Reviewed: All Labs Within Last 24 Hours Reviewed    Imaging:    Imaging Reports Reviewed Today Include:   Imaging Personally Reviewed by Myself Includes:     Recent Cultures (last 7 days):           Last 24 Hours Medication List:     Current Facility-Administered Medications:  aspirin 81 mg Oral Daily Vitaliy Villarreal PA-C   clopidogrel 75 mg Oral Daily Vitaliy Villarreal PA-C   docusate sodium 100 mg Oral BID Vitaliy Villarreal PA-C   enoxaparin 40 mg Subcutaneous Daily Vitaliy Villarreal PA-C   gabapentin 900 mg Oral HS Vitaliy Villarreal PA-C   hydrALAZINE 5 mg Intravenous Q6H PRN Vitaliy Villarreal PA-C   metoprolol succinate 25 mg Oral Daily Vitaliy Villarreal PA-C   pantoprazole 40 mg Oral Early Morning Vitaliy Villarreal PA-C   pravastatin 80 mg Oral Daily Vitaliy Villarreal PA-C   psyllium 1 packet Oral Daily Vitaliy Villarreal PA-C   sertraline 50 mg Oral Daily Vitaliy Villarreal PA-C        Today, Patient Was Seen By: Mir Lemus MD    ** Please Note: Dictation voice to text software may have been used in the creation of this document   **

## 2019-11-12 NOTE — RESTORATIVE TECHNICIAN NOTE
Restorative Specialist Mobility Note       Activity: Ambulate in [place], Ambulate in room, Bathroom privileges, Chair, Dangle, Stand at bedside(Educated/encouraged pt to ambulate with assistance 3-4 x's/day  Chair alarm on   Pt callbell, phone/tray within reach )     Assistive Device: Other (Comment)(HHA x1)       Kathy ESTRADA, Restorative Technician, [employer]

## 2019-11-12 NOTE — ANESTHESIA PREPROCEDURE EVALUATION
[place]OR[place] for embolic r/o     Review of Systems/Medical History  Patient summary reviewed    No history of anesthetic complications     Cardiovascular  EKG reviewed, Hyperlipidemia, Hypertension , CAD , History of CABG,   Comment: 11/2019 TTE -   LEFT VENTRICLE: Size was normal  Systolic function was at the lower limits of normal  Ejection fraction was estimated to be 52 %  There was akinesis of the entire inferior wall(s)  Wall thickness was mildly increased  The changes were  consistent with concentric remodeling (increased wall thickness with normal wall mass)  DOPPLER: Doppler parameters were consistent with abnormal left ventricular relaxation (grade 1 diastolic dysfunction)      RIGHT VENTRICLE: The size was normal  Systolic function was normal  Wall thickness was normal      LEFT ATRIUM: Size was normal      RIGHT ATRIUM: Size was normal      MITRAL VALVE: Valve structure was normal  There was normal leaflet separation  DOPPLER: The transmitral velocity was within the normal range  There was no evidence for stenosis  There was no significant regurgitation      AORTIC VALVE: The valve was trileaflet  Leaflets exhibited mildly increased thickness, moderate calcification, and mildly reduced cuspal separation  DOPPLER: Transaortic velocity was within the normal range  There was no evidence for  stenosis  There was no significant regurgitation      TRICUSPID VALVE: The valve structure was normal  There was normal leaflet separation  DOPPLER: The transtricuspid velocity was within the normal range  There was no evidence for stenosis  There was trace regurgitation      PULMONIC VALVE: Leaflets exhibited normal thickness, no calcification, and normal cuspal separation  DOPPLER: The transpulmonic velocity was within the normal range  There was no significant regurgitation      PERICARDIUM: There was no pericardial effusion   The pericardium was normal in appearance      AORTA: The root exhibited normal size      SYSTEMIC VEINS: IVC: The inferior vena cava was normal in size ,  Pulmonary  Negative pulmonary ROS Smoker ex-smoker  ,        GI/Hepatic  Dysphagia (cleared by speech/swallow),   GERD ,        Negative  ROS        Endo/Other  Negative endo/other ROS      GYN       Hematology  Anemia ,     Musculoskeletal    Arthritis     Neurology  Seizures ,  CVA , acute,    Psychology   Anxiety, Depression ,              Physical Exam    Airway    Mallampati score: II  TM Distance: >3 FB  Neck ROM: full     Dental   No notable dental hx     Cardiovascular  Rhythm: regular, Rate: normal,     Pulmonary  Breath sounds clear to auscultation,     Other Findings  On RA on floor      Anesthesia Plan  ASA Score- 3     Anesthesia Type- IV sedation with anesthesia with ASA Monitors  Additional Monitors:   Airway Plan:     Comment: Per patient, appropriately NPO, denies active CP/SOB/wheezing/symptoms related to heartburn/nausea/vomiting        Plan Factors-  Patient did not smoke on day of surgery  Induction- intravenous  Postoperative Plan-     Informed Consent- Anesthetic plan and risks discussed with patient  I personally reviewed this patient with the CRNA  Discussed and agreed on the Anesthesia Plan with the CRNA  Georgia Gonzalez

## 2019-11-13 ENCOUNTER — APPOINTMENT (INPATIENT)
Dept: NON INVASIVE DIAGNOSTICS | Facility: HOSPITAL | Age: 84
DRG: 041 | End: 2019-11-13
Payer: MEDICARE

## 2019-11-13 VITALS
HEART RATE: 81 BPM | DIASTOLIC BLOOD PRESSURE: 56 MMHG | TEMPERATURE: 97.6 F | OXYGEN SATURATION: 94 % | WEIGHT: 163.8 LBS | HEIGHT: 69 IN | SYSTOLIC BLOOD PRESSURE: 139 MMHG | BODY MASS INDEX: 24.26 KG/M2 | RESPIRATION RATE: 19 BRPM

## 2019-11-13 PROBLEM — I16.0 HYPERTENSIVE URGENCY: Status: RESOLVED | Noted: 2019-11-07 | Resolved: 2019-11-13

## 2019-11-13 LAB
APTT SCREEN TO CONFIRM RATIO: 0.91 RATIO (ref 0–1.4)
B2 GLYCOPROT1 IGA SER-ACNC: <9 GPI IGA UNITS (ref 0–25)
B2 GLYCOPROT1 IGG SER-ACNC: <9 GPI IGG UNITS (ref 0–20)
B2 GLYCOPROT1 IGM SER-ACNC: <9 GPI IGM UNITS (ref 0–32)
CONFIRM APTT/NORMAL: 42.6 SEC (ref 0–55)
LA PPP-IMP: NORMAL
PROT S ACT/NOR PPP: 68 % (ref 63–140)
PROT S ACT/NOR PPP: 85 % (ref 57–157)
PROT S PPP-ACNC: 102 % (ref 60–150)
SCREEN APTT: 39.9 SEC (ref 0–51.9)
SCREEN DRVVT: 39.7 SEC (ref 0–47)
THROMBIN TIME: 19.1 SEC (ref 0–23)

## 2019-11-13 PROCEDURE — 97530 THERAPEUTIC ACTIVITIES: CPT

## 2019-11-13 PROCEDURE — 0JH632Z INSERTION OF MONITORING DEVICE INTO CHEST SUBCUTANEOUS TISSUE AND FASCIA, PERCUTANEOUS APPROACH: ICD-10-PCS | Performed by: INTERNAL MEDICINE

## 2019-11-13 PROCEDURE — 97110 THERAPEUTIC EXERCISES: CPT

## 2019-11-13 PROCEDURE — 33285 INSJ SUBQ CAR RHYTHM MNTR: CPT

## 2019-11-13 PROCEDURE — 99239 HOSP IP/OBS DSCHRG MGMT >30: CPT | Performed by: INTERNAL MEDICINE

## 2019-11-13 PROCEDURE — 33285 INSJ SUBQ CAR RHYTHM MNTR: CPT | Performed by: INTERNAL MEDICINE

## 2019-11-13 PROCEDURE — C1764 EVENT RECORDER, CARDIAC: HCPCS

## 2019-11-13 PROCEDURE — 97116 GAIT TRAINING THERAPY: CPT

## 2019-11-13 PROCEDURE — 99232 SBSQ HOSP IP/OBS MODERATE 35: CPT | Performed by: PHYSICIAN ASSISTANT

## 2019-11-13 RX ORDER — CLOPIDOGREL BISULFATE 75 MG/1
75 TABLET ORAL DAILY
Qty: 30 TABLET | Refills: 0 | Status: SHIPPED | OUTPATIENT
Start: 2019-11-14 | End: 2019-11-26 | Stop reason: SDUPTHER

## 2019-11-13 RX ORDER — LIDOCAINE HYDROCHLORIDE 10 MG/ML
INJECTION, SOLUTION EPIDURAL; INFILTRATION; INTRACAUDAL; PERINEURAL CODE/TRAUMA/SEDATION MEDICATION
Status: COMPLETED | OUTPATIENT
Start: 2019-11-13 | End: 2019-11-13

## 2019-11-13 RX ADMIN — PRAVASTATIN SODIUM 80 MG: 80 TABLET ORAL at 08:37

## 2019-11-13 RX ADMIN — METOPROLOL SUCCINATE 25 MG: 25 TABLET, EXTENDED RELEASE ORAL at 08:37

## 2019-11-13 RX ADMIN — LIDOCAINE HYDROCHLORIDE 20 ML: 10 INJECTION, SOLUTION EPIDURAL; INFILTRATION; INTRACAUDAL; PERINEURAL at 12:17

## 2019-11-13 RX ADMIN — PSYLLIUM HUSK 1 PACKET: 3.4 POWDER ORAL at 08:38

## 2019-11-13 RX ADMIN — PANTOPRAZOLE SODIUM 40 MG: 40 TABLET, DELAYED RELEASE ORAL at 05:34

## 2019-11-13 RX ADMIN — ENOXAPARIN SODIUM 40 MG: 40 INJECTION SUBCUTANEOUS at 08:37

## 2019-11-13 RX ADMIN — DOCUSATE SODIUM 100 MG: 100 CAPSULE, LIQUID FILLED ORAL at 08:37

## 2019-11-13 RX ADMIN — CLOPIDOGREL BISULFATE 75 MG: 75 TABLET ORAL at 08:36

## 2019-11-13 RX ADMIN — ASPIRIN 81 MG: 81 TABLET, COATED ORAL at 08:36

## 2019-11-13 NOTE — PROGRESS NOTES
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, and deep right frontal regions, strongly suspect embolic etiology  -MRI brain as above   -CTA head and neck demonstrates 75% narrowing of the proximal right cervical ICA just beyond the bifurcation  Known sequela of bifrontal craniotomy noted  -FLORENCE demonstrates no left atrial thrombus  EF 60%  No noted PFO    -loop recorder placement today   -vascular surgery evaluation as an outpatient   -continue dual antiplatelet therapy for a total of 3 weeks, then Plavix monotherapy   -will hold anticoagulation at this time   -continue statin   -PT/OT/speech   -supportive care and medication management per primary team   -will continue to follow, please monitor exam notify with changes   -the patient will follow up with the Stroke [facility], appointment has been requested   -no additional acute neurologic recommendations at this time, please call with questions      Subjective:   Patient is an 80year-old male with HTN, HLD, orthostatic hypotension on midodrine, peripheral neuropathy, CAD status post bypass, cervical spinal stenosis, prior anterior fossa meningioma status post resection 1989, prostate cancer, anxiety and depression who initially presented to the [facility] Emergency Department on 11/07/2019 with a chief complaint of transient right arm weakness, bilateral leg weakness, as well as hypertension  MRI brain was completed overnight which demonstrates multifocal small acute infarctions, very likely embolic in etiology  FLORENCE without evidence of PFO or atrial thrombus  Loop recorder placement to occur today  On exam today, the patient is resting comfortably in bed in no acute distress  Twelve point review systems was completed and is negative    Patient demonstrates a grossly nonfocal neurological exam as detailed below  ROS:  See subjective    Medications:  Scheduled Meds:    Current Facility-Administered Medications:  aspirin 81 mg Oral Daily Aletha Timothy, LOGAN   clopidogrel 75 mg Oral Daily Aletha Timothy, LOGAN   docusate sodium 100 mg Oral BID Aletha Timothy, PA-TJ   enoxaparin 40 mg Subcutaneous Daily Aletha Timothy, LOGAN   gabapentin 900 mg Oral HS Aletha Timothy, PA-TJ   hydrALAZINE 5 mg Intravenous Q6H PRN Aletha Timothy, LOGAN   metoprolol succinate 25 mg Oral Daily Aletha Timothy, PA-TJ   pantoprazole 40 mg Oral Early Morning Aletha Timothy, LOGAN   pravastatin 80 mg Oral Daily Aletha Timothy, LOGAN   psyllium 1 packet Oral Daily Aletha Timothy, LOGAN   sertraline 50 mg Oral Daily Aletha Timothy, LOGAN     Continuous Infusions:   PRN Meds: hydrALAZINE      Vitals: Blood pressure 139/56, pulse 84, temperature 97 6 °F (36 4 °C), temperature source Oral, resp  rate 19, height 5' 9" (1 753 m), weight 74 3 kg (163 lb 12 8 oz), SpO2 94 %  ,Body mass index is 24 19 kg/m²  Physical Exam:   Physical Exam   Constitutional: He is oriented to person, place, and time  He appears well-developed and well-nourished  No distress  HENT:   Head: Normocephalic and atraumatic  Right Ear: External ear normal    Left Ear: External ear normal    Mouth/Throat: Oropharynx is clear and moist  No oropharyngeal exudate  Eyes: Conjunctivae are normal  Right eye exhibits no discharge  Left eye exhibits no discharge  No scleral icterus  Neck: Normal range of motion  Neck supple  Pulmonary/Chest: Effort normal  No respiratory distress  Musculoskeletal: Normal range of motion  He exhibits no edema, tenderness or deformity  Neurological: He is oriented to person, place, and time  He has normal strength  He has a normal Finger-Nose-Finger Test    Skin: Skin is warm and dry  No rash noted  He is not diaphoretic  No erythema  No pallor  Psychiatric: He has a normal mood and affect   His speech is normal and behavior is normal    Nursing note and vitals reviewed  Neurologic Exam     Mental Status   Oriented to person, place, and time  Follows 2 step commands  Attention: normal  Concentration: normal    Speech: speech is normal   Level of consciousness: alert  Knowledge: good  Normal comprehension  Cranial Nerves   Cranial nerves II through XII intact  Motor Exam   Muscle bulk: normal  Overall muscle tone: normal    Strength   Strength 5/5 throughout  Sensory Exam   Light touch normal      Gait, Coordination, and Reflexes     Gait  Gait: (Deferred for safety)    Coordination   Finger to nose coordination: normal    Tremor   Resting tremor: absent      Lab, Imaging and other studies: I have personally reviewed pertinent reports  I have personally reviewed pertinent imaging in PACs  No results found for this or any previous visit (from the past 24 hour(s)) ]      VTE Prophylaxis: Sequential compression device (Venodyne)  and Enoxaparin (Lovenox)    Counseling / Coordination of Care  Total time spent today 25 minutes  Greater than 50% of total time was spent with the patient and / or family counseling and / or coordination of care  A description of the counseling / coordination of care: [facility] The pt was seen and examined by myself and the attending physician  The chart was reviewed thoroughly, including laboratory values and imaging studies  The pt was counseled in the room  The patient was discussed with Internal Medicine

## 2019-11-13 NOTE — PLAN OF CARE
Problem: PHYSICAL THERAPY ADULT  Goal: Performs mobility at highest level of function for planned discharge setting  See evaluation for individualized goals  Description  Treatment/Interventions: Functional transfer training, LE strengthening/ROM, Therapeutic exercise, Endurance training, Bed mobility, Gait training, Spoke to nursing, OT  Equipment Recommended: Darian Zamora       See flowsheet documentation for full assessment, interventions and recommendations  Outcome: Progressing  Note:   Prognosis: Good  Problem List: Decreased strength, Decreased endurance, Impaired balance, Decreased mobility  Assessment: Pt is making steady progress with functional mobility  Cues for hand placement with safety for transfers  Gait is slow with mild unsteadiness however without loss of balance  Denies dizziness today  With encouragement, trialed use of a staight cane during ambulation trials  Completed seated BLE exercises to conclude session  Chair alarm active post session  Pt would benefit from continued physical therapy to maximize functional independence  Recommendation: Outpatient PT     PT - OK to Discharge: Yes(once medically stable)    See flowsheet documentation for full assessment

## 2019-11-13 NOTE — PROGRESS NOTES
Spoke with Dr Jayesh Escobar with [facility], plan for today is have loop recorder placed and d/c home, continue to monitor pt

## 2019-11-13 NOTE — PHYSICAL THERAPY NOTE
Physical Therapy Progress Note     11/13/19 1039   Pain Assessment   Pain Assessment No/denies pain   Pain Score No Pain   Restrictions/Precautions   Weight Bearing Precautions Per Order No   Other Precautions Chair Alarm; Fall Risk   General   Family/Caregiver Present No   Cognition   Overall Cognitive Status WFL   Arousal/Participation Alert; Cooperative   Transfers   Sit to Stand 5  Supervision   Additional items Assist x 1   Stand to Sit 5  Supervision   Additional items Assist x 1   Ambulation/Elevation   Gait pattern   (slow, short step length)   Gait Assistance 5  Supervision   Additional items Assist x 1   Assistive Device   (without AD and straight cane)   Distance Without AD 70 feet x 2, straight cane 70 feet x 2   Balance   Static Sitting Fair +   Dynamic Sitting Fair   Static Standing Fair   Dynamic Standing Fair -   Ambulatory Fair -   Endurance Deficit   Endurance Deficit Yes   Endurance Deficit Description general weakness   Activity Tolerance   Activity Tolerance Patient limited by fatigue   Nurse [address] to see per RN [place]   Exercises   Hip Flexion Sitting;20 reps;AROM; Bilateral   Knee AROM Long Arc Quad Sitting;20 reps;AROM; Bilateral   Ankle Pumps Sitting;20 reps;AROM; Bilateral   Assessment   Prognosis Good   Problem List Decreased strength;Decreased endurance; Impaired balance;Decreased mobility   Assessment Pt is making steady progress with functional mobility  Cues for hand placement with safety for transfers  Gait is slow with mild unsteadiness however without loss of balance  Denies dizziness today  With encouragement, trialed use of a staight cane during ambulation trials  Completed seated BLE exercises to conclude session  Chair alarm active post session  Pt would benefit from continued physical therapy to maximize functional independence     Goals   Patient Goals To go home today   STG Expiration Date 11/22/19   Short Term Goal #1 In 10-14 days, pt will be able to ambulate 250-300' w/RW <--> LRAD at mod I to improve his functional independence; pt will be able to perform all transfers independently to improve his ability to negotiate various surfaces; pt will be able to improve standing balance to fair+ to improve his ability to safely perform activities while in standing   PT Treatment Day 2   Plan   Treatment/Interventions Functional transfer training;LE strengthening/ROM; Therapeutic exercise; Endurance training;Patient/family training;Bed mobility;Gait training;Spoke to nursing   Progress Progressing toward goals   PT Frequency   (3-5x/week)   Recommendation   Recommendation Outpatient PT   Equipment Recommended Cane  (Pt has SPC at home)     Nathalie Larson, PTA

## 2019-11-13 NOTE — RESTORATIVE TECHNICIAN NOTE
Restorative Specialist Mobility Note       Activity: Ambulate in [place], [address] privileges, Ambulate in room, Chair, Dangle, Stand at bedside(Educated/encouraged pt to ambulate with assistance 3-4 x's/day  Chair alarm on   Pt callbell, phone/tray within reach )     Assistive Device: Other (Comment)(HHA x1)          Jose ESTRADA, Restorative Technician, [employer]

## 2019-11-13 NOTE — DISCHARGE INSTRUCTIONS
Keep loop recorder incision dry for one week  Do not use lotions/powders/creams on incision  Remove outer bandage 48 hours after procedure - if present, leave underlying steri-strips in place, they will either fall off on their own or will be removed at 2 week follow up appointment  Please call the office [phone] if you notice redness, swelling, bleeding, or drainage from incision or if you develop fevers  Cardiac Loop Recorder Insertion      WHAT YOU SHOULD KNOW:    A cardiac loop recorder is a device used to diagnose heart rhythm problems, such as a fast or irregular heartbeat  It is implanted in your left chest, just under the skin  The device records a pattern of your heart's rhythm, called an EKG  Your device records automatic EKGs, depending on how your caregiver programs it  You may also receive a handheld controller  You press a button on the controller when you have symptoms, such as dizziness, lightheadedness, or palpitations  The device will record an EKG at that moment  The recording can help your caregiver see if your symptoms may be caused by heart rhythm problems  Your caregiver will remove the device after it has collected enough data  You may need the device for up to 3 years  The procedure to remove the device is similar to the procedure used to implant it       AFTER YOU LEAVE:    Follow up with your cardiologist as directed: You will need to return in 1 to 2 weeks  Your cardiologist will check your incision  He may also program your device settings again  He will retrieve data from the device every 1 to 3 months with a monitor held over your skin  You may be able to transmit data from your device from home as well  You will do this by calling a number provided by your cardiologist, or as they have instructed you  Ask for information about this process  Write down your questions so you remember to ask them during your visits       Wound care: Keep loop recorder incision dry for one week  Do not use lotions/powders/creams on incision  Remove outer bandage 48 hours after procedure - leave underlying steri-strips in place, they will either fall off on their own or will be removed at 2 week follow up appointment  Please call the office if you notice redness, swelling, bleeding, or drainage from incision or if you develop fevers  After that first week, carefully wash your incision with soap and water  Keep the area clean and dry until it heals       Return to activity: If you received anesthesia, you will not be able to drive for 24 hours  Otherwise, most people can return to normal activities soon after the procedure  Your cardiologist may want to know if your work involves electrical current or high-voltage equipment  Ask about other electrical items that could interfere with your cardiac loop recorder       Contact your cardiologist if:   · You have a fever or chills  · Your wound is red, swollen, or draining pus  · You have questions or concerns about your condition or care  Seek care immediately or call 911 if:   · You feel weak, dizzy, or faint  · You lose consciousness  © 2014 [employer] [employer] is for End User's use only and may not be sold, redistributed or otherwise used for commercial purposes  All illustrations and images included in CareNotes® are the copyrighted property of A D A M [employer] Inc  or Agustín Ledezma  The above information is an  only  It is not intended as medical advice for individual conditions or treatments  Talk to your doctor, nurse or pharmacist before following any medical regimen to see if it is safe and effective for you

## 2019-11-13 NOTE — PROGRESS NOTES
Rounded with patient for further follow up care/dischagre needs from outpatient neurology  Patient states he does not have any questions or concerns at this time  Reports improvement in walking  Will continue to follow up for additional needs

## 2019-11-13 NOTE — PLAN OF CARE
Problem: Potential for Falls  Goal: Patient will remain free of falls  Description  INTERVENTIONS:  - Assess patient frequently for physical needs  -  Identify cognitive and physical deficits and behaviors that affect risk of falls  -  [place] fall precautions as indicated by assessment   - Educate patient/family on patient safety including physical limitations  - Instruct patient to call for assistance with activity based on assessment  - Modify environment to reduce risk of injury  - Consider OT/PT consult to assist with strengthening/mobility  Outcome: Progressing     Problem: Neurological Deficit  Goal: Neurological status is stable or improving  Description  Interventions:  - Monitor and assess patient's level of consciousness, motor function, sensory function, and level of assistance needed for ADLs  - Monitor and report changes from baseline  Collaborate with interdisciplinary team to initiate plan and implement interventions as ordered  - Provide and maintain a safe environment  - Consider fall precautions  - Consider aspiration precautions  Outcome: Progressing     Problem: Activity Intolerance/Impaired Mobility  Goal: Mobility/activity is maintained at optimum level for patient  Description  Interventions:  - Assess and monitor patient  barriers to mobility and need for assistive/adaptive devices  - Assess patient's emotional response to limitations  - Collaborate with interdisciplinary team and initiate plans and interventions as ordered  - Encourage independent activity per ability   - Perform active/passive rom as tolerated/ordered  - Plan activities to conserve energy   - Turn patient as appropriate   Outcome: Progressing     Problem: Communication Impairment  Goal: Ability to express needs and understand communication  Description  Assess patient's communication skills and ability to understand information    Patient will demonstrate use of effective communication techniques, alternative methods of communication and understanding even if not able to speak  - Encourage communication and provide alternate methods of communication as needed  - Collaborate with case management/ for discharge needs  - Include patient/family/caregiver in decisions related to communication    Outcome: Progressing     Problem: Prexisting or High Potential for Compromised Skin Integrity  Goal: Skin integrity is maintained or improved  Description  INTERVENTIONS:  - Identify patients at risk for skin breakdown  - Assess and monitor skin integrity  - Assess and monitor nutrition and hydration status  - Monitor labs   - Assess for incontinence   - Turn and reposition patient  - Assist with mobility/ambulation  - Relieve pressure over bony prominences  - Avoid friction and shearing  - Provide appropriate hygiene as needed including keeping skin clean and dry  - Evaluate need for skin moisturizer/barrier cream  - Collaborate with interdisciplinary team   - Patient/family teaching  - Consider wound care consult   Outcome: Progressing     Problem: INFECTION - ADULT  Goal: Absence or prevention of progression during hospitalization  Description  INTERVENTIONS:  - Assess and monitor for signs and symptoms of infection  - Monitor lab/diagnostic results  - Monitor all insertion sites, i e  indwelling lines, tubes, and drains  - Administer medications as ordered  - Instruct and encourage patient and family to use good hand hygiene technique  - Identify and instruct in appropriate isolation precautions for identified infection/condition   Outcome: Progressing  Goal: Absence of fever/infection during neutropenic period  Description  INTERVENTIONS:  - Monitor WBC    Outcome: Progressing     Problem: SAFETY ADULT  Goal: Patient will remain free of falls  Description  INTERVENTIONS:  - Assess patient frequently for physical needs  -  Identify cognitive and physical deficits and behaviors that affect risk of falls   -  [place] fall precautions as indicated by assessment   - Educate patient/family on patient safety including physical limitations  - Instruct patient to call for assistance with activity based on assessment  - Modify environment to reduce risk of injury  - Consider OT/PT consult to assist with strengthening/mobility  Outcome: Progressing  Goal: Maintain or return mobility status to optimal level  Description  INTERVENTIONS:  - Assess patient's baseline mobility status (ambulation, transfers, stairs, etc )    - Identify cognitive and physical deficits and behaviors that affect mobility  - Identify mobility aids required to assist with transfers and/or ambulation (gait belt, sit-to-stand, lift, walker, cane, etc )  - [place] fall precautions as indicated by assessment  - Record patient progress and toleration of activity level on Mobility SBAR; progress patient to next Phase/Stage  - Instruct patient to call for assistance with activity based on assessment  - Consider rehabilitation consult to assist with strengthening/weightbearing, etc   Outcome: Progressing  Goal: Maintain or return to baseline ADL function  Description  INTERVENTIONS:  -  Assess patient's ability to carry out ADLs; assess patient's baseline for ADL function and identify physical deficits which impact ability to perform ADLs (bathing, care of mouth/teeth, toileting, grooming, dressing, etc )  - Assess/evaluate cause of self-care deficits   - Assess range of motion  - Assess patient's mobility; develop plan if impaired  - Assess patient's need for assistive devices and provide as appropriate  - Encourage maximum independence but intervene and supervise when necessary  - Involve family in performance of ADLs  - Assess for home care needs following discharge   - Consider OT consult to assist with ADL evaluation and planning for discharge  - Provide patient education as appropriate  Outcome: Progressing     Problem: DISCHARGE PLANNING  Goal: Discharge to home or other facility with appropriate resources  Description  INTERVENTIONS:  - Identify barriers to discharge w/patient and caregiver  - Arrange for needed discharge resources and transportation as appropriate  - Identify discharge learning needs (meds, wound care, etc )  - Arrange for interpretive services to assist at discharge as needed  - Refer to Case Management Department for coordinating discharge planning if the patient needs post-hospital services based on physician/advanced practitioner order or complex needs related to functional status, cognitive ability, or social support system  Outcome: Progressing     Problem: Knowledge Deficit  Goal: Patient/family/caregiver demonstrates understanding of disease process, treatment plan, medications, and discharge instructions  Description  Complete learning assessment and assess knowledge base  Interventions:  - Provide teaching at level of understanding  - Provide teaching via preferred learning methods  Outcome: Progressing     Problem: NEUROSENSORY - ADULT  Goal: Achieves stable or improved neurological status  Description  INTERVENTIONS  - Monitor and report changes in neurological status  - Monitor vital signs such as temperature, blood pressure, glucose, and any other labs ordered   - Monitor for seizure activity and implement precautions if appropriate       Outcome: Progressing  Goal: Achieves maximal functionality and self care  Description  INTERVENTIONS  - Monitor swallowing and airway patency with patient fatigue and changes in neurological status  - Encourage and assist patient to increase activity and self care     - Encourage visually impaired, hearing impaired and aphasic patients to use assistive/communication devices  Outcome: Progressing     Problem: Nutrition/Hydration-ADULT  Goal: Nutrient/Hydration intake appropriate for improving, restoring or maintaining nutritional needs  Description  Monitor and assess patient's nutrition/hydration status for malnutrition  Collaborate with interdisciplinary team and initiate plan and interventions as ordered  Monitor patient's weight and dietary intake as ordered or per policy  Utilize nutrition screening tool and intervene as necessary  Determine patient's food preferences and provide high-protein, high-caloric foods as appropriate       INTERVENTIONS:  - Monitor oral intake, urinary output, labs, and treatment plans  - Assess nutrition and hydration status and recommend course of action  - Evaluate amount of meals eaten  - Assist patient with eating if necessary   - Allow adequate time for meals  - Recommend/ encourage appropriate diets, oral nutritional supplements, and vitamin/mineral supplements  - Assess need for intravenous fluids  - Provide specific nutrition/hydration education as appropriate  - Include patient/family/caregiver in decisions related to nutrition   Outcome: Progressing     Problem: PAIN - ADULT  Goal: Verbalizes/displays adequate comfort level or baseline comfort level  Description  Interventions:  - Encourage patient to monitor pain and request assistance  - Assess pain using appropriate pain scale  - Administer analgesics based on type and severity of pain and evaluate response  - Implement non-pharmacological measures as appropriate and evaluate response  - Consider cultural and social influences on pain and pain management  - Notify physician/advanced practitioner if interventions unsuccessful or patient reports new pain  Outcome: Progressing

## 2019-11-13 NOTE — PROCEDURES
PP  LINQ    History and physical were reviewed  Patient was examined and history was reviewed  No change in patient's condition Since history and physical has been completed        The pre- operative diagnosis:  Cryptogenic stroke      Postoperative diagnosis:  Cryptogenic stroke      Procedure:  LINQ        Surgeon: [id number] -none    Specimens - none    Estimated blood loss- 5    Findings-none    Complications none    Anesthesia-  local lidocaine by myself      Details of the device      Description of procedure: The patient was seen before the procedure  The details of the procedure was explained and patient agreed to the same  Appropriate consent was signed  The patient was brought to the electrophysiologic laboratory  Proper time out was done  Sterile dressing and draping was done  Local lidocaine was infiltrated, in the left 2nd intercostal space, about 2 cm lateral to the sternal edge  Using the stab knife an incision was made  Thereafter the  was used, moved around to form a pocket, along the long axis of the heart, in the 2nd intercostal space region  Then plunger was used to deploy the device  The plunger was disengaged and removed  The  was pulled back  Pressure was held, a figure of 8 suture was applied,  and hemostasis was obtained  Dressing was done with Steri-Strips, Telfa and Tegaderm      Summary of the procedure: The patient came in to the laboratory for linq device placement   The device has been placed and patient tolerated the procedure well

## 2019-11-13 NOTE — DISCHARGE SUMMARY
Discharge Summary - [facility] Internal Medicine    Patient Information: Jose Palacios 80 y o  male MRN: [id number]  Unit/Bed#: [facility] 733-01 Encounter: [id number]    Discharging Physician / Practitioner: Jimbo Cooper MD  PCP: Otilia Monae MD  Admission Date: 11/7/2019  Discharge Date: 11/13/19    Disposition:     Home    Reason for Admission:  Right arm weakness, difficulty speaking    Discharge Diagnoses:     Principal Problem:    Acute CVA (cerebrovascular accident), suspected embolic in nature  Active Problems:    Arteriosclerotic cardiovascular disease    Orthostatic hypotension    Lumbar radiculopathy    Hypercalcemia    History of resection of meningioma  Resolved Problems:    Hypertensive urgency      Consultations During Hospital Stay:  · Neurology  · Cardiology    Procedures Performed:     · Chest x-ray no active lung disease  · CT head and neck no evidence of acute intracranial hemorrhage, frontal craniotomy, bifrontal encephalomalacia, 75% atherosclerotic plaque narrowing proximal right cervical ICA  · MRI brain bilateral small infarcts superficial cortical infarct noted in left frontal region, deep periventricular infarct noted in the right frontal and left frontal region  · CT chest abdomen pelvis left upper lobe pulmonary nodule, trace amount of pelvic ascites, no finding suggestive of metastasis in the abdominal pelvis  · 2D echo EF 95%, grade 1 diastolic dysfunction  · FLORENCE no echocardiographic evidence for cardiac source of embolism  · Loop recorder placement    Hospital Course:     Jose Palacios is a 80 y o  male patient who originally presented to the hospital on 11/7/2019 due to right arm weakness, difficulty speaking  Patient presented with lower extremity weakness right upper extremity weakness speech abnormalities  He was evaluated by Neurology, imaging studies revealed bilateral infarcts    He was placed on aspirin and Plavix, he was worked up for a cardiac source of embolism including FLORENCE and loop recorder has been placed  He will be discharged on aspirin and Plavix for 3 weeks followed by Plavix monotherapy alone, he will continue with his simvastatin as he has intolerance to atorvastatin  Patient is hemodynamically stable reports symptomatic improvement since admission and is deemed ready for discharge today  Kindly review the chart for details  Condition at Discharge: fair     Discharge Day Visit / Exam:     Subjective:      Comfortably sitting up in chair  Reports feeling okay  Agreeable to discharge plan    Vitals: Blood Pressure: 139/56 (11/13/19 1117)  Pulse: 81 (11/13/19 1217)  Temperature: 97 6 °F (36 4 °C) (11/13/19 1117)  Temp Source: Oral (11/13/19 1117)  Respirations: 19 (11/13/19 1117)  Height: 5' 9" (175 3 cm) (11/07/19 1807)  Weight - Scale: 74 3 kg (163 lb 12 8 oz) (11/07/19 1807)  SpO2: 94 % (11/13/19 1117)  Exam:     Comfortably sitting up in chair  Neck supple  Lungs diminished breath sounds bilateral bases  Heart sounds S1-S2 noted  Abdomen soft nontender  Awake obeys simple commands  Pulses noted  No rash  No pedal edema    Discharge instructions/Information to patient and family:   See after visit summary for information provided to patient and family  Discharge Plan discussed with Neurology, Cardiology  Discharge plan discussed the patient, he reports he will keep his family updated  Outpatient follow-up with Neurology Cardiology primary care physician    Provisions for Follow-Up Care:  See after visit summary for information related to follow-up care and any pertinent home health orders  Planned Readmission: no     Discharge Statement:  I spent 45 minutes discharging the patient  This time was spent on the day of discharge  I had direct contact with the patient on the day of discharge   Greater than 50% of the total time was spent examining patient, answering all patient questions, arranging and discussing plan of care with patient as well as directly providing post-discharge instructions  Additional time then spent on discharge activities  Discharge Medications:  See after visit summary for reconciled discharge medications provided to patient and family        ** Please Note: This note has been constructed using a voice recognition system **

## 2019-11-13 NOTE — PLAN OF CARE
Problem: Potential for Falls  Goal: Patient will remain free of falls  Description  INTERVENTIONS:  - Assess patient frequently for physical needs  -  Identify cognitive and physical deficits and behaviors that affect risk of falls  -  [place] fall precautions as indicated by assessment   - Educate patient/family on patient safety including physical limitations  - Instruct patient to call for assistance with activity based on assessment  - Modify environment to reduce risk of injury  - Consider OT/PT consult to assist with strengthening/mobility  11/13/2019 1238 by Valente Leyden, RN  Outcome: Adequate for Discharge  11/13/2019 1134 by Valente Leyden, RN  Outcome: Progressing     Problem: Neurological Deficit  Goal: Neurological status is stable or improving  Description  Interventions:  - Monitor and assess patient's level of consciousness, motor function, sensory function, and level of assistance needed for ADLs  - Monitor and report changes from baseline  Collaborate with interdisciplinary team to initiate plan and implement interventions as ordered  - Provide and maintain a safe environment  - Consider fall precautions  - Consider aspiration precautions  11/13/2019 1238 by Valente Leyden, RN  Outcome: Adequate for Discharge  11/13/2019 1134 by Valente Leyden, RN  Outcome: Progressing     Problem: Activity Intolerance/Impaired Mobility  Goal: Mobility/activity is maintained at optimum level for patient  Description  Interventions:  - Assess and monitor patient  barriers to mobility and need for assistive/adaptive devices  - Assess patient's emotional response to limitations  - Collaborate with interdisciplinary team and initiate plans and interventions as ordered  - Encourage independent activity per ability   - Perform active/passive rom as tolerated/ordered    - Plan activities to conserve energy   - Turn patient as appropriate   11/13/2019 1238 by Valente Leyden, RN  Outcome: Adequate for Discharge  11/13/2019 1134 by Jaya Osorio RN  Outcome: Progressing     Problem: Communication Impairment  Goal: Ability to express needs and understand communication  Description  Assess patient's communication skills and ability to understand information  Patient will demonstrate use of effective communication techniques, alternative methods of communication and understanding even if not able to speak  - Encourage communication and provide alternate methods of communication as needed  - Collaborate with case management/ for discharge needs  - Include patient/family/caregiver in decisions related to communication    11/13/2019 1238 by Jaya Osorio RN  Outcome: Adequate for Discharge  11/13/2019 1134 by Jaya Osorio RN  Outcome: Progressing     Problem: Prexisting or High Potential for Compromised Skin Integrity  Goal: Skin integrity is maintained or improved  Description  INTERVENTIONS:  - Identify patients at risk for skin breakdown  - Assess and monitor skin integrity  - Assess and monitor nutrition and hydration status  - Monitor labs   - Assess for incontinence   - Turn and reposition patient  - Assist with mobility/ambulation  - Relieve pressure over bony prominences  - Avoid friction and shearing  - Provide appropriate hygiene as needed including keeping skin clean and dry  - Evaluate need for skin moisturizer/barrier cream  - Collaborate with interdisciplinary team   - Patient/family teaching  - Consider wound care consult   11/13/2019 1238 by Jaya Osorio RN  Outcome: Adequate for Discharge  11/13/2019 1134 by Jaya Osorio RN  Outcome: Progressing     Problem: INFECTION - ADULT  Goal: Absence or prevention of progression during hospitalization  Description  INTERVENTIONS:  - Assess and monitor for signs and symptoms of infection  - Monitor lab/diagnostic results  - Monitor all insertion sites, i e  indwelling lines, tubes, and drains  - Administer medications as ordered  - Instruct and encourage patient and family to use good hand hygiene technique  - Identify and instruct in appropriate isolation precautions for identified infection/condition   11/13/2019 1238 by Maria Luz Stuart RN  Outcome: Adequate for Discharge  11/13/2019 1134 by Maria Luz Stuart RN  Outcome: Progressing  Goal: Absence of fever/infection during neutropenic period  Description  INTERVENTIONS:  - Monitor WBC    11/13/2019 1238 by Maria Luz Stuart RN  Outcome: Adequate for Discharge  11/13/2019 1134 by Maria Luz Stuart RN  Outcome: Progressing     Problem: SAFETY ADULT  Goal: Patient will remain free of falls  Description  INTERVENTIONS:  - Assess patient frequently for physical needs  -  Identify cognitive and physical deficits and behaviors that affect risk of falls    -  [place] fall precautions as indicated by assessment   - Educate patient/family on patient safety including physical limitations  - Instruct patient to call for assistance with activity based on assessment  - Modify environment to reduce risk of injury  - Consider OT/PT consult to assist with strengthening/mobility  11/13/2019 1238 by Maria Luz Stuart RN  Outcome: Adequate for Discharge  11/13/2019 1134 by Maria Luz Stuart RN  Outcome: Progressing  Goal: Maintain or return mobility status to optimal level  Description  INTERVENTIONS:  - Assess patient's baseline mobility status (ambulation, transfers, stairs, etc )    - Identify cognitive and physical deficits and behaviors that affect mobility  - Identify mobility aids required to assist with transfers and/or ambulation (gait belt, sit-to-stand, lift, walker, cane, etc )  - [place] fall precautions as indicated by assessment  - Record patient progress and toleration of activity level on Mobility SBAR; progress patient to next Phase/Stage  - Instruct patient to call for assistance with activity based on assessment  - Consider rehabilitation consult to assist with strengthening/weightbearing, etc   11/13/2019 1238 by Kalyani Goodwin RN  Outcome: Adequate for Discharge  11/13/2019 1134 by Vincent Oro RN  Outcome: Progressing  Goal: Maintain or return to baseline ADL function  Description  INTERVENTIONS:  -  Assess patient's ability to carry out ADLs; assess patient's baseline for ADL function and identify physical deficits which impact ability to perform ADLs (bathing, care of mouth/teeth, toileting, grooming, dressing, etc )  - Assess/evaluate cause of self-care deficits   - Assess range of motion  - Assess patient's mobility; develop plan if impaired  - Assess patient's need for assistive devices and provide as appropriate  - Encourage maximum independence but intervene and supervise when necessary  - Involve family in performance of ADLs  - Assess for home care needs following discharge   - Consider OT consult to assist with ADL evaluation and planning for discharge  - Provide patient education as appropriate  11/13/2019 1238 by Vincent Oro RN  Outcome: Adequate for Discharge  11/13/2019 1134 by Vincent Oro RN  Outcome: Progressing     Problem: DISCHARGE PLANNING  Goal: Discharge to home or other facility with appropriate resources  Description  INTERVENTIONS:  - Identify barriers to discharge w/patient and caregiver  - Arrange for needed discharge resources and transportation as appropriate  - Identify discharge learning needs (meds, wound care, etc )  - Arrange for interpretive services to assist at discharge as needed  - Refer to Case Management Department for coordinating discharge planning if the patient needs post-hospital services based on physician/advanced practitioner order or complex needs related to functional status, cognitive ability, or social support system  11/13/2019 1238 by Vincent Oro RN  Outcome: Adequate for Discharge  11/13/2019 1134 by Vincent Oro RN  Outcome: Progressing     Problem: Knowledge Deficit  Goal: Patient/family/caregiver demonstrates understanding of disease process, treatment plan, medications, and discharge instructions  Description  Complete learning assessment and assess knowledge base  Interventions:  - Provide teaching at level of understanding  - Provide teaching via preferred learning methods  11/13/2019 1238 by Brianda Sewet RN  Outcome: Adequate for Discharge  11/13/2019 1134 by Brianda Sweet RN  Outcome: Progressing     Problem: NEUROSENSORY - ADULT  Goal: Achieves stable or improved neurological status  Description  INTERVENTIONS  - Monitor and report changes in neurological status  - Monitor vital signs such as temperature, blood pressure, glucose, and any other labs ordered   - Monitor for seizure activity and implement precautions if appropriate       11/13/2019 1238 by Brianda Sweet RN  Outcome: Adequate for Discharge  11/13/2019 1134 by Brianda Sweet RN  Outcome: Progressing  Goal: Achieves maximal functionality and self care  Description  INTERVENTIONS  - Monitor swallowing and airway patency with patient fatigue and changes in neurological status  - Encourage and assist patient to increase activity and self care  - Encourage visually impaired, hearing impaired and aphasic patients to use assistive/communication devices  11/13/2019 1238 by Brianda Sweet RN  Outcome: Adequate for Discharge  11/13/2019 1134 by Brianda Sweet RN  Outcome: Progressing     Problem: Nutrition/Hydration-ADULT  Goal: Nutrient/Hydration intake appropriate for improving, restoring or maintaining nutritional needs  Description  Monitor and assess patient's nutrition/hydration status for malnutrition  Collaborate with interdisciplinary team and initiate plan and interventions as ordered  Monitor patient's weight and dietary intake as ordered or per policy  Utilize nutrition screening tool and intervene as necessary  Determine patient's food preferences and provide high-protein, high-caloric foods as appropriate       INTERVENTIONS:  - Monitor oral intake, urinary output, labs, and treatment plans  - Assess nutrition and hydration status and recommend course of action  - Evaluate amount of meals eaten  - Assist patient with eating if necessary   - Allow adequate time for meals  - Recommend/ encourage appropriate diets, oral nutritional supplements, and vitamin/mineral supplements  - Assess need for intravenous fluids  - Provide specific nutrition/hydration education as appropriate  - Include patient/family/caregiver in decisions related to nutrition   11/13/2019 1238 by Jaya Osorio RN  Outcome: Adequate for Discharge  11/13/2019 1134 by Jaya Osorio RN  Outcome: Progressing     Problem: PAIN - ADULT  Goal: Verbalizes/displays adequate comfort level or baseline comfort level  Description  Interventions:  - Encourage patient to monitor pain and request assistance  - Assess pain using appropriate pain scale  - Administer analgesics based on type and severity of pain and evaluate response  - Implement non-pharmacological measures as appropriate and evaluate response  - Consider cultural and social influences on pain and pain management  - Notify physician/advanced practitioner if interventions unsuccessful or patient reports new pain  11/13/2019 1238 by Jaya Osorio RN  Outcome: Adequate for Discharge  11/13/2019 1134 by Jaya Osorio RN  Outcome: Progressing

## 2019-11-14 ENCOUNTER — TELEPHONE (OUTPATIENT)
Dept: NEUROLOGY | Facility: CLINIC | Age: 84
End: 2019-11-14

## 2019-11-14 LAB — F5 GENE MUT ANL BLD/T: NORMAL

## 2019-11-14 NOTE — TELEPHONE ENCOUNTER
Have you ever been seen by outpatient Neurology?  NO     Do you have a location preference: [place]     Do you prefer morning or afternoon     Was your hospital stay due to a work or MVA related injury NO   (if yes, fill out)    If yes name of Insurance company: Medicare     Date of Injury:                   Open Claim      If yes Claim #     Name     [address] phone #    Type of Insurance Medicare       Appointment Scheduled for: Stroke HFU   2-10-20 1:00pm Teresa

## 2019-11-15 ENCOUNTER — TRANSITIONAL CARE MANAGEMENT (OUTPATIENT)
Dept: FAMILY MEDICINE CLINIC | Facility: CLINIC | Age: 84
End: 2019-11-15

## 2019-11-15 LAB — F2 GENE MUT ANL BLD/T: NORMAL

## 2019-11-19 ENCOUNTER — TELEPHONE (OUTPATIENT)
Dept: NEUROLOGY | Facility: CLINIC | Age: 84
End: 2019-11-19

## 2019-11-19 NOTE — TELEPHONE ENCOUNTER
The purpose of this phone call is to assess patient's general wellbeing or for any assistance needed with follow-up care  Hospitalization 11/7-11/13/2019     -    Called patient with no answer  Left message on voicemail box for call back  Scheduled 2/10 with Rachel  Will continue to reach out for follow up call

## 2019-11-19 NOTE — TELEPHONE ENCOUNTER
----- Message from Jemma Pham PA-C sent at 11/8/2019  1:27 PM EST -----  Regarding: HFU    Diagnosis/Reason for follow-up: TIA  Subspecialty for follow-up: Stroke  Attending or AP?: Either  Existing neurologist: None  Tests/Labs/Imaging ordered: None    Continue Aspirin 81 mg daily and Plavix 75 mg daily x 3 weeks, then continue on Plavix only

## 2019-11-21 ENCOUNTER — OFFICE VISIT (OUTPATIENT)
Dept: FAMILY MEDICINE CLINIC | Facility: CLINIC | Age: 84
End: 2019-11-21
Payer: MEDICARE

## 2019-11-21 VITALS
TEMPERATURE: 97.4 F | HEART RATE: 84 BPM | HEIGHT: 69 IN | DIASTOLIC BLOOD PRESSURE: 60 MMHG | SYSTOLIC BLOOD PRESSURE: 142 MMHG | BODY MASS INDEX: 24.59 KG/M2 | WEIGHT: 166 LBS

## 2019-11-21 DIAGNOSIS — I63.9 CEREBROVASCULAR ACCIDENT (CVA), UNSPECIFIED MECHANISM (HCC): Primary | ICD-10-CM

## 2019-11-21 DIAGNOSIS — I63.9 ACUTE CVA (CEREBROVASCULAR ACCIDENT) (HCC): ICD-10-CM

## 2019-11-21 PROCEDURE — 99495 TRANSJ CARE MGMT MOD F2F 14D: CPT | Performed by: FAMILY MEDICINE

## 2019-11-21 NOTE — TELEPHONE ENCOUNTER
Called patient since discharge, he has not experienced any new or worsening stroke symptoms  Denies any residual symptoms following hospitalization  States he has returned to baseline  Patient ambulates with a cane has a walker as needed  Preforms all his own ADLs, also manages his own medications, appointments, and affairs  Patient scheduled to follow up with PCP today  Stroke hospital follow up appointment scheduled 2/10 with Rachel MAO reviewed medications with him  There have not been any changes since discharge  he had no difficulties obtaining medications  Reports taking all as prescribed with no missed doses or medication side effects  Patient denies signs of bleeding  He verbalizes understanding of stroke symptoms and risk factor management  Patient states he monitors his BP at home but does not know his average  he is a non smoker  No specific diet  Patient does not have any questions or concerns at this time

## 2019-11-21 NOTE — ASSESSMENT & PLAN NOTE
CVA   Unfortunately patient does not having any lingering neurologic deficits at this time  He was given prescription to initiate strengthening and balance therapy as an outpatient as recommended during hospital stay  He is scheduled to see his cardiologist next week  He will continue on current regimen of medications including aspirin and Plavix as recommended by hospital stay for 4 weeks and thereafter continuing with just Plavix    He is scheduled see neurologist in February 2020

## 2019-11-21 NOTE — PROGRESS NOTES
FAMILY PRACTICE OFFICE VISIT       NAME: Sally Schrader  AGE: 80 y o  SEX: male       : 1933        MRN: [id number]    DATE: 2019  TIME: 3:51 PM    Assessment and Plan     Problem List Items Addressed This Visit        Cardiovascular and Mediastinum    Acute CVA (cerebrovascular accident), suspected embolic in nature     CVA  Unfortunately patient does not having any lingering neurologic deficits at this time  He was given prescription to initiate strengthening and balance therapy as an outpatient as recommended during hospital stay  He is scheduled to see his cardiologist next week  He will continue on current regimen of medications including aspirin and Plavix as recommended by hospital stay for 4 weeks and thereafter continuing with just Plavix  He is scheduled see neurologist in 2020           Other Visit Diagnoses     Cerebrovascular accident (CVA), unspecified mechanism ([facility] [address] )    -  Primary    Relevant Orders    Ambulatory referral to Physical Therapy        TCM Call (since 10/21/2019)     Date and time call was made  11/15/2019  4:49 PM    Hospital care reviewed  Records reviewed    Patient was hospitialized at  [facility]    Date of Admission  19    Date of discharge  19    Diagnosis  TIA    Disposition  Home    Were the patients medications reviewed and updated  Yes    Current Symptoms  Fatigue    Fatigue severity  Mild      TCM Call (since 10/21/2019)     Post hospital issues  Reduced activity    Should patient be enrolled in anticoag monitoring? No    Scheduled for follow up?   Yes    Patients specialists  Cardiologist    Cardiologist name   Cardiology-2019    Cardiologist contact #  [phone]    Referrals needed  no    Did you obtain your prescribed medications  Yes    Do you need help managing your prescriptions or medications  No    Is transportation to your appointment needed  Yes    Specify why  Pt not cleared to drive    I have advised the patient to call PCP with any new or worsening symptoms  Charlee Shearer 47 or Significiant other    Support System  Spouse    Are you recieving any outpatient services  Yes    What type of services  Physical Therapy    Are you recieving home care services  No    Are you using any community resources  No    Current waiver services  No    Counseling  Family    Counseling topics  Importance of RX compliance    Comments  Apt scheduled for 11/21/2019 @ 1:30pm          Rashad Myers is a 80 y o  male patient who originally presented to the hospital on 11/7/2019 due to right arm weakness, difficulty speaking  Patient presented with lower extremity weakness right upper extremity weakness speech abnormalities  He was evaluated by Neurology, imaging studies revealed bilateral infarcts  He was placed on aspirin and Plavix, he was worked up for a cardiac source of embolism including FLORENCE and loop recorder has been placed      He will be discharged on aspirin and Plavix for 3 weeks followed by Plavix monotherapy alone, he will continue with his simvastatin as he has intolerance to atorvastatin      Patient is hemodynamically stable reports symptomatic improvement since admission and is deemed ready for discharge today  Kindly review the chart for details  Chief Complaint     Chief Complaint   Patient presents with    Transition of Care Management     TIA       History of Present Illness     I reviewed the patient's discharge summary from his latest hospitalization  He has not had any neurologic symptoms since symptoms resolve the 10 minutes after experiencing symptoms at home  He is able to perform ADLs independently  I reviewed his recent CT a test from hospitalization      Review of Systems   Review of Systems   Constitutional: Negative  HENT: Negative  Respiratory: Negative  Cardiovascular: Negative  Gastrointestinal: Negative  Genitourinary: Negative  Musculoskeletal: Negative  Neurological: Negative  Psychiatric/Behavioral: Negative          Active Problem List     Patient Active Problem List   Diagnosis    Constipation    Iron deficiency    Other constipation    Anemia    Arteriosclerotic cardiovascular disease    Backache    Essential hypertension    Cervical spinal stenosis    Depression    Esophageal reflux    Hyperlipidemia    Insomnia    Spinal stenosis of lumbar region with neurogenic claudication    Vitamin B12 deficiency    Idiopathic peripheral neuropathy    Iron deficiency anemia    History of meningioma    Confusion    Altered mental status    Fall    Cognitive decline    Rib pain on right side    Contusion of rib on right side    Orthostatic hypotension    Convulsions ([address] )    Lumbar spondylosis    Lumbar radiculopathy    Acute CVA (cerebrovascular accident), suspected embolic in nature    Hypercalcemia    History of resection of meningioma       Past Medical History:  Past Medical History:   Diagnosis Date    Anxiety     Arthritis     Coronary artery disease involving native coronary artery of native heart without angina pectoris     Depression     Hypercholesterolemia     Meningioma ([address] ) 11/1999    brain tumor    Narcolepsy     daytime drowsiness and dozing off occasionally    Palpitations     Prostate cancer (HCC)        Past Surgical History:  Past Surgical History:   Procedure Laterality Date    BACK SURGERY      BRAIN SURGERY  11/08/1999    CORONARY ARTERY BYPASS GRAFT      x5       Family History:  Family History   Problem Relation Age of Onset    Hypertension Mother         benign essential    Cancer Mother     Osteoporosis Mother     Suicidality Father     Diabetes Family     Heart disease Family     Neuropathy Family         peripheral    Prostate cancer Family     Thyroid disease Family        Social History:  Social History     Socioeconomic History    Marital status: /Civil [employer]     Spouse name: Not on file    Number of children: Not on file    Years of education: Not on file    Highest education level: Not on file   Occupational History    Occupation: retired   Social Needs    Financial resource strain: Not on file    Food insecurity:     Worry: Not on file     Inability: Not on file   [employer] needs:     Medical: Not on file     Non-medical: Not on file   Tobacco Use    Smoking status: Former Smoker     Types: Cigars    Smokeless tobacco: Never Used    Tobacco comment: former cig smoker- quit in 1992   Substance and Sexual Activity    Alcohol use: Not Currently     Frequency: Never     Binge frequency: Never     Comment: (history)    Drug use: No    Sexual activity: Not on file   Lifestyle    Physical activity:     Days per week: Not on file     Minutes per session: Not on file    Stress: Not on file   Relationships    Social connections:     Talks on phone: Not on file     Gets together: Not on file     Attends Jainism service: Not on file     Active member of club or organization: Not on file     Attends meetings of clubs or organizations: Not on file     Relationship status: Not on file    Intimate partner violence:     Fear of current or ex partner: Not on file     Emotionally abused: Not on file     Physically abused: Not on file     Forced sexual activity: Not on file   Other Topics Concern    Not on file   Social History Narrative    Pt lives with wife       Objective     Vitals:    11/21/19 1332   BP: 142/60   Pulse: 84   Temp: (!) 97 4 °F (36 3 °C)     Wt Readings from Last 3 Encounters:   11/21/19 75 3 kg (166 lb)   11/07/19 74 3 kg (163 lb 12 8 oz)   11/07/19 74 4 kg (164 lb)       Physical Exam   Constitutional: He is oriented to person, place, and time  No distress  Neck:   No carotid bruit   Cardiovascular: Normal rate, regular rhythm and normal heart sounds  No murmur heard    Pulmonary/Chest: Effort normal and breath sounds normal  He has no wheezes  He has no rales  Musculoskeletal: He exhibits no edema  Neurological: He is alert and oriented to person, place, and time  No cranial nerve deficit  Muscle strength 5/5 upper and lower extremities  No disturbance of his speech  Psychiatric: He has a normal mood and affect   His behavior is normal  Judgment and thought content normal        Pertinent Laboratory/Diagnostic Studies:  Lab Results   Component Value Date    GLUCOSE 170 (H) 05/08/2015    BUN 13 11/12/2019    CREATININE 0 96 11/12/2019    CALCIUM 10 1 11/12/2019     05/08/2015    K 3 6 11/12/2019    CO2 24 11/12/2019     11/12/2019     Lab Results   Component Value Date    ALT 21 01/29/2019    AST 17 01/29/2019    ALKPHOS 59 01/29/2019    BILITOT 0 47 06/09/2015       Lab Results   Component Value Date    WBC 7 67 11/10/2019    HGB 10 5 (L) 11/10/2019    HCT 33 8 (L) 11/10/2019    MCV 76 (L) 11/10/2019     11/10/2019       No results found for: TSH    Lab Results   Component Value Date    CHOL 215 08/20/2014     Lab Results   Component Value Date    TRIG 150 11/08/2019     Lab Results   Component Value Date    HDL 45 11/08/2019     Lab Results   Component Value Date    LDLCALC 115 (H) 11/08/2019     Lab Results   Component Value Date    HGBA1C 6 0 11/08/2019       Results for orders placed or performed during the hospital encounter of 68/83/17   Basic metabolic panel   Result Value Ref Range    Sodium 139 136 - 145 mmol/L    Potassium 4 2 3 5 - 5 3 mmol/L    Chloride 108 100 - 108 mmol/L    CO2 28 21 - 32 mmol/L    ANION GAP 3 (L) 4 - 13 mmol/L    BUN 9 5 - 25 mg/dL    Creatinine 0 98 0 60 - 1 30 mg/dL    Glucose 119 65 - 140 mg/dL    Calcium 10 4 (H) 8 3 - 10 1 mg/dL    eGFR 70 ml/min/1 73sq m   CBC and Platelet   Result Value Ref Range    WBC 5 65 4 31 - 10 16 Thousand/uL    RBC 4 51 3 88 - 5 62 Million/uL    Hemoglobin 10 8 (L) 12 0 - 17 0 g/dL    Hematocrit 34 3 (L) 36 5 - 49 3 %    MCV 76 (L) 82 - 98 fL    MCH 23 9 (L) 26 8 - 34 3 pg    MCHC 31 5 31 4 - 37 4 g/dL    RDW 15 6 (H) 11 6 - 15 1 %    Platelets 080 392 - 063 Thousands/uL    MPV 10 1 8 9 - 12 7 fL   Protime-INR   Result Value Ref Range    Protime 13 5 11 6 - 14 5 seconds    INR 1 07 0 84 - 1 19   APTT   Result Value Ref Range    PTT 29 23 - 37 seconds   Troponin I   Result Value Ref Range    Troponin I <0 02 <=0 04 ng/mL   Calcium, ionized   Result Value Ref Range    Calcium, Ionized 1 31 1 12 - 1 32 mmol/L   Lipid Panel with Direct LDL reflex   Result Value Ref Range    Cholesterol 190 50 - 200 mg/dL    Triglycerides 150 <=150 mg/dL    HDL, Direct 45 >=40 mg/dL    LDL Calculated 115 (H) 0 - 100 mg/dL   Hemoglobin A1c w/EAG Estimation   Result Value Ref Range    Hemoglobin A1C 6 0 4 2 - 6 3 %     mg/dl   Basic metabolic panel   Result Value Ref Range    Sodium 138 136 - 145 mmol/L    Potassium 3 8 3 5 - 5 3 mmol/L    Chloride 106 100 - 108 mmol/L    CO2 27 21 - 32 mmol/L    ANION GAP 5 4 - 13 mmol/L    BUN 9 5 - 25 mg/dL    Creatinine 0 88 0 60 - 1 30 mg/dL    Glucose 105 65 - 140 mg/dL    Glucose, Fasting 105 (H) 65 - 99 mg/dL    Calcium 9 7 8 3 - 10 1 mg/dL    eGFR 78 ml/min/1 73sq m   CBC and differential   Result Value Ref Range    WBC 7 13 4 31 - 10 16 Thousand/uL    RBC 4 59 3 88 - 5 62 Million/uL    Hemoglobin 10 8 (L) 12 0 - 17 0 g/dL    Hematocrit 35 0 (L) 36 5 - 49 3 %    MCV 76 (L) 82 - 98 fL    MCH 23 5 (L) 26 8 - 34 3 pg    MCHC 30 9 (L) 31 4 - 37 4 g/dL    RDW 15 9 (H) 11 6 - 15 1 %    MPV 10 6 8 9 - 12 7 fL    Platelets 431 159 - 322 Thousands/uL    nRBC 0 /100 WBCs    Neutrophils Relative 53 43 - 75 %    Immat GRANS % 0 0 - 2 %    Lymphocytes Relative 35 14 - 44 %    Monocytes Relative 10 4 - 12 %    Eosinophils Relative 2 0 - 6 %    Basophils Relative 0 0 - 1 %    Neutrophils Absolute 3 70 1 85 - 7 62 Thousands/µL    Immature Grans Absolute 0 03 0 00 - 0 20 Thousand/uL    Lymphocytes Absolute 2 50 0 60 - 4 47 Thousands/µL Monocytes Absolute 0 70 0 17 - 1 22 Thousand/µL    Eosinophils Absolute 0 17 0 00 - 0 61 Thousand/µL    Basophils Absolute 0 03 0 00 - 0 10 Thousands/µL   CBC   Result Value Ref Range    WBC 7 67 4 31 - 10 16 Thousand/uL    RBC 4 43 3 88 - 5 62 Million/uL    Hemoglobin 10 5 (L) 12 0 - 17 0 g/dL    Hematocrit 33 8 (L) 36 5 - 49 3 %    MCV 76 (L) 82 - 98 fL    MCH 23 7 (L) 26 8 - 34 3 pg    MCHC 31 1 (L) 31 4 - 37 4 g/dL    RDW 15 9 (H) 11 6 - 15 1 %    Platelets 436 917 - 289 Thousands/uL    MPV 10 8 8 9 - 12 7 fL   Basic metabolic panel   Result Value Ref Range    Sodium 141 136 - 145 mmol/L    Potassium 3 8 3 5 - 5 3 mmol/L    Chloride 110 (H) 100 - 108 mmol/L    CO2 26 21 - 32 mmol/L    ANION GAP 5 4 - 13 mmol/L    BUN 11 5 - 25 mg/dL    Creatinine 0 98 0 60 - 1 30 mg/dL    Glucose 100 65 - 140 mg/dL    Calcium 10 2 (H) 8 3 - 10 1 mg/dL    eGFR 70 ml/min/1 73sq m   Protime-INR   Result Value Ref Range    Protime 13 7 11 6 - 14 5 seconds    INR 1 09 0 84 - 1 19   Antithrombin III Activity   Result Value Ref Range    AntiThrombIN III Activity 100 92 - 136 % of Normal   Cardiolipin antibody   Result Value Ref Range    Anticardiolipin IgA <9 0 - 11 APL U/mL    Anticardiolipin IgG <9 0 - 14 GPL U/mL    Anticardiolipin IgM 64 (H) 0 - 12 MPL U/mL   Factor 5 leiden   Result Value Ref Range    Factor V Leiden Comment    Lupus anticoagulant   Result Value Ref Range    PTT Lupus Anticoagulant 39 9 0 0 - 51 9 sec    Dilute Viper Venom Time 39 7 0 0 - 47 0 sec    DILUTE PROTHROMBIN TIME(DPT) 42 6 0 0 - 55 0 sec    THROMBIN TIME (DRVW) 19 1 0 0 - 23 0 sec    DPT CONFIRM RATIO 0 91 0 00 - 1 40 Ratio    LUPUS REFLEX INTERPRETATION Comment:    Protein C activity   Result Value Ref Range    Protein C Activity 139 60 - 150 % of Normal   Protein S activity   Result Value Ref Range    Protein S Activity 68 63 - 140 %   Protein S Antigen, Total & Free   Result Value Ref Range    Protein S Ag, Total 102 60 - 150 %    Protein S Ag, Free 85 57 - 157 %   Prothrombin gene mutation   Result Value Ref Range    Prothrombin Mutation Comment    Beta-2 glycoprotein antibodies   Result Value Ref Range    Beta-2 Glyco 1 IgG <9 0 - 20 GPI IgG units    Beta-2 Glyco 1 IgA <9 0 - 25 GPI IgA units    Beta-2 Glyco 1 IgM <9 0 - 32 GPI IgM units   Calcium, ionized   Result Value Ref Range    Calcium, Ionized 1 31 1 12 - 1 32 mmol/L   Basic metabolic panel   Result Value Ref Range    Sodium 139 136 - 145 mmol/L    Potassium 3 6 3 5 - 5 3 mmol/L    Chloride 107 100 - 108 mmol/L    CO2 24 21 - 32 mmol/L    ANION GAP 8 4 - 13 mmol/L    BUN 13 5 - 25 mg/dL    Creatinine 0 96 0 60 - 1 30 mg/dL    Glucose 108 65 - 140 mg/dL    Calcium 10 1 8 3 - 10 1 mg/dL    eGFR 71 ml/min/1 73sq m   ECG 12 lead   Result Value Ref Range    Ventricular Rate 68 BPM    Atrial Rate 68 BPM    NM Interval 192 ms    QRSD Interval 116 ms    QT Interval 394 ms    QTC Interval 418 ms    P Axis 50 degrees    QRS Axis -6 degrees    T Wave Axis 46 degrees       Orders Placed This Encounter   Procedures    Ambulatory referral to Physical Therapy       ALLERGIES:  Allergies   Allergen Reactions    Atorvastatin Myalgia, Dizziness and Headache    Niacin Other (See Comments)     unknown       Current Medications     Current Outpatient Medications   Medication Sig Dispense Refill    aspirin 81 MG tablet Take 1 tablet by mouth daily      clopidogrel (PLAVIX) 75 mg tablet Take 1 tablet (75 mg total) by mouth daily 30 tablet 0    docusate sodium (COLACE) 100 mg capsule Take 1 capsule (100 mg total) by mouth 2 (two) times a day 10 capsule 0    gabapentin (NEURONTIN) 300 mg capsule TAKE 3 CAPSULES BY MOUTH  EVERY NIGHT AT BEDTIME 270 capsule 3    metoprolol succinate (TOPROL-XL) 25 mg 24 hr tablet TAKE 1 TABLET BY MOUTH  DAILY 90 tablet 3    midodrine (PROAMATINE) 5 mg tablet TAKE 1 TABLET BY MOUTH 3  TIMES A  tablet 3    omeprazole (PriLOSEC) 40 MG capsule TAKE 1 CAPSULE BY MOUTH  DAILY 90 capsule 3    psyllium (METAMUCIL) 58 6 % packet Take 1 packet by mouth daily      sertraline (ZOLOFT) 50 mg tablet TAKE 1 TABLET BY MOUTH  EVERY DAY 90 tablet 3    simvastatin (ZOCOR) 80 mg tablet TAKE 1 TABLET BY MOUTH  DAILY 90 tablet 3     No current facility-administered medications for this visit            Health Maintenance     Health Maintenance   Topic Date Due    SLP PLAN OF CARE  1933    Fall Risk  04/02/2020    Medicare Annual Wellness Visit (AWV)  04/02/2020    BMI: Adult  11/21/2020    DTaP,Tdap,and Td Vaccines (2 - Td) 08/01/2028    Influenza Vaccine  Completed    Pneumococcal Vaccine: 65+ Years  Completed    Pneumococcal Vaccine: Pediatrics (0 to 5 Years) and At-Risk Patients (6 to 59 Years)  Aged Out    HIB Vaccine  Aged Out    Hepatitis B Vaccine  Aged Out    IPV Vaccine  Aged Out    Hepatitis A Vaccine  Aged Out    Meningococcal ACWY Vaccine  Aged Out    HPV Vaccine  Aged [employer] History   Administered Date(s) Administered    Influenza Split High Dose Preservative Free IM 11/10/2014, 01/12/2016, 11/22/2016, 11/08/2017    Influenza TIV (IM) 10/01/2007, 11/01/2009, 11/02/2009, 11/10/2010, 12/17/2012    Influenza, high dose seasonal 0 5 mL 11/14/2018, 10/29/2019    Pneumococcal Conjugate 13-Valent 01/12/2016    Pneumococcal Polysaccharide PPV23 10/01/2007    Tdap 04/01/6709       Rik Yuen MD

## 2019-11-25 ENCOUNTER — IN-CLINIC DEVICE VISIT (OUTPATIENT)
Dept: CARDIOLOGY CLINIC | Facility: CLINIC | Age: 84
End: 2019-11-25
Payer: MEDICARE

## 2019-11-25 DIAGNOSIS — Z95.818 PRESENCE OF OTHER CARDIAC IMPLANTS AND GRAFTS: Primary | ICD-10-CM

## 2019-11-25 PROCEDURE — 93291 INTERROG DEV EVAL SCRMS IP: CPT | Performed by: INTERNAL MEDICINE

## 2019-11-25 NOTE — PROGRESS NOTES
Results for orders placed or performed in visit on 11/25/19   Cardiac EP device report    Narrative    MDT LOOP  WOUND CHECK: INCISION CLEAN AND DRY WITH EDGES APPROXIMATED; WOUND CARE AND RESTRICTIONS REVIEWED WITH PATIENT  DEVICE INTERROGATED IN THE BETEHEM OFFICE: BATTERY VOLTAGE ADEQUATE  NO PATIENT OR DEVICE ACTIVATED EPISODES  NORMAL DEVICE FUNCTION   NC

## 2019-11-26 ENCOUNTER — TELEPHONE (OUTPATIENT)
Dept: FAMILY MEDICINE CLINIC | Facility: CLINIC | Age: 84
End: 2019-11-26

## 2019-11-26 DIAGNOSIS — I63.9 ACUTE CVA (CEREBROVASCULAR ACCIDENT) (HCC): ICD-10-CM

## 2019-11-26 RX ORDER — CLOPIDOGREL BISULFATE 75 MG/1
75 TABLET ORAL DAILY
Qty: 90 TABLET | Refills: 3 | Status: SHIPPED | OUTPATIENT
Start: 2019-11-26 | End: 2020-08-24

## 2019-11-26 NOTE — TELEPHONE ENCOUNTER
Wife called and stated that patent was in the hospital and given plavix  He will be running out in about two weeks and would like to know if you can send a script to [facility] since they take a few weeks to get it out to them    Please call Michael Cook to advise

## 2019-12-06 ENCOUNTER — TELEPHONE (OUTPATIENT)
Dept: NEUROLOGY | Facility: CLINIC | Age: 84
End: 2019-12-06

## 2019-12-06 NOTE — TELEPHONE ENCOUNTER
21/30 day post discharge follow up call  Hospitalization 11/7-11/13/19  Please see my telephone encounter 11/19 for 7 day follow up call  The purpose of this phone call is to assess patient's general wellbeing or for any assistance needed with follow-up care  -    Called patient since discharge, he has not experienced any new or worsening stroke symptoms  Continued to be at baseline  Ambulates with a cane, has a walker as needed  Independent with ADLS  States wife Tmamy Garcia assists with medications and appointments  States he is doing very well  Patient then put wife on the phone to review with me  States patient is progressing well, his memory has improved  Tammy Garcia states patient followed up with PCP 11/21  Stroke hospital follow up appointment scheduled 2/10  States patient's PCP told her and patient they may not need appt and are thinking about canceling in the future  Informed her appt was recommended following stroke hospitalization and educated what a hospital follow up appt entails  She verbalizes understanding, states she may call in the future to cancel but would like to keep the appt at this time  Cleared by PCP for no OT needs  Script written for PT recs  I reviewed medications with her  There have not been any changes since discharge  They had no difficulties obtaining medications  Reports patient taking all as prescribed with no missed doses or medication side effects  Denies signs of bleeding  Wife declined review of stroke symptoms and risk factor management  States she feels comfortable with education  Patient does not monitor BP, he is a non smoker, and follows no specific diet  Addressed questions  No further questions or concerns at this time

## 2019-12-09 DIAGNOSIS — I63.9 ACUTE CVA (CEREBROVASCULAR ACCIDENT) (HCC): Primary | ICD-10-CM

## 2019-12-09 DIAGNOSIS — I63.9 CEREBROVASCULAR ACCIDENT (CVA), UNSPECIFIED MECHANISM (HCC): ICD-10-CM

## 2019-12-09 RX ORDER — CLOPIDOGREL BISULFATE 75 MG/1
75 TABLET ORAL DAILY
Qty: 10 TABLET | Refills: 0 | Status: SHIPPED | OUTPATIENT
Start: 2019-12-09 | End: 2020-04-13 | Stop reason: SDUPTHER

## 2020-01-12 DIAGNOSIS — F41.9 ANXIETY: ICD-10-CM

## 2020-01-13 ENCOUNTER — EVALUATION (OUTPATIENT)
Dept: PHYSICAL THERAPY | Facility: REHABILITATION | Age: 85
End: 2020-01-13
Payer: MEDICARE

## 2020-01-13 VITALS — DIASTOLIC BLOOD PRESSURE: 70 MMHG | SYSTOLIC BLOOD PRESSURE: 150 MMHG

## 2020-01-13 DIAGNOSIS — I63.9 ACUTE CVA (CEREBROVASCULAR ACCIDENT) (HCC): Primary | ICD-10-CM

## 2020-01-13 DIAGNOSIS — I63.9 CEREBROVASCULAR ACCIDENT (CVA), UNSPECIFIED MECHANISM (HCC): ICD-10-CM

## 2020-01-13 DIAGNOSIS — R26.89 BALANCE DISORDER: ICD-10-CM

## 2020-01-13 PROCEDURE — 97162 PT EVAL MOD COMPLEX 30 MIN: CPT | Performed by: PHYSICAL THERAPIST

## 2020-01-13 NOTE — PROGRESS NOTES
PT Evaluation     Today's date: 2020  Patient name: Christopher Montes  : 1933  MRN: [id number]  Referring provider: Jasmine Dhaliwal MD  Dx:   Encounter Diagnosis     ICD-10-CM    1  Acute CVA (cerebrovascular accident), suspected embolic in nature L36 1    2  Cerebrovascular accident (CVA), unspecified mechanism ([facility] [address] ) I63 9 Ambulatory referral to Physical Therapy   3  Balance disorder R26 89        Start Time: 1110  Stop Time: 1200  Total time in clinic (min): 50 minutes    Assessment  Assessment details: Pt is an 81 yo M who presents with PT for balance problem following a CVA that occurred 2019  Examination findings include decreased LE strength, decreased tolerance to walking (unable to complete a 6 min walk test without a rest break) and balance testing that places the pt at falls risk (TUG test = 15 sec, 5 times sit to stand = 23 sec)  Pt will benefit from skilled PT to address these impairments, improve tolerance to activity, and reduce falls risk  Therapist explained to pt: findings of IE, rehab diagnosis, and POC  Pt-centered goals reviewed and confirmed by pt  Instruction also given for HEP  Pt expressed verbal agreement and understanding and verified understanding via teach back method  Pt also expressed satisfaction that their current concerns were addressed at session end  Impairments: abnormal gait, abnormal or restricted ROM, activity intolerance, impaired balance, impaired physical strength, lacks appropriate home exercise program and safety issue  Understanding of Dx/Px/POC: good   Prognosis: good    Goals  Short term goals: to be met in 4weeks  Pt independent with initial HEP, rationale, technique and frequency, for carry over at home   Pt will be able to walk for at least 5 min without a seated rest break indicating improved tolerance to activity  Pt will perform the TUG test in less than <14 seconds indicating decreased risk for falls    Achieve 5 times sit to stand test score to <12 sec indicating improved fucntional LE strength and reduced fall risk  Pt able to maintain static balance eyes closed for >15 sec to improve balance during times of decreased light such as at night    Long term goals: to be met in 8-10 weeks  Pt will report a 85% or > reduction in subjective complaints/symptoms to better manage ADLs and functional mobility  Pt able to maintain single leg balance >5 sec bilat for improved ankle stability and balance  Pt able to maintain static balance eyes closed for >20 sec to improve balance during times of decreased light such as at night  Pt will improve FOTO score to better then expected outcome indicating an overall improvement in pain and function   Pt will be able to walk for at least 6 min without a seated rest break indicating improved tolerance to activity  Pt independent with rationale, technique and plan for performance of advanced HEP to maintain gains made in therapy  Achieve pts goal: Pt states he would like to work on his balance and walk more  Plan  Patient would benefit from: skilled physical therapy  Referral necessary: No  Planned modality interventions: TENS, cryotherapy, ultrasound, low level laser therapy and thermotherapy: hydrocollator packs  Planned therapy interventions: manual therapy, patient education, strengthening, stretching, therapeutic activities, therapeutic exercise, home exercise program, functional ROM exercises, flexibility, neuromuscular re-education, body mechanics training, balance, joint mobilization and gait training  Frequency: 2-3x/week  Duration in weeks: 10  Treatment plan discussed with: patient and family        Subjective Evaluation    History of Present Illness  Mechanism of injury: Pt is an 81 yo M w/p TIA on 11/7/2019  Pt presented to the ED with difficulty speaking and R arm weakness   MRI showed bilateral small infarcts with superficial cortical infarct in the left frontal region in the region of precentral and postcentral gyrus, and a deep periventricular infarct in the right frontal and left frontal region  He was evaluated by Neurology  Pt was placed on aspirin and Plavix and he was worked up for a cardiac source of embolism including FLORENCE and loop recorder has been placed  Per pt and his wife: Pt's wife states pt was making breakfast, and was unable to hold self up, wife helped pt into living room  States he was unable to talk for 5 min  Wife called 46 and pt was taken to ED  Pt is currently walking with SCP, was using prior to the CVA  Also uses a rollator walker at home  Pt has not had any therapy at this for the the CVA  Pt and his wife report the pt has no new balance or strength problems since the CVA  Pt states he would like to work on his balance and walk more  Quality of life: good    Pain  No pain reported    Social Support  Steps to enter house: no  Stairs in house: no   Lives in: apartment (1st floor)  Lives with: spouse    Employment status: not working  Hand dominance: right      Diagnostic Tests  MRI studies: abnormal  Treatments  Previous treatment: physical therapy  Patient Goals  Patient goals for therapy: increased strength, improved balance and independence with ADLs/IADLs  Patient goal: Pt states he would like to work on his balance and walk more          Objective     Strength/Myotome Testing     Left Shoulder     Planes of Motion   Flexion: 4+   Abduction: 4+     Right Shoulder     Planes of Motion   Flexion: 4+   Abduction: 4+     Left Elbow   Flexion: 5  Extension: 5    Right Elbow   Flexion: 5  Extension: 5    Left Wrist/Hand   Wrist extension: 5  Wrist flexion: 5    Right Wrist/Hand   Wrist extension: 5  Wrist flexion: 5    Left Hip   Planes of Motion   Flexion: 4-  Extension: 4  Abduction: 3-    Right Hip   Planes of Motion   Flexion: 4-  Extension: 4  Abduction: 3-    Left Knee   Flexion: 5  Extension: 4+    Right Knee   Flexion: 5  Extension: 4+    Left Ankle/Foot Dorsiflexion: 5  Plantar flexion: 5    Right Ankle/Foot   Dorsiflexion: 5  Plantar flexion: 5    Ambulation     Comments   No LE spasticity noted    Severe hamstring tightness bilat    Dec light touch sensation in B feet and leg secondary to neuropathy   No UE sensation impairments noted    Biceps, brachioradialis, achilles and patellar reflexes all normal    6mwt: seated rest break after 2 min 45 sec  Neuro Exam:     Functional outcomes   6 minute walk test: 675 ft  5x sit to stand: 23 (seconds)  TUG: 15 (seconds)  Functional outcome comment: ModCTSIB  Floor eyes open = 30 sec  floor eyes closed = 12 sec  Foam eyes open = 30 sec  Foam eyes closed = 3 sec          Flowsheet Rows      Most Recent Value   PT/OT G-Codes   Current Score  49   Projected Score  59             Precautions: CA hx, anxiety/dep, CAD, hx of bypass surgery, fall risk, CVA hx    FOTO  1/13 = 49/59      Manual  1/13            Hamstring stretch, piriformis stretch                                                                     Exercise Diary  1/13            Walk to tolerance             Static on foam             Single leg balance             Balance, feet together eyes closed             functional gait: march, tandem             Tandem balance             VG             Leg press             Cone pick ups from floor             Standing hip abd with band                                                                                                                                                   Modalities

## 2020-01-16 ENCOUNTER — OFFICE VISIT (OUTPATIENT)
Dept: PHYSICAL THERAPY | Facility: REHABILITATION | Age: 85
End: 2020-01-16
Payer: MEDICARE

## 2020-01-16 DIAGNOSIS — I63.9 CEREBROVASCULAR ACCIDENT (CVA), UNSPECIFIED MECHANISM (HCC): Primary | ICD-10-CM

## 2020-01-16 DIAGNOSIS — R26.89 BALANCE DISORDER: ICD-10-CM

## 2020-01-16 DIAGNOSIS — I63.9 ACUTE CVA (CEREBROVASCULAR ACCIDENT) (HCC): ICD-10-CM

## 2020-01-16 PROCEDURE — 97112 NEUROMUSCULAR REEDUCATION: CPT | Performed by: PHYSICAL THERAPIST

## 2020-01-16 PROCEDURE — 97530 THERAPEUTIC ACTIVITIES: CPT | Performed by: PHYSICAL THERAPIST

## 2020-01-16 NOTE — PROGRESS NOTES
Daily Note     Today's date: 2020  Patient name: Patito Wood  : 1933  MRN: [id number]  Referring provider: Efrem Brooks MD  Dx:   Encounter Diagnosis     ICD-10-CM    1  Cerebrovascular accident (CVA), unspecified mechanism ([facility] [address] ) I63 9    2  Acute CVA (cerebrovascular accident), suspected embolic in nature Q76 9    3  Balance disorder R26 89        Start Time: 1016  Stop Time: 1058  Total time in clinic (min): 42 minutes    Subjective: Pt states he was tired after the eval      Objective: See treatment diary below  HR remained below 100  O2 remained at 99%  BP end of session 130/60    Assessment: Pt fatigues very quickly and required a seated rest break after every exercise  HR and O2 monitored and remained WNL throughout session  Pt will benefit from continued skilled outpatient PT to improve strength and balance, to address therapy goals, to reduce falls risk, and improve tolerance to activitiy to increase function  Plan: Continue per plan of care  Progress treatment as tolerated         Precautions: CA hx, anxiety/dep, CAD, hx of bypass surgery, fall risk, CVA hx    FOTO   = 49/59      Manual             Hamstring stretch, piriformis stretch  DS - hams                                                                   Exercise Diary             Walk to tolerance  x3 min            Static on foam  30"x3 CGA           Single leg balance  20"x3 each, 2 fingers and CGA           Balance, feet together eyes closed  20"x3 CGA           functional gait: march, tandem             Tandem balance  20"x3 each, CGA           VG  L7 x5'           Leg press             Cone pick ups from floor             Standing hip abd with band                                                                                                                                                   Modalities

## 2020-01-21 ENCOUNTER — OFFICE VISIT (OUTPATIENT)
Dept: PHYSICAL THERAPY | Facility: REHABILITATION | Age: 85
End: 2020-01-21
Payer: MEDICARE

## 2020-01-21 DIAGNOSIS — I63.9 ACUTE CVA (CEREBROVASCULAR ACCIDENT) (HCC): ICD-10-CM

## 2020-01-21 DIAGNOSIS — R26.89 BALANCE DISORDER: ICD-10-CM

## 2020-01-21 DIAGNOSIS — I63.9 CEREBROVASCULAR ACCIDENT (CVA), UNSPECIFIED MECHANISM (HCC): Primary | ICD-10-CM

## 2020-01-21 PROCEDURE — 97530 THERAPEUTIC ACTIVITIES: CPT | Performed by: PHYSICAL THERAPIST

## 2020-01-21 PROCEDURE — 97112 NEUROMUSCULAR REEDUCATION: CPT | Performed by: PHYSICAL THERAPIST

## 2020-01-21 NOTE — PROGRESS NOTES
Daily Note     Today's date: 2020  Patient name: Jeffery Little  : 1933  MRN: [id number]  Referring provider: Abdiel Dawkins MD  Dx:   Encounter Diagnosis     ICD-10-CM    1  Cerebrovascular accident (CVA), unspecified mechanism ([facility] [address] ) I63 9    2  Acute CVA (cerebrovascular accident), suspected embolic in nature U15 9    3  Balance disorder R26 89        Start Time: 1215  Stop Time: 1300  Total time in clinic (min): 45 minutes    Subjective: Pt offers no complaints      Objective: See treatment diary below  HR remained WNL t/o session  O2 remained at 99%    Assessment: Pt fatigued very quickly and required seated rest breaks between each exercise  However he tolerated more exercise this visit compared to last visit  CG assist and SPC required during functional gait exercises for balance and safety  Pt will benefit from continued skilled outpatient PT to improve strength and balance, to address therapy goals, to reduce falls risk, and improve tolerance to activitiy to increase function  Plan: Continue per plan of care  Progress treatment as tolerated         Precautions: CA hx, anxiety/dep, CAD, hx of bypass surgery, fall risk, CVA hx    FOTO   = 49/59      Manual            Hamstring stretch, piriformis stretch  DS - hams                                                                    Exercise Diary            Walk to tolerance  x3 min  x3 min           Static on foam  30"x3 CGA 30"x4 Close S          Single leg balance  20"x3 each, 2 fingers and CGA 20"x3 each, 2 fingers close S          Balance, feet together eyes closed  20"x3 CGA 30"x3 close S          functional gait: march, tandem   CGA + SPC  1x65ft each          Tandem balance  20"x3 each, CGA 20"x3 each, CGA          VG  L7 x5' L7 3'          Leg press             Cone pick ups from floor             Standing hip abd with band Modalities

## 2020-01-24 ENCOUNTER — OFFICE VISIT (OUTPATIENT)
Dept: PHYSICAL THERAPY | Facility: REHABILITATION | Age: 85
End: 2020-01-24
Payer: MEDICARE

## 2020-01-24 DIAGNOSIS — I63.9 CEREBROVASCULAR ACCIDENT (CVA), UNSPECIFIED MECHANISM (HCC): Primary | ICD-10-CM

## 2020-01-24 DIAGNOSIS — R26.89 BALANCE DISORDER: ICD-10-CM

## 2020-01-24 DIAGNOSIS — I63.9 ACUTE CVA (CEREBROVASCULAR ACCIDENT) (HCC): ICD-10-CM

## 2020-01-24 PROCEDURE — 97530 THERAPEUTIC ACTIVITIES: CPT | Performed by: PHYSICAL THERAPIST

## 2020-01-24 PROCEDURE — 97112 NEUROMUSCULAR REEDUCATION: CPT | Performed by: PHYSICAL THERAPIST

## 2020-01-24 NOTE — PROGRESS NOTES
Daily Note     Today's date: 2020  Patient name: Scott Castro  : 1933  MRN: [id number]  Referring provider: Cindy Dalton MD  Dx:   Encounter Diagnosis     ICD-10-CM    1  Cerebrovascular accident (CVA), unspecified mechanism ([facility] [address] ) I63 9    2  Acute CVA (cerebrovascular accident), suspected embolic in nature N65 5    3  Balance disorder R26 89        Start Time: 1115  Stop Time: 1200  Total time in clinic (min): 45 minutes    Subjective: Pt states he was fatigued after last visit, but not too bad  States his wife fell so he is worried about her  Objective: See treatment diary below      Assessment: Pt continues to fatigue very quickly and requires seated rest breaks between each exercise  Pt will benefit from continued skilled outpatient PT to improve strength and balance, to address therapy goals, to reduce falls risk, and improve tolerance to activitiy to increase function  Plan: Continue per plan of care  Progress treatment as tolerated         Precautions: CA hx, anxiety/dep, CAD, hx of bypass surgery, fall risk, CVA hx    FOTO   = 49/59      Manual           Hamstring stretch, piriformis stretch  DS - hams                                                                    Exercise Diary           Walk to tolerance  x3 min  x3 min  x3 min         Static on foam  30"x3 CGA 30"x4 Close S 30"x4 Close S         Single leg balance  20"x3 each, 2 fingers and CGA 20"x3 each, 2 fingers close S 20"x3 each, 2 fingers close S         Balance, feet together eyes closed  20"x3 CGA 30"x3 close S 30"x3 close S         functional gait: march, tandem   CGA + SPC  1x65ft each CGA + SPC  1x65ft each         Tandem balance  20"x3 each, CGA 20"x3 each, CGA 30"x3 each, CGA         VG  L7 x5' L7 3' L7 3'         Leg press             Cone pick ups from floor             Standing hip abd with band Modalities

## 2020-01-28 ENCOUNTER — OFFICE VISIT (OUTPATIENT)
Dept: PHYSICAL THERAPY | Facility: REHABILITATION | Age: 85
End: 2020-01-28
Payer: MEDICARE

## 2020-01-28 DIAGNOSIS — R26.89 BALANCE DISORDER: ICD-10-CM

## 2020-01-28 DIAGNOSIS — I63.9 ACUTE CVA (CEREBROVASCULAR ACCIDENT) (HCC): ICD-10-CM

## 2020-01-28 DIAGNOSIS — I63.9 CEREBROVASCULAR ACCIDENT (CVA), UNSPECIFIED MECHANISM (HCC): Primary | ICD-10-CM

## 2020-01-28 PROCEDURE — 97530 THERAPEUTIC ACTIVITIES: CPT | Performed by: PHYSICAL THERAPIST

## 2020-01-28 PROCEDURE — 97112 NEUROMUSCULAR REEDUCATION: CPT | Performed by: PHYSICAL THERAPIST

## 2020-01-28 NOTE — PROGRESS NOTES
Daily Note     Today's date: 2020  Patient name: Kelsi Landry  : 1933  MRN: [id number]  Referring provider: Priyanka Garcia MD  Dx:   Encounter Diagnosis     ICD-10-CM    1  Cerebrovascular accident (CVA), unspecified mechanism ([facility] [address] ) I63 9    2  Acute CVA (cerebrovascular accident), suspected embolic in nature P33 9    3  Balance disorder R26 89        Start Time: 1430  Stop Time: 5832  Total time in clinic (min): 41 minutes    Subjective: Pt states he gets fatigued very quickly  Objective: See treatment diary below      Assessment: Pts is most limited by his tolerance to exercise and fatigue level  Pt was advised to try to get up and walk every hour or more at home to try and build up his tolerance to activity and walking  Also informed pt he can come in and use the bike to try and build up his endurance as well, but pt is unable to drive and must rely on his wife for transportation  Pt will benefit from continued skilled outpatient PT to improve strength and balance, to address therapy goals, to reduce falls risk, and improve tolerance to activitiy to increase function  Plan: Continue per plan of care  Progress treatment as tolerated         Precautions: CA hx, anxiety/dep, CAD, hx of bypass surgery, fall risk, CVA hx    FOTO   = 49/59      Manual          Hamstring stretch, piriformis stretch  DS - hams                                                                    Exercise Diary          Walk to tolerance  x3 min  x3 min  x3 min (15' prior to thearpy)        Static on foam  30"x3 CGA 30"x4 Close S 30"x4 Close S 30"x4 Close S        Single leg balance  20"x3 each, 2 fingers and CGA 20"x3 each, 2 fingers close S 20"x3 each, 2 fingers close S 20"x3 each, 2 fingers close S        Balance, feet together eyes closed  20"x3 CGA 30"x3 close S 30"x3 close S 30"x3 close S        functional gait: march, tandem   CGA + SPC  1x65ft each CGA + SPC  1x65ft each CGA + SPC  1x65ft each        Tandem balance  20"x3 each, CGA 20"x3 each, CGA 30"x3 each, CGA 90" x1 each CGA        VG  L7 x5' L7 3' L7 3' L7 5'        Leg press             Cone pick ups from floor             Standing hip abd with band                                                                                                                                                   Modalities

## 2020-01-31 ENCOUNTER — OFFICE VISIT (OUTPATIENT)
Dept: PHYSICAL THERAPY | Facility: REHABILITATION | Age: 85
End: 2020-01-31
Payer: MEDICARE

## 2020-01-31 DIAGNOSIS — I63.9 ACUTE CVA (CEREBROVASCULAR ACCIDENT) (HCC): ICD-10-CM

## 2020-01-31 DIAGNOSIS — I63.9 CEREBROVASCULAR ACCIDENT (CVA), UNSPECIFIED MECHANISM (HCC): Primary | ICD-10-CM

## 2020-01-31 DIAGNOSIS — R26.89 BALANCE DISORDER: ICD-10-CM

## 2020-01-31 PROCEDURE — 97530 THERAPEUTIC ACTIVITIES: CPT | Performed by: PHYSICAL THERAPIST

## 2020-01-31 PROCEDURE — 97112 NEUROMUSCULAR REEDUCATION: CPT | Performed by: PHYSICAL THERAPIST

## 2020-01-31 NOTE — PROGRESS NOTES
Daily Note     Today's date: 2020  Patient name: Jeffery Little  : 1933  MRN: [id number]  Referring provider: Abdeil Dawkins MD  Dx:   Encounter Diagnosis     ICD-10-CM    1  Cerebrovascular accident (CVA), unspecified mechanism ([facility] [address] ) I63 9    2  Acute CVA (cerebrovascular accident), suspected embolic in nature Q79 3    3  Balance disorder R26 89        Start Time: 1113  Stop Time: 1200  Total time in clinic (min): 47 minutes    Subjective: Pt states the bike really tired his legs last visit  Objective: See treatment diary below      Assessment: Pt continues to required seated rest breaks after each exercises, but is completing more exercise each session demonstrating improved tolerance to acitivyt  He demonstrated good balance during cone pick ups, but close S and gait belt utilized for safety  Pt will benefit from continued skilled outpatient PT to improve strength and balance, to address therapy goals, to reduce falls risk, and improve tolerance to activitiy to increase function  Plan: Continue per plan of care  Progress treatment as tolerated         Precautions: CA hx, anxiety/dep, CAD, hx of bypass surgery, fall risk, CVA hx    FOTO   = 49/59      Manual         Hamstring stretch, piriformis stretch  DS - hams                                                                    Exercise Diary         Walk to tolerance  x3 min  x3 min  x3 min (15' prior to thearpy) x3 min       Static on foam  30"x3 CGA 30"x4 Close S 30"x4 Close S 30"x4 Close S 30"x4 Close S       Single leg balance  20"x3 each, 2 fingers and CGA 20"x3 each, 2 fingers close S 20"x3 each, 2 fingers close S 20"x3 each, 2 fingers close S 20"x3 each, 2 fingers close S       Balance, feet together eyes closed  20"x3 CGA 30"x3 close S 30"x3 close S 30"x3 close S 30"x3 close S       functional gait: march, tandem   CGA + SPC  1x65ft each CGA + SPC  1x65ft each CGA + SPC  1x65ft each CGA + SPC  1x65ft each       Tandem balance  20"x3 each, CGA 20"x3 each, CGA 30"x3 each, CGA 90" x1 each CGA 90" x1 each CGA       VG  L7 x5' L7 3' L7 3' L7 5' L7 6'       Leg press             Cone pick ups table to/from floor      9 cones x1       Standing hip abd with band             Bike                                                                                                                                      Modalities

## 2020-02-04 ENCOUNTER — OFFICE VISIT (OUTPATIENT)
Dept: PHYSICAL THERAPY | Facility: REHABILITATION | Age: 85
End: 2020-02-04
Payer: MEDICARE

## 2020-02-04 DIAGNOSIS — R26.89 BALANCE DISORDER: ICD-10-CM

## 2020-02-04 DIAGNOSIS — I63.9 ACUTE CVA (CEREBROVASCULAR ACCIDENT) (HCC): ICD-10-CM

## 2020-02-04 DIAGNOSIS — I63.9 CEREBROVASCULAR ACCIDENT (CVA), UNSPECIFIED MECHANISM (HCC): Primary | ICD-10-CM

## 2020-02-04 PROCEDURE — 97530 THERAPEUTIC ACTIVITIES: CPT

## 2020-02-04 PROCEDURE — 97112 NEUROMUSCULAR REEDUCATION: CPT

## 2020-02-04 NOTE — PROGRESS NOTES
Daily Note     Today's date: 2020  Patient name: Sha Avilez  : 1933  MRN: [id number]  Referring provider: Mani De Los Santos MD  Dx:   Encounter Diagnosis     ICD-10-CM    1  Cerebrovascular accident (CVA), unspecified mechanism ([facility] [address] ) I63 9    2  Acute CVA (cerebrovascular accident), suspected embolic in nature S69 5    3  Balance disorder R26 89        Start Time: 7908  Stop Time: 1128  Total time in clinic (min): 43 minutes    Subjective: Pt reports he is feeling ok prior to start of session today  Some pain/soreness in b/l quads this AM, states this pain comes and goes  Pt's wife mentioned sometimes in the morning his "legs give out" and is "kind of shakey" when in the kitchen  Objective: See treatment diary below      Assessment: Tolerated treatment well  Pt required frequent seated rest breaks, resting after each exercise today  Pt quickly fatigues with each exercise  Program slightly modified today d/t this fatigue  Pt had frequent unsteadiness and LOB to his right during tandem stance balance, requiring assistance from therapist for stabilization  Pt states cone  was much more difficult for him today compared to LV  Patient would benefit from continued PT      Plan: Continue per plan of care        Precautions: CA hx, anxiety/dep, CAD, hx of bypass surgery, fall risk, CVA hx    FOTO   = 49/59      Manual   2/4      Hamstring stretch, piriformis stretch  DS - hams                                                                    Exercise Diary   2/4      Walk to tolerance  x3 min  x3 min  x3 min (15' prior to thearpy) x3 min NV      Static on foam  30"x3 CGA 30"x4 Close S 30"x4 Close S 30"x4 Close S 30"x4 Close S 30"x4 Close S      Single leg balance  20"x3 each, 2 fingers and CGA 20"x3 each, 2 fingers close S 20"x3 each, 2 fingers close S 20"x3 each, 2 fingers close S 20"x3 each, 2 fingers close S 20"x3 each, 2 fingers close S      Balance, feet together eyes closed  20"x3 CGA 30"x3 close S 30"x3 close S 30"x3 close S 30"x3 close S 30"x3 close S      functional gait: march, tandem   CGA + SPC  1x65ft each CGA + SPC  1x65ft each CGA + SPC  1x65ft each CGA + SPC  1x65ft each CGA + SPC  1x65ft   March only      Tandem balance  20"x3 each, CGA 20"x3 each, CGA 30"x3 each, CGA 90" x1 each CGA 90" x1 each CGA 90" x1 each CGA      VG  L7 x5' L7 3' L7 3' L7 5' L7 6' L7 6'      Leg press             Cone pick ups table to/from floor      9 cones x1 9 cones x1  CGA      Standing hip abd with band             Bike                                                                                                                                      Modalities

## 2020-02-07 ENCOUNTER — OFFICE VISIT (OUTPATIENT)
Dept: PHYSICAL THERAPY | Facility: REHABILITATION | Age: 85
End: 2020-02-07
Payer: MEDICARE

## 2020-02-07 DIAGNOSIS — I63.9 ACUTE CVA (CEREBROVASCULAR ACCIDENT) (HCC): ICD-10-CM

## 2020-02-07 DIAGNOSIS — I63.9 CEREBROVASCULAR ACCIDENT (CVA), UNSPECIFIED MECHANISM (HCC): Primary | ICD-10-CM

## 2020-02-07 DIAGNOSIS — R26.89 BALANCE DISORDER: ICD-10-CM

## 2020-02-07 PROCEDURE — 97112 NEUROMUSCULAR REEDUCATION: CPT

## 2020-02-07 PROCEDURE — 97530 THERAPEUTIC ACTIVITIES: CPT

## 2020-02-07 NOTE — PROGRESS NOTES
Daily Note     Today's date: 2020  Patient name: Rashad Myers  : 1933  MRN: [id number]  Referring provider: David Quick MD  Dx:   Encounter Diagnosis     ICD-10-CM    1  Cerebrovascular accident (CVA), unspecified mechanism ([address] ) I63 9    2  Acute CVA (cerebrovascular accident), suspected embolic in nature I71 7    3  Balance disorder R26 89        Start Time: 1115  Stop Time: 1200  Total time in clinic (min): 45 minutes    Subjective: Pt reports feeling good prior start of session  No new complaints to report prior to start of session today  Objective: See treatment diary below      Assessment: Tolerated treatment well  Continues to demonstrate signs of fatigue throughout session, requiring rest breaks between each exercise  Did tolerate more exercise today compared to LV  Patient would benefit from continued PT to further improve strength, balance, and endurance, in effort to maximize function  Plan: Continue per plan of care        Precautions: CA hx, anxiety/dep, CAD, hx of bypass surgery, fall risk, CVA hx    FOTO   = 49/59  2 = 55/59      Manual   2/ 2/7     Hamstring stretch, piriformis stretch  DS - hams                                                                    Exercise Diary   2 2/     Walk to tolerance  x3 min  x3 min  x3 min (15' prior to thearpy) x3 min NV 1 5 min     Static on foam  30"x3 CGA 30"x4 Close S 30"x4 Close S 30"x4 Close S 30"x4 Close S 30"x4 Close S 30"x4 Close S     Single leg balance  20"x3 each, 2 fingers and CGA 20"x3 each, 2 fingers close S 20"x3 each, 2 fingers close S 20"x3 each, 2 fingers close S 20"x3 each, 2 fingers close S 20"x3 each, 2 fingers close S 20"x3 each, 2 fingers close S     Balance, feet together eyes closed  20"x3 CGA 30"x3 close S 30"x3 close S 30"x3 close S 30"x3 close S 30"x3 close S 30"x3 close S     functional gait: march, tandem   CGA + SPC  1x65ft each CGA + SPC  1x65ft each CGA + SPC  1x65ft each CGA + SPC  1x65ft each CGA + SPC  1x65ft   March only CGA + SPC  1x65ft   each     Tandem balance  20"x3 each, CGA 20"x3 each, CGA 30"x3 each, CGA 90" x1 each CGA 90" x1 each CGA 90" x1 each CGA 90" x1 each CGA     VG  L7 x5' L7 3' L7 3' L7 5' L7 6' L7 6' L7 5'     Leg press             Cone pick ups table to/from floor      9 cones x1 9 cones x1  CGA 9 cones x1  CGA     Standing hip abd with band             Bike                                                                                                                                      Modalities

## 2020-02-11 ENCOUNTER — OFFICE VISIT (OUTPATIENT)
Dept: PHYSICAL THERAPY | Facility: REHABILITATION | Age: 85
End: 2020-02-11
Payer: MEDICARE

## 2020-02-11 DIAGNOSIS — I63.9 CEREBROVASCULAR ACCIDENT (CVA), UNSPECIFIED MECHANISM (HCC): Primary | ICD-10-CM

## 2020-02-11 DIAGNOSIS — I63.9 ACUTE CVA (CEREBROVASCULAR ACCIDENT) (HCC): ICD-10-CM

## 2020-02-11 DIAGNOSIS — R26.89 BALANCE DISORDER: ICD-10-CM

## 2020-02-11 PROCEDURE — 97530 THERAPEUTIC ACTIVITIES: CPT

## 2020-02-11 PROCEDURE — 97112 NEUROMUSCULAR REEDUCATION: CPT

## 2020-02-11 NOTE — PROGRESS NOTES
Daily Note     Today's date: 2020  Patient name: Elizabeth Benitez  : 1933  MRN: [id number]  Referring provider: Bola Loredo MD  Dx:   Encounter Diagnosis     ICD-10-CM    1  Cerebrovascular accident (CVA), unspecified mechanism ([address] ) I63 9    2  Acute CVA (cerebrovascular accident), suspected embolic in nature Q42 9    3  Balance disorder R26 89        Start Time: 81  Stop Time: 1135  Total time in clinic (min): 48 minutes    Subjective: Pt reports no new complaints prior to start of session today  Objective: See treatment diary below      Assessment: Tolerated treatment well  Tandem stance balance performed early in treatment today and only had one moment of slight LOB on each side bilaterally  Was more challenged with EC NBOS balance on foam today, compared to previous visits, but this is likely d/t performing this exercise later in his treatment and being more fatigued than usual   Patient would benefit from continued PT to further improve strength and balance, increase muscular endurance, and maximize function, in effort to reduce risk for falls  Plan: Continue per plan of care        Precautions: CA hx, anxiety/dep, CAD, hx of bypass surgery, fall risk, CVA hx    FOTO   = 49/59  2 = 55/59      Manual   2/ 2    Hamstring stretch, piriformis stretch  DS - hams                                                                    Exercise Diary   2/ 2    Walk to tolerance  x3 min  x3 min  x3 min (15' prior to thearpy) x3 min NV 1 5 min 1 5 min  x2    Static on foam  30"x3 CGA 30"x4 Close S 30"x4 Close S 30"x4 Close S 30"x4 Close S 30"x4 Close S 30"x4 Close S 30"x4 Close S    Single leg balance  20"x3 each, 2 fingers and CGA 20"x3 each, 2 fingers close S 20"x3 each, 2 fingers close S 20"x3 each, 2 fingers close S 20"x3 each, 2 fingers close S 20"x3 each, 2 fingers close S 20"x3 each, 2 fingers close S 20"x3 each, 2 fingers close S    Balance, feet together eyes closed  20"x3 CGA 30"x3 close S 30"x3 close S 30"x3 close S 30"x3 close S 30"x3 close S 30"x3 close S 30"x3   CGA    functional gait: march, tandem   CGA + SPC  1x65ft each CGA + SPC  1x65ft each CGA + SPC  1x65ft each CGA + SPC  1x65ft each CGA + SPC  1x65ft   March only CGA + SPC  1x65ft   each CGA + SPC  1x65ft   each    Tandem balance  20"x3 each, CGA 20"x3 each, CGA 30"x3 each, CGA 90" x1 each CGA 90" x1 each CGA 90" x1 each CGA 90" x1 each CGA 90" x1 each CGA    VG  L7 x5' L7 3' L7 3' L7 5' L7 6' L7 6' L7 5' L7 5'    Leg press             Cone pick ups table to/from floor      9 cones x1 9 cones x1  CGA 9 cones x1  CGA 9 cones x1  CGA    Standing hip abd with band             Bike                                                                                                                                      Modalities

## 2020-02-13 ENCOUNTER — APPOINTMENT (OUTPATIENT)
Dept: LAB | Facility: CLINIC | Age: 85
End: 2020-02-13
Payer: MEDICARE

## 2020-02-13 ENCOUNTER — TRANSCRIBE ORDERS (OUTPATIENT)
Dept: LAB | Facility: CLINIC | Age: 85
End: 2020-02-13

## 2020-02-13 DIAGNOSIS — N40.1 ENLARGED PROSTATE WITH URINARY OBSTRUCTION: ICD-10-CM

## 2020-02-13 DIAGNOSIS — N13.8 ENLARGED PROSTATE WITH URINARY OBSTRUCTION: ICD-10-CM

## 2020-02-13 DIAGNOSIS — C61 MALIGNANT NEOPLASM OF PROSTATE (HCC): ICD-10-CM

## 2020-02-13 DIAGNOSIS — C61 MALIGNANT NEOPLASM OF PROSTATE (HCC): Primary | ICD-10-CM

## 2020-02-13 LAB
ALBUMIN SERPL BCP-MCNC: 3.8 G/DL (ref 3.5–5)
ALP SERPL-CCNC: 56 U/L (ref 46–116)
ALT SERPL W P-5'-P-CCNC: 24 U/L (ref 12–78)
AST SERPL W P-5'-P-CCNC: 16 U/L (ref 5–45)
BASOPHILS # BLD AUTO: 0.05 THOUSANDS/ΜL (ref 0–0.1)
BASOPHILS NFR BLD AUTO: 1 % (ref 0–1)
BILIRUB DIRECT SERPL-MCNC: 0.13 MG/DL (ref 0–0.2)
BILIRUB SERPL-MCNC: 0.37 MG/DL (ref 0.2–1)
BUN SERPL-MCNC: 10 MG/DL (ref 5–25)
CALCIUM SERPL-MCNC: 10 MG/DL (ref 8.3–10.1)
CREAT SERPL-MCNC: 0.95 MG/DL (ref 0.6–1.3)
EOSINOPHIL # BLD AUTO: 0.09 THOUSAND/ΜL (ref 0–0.61)
EOSINOPHIL NFR BLD AUTO: 2 % (ref 0–6)
ERYTHROCYTE [DISTWIDTH] IN BLOOD BY AUTOMATED COUNT: 16.6 % (ref 11.6–15.1)
GFR SERPL CREATININE-BSD FRML MDRD: 72 ML/MIN/1.73SQ M
HCT VFR BLD AUTO: 33.4 % (ref 36.5–49.3)
HGB BLD-MCNC: 9.8 G/DL (ref 12–17)
IMM GRANULOCYTES # BLD AUTO: 0.01 THOUSAND/UL (ref 0–0.2)
IMM GRANULOCYTES NFR BLD AUTO: 0 % (ref 0–2)
LYMPHOCYTES # BLD AUTO: 2.15 THOUSANDS/ΜL (ref 0.6–4.47)
LYMPHOCYTES NFR BLD AUTO: 36 % (ref 14–44)
MCH RBC QN AUTO: 21.4 PG (ref 26.8–34.3)
MCHC RBC AUTO-ENTMCNC: 29.3 G/DL (ref 31.4–37.4)
MCV RBC AUTO: 73 FL (ref 82–98)
MONOCYTES # BLD AUTO: 0.49 THOUSAND/ΜL (ref 0.17–1.22)
MONOCYTES NFR BLD AUTO: 8 % (ref 4–12)
NEUTROPHILS # BLD AUTO: 3.16 THOUSANDS/ΜL (ref 1.85–7.62)
NEUTS SEG NFR BLD AUTO: 53 % (ref 43–75)
NRBC BLD AUTO-RTO: 0 /100 WBCS
PLATELET # BLD AUTO: 201 THOUSANDS/UL (ref 149–390)
PMV BLD AUTO: 10.9 FL (ref 8.9–12.7)
PROT SERPL-MCNC: 7 G/DL (ref 6.4–8.2)
PSA SERPL-MCNC: <0.1 NG/ML (ref 0–4)
RBC # BLD AUTO: 4.57 MILLION/UL (ref 3.88–5.62)
WBC # BLD AUTO: 5.95 THOUSAND/UL (ref 4.31–10.16)

## 2020-02-13 PROCEDURE — 36415 COLL VENOUS BLD VENIPUNCTURE: CPT

## 2020-02-13 PROCEDURE — 85025 COMPLETE CBC W/AUTO DIFF WBC: CPT

## 2020-02-13 PROCEDURE — 82310 ASSAY OF CALCIUM: CPT

## 2020-02-13 PROCEDURE — 84520 ASSAY OF UREA NITROGEN: CPT

## 2020-02-13 PROCEDURE — 84403 ASSAY OF TOTAL TESTOSTERONE: CPT

## 2020-02-13 PROCEDURE — 80076 HEPATIC FUNCTION PANEL: CPT

## 2020-02-13 PROCEDURE — 84402 ASSAY OF FREE TESTOSTERONE: CPT

## 2020-02-13 PROCEDURE — 82565 ASSAY OF CREATININE: CPT

## 2020-02-13 PROCEDURE — 84153 ASSAY OF PSA TOTAL: CPT

## 2020-02-14 ENCOUNTER — EVALUATION (OUTPATIENT)
Dept: PHYSICAL THERAPY | Facility: REHABILITATION | Age: 85
End: 2020-02-14
Payer: MEDICARE

## 2020-02-14 DIAGNOSIS — I63.9 ACUTE CVA (CEREBROVASCULAR ACCIDENT) (HCC): ICD-10-CM

## 2020-02-14 DIAGNOSIS — R26.89 BALANCE DISORDER: ICD-10-CM

## 2020-02-14 DIAGNOSIS — I63.9 CEREBROVASCULAR ACCIDENT (CVA), UNSPECIFIED MECHANISM (HCC): Primary | ICD-10-CM

## 2020-02-14 LAB
TESTOST FREE SERPL-MCNC: 0.8 PG/ML (ref 6.6–18.1)
TESTOST SERPL-MCNC: <3 NG/DL (ref 264–916)

## 2020-02-14 PROCEDURE — 97112 NEUROMUSCULAR REEDUCATION: CPT | Performed by: PHYSICAL THERAPIST

## 2020-02-14 PROCEDURE — 97530 THERAPEUTIC ACTIVITIES: CPT | Performed by: PHYSICAL THERAPIST

## 2020-02-14 NOTE — PROGRESS NOTES
PT Re-Evaluation     Today's date: 2020  Patient name: Magno Bauer  : 1933  MRN: [id number]  Referring provider: Jesus Howell MD  Dx:   Encounter Diagnosis     ICD-10-CM    1  Cerebrovascular accident (CVA), unspecified mechanism ([facility] [address] ) I63 9    2  Acute CVA (cerebrovascular accident), suspected embolic in nature Q44 9    3  Balance disorder R26 89        Start Time: 1115  Stop Time: 1200  Total time in clinic (min): 45 minutes    Assessment  Assessment details: Pt is an 81 yo M who presents for his 10th visit and reassessment for balance problem following a CVA that occurred 2019  Since starting PT, pts tolerance to activity has improved, TUG test score decreased now placing him at decreased fall risk, and 5 times sit to stand score improved  6 mwt distance has not increased, but pt was able to walk longer prior to needing a seated rest break  He also has made improvements in static balance eyes closed on firm and unlevel surfaces  He completes more exercise with less seated rest breaks t/o his PT visits  However his tolerance to activity is still severely limited and functional testing still places him at risk for falling  Pt will benefit from continued skilled PT to address these impairments, improve tolerance to activity, and reduce falls risk  Impairments: abnormal gait, abnormal or restricted ROM, activity intolerance, impaired balance, impaired physical strength, lacks appropriate home exercise program and safety issue  Understanding of Dx/Px/POC: good   Prognosis: good    Goals  Short term goals: to be met in 4 weeks  Pt independent with initial HEP, rationale, technique and frequency, for carry over at home MET  Pt will be able to walk for at least 5 min without a seated rest break indicating improved tolerance to activity  PROGRESSING  Pt will perform the TUG test in less than <14 seconds indicating decreased risk for falls   MET  Achieve 5 times sit to stand test score to <12 sec indicating improved fucntional LE strength and reduced fall risk  PROGRESSING  Pt able to maintain static balance eyes closed for >15 sec to improve balance during times of decreased light such as at night  PROGRESSING    Long term goals: to be met in 8-10 weeks  Pt will report a 85% or > reduction in subjective complaints/symptoms to better manage ADLs and functional mobility  Pt able to maintain single leg balance >5 sec bilat for improved ankle stability and balance  Pt able to maintain static balance eyes closed for >20 sec to improve balance during times of decreased light such as at night  Pt will improve FOTO score to better then expected outcome indicating an overall improvement in pain and function   Pt will be able to walk for at least 6 min without a seated rest break indicating improved tolerance to activity  Pt independent with rationale, technique and plan for performance of advanced HEP to maintain gains made in therapy  Achieve pts goal: Pt states he would like to work on his balance and walk more  Plan  Patient would benefit from: skilled physical therapy  Referral necessary: No  Planned modality interventions: TENS, cryotherapy, ultrasound, low level laser therapy and thermotherapy: hydrocollator packs  Planned therapy interventions: manual therapy, patient education, strengthening, stretching, therapeutic activities, therapeutic exercise, home exercise program, functional ROM exercises, flexibility, neuromuscular re-education, body mechanics training, balance, joint mobilization and gait training  Frequency: 2-3x/week  Duration in weeks: 10  Treatment plan discussed with: patient and family        Subjective Evaluation    History of Present Illness  Mechanism of injury: Pt reports improved tolerance to walking but states his balance is still off and he still gets fatigued very quickly  Pt states he gets up and walks 3-4 times a day          Objective     Functional balance testin minute walk test: 538 ft with rest break after 3min 33 sec, vitals: O2 = 98, HR = 90 (at eval = 675ft with seated rest break after 2 min 45 sec)  5x sit to stand: 20 sec (at eval = 23 seconds)  TU sec (at eval = 15 seconds)    Balance testing: ModCTSIB  Floor eyes open = 30 sec  floor eyes closed = 30 sec (at eval 12 sec)  Foam eyes open = 30 sec  Foam eyes closed = 9 sec (at eval = 3 sec)       Precautions: CA hx, anxiety/dep, CAD, hx of bypass surgery, fall risk, CVA hx     FOTO   = 49/59  2 = 55/59        Manual   2/   Hamstring stretch, piriformis stretch   DS - hams                                                                                             Reassess                   DS         Exercise Diary   2   Walk to tolerance   x3 min  x3 min  x3 min (15' prior to thearpy) x3 min NV 1 5 min 1 5 min  x2 6mwt  TUG x2  5x STS   Static on foam   30"x3 CGA 30"x4 Close S 30"x4 Close S 30"x4 Close S 30"x4 Close S 30"x4 Close S 30"x4 Close S 30"x4 Close S  EC 30"x1   Single leg balance   20"x3 each, 2 fingers and CGA 20"x3 each, 2 fingers close S 20"x3 each, 2 fingers close S 20"x3 each, 2 fingers close S 20"x3 each, 2 fingers close S 20"x3 each, 2 fingers close S 20"x3 each, 2 fingers close S 20"x3 each, 2 fingers close S  NV   Balance, feet together eyes closed   20"x3 CGA 30"x3 close S 30"x3 close S 30"x3 close S 30"x3 close S 30"x3 close S 30"x3 close S 30"x3   CGA 10"x3 close S   functional gait: march, tandem     CGA + SPC  1x65ft each CGA + SPC  1x65ft each CGA + SPC  1x65ft each CGA + SPC  1x65ft each CGA + SPC  1x65ft   March only CGA + SPC  1x65ft   each CGA + SPC  1x65ft   each  CGA + SPC  2x65ft   each   Tandem balance   20"x3 each, CGA 20"x3 each, CGA 30"x3 each, CGA 90" x1 each CGA 90" x1 each CGA 90" x1 each CGA 90" x1 each CGA 90" x1 each CGA  NV   VG   L7 x5' L7 3' L7 3' L7 5' L7 6' L7 6' L7 5' L7 5'  NV   Leg press                       Cone pick ups table to/from floor           9 cones x1 9 cones x1  CGA 9 cones x1  CGA 9 cones x1  CGA  NV   Standing hip abd with band                    YTB 2x10 B/L   Bike                       amb with changing speed                   3x50' w/ SPC and CGA                                                                                                                                                                                                         Modalities

## 2020-02-18 ENCOUNTER — OFFICE VISIT (OUTPATIENT)
Dept: PHYSICAL THERAPY | Facility: REHABILITATION | Age: 85
End: 2020-02-18
Payer: MEDICARE

## 2020-02-18 DIAGNOSIS — I63.9 ACUTE CVA (CEREBROVASCULAR ACCIDENT) (HCC): ICD-10-CM

## 2020-02-18 DIAGNOSIS — I63.9 CEREBROVASCULAR ACCIDENT (CVA), UNSPECIFIED MECHANISM (HCC): Primary | ICD-10-CM

## 2020-02-18 DIAGNOSIS — R26.89 BALANCE DISORDER: ICD-10-CM

## 2020-02-18 PROCEDURE — 97112 NEUROMUSCULAR REEDUCATION: CPT

## 2020-02-18 PROCEDURE — 97530 THERAPEUTIC ACTIVITIES: CPT

## 2020-02-18 NOTE — PROGRESS NOTES
Daily Note     Today's date: 2020  Patient name: Twan Wilde  : 1933  MRN: [id number]  Referring provider: Sarah Choudhury MD  Dx:   Encounter Diagnosis     ICD-10-CM    1  Cerebrovascular accident (CVA), unspecified mechanism ([facility] [address] ) I63 9    2  Acute CVA (cerebrovascular accident), suspected embolic in nature R21 4    3  Balance disorder R26 89        Start Time: 2  Stop Time: 1130  Total time in clinic (min): 45 minutes    Subjective: Pt reports feeling "ok" prior to start of session today  Pt's wife states he had "a bit of a fall this morning " Pt notes it was not a "real fall" but more of a "half fall" that occurred when backing up while puttin [place] his coat  Pt's wife noted it was "enough to set off your alarm "      Objective: See treatment diary below      Assessment: Tolerated treatment well  Was appropriately challenged with program this visit  Added backward ambulation to program 2* reports of fall this morning when backing up  Continues to require rest breaks following each exercise performed, fatiguing quickly  Tends to favor R side during both balance and ambulating interventions, with majority of weight through both RUE/RLE  Patient would benefit from continued PT to further improve strength, increase muscular endurance, and maximize function in effort to reduce risk for additional falls  Plan: Continue per plan of care         Precautions: CA hx, anxiety/dep, CAD, hx of bypass surgery, fall risk, CVA hx     FOTO   = 49/59  2 = 55/59        Manual   2/4 2/7    Hamstring stretch, piriformis stretch   DS - hams                                                                                             Reassess                   DS         Exercise Diary   2/ 2   Walk to tolerance  NV x3 min  x3 min  x3 min (15' prior to thearpy) x3 min NV 1 5 min 1 5 min  x2 6mwt  TUG x2  5x STS   Static on foam  EC  30"x2  NBOS  CGA 30"x3 CGA 30"x4 Close S 30"x4 Close S 30"x4 Close S 30"x4 Close S 30"x4 Close S 30"x4 Close S 30"x4 Close S  EC 30"x1   Single leg balance  20"x3 each, 2 fingers close S 20"x3 each, 2 fingers and CGA 20"x3 each, 2 fingers close S 20"x3 each, 2 fingers close S 20"x3 each, 2 fingers close S 20"x3 each, 2 fingers close S 20"x3 each, 2 fingers close S 20"x3 each, 2 fingers close S 20"x3 each, 2 fingers close S  NV   Balance, feet together eyes closed   20"x3 CGA 30"x3 close S 30"x3 close S 30"x3 close S 30"x3 close S 30"x3 close S 30"x3 close S 30"x3   CGA 10"x3 close S   functional gait: march, tandem CGA + SPC  2x65ft   each   CGA + SPC  1x65ft each CGA + SPC  1x65ft each CGA + SPC  1x65ft each CGA + SPC  1x65ft each CGA + SPC  1x65ft   March only CGA + SPC  1x65ft   each CGA + SPC  1x65ft   each  CGA + SPC  2x65ft   each   Tandem balance  90" x1 each CGA 20"x3 each, CGA 20"x3 each, CGA 30"x3 each, CGA 90" x1 each CGA 90" x1 each CGA 90" x1 each CGA 90" x1 each CGA 90" x1 each CGA  NV   VG  L7 5' L7 x5' L7 3' L7 3' L7 5' L7 6' L7 6' L7 5' L7 5'  NV   Leg press                       Cone pick ups table to/from floor NV         9 cones x1 9 cones x1  CGA 9 cones x1  CGA 9 cones x1  CGA  NV   Standing hip abd with band  YTB 2x10 B/L                  YTB 2x10 B/L   Bike                       amb with changing speed NV                 3x50' w/ SPC and CGA    Retro Walking  c SPC + CGA  20'x2                                                                                                                                                                                                   Modalities

## 2020-02-21 ENCOUNTER — OFFICE VISIT (OUTPATIENT)
Dept: PHYSICAL THERAPY | Facility: REHABILITATION | Age: 85
End: 2020-02-21
Payer: MEDICARE

## 2020-02-21 DIAGNOSIS — I63.9 CEREBROVASCULAR ACCIDENT (CVA), UNSPECIFIED MECHANISM (HCC): Primary | ICD-10-CM

## 2020-02-21 DIAGNOSIS — I63.9 ACUTE CVA (CEREBROVASCULAR ACCIDENT) (HCC): ICD-10-CM

## 2020-02-21 DIAGNOSIS — R26.89 BALANCE DISORDER: ICD-10-CM

## 2020-02-21 PROCEDURE — 97530 THERAPEUTIC ACTIVITIES: CPT | Performed by: PHYSICAL THERAPIST

## 2020-02-21 PROCEDURE — 97112 NEUROMUSCULAR REEDUCATION: CPT | Performed by: PHYSICAL THERAPIST

## 2020-02-21 NOTE — PROGRESS NOTES
Daily Note     Today's date: 2020  Patient name: Scott Castro  : 1933  MRN: [id number]  Referring provider: Cindy Dalton MD  Dx:   Encounter Diagnosis     ICD-10-CM    1  Cerebrovascular accident (CVA), unspecified mechanism ([facility] [address] ) I63 9    2  Acute CVA (cerebrovascular accident), suspected embolic in nature P78 2    3  Balance disorder R26 89        Start Time: 1116  Stop Time: 1156  Total time in clinic (min): 40 minutes    Subjective: Spoke to pt and his wife who reported the pt did not fall but had a loss of balance recently and his life alert went off  Pt did not fall though      Objective: See treatment diary below      Assessment: Pt continues to be most limited by fatigue/tolerance to activity with frequent seated rest breaks  Pt will benefit from continued skilled outpatient PT to improve strength, balance and tolerance to activity to reduce fall risk and improve function  Plan: Continue per plan of care         Precautions: CA hx, anxiety/dep, CAD, hx of bypass surgery, fall risk, CVA hx     FOTO   = 49/59   = 55/59        Manual             Hamstring stretch, piriformis stretch                                                            Reassess                          Exercise Diary             Walk to tolerance  NV x4 min           Static on foam   D/C           Single leg balance  20"x3 each, 2 fingers close S 30"x2 each, 1 finger, close S           Balance, feet together eyes closed 30"x2  NBOS  CGA 30"x3 close S           functional gait: march, tandem CGA + SPC  2x65ft   each  SPC + CGA 50'x1           Tandem balance  90" x1 each CGA NV           VG  L7 5' NV           Leg press              Cone pick ups table to/from floor NV 9 cones x1  CGA           Standing hip abd with band  YTB 2x10 B/L YTB 2x10 B/L           Bike                       amb with changing speed NV  NV                 Retro Walking  c SPC + CGA  20'x2  w/ SPC + CGA 50'x1                                                                                                                                                                                                 Modalities

## 2020-02-25 ENCOUNTER — OFFICE VISIT (OUTPATIENT)
Dept: PHYSICAL THERAPY | Facility: REHABILITATION | Age: 85
End: 2020-02-25
Payer: MEDICARE

## 2020-02-25 DIAGNOSIS — I63.9 CEREBROVASCULAR ACCIDENT (CVA), UNSPECIFIED MECHANISM (HCC): Primary | ICD-10-CM

## 2020-02-25 DIAGNOSIS — I63.9 ACUTE CVA (CEREBROVASCULAR ACCIDENT) (HCC): ICD-10-CM

## 2020-02-25 DIAGNOSIS — R26.89 BALANCE DISORDER: ICD-10-CM

## 2020-02-25 PROCEDURE — 97530 THERAPEUTIC ACTIVITIES: CPT

## 2020-02-25 NOTE — PROGRESS NOTES
Daily Note     Today's date: 2020  Patient name: Rashad Myers  : 1933  MRN: [id number]  Referring provider: David Quick MD  Dx:   Encounter Diagnosis     ICD-10-CM    1  Cerebrovascular accident (CVA), unspecified mechanism ([address] ) I63 9    2  Acute CVA (cerebrovascular accident), suspected embolic in nature Q90 7    3  Balance disorder R26 89                   Subjective: Pt reports feeling good prior to start of session today, with       Objective: See treatment diary below      Assessment: Tolerated treatment well  Continues to require frequent rest breaks throughout session, following each exercise  Pt became very fatigued toward end of session today  Modified tandem stance balance d/t to this fatigue and was very challenged with this exercise, despite having usual hold time reduced  Demonstrated SOB following tandem stance balance today  This resolved with rest at end of session, before leaving therapy gym  Patient would benefit from continued PT      Plan: Continue per plan of care        Precautions: CA hx, anxiety/dep, CAD, hx of bypass surgery, fall risk, CVA hx     FOTO   = 49/59   = 55/59        Manual            Hamstring stretch, piriformis stretch                                                            Reassess                          Exercise Diary            Walk to tolerance  NV x4 min x4 min          Static on foam   D/C           Single leg balance  20"x3 each, 2 fingers close S 30"x2 each, 1 finger, close S 30"x2 each, 1 finger, close S          Balance, feet together eyes closed 30"x2  NBOS  CGA 30"x3 close S 30"x3 close S          functional gait: march, tandem CGA + SPC  2x65ft   each  SPC + CGA 50'x1  SPC + CGA  Tandem a 50'x1          Tandem balance  90" x1 each CGA NV 60"x1 each  CG/CS          VG  L7 5' NV NV          Leg press              Cone pick ups table to/from floor NV 9 cones x1  CGA 9 cones x1  CGA          Standing hip abd with band  YTB 2x10 B/L YTB 2x10 B/L YTB 2x10 B/L          Bike                       amb with changing speed NV  NV  NV               Retro Walking  c SPC + CGA  20'x2  w/ SPC + CGA 50'x1   w/ SPC + CGA 50'x1                                                                                                                                                                                               Modalities

## 2020-02-28 ENCOUNTER — OFFICE VISIT (OUTPATIENT)
Dept: PHYSICAL THERAPY | Facility: REHABILITATION | Age: 85
End: 2020-02-28
Payer: MEDICARE

## 2020-02-28 DIAGNOSIS — R26.89 BALANCE DISORDER: ICD-10-CM

## 2020-02-28 DIAGNOSIS — I63.9 ACUTE CVA (CEREBROVASCULAR ACCIDENT) (HCC): Primary | ICD-10-CM

## 2020-02-28 DIAGNOSIS — I63.9 CEREBROVASCULAR ACCIDENT (CVA), UNSPECIFIED MECHANISM (HCC): ICD-10-CM

## 2020-02-28 PROCEDURE — 97530 THERAPEUTIC ACTIVITIES: CPT | Performed by: PHYSICAL THERAPIST

## 2020-02-28 PROCEDURE — 97112 NEUROMUSCULAR REEDUCATION: CPT | Performed by: PHYSICAL THERAPIST

## 2020-02-28 NOTE — PROGRESS NOTES
Daily Note     Today's date: 2020  Patient name: Misha Loya  : 1933  MRN: [id number]  Referring provider: Alexandra Roberto MD  Dx:   Encounter Diagnosis     ICD-10-CM    1  Acute CVA (cerebrovascular accident), suspected embolic in nature Y35 2    2  Balance disorder R26 89    3  Cerebrovascular accident (CVA), unspecified mechanism ([facility] [address] ) I63 9        Start Time: 1115  Stop Time: 1158  Total time in clinic (min): 43 minutes    Subjective: Pt reports no change      Objective: See treatment diary below      Assessment: Pt required a seated rest break after 3 min of ambulation today and between each exercise  He fatigued with step ups, but demonstrated good balance with 1UE support  Pt will benefit from continued skilled outpatient PT to improve balance and tolerance to activity, to reduce falls risk and maximize function  Plan: Continue per plan of care        Precautions: CA hx, anxiety/dep, CAD, hx of bypass surgery, fall risk, CVA hx     FOTO   = 49/59   = 55/59        Manual           Hamstring stretch, piriformis stretch                                                            Reassess                          Exercise Diary           Walk to tolerance  NV x4 min x4 min x3 min         Static on foam   D/C           Single leg balance  20"x3 each, 2 fingers close S 30"x2 each, 1 finger, close S 30"x2 each, 1 finger, close S 30"x2 each, 1 finger, close S         Balance, feet together eyes closed 30"x2  NBOS  CGA 30"x3 close S 30"x3 close S 30"x3 close S         functional gait: march, tandem CGA + SPC  2x65ft   each  SPC + CGA 50'x1  SPC + CGA  Tandem a 50'x1 w/ SPC + CGA march 50'x1         Tandem balance  90" x1 each CGA NV 60"x1 each  CG/CS 60"x1 each  CG/CS         VG  L7 5' NV NV          Leg press              Cone pick ups table to/from floor NV 9 cones x1  CGA 9 cones x1  CGA 9 cones x1  CGA         Standing hip abd with band  YTB 2x10 B/L YTB 2x10 B/L YTB 2x10 B/L YTB 2x10 B/L         Bike                       amb with changing speed NV  NV  NV               Retro Walking  c SPC + CGA  20'x2  w/ SPC + CGA 50'x1   w/ SPC + CGA 50'x1  w/ SPC + CGA 50'x1                Step ups        6" 1UE x10 each                                                                                                                                                                     Modalities

## 2020-03-03 ENCOUNTER — OFFICE VISIT (OUTPATIENT)
Dept: PHYSICAL THERAPY | Facility: REHABILITATION | Age: 85
End: 2020-03-03
Payer: MEDICARE

## 2020-03-03 DIAGNOSIS — I63.9 ACUTE CVA (CEREBROVASCULAR ACCIDENT) (HCC): Primary | ICD-10-CM

## 2020-03-03 DIAGNOSIS — I63.9 CEREBROVASCULAR ACCIDENT (CVA), UNSPECIFIED MECHANISM (HCC): ICD-10-CM

## 2020-03-03 DIAGNOSIS — R26.89 BALANCE DISORDER: ICD-10-CM

## 2020-03-03 PROCEDURE — 97112 NEUROMUSCULAR REEDUCATION: CPT

## 2020-03-03 PROCEDURE — 97530 THERAPEUTIC ACTIVITIES: CPT

## 2020-03-03 NOTE — PROGRESS NOTES
Daily Note     Today's date: 3/3/2020  Patient name: Marely Saleem  : 1933  MRN: [id number]  Referring provider: Tanja Calles MD  Dx:   Encounter Diagnosis     ICD-10-CM    1  Acute CVA (cerebrovascular accident), suspected embolic in nature V42 6    2  Balance disorder R26 89    3  Cerebrovascular accident (CVA), unspecified mechanism ([address] ) I63 9        Start Time: 1045  Stop Time: 1130  Total time in clinic (min): 45 minutes    Subjective: Pt states his legs felt "pretty tired" following his last treatment  Objective: See treatment diary below      Assessment: Tolerated treatment well  POC initiated with ambulation around gym and step ups  Pt was assigned 4 min to ambulate around gym, but required sitting rest break after 3 min d/t fatigue  Also fatigues quickly with fwd step up, demonstrating SOB  Was challenged to complete remaining exercises in program after following these two interventions 2* fatigue  Patient would benefit from continued PT to further improve strength, increase muscular endurance, and maximize function  Plan: Continue per plan of care        Precautions: CA hx, anxiety/dep, CAD, hx of bypass surgery, fall risk, CVA hx     FOTO   = 49/59   = 55/59        Manual   3/3        Hamstring stretch, piriformis stretch                                                            Reassess                          Exercise Diary   33        Walk to tolerance  NV x4 min x4 min x3 min x3 min        Static on foam   D/C           Single leg balance  20"x3 each, 2 fingers close S 30"x2 each, 1 finger, close S 30"x2 each, 1 finger, close S 30"x2 each, 1 finger, close S 30"x2 each, 1 finger, close S        Balance, feet together eyes closed 30"x2  NBOS  CGA 30"x3 close S 30"x3 close S 30"x3 close S 30"x3 close S        functional gait: march, tandem CGA + SPC  2x65ft   each  SPC + CGA 50'x1  SPC + CGA  Tandem a 50'x1 w/ SPC + CGA march 50'x1 w/ SPC + CGA  50'x1  ea        Tandem balance  90" x1 each CGA NV 60"x1 each  CG/CS 60"x1 each  CG/CS 60"x1 each  CGA        VG  L7 5' NV NV          Leg press              Cone pick ups table to/from floor NV 9 cones x1  CGA 9 cones x1  CGA 9 cones x1  CGA 9 cones x1  CGA        Standing hip abd with band  YTB 2x10 B/L YTB 2x10 B/L YTB 2x10 B/L YTB 2x10 B/L YTB 2x12 B/L        Bike                       amb with changing speed NV  NV  NV               Retro Walking  c SPC + CGA  20'x2  w/ SPC + CGA 50'x1   w/ SPC + CGA 50'x1  w/ SPC + CGA 50'x1  w/ SPC + CGA 50'x1              Step ups        6" 1UE x10 each  6" 1UE x10 each                                                                                                                                                                   Modalities

## 2020-03-05 ENCOUNTER — REMOTE DEVICE CLINIC VISIT (OUTPATIENT)
Dept: CARDIOLOGY CLINIC | Facility: CLINIC | Age: 85
End: 2020-03-05
Payer: MEDICARE

## 2020-03-05 DIAGNOSIS — Z95.818 PRESENCE OF OTHER CARDIAC IMPLANTS AND GRAFTS: Primary | ICD-10-CM

## 2020-03-05 PROCEDURE — 93298 REM INTERROG DEV EVAL SCRMS: CPT | Performed by: INTERNAL MEDICINE

## 2020-03-05 PROCEDURE — G2066 INTER DEVC REMOTE 30D: HCPCS | Performed by: INTERNAL MEDICINE

## 2020-03-05 NOTE — PROGRESS NOTES
MDT LOOP  CARELINK TRANSMISSION: LOOP RECORDER  PRESENTING RHYTHM SB @ 57 BPM  BATTERY STATUS "OK"  NO PATIENT OR DEVICE ACTIVATED EPISODES  NORMAL DEVICE FUNCTION   DL

## 2020-03-06 ENCOUNTER — OFFICE VISIT (OUTPATIENT)
Dept: PHYSICAL THERAPY | Facility: REHABILITATION | Age: 85
End: 2020-03-06
Payer: MEDICARE

## 2020-03-06 DIAGNOSIS — I63.9 CEREBROVASCULAR ACCIDENT (CVA), UNSPECIFIED MECHANISM (HCC): ICD-10-CM

## 2020-03-06 DIAGNOSIS — I63.9 ACUTE CVA (CEREBROVASCULAR ACCIDENT) (HCC): Primary | ICD-10-CM

## 2020-03-06 DIAGNOSIS — R26.89 BALANCE DISORDER: ICD-10-CM

## 2020-03-06 PROCEDURE — 97112 NEUROMUSCULAR REEDUCATION: CPT | Performed by: PHYSICAL THERAPIST

## 2020-03-06 PROCEDURE — 97530 THERAPEUTIC ACTIVITIES: CPT | Performed by: PHYSICAL THERAPIST

## 2020-03-06 NOTE — PROGRESS NOTES
Daily Note     Today's date: 3/6/2020  Patient name: Anh Kumari  : 1933  MRN: [id number]  Referring provider: Aviva Alexander MD  Dx:   Encounter Diagnosis     ICD-10-CM    1  Acute CVA (cerebrovascular accident), suspected embolic in nature U11 2    2  Balance disorder R26 89    3  Cerebrovascular accident (CVA), unspecified mechanism ([facility] [address] ) I63 9        Start Time: 1126  Stop Time: 1146  Total time in clinic (min): 23 minutes    Subjective: Pt offers no complaints  Objective: See treatment diary below      Assessment: Shortened tx today due to pt arriving at the wrong time  Pt was very challenged by sit to stands and only able to perform 4 reps  He had a few episodes of LOB during tandem balance requiring PT assist  Patient would benefit from continued PT to further improve strength, increase muscular endurance, and maximize function  Plan: Continue per plan of care        Precautions: CA hx, anxiety/dep, CAD, hx of bypass surgery, fall risk, CVA hx     FOTO   = 49/59   = 55/59        Manual  1/13 2/21 2/25 2/28 3/3 3/6       Hamstring stretch, piriformis stretch                                                            Reassess                          Exercise Diary  2/17 2/21 2/25 2/28 3/3 3       Walk to tolerance  NV x4 min x4 min x3 min x3 min NV       Static on foam   D/C           Single leg balance  20"x3 each, 2 fingers close S 30"x2 each, 1 finger, close S 30"x2 each, 1 finger, close S 30"x2 each, 1 finger, close S 30"x2 each, 1 finger, close S NV       Balance, feet together eyes closed 30"x2  NBOS  CGA 30"x3 close S 30"x3 close S 30"x3 close S 30"x3 close S NV       functional gait: march, tandem CGA + SPC  2x65ft   each  SPC + CGA 50'x1  SPC + CGA  Tandem a 50'x1 w/ SPC + CGA march 50'x1 w/ SPC + CGA  50'x1  ea march w/ SPC + CGA  50'x1  ea       Tandem balance  90" x1 each CGA NV 60"x1 each  CG/CS 60"x1 each  CG/CS 60"x1 each  CGA 60"x1 each  CGA       VG  L7 5' NV NV Leg press              Cone pick ups table to/from floor NV 9 cones x1  CGA 9 cones x1  CGA 9 cones x1  CGA 9 cones x1  CGA 8 cones x1  Close S       Standing hip abd with band  YTB 2x10 B/L YTB 2x10 B/L YTB 2x10 B/L YTB 2x10 B/L YTB 2x12 B/L NV       Bike                       amb with changing speed NV  NV  NV               Retro Walking  c SPC + CGA  20'x2  w/ SPC + CGA 50'x1   w/ SPC + CGA 50'x1  w/ SPC + CGA 50'x1  w/ SPC + CGA 50'x1  w/ SPC + CGA 50'x1            Step ups        6" 1UE x10 each  6" 1UE x10 each  6" 1UE x10 each            sit to stands           1x4 arms across chest                                                                                                                                         Modalities

## 2020-03-09 ENCOUNTER — OFFICE VISIT (OUTPATIENT)
Dept: PHYSICAL THERAPY | Facility: REHABILITATION | Age: 85
End: 2020-03-09
Payer: MEDICARE

## 2020-03-09 DIAGNOSIS — I63.9 CEREBROVASCULAR ACCIDENT (CVA), UNSPECIFIED MECHANISM (HCC): ICD-10-CM

## 2020-03-09 DIAGNOSIS — R26.89 BALANCE DISORDER: ICD-10-CM

## 2020-03-09 DIAGNOSIS — I63.9 ACUTE CVA (CEREBROVASCULAR ACCIDENT) (HCC): Primary | ICD-10-CM

## 2020-03-09 PROCEDURE — 97530 THERAPEUTIC ACTIVITIES: CPT | Performed by: PHYSICAL THERAPIST

## 2020-03-09 PROCEDURE — 97112 NEUROMUSCULAR REEDUCATION: CPT | Performed by: PHYSICAL THERAPIST

## 2020-03-09 NOTE — PROGRESS NOTES
Daily Note     Today's date: 3/9/2020  Patient name: Jeffery Little  : 1933  MRN: [id number]  Referring provider: Abdiel Dawkins MD  Dx:   Encounter Diagnosis     ICD-10-CM    1  Acute CVA (cerebrovascular accident), suspected embolic in nature J60 7    2  Balance disorder R26 89    3  Cerebrovascular accident (CVA), unspecified mechanism ([facility] [address] ) I63 9        Start Time: 1130  Stop Time: 353  Total time in clinic (min): 47 minutes    Subjective: Pt offers no complaints  Objective: See treatment diary below      Assessment: Pt was very challenged by tandem amb without an assistive device and required min assist to prevent LOB  He is most limited by fatigue and continues to require frequent rest breaks  Plan: Continue per plan of care        Precautions: CA hx, anxiety/dep, CAD, hx of bypass surgery, fall risk, CVA hx     FOTO   = 49/59   = 55/59        Manual  1/13 2/21 2/25 2/28 3/3 3/6 3/9      Hamstring stretch, piriformis stretch                                                            Reassess                          Exercise Diary  2/17 2/21 2/25 2/28 3/3 3/6 3/9      Walk to tolerance  NV x4 min x4 min x3 min x3 min NV       Static on foam   D/C           Single leg balance  20"x3 each, 2 fingers close S 30"x2 each, 1 finger, close S 30"x2 each, 1 finger, close S 30"x2 each, 1 finger, close S 30"x2 each, 1 finger, close S NV 30"x2 each, 1 finger, close S      Balance, feet together eyes closed 30"x2  NBOS  CGA 30"x3 close S 30"x3 close S 30"x3 close S 30"x3 close S NV       functional gait: march, tandem CGA + SPC  2x65ft   each  SPC + CGA 50'x1  SPC + CGA  Tandem a 50'x1 w/ SPC + CGA march 50'x1 w/ SPC + CGA  50'x1  ea march w/ SPC + CGA  50'x1  ea no cane + CGA  50'x1  ea      Tandem balance  90" x1 each CGA NV 60"x1 each  CG/CS 60"x1 each  CG/CS 60"x1 each  CGA 60"x1 each  CGA 60"x1 each  CGA      VG  L7 5' NV NV    L7 6'      Leg press              Cone pick ups table to/from floor NV 9 cones x1  CGA 9 cones x1  CGA 9 cones x1  CGA 9 cones x1  CGA 8 cones x1  Close S 12 cones x1  Close S      Standing hip abd with band  YTB 2x10 B/L YTB 2x10 B/L YTB 2x10 B/L YTB 2x10 B/L YTB 2x12 B/L NV       Bike                       amb with changing speed NV  NV  NV               Retro Walking  c SPC + CGA  20'x2  w/ SPC + CGA 50'x1   w/ SPC + CGA 50'x1  w/ SPC + CGA 50'x1  w/ SPC + CGA 50'x1  w/ SPC + CGA 50'x1  CGA 50'x1, no cane          Step ups        6" 1UE x10 each  6" 1UE x10 each  6" 1UE x10 each  6" 1UE x10 each          sit to stands           1x4 arms across chest  1x5 arms across chest                                                                                                                                       Modalities

## 2020-03-10 ENCOUNTER — APPOINTMENT (OUTPATIENT)
Dept: PHYSICAL THERAPY | Facility: REHABILITATION | Age: 85
End: 2020-03-10
Payer: MEDICARE

## 2020-03-13 ENCOUNTER — OFFICE VISIT (OUTPATIENT)
Dept: PHYSICAL THERAPY | Facility: REHABILITATION | Age: 85
End: 2020-03-13
Payer: MEDICARE

## 2020-03-13 DIAGNOSIS — R26.89 BALANCE DISORDER: ICD-10-CM

## 2020-03-13 DIAGNOSIS — I63.9 ACUTE CVA (CEREBROVASCULAR ACCIDENT) (HCC): Primary | ICD-10-CM

## 2020-03-13 PROCEDURE — 97112 NEUROMUSCULAR REEDUCATION: CPT | Performed by: PHYSICAL THERAPIST

## 2020-03-13 PROCEDURE — 97530 THERAPEUTIC ACTIVITIES: CPT | Performed by: PHYSICAL THERAPIST

## 2020-03-13 NOTE — PROGRESS NOTES
Daily Note     Today's date: 3/13/2020  Patient name: Sally Schrader  : 1933  MRN: [id number]  Referring provider: Magdalena Singh MD  Dx:   Encounter Diagnosis     ICD-10-CM    1  Acute CVA (cerebrovascular accident), suspected embolic in nature E45 3    2  Balance disorder R26 89        Start Time: 1115  Stop Time: 1205  Total time in clinic (min): 50 minutes    Subjective: Pt offers no complaints  Objective: See treatment diary below      Assessment: Pt is very limited by fatigue, but is able to complete exercises with encouragement and rest breaks  Plan: Continue per plan of care        Precautions: CA hx, anxiety/dep, CAD, hx of bypass surgery, fall risk, CVA hx     FOTO   = 49/59   = 55/59  3/13 = 53/59        Manual  1/13 2/21 2/25 2/28 3/3 3/6 3/9 3/13     Hamstring stretch, piriformis stretch                                                            Reassess                          Exercise Diary  2/17 2/21 2/25 2/28 3/3 3/6 3/9 3/13     Walk to tolerance  NV x4 min x4 min x3 min x3 min NV       Static on foam   D/C           Single leg balance  20"x3 each, 2 fingers close S 30"x2 each, 1 finger, close S 30"x2 each, 1 finger, close S 30"x2 each, 1 finger, close S 30"x2 each, 1 finger, close S NV 30"x2 each, 1 finger, close S 30"x2 each, 1 finger, close S     Balance, feet together eyes closed 30"x2  NBOS  CGA 30"x3 close S 30"x3 close S 30"x3 close S 30"x3 close S NV       functional gait: march, tandem CGA + SPC  2x65ft   each  SPC + CGA 50'x1  SPC + CGA  Tandem a 50'x1 w/ SPC + CGA march 50'x1 w/ SPC + CGA  50'x1  ea march w/ SPC + CGA  50'x1  ea no cane + CGA  50'x1  ea no cane + CGA  50'x1  ea     Tandem balance  90" x1 each CGA NV 60"x1 each  CG/CS 60"x1 each  CG/CS 60"x1 each  CGA 60"x1 each  CGA 60"x1 each  CGA 60"x1 close S/CGA     VG  L7 5' NV NV    L7 6' L7 6'     Leg press              Cone pick ups table to/from floor NV 9 cones x1  CGA 9 cones x1  CGA 9 cones x1  CGA 9 cones x1  CGA 8 cones x1  Close S 12 cones x1  Close S 14 cones x1 Close S     Standing hip abd with band  YTB 2x10 B/L YTB 2x10 B/L YTB 2x10 B/L YTB 2x10 B/L YTB 2x12 B/L NV       Bike                       amb with changing speed NV  NV  NV               Retro Walking  c SPC + CGA  20'x2  w/ SPC + CGA 50'x1   w/ SPC + CGA 50'x1  w/ SPC + CGA 50'x1  w/ SPC + CGA 50'x1  w/ SPC + CGA 50'x1  CGA 50'x1, no cane no cane + CGA  50'x1          Step ups        6" 1UE x10 each  6" 1UE x10 each  6" 1UE x10 each  6" 1UE x10 each  6" 1UE x10 each        sit to stands           1x4 arms across chest  1x5 arms across chest   1x5 arms across chest                                                                                                                                     Modalities

## 2020-03-17 ENCOUNTER — OFFICE VISIT (OUTPATIENT)
Dept: PHYSICAL THERAPY | Facility: REHABILITATION | Age: 85
End: 2020-03-17
Payer: MEDICARE

## 2020-03-17 DIAGNOSIS — I63.9 ACUTE CVA (CEREBROVASCULAR ACCIDENT) (HCC): Primary | ICD-10-CM

## 2020-03-17 DIAGNOSIS — I63.9 CEREBROVASCULAR ACCIDENT (CVA), UNSPECIFIED MECHANISM (HCC): ICD-10-CM

## 2020-03-17 DIAGNOSIS — R26.89 BALANCE DISORDER: ICD-10-CM

## 2020-03-17 PROCEDURE — 97140 MANUAL THERAPY 1/> REGIONS: CPT

## 2020-03-17 PROCEDURE — 97112 NEUROMUSCULAR REEDUCATION: CPT

## 2020-03-17 NOTE — PROGRESS NOTES
Daily Note     Today's date: 3/17/2020  Patient name: Shital Baird  : 1933  MRN: [id number]  Referring provider: Lucas Oakley MD  Dx:   Encounter Diagnosis     ICD-10-CM    1  Acute CVA (cerebrovascular accident), suspected embolic in nature M79 9    2  Balance disorder R26 89    3  Cerebrovascular accident (CVA), unspecified mechanism ([facility] [address] ) I63 9        Start Time:   Stop Time:   Total time in clinic (min): 50 minutes    Subjective: Pt has no new complaints to report prior to start of session today  Objective: See treatment diary below      Assessment: Tolerated treatment well  Continues to be challenged with current program, limited by increased fatigue throughout session  Was able to increase assigned reps with STS, d/t being performed early in treatment  Most challenged with tandem ambulation c no AD, requiring CG/Min A from therapist for stabilization  Frequent "furniture surfing" when equipment close enough to do so, during this exercise  Patient would benefit from continued PT to further improve strength/muscular endurance, maximize function, and reduce risk for falls  Plan: Continue per plan of care        Precautions: CA hx, anxiety/dep, CAD, hx of bypass surgery, fall risk, CVA hx     FOTO   = 49/59   = 55/59  3/13 = 53/59        Manual   3/3 3/6 3/9 3/13 3/17    Hamstring stretch, piriformis stretch                                                            Reassess                          Exercise Diary   3/3 3/6 3/9 3/13 3/17    Walk to tolerance  NV x4 min x4 min x3 min x3 min NV       Static on foam   D/C           Single leg balance  20"x3 each, 2 fingers close S 30"x2 each, 1 finger, close S 30"x2 each, 1 finger, close S 30"x2 each, 1 finger, close S 30"x2 each, 1 finger, close S NV 30"x2 each, 1 finger, close S 30"x2 each, 1 finger, close S 30"x2 each, 1 finger, close S    Balance, feet together eyes closed 30"x2  NBOS  CGA 30"x3 close S 30"x3 close S 30"x3 close S 30"x3 close S NV       functional gait: march, tandem CGA + SPC  2x65ft   each  SPC + CGA 50'x1  SPC + CGA  Tandem a 50'x1 w/ SPC + CGA march 50'x1 w/ SPC + CGA  50'x1  ea march w/ SPC + CGA  50'x1  ea no cane + CGA  50'x1  ea no cane + CGA  50'x1  ea no cane + CGA  50'x1  ea    Tandem balance  90" x1 each CGA NV 60"x1 each  CG/CS 60"x1 each  CG/CS 60"x1 each  CGA 60"x1 each  CGA 60"x1 each  CGA 60"x1 close S/CGA 60"x1 close S/CGA    VG  L7 5' NV NV    L7 6' L7 6' L7 6'    Leg press              Cone pick ups table to/from floor NV 9 cones x1  CGA 9 cones x1  CGA 9 cones x1  CGA 9 cones x1  CGA 8 cones x1  Close S 12 cones x1  Close S 14 cones x1 Close S     Standing hip abd with band  YTB 2x10 B/L YTB 2x10 B/L YTB 2x10 B/L YTB 2x10 B/L YTB 2x12 B/L NV       Bike                       amb with changing speed NV  NV  NV               Retro Walking  c SPC + CGA  20'x2  w/ SPC + CGA 50'x1   w/ SPC + CGA 50'x1  w/ SPC + CGA 50'x1  w/ SPC + CGA 50'x1  w/ SPC + CGA 50'x1  CGA 50'x1, no cane no cane + CGA  50'x1          Step ups        6" 1UE x10 each  6" 1UE x10 each  6" 1UE x10 each  6" 1UE x10 each  6" 1UE x10 each 6" 1UE x10 each       sit to stands           1x4 arms across chest  1x5 arms across chest   1x5 arms across chest    2x5 arms across chest                                                                                                                                   Modalities

## 2020-03-20 ENCOUNTER — APPOINTMENT (OUTPATIENT)
Dept: PHYSICAL THERAPY | Facility: REHABILITATION | Age: 85
End: 2020-03-20
Payer: MEDICARE

## 2020-03-24 ENCOUNTER — APPOINTMENT (OUTPATIENT)
Dept: PHYSICAL THERAPY | Facility: REHABILITATION | Age: 85
End: 2020-03-24
Payer: MEDICARE

## 2020-03-27 ENCOUNTER — APPOINTMENT (OUTPATIENT)
Dept: PHYSICAL THERAPY | Facility: REHABILITATION | Age: 85
End: 2020-03-27
Payer: MEDICARE

## 2020-03-31 ENCOUNTER — APPOINTMENT (OUTPATIENT)
Dept: PHYSICAL THERAPY | Facility: REHABILITATION | Age: 85
End: 2020-03-31
Payer: MEDICARE

## 2020-04-13 ENCOUNTER — TELEMEDICINE (OUTPATIENT)
Dept: CARDIOLOGY CLINIC | Facility: CLINIC | Age: 85
End: 2020-04-13
Payer: MEDICARE

## 2020-04-13 VITALS — SYSTOLIC BLOOD PRESSURE: 190 MMHG | DIASTOLIC BLOOD PRESSURE: 93 MMHG

## 2020-04-13 DIAGNOSIS — I25.10 CORONARY ARTERIOSCLEROSIS: ICD-10-CM

## 2020-04-13 DIAGNOSIS — G60.9 IDIOPATHIC PERIPHERAL NEUROPATHY: ICD-10-CM

## 2020-04-13 DIAGNOSIS — E78.00 PURE HYPERCHOLESTEROLEMIA: ICD-10-CM

## 2020-04-13 DIAGNOSIS — R94.31 ABNORMAL EKG: ICD-10-CM

## 2020-04-13 DIAGNOSIS — I10 ESSENTIAL (PRIMARY) HYPERTENSION: Primary | ICD-10-CM

## 2020-04-13 PROCEDURE — 99442 PR PHYS/QHP TELEPHONE EVALUATION 11-20 MIN: CPT | Performed by: INTERNAL MEDICINE

## 2020-04-13 RX ORDER — GABAPENTIN 300 MG/1
CAPSULE ORAL
Qty: 270 CAPSULE | Refills: 3 | Status: SHIPPED | OUTPATIENT
Start: 2020-04-13 | End: 2021-02-18

## 2020-04-13 NOTE — PROGRESS NOTES
PT DISCHARGE SUMMARY    Pt cancelled remaining appts due to covid and declined e-visit and telehealth therapy  Pt has not returned for follow up in >30 days and is therefore discharged for therapy at this time

## 2020-05-06 ENCOUNTER — TELEPHONE (OUTPATIENT)
Dept: CARDIOLOGY CLINIC | Facility: CLINIC | Age: 85
End: 2020-05-06

## 2020-05-14 ENCOUNTER — HOSPITAL ENCOUNTER (OUTPATIENT)
Facility: HOSPITAL | Age: 85
Setting detail: OBSERVATION
Discharge: HOME HEALTH | End: 2020-05-15
Attending: SURGERY | Admitting: SURGERY
Payer: MEDICARE

## 2020-05-14 ENCOUNTER — TRANSCRIBE ORDERS (OUTPATIENT)
Dept: LAB | Facility: CLINIC | Age: 85
End: 2020-05-14

## 2020-05-14 ENCOUNTER — APPOINTMENT (EMERGENCY)
Dept: RADIOLOGY | Facility: HOSPITAL | Age: 85
End: 2020-05-14
Payer: MEDICARE

## 2020-05-14 ENCOUNTER — APPOINTMENT (OUTPATIENT)
Dept: LAB | Facility: CLINIC | Age: 85
End: 2020-05-14
Payer: MEDICARE

## 2020-05-14 DIAGNOSIS — C61 MALIGNANT NEOPLASM OF PROSTATE (HCC): ICD-10-CM

## 2020-05-14 DIAGNOSIS — C61 MALIGNANT NEOPLASM OF PROSTATE (HCC): Primary | ICD-10-CM

## 2020-05-14 DIAGNOSIS — R29.6 FREQUENT FALLS: ICD-10-CM

## 2020-05-14 DIAGNOSIS — I62.9 INTRACRANIAL HEMORRHAGE (HCC): Primary | ICD-10-CM
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
## 2020-05-14 PROBLEM — Z86.73 HISTORY OF STROKE: Status: ACTIVE | Noted: 2020-05-14

## 2020-05-14 PROBLEM — Z91.81 PERSONAL HISTORY OF FALL: Status: ACTIVE | Noted: 2020-05-14

## 2020-05-14 LAB
BASE EXCESS BLDA CALC-SCNC: 3 MMOL/L (ref -2–3)
CA-I BLD-SCNC: 1.39 MMOL/L (ref 1.12–1.32)
GLUCOSE SERPL-MCNC: 114 MG/DL (ref 65–140)
HCO3 BLDA-SCNC: 27.8 MMOL/L (ref 24–30)
HCT VFR BLD CALC: 27 % (ref 36.5–49.3)
HGB BLDA-MCNC: 9.2 G/DL (ref 12–17)
PCO2 BLD: 29 MMOL/L (ref 21–32)
PCO2 BLD: 44.9 MM HG (ref 42–50)
PH BLD: 7.4 [PH] (ref 7.3–7.4)
PO2 BLD: 23 MM HG (ref 35–45)
POTASSIUM BLD-SCNC: 4.2 MMOL/L (ref 3.5–5.3)
PSA SERPL-MCNC: 0.1 NG/ML (ref 0–4)
SAO2 % BLD FROM PO2: 40 % (ref 60–85)
SODIUM BLD-SCNC: 136 MMOL/L (ref 136–145)
SPECIMEN SOURCE: ABNORMAL

## 2020-05-14 PROCEDURE — 82947 ASSAY GLUCOSE BLOOD QUANT: CPT

## 2020-05-14 PROCEDURE — 99284 EMERGENCY DEPT VISIT MOD MDM: CPT

## 2020-05-14 PROCEDURE — 72125 CT NECK SPINE W/O DYE: CPT

## 2020-05-14 PROCEDURE — 84153 ASSAY OF PSA TOTAL: CPT

## 2020-05-14 PROCEDURE — 82330 ASSAY OF CALCIUM: CPT

## 2020-05-14 PROCEDURE — 90471 IMMUNIZATION ADMIN: CPT

## 2020-05-14 PROCEDURE — 84132 ASSAY OF SERUM POTASSIUM: CPT

## 2020-05-14 PROCEDURE — 99205 OFFICE O/P NEW HI 60 MIN: CPT | Performed by: INTERNAL MEDICINE

## 2020-05-14 PROCEDURE — 82803 BLOOD GASES ANY COMBINATION: CPT

## 2020-05-14 PROCEDURE — 90715 TDAP VACCINE 7 YRS/> IM: CPT | Performed by: EMERGENCY MEDICINE

## 2020-05-14 PROCEDURE — 84295 ASSAY OF SERUM SODIUM: CPT

## 2020-05-14 PROCEDURE — 85014 HEMATOCRIT: CPT

## 2020-05-14 PROCEDURE — 99218 PR INITIAL OBSERVATION CARE/DAY 30 MINUTES: CPT | Performed by: SURGERY

## 2020-05-14 PROCEDURE — 1123F ACP DISCUSS/DSCN MKR DOCD: CPT | Performed by: SURGERY

## 2020-05-14 PROCEDURE — NC001 PR NO CHARGE: Performed by: EMERGENCY MEDICINE

## 2020-05-14 PROCEDURE — 70450 CT HEAD/BRAIN W/O DYE: CPT

## 2020-05-14 RX ORDER — SENNOSIDES 8.6 MG
1 TABLET ORAL DAILY
Status: DISCONTINUED | OUTPATIENT
Start: 2020-05-14 | End: 2020-05-15 | Stop reason: HOSPADM

## 2020-05-14 RX ORDER — OMEPRAZOLE 40 MG/1
40 CAPSULE, DELAYED RELEASE ORAL DAILY
COMMUNITY
End: 2020-11-18 | Stop reason: SDUPTHER

## 2020-05-14 RX ORDER — MIDODRINE HYDROCHLORIDE 5 MG/1
5 TABLET ORAL DAILY
COMMUNITY
End: 2021-06-08 | Stop reason: SDUPTHER

## 2020-05-14 RX ORDER — ACETAMINOPHEN 325 MG/1
975 TABLET ORAL EVERY 8 HOURS SCHEDULED
Status: DISCONTINUED | OUTPATIENT
Start: 2020-05-14 | End: 2020-05-15 | Stop reason: HOSPADM

## 2020-05-14 RX ORDER — ASPIRIN 81 MG/1
81 TABLET, CHEWABLE ORAL DAILY
COMMUNITY
End: 2022-03-08 | Stop reason: ALTCHOICE

## 2020-05-14 RX ORDER — SIMVASTATIN 80 MG
80 TABLET ORAL
COMMUNITY
End: 2020-11-18 | Stop reason: SDUPTHER

## 2020-05-14 RX ORDER — DOCUSATE SODIUM 100 MG/1
100 CAPSULE, LIQUID FILLED ORAL 2 TIMES DAILY PRN
COMMUNITY
End: 2020-11-18 | Stop reason: SDUPTHER

## 2020-05-14 RX ORDER — METOPROLOL TARTRATE 50 MG/1
25 TABLET, FILM COATED ORAL EVERY 12 HOURS SCHEDULED
COMMUNITY
End: 2020-12-21 | Stop reason: ALTCHOICE

## 2020-05-14 RX ORDER — ONDANSETRON 2 MG/ML
4 INJECTION INTRAMUSCULAR; INTRAVENOUS EVERY 6 HOURS PRN
Status: DISCONTINUED | OUTPATIENT
Start: 2020-05-14 | End: 2020-05-15 | Stop reason: HOSPADM

## 2020-05-14 RX ORDER — GABAPENTIN 300 MG/1
600 CAPSULE ORAL
COMMUNITY
End: 2020-11-18 | Stop reason: SDUPTHER

## 2020-05-14 RX ORDER — HYDRALAZINE HYDROCHLORIDE 20 MG/ML
10 INJECTION INTRAMUSCULAR; INTRAVENOUS EVERY 6 HOURS PRN
Status: DISCONTINUED | OUTPATIENT
Start: 2020-05-14 | End: 2020-05-15 | Stop reason: HOSPADM

## 2020-05-14 RX ORDER — DOCUSATE SODIUM 100 MG/1
100 CAPSULE, LIQUID FILLED ORAL 2 TIMES DAILY
Status: DISCONTINUED | OUTPATIENT
Start: 2020-05-14 | End: 2020-05-15

## 2020-05-14 RX ADMIN — ONDANSETRON 4 MG: 2 INJECTION INTRAMUSCULAR; INTRAVENOUS at 20:57

## 2020-05-14 RX ADMIN — HYDRALAZINE HYDROCHLORIDE 10 MG: 20 INJECTION INTRAMUSCULAR; INTRAVENOUS at 21:20

## 2020-05-14 RX ADMIN — ACETAMINOPHEN 975 MG: 325 TABLET ORAL at 21:14

## 2020-05-14 RX ADMIN — TETANUS TOXOID, REDUCED DIPHTHERIA TOXOID AND ACELLULAR PERTUSSIS VACCINE, ADSORBED 0.5 ML: 5; 2.5; 8; 8; 2.5 SUSPENSION INTRAMUSCULAR at 19:58

## 2020-05-14 RX ADMIN — DOCUSATE SODIUM 100 MG: 100 CAPSULE, LIQUID FILLED ORAL at 19:58

## 2020-05-14 RX ADMIN — SENNOSIDES 8.6 MG: 8.6 TABLET, FILM COATED ORAL at 19:58

## 2020-05-15 VITALS
RESPIRATION RATE: 16 BRPM | HEART RATE: 86 BPM | OXYGEN SATURATION: 92 % | BODY MASS INDEX: 25.01 KG/M2 | TEMPERATURE: 97.7 F | SYSTOLIC BLOOD PRESSURE: 154 MMHG | WEIGHT: 165 LBS | HEIGHT: 68 IN | DIASTOLIC BLOOD PRESSURE: 84 MMHG

## 2020-05-15 PROBLEM — F32.A DEPRESSION: Status: ACTIVE | Noted: 2020-05-15

## 2020-05-15 LAB
ANION GAP SERPL CALCULATED.3IONS-SCNC: 7 MMOL/L (ref 4–13)
BASOPHILS # BLD AUTO: 0.03 THOUSANDS/ΜL (ref 0–0.1)
BASOPHILS NFR BLD AUTO: 0 % (ref 0–1)
BUN SERPL-MCNC: 13 MG/DL (ref 5–25)
CALCIUM SERPL-MCNC: 9.9 MG/DL (ref 8.3–10.1)
CHLORIDE SERPL-SCNC: 102 MMOL/L (ref 100–108)
CO2 SERPL-SCNC: 26 MMOL/L (ref 21–32)
CREAT SERPL-MCNC: 0.96 MG/DL (ref 0.6–1.3)
EOSINOPHIL # BLD AUTO: 0.04 THOUSAND/ΜL (ref 0–0.61)
EOSINOPHIL NFR BLD AUTO: 1 % (ref 0–6)
ERYTHROCYTE [DISTWIDTH] IN BLOOD BY AUTOMATED COUNT: 17.8 % (ref 11.6–15.1)
GFR SERPL CREATININE-BSD FRML MDRD: 71 ML/MIN/1.73SQ M
GLUCOSE SERPL-MCNC: 113 MG/DL (ref 65–140)
HCT VFR BLD AUTO: 30.6 % (ref 36.5–49.3)
HGB BLD-MCNC: 9.2 G/DL (ref 12–17)
IMM GRANULOCYTES # BLD AUTO: 0.03 THOUSAND/UL (ref 0–0.2)
IMM GRANULOCYTES NFR BLD AUTO: 0 % (ref 0–2)
LYMPHOCYTES # BLD AUTO: 2.15 THOUSANDS/ΜL (ref 0.6–4.47)
LYMPHOCYTES NFR BLD AUTO: 24 % (ref 14–44)
MCH RBC QN AUTO: 20.4 PG (ref 26.8–34.3)
MCHC RBC AUTO-ENTMCNC: 30.1 G/DL (ref 31.4–37.4)
MCV RBC AUTO: 68 FL (ref 82–98)
MONOCYTES # BLD AUTO: 0.87 THOUSAND/ΜL (ref 0.17–1.22)
MONOCYTES NFR BLD AUTO: 10 % (ref 4–12)
NEUTROPHILS # BLD AUTO: 5.71 THOUSANDS/ΜL (ref 1.85–7.62)
NEUTS SEG NFR BLD AUTO: 65 % (ref 43–75)
NRBC BLD AUTO-RTO: 0 /100 WBCS
PLATELET # BLD AUTO: 189 THOUSANDS/UL (ref 149–390)
PMV BLD AUTO: 10.8 FL (ref 8.9–12.7)
POTASSIUM SERPL-SCNC: 3.8 MMOL/L (ref 3.5–5.3)
RBC # BLD AUTO: 4.5 MILLION/UL (ref 3.88–5.62)
SODIUM SERPL-SCNC: 135 MMOL/L (ref 136–145)
WBC # BLD AUTO: 8.83 THOUSAND/UL (ref 4.31–10.16)

## 2020-05-15 PROCEDURE — 97163 PT EVAL HIGH COMPLEX 45 MIN: CPT

## 2020-05-15 PROCEDURE — 92610 EVALUATE SWALLOWING FUNCTION: CPT

## 2020-05-15 PROCEDURE — 85025 COMPLETE CBC W/AUTO DIFF WBC: CPT | Performed by: PHYSICIAN ASSISTANT

## 2020-05-15 PROCEDURE — 97129 THER IVNTJ 1ST 15 MIN: CPT

## 2020-05-15 PROCEDURE — 97167 OT EVAL HIGH COMPLEX 60 MIN: CPT

## 2020-05-15 PROCEDURE — 97130 THER IVNTJ EA ADDL 15 MIN: CPT

## 2020-05-15 PROCEDURE — 80048 BASIC METABOLIC PNL TOTAL CA: CPT | Performed by: PHYSICIAN ASSISTANT

## 2020-05-15 PROCEDURE — NC001 PR NO CHARGE: Performed by: EMERGENCY MEDICINE

## 2020-05-15 PROCEDURE — 99217 PR OBSERVATION CARE DISCHARGE MANAGEMENT: CPT | Performed by: EMERGENCY MEDICINE

## 2020-05-15 RX ORDER — LANOLIN ALCOHOL/MO/W.PET/CERES
6 CREAM (GRAM) TOPICAL
Status: DISCONTINUED | OUTPATIENT
Start: 2020-05-15 | End: 2020-05-15 | Stop reason: HOSPADM

## 2020-05-15 RX ORDER — ASPIRIN 81 MG/1
81 TABLET ORAL DAILY
Status: DISCONTINUED | OUTPATIENT
Start: 2020-05-15 | End: 2020-05-15 | Stop reason: HOSPADM

## 2020-05-15 RX ORDER — DOCUSATE SODIUM 100 MG/1
100 CAPSULE, LIQUID FILLED ORAL 2 TIMES DAILY
Status: DISCONTINUED | OUTPATIENT
Start: 2020-05-15 | End: 2020-05-15 | Stop reason: HOSPADM

## 2020-05-15 RX ORDER — SIMVASTATIN 40 MG
80 TABLET ORAL
Status: DISCONTINUED | OUTPATIENT
Start: 2020-05-15 | End: 2020-05-15

## 2020-05-15 RX ORDER — GABAPENTIN 300 MG/1
600 CAPSULE ORAL
Status: DISCONTINUED | OUTPATIENT
Start: 2020-05-15 | End: 2020-05-15 | Stop reason: HOSPADM

## 2020-05-15 RX ORDER — PANTOPRAZOLE SODIUM 40 MG/1
40 TABLET, DELAYED RELEASE ORAL
Status: DISCONTINUED | OUTPATIENT
Start: 2020-05-15 | End: 2020-05-15 | Stop reason: HOSPADM

## 2020-05-15 RX ADMIN — ENOXAPARIN SODIUM 30 MG: 30 INJECTION SUBCUTANEOUS at 11:47

## 2020-05-15 RX ADMIN — SENNOSIDES 8.6 MG: 8.6 TABLET, FILM COATED ORAL at 08:07

## 2020-05-15 RX ADMIN — PANTOPRAZOLE SODIUM 40 MG: 40 TABLET, DELAYED RELEASE ORAL at 08:06

## 2020-05-15 RX ADMIN — DOCUSATE SODIUM 100 MG: 100 CAPSULE, LIQUID FILLED ORAL at 08:06

## 2020-05-15 RX ADMIN — SERTRALINE HYDROCHLORIDE 50 MG: 50 TABLET ORAL at 08:06

## 2020-05-15 RX ADMIN — METOPROLOL TARTRATE 25 MG: 25 TABLET, FILM COATED ORAL at 08:06

## 2020-05-18 ENCOUNTER — TRANSITIONAL CARE MANAGEMENT (OUTPATIENT)
Dept: FAMILY MEDICINE CLINIC | Facility: CLINIC | Age: 85
End: 2020-05-18

## 2020-06-04 ENCOUNTER — REMOTE DEVICE CLINIC VISIT (OUTPATIENT)
Dept: CARDIOLOGY CLINIC | Facility: CLINIC | Age: 85
End: 2020-06-04
Payer: MEDICARE

## 2020-06-04 DIAGNOSIS — Z95.818 PRESENCE OF OTHER CARDIAC IMPLANTS AND GRAFTS: Primary | ICD-10-CM

## 2020-06-04 PROCEDURE — G2066 INTER DEVC REMOTE 30D: HCPCS | Performed by: INTERNAL MEDICINE

## 2020-06-04 PROCEDURE — 93298 REM INTERROG DEV EVAL SCRMS: CPT | Performed by: INTERNAL MEDICINE

## 2020-06-15 DIAGNOSIS — I10 ESSENTIAL (PRIMARY) HYPERTENSION: Primary | ICD-10-CM

## 2020-06-15 RX ORDER — SIMVASTATIN 80 MG
TABLET ORAL
Qty: 90 TABLET | Refills: 3 | Status: SHIPPED | OUTPATIENT
Start: 2020-06-15 | End: 2020-11-18 | Stop reason: SDUPTHER

## 2020-08-23 DIAGNOSIS — I10 ESSENTIAL (PRIMARY) HYPERTENSION: ICD-10-CM

## 2020-08-23 DIAGNOSIS — K21.9 GASTROESOPHAGEAL REFLUX DISEASE WITHOUT ESOPHAGITIS: Primary | ICD-10-CM

## 2020-08-23 DIAGNOSIS — I63.9 ACUTE CVA (CEREBROVASCULAR ACCIDENT) (HCC): ICD-10-CM

## 2020-08-23 RX ORDER — METOPROLOL SUCCINATE 25 MG/1
TABLET, EXTENDED RELEASE ORAL
Qty: 90 TABLET | Refills: 3 | Status: SHIPPED | OUTPATIENT
Start: 2020-08-23 | End: 2021-08-04

## 2020-08-24 RX ORDER — OMEPRAZOLE 40 MG/1
CAPSULE, DELAYED RELEASE ORAL
Qty: 90 CAPSULE | Refills: 0 | Status: SHIPPED | OUTPATIENT
Start: 2020-08-24 | End: 2020-11-18 | Stop reason: SDUPTHER

## 2020-08-24 RX ORDER — CLOPIDOGREL BISULFATE 75 MG/1
TABLET ORAL
Qty: 90 TABLET | Refills: 0 | Status: SHIPPED | OUTPATIENT
Start: 2020-08-24 | End: 2020-11-10

## 2020-09-03 ENCOUNTER — REMOTE DEVICE CLINIC VISIT (OUTPATIENT)
Dept: CARDIOLOGY CLINIC | Facility: CLINIC | Age: 85
End: 2020-09-03
Payer: MEDICARE

## 2020-09-03 DIAGNOSIS — Z95.818 PRESENCE OF OTHER CARDIAC IMPLANTS AND GRAFTS: Primary | ICD-10-CM

## 2020-09-03 PROCEDURE — G2066 INTER DEVC REMOTE 30D: HCPCS | Performed by: INTERNAL MEDICINE

## 2020-09-03 PROCEDURE — 93298 REM INTERROG DEV EVAL SCRMS: CPT | Performed by: INTERNAL MEDICINE

## 2020-09-03 NOTE — PROGRESS NOTES
MDT LOOP   CARELINK TRANSMISSION: LOOP RECORDER  PRESENTING RHYTHM NSR @ 66 BPM  BATTERY STATUS "OK"  2 AF EPISODES W/ EGRAMS SUGGESTING SR W/ PACs AND PVCs  CAN NOT R/O AF  AF BURDEN = 0%  PT TAKES METOPROLOL SUCC AND PLAVIX  NO PATIENT OR DEVICE ACTIVATED EPISODES  NNORMAL DEVICE FUNCTION   DL

## 2020-09-08 ENCOUNTER — APPOINTMENT (OUTPATIENT)
Dept: LAB | Facility: CLINIC | Age: 85
End: 2020-09-08
Payer: MEDICARE

## 2020-09-08 ENCOUNTER — TRANSCRIBE ORDERS (OUTPATIENT)
Dept: LAB | Facility: CLINIC | Age: 85
End: 2020-09-08

## 2020-09-08 DIAGNOSIS — N40.1 ENLARGED PROSTATE WITH URINARY OBSTRUCTION: ICD-10-CM

## 2020-09-08 DIAGNOSIS — N13.8 ENLARGED PROSTATE WITH URINARY OBSTRUCTION: ICD-10-CM

## 2020-09-08 DIAGNOSIS — C61 MALIGNANT NEOPLASM OF PROSTATE (HCC): ICD-10-CM

## 2020-09-08 DIAGNOSIS — C61 MALIGNANT NEOPLASM OF PROSTATE (HCC): Primary | ICD-10-CM

## 2020-09-08 LAB — PSA SERPL-MCNC: <0.1 NG/ML (ref 0–4)

## 2020-09-08 PROCEDURE — 84153 ASSAY OF PSA TOTAL: CPT

## 2020-10-29 DIAGNOSIS — K21.9 GASTROESOPHAGEAL REFLUX DISEASE WITHOUT ESOPHAGITIS: Primary | ICD-10-CM

## 2020-10-29 RX ORDER — OMEPRAZOLE 40 MG/1
CAPSULE, DELAYED RELEASE ORAL
Qty: 90 CAPSULE | Refills: 3 | Status: SHIPPED | OUTPATIENT
Start: 2020-10-29 | End: 2020-11-18 | Stop reason: SDUPTHER

## 2020-10-31 DIAGNOSIS — I10 ESSENTIAL (PRIMARY) HYPERTENSION: Primary | ICD-10-CM

## 2020-11-01 RX ORDER — MIDODRINE HYDROCHLORIDE 5 MG/1
TABLET ORAL
Qty: 270 TABLET | Refills: 3 | Status: SHIPPED | OUTPATIENT
Start: 2020-11-01 | End: 2020-11-18 | Stop reason: SDUPTHER

## 2020-11-10 DIAGNOSIS — I63.9 ACUTE CVA (CEREBROVASCULAR ACCIDENT) (HCC): ICD-10-CM

## 2020-11-10 RX ORDER — CLOPIDOGREL BISULFATE 75 MG/1
TABLET ORAL
Qty: 90 TABLET | Refills: 3 | Status: SHIPPED | OUTPATIENT
Start: 2020-11-10 | End: 2021-12-23

## 2020-11-17 RX ORDER — ZOLPIDEM TARTRATE 5 MG/1
TABLET ORAL
COMMUNITY
Start: 2013-04-01 | End: 2020-12-21 | Stop reason: ALTCHOICE

## 2020-11-18 ENCOUNTER — OFFICE VISIT (OUTPATIENT)
Dept: FAMILY MEDICINE CLINIC | Facility: CLINIC | Age: 85
End: 2020-11-18
Payer: MEDICARE

## 2020-11-18 VITALS
RESPIRATION RATE: 18 BRPM | HEIGHT: 68 IN | TEMPERATURE: 97.3 F | WEIGHT: 161.6 LBS | BODY MASS INDEX: 24.49 KG/M2 | HEART RATE: 62 BPM | SYSTOLIC BLOOD PRESSURE: 128 MMHG | OXYGEN SATURATION: 97 % | DIASTOLIC BLOOD PRESSURE: 50 MMHG

## 2020-11-18 DIAGNOSIS — I10 ESSENTIAL HYPERTENSION: ICD-10-CM

## 2020-11-18 DIAGNOSIS — D50.9 IRON DEFICIENCY ANEMIA, UNSPECIFIED IRON DEFICIENCY ANEMIA TYPE: ICD-10-CM

## 2020-11-18 DIAGNOSIS — E78.5 HYPERLIPIDEMIA, UNSPECIFIED HYPERLIPIDEMIA TYPE: ICD-10-CM

## 2020-11-18 DIAGNOSIS — Z23 NEED FOR INFLUENZA VACCINATION: Primary | ICD-10-CM
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
## 2020-11-18 PROBLEM — R07.81 RIB PAIN ON RIGHT SIDE: Status: RESOLVED | Noted: 2018-07-02 | Resolved: 2020-11-18
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
## 2020-11-18 PROBLEM — Z91.81 PERSONAL HISTORY OF FALL: Status: RESOLVED | Noted: 2020-05-14 | Resolved: 2020-11-18

## 2020-11-18 PROBLEM — I70.0 CALCIFICATION OF AORTA (HCC): Status: ACTIVE | Noted: 2020-11-18

## 2020-11-18 PROBLEM — R41.0 CONFUSION: Status: RESOLVED | Noted: 2018-05-21 | Resolved: 2020-11-18

## 2020-11-18 PROBLEM — I69.365: Status: ACTIVE | Noted: 2020-11-18

## 2020-11-18 PROCEDURE — G0008 ADMIN INFLUENZA VIRUS VAC: HCPCS | Performed by: FAMILY MEDICINE

## 2020-11-18 PROCEDURE — 90662 IIV NO PRSV INCREASED AG IM: CPT | Performed by: FAMILY MEDICINE

## 2020-11-18 PROCEDURE — 99213 OFFICE O/P EST LOW 20 MIN: CPT | Performed by: FAMILY MEDICINE

## 2020-12-02 ENCOUNTER — TRANSCRIBE ORDERS (OUTPATIENT)
Dept: LAB | Facility: CLINIC | Age: 85
End: 2020-12-02

## 2020-12-02 ENCOUNTER — LAB (OUTPATIENT)
Dept: LAB | Facility: CLINIC | Age: 85
End: 2020-12-02
Payer: MEDICARE

## 2020-12-02 DIAGNOSIS — I10 ESSENTIAL HYPERTENSION: ICD-10-CM

## 2020-12-02 DIAGNOSIS — C61 MALIGNANT NEOPLASM OF PROSTATE (HCC): ICD-10-CM

## 2020-12-02 DIAGNOSIS — C61 MALIGNANT NEOPLASM OF PROSTATE (HCC): Primary | ICD-10-CM

## 2020-12-02 LAB
ALBUMIN SERPL BCP-MCNC: 3.9 G/DL (ref 3.5–5)
ALP SERPL-CCNC: 67 U/L (ref 46–116)
ALT SERPL W P-5'-P-CCNC: 20 U/L (ref 12–78)
ANION GAP SERPL CALCULATED.3IONS-SCNC: 5 MMOL/L (ref 4–13)
AST SERPL W P-5'-P-CCNC: 13 U/L (ref 5–45)
BILIRUB SERPL-MCNC: 0.3 MG/DL (ref 0.2–1)
BUN SERPL-MCNC: 14 MG/DL (ref 5–25)
CALCIUM SERPL-MCNC: 10.5 MG/DL (ref 8.3–10.1)
CHLORIDE SERPL-SCNC: 106 MMOL/L (ref 100–108)
CHOLEST SERPL-MCNC: 169 MG/DL (ref 50–200)
CO2 SERPL-SCNC: 28 MMOL/L (ref 21–32)
CREAT SERPL-MCNC: 1.04 MG/DL (ref 0.6–1.3)
ERYTHROCYTE [DISTWIDTH] IN BLOOD BY AUTOMATED COUNT: 18.3 % (ref 11.6–15.1)
GFR SERPL CREATININE-BSD FRML MDRD: 64 ML/MIN/1.73SQ M
GLUCOSE P FAST SERPL-MCNC: 108 MG/DL (ref 65–99)
HCT VFR BLD AUTO: 32.5 % (ref 36.5–49.3)
HDLC SERPL-MCNC: 52 MG/DL
HGB BLD-MCNC: 9.1 G/DL (ref 12–17)
LDLC SERPL CALC-MCNC: 91 MG/DL (ref 0–100)
MCH RBC QN AUTO: 18.6 PG (ref 26.8–34.3)
MCHC RBC AUTO-ENTMCNC: 28 G/DL (ref 31.4–37.4)
MCV RBC AUTO: 67 FL (ref 82–98)
NONHDLC SERPL-MCNC: 117 MG/DL
PLATELET # BLD AUTO: 251 THOUSANDS/UL (ref 149–390)
PMV BLD AUTO: 11 FL (ref 8.9–12.7)
POTASSIUM SERPL-SCNC: 4.3 MMOL/L (ref 3.5–5.3)
PROT SERPL-MCNC: 7.2 G/DL (ref 6.4–8.2)
PSA SERPL-MCNC: 0.1 NG/ML (ref 0–4)
RBC # BLD AUTO: 4.89 MILLION/UL (ref 3.88–5.62)
SODIUM SERPL-SCNC: 139 MMOL/L (ref 136–145)
TRIGL SERPL-MCNC: 128 MG/DL
TSH SERPL DL<=0.05 MIU/L-ACNC: 3.48 UIU/ML (ref 0.36–3.74)
WBC # BLD AUTO: 8.21 THOUSAND/UL (ref 4.31–10.16)

## 2020-12-02 PROCEDURE — 85027 COMPLETE CBC AUTOMATED: CPT

## 2020-12-02 PROCEDURE — 36415 COLL VENOUS BLD VENIPUNCTURE: CPT

## 2020-12-02 PROCEDURE — 80061 LIPID PANEL: CPT

## 2020-12-02 PROCEDURE — 84153 ASSAY OF PSA TOTAL: CPT

## 2020-12-02 PROCEDURE — 84443 ASSAY THYROID STIM HORMONE: CPT

## 2020-12-02 PROCEDURE — 80053 COMPREHEN METABOLIC PANEL: CPT

## 2020-12-16 ENCOUNTER — REMOTE DEVICE CLINIC VISIT (OUTPATIENT)
Dept: CARDIOLOGY CLINIC | Facility: CLINIC | Age: 85
End: 2020-12-16
Payer: MEDICARE

## 2020-12-16 DIAGNOSIS — Z95.818 PRESENCE OF OTHER CARDIAC IMPLANTS AND GRAFTS: Primary | ICD-10-CM

## 2020-12-16 PROCEDURE — 93298 REM INTERROG DEV EVAL SCRMS: CPT | Performed by: INTERNAL MEDICINE

## 2020-12-16 PROCEDURE — G2066 INTER DEVC REMOTE 30D: HCPCS | Performed by: INTERNAL MEDICINE

## 2020-12-21 ENCOUNTER — OFFICE VISIT (OUTPATIENT)
Dept: CARDIOLOGY CLINIC | Facility: CLINIC | Age: 85
End: 2020-12-21
Payer: MEDICARE

## 2020-12-21 VITALS
SYSTOLIC BLOOD PRESSURE: 126 MMHG | BODY MASS INDEX: 24.49 KG/M2 | DIASTOLIC BLOOD PRESSURE: 64 MMHG | HEIGHT: 68 IN | HEART RATE: 68 BPM | WEIGHT: 161.6 LBS

## 2020-12-21 DIAGNOSIS — R94.31 ABNORMAL EKG: ICD-10-CM

## 2020-12-21 DIAGNOSIS — K21.9 GASTROESOPHAGEAL REFLUX DISEASE: ICD-10-CM

## 2020-12-21 DIAGNOSIS — F41.9 ANXIETY: ICD-10-CM

## 2020-12-21 DIAGNOSIS — I10 ESSENTIAL (PRIMARY) HYPERTENSION: Primary | ICD-10-CM

## 2020-12-21 DIAGNOSIS — I25.10 CORONARY ARTERIOSCLEROSIS: ICD-10-CM

## 2020-12-21 DIAGNOSIS — E78.00 PURE HYPERCHOLESTEROLEMIA: ICD-10-CM

## 2020-12-21 PROCEDURE — 99214 OFFICE O/P EST MOD 30 MIN: CPT | Performed by: INTERNAL MEDICINE

## 2020-12-21 RX ORDER — OMEPRAZOLE 40 MG/1
40 CAPSULE, DELAYED RELEASE ORAL DAILY
Qty: 90 CAPSULE | Refills: 2 | Status: SHIPPED | OUTPATIENT
Start: 2020-12-21 | End: 2021-08-04

## 2020-12-29 DIAGNOSIS — G60.9 IDIOPATHIC PERIPHERAL NEUROPATHY: ICD-10-CM

## 2020-12-29 DIAGNOSIS — R29.898 WEAKNESS OF BOTH LOWER EXTREMITIES: Primary | ICD-10-CM

## 2020-12-29 DIAGNOSIS — I63.9 ACUTE CVA (CEREBROVASCULAR ACCIDENT) (HCC): ICD-10-CM

## 2020-12-30 ENCOUNTER — TELEPHONE (OUTPATIENT)
Dept: FAMILY MEDICINE CLINIC | Facility: CLINIC | Age: 85
End: 2020-12-30

## 2020-12-30 NOTE — TELEPHONE ENCOUNTER
Patient's spouse called and said a referral for PT was sent to the wrong place  Patient needs the referral sent to Lona Mireles at [facility]

## 2020-12-31 DIAGNOSIS — R29.898 WEAKNESS OF BOTH LOWER EXTREMITIES: Primary | ICD-10-CM

## 2021-02-18 DIAGNOSIS — G60.9 IDIOPATHIC PERIPHERAL NEUROPATHY: ICD-10-CM

## 2021-02-18 RX ORDER — GABAPENTIN 300 MG/1
CAPSULE ORAL
Qty: 270 CAPSULE | Refills: 3 | Status: SHIPPED | OUTPATIENT
Start: 2021-02-18 | End: 2022-03-08 | Stop reason: ALTCHOICE

## 2021-03-17 ENCOUNTER — REMOTE DEVICE CLINIC VISIT (OUTPATIENT)
Dept: CARDIOLOGY CLINIC | Facility: CLINIC | Age: 86
End: 2021-03-17
Payer: MEDICARE

## 2021-03-17 DIAGNOSIS — Z95.818 PRESENCE OF OTHER CARDIAC IMPLANTS AND GRAFTS: Primary | ICD-10-CM

## 2021-03-17 PROCEDURE — 93298 REM INTERROG DEV EVAL SCRMS: CPT | Performed by: INTERNAL MEDICINE

## 2021-03-17 PROCEDURE — G2066 INTER DEVC REMOTE 30D: HCPCS | Performed by: INTERNAL MEDICINE

## 2021-03-17 NOTE — PROGRESS NOTES
MDT LOOP   CARELINK TRANSMISSION: LOOP RECORDER  PRESENTNG RHYTHM NSR W/ PVC @ 1 BPM  BATTERY STATUS "OK"  4 AF EPISODES W/ EGRAMS SUGGESTING SR W/ PACs AND PVCs  CAN NOT R/O AF  AF BURDEN = 0%  NO PATIENT ACTIVATED EPISODES  NORMAL DEVICE FUNCTION   DL

## 2021-03-30 DIAGNOSIS — E78.00 PURE HYPERCHOLESTEROLEMIA: ICD-10-CM

## 2021-03-30 RX ORDER — SIMVASTATIN 80 MG
80 TABLET ORAL DAILY
Qty: 90 TABLET | Refills: 3 | Status: SHIPPED | OUTPATIENT
Start: 2021-03-30 | End: 2022-01-13 | Stop reason: SDUPTHER

## 2021-04-20 ENCOUNTER — OFFICE VISIT (OUTPATIENT)
Dept: FAMILY MEDICINE CLINIC | Facility: CLINIC | Age: 86
End: 2021-04-20
Payer: MEDICARE

## 2021-04-20 VITALS
OXYGEN SATURATION: 98 % | TEMPERATURE: 98.2 F | BODY MASS INDEX: 24.17 KG/M2 | WEIGHT: 168.8 LBS | DIASTOLIC BLOOD PRESSURE: 60 MMHG | HEART RATE: 72 BPM | RESPIRATION RATE: 16 BRPM | SYSTOLIC BLOOD PRESSURE: 138 MMHG | HEIGHT: 70 IN

## 2021-04-20 DIAGNOSIS — F03.90 DEMENTIA WITHOUT BEHAVIORAL DISTURBANCE, UNSPECIFIED DEMENTIA TYPE (HCC): ICD-10-CM

## 2021-04-20 PROBLEM — D69.1 QUALITATIVE PLATELET DEFECTS (HCC): Status: ACTIVE | Noted: 2021-04-20

## 2021-04-20 PROCEDURE — 99215 OFFICE O/P EST HI 40 MIN: CPT | Performed by: FAMILY MEDICINE

## 2021-04-20 NOTE — PROGRESS NOTES
FAMILY PRACTICE OFFICE VISIT       NAME: Brooklyn Murray  AGE: 80 y o  SEX: male       : 1933        MRN: [id number]    DATE: 2021  TIME: 6:51 AM    Assessment and Plan     Problem List Items Addressed This Visit        Nervous and Auditory    Dementia without behavioral disturbance ([facility] [address] )      Memory lapses  Patient will obtain blood work as ordered for further evaluation  I suspect patient is having some dementia symptoms either from Alzheimer's or vascular dementia  If blood work is within normal limits he will obtain a CT scan of his head for further evaluation  We may also consider  Initiating Aricept medication  Relevant Orders    CBC    Comprehensive metabolic panel    TSH, 3rd generation    Vitamin B12    RPR    Urinalysis with microscopic              Chief Complaint     Chief Complaint   Patient presents with    Follow-up       History of Present Illness      Patient is accompanied by his wife  She states that the patient is becoming more forgetful over time  At times he will go in the bathroom and does not remember how to take his dentures out and brush them as he has than the past   He requires constant reminders when he comes to meal times and walking to the dining area  Many times he will need verbal cues in regards to his physical therapy appointments and instructions on what exercises to perform      Review of Systems   Review of Systems   Constitutional: Positive for fatigue  Respiratory: Negative  Cardiovascular: Negative  Gastrointestinal: Negative  Musculoskeletal: Positive for arthralgias, back pain and gait problem  Patient with history of gait disturbance and poor balance  He also has lower extremity weakness  He has a history of low back pain from degenerative joint disease   Neurological:        As per HPI   Psychiatric/Behavioral: Positive for confusion, dysphoric mood and sleep disturbance  The patient is nervous/anxious          Active Problem List     Patient Active Problem List   Diagnosis    Constipation    Iron deficiency    Other constipation    Anemia    Arteriosclerotic cardiovascular disease    Backache    Essential hypertension    Cervical spinal stenosis    Depression    Esophageal reflux    Hyperlipidemia    Insomnia    Spinal stenosis of lumbar region with neurogenic claudication    Vitamin B12 deficiency    Idiopathic peripheral neuropathy    Iron deficiency anemia    History of meningioma    Cognitive decline    Contusion of rib on right side    Orthostatic hypotension    Convulsions ([address] )    Lumbar spondylosis    Lumbar radiculopathy    Acute CVA (cerebrovascular accident), suspected embolic in nature    Hypercalcemia    History of resection of meningioma    History of stroke    Depression    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) ([address] )    Calcification of aorta ([address] )    Need for influenza vaccination    Dementia without behavioral disturbance ([address] )    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[address] )       Past Medical History:  Past Medical History:   Diagnosis Date    Anxiety     Arthritis     Coronary artery disease     Coronary artery disease involving native coronary artery of native heart without angina pectoris     Depression     Hypercholesterolemia     Meningioma ([address] ) 11/1999    brain tumor    Meningioma ([address] )     Narcolepsy     daytime drowsiness and dozing off occasionally    Orthostatic hypotension     Palpitations     Prostate CA ([address] )     Prostate cancer ([facility] )     Stroke [facility])        Past Surgical History:  Past Surgical History:   Procedure Laterality Date    BACK SURGERY      BRAIN SURGERY  11/08/1999    CORONARY ARTERY BYPASS GRAFT      x5    CORONARY ARTERY BYPASS GRAFT         Family History:  Family History   Problem Relation Age of Onset    Hypertension Mother         benign essential    Cancer Mother     Osteoporosis Mother     Suicidality Father     Diabetes Family     Heart disease Family     Neuropathy Family         peripheral    Prostate cancer Family     Thyroid disease Family        Social History:  Social History     Socioeconomic History    Marital status: /Civil Union     Spouse name: Not on file    Number of children: Not on file    Years of education: Not on file    Highest education level: Not on file   Occupational History    Occupation: retired   Social Needs    Financial resource strain: Not on file    Food insecurity     Worry: Not on file     Inability: Not on file   [employer] needs     Medical: Not on file     Non-medical: Not on file   Tobacco Use    Smoking status: Former Smoker     Types: Cigarettes     Quit date: 1995     Years since quittin 3    Smokeless tobacco: Never Used    Tobacco comment: former cig smoker- quit in    Substance and Sexual Activity    Alcohol use: Not Currently     Frequency: Never     Comment: (history)    Drug use: No    Sexual activity: Not on file   Lifestyle    Physical activity     Days per week: Not on file     Minutes per session: Not on file    Stress: Not on file   Relationships    Social connections     Talks on phone: Not on file     Gets together: Not on file     Attends Hoahaoism service: Not on file     Active member of club or organization: Not on file     Attends meetings of clubs or organizations: Not on file     Relationship status: Not on file    Intimate partner violence     Fear of current or ex partner: Not on file     Emotionally abused: Not on file     Physically abused: Not on file     Forced sexual activity: Not on file   Other Topics Concern    Not on file   Social History Narrative    ** Merged History Encounter **         Pt lives with wife       Objective     Vitals:    21 1452   BP: 138/60   Pulse: 72   Resp: 16   Temp: 98 2 °F (36 8 °C)   SpO2: 98%     Wt Readings from Last 3 Encounters:   21 76 6 kg (168 lb 12 8 oz)   12/21/20 73 3 kg (161 lb 9 6 oz)   11/18/20 73 3 kg (161 lb 9 6 oz)       Physical Exam  Constitutional:       General: He is not in acute distress  Appearance: Normal appearance  He is not ill-appearing  HENT:      Head: Normocephalic and atraumatic  Eyes:      General:         Right eye: No discharge  Left eye: No discharge  Extraocular Movements: Extraocular movements intact  Conjunctiva/sclera: Conjunctivae normal       Pupils: Pupils are equal, round, and reactive to light  Cardiovascular:      Rate and Rhythm: Normal rate and regular rhythm  Heart sounds: Normal heart sounds  No murmur  Pulmonary:      Effort: Pulmonary effort is normal  No respiratory distress  Breath sounds: Normal breath sounds  No wheezing, rhonchi or rales  Musculoskeletal:      Right lower leg: No edema  Left lower leg: No edema  Comments: Patient ambulates slowly with a slight kyphotic gait  Patient does use a walking cane   Neurological:      General: No focal deficit present  Mental Status: He is alert and oriented to person, place, and time  Mental status is at baseline  Psychiatric:         Mood and Affect: Mood normal          Behavior: Behavior normal          Thought Content:  Thought content normal          Judgment: Judgment normal       mini-mental status exam was scored a 26/30   Pertinent Laboratory/Diagnostic Studies:  Lab Results   Component Value Date    GLUCOSE 114 05/14/2020    BUN 14 12/02/2020    CREATININE 1 04 12/02/2020    CALCIUM 10 5 (H) 12/02/2020     05/08/2015    K 4 3 12/02/2020    CO2 28 12/02/2020     12/02/2020     Lab Results   Component Value Date    ALT 20 12/02/2020    AST 13 12/02/2020    ALKPHOS 67 12/02/2020    BILITOT 0 47 06/09/2015       Lab Results   Component Value Date    WBC 8 21 12/02/2020    HGB 9 1 (L) 12/02/2020    HCT 32 5 (L) 12/02/2020    MCV 67 (L) 12/02/2020     12/02/2020       No results found for: TSH    Lab Results   Component Value Date    CHOL 215 08/20/2014     Lab Results   Component Value Date    TRIG 128 12/02/2020     Lab Results   Component Value Date    HDL 52 12/02/2020     Lab Results   Component Value Date    LDLCALC 91 12/02/2020     Lab Results   Component Value Date    HGBA1C 6 0 11/08/2019       Results for orders placed or performed in visit on 12/02/20   CBC   Result Value Ref Range    WBC 8 21 4 31 - 10 16 Thousand/uL    RBC 4 89 3 88 - 5 62 Million/uL    Hemoglobin 9 1 (L) 12 0 - 17 0 g/dL    Hematocrit 32 5 (L) 36 5 - 49 3 %    MCV 67 (L) 82 - 98 fL    MCH 18 6 (L) 26 8 - 34 3 pg    MCHC 28 0 (L) 31 4 - 37 4 g/dL    RDW 18 3 (H) 11 6 - 15 1 %    Platelets 040 261 - 939 Thousands/uL    MPV 11 0 8 9 - 12 7 fL   Comprehensive metabolic panel   Result Value Ref Range    Sodium 139 136 - 145 mmol/L    Potassium 4 3 3 5 - 5 3 mmol/L    Chloride 106 100 - 108 mmol/L    CO2 28 21 - 32 mmol/L    ANION GAP 5 4 - 13 mmol/L    BUN 14 5 - 25 mg/dL    Creatinine 1 04 0 60 - 1 30 mg/dL    Glucose, Fasting 108 (H) 65 - 99 mg/dL    Calcium 10 5 (H) 8 3 - 10 1 mg/dL    AST 13 5 - 45 U/L    ALT 20 12 - 78 U/L    Alkaline Phosphatase 67 46 - 116 U/L    Total Protein 7 2 6 4 - 8 2 g/dL    Albumin 3 9 3 5 - 5 0 g/dL    Total Bilirubin 0 30 0 20 - 1 00 mg/dL    eGFR 64 ml/min/1 73sq m   Lipid panel   Result Value Ref Range    Cholesterol 169 50 - 200 mg/dL    Triglycerides 128 <=150 mg/dL    HDL, Direct 52 >=40 mg/dL    LDL Calculated 91 0 - 100 mg/dL    Non-HDL-Chol (CHOL-HDL) 117 mg/dl   TSH, 3rd generation   Result Value Ref Range    TSH 3RD GENERATON 3 480 0 358 - 3 740 uIU/mL   PSA Total, Diagnostic   Result Value Ref Range    PSA, Diagnostic 0 1 0 0 - 4 0 ng/mL       Orders Placed This Encounter   Procedures    CBC    Comprehensive metabolic panel    TSH, 3rd generation    Vitamin B12    RPR    Urinalysis with microscopic       ALLERGIES:  Allergies   Allergen Reactions    Lipitor [Atorvastatin]  Niacin     Atorvastatin Myalgia, Dizziness and Headache    Niacin Other (See Comments)     unknown       Current Medications     Current Outpatient Medications   Medication Sig Dispense Refill    aspirin 81 mg chewable tablet Chew 81 mg daily      aspirin 81 MG tablet Take 1 tablet by mouth daily      clopidogrel (PLAVIX) 75 mg tablet TAKE 1 TABLET BY MOUTH  DAILY 90 tablet 3    docusate sodium (COLACE) 100 mg capsule Take 1 capsule (100 mg total) by mouth 2 (two) times a day 10 capsule 0    fluocinonide (LIDEX) 0 05 % cream APPLY TO AFFECTED AREA(S) FOUR TIMES A DAY      gabapentin (NEURONTIN) 300 mg capsule TAKE 3 CAPSULES BY MOUTH AT BEDTIME 270 capsule 3    metoprolol succinate (TOPROL-XL) 25 mg 24 hr tablet TAKE 1 TABLET BY MOUTH  DAILY 90 tablet 3    midodrine (PROAMATINE) 5 mg tablet Take 5 mg by mouth daily       omeprazole (PriLOSEC) 40 MG capsule Take 1 capsule (40 mg total) by mouth daily 90 capsule 2    psyllium (METAMUCIL) 58 6 % packet Take 1 packet by mouth daily      sertraline (ZOLOFT) 50 mg tablet Take 1 tablet (50 mg total) by mouth daily 90 tablet 2    simvastatin (ZOCOR) 80 mg tablet Take 1 tablet (80 mg total) by mouth daily 90 tablet 3     No current facility-administered medications for this visit            Health Maintenance     Health Maintenance   Topic Date Due    SLP PLAN OF CARE  Never done    Annual Physical  Never done    Fall Risk  01/13/2021    BMI: Adult  04/20/2022    Depression Remission PHQ  04/20/2022    DTaP,Tdap,and Td Vaccines (3 - Td) 05/14/2030    Pneumococcal Vaccine: 65+ Years  Completed    Influenza Vaccine  Completed    COVID-19 Vaccine  Completed    HIB Vaccine  Aged Out    Hepatitis B Vaccine  Aged Out    IPV Vaccine  Aged Out    Hepatitis A Vaccine  Aged Out    Meningococcal ACWY Vaccine  Aged Out    HPV Vaccine  Aged Out     Immunization History   Administered Date(s) Administered    Influenza Split High Dose Preservative Free IM 11/10/2014, 01/12/2016, 11/22/2016, 11/08/2017    Influenza, high dose seasonal 0 7 mL 11/14/2018, 10/29/2019, 11/18/2020    Influenza, seasonal, injectable 10/01/2007, 11/01/2009, 11/02/2009, 11/10/2010, 12/17/2012    Pneumococcal Conjugate 13-Valent 01/12/2016    Pneumococcal Polysaccharide PPV23 10/01/2007    SARS-CoV-2 / COVID-19 mRNA IM (Moderna) 01/12/2021, 02/09/2021    Tdap 08/01/2018, 82/73/7165       Diana Tapia MD     I spent 40 minutes with this patient which greater than 50% was spent counseling

## 2021-04-21 ENCOUNTER — APPOINTMENT (OUTPATIENT)
Dept: LAB | Facility: CLINIC | Age: 86
End: 2021-04-21
Payer: MEDICARE

## 2021-04-21 DIAGNOSIS — F03.90 DEMENTIA WITHOUT BEHAVIORAL DISTURBANCE, UNSPECIFIED DEMENTIA TYPE (HCC): ICD-10-CM

## 2021-04-21 LAB
ALBUMIN SERPL BCP-MCNC: 3.6 G/DL (ref 3.5–5)
ALP SERPL-CCNC: 53 U/L (ref 46–116)
ALT SERPL W P-5'-P-CCNC: 26 U/L (ref 12–78)
ANION GAP SERPL CALCULATED.3IONS-SCNC: 6 MMOL/L (ref 4–13)
AST SERPL W P-5'-P-CCNC: 16 U/L (ref 5–45)
BACTERIA UR QL AUTO: ABNORMAL /HPF
BILIRUB SERPL-MCNC: 0.49 MG/DL (ref 0.2–1)
BILIRUB UR QL STRIP: NEGATIVE
BUN SERPL-MCNC: 11 MG/DL (ref 5–25)
CALCIUM SERPL-MCNC: 10.1 MG/DL (ref 8.3–10.1)
CHLORIDE SERPL-SCNC: 103 MMOL/L (ref 100–108)
CLARITY UR: CLEAR
CO2 SERPL-SCNC: 27 MMOL/L (ref 21–32)
COLOR UR: YELLOW
CREAT SERPL-MCNC: 0.92 MG/DL (ref 0.6–1.3)
ERYTHROCYTE [DISTWIDTH] IN BLOOD BY AUTOMATED COUNT: 14.7 % (ref 11.6–15.1)
GFR SERPL CREATININE-BSD FRML MDRD: 74 ML/MIN/1.73SQ M
GLUCOSE P FAST SERPL-MCNC: 126 MG/DL (ref 65–99)
GLUCOSE UR STRIP-MCNC: NEGATIVE MG/DL
HCT VFR BLD AUTO: 38.6 % (ref 36.5–49.3)
HGB BLD-MCNC: 12.3 G/DL (ref 12–17)
HGB UR QL STRIP.AUTO: ABNORMAL
KETONES UR STRIP-MCNC: NEGATIVE MG/DL
LEUKOCYTE ESTERASE UR QL STRIP: NEGATIVE
MCH RBC QN AUTO: 26.5 PG (ref 26.8–34.3)
MCHC RBC AUTO-ENTMCNC: 31.9 G/DL (ref 31.4–37.4)
MCV RBC AUTO: 83 FL (ref 82–98)
NITRITE UR QL STRIP: NEGATIVE
NON-SQ EPI CELLS URNS QL MICRO: ABNORMAL /HPF
PH UR STRIP.AUTO: 7 [PH]
PLATELET # BLD AUTO: 151 THOUSANDS/UL (ref 149–390)
PMV BLD AUTO: 10.3 FL (ref 8.9–12.7)
POTASSIUM SERPL-SCNC: 4.5 MMOL/L (ref 3.5–5.3)
PROT SERPL-MCNC: 6.2 G/DL (ref 6.4–8.2)
PROT UR STRIP-MCNC: NEGATIVE MG/DL
RBC # BLD AUTO: 4.65 MILLION/UL (ref 3.88–5.62)
RBC #/AREA URNS AUTO: ABNORMAL /HPF
SODIUM SERPL-SCNC: 136 MMOL/L (ref 136–145)
SP GR UR STRIP.AUTO: 1.01 (ref 1–1.03)
TSH SERPL DL<=0.05 MIU/L-ACNC: 1.92 UIU/ML (ref 0.36–3.74)
UROBILINOGEN UR QL STRIP.AUTO: 0.2 E.U./DL
VIT B12 SERPL-MCNC: 355 PG/ML (ref 100–900)
WBC # BLD AUTO: 6.47 THOUSAND/UL (ref 4.31–10.16)
WBC #/AREA URNS AUTO: ABNORMAL /HPF

## 2021-04-21 PROCEDURE — 82607 VITAMIN B-12: CPT

## 2021-04-21 PROCEDURE — 85027 COMPLETE CBC AUTOMATED: CPT

## 2021-04-21 PROCEDURE — 81001 URINALYSIS AUTO W/SCOPE: CPT | Performed by: FAMILY MEDICINE

## 2021-04-21 PROCEDURE — 84443 ASSAY THYROID STIM HORMONE: CPT

## 2021-04-21 PROCEDURE — 36415 COLL VENOUS BLD VENIPUNCTURE: CPT

## 2021-04-21 PROCEDURE — 86592 SYPHILIS TEST NON-TREP QUAL: CPT

## 2021-04-21 PROCEDURE — 80053 COMPREHEN METABOLIC PANEL: CPT

## 2021-04-21 NOTE — ASSESSMENT & PLAN NOTE
Memory lapses  Patient will obtain blood work as ordered for further evaluation  I suspect patient is having some dementia symptoms either from Alzheimer's or vascular dementia  If blood work is within normal limits he will obtain a CT scan of his head for further evaluation  We may also consider  Initiating Aricept medication

## 2021-04-22 ENCOUNTER — TELEPHONE (OUTPATIENT)
Dept: FAMILY MEDICINE CLINIC | Facility: CLINIC | Age: 86
End: 2021-04-22

## 2021-04-22 DIAGNOSIS — R41.82 ALTERED MENTAL STATUS, UNSPECIFIED ALTERED MENTAL STATUS TYPE: Primary | ICD-10-CM

## 2021-04-22 LAB — RPR SER QL: NORMAL

## 2021-04-22 NOTE — TELEPHONE ENCOUNTER
Called [facility] to obtain Prior auth for head CT  Spoke w/ Ms  Cortez Marte  She states no prior auth needed for cpt code 88433, under pt's plan  Case #: [id number]

## 2021-05-03 ENCOUNTER — HOSPITAL ENCOUNTER (OUTPATIENT)
Dept: RADIOLOGY | Facility: HOSPITAL | Age: 86
Discharge: HOME | End: 2021-05-03
Payer: MEDICARE

## 2021-05-03 ENCOUNTER — TRANSCRIBE ORDERS (OUTPATIENT)
Dept: ADMINISTRATIVE | Facility: HOSPITAL | Age: 86
End: 2021-05-03

## 2021-05-03 DIAGNOSIS — R41.82 ALTERED MENTAL STATUS, UNSPECIFIED ALTERED MENTAL STATUS TYPE: ICD-10-CM

## 2021-05-03 PROCEDURE — G1004 CDSM NDSC: HCPCS

## 2021-05-03 PROCEDURE — 70450 CT HEAD/BRAIN W/O DYE: CPT

## 2021-05-07 ENCOUNTER — TELEPHONE (OUTPATIENT)
Dept: FAMILY MEDICINE CLINIC | Facility: CLINIC | Age: 86
End: 2021-05-07

## 2021-05-07 DIAGNOSIS — F03.90 DEMENTIA WITHOUT BEHAVIORAL DISTURBANCE, UNSPECIFIED DEMENTIA TYPE (HCC): Primary | ICD-10-CM

## 2021-05-07 RX ORDER — DONEPEZIL HYDROCHLORIDE 5 MG/1
5 TABLET, FILM COATED ORAL
Qty: 30 TABLET | Refills: 0 | Status: SHIPPED | OUTPATIENT
Start: 2021-05-07 | End: 2021-06-01 | Stop reason: SDUPTHER

## 2021-05-07 NOTE — TELEPHONE ENCOUNTER
----- Message from Ligia Soria MD sent at 1/9/7783  6:31 AM EDT -----  Please call patient's wife  Recent CT scan did not show any significant or new changes compared to old studies  I will send medication to pharmacy for Aricept which he  Will start at 5 mg daily    If he is tolerating medication well I would increase to 10 mg daily after the 1st month

## 2021-05-07 NOTE — TELEPHONE ENCOUNTER
Patients wife made aware of providers note  She is asking for a call back in regards to the prior auth for CT   She can be reached at the home number : [phone]

## 2021-06-01 ENCOUNTER — REMOTE DEVICE CLINIC VISIT (OUTPATIENT)
Dept: CARDIOLOGY CLINIC | Facility: CLINIC | Age: 86
End: 2021-06-01
Payer: MEDICARE

## 2021-06-01 DIAGNOSIS — F03.90 DEMENTIA WITHOUT BEHAVIORAL DISTURBANCE, UNSPECIFIED DEMENTIA TYPE (HCC): ICD-10-CM

## 2021-06-01 DIAGNOSIS — Z95.818 PRESENCE OF OTHER CARDIAC IMPLANTS AND GRAFTS: Primary | ICD-10-CM

## 2021-06-01 PROCEDURE — G2066 INTER DEVC REMOTE 30D: HCPCS | Performed by: INTERNAL MEDICINE

## 2021-06-01 PROCEDURE — 93298 REM INTERROG DEV EVAL SCRMS: CPT | Performed by: INTERNAL MEDICINE

## 2021-06-01 RX ORDER — DONEPEZIL HYDROCHLORIDE 10 MG/1
10 TABLET, FILM COATED ORAL
Qty: 30 TABLET | Refills: 5 | Status: SHIPPED | OUTPATIENT
Start: 2021-06-01 | End: 2021-06-04 | Stop reason: SDUPTHER

## 2021-06-01 NOTE — TELEPHONE ENCOUNTER
Patient's wife call, said that he tolerated the medication well, she wants to know if he can get the stronger dose, she would like it sent to [facility] in [place]

## 2021-06-02 NOTE — PROGRESS NOTES
MDT LOOP   CARELINK TRANSMISSION: LOOP RECORDER  PRESENTING RHYTHM SB W/ PVC @ 59 BPM  BATTERY STATUS "OK"  3 AF EPISODES W/ EGRAMS SUGGESTING SR W/ PACs AND PVCs  CAN NOT R/O AF  AF BURDEN = 0%  NO PATIENT ACTIVATED EPISODES  NORMAL DEVICE FUNCTION   DL

## 2021-06-04 DIAGNOSIS — F03.90 DEMENTIA WITHOUT BEHAVIORAL DISTURBANCE, UNSPECIFIED DEMENTIA TYPE (HCC): ICD-10-CM

## 2021-06-04 RX ORDER — DONEPEZIL HYDROCHLORIDE 10 MG/1
10 TABLET, FILM COATED ORAL
Qty: 90 TABLET | Refills: 3 | Status: SHIPPED | OUTPATIENT
Start: 2021-06-04 | End: 2021-08-20 | Stop reason: ALTCHOICE

## 2021-06-04 RX ORDER — DONEPEZIL HYDROCHLORIDE 10 MG/1
10 TABLET, FILM COATED ORAL
Qty: 90 TABLET | Refills: 3 | OUTPATIENT
Start: 2021-06-04

## 2021-06-08 ENCOUNTER — APPOINTMENT (OUTPATIENT)
Dept: LAB | Facility: CLINIC | Age: 86
End: 2021-06-08
Payer: MEDICARE

## 2021-06-08 ENCOUNTER — TRANSCRIBE ORDERS (OUTPATIENT)
Dept: LAB | Facility: CLINIC | Age: 86
End: 2021-06-08

## 2021-06-08 DIAGNOSIS — C61 MALIGNANT NEOPLASM OF PROSTATE (HCC): Primary | ICD-10-CM

## 2021-06-08 DIAGNOSIS — I95.1 ORTHOSTATIC HYPOTENSION: Primary | ICD-10-CM

## 2021-06-08 LAB — PSA SERPL-MCNC: 0.2 NG/ML (ref 0–4)

## 2021-06-08 PROCEDURE — 84153 ASSAY OF PSA TOTAL: CPT

## 2021-06-08 RX ORDER — MIDODRINE HYDROCHLORIDE 5 MG/1
5 TABLET ORAL DAILY
Qty: 90 TABLET | Refills: 0 | Status: SHIPPED | OUTPATIENT
Start: 2021-06-08 | End: 2022-01-13 | Stop reason: SDUPTHER

## 2021-06-27 NOTE — PROGRESS NOTES
Cardiology Follow Up    Wiliam Horan  1933  [id number]  [facility] [address] [address]  [phone]    1  Essential (primary) hypertension     2  Pure hypercholesterolemia     3  Coronary arteriosclerosis     4  Orthostatic hypotension         Interval History:  Patient is here for a follow-up visit  Jw Charles has a remote history of CABG  Ethan Gallagher does have intermittent issues with orthostatic hypotension   He is on midodrine   Most recent echocardiogram done 11/19 demonstrated an ejection fraction of 50% with a basal inferior WMA noted   There was no significant valvular heart disease except fibrocalcific disease of the AV and no significant change compared to a prior study done May 2015  Hollynatalie Donaldson has a loop recorder in place since 2/18 in reference to syncope   Recent interrogation 6/2021 showed no evidence of prognostically important arrhythmia  Three episodes of probable sinus rhythm with PACs and PVCs were noted  AFib could not be excluded but the burden was 0%  He has had no chest pain or significant dyspnea  He has occasional dyspnea with exertion but his wife feels he is deconditioned as he sits on the couch alot and watches TV  His vital signs are stable today      Patient Active Problem List   Diagnosis    Constipation    Iron deficiency    Other constipation    Anemia    Arteriosclerotic cardiovascular disease    Backache    Essential hypertension    Cervical spinal stenosis    Depression    Esophageal reflux    Hyperlipidemia    Insomnia    Spinal stenosis of lumbar region with neurogenic claudication    Vitamin B12 deficiency    Idiopathic peripheral neuropathy    Iron deficiency anemia    History of meningioma    Cognitive decline    Contusion of rib on right side    Orthostatic hypotension    Convulsions (HCC)    Lumbar spondylosis    Lumbar radiculopathy    Acute CVA (cerebrovascular accident), suspected embolic in nature    Hypercalcemia    History of resection of meningioma    History of stroke    Depression    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 )    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[facility] )     Past Medical History:   Diagnosis Date    Anxiety     Arthritis     Coronary artery disease     Coronary artery disease involving native coronary artery of native heart without angina pectoris     Depression     Hypercholesterolemia     Meningioma ([place] [address] ) 1999    brain tumor    Meningioma ([place] [address] )     Narcolepsy     daytime drowsiness and dozing off occasionally    Orthostatic hypotension     Palpitations     Prostate CA ([facility] )     Prostate cancer ([place] [address] )     Stroke ([place] [address] )      Social History     Socioeconomic History    Marital status: /Civil Union     Spouse name: Not on file    Number of children: Not on file    Years of education: Not on file    Highest education level: Not on file   Occupational History    Occupation: retired   Tobacco Use    Smoking status: Former Smoker     Types: Cigarettes     Quit date: 1995     Years since quittin 5    Smokeless tobacco: Never Used    Tobacco comment: former cig smoker- quit in    Vaping Use    Vaping Use: Never used   Substance and Sexual Activity    Alcohol use: Not Currently     Comment: (history)    Drug use: No    Sexual activity: Not on file   Other Topics Concern    Not on file   Social History Narrative    ** Merged History Encounter **         Pt lives with wife     Social Determinants of Health     Financial Resource Strain:     Difficulty of Paying Living Expenses:    Food Insecurity:     Worried About Running Out of Food in the Last Year:     [address] in the Last Year:    Transportation Needs:     Lack of Transportation (Medical):      Lack of Transportation (Non-Medical):    Physical Activity:     Days of Exercise per Week:     Minutes of Exercise per Session:    Stress:     Feeling of Stress :    Social Connections:     Frequency of Communication with Friends and Family:     Frequency of Social Gatherings with Friends and Family:     Attends Tenri[employer] Services:     Active Member of Clubs or Organizations:     Attends Club or Organization Meetings:     Marital Status:    Intimate Partner Violence:     Fear of Current or Ex-Partner:     Emotionally Abused:     Physically Abused:     Sexually Abused:       Family History   Problem Relation Age of Onset    Hypertension Mother         benign essential    Cancer Mother     Osteoporosis Mother     Suicidality Father     Diabetes Family     Heart disease Family     Neuropathy Family         peripheral    Prostate cancer Family     Thyroid disease Family      Past Surgical History:   Procedure Laterality Date    BACK SURGERY      BRAIN SURGERY  11/08/1999    CORONARY ARTERY BYPASS GRAFT      x5    CORONARY ARTERY BYPASS GRAFT         Current Outpatient Medications:     clopidogrel (PLAVIX) 75 mg tablet, TAKE 1 TABLET BY MOUTH  DAILY, Disp: 90 tablet, Rfl: 3    docusate sodium (COLACE) 100 mg capsule, Take 1 capsule (100 mg total) by mouth 2 (two) times a day, Disp: 10 capsule, Rfl: 0    donepezil (ARICEPT) 10 mg tablet, Take 1 tablet (10 mg total) by mouth daily at bedtime, Disp: 90 tablet, Rfl: 3    gabapentin (NEURONTIN) 300 mg capsule, TAKE 3 CAPSULES BY MOUTH AT BEDTIME, Disp: 270 capsule, Rfl: 3    metoprolol succinate (TOPROL-XL) 25 mg 24 hr tablet, TAKE 1 TABLET BY MOUTH  DAILY, Disp: 90 tablet, Rfl: 3    midodrine (PROAMATINE) 5 mg tablet, Take 1 tablet (5 mg total) by mouth daily, Disp: 90 tablet, Rfl: 0    omeprazole (PriLOSEC) 40 MG capsule, Take 1 capsule (40 mg total) by mouth daily, Disp: 90 capsule, Rfl: 2    psyllium (METAMUCIL) 58 6 % packet, Take 1 packet by mouth daily, Disp: , Rfl:     sertraline (ZOLOFT) 50 mg tablet, Take 1 tablet (50 mg total) by mouth daily, Disp: 90 tablet, Rfl: 2    simvastatin (ZOCOR) 80 mg tablet, Take 1 tablet (80 mg total) by mouth daily, Disp: 90 tablet, Rfl: 3    aspirin 81 mg chewable tablet, Chew 81 mg daily, Disp: , Rfl:     aspirin 81 MG tablet, Take 1 tablet by mouth daily, Disp: , Rfl:     fluocinonide (LIDEX) 0 05 % cream, APPLY TO AFFECTED AREA(S) FOUR TIMES A DAY (Patient not taking: Reported on 7/6/2021), Disp: , Rfl:   Allergies   Allergen Reactions    Atorvastatin Myalgia, Dizziness and Headache    Niacin Other (See Comments)     unknown       Labs:not applicable  Imaging: Cardiac EP device report    Result Date: 6/1/2021  Narrative: MDT LOOP CARELINK TRANSMISSION: LOOP RECORDER  PRESENTING RHYTHM SB W/ PVC @ 59 BPM  BATTERY STATUS "OK"  3 AF EPISODES W/ EGRAMS SUGGESTING SR W/ PACs AND PVCs  CAN NOT R/O AF  AF BURDEN = 0%  NO PATIENT ACTIVATED EPISODES  NORMAL DEVICE FUNCTION  DL      Review of Systems:  Review of Systems   All other systems reviewed and are negative  Physical Exam:  /50 (BP Location: Left arm, Cuff Size: Standard)   Pulse 60   Ht 5' 9" (1 753 m)   Wt 74 4 kg (164 lb)   BMI 24 22 kg/m²   Physical Exam  Vitals reviewed  Constitutional:       Appearance: He is well-developed  HENT:      Head: Normocephalic and atraumatic  Eyes:      Conjunctiva/sclera: Conjunctivae normal       Pupils: Pupils are equal, round, and reactive to light  Cardiovascular:      Rate and Rhythm: Normal rate  Heart sounds: Normal heart sounds  Pulmonary:      Effort: Pulmonary effort is normal       Breath sounds: Normal breath sounds  Musculoskeletal:      Cervical back: Normal range of motion and neck supple  Skin:     General: Skin is warm and dry  Neurological:      Mental Status: He is alert and oriented to person, place, and time  Discussion/Summary:I will continue the patient's present medical regimen  The patient appears well compensated  I have asked the patient to call if there is a problem in the interim otherwise I will see the patient in six months time

## 2021-07-06 ENCOUNTER — OFFICE VISIT (OUTPATIENT)
Dept: CARDIOLOGY CLINIC | Facility: CLINIC | Age: 86
End: 2021-07-06
Payer: MEDICARE

## 2021-07-06 VITALS
HEIGHT: 69 IN | WEIGHT: 164 LBS | BODY MASS INDEX: 24.29 KG/M2 | SYSTOLIC BLOOD PRESSURE: 130 MMHG | DIASTOLIC BLOOD PRESSURE: 50 MMHG | HEART RATE: 60 BPM

## 2021-07-06 DIAGNOSIS — I95.1 ORTHOSTATIC HYPOTENSION: ICD-10-CM

## 2021-07-06 DIAGNOSIS — I25.10 CORONARY ARTERIOSCLEROSIS: ICD-10-CM

## 2021-07-06 DIAGNOSIS — I10 ESSENTIAL (PRIMARY) HYPERTENSION: Primary | ICD-10-CM

## 2021-07-06 DIAGNOSIS — E78.00 PURE HYPERCHOLESTEROLEMIA: ICD-10-CM

## 2021-07-06 PROCEDURE — 99214 OFFICE O/P EST MOD 30 MIN: CPT | Performed by: INTERNAL MEDICINE

## 2021-08-02 ENCOUNTER — TELEPHONE (OUTPATIENT)
Dept: FAMILY MEDICINE CLINIC | Facility: CLINIC | Age: 86
End: 2021-08-02

## 2021-08-02 NOTE — TELEPHONE ENCOUNTER
JACINDA from [facility] called and stated that patient fell and injured his foot early this morning and wanted to know if you would order an xray since they would have the mobile xray to come in  Patient does not want to have to leave and go anywhere at this time  Please fax script to  [phone]  If there are any questions or concerns

## 2021-08-04 DIAGNOSIS — F41.9 ANXIETY: ICD-10-CM

## 2021-08-04 DIAGNOSIS — K21.9 GASTROESOPHAGEAL REFLUX DISEASE: ICD-10-CM

## 2021-08-04 DIAGNOSIS — I10 ESSENTIAL (PRIMARY) HYPERTENSION: ICD-10-CM

## 2021-08-04 RX ORDER — OMEPRAZOLE 40 MG/1
CAPSULE, DELAYED RELEASE ORAL
Qty: 90 CAPSULE | Refills: 3 | Status: SHIPPED | OUTPATIENT
Start: 2021-08-04 | End: 2022-01-12 | Stop reason: SDUPTHER

## 2021-08-04 RX ORDER — METOPROLOL SUCCINATE 25 MG/1
TABLET, EXTENDED RELEASE ORAL
Qty: 90 TABLET | Refills: 3 | Status: SHIPPED | OUTPATIENT
Start: 2021-08-04 | End: 2022-01-13 | Stop reason: SDUPTHER

## 2021-08-04 NOTE — TELEPHONE ENCOUNTER
Unable to send the zoloft     Requested Prescriptions     Pending Prescriptions Disp Refills    omeprazole (PriLOSEC) 40 MG capsule [Pharmacy Med Name: Omeprazole 40 MG Oral Capsule Delayed Release] 90 capsule 3     Sig: TAKE 1 CAPSULE BY MOUTH  DAILY    sertraline (ZOLOFT) 50 mg tablet [Pharmacy Med Name: SERTRALINE HCL 50MG TABLET] 90 tablet 3     Sig: TAKE 1 TABLET BY MOUTH  DAILY       Please review and advise   Protocol passed criteria to refill medications

## 2021-08-17 ENCOUNTER — TELEPHONE (OUTPATIENT)
Dept: FAMILY MEDICINE CLINIC | Facility: CLINIC | Age: 86
End: 2021-08-17

## 2021-08-17 NOTE — TELEPHONE ENCOUNTER
Patients wife calling to advise he has been having episodes  On Monday he got out of the apartment complex in the middle of the night and fell off the sidewalk into [address] street  A meeting was scheduled for today with administration at [facility]   She is requesting repeat blood work from April and is also inquiring if donepezil (ARICEPT) 10 mg tablet can be stopped as she feels this is making him confused

## 2021-08-17 NOTE — TELEPHONE ENCOUNTER
I will place order in epic for blood work and urinalysis    She may discontinue Aricept medication at this time

## 2021-08-19 ENCOUNTER — APPOINTMENT (OUTPATIENT)
Dept: LAB | Facility: CLINIC | Age: 86
End: 2021-08-19
Payer: MEDICARE

## 2021-08-19 DIAGNOSIS — F03.90 DEMENTIA WITHOUT BEHAVIORAL DISTURBANCE, UNSPECIFIED DEMENTIA TYPE (HCC): ICD-10-CM

## 2021-08-19 LAB
ALBUMIN SERPL BCP-MCNC: 3.4 G/DL (ref 3.5–5)
ALP SERPL-CCNC: 83 U/L (ref 46–116)
ALT SERPL W P-5'-P-CCNC: 23 U/L (ref 12–78)
ANION GAP SERPL CALCULATED.3IONS-SCNC: 6 MMOL/L (ref 4–13)
AST SERPL W P-5'-P-CCNC: 19 U/L (ref 5–45)
BILIRUB SERPL-MCNC: 0.59 MG/DL (ref 0.2–1)
BUN SERPL-MCNC: 10 MG/DL (ref 5–25)
CALCIUM ALBUM COR SERPL-MCNC: 11 MG/DL (ref 8.3–10.1)
CALCIUM SERPL-MCNC: 10.5 MG/DL (ref 8.3–10.1)
CHLORIDE SERPL-SCNC: 102 MMOL/L (ref 100–108)
CO2 SERPL-SCNC: 27 MMOL/L (ref 21–32)
CREAT SERPL-MCNC: 0.86 MG/DL (ref 0.6–1.3)
ERYTHROCYTE [DISTWIDTH] IN BLOOD BY AUTOMATED COUNT: 13.3 % (ref 11.6–15.1)
GFR SERPL CREATININE-BSD FRML MDRD: 77 ML/MIN/1.73SQ M
GLUCOSE P FAST SERPL-MCNC: 107 MG/DL (ref 65–99)
HCT VFR BLD AUTO: 39.3 % (ref 36.5–49.3)
HGB BLD-MCNC: 12.9 G/DL (ref 12–17)
MCH RBC QN AUTO: 27.9 PG (ref 26.8–34.3)
MCHC RBC AUTO-ENTMCNC: 32.8 G/DL (ref 31.4–37.4)
MCV RBC AUTO: 85 FL (ref 82–98)
PLATELET # BLD AUTO: 183 THOUSANDS/UL (ref 149–390)
PMV BLD AUTO: 10.9 FL (ref 8.9–12.7)
POTASSIUM SERPL-SCNC: 4 MMOL/L (ref 3.5–5.3)
PROT SERPL-MCNC: 6.7 G/DL (ref 6.4–8.2)
RBC # BLD AUTO: 4.62 MILLION/UL (ref 3.88–5.62)
SODIUM SERPL-SCNC: 135 MMOL/L (ref 136–145)
WBC # BLD AUTO: 6.77 THOUSAND/UL (ref 4.31–10.16)

## 2021-08-19 PROCEDURE — 80053 COMPREHEN METABOLIC PANEL: CPT

## 2021-08-19 PROCEDURE — 36415 COLL VENOUS BLD VENIPUNCTURE: CPT

## 2021-08-19 PROCEDURE — 85027 COMPLETE CBC AUTOMATED: CPT

## 2021-08-20 ENCOUNTER — OFFICE VISIT (OUTPATIENT)
Dept: FAMILY MEDICINE CLINIC | Facility: CLINIC | Age: 86
End: 2021-08-20
Payer: MEDICARE

## 2021-08-20 VITALS
OXYGEN SATURATION: 97 % | DIASTOLIC BLOOD PRESSURE: 60 MMHG | RESPIRATION RATE: 15 BRPM | HEART RATE: 100 BPM | HEIGHT: 69 IN | WEIGHT: 162.2 LBS | TEMPERATURE: 98 F | BODY MASS INDEX: 24.02 KG/M2 | SYSTOLIC BLOOD PRESSURE: 160 MMHG

## 2021-08-20 DIAGNOSIS — R41.89 COGNITIVE DECLINE: ICD-10-CM

## 2021-08-20 DIAGNOSIS — S20.211A CONTUSION OF RIB ON RIGHT SIDE, INITIAL ENCOUNTER: Primary | ICD-10-CM

## 2021-08-20 PROCEDURE — 1123F ACP DISCUSS/DSCN MKR DOCD: CPT | Performed by: FAMILY MEDICINE

## 2021-08-20 PROCEDURE — 99214 OFFICE O/P EST MOD 30 MIN: CPT | Performed by: FAMILY MEDICINE

## 2021-08-20 PROCEDURE — G0439 PPPS, SUBSEQ VISIT: HCPCS | Performed by: FAMILY MEDICINE

## 2021-08-20 NOTE — PROGRESS NOTES
Nutrition and Exercise Counseling: The patient's Body mass index is 23 95 kg/m²  This is Facility age limit for growth percentiles is 20 years  Nutrition counseling provided:  Anticipatory guidance for nutrition given and counseled on healthy eating habits    Exercise counseling provided:  Educational material provided to patient/family on physical activity   FAMILY PRACTICE OFFICE VISIT       NAME: Michelle Mendosa  AGE: 80 y o  SEX: male       : 1933        MRN: [id number]    DATE: 2021  TIME: 5:03 PM    Assessment and Plan     Problem List Items Addressed This Visit        Musculoskeletal and Integument    Contusion of rib on right side - Primary      Rib contusion  Patient will obtain x-ray of right ribcage for further evaluation  We will make further recommendations pending results of test          Relevant Orders    XR ribs 2 vw right       Other    Cognitive decline       Cognitive decline  We had a long discussion regarding his periods of confusion and memory lapses as well as his frequent falls  We decided to wean down from his gabapentin and use 300 milligrams b i d  for 5 days followed by once daily dosing for 5 days before discontinuing altogether to see if this has been causing any of his symptoms  We discussed the possibility of dementia progressing  He had blood work and urinalysis that was unremarkable  He had previous CT scan that was also unremarkable  Patient was intolerant of Aricept medication                   Chief Complaint     Chief Complaint   Patient presents with    Confusion       History of Present Illness       Patient is accompanied by his wife  He states he has been having episodes of falling in his apartment as well as outside his apartment with walking  He had an episode of roaming out of his apartment in the early morning and was found by a neighbor after he had fallen out on the sidewalk  Patient does not recall events that occurred that morning  Patient's wife felt that he was more confused since starting Aricept medication approximately 2 months ago  He has also been on gabapentin 300 milligrams t i d  for chronic back pain  Previous mini-mental status exam scored i26/30     patient sustained a fracture of his left 4th metatarsal for which she saw podiatrist and is in a walking shoe  Since his fall he has also been experiencing right-sided rib discomfort  Review of Systems   Review of Systems   Constitutional: Positive for fatigue  Respiratory: Negative  Cardiovascular: Negative  Gastrointestinal: Negative  Musculoskeletal: Positive for arthralgias, back pain and gait problem          Patient uses a walker with wheels   Neurological:        As per HPI       Active Problem List     Patient Active Problem List   Diagnosis    Constipation    Iron deficiency    Other constipation    Anemia    Arteriosclerotic cardiovascular disease    Backache    Essential hypertension    Cervical spinal stenosis    Depression    Esophageal reflux    Hyperlipidemia    Insomnia    Spinal stenosis of lumbar region with neurogenic claudication    Vitamin B12 deficiency    Idiopathic peripheral neuropathy    Iron deficiency anemia    History of meningioma    Cognitive decline    Contusion of rib on right side    Orthostatic hypotension    Convulsions ([address]    Lumbar spondylosis    Lumbar radiculopathy    Acute CVA (cerebrovascular accident), suspected embolic in nature    Hypercalcemia    History of resection of meningioma    History of stroke    Depression    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) ([address]    Calcification of aorta ([address] )    Need for influenza vaccination    Dementia without behavioral disturbance ([address] )    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[address] )       Past Medical History:  Past Medical History:   Diagnosis Date    Anxiety     Arthritis     Coronary artery disease     Coronary artery disease involving native coronary artery of native heart without angina pectoris     Depression     Fracture     Hypercholesterolemia     [facility]    brain tumor    Meningioma ([facility]     Narcolepsy     daytime drowsiness and dozing off occasionally    Orthostatic hypotension     Palpitations     Prostate CA (HCC)     Prostate cancer (HCC)     Stroke [facility])        Past Surgical History:  Past Surgical History:   Procedure Laterality Date    BACK SURGERY      BRAIN SURGERY  1999    CORONARY ARTERY BYPASS GRAFT      x5    CORONARY ARTERY BYPASS GRAFT         Family History:  Family History   Problem Relation Age of Onset    Hypertension Mother         benign essential    Cancer Mother     Osteoporosis Mother     Suicidality Father     Diabetes Family     Heart disease Family     Neuropathy Family         peripheral    Prostate cancer Family     Thyroid disease Family        Social History:  Social History     Socioeconomic History    Marital status: /Civil Union     Spouse name: Not on file    Number of children: Not on file    Years of education: Not on file    Highest education level: Not on file   Occupational History    Occupation: retired   Tobacco Use    Smoking status: Former Smoker     Types: Cigarettes     Quit date: 1995     Years since quittin 6    Smokeless tobacco: Never Used    Tobacco comment: former cig smoker- quit in    Vaping Use    Vaping Use: Never used   Substance and Sexual Activity    Alcohol use: Not Currently     Comment: (history)    Drug use: No    Sexual activity: Not on file   Other Topics Concern    Not on file   Social History Narrative    ** Merged History Encounter **         Pt lives with wife     Social Determinants of Health     Financial Resource Strain:     Difficulty of Paying Living Expenses:    Food Insecurity:     Worried About Running Out of Food in the Last Year:     [address] in the Last Year:    Transportation Needs:     Lack of Transportation (Medical):  Lack of Transportation (Non-Medical):    Physical Activity:     Days of Exercise per Week:     Minutes of Exercise per Session:    Stress:     Feeling of Stress :    Social Connections:     Frequency of Communication with Friends and Family:     Frequency of Social Gatherings with Friends and Family:     Attends Taoist Services:     Active Member of Clubs or Organizations:     Attends Club or Organization Meetings:     Marital Status:    Intimate Partner Violence:     Fear of Current or Ex-Partner:     Emotionally Abused:     Physically Abused:     Sexually Abused:        Objective     Vitals:    08/20/21 0959   BP: 160/60   Pulse: 100   Resp: 15   Temp: 98 °F (36 7 °C)   SpO2: 97%     Wt Readings from Last 3 Encounters:   08/20/21 73 6 kg (162 lb 3 2 oz)   07/06/21 74 4 kg (164 lb)   04/20/21 76 6 kg (168 lb 12 8 oz)       Physical Exam  Constitutional:       General: He is not in acute distress  Appearance: Normal appearance  He is not ill-appearing  Cardiovascular:      Rate and Rhythm: Normal rate and regular rhythm  Heart sounds: Normal heart sounds  No murmur heard  Pulmonary:      Effort: Pulmonary effort is normal  No respiratory distress  Breath sounds: Normal breath sounds  No wheezing or rhonchi  Neurological:      General: No focal deficit present  Mental Status: He is alert and oriented to person, place, and time  Mental status is at baseline  Cranial Nerves: No cranial nerve deficit  Psychiatric:         Mood and Affect: Mood normal          Behavior: Behavior normal          Thought Content:  Thought content normal          Judgment: Judgment normal          Pertinent Laboratory/Diagnostic Studies:  Lab Results   Component Value Date    GLUCOSE 114 05/14/2020    BUN 10 08/19/2021    CREATININE 0 86 08/19/2021    CALCIUM 10 5 (H) 08/19/2021     05/08/2015    K 4 0 08/19/2021 CO2 27 08/19/2021     08/19/2021     Lab Results   Component Value Date    ALT 23 08/19/2021    AST 19 08/19/2021    ALKPHOS 83 08/19/2021    BILITOT 0 47 06/09/2015       Lab Results   Component Value Date    WBC 6 77 08/19/2021    HGB 12 9 08/19/2021    HCT 39 3 08/19/2021    MCV 85 08/19/2021     08/19/2021       No results found for: TSH    Lab Results   Component Value Date    CHOL 215 08/20/2014     Lab Results   Component Value Date    TRIG 128 12/02/2020     Lab Results   Component Value Date    HDL 52 12/02/2020     Lab Results   Component Value Date    LDLCALC 91 12/02/2020     Lab Results   Component Value Date    HGBA1C 6 0 11/08/2019       Results for orders placed or performed in visit on 08/19/21   CBC   Result Value Ref Range    WBC 6 77 4 31 - 10 16 Thousand/uL    RBC 4 62 3 88 - 5 62 Million/uL    Hemoglobin 12 9 12 0 - 17 0 g/dL    Hematocrit 39 3 36 5 - 49 3 %    MCV 85 82 - 98 fL    MCH 27 9 26 8 - 34 3 pg    MCHC 32 8 31 4 - 37 4 g/dL    RDW 13 3 11 6 - 15 1 %    Platelets 719 139 - 030 Thousands/uL    MPV 10 9 8 9 - 12 7 fL   Comprehensive metabolic panel   Result Value Ref Range    Sodium 135 (L) 136 - 145 mmol/L    Potassium 4 0 3 5 - 5 3 mmol/L    Chloride 102 100 - 108 mmol/L    CO2 27 21 - 32 mmol/L    ANION GAP 6 4 - 13 mmol/L    BUN 10 5 - 25 mg/dL    Creatinine 0 86 0 60 - 1 30 mg/dL    Glucose, Fasting 107 (H) 65 - 99 mg/dL    Calcium 10 5 (H) 8 3 - 10 1 mg/dL    Corrected Calcium 11 0 (H) 8 3 - 10 1 mg/dL    AST 19 5 - 45 U/L    ALT 23 12 - 78 U/L    Alkaline Phosphatase 83 46 - 116 U/L    Total Protein 6 7 6 4 - 8 2 g/dL    Albumin 3 4 (L) 3 5 - 5 0 g/dL    Total Bilirubin 0 59 0 20 - 1 00 mg/dL    eGFR 77 ml/min/1 73sq m       Orders Placed This Encounter   Procedures    XR ribs 2 vw right       ALLERGIES:  Allergies   Allergen Reactions    Aricept [Donepezil] Confusion     confusion    Atorvastatin Myalgia, Dizziness and Headache    Niacin Other (See Comments) unknown       Current Medications     Current Outpatient Medications   Medication Sig Dispense Refill    aspirin 81 mg chewable tablet Chew 81 mg daily      aspirin 81 MG tablet Take 1 tablet by mouth daily      clopidogrel (PLAVIX) 75 mg tablet TAKE 1 TABLET BY MOUTH  DAILY 90 tablet 3    docusate sodium (COLACE) 100 mg capsule Take 1 capsule (100 mg total) by mouth 2 (two) times a day 10 capsule 0    gabapentin (NEURONTIN) 300 mg capsule TAKE 3 CAPSULES BY MOUTH AT BEDTIME 270 capsule 3    metoprolol succinate (TOPROL-XL) 25 mg 24 hr tablet TAKE 1 TABLET BY MOUTH  DAILY 90 tablet 3    midodrine (PROAMATINE) 5 mg tablet Take 1 tablet (5 mg total) by mouth daily 90 tablet 0    omeprazole (PriLOSEC) 40 MG capsule TAKE 1 CAPSULE BY MOUTH  DAILY 90 capsule 3    psyllium (METAMUCIL) 58 6 % packet Take 1 packet by mouth daily      sertraline (ZOLOFT) 50 mg tablet TAKE 1 TABLET BY MOUTH  DAILY 90 tablet 3    simvastatin (ZOCOR) 80 mg tablet Take 1 tablet (80 mg total) by mouth daily 90 tablet 3    fluocinonide (LIDEX) 0 05 % cream APPLY TO AFFECTED AREA(S) FOUR TIMES A DAY (Patient not taking: Reported on 7/6/2021)       No current facility-administered medications for this visit           Health Maintenance     Health Maintenance   Topic Date Due    SLP PLAN OF CARE  Never done   [facility] Annual Wellness Visit (AWV)  04/02/2020    Influenza Vaccine (1) 09/01/2021    Fall Risk  08/20/2022    BMI: Adult  08/20/2022    Depression Remission PHQ  08/20/2022    DTaP,Tdap,and Td Vaccines (3 - Td or Tdap) 05/14/2030    Pneumococcal Vaccine: 65+ Years  Completed    COVID-19 Vaccine  Completed    HIB Vaccine  Aged Out    Hepatitis B Vaccine  Aged Out    IPV Vaccine  Aged Out    Hepatitis A Vaccine  Aged Out    Meningococcal ACWY Vaccine  Aged Out    HPV Vaccine  Aged Out     Immunization History   Administered Date(s) Administered    Influenza Split High Dose Preservative Free IM 11/10/2014, 01/12/2016, 11/22/2016, 11/08/2017    Influenza, high dose seasonal 0 7 mL 11/14/2018, 10/29/2019, 11/18/2020    Influenza, seasonal, injectable 10/01/2007, 11/01/2009, 11/02/2009, 11/10/2010, 12/17/2012    Pneumococcal Conjugate 13-Valent 01/12/2016    Pneumococcal Polysaccharide PPV23 10/01/2007    SARS-CoV-2 / COVID-19 mRNA IM (Moderna) 01/12/2021, 02/09/2021    Tdap 08/01/2018, 50/33/1617       Clearance MD MAYCO Melendez spent 25 minutes with this patient which greater than 50 percent was spent counseling

## 2021-08-20 NOTE — PATIENT INSTRUCTIONS
Medicare Preventive Visit Patient Instructions  Thank you for completing your Welcome to Medicare Visit or Medicare Annual Wellness Visit today  Your next wellness visit will be due in one year (8/21/2022)  The screening/preventive services that you may require over the next 5-10 years are detailed below  Some tests may not apply to you based off risk factors and/or age  Screening tests ordered at today's visit but not completed yet may show as past due  Also, please note that scanned in results may not display below  Preventive Screenings:  Service Recommendations Previous Testing/Comments   Colorectal Cancer Screening  · Colonoscopy    · Fecal Occult Blood Test (FOBT)/Fecal Immunochemical Test (FIT)  · Fecal DNA/Cologuard Test  · Flexible Sigmoidoscopy Age: 54-65 years old   Colonoscopy: every 10 years (May be performed more frequently if at higher risk)  OR  FOBT/FIT: every 1 year  OR  Cologuard: every 3 years  OR  Sigmoidoscopy: every 5 years  Screening may be recommended earlier than age 48 if at higher risk for colorectal cancer  Also, an individualized decision between you and your healthcare provider will decide whether screening between the ages of 74-80 would be appropriate   Colonoscopy: Not on file  FOBT/FIT: Not on file  Cologuard: Not on file  Sigmoidoscopy: Not on file    Screening Not Indicated     Prostate Cancer Screening Individualized decision between patient and health care provider in men between ages of 53-78   Medicare will cover every 12 months beginning on the day after your 50th birthday PSA: 0 2 ng/mL     History Prostate Cancer  Screening Not Indicated     Hepatitis C Screening Once for adults born between 1945 and 1965  More frequently in patients at high risk for Hepatitis C Hep C Antibody: Not on file        Diabetes Screening 1-2 times per year if you're at risk for diabetes or have pre-diabetes Fasting glucose: 107 mg/dL   A1C: 6 0 %    Screening Current   Cholesterol Screening Once every 5 years if you don't have a lipid disorder  May order more often based on risk factors  Lipid panel: 12/02/2020    Screening Not Indicated  History Lipid Disorder      Other Preventive Screenings Covered by Medicare:  1  Abdominal Aortic Aneurysm (AAA) Screening: covered once if your at risk  You're considered to be at risk if you have a family history of AAA or a male between the age of 73-68 who smoking at least 100 cigarettes in your lifetime  2  Lung Cancer Screening: covers low dose CT scan once per year if you meet all of the following conditions: (1) Age 50-69; (2) No signs or symptoms of lung cancer; (3) Current smoker or have quit smoking within the last 15 years; (4) You have a tobacco smoking history of at least 30 pack years (packs per day x number of years you smoked); (5) You get a written order from a healthcare provider  3  Glaucoma Screening: covered annually if you're considered high risk: (1) You have diabetes OR (2) Family history of glaucoma OR (3)  aged 48 and older OR (3)  American aged 72 and older  3  Osteoporosis Screening: covered every 2 years if you meet one of the following conditions: (1) Have a vertebral abnormality; (2) On glucocorticoid therapy for more than 3 months; (3) Have primary hyperparathyroidism; (4) On osteoporosis medications and need to assess response to drug therapy  5  HIV Screening: covered annually if you're between the age of 12-76  Also covered annually if you are younger than 13 and older than 72 with risk factors for HIV infection  For pregnant patients, it is covered up to 3 times per pregnancy      Immunizations:  Immunization Recommendations   Influenza Vaccine Annual influenza vaccination during flu season is recommended for all persons aged >= 6 months who do not have contraindications   Pneumococcal Vaccine (Prevnar and Pneumovax)  * Prevnar = PCV13  * Pneumovax = PPSV23 Adults 25-60 years old: 1-3 doses may be recommended based on certain risk factors  Adults 72 years old: Prevnar (PCV13) vaccine recommended followed by Pneumovax (PPSV23) vaccine  If already received PPSV23 since turning 65, then PCV13 recommended at least one year after PPSV23 dose  Hepatitis B Vaccine 3 dose series if at intermediate or high risk (ex: diabetes, end stage renal disease, liver disease)   Tetanus (Td) Vaccine - COST NOT COVERED BY MEDICARE PART B Following completion of primary series, a booster dose should be given every 10 years to maintain immunity against tetanus  Td may also be given as tetanus wound prophylaxis  Tdap Vaccine - COST NOT COVERED BY MEDICARE PART B Recommended at least once for all adults  For pregnant patients, recommended with each pregnancy  Shingles Vaccine (Shingrix) - COST NOT COVERED BY MEDICARE PART B  2 shot series recommended in those aged 48 and above     Health Maintenance Due:  There are no preventive care reminders to display for this patient  Immunizations Due:      Topic Date Due    Influenza Vaccine (1) 09/01/2021     Advance Directives   What are advance directives? Advance directives are legal documents that state your wishes and plans for medical care  These plans are made ahead of time in case you lose your ability to make decisions for yourself  Advance directives can apply to any medical decision, such as the treatments you want, and if you want to donate organs  What are the types of advance directives? There are many types of advance directives, and each state has rules about how to use them  You may choose a combination of any of the following:  · Living will: This is a written record of the treatment you want  You can also choose which treatments you do not want, which to limit, and which to stop at a certain time  This includes surgery, medicine, IV fluid, and tube feedings  · Durable power of  for healthcare [facility]):   This is a written record that states who you want to make healthcare choices for you when you are unable to make them for yourself  This person, called a proxy, is usually a family member or a friend  You may choose more than 1 proxy  · Do not resuscitate (DNR) order:  A DNR order is used in case your heart stops beating or you stop breathing  It is a request not to have certain forms of treatment, such as CPR  A DNR order may be included in other types of advance directives  · Medical directive: This covers the care that you want if you are in a coma, near death, or unable to make decisions for yourself  You can list the treatments you want for each condition  Treatment may include pain medicine, surgery, blood transfusions, dialysis, IV or tube feedings, and a ventilator (breathing machine)  · Values history: This document has questions about your views, beliefs, and how you feel and think about life  This information can help others choose the care that you would choose  Why are advance directives important? An advance directive helps you control your care  Although spoken wishes may be used, it is better to have your wishes written down  Spoken wishes can be misunderstood, or not followed  Treatments may be given even if you do not want them  An advance directive may make it easier for your family to make difficult choices about your care  Fall Prevention    Fall prevention  includes ways to make your home and other areas safer  It also includes ways you can move more carefully to prevent a fall  Health conditions that cause changes in your blood pressure, vision, or muscle strength and coordination may increase your risk for falls  Medicines may also increase your risk for falls if they make you dizzy, weak, or sleepy  Fall prevention tips:   · Stand or sit up slowly  · Use assistive devices as directed  · Wear shoes that fit well and have soles that   · Wear a personal alarm  · Stay active  · Manage your medical conditions  Home Safety Tips:  · Add items to prevent falls in the bathroom  · Keep paths clear  · Install bright lights in your home  · Keep items you use often on shelves within reach  · Paint or place reflective tape on the edges of your stairs  © Copyright [employer] 2018 Information is for End User's use only and may not be sold, redistributed or otherwise used for commercial purposes   All illustrations and images included in CareNotes® are the copyrighted property of A [employer] M , [employer]  or [address] [place] [address]

## 2021-08-20 NOTE — PROGRESS NOTES
Assessment and Plan:     Problem List Items Addressed This Visit     None           Preventive health issues were discussed with patient, and age appropriate screening tests were ordered as noted in patient's After Visit Summary  Personalized health advice and appropriate referrals for health education or preventive services given if needed, as noted in patient's After Visit Summary       History of Present Illness:     Patient presents for Medicare Annual Wellness visit    Patient Care Team:  Antwon Black MD as PCP - General (Family Medicine)  Nahed Kraft MD (Gastroenterology)  MD Power Pabon MD (Urology)  Antwon Black MD     Problem List:     Patient Active Problem List   Diagnosis    Constipation    Iron deficiency    Other constipation    Anemia    Arteriosclerotic cardiovascular disease    Backache    Essential hypertension    Cervical spinal stenosis    Depression    Esophageal reflux    Hyperlipidemia    Insomnia    Spinal stenosis of lumbar region with neurogenic claudication    Vitamin B12 deficiency    Idiopathic peripheral neuropathy    Iron deficiency anemia    History of meningioma    Cognitive decline    Contusion of rib on right side    Orthostatic hypotension    Convulsions ([address]    Lumbar spondylosis    Lumbar radiculopathy    Acute CVA (cerebrovascular accident), suspected embolic in nature    Hypercalcemia    History of resection of meningioma    History of stroke    Depression    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) ([address] )    Calcification of aorta ([address] )    Need for influenza vaccination    Dementia without behavioral disturbance ([address] )    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[address] )      Past Medical and Surgical History:     Past Medical History:   Diagnosis Date    Anxiety     Arthritis     Coronary artery disease     Coronary artery disease involving native coronary artery of native heart without angina pectoris     Depression     Fracture     Hypercholesterolemia     [facility]ening[facility]oma ([facility] ) 1999    brain tumor    Meningioma ([facility] )     Narcolepsy     daytime drowsiness and dozing off occasionally    Orthostatic hypotension     Palpitations     Prostate CA (HCC)     Prostate cancer ([facility] )     Stroke [facility])      Past Surgical History:   Procedure Laterality Date    BACK SURGERY      BRAIN SURGERY  1999    CORONARY ARTERY BYPASS GRAFT      x5    CORONARY ARTERY BYPASS GRAFT        Family History:     Family History   Problem Relation Age of Onset    Hypertension Mother         benign essential    Cancer Mother     Osteoporosis Mother     Suicidality Father     Diabetes Family     Heart disease Family     Neuropathy Family         peripheral    Prostate cancer Family     Thyroid disease Family       Social History:     Social History     Socioeconomic History    Marital status: /Civil Union     Spouse name: None    Number of children: None    Years of education: None    Highest education level: None   Occupational History    Occupation: retired   Tobacco Use    Smoking status: Former Smoker     Types: Cigarettes     Quit date: 1995     Years since quittin 6    Smokeless tobacco: Never Used    Tobacco comment: former cig smoker- quit in    Vaping Use    Vaping Use: Never used   Substance and Sexual Activity    Alcohol use: Not Currently     Comment: (history)    Drug use: No    Sexual activity: None   Other Topics Concern    None   Social History Narrative    ** Merged History Encounter **         Pt lives with wife     Social Determinants of Health     Financial Resource Strain:     Difficulty of Paying Living Expenses:    Food Insecurity:     Worried About [address] in the Last Year:     [address] [place] in the Last Year:    Transportation Needs:     Lack of Transportation (Medical):      Lack of Transportation (Non-Medical):    Physical Activity:  Days of Exercise per Week:     Minutes of Exercise per Session:    Stress:     Feeling of Stress :    Social Connections:     Frequency of Communication with Friends and Family:     Frequency of Social Gatherings with Friends and Family:     Attends [employer] Services:     Active Member of Clubs or Organizations:     Attends Club or Organization Meetings:     Marital Status:    Intimate Partner Violence:     Fear of Current or Ex-Partner:     Emotionally Abused:     Physically Abused:     Sexually Abused:       Medications and Allergies:     Current Outpatient Medications   Medication Sig Dispense Refill    aspirin 81 mg chewable tablet Chew 81 mg daily      aspirin 81 MG tablet Take 1 tablet by mouth daily      clopidogrel (PLAVIX) 75 mg tablet TAKE 1 TABLET BY MOUTH  DAILY 90 tablet 3    docusate sodium (COLACE) 100 mg capsule Take 1 capsule (100 mg total) by mouth 2 (two) times a day 10 capsule 0    donepezil (ARICEPT) 10 mg tablet Take 1 tablet (10 mg total) by mouth daily at bedtime 90 tablet 3    gabapentin (NEURONTIN) 300 mg capsule TAKE 3 CAPSULES BY MOUTH AT BEDTIME 270 capsule 3    metoprolol succinate (TOPROL-XL) 25 mg 24 hr tablet TAKE 1 TABLET BY MOUTH  DAILY 90 tablet 3    midodrine (PROAMATINE) 5 mg tablet Take 1 tablet (5 mg total) by mouth daily 90 tablet 0    omeprazole (PriLOSEC) 40 MG capsule TAKE 1 CAPSULE BY MOUTH  DAILY 90 capsule 3    psyllium (METAMUCIL) 58 6 % packet Take 1 packet by mouth daily      sertraline (ZOLOFT) 50 mg tablet TAKE 1 TABLET BY MOUTH  DAILY 90 tablet 3    simvastatin (ZOCOR) 80 mg tablet Take 1 tablet (80 mg total) by mouth daily 90 tablet 3    fluocinonide (LIDEX) 0 05 % cream APPLY TO AFFECTED AREA(S) FOUR TIMES A DAY (Patient not taking: Reported on 7/6/2021)       No current facility-administered medications for this visit       Allergies   Allergen Reactions    Atorvastatin Myalgia, Dizziness and Headache    Niacin Other (See Comments)     unknown      Immunizations:     Immunization History   Administered Date(s) Administered    Influenza Split High Dose Preservative Free IM 11/10/2014, 01/12/2016, 11/22/2016, 11/08/2017    Influenza, high dose seasonal 0 7 mL 11/14/2018, 10/29/2019, 11/18/2020    Influenza, seasonal, injectable 10/01/2007, 11/01/2009, 11/02/2009, 11/10/2010, 12/17/2012    Pneumococcal Conjugate 13-Valent 01/12/2016    Pneumococcal Polysaccharide PPV23 10/01/2007    SARS-CoV-2 / COVID-19 mRNA IM ([facility]) 01/12/2021, 02/09/2021    Tdap 08/01/2018, 05/14/2020      Health Maintenance: There are no preventive care reminders to display for this patient  Topic Date Due    Influenza Vaccine (1) 09/01/2021      Medicare Health Risk Assessment:     /60 (BP Location: Left arm, Patient Position: Sitting, Cuff Size: Adult)   Pulse 100   Temp 98 °F (36 7 °C) (Temporal)   Resp 15   Ht 5' 9" (1 753 m)   Wt 73 6 kg (162 lb 3 2 oz)   SpO2 97%   BMI 23 95 kg/m²      Choco Booth is here for his Subsequent Wellness visit  Health Risk Assessment:   Patient rates overall health as fair  Patient feels that their physical health rating is slightly worse  Patient is satisfied with their life  Eyesight was rated as same  Hearing was rated as slightly worse  Patient feels that their emotional and mental health rating is slightly worse  Patients states they are sometimes angry  Patient states they are sometimes unusually tired/fatigued  Pain experienced in the last 7 days has been some  Patient's pain rating has been 7/10  Patient states that he has experienced no weight loss or gain in last 6 months  Depression Screening:   PHQ-2 Score: 1  PHQ-9 Score: 5      Fall Risk Screening:    In the past year, patient has experienced: history of falling in past year    Number of falls: 2 or more  Injured during fall?: Yes    Feels unsteady when standing or walking?: Yes    Worried about falling?: Yes      Home Safety:  Patient does not have trouble with stairs inside or outside of their home  Patient has working smoke alarms and has working carbon monoxide detector  Home safety hazards include: none  Nutrition:   Current diet is Regular  Medications:   Patient is currently taking over-the-counter supplements  OTC medications include: see medication list  Patient is able to manage medications  Activities of Daily Living (ADLs)/Instrumental Activities of Daily Living (IADLs):   Walk and transfer into and out of bed and chair?: Yes  Dress and groom yourself?: Yes    Bathe or shower yourself?: Yes    Feed yourself? Yes  Do your laundry/housekeeping?: Yes  Manage your money, pay your bills and track your expenses?: Yes  Make your own meals?: No    Do your own shopping?: Yes    Previous Hospitalizations:   Any hospitalizations or ED visits within the last 12 months?: No      Advance Care Planning:   Living will: Yes    Durable POA for healthcare: Yes    Advanced directive: Yes      PREVENTIVE SCREENINGS      Cardiovascular Screening:    General: Screening Not Indicated and History Lipid Disorder      Diabetes Screening:     General: Screening Current      Colorectal Cancer Screening:     General: Screening Not Indicated      Prostate Cancer Screening:    General: History Prostate Cancer and Screening Not Indicated      Abdominal Aortic Aneurysm (AAA) Screening:    Risk factors include: tobacco use        Lung Cancer Screening:     General: Screening Not Indicated    Screening, Brief Intervention, and Referral to Treatment (SBIRT)    Screening  Typical number of drinks in a day: 0  Typical number of drinks in a week: 0  Interpretation: Low risk drinking behavior      Single Item Drug Screening:  How often have you used an illegal drug (including marijuana) or a prescription medication for non-medical reasons in the past year? never    Single Item Drug Screen Score: 0  Interpretation: Negative screen for possible drug use disorder      Magali Rinne, MD

## 2021-08-20 NOTE — ASSESSMENT & PLAN NOTE
Cognitive decline  We had a long discussion regarding his periods of confusion and memory lapses as well as his frequent falls  We decided to wean down from his gabapentin and use 300 milligrams b i d  for 5 days followed by once daily dosing for 5 days before discontinuing altogether to see if this has been causing any of his symptoms  We discussed the possibility of dementia progressing  He had blood work and urinalysis that was unremarkable  He had previous CT scan that was also unremarkable   Patient was intolerant of Aricept medication

## 2021-08-23 ENCOUNTER — HOSPITAL ENCOUNTER (OUTPATIENT)
Dept: RADIOLOGY | Facility: HOSPITAL | Age: 86
Discharge: HOME | End: 2021-08-23
Payer: MEDICARE

## 2021-08-23 DIAGNOSIS — S20.211A CONTUSION OF RIB ON RIGHT SIDE, INITIAL ENCOUNTER: ICD-10-CM

## 2021-08-23 PROCEDURE — 71101 X-RAY EXAM UNILAT RIBS/CHEST: CPT

## 2021-09-06 ENCOUNTER — OFFICE VISIT (OUTPATIENT)
Dept: URGENT CARE | Age: 86
End: 2021-09-06
Payer: MEDICARE

## 2021-09-06 ENCOUNTER — NURSE TRIAGE (OUTPATIENT)
Dept: OTHER | Facility: OTHER | Age: 86
End: 2021-09-06

## 2021-09-06 VITALS — RESPIRATION RATE: 18 BRPM | HEART RATE: 96 BPM | TEMPERATURE: 98.3 F | OXYGEN SATURATION: 98 %

## 2021-09-06 DIAGNOSIS — J32.9 SINOBRONCHITIS: ICD-10-CM

## 2021-09-06 DIAGNOSIS — J40 SINOBRONCHITIS: ICD-10-CM

## 2021-09-06 DIAGNOSIS — R05.9 COUGH: Primary | ICD-10-CM

## 2021-09-06 PROCEDURE — 99213 OFFICE O/P EST LOW 20 MIN: CPT | Performed by: PHYSICIAN ASSISTANT

## 2021-09-06 PROCEDURE — 87635 SARS-COV-2 COVID-19 AMP PRB: CPT | Performed by: PHYSICIAN ASSISTANT

## 2021-09-06 PROCEDURE — G0463 HOSPITAL OUTPT CLINIC VISIT: HCPCS | Performed by: PHYSICIAN ASSISTANT

## 2021-09-06 RX ORDER — AMOXICILLIN AND CLAVULANATE POTASSIUM 875; 125 MG/1; MG/1
1 TABLET, FILM COATED ORAL EVERY 12 HOURS SCHEDULED
Qty: 14 TABLET | Refills: 0 | Status: SHIPPED | OUTPATIENT
Start: 2021-09-06 | End: 2021-09-13

## 2021-09-06 NOTE — PATIENT INSTRUCTIONS
Take antibiotics as prescribed  COVID swab collected today, test results pending  Check MyChart and call in 2-3 days for test results  Results may take up to 7-10 days  Quarantine guidelines discussed  OTC supplements/medications discussed  Follow-up with PCP in the next 1-2 days for re-evaluation  Go to the ED if any fevers, unable to stay hydrated, abdominal pain, chest pain, shortness of breath, wheezing, chest tightness, cough or cold symptoms, new or worsening symptoms or other concerning symptoms  [address]    Your healthcare provider and/or public health staff have evaluated you and have determined that you do not need to remain in the hospital at this time  At this time you can be isolated at home where you will be monitored by staff from your local or state health department  You should carefully follow the prevention and isolation steps below until a healthcare provider or local or state health department says that you can return to your normal activities  Stay home except to get medical care    People who are mildly ill with COVID-19 are able to isolate at home during their illness  You should restrict activities outside your home, except for getting medical care  Do not go to work, school, or public areas  Avoid using public transportation, ride-sharing, or taxis  Separate yourself from other people and animals in your home    People: As much as possible, you should stay in a specific room and away from other people in your home  Also, you should use a separate bathroom, if available  Animals: You should restrict contact with pets and other animals while you are sick with COVID-19, just like you would around other people  Although there have not been reports of pets or other animals becoming sick with COVID-19, it is still recommended that people sick with COVID-19 limit contact with animals until more information is known about the virus   When possible, have another member of your household care for your animals while you are sick  If you are sick with COVID-19, avoid contact with your pet, including petting, snuggling, being kissed or licked, and sharing food  If you must care for your pet or be around animals while you are sick, wash your hands before and after you interact with pets and wear a facemask  See COVID-19 and Animals for more information  Call ahead before visiting your doctor    If you have a medical appointment, call the healthcare provider and tell them that you have or may have COVID-19  This will help the healthcare providers office take steps to keep other people from getting infected or exposed  Wear a facemask    You should wear a facemask when you are around other people (e g , sharing a room or vehicle) or pets and before you enter a healthcare providers office  If you are not able to wear a facemask (for example, because it causes trouble breathing), then people who live with you should not stay in the same room with you, or they should wear a facemask if they enter your room  Cover your coughs and sneezes    Cover your mouth and nose with a tissue when you cough or sneeze  Throw used tissues in a lined trash can  Immediately wash your hands with soap and water for at least 20 seconds or, if soap and water are not available, clean your hands with an alcohol-based hand  that contains at least 60% alcohol  Clean your hands often    Wash your hands often with soap and water for at least 20 seconds, especially after blowing your nose, coughing, or sneezing; going to the bathroom; and before eating or preparing food  If soap and water are not readily available, use an alcohol-based hand  with at least 60% alcohol, covering all surfaces of your hands and rubbing them together until they feel dry  Soap and water are the best option if hands are visibly dirty   Avoid touching your eyes, nose, and mouth with unwashed hands  Avoid sharing personal household items    You should not share dishes, drinking glasses, cups, eating utensils, towels, or bedding with other people or pets in your home  After using these items, they should be washed thoroughly with soap and water  Clean all high-touch surfaces everyday    High touch surfaces include counters, tabletops, doorknobs, bathroom fixtures, toilets, phones, keyboards, tablets, and bedside tables  Also, clean any surfaces that may have blood, stool, or body fluids on them  Use a household cleaning spray or wipe, according to the label instructions  Labels contain instructions for safe and effective use of the cleaning product including precautions you should take when applying the product, such as wearing gloves and making sure you have good ventilation during use of the product  Monitor your symptoms    Seek prompt medical attention if your illness is worsening (e g , difficulty breathing)  Before seeking care, call your healthcare provider and tell them that you have, or are being evaluated for, COVID-19  Put on a facemask before you enter the facility  These steps will help the healthcare providers office to keep other people in the office or waiting room from getting infected or exposed  Ask your healthcare provider to call the local or [facility] health department  Persons who are placed under active monitoring or facilitated self-monitoring should follow instructions provided by their local health department or occupational health professionals, as appropriate  If you have a medical emergency and need to call 911, notify the dispatch personnel that you have, or are being evaluated for COVID-19  If possible, put on a facemask before emergency medical services arrive      Discontinuing home isolation    Patients with confirmed COVID-19 should remain under home isolation precautions until the following conditions are met:   - They have had no fever for at least 24 hours (that is one full day of no fever without the use medicine that reduces fevers)  AND  - other symptoms have improved (for example, when their cough or shortness of breath have improved)  AND  - If had mild or moderate illness, at least 10 days have passed since their symptoms first appeared or if severe illness (needed oxygen) or immunosuppressed, at least 20 days have passed since symptoms first appeared  Patients with confirmed COVID-19 should also notify close contacts (including their workplace) and ask that they self-quarantine  Currently, close contact is defined as being within 6 feet for 15 minutes or more from the period 24 hours starting 48 hours before symptom onset to the time at which the patient went into isolation  Close contacts of patients diagnosed with COVID-19 should be instructed by the patient to self-quarantine for 14 days from the last time of their last contact with the patient       Source: [employer]

## 2021-09-06 NOTE — TELEPHONE ENCOUNTER
Pt's wife requesting appt with [facility], as pt has been ill for approx 5 days  Review of schedule - all providers 100% booked for next 2 days  Advised pt be seen in [facility] for provider visit

## 2021-09-06 NOTE — PROGRESS NOTES
[address] [facility]        NAME: Nolvia Rodrigues is a 80 y o  male  : 1933    MRN: [id number]  DATE: 2021  TIME: 1:23 PM    Assessment and Plan   Cough [R05]  1  Cough  Novel Coronavirus (Covid-19),PCR [facility]   2  Sinobronchitis  amoxicillin-clavulanate (AUGMENTIN) 875-125 mg per tablet         Patient Instructions     Take antibiotics as prescribed  COVID swab collected today, test results pending  Check MyChart and call in 2-3 days for test results  Results may take up to 7-10 days  Quarantine guidelines discussed  OTC supplements/medications discussed  Follow-up with PCP in the next 1-2 days for re-evaluation  Go to the ED if any fevers, unable to stay hydrated, abdominal pain, chest pain, shortness of breath, wheezing, chest tightness, cough or cold symptoms, new or worsening symptoms or other concerning symptoms  Chief Complaint     Chief Complaint   Patient presents with    Cough     x 1 1/2 weeks          History of Present Illness        66-year-old male presents with cough, nasal congestion, runny nose, sinus pressure, postnasal drip times 5-6 days  States he has tried multiple over-the-counter medications with some relief  States a few days ago he did have a slight sore throat, states that has now resolved  Denies any recent travel, sick contacts or known COVID exposures  Denies any fevers, chest pain, chest tightness, shortness breath, GI/ symptoms or other complaints  Review of Systems   Review of Systems   Constitutional: Negative for activity change, appetite change, chills, fatigue and fever  HENT: Positive for congestion, postnasal drip, rhinorrhea, sinus pressure and sore throat (has now resolved)  Negative for ear pain, trouble swallowing and voice change  Eyes: Negative for itching and visual disturbance  Respiratory: Positive for cough  Negative for chest tightness, shortness of breath and wheezing      Cardiovascular: Negative for chest pain and leg swelling  Gastrointestinal: Negative for abdominal pain, diarrhea, nausea and vomiting  Musculoskeletal: Negative for back pain, joint swelling, neck pain and neck stiffness  Skin: Negative for rash  Neurological: Negative for dizziness, seizures, syncope, weakness, numbness and headaches  All other systems reviewed and are negative          Current Medications       Current Outpatient Medications:     amoxicillin-clavulanate (AUGMENTIN) 875-125 mg per tablet, Take 1 tablet by mouth every 12 (twelve) hours for 7 days, Disp: 14 tablet, Rfl: 0    aspirin 81 mg chewable tablet, Chew 81 mg daily, Disp: , Rfl:     aspirin 81 MG tablet, Take 1 tablet by mouth daily, Disp: , Rfl:     clopidogrel (PLAVIX) 75 mg tablet, TAKE 1 TABLET BY MOUTH  DAILY, Disp: 90 tablet, Rfl: 3    docusate sodium (COLACE) 100 mg capsule, Take 1 capsule (100 mg total) by mouth 2 (two) times a day, Disp: 10 capsule, Rfl: 0    fluocinonide (LIDEX) 0 05 % cream, APPLY TO AFFECTED AREA(S) FOUR TIMES A DAY (Patient not taking: Reported on 7/6/2021), Disp: , Rfl:     gabapentin (NEURONTIN) 300 mg capsule, TAKE 3 CAPSULES BY MOUTH AT BEDTIME (Patient not taking: Reported on 9/6/2021), Disp: 270 capsule, Rfl: 3    metoprolol succinate (TOPROL-XL) 25 mg 24 hr tablet, TAKE 1 TABLET BY MOUTH  DAILY, Disp: 90 tablet, Rfl: 3    midodrine (PROAMATINE) 5 mg tablet, Take 1 tablet (5 mg total) by mouth daily, Disp: 90 tablet, Rfl: 0    omeprazole (PriLOSEC) 40 MG capsule, TAKE 1 CAPSULE BY MOUTH  DAILY, Disp: 90 capsule, Rfl: 3    psyllium (METAMUCIL) 58 6 % packet, Take 1 packet by mouth daily, Disp: , Rfl:     sertraline (ZOLOFT) 50 mg tablet, TAKE 1 TABLET BY MOUTH  DAILY, Disp: 90 tablet, Rfl: 3    simvastatin (ZOCOR) 80 mg tablet, Take 1 tablet (80 mg total) by mouth daily, Disp: 90 tablet, Rfl: 3    Current Allergies     Allergies as of 09/06/2021 - Reviewed 09/06/2021   Allergen Reaction Noted    Aricept [donepezil] Confusion 08/20/2021    Atorvastatin Myalgia, Dizziness, and Headache 06/03/2013    Niacin Other (See Comments) 02/03/2015            The following portions of the patient's history were reviewed and updated as appropriate: allergies, current medications, past family history, past medical history, past social history, past surgical history and problem list      Past Medical History:   Diagnosis Date    Anxiety     Arthritis     Coronary artery disease     Coronary artery disease involving native coronary artery of native heart without angina pectoris     Depression     Fracture     Hypercholesterolemia     Meningioma ([facility] ) 11/1999    brain tumor    Meningioma ([facility] )     Narcolepsy     daytime drowsiness and dozing off occasionally    Orthostatic hypotension     Palpitations     Prostate CA ([facility] )     Prostate cancer ([facility] )     Stroke [facility])        Past Surgical History:   Procedure Laterality Date    BACK SURGERY      BRAIN SURGERY  11/08/1999    CORONARY ARTERY BYPASS GRAFT      x5    CORONARY ARTERY BYPASS GRAFT         Family History   Problem Relation Age of Onset    Hypertension Mother         benign essential    Cancer Mother     Osteoporosis Mother     Suicidality Father     Diabetes Family     Heart disease Family     Neuropathy Family         peripheral    Prostate cancer Family     Thyroid disease Family          Medications have been verified  Objective   Pulse 96   Temp 98 3 °F (36 8 °C)   Resp 18   SpO2 98%        Physical Exam     Physical Exam  Vitals and nursing note reviewed  Constitutional:       General: He is not in acute distress  Appearance: Normal appearance  He is well-developed  He is not toxic-appearing  HENT:      Right Ear: Tympanic membrane normal       Left Ear: Tympanic membrane normal       Mouth/Throat:      Mouth: Mucous membranes are moist       Pharynx: Oropharynx is clear  Uvula midline   No oropharyngeal exudate, posterior oropharyngeal erythema or uvula swelling  Comments: Mild PND visualized  Eyes:      Pupils: Pupils are equal, round, and reactive to light  Cardiovascular:      Rate and Rhythm: Normal rate and regular rhythm  Heart sounds: Normal heart sounds  Pulmonary:      Effort: Pulmonary effort is normal  No respiratory distress  Breath sounds: Normal breath sounds  No wheezing  Musculoskeletal:      Cervical back: Normal range of motion and neck supple  No rigidity  Skin:     Capillary Refill: Capillary refill takes less than 2 seconds  Neurological:      Mental Status: He is alert and oriented to person, place, and time     Psychiatric:         Behavior: Behavior normal

## 2021-09-06 NOTE — TELEPHONE ENCOUNTER
Regarding: Cough/Appointment Request  ----- Message from Nathen Reyes sent at 9/6/2021  7:53 AM EDT -----  " He has had a cough and he is spitting up green phlegm, I would like to schedule an appointment for him tomorrow "

## 2021-09-06 NOTE — TELEPHONE ENCOUNTER
Reason for Disposition   Cough with cold symptoms (e g , runny nose, postnasal drip, throat clearing)    Answer Assessment - Initial Assessment Questions  1  ONSET: "When did the cough begin?"       5 days  2  SEVERITY: "How bad is the cough today?"       Pt able to sleep through the night  Cough worse in am     3  SPUTUM: "Describe the color of your sputum" (none, dry cough; clear, white, yellow, green)      Sputum has changed from green to white  4  HEMOPTYSIS: "Are you coughing up any blood?" If so ask: "How much?" (flecks, streaks, tablespoons, etc )      Denies  5  DIFFICULTY BREATHING: "Are you having difficulty breathing?" If Yes, ask: "How bad is it?" (e g , mild, moderate, severe)     - MILD: No SOB at rest, mild SOB with walking, speaks normally in sentences, can lay down, no retractions, pulse < 100      - MODERATE: SOB at rest, SOB with minimal exertion and prefers to sit, cannot lie down flat, speaks in phrases, mild retractions, audible wheezing, pulse 100-120      - SEVERE: Very SOB at rest, speaks in single words, struggling to breathe, sitting hunched forward, retractions, pulse > 120       Unchanged  6  FEVER: "Do you have a fever?" If Yes, ask: "What is your temperature, how was it measured, and when did it start?"      Denies  7  CARDIAC HISTORY: "Do you have any history of heart disease?" (e g , heart attack, congestive heart failure)       Yes, followed by cardiology  8  LUNG HISTORY: "Do you have any history of lung disease?"  (e g , pulmonary embolus, asthma, emphysema)      Denies  9  PE RISK FACTORS: "Do you have a history of blood clots?" (or: recent major surgery, recent prolonged travel, bedridden)      Denies  10  OTHER SYMPTOMS: "Do you have any other symptoms?" (e g , runny nose, wheezing, chest pain)        Fatigue, sore throat has now resolved      Protocols used: COUGH - ACUTE PRODUCTIVE-ADULT-AH

## 2021-09-07 LAB — SARS-COV-2 RNA RESP QL NAA+PROBE: NEGATIVE

## 2021-09-13 ENCOUNTER — APPOINTMENT (OUTPATIENT)
Dept: LAB | Facility: CLINIC | Age: 86
End: 2021-09-13
Payer: MEDICARE

## 2021-09-27 ENCOUNTER — OFFICE VISIT (OUTPATIENT)
Dept: FAMILY MEDICINE CLINIC | Facility: CLINIC | Age: 86
End: 2021-09-27
Payer: MEDICARE

## 2021-09-27 VITALS
DIASTOLIC BLOOD PRESSURE: 70 MMHG | HEART RATE: 77 BPM | BODY MASS INDEX: 23.57 KG/M2 | WEIGHT: 159.13 LBS | TEMPERATURE: 98 F | OXYGEN SATURATION: 96 % | HEIGHT: 69 IN | SYSTOLIC BLOOD PRESSURE: 150 MMHG | RESPIRATION RATE: 16 BRPM

## 2021-09-27 DIAGNOSIS — R13.10 DYSPHAGIA, UNSPECIFIED TYPE: Primary | ICD-10-CM

## 2021-09-27 PROCEDURE — 99213 OFFICE O/P EST LOW 20 MIN: CPT | Performed by: FAMILY MEDICINE

## 2021-09-27 NOTE — PROGRESS NOTES
FAMILY PRACTICE OFFICE VISIT       NAME: Jhonny Hart  AGE: 80 y o  SEX: male       : 1933        MRN: [id number]    DATE: 2021  TIME: 8:55 AM    Assessment and Plan     Problem List Items Addressed This Visit        Digestive    Dysphagia - Primary     Dysphagia  Patient given prescription to obtain barium video swallow for further evaluation for symptoms  We will make further recommendations pending results of test          Relevant Orders    FL barium swallow video w speech              Chief Complaint     Chief Complaint   Patient presents with    Difficulty Swallowing     x long time        History of Present Illness     Patient the office with several weeks to month complaint of having difficulties with swallowing foods  His wife states that he does not drink water or Ensure or very much with his meals  Thick times patient has some coughing spells and feels food gets stuck in his esophagus  He denies any regurgitation or vomiting  Review of Systems   Review of Systems   Constitutional: Positive for unexpected weight change  Respiratory: Negative  Cardiovascular: Negative      Gastrointestinal:        As per HPI       Active Problem List     Patient Active Problem List   Diagnosis    Constipation    Iron deficiency    Other constipation    Anemia    Arteriosclerotic cardiovascular disease    Backache    Essential hypertension    Cervical spinal stenosis    Depression    Esophageal reflux    Hyperlipidemia    Insomnia    Spinal stenosis of lumbar region with neurogenic claudication    Vitamin B12 deficiency    Idiopathic peripheral neuropathy    Iron deficiency anemia    History of meningioma    Cognitive decline    Contusion of rib on right side    Orthostatic hypotension    Convulsions ([address]    Lumbar spondylosis    Lumbar radiculopathy    Acute CVA (cerebrovascular accident), suspected embolic in nature    Hypercalcemia    History of resection of meningioma    History of stroke    Depression    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) ([facility] )    Calcification of aorta ([place] [address] )    Need for influenza vaccination    Dementia without behavioral disturbance ([place] [address] )    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[facility] )    Dysphagia       Past Medical History:  Past Medical History:   Diagnosis Date    Anxiety     Arthritis     Coronary artery disease     Coronary artery disease involving native coronary artery of native heart without angina pectoris     Depression     Fracture     Hypercholesterolemia     Meningioma ([facility] ) 1999    brain tumor    Meningioma ([place] [address] )     Narcolepsy     daytime drowsiness and dozing off occasionally    Orthostatic hypotension     Palpitations     Prostate CA ([facility] )     Prostate cancer ([place] [address] )     Stroke [facility])        Past Surgical History:  Past Surgical History:   Procedure Laterality Date    BACK SURGERY      BRAIN SURGERY  1999    CORONARY ARTERY BYPASS GRAFT      x5    CORONARY ARTERY BYPASS GRAFT         Family History:  Family History   Problem Relation Age of Onset    Hypertension Mother         benign essential    Cancer Mother     Osteoporosis Mother     Suicidality Father     Diabetes Family     Heart disease Family     Neuropathy Family         peripheral    Prostate cancer Family     Thyroid disease Family        Social History:  Social History     Socioeconomic History    Marital status: /Civil Union     Spouse name: Not on file    Number of children: Not on file    Years of education: Not on file    Highest education level: Not on file   Occupational History    Occupation: retired   Tobacco Use    Smoking status: Former Smoker     Types: Cigarettes     Quit date: 1995     Years since quittin 7    Smokeless tobacco: Never Used    Tobacco comment: former cig smoker- quit in    Vaping Use    Vaping Use: Never used   Substance and Sexual Activity    Alcohol use: Not Currently     Comment: (history)    Drug use: No    Sexual activity: Not on file   Other Topics Concern    Not on file   Social History Narrative    ** Merged History Encounter **         Pt lives with wife     Social Determinants of Health     Financial Resource Strain:     Difficulty of Paying Living Expenses:    Food Insecurity:     Worried About Running Out of Food in the Last Year:     [address] in the Last Year:    Transportation Needs:     Lack of Transportation (Medical):  Lack of Transportation (Non-Medical):    Physical Activity:     Days of Exercise per Week:     Minutes of Exercise per Session:    Stress:     Feeling of Stress :    Social Connections:     Frequency of Communication with Friends and Family:     Frequency of Social Gatherings with Friends and Family:     Attends Mu-[employer] Services:     Active Member of Clubs or Organizations:     Attends Club or Organization Meetings:     Marital Status:    Intimate Partner Violence:     Fear of Current or Ex-Partner:     Emotionally Abused:     Physically Abused:     Sexually Abused:        Objective     Vitals:    09/27/21 1413   BP: 150/70   Pulse: 77   Resp: 16   Temp: 98 °F (36 7 °C)   SpO2: 96%     Wt Readings from Last 3 Encounters:   09/27/21 72 2 kg (159 lb 2 oz)   08/20/21 73 6 kg (162 lb 3 2 oz)   07/06/21 74 4 kg (164 lb)       Physical Exam  Constitutional:       General: He is not in acute distress  Appearance: Normal appearance  He is not ill-appearing  Cardiovascular:      Rate and Rhythm: Normal rate and regular rhythm  Heart sounds: Normal heart sounds  No murmur heard  Pulmonary:      Effort: Pulmonary effort is normal  No respiratory distress  Breath sounds: Normal breath sounds  No wheezing, rhonchi or rales  Neurological:      Mental Status: He is alert           Pertinent Laboratory/Diagnostic Studies:  Lab Results   Component Value Date    GLUCOSE 114 05/14/2020 BUN 9 09/13/2021    CREATININE 0 97 09/13/2021    CALCIUM 10 5 (H) 08/19/2021     05/08/2015    K 4 0 08/19/2021    CO2 27 08/19/2021     08/19/2021     Lab Results   Component Value Date    ALT 23 08/19/2021    AST 19 08/19/2021    ALKPHOS 83 08/19/2021    BILITOT 0 47 06/09/2015       Lab Results   Component Value Date    WBC 6 45 09/13/2021    HGB 13 9 09/13/2021    HCT 42 7 09/13/2021    MCV 86 09/13/2021     09/13/2021       No results found for: TSH    Lab Results   Component Value Date    CHOL 215 08/20/2014     Lab Results   Component Value Date    TRIG 128 12/02/2020     Lab Results   Component Value Date    HDL 52 12/02/2020     Lab Results   Component Value Date    LDLCALC 91 12/02/2020     Lab Results   Component Value Date    HGBA1C 6 0 11/08/2019       Results for orders placed or performed in visit on 09/13/21   CBC and differential   Result Value Ref Range    WBC 6 45 4 31 - 10 16 Thousand/uL    RBC 4 94 3 88 - 5 62 Million/uL    Hemoglobin 13 9 12 0 - 17 0 g/dL    Hematocrit 42 7 36 5 - 49 3 %    MCV 86 82 - 98 fL    MCH 28 1 26 8 - 34 3 pg    MCHC 32 6 31 4 - 37 4 g/dL    RDW 13 2 11 6 - 15 1 %    MPV 11 1 8 9 - 12 7 fL    Platelets 209 742 - 094 Thousands/uL    nRBC 0 /100 WBCs    Neutrophils Relative 55 43 - 75 %    Immat GRANS % 1 0 - 2 %    Lymphocytes Relative 34 14 - 44 %    Monocytes Relative 7 4 - 12 %    Eosinophils Relative 2 0 - 6 %    Basophils Relative 1 0 - 1 %    Neutrophils Absolute 3 62 1 85 - 7 62 Thousands/µL    Immature Grans Absolute 0 03 0 00 - 0 20 Thousand/uL    Lymphocytes Absolute 2 20 0 60 - 4 47 Thousands/µL    Monocytes Absolute 0 44 0 17 - 1 22 Thousand/µL    Eosinophils Absolute 0 13 0 00 - 0 61 Thousand/µL    Basophils Absolute 0 03 0 00 - 0 10 Thousands/µL   BUN   Result Value Ref Range    BUN 9 5 - 25 mg/dL   Creatinine, serum   Result Value Ref Range    Creatinine 0 97 0 60 - 1 30 mg/dL    eGFR 69 ml/min/1 73sq m   PSA Total, Diagnostic   Result Value Ref Range    PSA, Diagnostic 0 2 0 0 - 4 0 ng/mL       Orders Placed This Encounter   Procedures    FL barium swallow video w speech       ALLERGIES:  Allergies   Allergen Reactions    Aricept [Donepezil] Confusion     confusion    Atorvastatin Myalgia, Dizziness and Headache    Niacin Other (See Comments)     unknown       Current Medications     Current Outpatient Medications   Medication Sig Dispense Refill    clopidogrel (PLAVIX) 75 mg tablet TAKE 1 TABLET BY MOUTH  DAILY 90 tablet 3    docusate sodium (COLACE) 100 mg capsule Take 1 capsule (100 mg total) by mouth 2 (two) times a day 10 capsule 0    metoprolol succinate (TOPROL-XL) 25 mg 24 hr tablet TAKE 1 TABLET BY MOUTH  DAILY 90 tablet 3    midodrine (PROAMATINE) 5 mg tablet Take 1 tablet (5 mg total) by mouth daily 90 tablet 0    omeprazole (PriLOSEC) 40 MG capsule TAKE 1 CAPSULE BY MOUTH  DAILY 90 capsule 3    psyllium (METAMUCIL) 58 6 % packet Take 1 packet by mouth daily      sertraline (ZOLOFT) 50 mg tablet TAKE 1 TABLET BY MOUTH  DAILY 90 tablet 3    simvastatin (ZOCOR) 80 mg tablet Take 1 tablet (80 mg total) by mouth daily 90 tablet 3    aspirin 81 mg chewable tablet Chew 81 mg daily (Patient not taking: Reported on 9/27/2021)      aspirin 81 MG tablet Take 1 tablet by mouth daily (Patient not taking: Reported on 9/27/2021)      fluocinonide (LIDEX) 0 05 % cream APPLY TO AFFECTED AREA(S) FOUR TIMES A DAY (Patient not taking: Reported on 7/6/2021)      gabapentin (NEURONTIN) 300 mg capsule TAKE 3 CAPSULES BY MOUTH AT BEDTIME (Patient not taking: Reported on 9/6/2021) 270 capsule 3     No current facility-administered medications for this visit           Health Maintenance     Health Maintenance   Topic Date Due    SLP PLAN OF CARE  Never done    Falls: Plan of Care  Never done    Influenza Vaccine (1) 09/01/2021    Fall Risk  08/20/2022    Medicare Annual Wellness Visit (AWV)  08/20/2022    Depression Remission PHQ 08/20/2022    BMI: Adult  09/27/2022    DTaP,Tdap,and Td Vaccines (3 - Td or Tdap) 05/14/2030    Pneumococcal Vaccine: 65+ Years  Completed    COVID-19 Vaccine  Completed    HIB Vaccine  Aged Out    Hepatitis B Vaccine  Aged Out    IPV Vaccine  Aged Out    Hepatitis A Vaccine  Aged Out    Meningococcal ACWY Vaccine  Aged Out    HPV Vaccine  Aged Out     Immunization History   Administered Date(s) Administered    Influenza Split High Dose Preservative Free IM 11/10/2014, 01/12/2016, 11/22/2016, 11/08/2017    Influenza, high dose seasonal 0 7 mL 11/14/2018, 10/29/2019, 11/18/2020    Influenza, seasonal, injectable 10/01/2007, 11/01/2009, 11/02/2009, 11/10/2010, 12/17/2012    Pneumococcal Conjugate 13-Valent 01/12/2016    Pneumococcal Polysaccharide PPV23 10/01/2007    SARS-CoV-2 / COVID-19 mRNA IM ([facility]) 01/12/2021, 02/09/2021    Tdap 08/01/2018, 33/50/4426       Jaylyn Leyva MD

## 2021-09-28 NOTE — ASSESSMENT & PLAN NOTE
Dysphagia  Patient given prescription to obtain barium video swallow for further evaluation for symptoms    We will make further recommendations pending results of test

## 2021-10-05 ENCOUNTER — HOSPITAL ENCOUNTER (OUTPATIENT)
Dept: RADIOLOGY | Facility: HOSPITAL | Age: 86
Discharge: HOME | End: 2021-10-05
Payer: MEDICARE

## 2021-10-05 DIAGNOSIS — R13.10 DYSPHAGIA, UNSPECIFIED TYPE: ICD-10-CM

## 2021-10-05 PROCEDURE — 74230 X-RAY XM SWLNG FUNCJ C+: CPT

## 2021-10-05 PROCEDURE — 92611 MOTION FLUOROSCOPY/SWALLOW: CPT

## 2021-10-27 ENCOUNTER — TELEPHONE (OUTPATIENT)
Dept: OTHER | Facility: OTHER | Age: 86
End: 2021-10-27

## 2021-10-27 ENCOUNTER — IMMUNIZATIONS (OUTPATIENT)
Dept: FAMILY MEDICINE CLINIC | Facility: CLINIC | Age: 86
End: 2021-10-27
Payer: MEDICARE

## 2021-10-27 VITALS — TEMPERATURE: 97.3 F

## 2021-10-27 DIAGNOSIS — Z23 INFLUENZA VACCINE NEEDED: Primary | ICD-10-CM

## 2021-10-27 PROCEDURE — G0008 ADMIN INFLUENZA VIRUS VAC: HCPCS | Performed by: FAMILY MEDICINE

## 2021-10-27 PROCEDURE — 90662 IIV NO PRSV INCREASED AG IM: CPT | Performed by: FAMILY MEDICINE

## 2021-11-30 ENCOUNTER — REMOTE DEVICE CLINIC VISIT (OUTPATIENT)
Dept: CARDIOLOGY CLINIC | Facility: CLINIC | Age: 86
End: 2021-11-30
Payer: MEDICARE

## 2021-11-30 DIAGNOSIS — Z95.818 PRESENCE OF OTHER CARDIAC IMPLANTS AND GRAFTS: Primary | ICD-10-CM

## 2021-11-30 PROCEDURE — G2066 INTER DEVC REMOTE 30D: HCPCS | Performed by: INTERNAL MEDICINE

## 2021-11-30 PROCEDURE — 93298 REM INTERROG DEV EVAL SCRMS: CPT | Performed by: INTERNAL MEDICINE

## 2021-12-09 ENCOUNTER — APPOINTMENT (OUTPATIENT)
Dept: LAB | Facility: CLINIC | Age: 86
End: 2021-12-09
Payer: MEDICARE

## 2021-12-09 ENCOUNTER — TRANSCRIBE ORDERS (OUTPATIENT)
Dept: LAB | Facility: CLINIC | Age: 86
End: 2021-12-09

## 2021-12-09 DIAGNOSIS — N13.8 ENLARGED PROSTATE WITH URINARY OBSTRUCTION: Primary | ICD-10-CM

## 2021-12-09 DIAGNOSIS — N13.8 ENLARGED PROSTATE WITH URINARY OBSTRUCTION: ICD-10-CM

## 2021-12-09 DIAGNOSIS — N40.1 ENLARGED PROSTATE WITH URINARY OBSTRUCTION: Primary | ICD-10-CM

## 2021-12-09 DIAGNOSIS — N40.1 ENLARGED PROSTATE WITH URINARY OBSTRUCTION: ICD-10-CM

## 2021-12-09 LAB — PSA SERPL-MCNC: 0.2 NG/ML (ref 0–4)

## 2021-12-09 PROCEDURE — 36415 COLL VENOUS BLD VENIPUNCTURE: CPT

## 2021-12-09 PROCEDURE — G0103 PSA SCREENING: HCPCS

## 2021-12-10 ENCOUNTER — TELEPHONE (OUTPATIENT)
Dept: FAMILY MEDICINE CLINIC | Facility: CLINIC | Age: 86
End: 2021-12-10

## 2021-12-10 ENCOUNTER — CLINICAL SUPPORT (OUTPATIENT)
Dept: FAMILY MEDICINE CLINIC | Facility: CLINIC | Age: 86
End: 2021-12-10
Payer: MEDICARE

## 2021-12-10 DIAGNOSIS — R41.89 COGNITIVE DECLINE: ICD-10-CM

## 2021-12-10 DIAGNOSIS — N39.0 URINARY TRACT INFECTION WITHOUT HEMATURIA, SITE UNSPECIFIED: Primary | ICD-10-CM

## 2021-12-10 DIAGNOSIS — D50.9 IRON DEFICIENCY ANEMIA, UNSPECIFIED IRON DEFICIENCY ANEMIA TYPE: ICD-10-CM

## 2021-12-10 DIAGNOSIS — F03.90 DEMENTIA WITHOUT BEHAVIORAL DISTURBANCE, UNSPECIFIED DEMENTIA TYPE (HCC): ICD-10-CM

## 2021-12-10 LAB
SL AMB  POCT GLUCOSE, UA: NEGATIVE
SL AMB LEUKOCYTE ESTERASE,UA: NEGATIVE
SL AMB POCT BILIRUBIN,UA: NEGATIVE
SL AMB POCT BLOOD,UA: NEGATIVE
SL AMB POCT CLARITY,UA: NORMAL
SL AMB POCT COLOR,UA: NORMAL
SL AMB POCT KETONES,UA: NEGATIVE
SL AMB POCT NITRITE,UA: NEGATIVE
SL AMB POCT PH,UA: 7.5
SL AMB POCT SPECIFIC GRAVITY,UA: 1
SL AMB POCT URINE PROTEIN: NEGATIVE
SL AMB POCT UROBILINOGEN: 0.2

## 2021-12-10 PROCEDURE — 81003 URINALYSIS AUTO W/O SCOPE: CPT

## 2021-12-10 PROCEDURE — 99211 OFF/OP EST MAY X REQ PHY/QHP: CPT

## 2021-12-10 PROCEDURE — 87086 URINE CULTURE/COLONY COUNT: CPT | Performed by: FAMILY MEDICINE

## 2021-12-12 LAB — BACTERIA UR CULT: NORMAL

## 2021-12-13 ENCOUNTER — TELEPHONE (OUTPATIENT)
Dept: FAMILY MEDICINE CLINIC | Facility: CLINIC | Age: 86
End: 2021-12-13

## 2021-12-22 DIAGNOSIS — I63.9 ACUTE CVA (CEREBROVASCULAR ACCIDENT) (HCC): ICD-10-CM

## 2021-12-23 RX ORDER — CLOPIDOGREL BISULFATE 75 MG/1
TABLET ORAL
Qty: 90 TABLET | Refills: 3 | Status: SHIPPED | OUTPATIENT
Start: 2021-12-23 | End: 2022-01-12 | Stop reason: SDUPTHER

## 2022-01-05 NOTE — PROGRESS NOTES
Cardiology Follow Up    Niurka Gardiner  1933  [id number]  [facility] [place], [place] [facility] NATALIE [address] [address]  [phone]    1  Essential (primary) hypertension  POCT ECG   2  Pure hypercholesterolemia  POCT ECG   3  Coronary arteriosclerosis  POCT ECG   4  Abnormal EKG  POCT ECG       Interval History: Patient is here for a follow-up visit  He had CABG  Abram Lee does have intermittent issues with orthostatic hypotension   He is on midodrine   Echocardiogram done 11/19 demonstrated an LVEF of 50% with a basal inferior WMA noted   There was no significant valvular heart disease except fibrocalcific disease of the AV and no significant change compared to a prior study done May 2015  J Luis Guerra has a ILR since 2/18 in reference to syncope   Recent interrogation 11/2021 showed no evidence of prognostically important arrhythmia  AFib could not be excluded but the burden was 0%  Patient has had no chest pain or significant dyspnea  EKG today demonstrates sinus rhythm with right bundle branch block with no significant change compared to a prior tracing done November 7, 2019  Patient is well  He has had no chest pain or significant dyspnea  His vital signs are stable today  His wife is here today  He has had some issues with confusion         Patient Active Problem List   Diagnosis    Constipation    Iron deficiency    Other constipation    Anemia    Arteriosclerotic cardiovascular disease    Backache    Essential hypertension    Cervical spinal stenosis    Depression    Esophageal reflux    Hyperlipidemia    Insomnia    Spinal stenosis of lumbar region with neurogenic claudication    Vitamin B12 deficiency    Idiopathic peripheral neuropathy    Iron deficiency anemia    History of meningioma    Cognitive decline    Contusion of rib on right side    Orthostatic hypotension    Convulsions ([address]    Lumbar spondylosis    Lumbar radiculopathy    Acute CVA (cerebrovascular accident), suspected embolic in nature    Hypercalcemia    History of resection of meningioma    History of stroke    Depression    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) ([place] [address] )    Calcification of aorta ([facility] )    Need for influenza vaccination    Dementia without behavioral disturbance ([facility] )    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[place] [address] )    Dysphagia     Past Medical History:   Diagnosis Date    Anxiety     Arthritis     Coronary artery disease     Coronary artery disease involving native coronary artery of native heart without angina pectoris     Depression     Fracture     Hypercholesterolemia     Meningioma ([facility] ) 1999    brain tumor    Meningioma (HCC)     Narcolepsy     daytime drowsiness and dozing off occasionally    Orthostatic hypotension     Palpitations     Prostate CA ([place] [address] )     Prostate cancer ([place] [address] )     Stroke ([place] [address] )      Social History     Socioeconomic History    Marital status: /Civil Union     Spouse name: Not on file    Number of children: Not on file    Years of education: Not on file    Highest education level: Not on file   Occupational History    Occupation: retired   Tobacco Use    Smoking status: Former Smoker     Types: Cigarettes     Quit date: 1995     Years since quittin 0    Smokeless tobacco: Never Used    Tobacco comment: former cig smoker- quit in    Vaping Use    Vaping Use: Never used   Substance and Sexual Activity    Alcohol use: Not Currently     Comment: (history)    Drug use: No    Sexual activity: Not on file   Other Topics Concern    Not on file   Social History Narrative    ** Merged History Encounter **         Pt lives with wife     Social Determinants of Health     Financial Resource Strain: Not on file   Food Insecurity: Not on file   Transportation Needs: Not on file   Physical Activity: Not on file   Stress: Not on file   Social Connections: Not on file   Intimate Partner Violence: Not on file   Housing Stability: Not on file      Family History   Problem Relation Age of Onset    Hypertension Mother         benign essential    Cancer Mother     Osteoporosis Mother     Suicidality Father     Diabetes Family     Heart disease Family     Neuropathy Family         peripheral    Prostate cancer Family     Thyroid disease Family      Past Surgical History:   Procedure Laterality Date    BACK SURGERY      BRAIN SURGERY  11/08/1999    CORONARY ARTERY BYPASS GRAFT      x5    CORONARY ARTERY BYPASS GRAFT         Current Outpatient Medications:     clopidogrel (PLAVIX) 75 mg tablet, Take 1 tablet (75 mg total) by mouth daily, Disp: 100 tablet, Rfl: 3    docusate sodium (COLACE) 100 mg capsule, Take 1 capsule (100 mg total) by mouth 2 (two) times a day, Disp: 10 capsule, Rfl: 0    Melatonin 5 MG CAPS, Take 5 mg by mouth daily at bedtime, Disp: , Rfl:     metoprolol succinate (TOPROL-XL) 25 mg 24 hr tablet, TAKE 1 TABLET BY MOUTH  DAILY, Disp: 90 tablet, Rfl: 3    midodrine (PROAMATINE) 5 mg tablet, Take 1 tablet (5 mg total) by mouth daily, Disp: 90 tablet, Rfl: 0    omeprazole (PriLOSEC) 40 MG capsule, Take 1 capsule (40 mg total) by mouth daily, Disp: 100 capsule, Rfl: 3    psyllium (METAMUCIL) 58 6 % packet, Take 1 packet by mouth daily, Disp: , Rfl:     sertraline (ZOLOFT) 50 mg tablet, Take 1 tablet (50 mg total) by mouth daily, Disp: 100 tablet, Rfl: 3    simvastatin (ZOCOR) 80 mg tablet, Take 1 tablet (80 mg total) by mouth daily, Disp: 90 tablet, Rfl: 3    aspirin 81 mg chewable tablet, Chew 81 mg daily (Patient not taking: Reported on 9/27/2021), Disp: , Rfl:     aspirin 81 MG tablet, Take 1 tablet by mouth daily (Patient not taking: Reported on 9/27/2021), Disp: , Rfl:     fluocinonide (LIDEX) 0 05 % cream, APPLY TO AFFECTED AREA(S) FOUR TIMES A DAY (Patient not taking: Reported on 7/6/2021), Disp: , Rfl:     gabapentin (NEURONTIN) 300 mg capsule, TAKE 3 CAPSULES BY MOUTH AT BEDTIME (Patient not taking: Reported on 9/6/2021), Disp: 270 capsule, Rfl: 3  Allergies   Allergen Reactions    Aricept [Donepezil] Confusion     confusion    Atorvastatin Myalgia, Dizziness and Headache    Niacin Other (See Comments)     unknown       Labs:not applicable  Imaging: No results found  Review of Systems:  Review of Systems   All other systems reviewed and are negative  Physical Exam:  /82 (BP Location: Right arm, Patient Position: Sitting, Cuff Size: Adult)   Pulse 68   Ht 5' 9" (1 753 m)   Wt 71 2 kg (157 lb)   BMI 23 18 kg/m²   Physical Exam  Vitals reviewed  Constitutional:       Appearance: He is well-developed  HENT:      Head: Normocephalic and atraumatic  Eyes:      Conjunctiva/sclera: Conjunctivae normal       Pupils: Pupils are equal, round, and reactive to light  Cardiovascular:      Rate and Rhythm: Normal rate  Heart sounds: Normal heart sounds  Pulmonary:      Effort: Pulmonary effort is normal       Breath sounds: Normal breath sounds  Musculoskeletal:      Cervical back: Normal range of motion and neck supple  Skin:     General: Skin is warm and dry  Neurological:      Mental Status: He is alert and oriented to person, place, and time  Discussion/Summary:I will continue the patient's present medical regimen  Patient appears well compensated  I have asked the patient to call if there is a problem in the interim otherwise I will see the patient in 6 months time

## 2022-01-13 ENCOUNTER — OFFICE VISIT (OUTPATIENT)
Dept: CARDIOLOGY CLINIC | Facility: CLINIC | Age: 87
End: 2022-01-13
Payer: COMMERCIAL

## 2022-01-13 VITALS
HEIGHT: 69 IN | BODY MASS INDEX: 23.25 KG/M2 | DIASTOLIC BLOOD PRESSURE: 82 MMHG | SYSTOLIC BLOOD PRESSURE: 124 MMHG | WEIGHT: 157 LBS | HEART RATE: 68 BPM

## 2022-01-13 DIAGNOSIS — I10 ESSENTIAL (PRIMARY) HYPERTENSION: ICD-10-CM

## 2022-01-13 DIAGNOSIS — E78.00 PURE HYPERCHOLESTEROLEMIA: ICD-10-CM

## 2022-01-13 DIAGNOSIS — R94.31 ABNORMAL EKG: ICD-10-CM

## 2022-01-13 DIAGNOSIS — I25.10 CORONARY ARTERIOSCLEROSIS: ICD-10-CM

## 2022-01-13 DIAGNOSIS — I10 ESSENTIAL (PRIMARY) HYPERTENSION: Primary | ICD-10-CM

## 2022-01-13 DIAGNOSIS — I95.1 ORTHOSTATIC HYPOTENSION: ICD-10-CM

## 2022-01-13 PROCEDURE — 93000 ELECTROCARDIOGRAM COMPLETE: CPT | Performed by: INTERNAL MEDICINE

## 2022-01-13 PROCEDURE — 99214 OFFICE O/P EST MOD 30 MIN: CPT | Performed by: INTERNAL MEDICINE

## 2022-01-13 PROCEDURE — 1160F RVW MEDS BY RX/DR IN RCRD: CPT | Performed by: INTERNAL MEDICINE

## 2022-01-13 RX ORDER — METOPROLOL SUCCINATE 25 MG/1
25 TABLET, EXTENDED RELEASE ORAL DAILY
Qty: 90 TABLET | Refills: 3 | Status: SHIPPED | OUTPATIENT
Start: 2022-01-13 | End: 2022-07-14

## 2022-01-13 RX ORDER — SIMVASTATIN 80 MG
80 TABLET ORAL DAILY
Qty: 90 TABLET | Refills: 3 | Status: SHIPPED | OUTPATIENT
Start: 2022-01-13 | End: 2022-07-14

## 2022-01-13 RX ORDER — MIDODRINE HYDROCHLORIDE 5 MG/1
5 TABLET ORAL DAILY
Qty: 90 TABLET | Refills: 3 | Status: SHIPPED | OUTPATIENT
Start: 2022-01-13 | End: 2022-07-14

## 2022-01-13 NOTE — TELEPHONE ENCOUNTER
[facility]    [facility]    Name:  Maria Dolores Campa   Age:  81 y.o.  Sex:  female  :  1940  MRN:  [id number]   Visit Number:  [id number]  Admission Date:  9/10/2021  Date Of Service:  21  Primary Care Physician:  Gregorio Jackson MD     LOS: 4 days :  Patient Care Team:  Gregorio Jackson MD as PCP - General (Internal Medicine):    Chief Complaint:      Tired.  Denies any fever or dysuria.    Subjective / Interval History:     She is sitting up having her breakfast.  Answering appropriately.    Review of Systems:   Review of Systems   Constitutional: Positive for fatigue. Negative for activity change, appetite change and fever.   HENT: Negative for congestion, ear discharge, ear pain and trouble swallowing.    Eyes: Negative for photophobia and visual disturbance.   Respiratory: Negative for cough and shortness of breath.    Cardiovascular: Negative for chest pain and palpitations.   Gastrointestinal: Negative for abdominal distention, abdominal pain, constipation, diarrhea, nausea and vomiting.   Endocrine: Negative.    Genitourinary: Negative for dysuria, hematuria and urgency.   Musculoskeletal: Positive for arthralgias. Negative for back pain, joint swelling and myalgias.   Skin: Negative for color change and rash.   Allergic/Immunologic: Negative.    Neurological: Negative for dizziness, weakness, light-headedness and headaches.   Hematological: Negative for adenopathy. Does not bruise/bleed easily.   Psychiatric/Behavioral: Positive for confusion, decreased concentration and sleep disturbance. Negative for agitation and dysphoric mood. The patient is not nervous/anxious.          Vital Signs:    Temp:  [97.7 °F (36.5 °C)-98.2 °F (36.8 °C)] 98 °F (36.7 °C)  Heart Rate:  [61-88] 80  Resp:  [16-20] 20  BP: (127-141)/(72-96) 131/84    Intake and output:      Intake/Output Summary (Last 24 hours) at 2021 0837  Last data filed at 2021  Requesting refills be sent to [facility] mail service due to new medication plan  1700  Gross per 24 hour   Intake 960 ml   Output --   Net 960 ml     No intake/output data recorded.    Physical Examination:  Physical Exam  Constitutional:       General: She is not in acute distress.     Appearance: She is well-developed.   HENT:      Nose: Nose normal.   Eyes:      General: No scleral icterus.     Conjunctiva/sclera: Conjunctivae normal.   Neck:      Thyroid: No thyromegaly.      Trachea: No tracheal deviation.   Cardiovascular:      Rate and Rhythm: Normal rate and regular rhythm.      Heart sounds: No murmur heard.   No friction rub.   Pulmonary:      Effort: No respiratory distress.      Breath sounds: No wheezing or rales.   Abdominal:      General: There is no distension.      Palpations: Abdomen is soft. There is no mass.      Tenderness: There is no abdominal tenderness. There is no guarding.   Musculoskeletal:         General: Deformity present. Normal range of motion.   Lymphadenopathy:      Cervical: No cervical adenopathy.   Skin:     General: Skin is warm and dry.      Findings: No erythema or rash.   Neurological:      Mental Status: She is alert and oriented to person, place, and time.      Cranial Nerves: No cranial nerve deficit.      Coordination: Coordination normal.      Deep Tendon Reflexes: Reflexes are normal and symmetric.   Psychiatric:         Behavior: Behavior normal.         Thought Content: Thought content normal.         Judgment: Judgment normal.           Laboratory results:  Results from last 7 days   Lab Units 09/12/21  0606 09/10/21  1910   SODIUM mmol/L 136 138   POTASSIUM mmol/L 4.1 5.1   CHLORIDE mmol/L 101 98   CO2 mmol/L 27.4 30.0*   BUN mg/dL 20 22   CREATININE mg/dL 0.67 0.75   CALCIUM mg/dL 8.9 10.3   ALBUMIN g/dL 3.20* 4.10   BILIRUBIN mg/dL <0.2 0.3   ALK PHOS U/L 96 122*   ALT (SGPT) U/L 15 11   AST (SGOT) U/L 90* 20   GLUCOSE mg/dL 89 101*     Estimated Creatinine Clearance: 39.5 mL/min (by C-G formula based on SCr of 0.67 mg/dL).  Results from last  7 days   Lab Units 09/10/21  1910   MAGNESIUM mg/dL 2.2         Results from last 7 days   Lab Units 09/12/21  0606 09/10/21  1910   WBC 10*3/mm3 4.50 6.16   HEMOGLOBIN g/dL 12.1 15.4   PLATELETS 10*3/mm3 177 223         Brief Urine Lab Results  (Last result in the past 365 days)      Color   Clarity   Blood   Leuk Est   Nitrite   Protein   CREAT   Urine HCG        09/10/21 1929 Yellow Clear Negative Moderate (2+) Negative Negative                 I have reviewed the patient's laboratory results.    Radiology results:    Imaging Results (Last 24 Hours)     ** No results found for the last 24 hours. **          I have reviewed the patient's radiology reports.    Medication Review:     I have reviewed the patients active and prn medications.       Assessment & Plan:    Urinary tract infection without hematuria currently asymptomatic on empiric antibiotic therapy for Aerococcus viridans which grew in a colony size of 50,000/mL.  Repeat urine analysis if okay would like to discontinue Vanco and put her on oral antibiotic if needed    Acute exacerbation of chronic obstructive pulmonary disease (COPD) (CMS/HCC) stable on inhalers no recent exacerbation    Multiple sclerosis (CMS/HCC)    Tobacco abuse    COPD (chronic obstructive pulmonary disease) (CMS/HCC)    Declining functional status    Trigeminal neuralgia of left side of face    Altered mental status appears more awake and alert.  Recent lab work okay.  CT with evidence of atrophy and chronic changes.    SVT (supraventricular tachycardia) (CMS/HCC) maintaining a sinus rhythm as noted on telemetry.  On amiodarone has been evaluated by cardiology    Chronic respiratory failure (CMS/[facility])    Underweight continue with dietary supplements, nutrition consult          Andrea Reyes MD  09/14/21  08:37 EDT

## 2022-03-01 ENCOUNTER — REMOTE DEVICE CLINIC VISIT (OUTPATIENT)
Dept: CARDIOLOGY CLINIC | Facility: CLINIC | Age: 87
End: 2022-03-01
Payer: COMMERCIAL

## 2022-03-01 DIAGNOSIS — Z95.818 PRESENCE OF OTHER CARDIAC IMPLANTS AND GRAFTS: Primary | ICD-10-CM

## 2022-03-01 PROCEDURE — G2066 INTER DEVC REMOTE 30D: HCPCS | Performed by: INTERNAL MEDICINE

## 2022-03-01 PROCEDURE — 93298 REM INTERROG DEV EVAL SCRMS: CPT | Performed by: INTERNAL MEDICINE

## 2022-03-01 NOTE — PROGRESS NOTES
Results for orders placed or performed in visit on 03/01/22   Cardiac EP device report    Narrative    MDT LOOP  CARELINK TRANSMISSION: BATTERY STATUS "OK " NO PATIENT OR DEVICE ACTIVATED EPISODES  NORMAL DEVICE FUNCTION   NC         Current Outpatient Medications:     aspirin 81 mg chewable tablet, Chew 81 mg daily (Patient not taking: Reported on 9/27/2021), Disp: , Rfl:     aspirin 81 MG tablet, Take 1 tablet by mouth daily (Patient not taking: Reported on 9/27/2021), Disp: , Rfl:     clopidogrel (PLAVIX) 75 mg tablet, Take 1 tablet (75 mg total) by mouth daily, Disp: 100 tablet, Rfl: 3    docusate sodium (COLACE) 100 mg capsule, Take 1 capsule (100 mg total) by mouth 2 (two) times a day, Disp: 10 capsule, Rfl: 0    fluocinonide (LIDEX) 0 05 % cream, APPLY TO AFFECTED AREA(S) FOUR TIMES A DAY (Patient not taking: Reported on 7/6/2021), Disp: , Rfl:     gabapentin (NEURONTIN) 300 mg capsule, TAKE 3 CAPSULES BY MOUTH AT BEDTIME (Patient not taking: Reported on 9/6/2021), Disp: 270 capsule, Rfl: 3    Melatonin 5 MG CAPS, Take 5 mg by mouth daily at bedtime, Disp: , Rfl:     metoprolol succinate (TOPROL-XL) 25 mg 24 hr tablet, Take 1 tablet (25 mg total) by mouth daily, Disp: 90 tablet, Rfl: 3    midodrine (PROAMATINE) 5 mg tablet, Take 1 tablet (5 mg total) by mouth daily, Disp: 90 tablet, Rfl: 3    omeprazole (PriLOSEC) 40 MG capsule, Take 1 capsule (40 mg total) by mouth daily, Disp: 100 capsule, Rfl: 3    psyllium (METAMUCIL) 58 6 % packet, Take 1 packet by mouth daily, Disp: , Rfl:     sertraline (ZOLOFT) 50 mg tablet, Take 1 tablet (50 mg total) by mouth daily, Disp: 100 tablet, Rfl: 3    simvastatin (ZOCOR) 80 mg tablet, Take 1 tablet (80 mg total) by mouth daily, Disp: 90 tablet, Rfl: 3

## 2022-03-08 ENCOUNTER — OFFICE VISIT (OUTPATIENT)
Dept: FAMILY MEDICINE CLINIC | Facility: CLINIC | Age: 87
End: 2022-03-08
Payer: COMMERCIAL

## 2022-03-08 VITALS
HEART RATE: 75 BPM | OXYGEN SATURATION: 96 % | DIASTOLIC BLOOD PRESSURE: 90 MMHG | BODY MASS INDEX: 23.31 KG/M2 | TEMPERATURE: 98 F | WEIGHT: 157.38 LBS | HEIGHT: 69 IN | SYSTOLIC BLOOD PRESSURE: 180 MMHG | RESPIRATION RATE: 16 BRPM

## 2022-03-08 DIAGNOSIS — I10 ESSENTIAL HYPERTENSION: Primary | ICD-10-CM

## 2022-03-08 DIAGNOSIS — R41.89 COGNITIVE DECLINE: ICD-10-CM

## 2022-03-08 PROCEDURE — 1036F TOBACCO NON-USER: CPT | Performed by: FAMILY MEDICINE

## 2022-03-08 PROCEDURE — 1160F RVW MEDS BY RX/DR IN RCRD: CPT | Performed by: FAMILY MEDICINE

## 2022-03-08 PROCEDURE — 1101F PT FALLS ASSESS-DOCD LE1/YR: CPT | Performed by: FAMILY MEDICINE

## 2022-03-08 PROCEDURE — 3288F FALL RISK ASSESSMENT DOCD: CPT | Performed by: FAMILY MEDICINE

## 2022-03-08 PROCEDURE — 99214 OFFICE O/P EST MOD 30 MIN: CPT | Performed by: FAMILY MEDICINE

## 2022-03-08 NOTE — PROGRESS NOTES
FAMILY PRACTICE OFFICE VISIT       NAME: Malena Frazier  AGE: 80 y o  SEX: male       : 1933        MRN: [id number]    DATE: 3/8/2022  TIME: 7:10 PM    Assessment and Plan     Problem List Items Addressed This Visit        Cardiovascular and Mediastinum    Essential hypertension - Primary     Hypertension  Patient blood pressure is stable at this time he will continue current regimen of medications            Other    Cognitive decline     Cognitive decline  Patient with no worsening of symptoms but continues to have memory lapses as discussed  I had a long discussion with the patient and his wife who reluctant to start a new medication  Since he does tolerate Zoloft it was recommended to increased to 75 mg daily to see if this would help with his mood swings although I explained this would probably not improve his lapses                   Chief Complaint     Chief Complaint   Patient presents with    Follow-up     confuse        History of Present Illness     Patient in the office accompanied by his wife  She states that patient continues to have short-term memory lapses whereby she has to remind him to rinse off his dentures after meals  At times he gets confused with the time of day in terms of going to his meals at the assisted living facility  There have been occasions where patient insisted that it was the wrong time to go to dinner and became agitated when having to take off his pajamas  There have been other occasions with patient awakens in the middle of the night somewhat confused with the time of day and has to be coaxed to return to bed  Patient had 1 episode last 2021 where he ventured out of the assisted-living facility and was found outdoors in a confused state  He and his wife relate this to starting Aricept medication prior to this episode      In the office today patient had mini-mental status exam of 26/30 which is not significantly changed from previous testing last year      Review of Systems   Review of Systems   Constitutional: Negative  Respiratory: Negative  Cardiovascular: Negative  Gastrointestinal: Negative  Psychiatric/Behavioral: Positive for agitation, behavioral problems, confusion, decreased concentration, dysphoric mood and sleep disturbance  Negative for hallucinations, self-injury and suicidal ideas  The patient is nervous/anxious  The patient is not hyperactive          Active Problem List     Patient Active Problem List   Diagnosis    Constipation    Iron deficiency    Other constipation    Anemia    Arteriosclerotic cardiovascular disease    Backache    Essential hypertension    Cervical spinal stenosis    Depression    Esophageal reflux    Hyperlipidemia    Insomnia    Spinal stenosis of lumbar region with neurogenic claudication    Vitamin B12 deficiency    Idiopathic peripheral neuropathy    Iron deficiency anemia    History of meningioma    Cognitive decline    Contusion of rib on right side    Orthostatic hypotension    Convulsions ([address] )    Lumbar spondylosis    Lumbar radiculopathy    Acute CVA (cerebrovascular accident), suspected embolic in nature    Hypercalcemia    History of resection of meningioma    History of stroke    Depression    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) ([address] )    Calcification of aorta ([address] )    Need for influenza vaccination    Dementia without behavioral disturbance ([address] )    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[address] )    Dysphagia       Past Medical History:  Past Medical History:   Diagnosis Date    Anxiety     Arthritis     Coronary artery disease     Coronary artery disease involving native coronary artery of native heart without angina pectoris     Depression     Fracture     Hypercholesterolemia     Meningioma ([address] ) 11/1999    brain tumor    Meningioma (HCC)     Narcolepsy     daytime drowsiness and dozing off occasionally    Orthostatic hypotension     Palpitations     Prostate CA (HCC)     [facility]     Stroke [facility])        Past Surgical History:  Past Surgical History:   Procedure Laterality Date    BACK SURGERY      BRAIN SURGERY  1999    CORONARY ARTERY BYPASS GRAFT      x5    CORONARY ARTERY BYPASS GRAFT         Family History:  Family History   Problem Relation Age of Onset    Hypertension Mother         benign essential    Cancer Mother     Osteoporosis Mother     Suicidality Father     Diabetes Family     Heart disease Family     Neuropathy Family         peripheral    Prostate cancer Family     Thyroid disease Family        Social History:  Social History     Socioeconomic History    Marital status: /Civil Union     Spouse name: Not on file    Number of children: Not on file    Years of education: Not on file    Highest education level: Not on file   Occupational History    Occupation: retired   Tobacco Use    Smoking status: Former Smoker     Types: Cigarettes     Quit date: 1995     Years since quittin 2    Smokeless tobacco: Never Used    Tobacco comment: former cig smoker- quit in    Vaping Use    Vaping Use: Never used   Substance and Sexual Activity    Alcohol use: Not Currently     Comment: (history)    Drug use: No    Sexual activity: Not Currently   Other Topics Concern    Not on file   Social History Narrative    ** Merged History Encounter **         Pt lives with wife     Social Determinants of Health     Financial Resource Strain: Not on file   Food Insecurity: Not on file   Transportation Needs: Not on file   Physical Activity: Not on file   Stress: Not on file   Social Connections: Not on file   Intimate Partner Violence: Not on file   Housing Stability: Not on file       Objective     Vitals:    22 1502   BP: (!) 180/90   Pulse: 75   Resp: 16   Temp: 98 °F (36 7 °C)   SpO2: 96%     Wt Readings from Last 3 Encounters:   22 71 4 kg (157 lb 6 oz) 01/13/22 71 2 kg (157 lb)   09/27/21 72 2 kg (159 lb 2 oz)       Physical Exam  Constitutional:       General: He is not in acute distress  Appearance: Normal appearance  He is not ill-appearing  HENT:      Head: Normocephalic and atraumatic  Eyes:      General:         Right eye: No discharge  Left eye: No discharge  Conjunctiva/sclera: Conjunctivae normal       Pupils: Pupils are equal, round, and reactive to light  Neck:      Vascular: No carotid bruit  Cardiovascular:      Rate and Rhythm: Normal rate and regular rhythm  Heart sounds: Normal heart sounds  No murmur heard  Pulmonary:      Effort: Pulmonary effort is normal       Breath sounds: Normal breath sounds  No wheezing, rhonchi or rales  Musculoskeletal:      Right lower leg: No edema  Left lower leg: No edema  Lymphadenopathy:      Cervical: No cervical adenopathy  Skin:     Findings: No rash  Neurological:      General: No focal deficit present  Mental Status: He is alert and oriented to person, place, and time  Cranial Nerves: No cranial nerve deficit  Psychiatric:         Mood and Affect: Mood normal          Behavior: Behavior normal          Thought Content:  Thought content normal          Judgment: Judgment normal          Pertinent Laboratory/Diagnostic Studies:  Lab Results   Component Value Date    GLUCOSE 114 05/14/2020    BUN 9 09/13/2021    CREATININE 0 97 09/13/2021    CALCIUM 10 5 (H) 08/19/2021     05/08/2015    K 4 0 08/19/2021    CO2 27 08/19/2021     08/19/2021     Lab Results   Component Value Date    ALT 23 08/19/2021    AST 19 08/19/2021    ALKPHOS 83 08/19/2021    BILITOT 0 47 06/09/2015       Lab Results   Component Value Date    WBC 6 45 09/13/2021    HGB 13 9 09/13/2021    HCT 42 7 09/13/2021    MCV 86 09/13/2021     09/13/2021       No results found for: TSH    Lab Results   Component Value Date    CHOL 215 08/20/2014     Lab Results   Component Value Date    TRIG 128 12/02/2020     Lab Results   Component Value Date    HDL 52 12/02/2020     Lab Results   Component Value Date    LDLCALC 91 12/02/2020     Lab Results   Component Value Date    HGBA1C 6 0 11/08/2019       Results for orders placed or performed in visit on 12/10/21   Urine culture    Specimen: Urine, Clean Catch   Result Value Ref Range    Urine Culture <10,000 cfu/ml     POCT urine dip auto non-scope   Result Value Ref Range     COLOR,UA Light yellow     CLARITY,UA Transparent     SPECIFIC GRAVITY,UA 1 005      PH,UA 7 5     LEUKOCYTE ESTERASE,UA Negative     NITRITE,UA Negative     GLUCOSE, UA Negative     KETONES,UA Negative     BILIRUBIN,UA Negative     BLOOD,UA Negative     POCT URINE PROTEIN Negative     SL AMB POCT UROBILINOGEN 0 2        No orders of the defined types were placed in this encounter        ALLERGIES:  Allergies   Allergen Reactions    Aricept [Donepezil] Confusion     confusion    Atorvastatin Myalgia, Dizziness and Headache    Niacin Other (See Comments)     unknown       Current Medications     Current Outpatient Medications   Medication Sig Dispense Refill    clopidogrel (PLAVIX) 75 mg tablet Take 1 tablet (75 mg total) by mouth daily 100 tablet 3    docusate sodium (COLACE) 100 mg capsule Take 1 capsule (100 mg total) by mouth 2 (two) times a day 10 capsule 0    Melatonin 5 MG CAPS Take 5 mg by mouth daily at bedtime      metoprolol succinate (TOPROL-XL) 25 mg 24 hr tablet Take 1 tablet (25 mg total) by mouth daily 90 tablet 3    midodrine (PROAMATINE) 5 mg tablet Take 1 tablet (5 mg total) by mouth daily 90 tablet 3    omeprazole (PriLOSEC) 40 MG capsule Take 1 capsule (40 mg total) by mouth daily 100 capsule 3    psyllium (METAMUCIL) 58 6 % packet Take 1 packet by mouth daily      sertraline (ZOLOFT) 50 mg tablet Take 1 tablet (50 mg total) by mouth daily 100 tablet 3    simvastatin (ZOCOR) 80 mg tablet Take 1 tablet (80 mg total) by mouth daily 90 tablet 3     No current facility-administered medications for this visit           Health Maintenance     Health Maintenance   Topic Date Due    SLP PLAN OF CARE  Never done    Falls: Plan of Care  Never done    COVID-19 Vaccine (3 - Booster for Moderna series) 07/09/2021    Medicare Annual Wellness Visit (AWV)  08/20/2022    Fall Risk  03/08/2023    BMI: Adult  03/08/2023    DTaP,Tdap,and Td Vaccines (3 - Td or Tdap) 05/14/2030    Pneumococcal Vaccine: 65+ Years  Completed    Influenza Vaccine  Completed    HIB Vaccine  Aged Out    Hepatitis B Vaccine  Aged Out    IPV Vaccine  Aged Out    Hepatitis A Vaccine  Aged Out    Meningococcal ACWY Vaccine  Aged Out    HPV Vaccine  Aged Out     Immunization History   Administered Date(s) Administered    COVID-19 MODERNA VACC 0 5 ML IM 01/12/2021, 02/09/2021    Influenza Split High Dose Preservative Free IM 11/10/2014, 01/12/2016, 11/22/2016, 11/08/2017    Influenza, high dose seasonal 0 7 mL 11/14/2018, 10/29/2019, 11/18/2020, 10/27/2021    Influenza, seasonal, injectable 10/01/2007, 11/01/2009, 11/02/2009, 11/10/2010, 12/17/2012    Pneumococcal Conjugate 13-Valent 01/12/2016    Pneumococcal Polysaccharide PPV23 10/01/2007    Tdap 08/01/2018, 70/42/0051       Andressa Blevins MD    I spent 25 minutes with this patient of which greater than 50% spent counseling or reviewing chart

## 2022-03-09 NOTE — ASSESSMENT & PLAN NOTE
Cognitive decline  Patient with no worsening of symptoms but continues to have memory lapses as discussed  I had a long discussion with the patient and his wife who reluctant to start a new medication    Since he does tolerate Zoloft it was recommended to increased to 75 mg daily to see if this would help with his mood swings although I explained this would probably not improve his lapses Patient understands condition, prognosis and need for follow up care.

## 2022-03-21 ENCOUNTER — TELEPHONE (OUTPATIENT)
Dept: FAMILY MEDICINE CLINIC | Facility: CLINIC | Age: 87
End: 2022-03-21

## 2022-03-21 NOTE — TELEPHONE ENCOUNTER
Patient needs a new script to reflect the recent change in his sertraline, which patient now takes 1 5 tablets (75 mg) daily  Please send updated script to patient's new pharmacy, [address]

## 2022-05-24 DIAGNOSIS — R13.10 DYSPHAGIA, UNSPECIFIED TYPE: Primary | ICD-10-CM

## 2022-06-03 ENCOUNTER — REMOTE DEVICE CLINIC VISIT (OUTPATIENT)
Dept: CARDIOLOGY CLINIC | Facility: CLINIC | Age: 87
End: 2022-06-03
Payer: COMMERCIAL

## 2022-06-03 DIAGNOSIS — Z95.818 PRESENCE OF OTHER CARDIAC IMPLANTS AND GRAFTS: Primary | ICD-10-CM

## 2022-06-03 PROCEDURE — 93298 REM INTERROG DEV EVAL SCRMS: CPT | Performed by: INTERNAL MEDICINE

## 2022-06-03 PROCEDURE — G2066 INTER DEVC REMOTE 30D: HCPCS | Performed by: INTERNAL MEDICINE

## 2022-06-03 NOTE — PROGRESS NOTES
MDT LOOP   CARELINK TRANSMISSION: LOOP RECORDER  PRESENTING RHYTHM NSR @ 73 BPM  BATTERY STATUS "OK " NO PATIENT OR DEVICE ACTIVATED EPISODES  NORMAL DEVICE FUNCTION   DL

## 2022-06-07 ENCOUNTER — APPOINTMENT (OUTPATIENT)
Dept: LAB | Facility: CLINIC | Age: 87
End: 2022-06-07
Payer: COMMERCIAL

## 2022-06-07 ENCOUNTER — TRANSCRIBE ORDERS (OUTPATIENT)
Dept: LAB | Facility: CLINIC | Age: 87
End: 2022-06-07

## 2022-06-07 DIAGNOSIS — C61 MALIGNANT NEOPLASM OF PROSTATE (HCC): ICD-10-CM

## 2022-06-07 DIAGNOSIS — N13.8 ENLARGED PROSTATE WITH URINARY OBSTRUCTION: ICD-10-CM

## 2022-06-07 DIAGNOSIS — N40.1 ENLARGED PROSTATE WITH URINARY OBSTRUCTION: ICD-10-CM

## 2022-06-07 DIAGNOSIS — C61 MALIGNANT NEOPLASM OF PROSTATE (HCC): Primary | ICD-10-CM

## 2022-06-07 LAB
BUN SERPL-MCNC: 12 MG/DL (ref 5–25)
CREAT SERPL-MCNC: 0.89 MG/DL (ref 0.6–1.3)
ERYTHROCYTE [DISTWIDTH] IN BLOOD BY AUTOMATED COUNT: 13.6 % (ref 11.6–15.1)
GFR SERPL CREATININE-BSD FRML MDRD: 75 ML/MIN/1.73SQ M
HCT VFR BLD AUTO: 39.9 % (ref 36.5–49.3)
HGB BLD-MCNC: 13 G/DL (ref 12–17)
MCH RBC QN AUTO: 27.4 PG (ref 26.8–34.3)
MCHC RBC AUTO-ENTMCNC: 32.6 G/DL (ref 31.4–37.4)
MCV RBC AUTO: 84 FL (ref 82–98)
PLATELET # BLD AUTO: 180 THOUSANDS/UL (ref 149–390)
PMV BLD AUTO: 10.6 FL (ref 8.9–12.7)
PSA SERPL-MCNC: 0.2 NG/ML (ref 0–4)
RBC # BLD AUTO: 4.75 MILLION/UL (ref 3.88–5.62)
WBC # BLD AUTO: 6.98 THOUSAND/UL (ref 4.31–10.16)

## 2022-06-07 PROCEDURE — 85027 COMPLETE CBC AUTOMATED: CPT

## 2022-06-07 PROCEDURE — 84153 ASSAY OF PSA TOTAL: CPT

## 2022-06-07 PROCEDURE — 84520 ASSAY OF UREA NITROGEN: CPT

## 2022-06-07 PROCEDURE — 36415 COLL VENOUS BLD VENIPUNCTURE: CPT

## 2022-06-07 PROCEDURE — 82565 ASSAY OF CREATININE: CPT

## 2022-06-17 ENCOUNTER — TELEPHONE (OUTPATIENT)
Dept: CARDIOLOGY CLINIC | Facility: CLINIC | Age: 87
End: 2022-06-17

## 2022-06-17 ENCOUNTER — CONSULT (OUTPATIENT)
Dept: GASTROENTEROLOGY | Facility: CLINIC | Age: 87
End: 2022-06-17
Payer: COMMERCIAL

## 2022-06-17 ENCOUNTER — TELEPHONE (OUTPATIENT)
Dept: GASTROENTEROLOGY | Facility: CLINIC | Age: 87
End: 2022-06-17

## 2022-06-17 VITALS
DIASTOLIC BLOOD PRESSURE: 68 MMHG | TEMPERATURE: 97.4 F | OXYGEN SATURATION: 97 % | SYSTOLIC BLOOD PRESSURE: 128 MMHG | HEART RATE: 66 BPM | BODY MASS INDEX: 22.81 KG/M2 | WEIGHT: 154 LBS | HEIGHT: 69 IN | RESPIRATION RATE: 18 BRPM

## 2022-06-17 DIAGNOSIS — D69.1 QUALITATIVE PLATELET DEFECTS (HCC): ICD-10-CM

## 2022-06-17 DIAGNOSIS — R13.10 DYSPHAGIA, UNSPECIFIED TYPE: ICD-10-CM

## 2022-06-17 DIAGNOSIS — K21.9 GASTROESOPHAGEAL REFLUX DISEASE WITHOUT ESOPHAGITIS: Primary | ICD-10-CM

## 2022-06-17 PROCEDURE — 99204 OFFICE O/P NEW MOD 45 MIN: CPT | Performed by: INTERNAL MEDICINE

## 2022-06-17 NOTE — TELEPHONE ENCOUNTER
Our mutual patient is scheduled for procedure: EGD    On: 7/26/22  With: Dr Shyanne Mills    He is taking the following blood thinner: Plavix    Can this be stopped __5____ days prior to the procedure?       Physician Approving clearance: ________________________

## 2022-06-17 NOTE — TELEPHONE ENCOUNTER
Our mutual patient is scheduled for procedure: EGD     On: 7/26/22  With: Dr Korin Morgan     He is taking the following blood thinner: Plavix     Can this be stopped __5____ days prior to the procedure

## 2022-06-17 NOTE — PATIENT INSTRUCTIONS
[address] ok'd 7:30 case      Scheduled date of EGD(as of today): 7/26/2022  Physician performing EGD: Dr Najera  Location of EGD: [facility]  Instructions reviewed with patient by: Michelle Lyn  Clearances: Med Clearance message sent to Marcelo Peace

## 2022-06-17 NOTE — PROGRESS NOTES
[facility] - Outpatient Consultation  Elizabeth Benitez 80 y o  male MRN: [id number]  Encounter: [id number]          ASSESSMENT AND PLAN:      1  Dysphagia, unspecified type  - Ambulatory Referral to Gastroenterology  - FL barium swallow; Future  - EGD; Future  2  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[address] )  3  Gastroesophageal reflux disease without esophagitis    59-year-old male with previous CVA, GERD, hypertension presenting for chronic dysphagia over several years  Suspect that his symptoms may be multifactorial with a component of oropharyngeal dysphagia possibly related to previous CVA, as well as possible esophageal motility disorder  Given his history of previous dilation although it is unclear if this was empiric dilation, a ring or stricture is also possible  Low suspicion for malignancy given the chronicity of his symptoms over several years  Will plan for barium esophagram to evaluate anatomy, while waiting for upper endoscopy to be scheduled  May plan to dilate if ring or stricture is found, discussed that empiric dilation unlikely to be indicated  For his symptoms of GERD, I recommended that he continue on omeprazole 40 mg daily  If EGD is unremarkable along with barium esophagram, could consider motility studies verses recommendation for speech therapy  Follow-up after EGD    ______________________________________________________________________    HPI:  Mr Addis Bob is a pleasant 59-year-old male with previous CVA, GERD, hypertension, presenting for evaluation of dysphagia  He is accompanied today by his wife who provides parts of the history  This has been ongoing for several years  He reports intermittent dysphagia more typically to solids including pieces of meat  The symptom starts in the mouth and upper throat with occasional coughing but usually just a sensation before it passes down into his esophagus    Denies mid chest discomfort or sticking sensation in his upper abdomen  His wife believes that symptoms have worsened recently  He underwent a video swallow study in October 2021 for which I have reviewed these results  There is an impression of primarily oropharyngeal dysphagia with mild delay in emptying in the esophagus as well  He was recommended to be on a mechanical soft diet and to adjust liquids to be smaller sips and use multiple swallows  Meds were recommended to be pureed or crushed and always eat in an upright position  Last EGD and colonoscopy over 10 years ago  Unclear if he was dilated empirically or if there was a structural abnormality such as a ring or stricture  REVIEW OF SYSTEMS:    CONSTITUTIONAL: Denies any fever, chills, rigors, and weight loss  HEENT: Denies odynophagia, tinnitus  CARDIOVASCULAR: No chest pain or palpitations  RESPIRATORY: Denies any cough, hemoptysis, shortness of breath or dyspnea on exertion  GASTROINTESTINAL: As noted in the History of Present Illness  GENITOURINARY: No problems with urination  Denies any hematuria or dysuria  NEUROLOGIC: No dizziness or vertigo, denies headaches  MUSCULOSKELETAL: Denies any muscle or joint pain  SKIN: Denies skin rashes or itching  ENDOCRINE:  Denies intolerance to heat or cold  PSYCHOSOCIAL: Denies depression or anxiety  Denies any recent memory loss         Historical Information   Past Medical History:   Diagnosis Date    Anxiety     Arthritis     Coronary artery disease     Coronary artery disease involving native coronary artery of native heart without angina pectoris     Depression     Fracture     Hypercholesterolemia     Meningi[address] ([facility] [address] [facility] 11/1999    brain tumor    Meningioma ([facility]     Narcolepsy     daytime drowsiness and dozing off occasionally    Orthostatic hypotension     Palpitations     Prostate CA ([facility]     Prostate cancer ([facility]     Stroke [facility])      Past Surgical History:   Procedure Laterality Date    BACK SURGERY      BRAIN SURGERY 1999    CORONARY ARTERY BYPASS GRAFT      x5    CORONARY ARTERY BYPASS GRAFT       Social History   Social History     Substance and Sexual Activity   Alcohol Use Not Currently    Comment: (history)     Social History     Substance and Sexual Activity   Drug Use No     Social History     Tobacco Use   Smoking Status Former Smoker    Types: Cigarettes    Quit date: 1995    Years since quittin 4   Smokeless Tobacco Never Used   Tobacco Comment    former cig smoker- quit in      Family History   Problem Relation Age of Onset    Hypertension Mother         benign essential    Cancer Mother     Osteoporosis Mother     Suicidality Father     Diabetes Family     Heart disease Family     Neuropathy Family         peripheral    Prostate cancer Family     Thyroid disease Family        Meds/Allergies       Current Outpatient Medications:     clopidogrel (PLAVIX) 75 mg tablet    docusate sodium (COLACE) 100 mg capsule    Melatonin 5 MG CAPS    metoprolol succinate (TOPROL-XL) 25 mg 24 hr tablet    midodrine (PROAMATINE) 5 mg tablet    omeprazole (PriLOSEC) 40 MG capsule    psyllium (METAMUCIL) 58 6 % packet    sertraline (ZOLOFT) 50 mg tablet    simvastatin (ZOCOR) 80 mg tablet    Allergies   Allergen Reactions    Aricept [Donepezil] Confusion     confusion    Atorvastatin Myalgia, Dizziness and Headache    Niacin Other (See Comments)     unknown           Objective     Blood pressure 128/68, pulse 66, temperature (!) 97 4 °F (36 3 °C), temperature source Tympanic, resp  rate 18, height 5' 9" (1 753 m), weight 69 9 kg (154 lb), SpO2 97 %  Body mass index is 22 74 kg/m²  PHYSICAL EXAM:      General Appearance:   Alert, cooperative, no distress   HEENT:   Normocephalic, atraumatic, anicteric       Neck:  Supple, symmetrical, trachea midline   Lungs:   Clear to auscultation bilaterally; no rales, rhonchi or wheezing; respirations unlabored    Heart[de-identified]   Regular rate and rhythm; no murmur, rub, or gallop  Abdomen:   Soft, non-tender, non-distended; normal bowel sounds; no masses, no organomegaly    Genitalia:   Deferred    Rectal:   Deferred    Extremities:  No cyanosis, clubbing or edema    Pulses:  2+ and symmetric    Skin:  No jaundice, rashes, or lesions          Lab Results:   No visits with results within 1 Day(s) from this visit  Latest known visit with results is:   Appointment on 06/07/2022   Component Date Value    WBC 06/07/2022 6 98     RBC 06/07/2022 4 75     Hemoglobin 06/07/2022 13 0     Hematocrit 06/07/2022 39 9     MCV 06/07/2022 84     MCH 06/07/2022 27 4     MCHC 06/07/2022 32 6     RDW 06/07/2022 13 6     Platelets 86/83/1906 180     MPV 06/07/2022 10 6     BUN 06/07/2022 12     Creatinine 06/07/2022 0 89     eGFR 06/07/2022 75     PSA, Diagnostic 06/07/2022 0 2          Radiology Results:   Cardiac EP device report    Result Date: 6/3/2022  Narrative: MDT LOOP CARELINK TRANSMISSION: LOOP RECORDER  PRESENTING RHYTHM NSR @ 73 BPM  BATTERY STATUS "OK " NO PATIENT OR DEVICE ACTIVATED EPISODES  NORMAL DEVICE FUNCTION   DL

## 2022-06-18 ENCOUNTER — OFFICE VISIT (OUTPATIENT)
Dept: FAMILY MEDICINE CLINIC | Facility: CLINIC | Age: 87
End: 2022-06-18
Payer: COMMERCIAL

## 2022-06-18 VITALS
BODY MASS INDEX: 22.96 KG/M2 | TEMPERATURE: 97.5 F | WEIGHT: 155 LBS | HEIGHT: 69 IN | RESPIRATION RATE: 16 BRPM | HEART RATE: 74 BPM | OXYGEN SATURATION: 95 % | DIASTOLIC BLOOD PRESSURE: 60 MMHG | SYSTOLIC BLOOD PRESSURE: 130 MMHG

## 2022-06-18 DIAGNOSIS — K21.9 GASTROESOPHAGEAL REFLUX DISEASE WITHOUT ESOPHAGITIS: ICD-10-CM

## 2022-06-18 DIAGNOSIS — F03.90 DEMENTIA WITHOUT BEHAVIORAL DISTURBANCE, UNSPECIFIED DEMENTIA TYPE (HCC): ICD-10-CM

## 2022-06-18 DIAGNOSIS — I10 ESSENTIAL HYPERTENSION: Primary | ICD-10-CM

## 2022-06-18 PROCEDURE — 1160F RVW MEDS BY RX/DR IN RCRD: CPT | Performed by: FAMILY MEDICINE

## 2022-06-18 PROCEDURE — 1036F TOBACCO NON-USER: CPT | Performed by: FAMILY MEDICINE

## 2022-06-18 PROCEDURE — 99213 OFFICE O/P EST LOW 20 MIN: CPT | Performed by: FAMILY MEDICINE

## 2022-06-18 NOTE — ASSESSMENT & PLAN NOTE
Dementia  Patient with no significant decline over the last 6-12 months according to wife  If she should notice any increasing symptoms we will consider repeating MRI exam or initiating Namenda medication    For now patient would like to hold off on any other treatment options

## 2022-06-18 NOTE — PROGRESS NOTES
FAMILY PRACTICE OFFICE VISIT       NAME: Kelsi Landry  AGE: 80 y o  SEX: male       : 1933        MRN: [id number]    DATE: 2022  TIME: 9:38 AM    Assessment and Plan     Problem List Items Addressed This Visit        Digestive    Esophageal reflux       Cardiovascular and Mediastinum    Essential hypertension - Primary       Nervous and Auditory    Dementia without behavioral disturbance (HCC)     Dementia  Patient with no significant decline over the last 6-12 months according to wife  If she should notice any increasing symptoms we will consider repeating MRI exam or initiating Namenda medication  For now patient would like to hold off on any other treatment options                     Chief Complaint     Chief Complaint   Patient presents with    Dementia     REQUESTING ZENON        History of Present Illness     Patient the office to review chronic medical condition  His wife was curious as to when he had his last MRI of his head  Patient did have stroke in 2019 whereby MRI was reviewed with the patient's wife  She has not witnessed any worsening of the patient's symptoms of short-term memory loss  He has not been wandering outside her apartment  He does see gastroenterologist for his history of dysphagia for which she is scheduled to have an upper endoscopy in the near future  Review of Systems   Review of Systems   Constitutional: Negative  HENT: Negative  Respiratory: Negative  Cardiovascular: Negative  Gastrointestinal:        As per HPI   Neurological:        As per HPI   Psychiatric/Behavioral: Negative          Active Problem List     Patient Active Problem List   Diagnosis    Constipation    Iron deficiency    Other constipation    Anemia    Arteriosclerotic cardiovascular disease    Backache    Essential hypertension    Cervical spinal stenosis    Depression    Esophageal reflux    Hyperlipidemia    Insomnia    Spinal stenosis of lumbar region with neurogenic claudication    Vitamin B12 deficiency    Idiopathic peripheral neuropathy    Iron deficiency anemia    History of meningioma    Cognitive decline    Contusion of rib on right side    Orthostatic hypotension    Convulsions (HCC)    Lumbar spondylosis    Lumbar radiculopathy    Acute CVA (cerebrovascular accident), suspected embolic in nature    Hypercalcemia    History of resection of meningioma    History of stroke    Depression    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) [facility]    Calcification of aorta ([facility]    Need for influenza vaccination    Dementia without behavioral disturbance ([facility] )    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[facility]    Dysphagia       Past Medical History:  Past Medical History:   Diagnosis Date    Anxiety     Arthritis     Coronary artery disease     Coronary artery disease involving native coronary artery of native heart without angina pectoris     Depression     Fracture     Hypercholesterolemia     Meningioma ([facility] ) 11/1999    brain tumor    Meningioma (HCC)     Narcolepsy     daytime drowsiness and dozing off occasionally    Orthostatic hypotension     Palpitations     Prostate CA (HCC)     Prostate cancer (HCC)     Stroke [facility])        Past Surgical History:  Past Surgical History:   Procedure Laterality Date    BACK SURGERY      BRAIN SURGERY  11/08/1999    CORONARY ARTERY BYPASS GRAFT      x5    CORONARY ARTERY BYPASS GRAFT         Family History:  Family History   Problem Relation Age of Onset    Hypertension Mother         benign essential    Cancer Mother     Osteoporosis Mother     Suicidality Father     Diabetes Family     Heart disease Family     Neuropathy Family         peripheral    Prostate cancer Family     Thyroid disease Family        Social History:  Social History     Socioeconomic History    Marital status: /Civil Union     Spouse name: Not on file    Number of children: Not on file   Lizzette Avalos Years of education: Not on file    Highest education level: Not on file   Occupational History    Occupation: retired   Tobacco Use    Smoking status: Former Smoker     Types: Cigarettes     Quit date: 1995     Years since quittin 4    Smokeless tobacco: Never Used    Tobacco comment: former cig smoker- quit in    Vaping Use    Vaping Use: Never used   Substance and Sexual Activity    Alcohol use: Not Currently     Comment: (history)    Drug use: No    Sexual activity: Not Currently   Other Topics Concern    Not on file   Social History Narrative    ** Merged History Encounter **         Pt lives with wife     Social Determinants of Health     Financial Resource Strain: Not on file   Food Insecurity: Not on file   Transportation Needs: Not on file   Physical Activity: Not on file   Stress: Not on file   Social Connections: Not on file   Intimate Partner Violence: Not on file   Housing Stability: Not on file       Objective     Vitals:    22 0915   BP: 130/60   Pulse:    Resp:    Temp:    SpO2:      Wt Readings from Last 3 Encounters:   22 70 3 kg (155 lb)   22 69 9 kg (154 lb)   22 71 4 kg (157 lb 6 oz)       Physical Exam  Constitutional:       General: He is not in acute distress  Appearance: Normal appearance  He is not ill-appearing  Cardiovascular:      Rate and Rhythm: Normal rate and regular rhythm  Heart sounds: Normal heart sounds  No murmur heard  Pulmonary:      Effort: Pulmonary effort is normal  No respiratory distress  Breath sounds: Normal breath sounds  No wheezing, rhonchi or rales  Neurological:      General: No focal deficit present  Mental Status: He is alert  Mental status is at baseline     Psychiatric:         Mood and Affect: Mood normal          Behavior: Behavior normal          Pertinent Laboratory/Diagnostic Studies:  Lab Results   Component Value Date    GLUCOSE 114 2020    BUN 12 2022    CREATININE 0 89 06/07/2022    CALCIUM 10 5 (H) 08/19/2021     05/08/2015    K 4 0 08/19/2021    CO2 27 08/19/2021     08/19/2021     Lab Results   Component Value Date    ALT 23 08/19/2021    AST 19 08/19/2021    ALKPHOS 83 08/19/2021    BILITOT 0 47 06/09/2015       Lab Results   Component Value Date    WBC 6 98 06/07/2022    HGB 13 0 06/07/2022    HCT 39 9 06/07/2022    MCV 84 06/07/2022     06/07/2022       No results found for: TSH    Lab Results   Component Value Date    CHOL 215 08/20/2014     Lab Results   Component Value Date    TRIG 128 12/02/2020     Lab Results   Component Value Date    HDL 52 12/02/2020     Lab Results   Component Value Date    LDLCALC 91 12/02/2020     Lab Results   Component Value Date    HGBA1C 6 0 11/08/2019       Results for orders placed or performed in visit on 06/07/22   CBC and Platelet   Result Value Ref Range    WBC 6 98 4 31 - 10 16 Thousand/uL    RBC 4 75 3 88 - 5 62 Million/uL    Hemoglobin 13 0 12 0 - 17 0 g/dL    Hematocrit 39 9 36 5 - 49 3 %    MCV 84 82 - 98 fL    MCH 27 4 26 8 - 34 3 pg    MCHC 32 6 31 4 - 37 4 g/dL    RDW 13 6 11 6 - 15 1 %    Platelets 077 789 - 109 Thousands/uL    MPV 10 6 8 9 - 12 7 fL   BUN   Result Value Ref Range    BUN 12 5 - 25 mg/dL   Creatinine, serum   Result Value Ref Range    Creatinine 0 89 0 60 - 1 30 mg/dL    eGFR 75 ml/min/1 73sq m   PSA Total, Diagnostic   Result Value Ref Range    PSA, Diagnostic 0 2 0 0 - 4 0 ng/mL       No orders of the defined types were placed in this encounter        ALLERGIES:  Allergies   Allergen Reactions    Aricept [Donepezil] Confusion     confusion    Atorvastatin Myalgia, Dizziness and Headache    Niacin Other (See Comments)     unknown       Current Medications     Current Outpatient Medications   Medication Sig Dispense Refill    clopidogrel (PLAVIX) 75 mg tablet Take 1 tablet (75 mg total) by mouth daily 100 tablet 3    docusate sodium (COLACE) 100 mg capsule Take 1 capsule (100 mg total) by mouth 2 (two) times a day 10 capsule 0    Melatonin 5 MG CAPS Take 5 mg by mouth daily at bedtime      metoprolol succinate (TOPROL-XL) 25 mg 24 hr tablet Take 1 tablet (25 mg total) by mouth daily 90 tablet 3    midodrine (PROAMATINE) 5 mg tablet Take 1 tablet (5 mg total) by mouth daily 90 tablet 3    omeprazole (PriLOSEC) 40 MG capsule Take 1 capsule (40 mg total) by mouth daily 100 capsule 3    psyllium (METAMUCIL) 58 6 % packet Take 1 packet by mouth daily      sertraline (ZOLOFT) 50 mg tablet Take 1 5 tabs daily 135 tablet 3    simvastatin (ZOCOR) 80 mg tablet Take 1 tablet (80 mg total) by mouth daily 90 tablet 3     No current facility-administered medications for this visit           Health Maintenance     Health Maintenance   Topic Date Due    SLP PLAN OF CARE  Never done    Falls: Plan of Care  Never done    COVID-19 Vaccine (3 - Booster for Moderna series) 07/09/2021    Medicare Annual Wellness Visit (AWV)  08/20/2022    Fall Risk  03/08/2023    BMI: Adult  06/18/2023    DTaP,Tdap,and Td Vaccines (3 - Td or Tdap) 05/14/2030    Pneumococcal Vaccine: 65+ Years  Completed    Influenza Vaccine  Completed    HIB Vaccine  Aged Out    Hepatitis B Vaccine  Aged Out    IPV Vaccine  Aged Out    Hepatitis A Vaccine  Aged Out    Meningococcal ACWY Vaccine  Aged Out    HPV Vaccine  Aged Out     Immunization History   Administered Date(s) Administered    COVID-19 MODERNA VACC 0 5 ML IM 01/12/2021, 02/09/2021    Influenza Split High Dose Preservative Free IM 11/10/2014, 01/12/2016, 11/22/2016, 11/08/2017    Influenza, high dose seasonal 0 7 mL 11/14/2018, 10/29/2019, 11/18/2020, 10/27/2021    Influenza, seasonal, injectable 10/01/2007, 11/01/2009, 11/02/2009, 11/10/2010, 12/17/2012    Pneumococcal Conjugate 13-Valent 01/12/2016    Pneumococcal Polysaccharide PPV23 10/01/2007    Tdap 08/01/2018, 22/10/3543       Alin Hernandez MD

## 2022-06-24 ENCOUNTER — HOSPITAL ENCOUNTER (OUTPATIENT)
Dept: RADIOLOGY | Facility: HOSPITAL | Age: 87
Discharge: HOME | End: 2022-06-24
Attending: INTERNAL MEDICINE
Payer: COMMERCIAL

## 2022-06-24 DIAGNOSIS — R13.10 DYSPHAGIA, UNSPECIFIED TYPE: ICD-10-CM

## 2022-06-24 PROCEDURE — 74220 X-RAY XM ESOPHAGUS 1CNTRST: CPT

## 2022-06-29 ENCOUNTER — TELEPHONE (OUTPATIENT)
Dept: FAMILY MEDICINE CLINIC | Facility: CLINIC | Age: 87
End: 2022-06-29

## 2022-06-29 NOTE — TELEPHONE ENCOUNTER
Spoke with spouse and Juhi Akhtar from [facility] and they are wondering if you would allow them to put patient in the memory care unit  Right now he is in independent living and lately he has been leaving the home and walking outside by himself, they live on the 4th floor and he uses the back cement steps with his walker to leave and they are worried that patient will fall and get hurt really bad  They do feel that he is a danger to himself  Juhi Akhtar would like to know if you need to see the patient in the office to do your assessment or if you would use the 6/18/2022 as the last date seen? Please call Juhi Akhtar at [phone],   They would like to move the patient next week if that's ok?

## 2022-07-26 ENCOUNTER — ANESTHESIA EVENT (OUTPATIENT)
Dept: GASTROENTEROLOGY | Facility: HOSPITAL | Age: 87
End: 2022-07-26

## 2022-07-26 ENCOUNTER — TELEPHONE (OUTPATIENT)
Dept: GASTROENTEROLOGY | Facility: CLINIC | Age: 87
End: 2022-07-26

## 2022-07-26 ENCOUNTER — ANESTHESIA (OUTPATIENT)
Dept: GASTROENTEROLOGY | Facility: HOSPITAL | Age: 87
End: 2022-07-26

## 2022-07-26 ENCOUNTER — HOSPITAL ENCOUNTER (OUTPATIENT)
Dept: GASTROENTEROLOGY | Facility: HOSPITAL | Age: 87
Setting detail: OUTPATIENT SURGERY
Discharge: HOME | End: 2022-07-26
Attending: INTERNAL MEDICINE
Payer: COMMERCIAL

## 2022-07-26 VITALS
HEIGHT: 69 IN | BODY MASS INDEX: 22.81 KG/M2 | HEART RATE: 68 BPM | DIASTOLIC BLOOD PRESSURE: 72 MMHG | WEIGHT: 154 LBS | OXYGEN SATURATION: 97 % | TEMPERATURE: 96.1 F | RESPIRATION RATE: 20 BRPM | SYSTOLIC BLOOD PRESSURE: 130 MMHG

## 2022-07-26 DIAGNOSIS — R13.10 DYSPHAGIA, UNSPECIFIED TYPE: ICD-10-CM

## 2022-07-26 PROCEDURE — 88305 TISSUE EXAM BY PATHOLOGIST: CPT | Performed by: PATHOLOGY

## 2022-07-26 PROCEDURE — 88312 SPECIAL STAINS GROUP 1: CPT | Performed by: PATHOLOGY

## 2022-07-26 PROCEDURE — 43239 EGD BIOPSY SINGLE/MULTIPLE: CPT | Performed by: INTERNAL MEDICINE

## 2022-07-26 RX ORDER — SODIUM CHLORIDE, SODIUM LACTATE, POTASSIUM CHLORIDE, CALCIUM CHLORIDE 600; 310; 30; 20 MG/100ML; MG/100ML; MG/100ML; MG/100ML
INJECTION, SOLUTION INTRAVENOUS CONTINUOUS PRN
Status: DISCONTINUED | OUTPATIENT
Start: 2022-07-26 | End: 2022-07-26

## 2022-07-26 RX ORDER — PROPOFOL 10 MG/ML
INJECTION, EMULSION INTRAVENOUS AS NEEDED
Status: DISCONTINUED | OUTPATIENT
Start: 2022-07-26 | End: 2022-07-26

## 2022-07-26 RX ADMIN — PROPOFOL 50 MG: 10 INJECTION, EMULSION INTRAVENOUS at 07:41

## 2022-07-26 RX ADMIN — SODIUM CHLORIDE, SODIUM LACTATE, POTASSIUM CHLORIDE, AND CALCIUM CHLORIDE: .6; .31; .03; .02 INJECTION, SOLUTION INTRAVENOUS at 07:36

## 2022-07-26 RX ADMIN — PROPOFOL 20 MG: 10 INJECTION, EMULSION INTRAVENOUS at 07:44

## 2022-07-26 NOTE — TELEPHONE ENCOUNTER
Called patient and left message regarding scheduling a F/U appointment with Dr Elijah Chavez in 4 weeks  The next appointment available with Dr Elijah Chavez is 8/18

## 2022-07-26 NOTE — ANESTHESIA POSTPROCEDURE EVALUATION
Post-Op Assessment Note    CV Status:  Stable    Pain management: satisfactory to patient     Mental Status:  Lethargic, sleepy and arousable   Hydration Status:  Stable   PONV Controlled:  None   Airway Patency:  Patent      Post Op Vitals Reviewed: Yes      Staff: Anesthesiologist, CRNA         No complications documented      BP   130/62   Temp     Pulse  64   Resp   20   SpO2   100

## 2022-07-26 NOTE — H&P
History and Physical -  [facility]  Alissa Alejandro 80 y o  male MRN: [id number]                  HPI: Alissa Alejandro is a 80y o  year old male who presents for EGD for evaluation of dysphagia      REVIEW OF SYSTEMS: Per the HPI, and otherwise unremarkable      Historical Information   Past Medical History:   Diagnosis Date    Anxiety     Arthritis     Coronary artery disease     Coronary artery disease involving native coronary artery of native heart without angina pectoris     Depression     Fracture     Hypercholesterolemia     Meningioma ([facility] ) 1999    brain tumor    Meningioma (HCC)     Narcolepsy     daytime drowsiness and dozing off occasionally    Orthostatic hypotension     Palpitations     Prostate CA ([facility] )     Prostate cancer ([facility] )     Stroke [facility])      Past Surgical History:   Procedure Laterality Date    BACK SURGERY      BRAIN SURGERY  1999    CORONARY ARTERY BYPASS GRAFT      x5    CORONARY ARTERY BYPASS GRAFT       Social History   Social History     Substance and Sexual Activity   Alcohol Use Not Currently    Comment: (history)     Social History     Substance and Sexual Activity   Drug Use No     Social History     Tobacco Use   Smoking Status Former Smoker    Types: Cigarettes    Quit date: 1995    Years since quittin 5   Smokeless Tobacco Never Used   Tobacco Comment    former cig smoker- quit in      Family History   Problem Relation Age of Onset    Hypertension Mother         benign essential    Cancer Mother     Osteoporosis Mother     Suicidality Father     Diabetes Family     Heart disease Family     Neuropathy Family         peripheral    Prostate cancer Family     Thyroid disease Family        Meds/Allergies       Current Outpatient Medications:     Melatonin 5 MG CAPS    Metamucil Fiber 51 7 % PACK    metoprolol succinate (TOPROL-XL) 25 mg 24 hr tablet    midodrine (PROAMATINE) 5 mg tablet    omeprazole (PriLOSEC) 40 MG capsule    sertraline (ZOLOFT) 50 mg tablet    simvastatin (ZOCOR) 80 mg tablet    Stool Softener 100 MG capsule    clopidogrel (PLAVIX) 75 mg tablet    Melatonin 5 MG TABS    psyllium (METAMUCIL) 58 6 % packet    Allergies   Allergen Reactions    Aricept [Donepezil] Confusion     confusion    Atorvastatin Myalgia, Dizziness and Headache    Niacin Other (See Comments)     unknown       Objective     Ht 5' 9" (1 753 m)   Wt 69 9 kg (154 lb)   BMI 22 74 kg/m²       PHYSICAL EXAM    Gen: NAD  Head: NCAT  CV: RRR  CHEST: Clear  ABD: soft, NT/ND  EXT: no edema      ASSESSMENT/PLAN:  This is a 80y o  year old male here for EGD, and he is stable and optimized for his procedure

## 2022-07-26 NOTE — TELEPHONE ENCOUNTER
----- Message from Zeynep Quinones MD sent at 7/26/2022  8:02 AM EDT -----  Hi,    Can you please help the patient set up a follow up appointment with Dr Zak Roman in 4 weeks  Diagnosis: Dysphagia    Thank you      Tigre

## 2022-07-26 NOTE — ANESTHESIA PREPROCEDURE EVALUATION
Procedure:  EGD    Relevant Problems   CARDIO   (+) Calcification of aorta (HCC)   (+) Essential hypertension   (+) Hyperlipidemia      GI/HEPATIC   (+) Dysphagia   (+) Esophageal reflux      HEMATOLOGY   (+) Anemia   (+) Iron deficiency anemia      MUSCULOSKELETAL   (+) Backache   (+) Lumbar spondylosis      NEURO/PSYCH   (+) Convulsions (HCC)   (+) Dementia without behavioral disturbance (HCC)   (+) Depression   (+) History of meningioma   (+) History of stroke             Anesthesia Plan  ASA Score- 3     Anesthesia Type- IV sedation with anesthesia with ASA Monitors  Additional Monitors:   Airway Plan:           Plan Factors-    Chart reviewed  Induction- intravenous  Postoperative Plan-     Informed Consent- Anesthetic plan and risks discussed with patient  I personally reviewed this patient with the CRNA  Discussed and agreed on the Anesthesia Plan with the CRNA  Obi Nails

## 2022-08-08 ENCOUNTER — HOSPITAL ENCOUNTER (INPATIENT)
Facility: HOSPITAL | Age: 87
LOS: 4 days | Discharge: HOME | DRG: 369 | End: 2022-08-13
Attending: EMERGENCY MEDICINE | Admitting: INTERNAL MEDICINE
Payer: COMMERCIAL

## 2022-08-08 DIAGNOSIS — R13.10 DYSPHAGIA, UNSPECIFIED TYPE: Primary | ICD-10-CM

## 2022-08-08 DIAGNOSIS — R62.7 FAILURE TO THRIVE IN ADULT: ICD-10-CM

## 2022-08-08 DIAGNOSIS — B37.81 CANDIDA ESOPHAGITIS (HCC): ICD-10-CM

## 2022-08-08 PROBLEM — K20.90 ESOPHAGITIS: Status: ACTIVE | Noted: 2022-08-08

## 2022-08-08 PROBLEM — R62.51 FAILURE TO THRIVE (CHILD): Status: ACTIVE | Noted: 2022-08-08

## 2022-08-08 LAB
ANION GAP SERPL CALCULATED.3IONS-SCNC: 4 MMOL/L (ref 4–13)
BUN SERPL-MCNC: 8 MG/DL (ref 5–25)
CALCIUM SERPL-MCNC: 11.2 MG/DL (ref 8.3–10.1)
CHLORIDE SERPL-SCNC: 107 MMOL/L (ref 96–108)
CO2 SERPL-SCNC: 29 MMOL/L (ref 21–32)
CREAT SERPL-MCNC: 0.93 MG/DL (ref 0.6–1.3)
GFR SERPL CREATININE-BSD FRML MDRD: 72 ML/MIN/1.73SQ M
GLUCOSE SERPL-MCNC: 131 MG/DL (ref 65–140)
PLATELET # BLD AUTO: 181 THOUSANDS/UL (ref 149–390)
PMV BLD AUTO: 10.5 FL (ref 8.9–12.7)
POTASSIUM SERPL-SCNC: 3.8 MMOL/L (ref 3.5–5.3)
SODIUM SERPL-SCNC: 140 MMOL/L (ref 135–147)

## 2022-08-08 PROCEDURE — 36415 COLL VENOUS BLD VENIPUNCTURE: CPT

## 2022-08-08 PROCEDURE — 85049 AUTOMATED PLATELET COUNT: CPT | Performed by: INTERNAL MEDICINE

## 2022-08-08 PROCEDURE — 99285 EMERGENCY DEPT VISIT HI MDM: CPT

## 2022-08-08 PROCEDURE — 99285 EMERGENCY DEPT VISIT HI MDM: CPT | Performed by: EMERGENCY MEDICINE

## 2022-08-08 PROCEDURE — 99220 PR INITIAL OBSERVATION CARE/DAY 70 MINUTES: CPT | Performed by: INTERNAL MEDICINE

## 2022-08-08 PROCEDURE — 80048 BASIC METABOLIC PNL TOTAL CA: CPT

## 2022-08-08 RX ORDER — SERTRALINE HYDROCHLORIDE 25 MG/1
75 TABLET, FILM COATED ORAL DAILY
Status: DISCONTINUED | OUTPATIENT
Start: 2022-08-08 | End: 2022-08-13 | Stop reason: HOSPADM

## 2022-08-08 RX ORDER — PRAVASTATIN SODIUM 40 MG
40 TABLET ORAL
Status: DISCONTINUED | OUTPATIENT
Start: 2022-08-08 | End: 2022-08-13 | Stop reason: HOSPADM

## 2022-08-08 RX ORDER — METOPROLOL SUCCINATE 25 MG/1
25 TABLET, EXTENDED RELEASE ORAL DAILY
Status: DISCONTINUED | OUTPATIENT
Start: 2022-08-08 | End: 2022-08-08

## 2022-08-08 RX ORDER — SODIUM CHLORIDE 9 MG/ML
50 INJECTION, SOLUTION INTRAVENOUS CONTINUOUS
Status: DISPENSED | OUTPATIENT
Start: 2022-08-08 | End: 2022-08-09

## 2022-08-08 RX ORDER — METOPROLOL TARTRATE 5 MG/5ML
2.5 INJECTION INTRAVENOUS EVERY 8 HOURS
Status: DISCONTINUED | OUTPATIENT
Start: 2022-08-08 | End: 2022-08-09

## 2022-08-08 RX ORDER — PANTOPRAZOLE SODIUM 40 MG/10ML
40 INJECTION, POWDER, LYOPHILIZED, FOR SOLUTION INTRAVENOUS
Status: DISCONTINUED | OUTPATIENT
Start: 2022-08-08 | End: 2022-08-09

## 2022-08-08 RX ORDER — ENOXAPARIN SODIUM 100 MG/ML
40 INJECTION SUBCUTANEOUS DAILY
Status: DISCONTINUED | OUTPATIENT
Start: 2022-08-08 | End: 2022-08-13 | Stop reason: HOSPADM

## 2022-08-08 RX ORDER — DOCUSATE SODIUM 100 MG/1
100 CAPSULE, LIQUID FILLED ORAL DAILY
Status: DISCONTINUED | OUTPATIENT
Start: 2022-08-08 | End: 2022-08-08

## 2022-08-08 RX ORDER — PANTOPRAZOLE SODIUM 40 MG/1
40 TABLET, DELAYED RELEASE ORAL
Status: DISCONTINUED | OUTPATIENT
Start: 2022-08-09 | End: 2022-08-08

## 2022-08-08 RX ORDER — ONDANSETRON 2 MG/ML
4 INJECTION INTRAMUSCULAR; INTRAVENOUS EVERY 6 HOURS PRN
Status: DISCONTINUED | OUTPATIENT
Start: 2022-08-08 | End: 2022-08-12

## 2022-08-08 RX ORDER — MIDODRINE HYDROCHLORIDE 5 MG/1
5 TABLET ORAL DAILY
Status: DISCONTINUED | OUTPATIENT
Start: 2022-08-08 | End: 2022-08-12

## 2022-08-08 RX ADMIN — SODIUM CHLORIDE 50 ML/HR: 0.9 INJECTION, SOLUTION INTRAVENOUS at 17:31

## 2022-08-08 RX ADMIN — METOROPROLOL TARTRATE 2.5 MG: 5 INJECTION, SOLUTION INTRAVENOUS at 17:31

## 2022-08-08 NOTE — ASSESSMENT & PLAN NOTE
Dysphagia to solids and liquids  Now on able to take pills  Recent EGD revealed esophagitis, Schatzki's ring  Biopsy pending  Continue PPI  Aspiration precautions  GI following

## 2022-08-08 NOTE — ED PROVIDER NOTES
History  Chief Complaint   Patient presents with    Difficulty Swallowing     Per  EMS pt is at [facility] and has been having difficulty swallowing food  Pt reports feeling like something is stuck in his throat     Patient is an 63-year-old male with a past medical history of dementia, currently in a dementia unit, cervical stenosis, dysphasia status post EGD on the 26th of July, and CVA here for new onset complete p o  Liquid, solid, medication intolerance starting on Saturday, 2 days ago  He has not been able to keep any food liquid or medication down, and consistently spits them up, along with production mucus  He says that he has a feeling like something is stuck in his throat and endoscopy performed on the 26th shows suspected Schatzki's ring seen in lower esophagus  Seven dysphagia, he has no complaints  He has no pain in his head, chest, abdomen, no generalized weakness or fevers  Prior to Admission Medications   Prescriptions Last Dose Informant Patient Reported? Taking?    Melatonin 5 MG CAPS  Spouse/Significant Other Yes No   Sig: Take 5 mg by mouth daily at bedtime   Melatonin 5 MG TABS   No No   Sig: TAKE ONE TABLET BY MOUTH EVERY NIGHT AT BEDTIME *PATIENT SUPPLY   Metamucil Fiber 51 7 % PACK   No No   Sig: MIX 1 PACKET IN 6-8 OUNCES OF WATER AND CONSUME BY MOUTH DAILY *PATIENT SUPPLY   Stool Softener 100 MG capsule   No No   Sig: TAKE ONE CAPSULE BY MOUTH TWO TIMES A DAY *PATIENT SUPPLY   clopidogrel (PLAVIX) 75 mg tablet   No No   Sig: TAKE ONE TABLET BY MOUTH DAILY *PATIENT SUPPLY   metoprolol succinate (TOPROL-XL) 25 mg 24 hr tablet   No No   Sig: TAKE ONE TABLET BY MOUTH DAILY *PATIENT SUPPLY   midodrine (PROAMATINE) 5 mg tablet   No No   Sig: TAKE ONE TABLET BY MOUTH DAILY *PATIENT SUPPLY   omeprazole (PriLOSEC) 40 MG capsule   No No   Sig: TAKE ONE CAPSULE BY MOUTH EVERY DAY IN THE MORNING *PATIENT SUPPLY   psyllium (METAMUCIL) 58 6 % packet  Spouse/Significant Other Yes No   Sig: Take 1 packet by mouth daily   sertraline (ZOLOFT) 50 mg tablet   No No   Sig: TAKE 1 & 1/2 TABLETS BY MOUTH DAILY *PATIENT SUPPLY   simvastatin (ZOCOR) 80 mg tablet   No No   Sig: TAKE ONE TABLET BY MOUTH EVERY DAY IN THE EVENING *PATIENT SUPPLY      Facility-Administered Medications: None       Past Medical History:   Diagnosis Date    Anxiety     Arthritis     Coronary artery disease     Coronary artery disease involving native coronary artery of native heart without angina pectoris     Depression     Fracture     Hypercholesterolemia     M[facility] ([facility] [address] [facility] 1999    brain tumor    Meningioma (HCC)     Narcolepsy     daytime drowsiness and dozing off occasionally    Orthostatic hypotension     Palpitations     Prostate CA (HCC)     Prostate cancer (HCC)     Stroke [facility])        Past Surgical History:   Procedure Laterality Date    BACK SURGERY      BRAIN SURGERY  1999    CORONARY ARTERY BYPASS GRAFT      x5    CORONARY ARTERY BYPASS GRAFT         Family History   Problem Relation Age of Onset    Hypertension Mother         benign essential    Cancer Mother     Osteoporosis Mother     Suicidality Father     Diabetes Family     Heart disease Family     Neuropathy Family         peripheral    Prostate cancer Family     Thyroid disease Family      I have reviewed and agree with the history as documented      E-Cigarette/Vaping    E-Cigarette Use Never User      E-Cigarette/Vaping Substances    Nicotine No     THC No     CBD No     Flavoring No     Other No     Unknown No      Social History     Tobacco Use    Smoking status: Former Smoker     Types: Cigarettes     Quit date: 1995     Years since quittin 6    Smokeless tobacco: Never Used    Tobacco comment: former cig smoker- quit in    Vaping Use    Vaping Use: Never used   Substance Use Topics    Alcohol use: Not Currently     Comment: (history)    Drug use: No        Review of Systems Constitutional: Negative for chills and fever  HENT: Negative for ear pain and sore throat  Eyes: Negative for pain and visual disturbance  Respiratory: Negative for cough and shortness of breath  Cardiovascular: Negative for chest pain and palpitations  Gastrointestinal: Positive for vomiting  Negative for abdominal pain  Genitourinary: Negative for dysuria and hematuria  Musculoskeletal: Negative for arthralgias and back pain  Skin: Negative for color change and rash  Neurological: Negative for seizures and syncope  All other systems reviewed and are negative  Physical Exam  ED Triage Vitals [08/08/22 1141]   Temperature Pulse Respirations Blood Pressure SpO2   98 6 °F (37 °C) 76 20 154/69 98 %      Temp src Heart Rate Source Patient Position - Orthostatic VS BP Location FiO2 (%)   -- -- -- -- --      Pain Score       No Pain             Orthostatic Vital Signs  Vitals:    08/08/22 1141   BP: 154/69   Pulse: 76       Physical Exam  Vitals and nursing note reviewed  Constitutional:       Appearance: He is well-developed  HENT:      Head: Normocephalic and atraumatic  Mouth/Throat:      Comments: Small amount mucus present in the patient's mouth  Eyes:      Conjunctiva/sclera: Conjunctivae normal    Cardiovascular:      Rate and Rhythm: Normal rate and regular rhythm  Heart sounds: No murmur heard  Pulmonary:      Effort: Pulmonary effort is normal  No respiratory distress  Breath sounds: Normal breath sounds  Abdominal:      Palpations: Abdomen is soft  Tenderness: There is no abdominal tenderness  Musculoskeletal:      Cervical back: Neck supple  Skin:     General: Skin is warm and dry  Neurological:      Mental Status: He is alert           ED Medications  Medications - No data to display    Diagnostic Studies  Results Reviewed     Procedure Component Value Units Date/Time    Basic metabolic panel [[id number]]  (Abnormal) Collected: 08/08/22 1228 Lab Status: Final result Specimen: Blood from Arm, Left Updated: 08/08/22 1303     Sodium 140 mmol/L      Potassium 3 8 mmol/L      Chloride 107 mmol/L      CO2 29 mmol/L      ANION GAP 4 mmol/L      BUN 8 mg/dL      Creatinine 0 93 mg/dL      Glucose 131 mg/dL      Calcium 11 2 mg/dL      eGFR 72 ml/min/1 73sq m     Narrative:      [place] guidelines for Chronic Kidney Disease (CKD):     Stage 1 with normal or high GFR (GFR > 90 mL/min/1 73 square meters)    Stage 2 Mild CKD (GFR = 60-89 mL/min/1 73 square meters)    Stage 3A Moderate CKD (GFR = 45-59 mL/min/1 73 square meters)    Stage 3B Moderate CKD (GFR = 30-44 mL/min/1 73 square meters)    Stage 4 Severe CKD (GFR = 15-29 mL/min/1 73 square meters)    Stage 5 End Stage CKD (GFR <15 mL/min/1 73 square meters)  Note: GFR calculation is accurate only with a steady state creatinine                 No orders to display         Procedures  Procedures      ED Course                             SBIRT 22yo+    Flowsheet Row Most Recent Value   SBIRT (23 yo +)    In order to provide better care to our patients, we are screening all of our patients for alcohol and drug use  Would it be okay to ask you these screening questions? No Filed at: 08/08/2022 1312                MDM  Number of Diagnoses or Management Options  Dysphagia, unspecified type  Diagnosis management comments: We spoke to GI about the patient's dysphagia complaints, and after discussion it was determined that since the last EGD was so recent, there is no indication to do 1 urgently in the ED  However as he was having this complete intolerance of p o  Intake, he did merit admission for calorie counts evaluation speech and need for potential PEG to long term  Patient was admitted to observation to further evaluate these concerns        Disposition  Final diagnoses:   Dysphagia, unspecified type     Time reflects when diagnosis was documented in both MDM as applicable and the Disposition within this note     Time User Action Codes Description Comment    8/8/2022  1:10 PM Ailin Hernandez Add [R13 10] Dysphagia, unspecified type       ED Disposition     ED Disposition   Admit    Condition   Stable    Date/Time   Mon Aug 8, 2022  1:07 PM    Comment   Case was discussed with Dr Zahra Ugarte and the patient's admission status was agreed to be Admission Status: inpatient status to the service of Dr Zahra Ugarte   Follow-up Information    None         Patient's Medications   Discharge Prescriptions    No medications on file     No discharge procedures on file  PDMP Review     None           ED Provider  Attending physically available and evaluated Adamaris Khan I managed the patient along with the ED Attending      Electronically Signed by         Ursula Amaro MD  08/08/22 4722

## 2022-08-08 NOTE — ASSESSMENT & PLAN NOTE
History of stroke  Continue statin  Plavix presently on hold in anticipation of possible PEG tube placement

## 2022-08-08 NOTE — ASSESSMENT & PLAN NOTE
Patient failure to thrive  Poor p o   Intake  Spouse at bedside agreeable to PEG tube placement if indicated  GI following for dysphagia evaluation  Once tolerating oral feeds may need a calorie count

## 2022-08-08 NOTE — H&P
[facility]  H&P- Zetta Belt 1933, 80 y o  male MRN: [id number]  Unit/Bed#: Z4HB Encounter: [id number]  Primary Care Provider: Juli Beck MD   Date and time admitted to hospital: 8/8/2022 11:37 AM    * Dysphagia  Assessment & Plan  Dysphagia to solids and liquids  Now on able to take pills  Recent EGD revealed esophagitis, Schatzki's ring  Biopsy pending  Continue PPI  Aspiration precautions  GI following      Failure to thrive in adult  Assessment & Plan  Patient failure to thrive  Poor p o  Intake  Spouse at bedside agreeable to PEG tube placement if indicated  GI following for dysphagia evaluation  Once tolerating oral feeds may need a calorie count      Esophagitis  Assessment & Plan  Recent EGD revealed esophagitis  Follow-up on biopsy results  Continue PPI    Dementia without behavioral disturbance ([facility] [address] )  Assessment & Plan  Monitor for delirium  Reorientation  Sleep hygiene      History of stroke  Assessment & Plan  History of stroke  Continue statin  Plavix presently on hold in anticipation of possible PEG tube placement    Orthostatic hypotension  Assessment & Plan  Monitor blood pressures  Avoid hypotension  Continue Midodrine    Hyperlipidemia  Assessment & Plan  Statin    Essential hypertension  Assessment & Plan  Monitor blood pressures  Avoid hypotension        VTE Pharmacologic Prophylaxis: VTE Score: 5 High Risk (Score >/= 5) - Pharmacological DVT Prophylaxis Ordered: enoxaparin (Lovenox)  Sequential Compression Devices Ordered  Code Status: Level 1 - Full Code   Discussion with family: Discussed with the patient, spouse at bedside updated in detail  She reports she is keeping rest of the family updated  Anticipated Length of Stay: Patient will be admitted on an inpatient basis with an anticipated length of stay of greater than 2 midnights secondary to Dysphagia, failure to thrive for management as outlined        Chief Complaint:       Failure to thrive  Dysphagia    History of Present Illness:  Ruthie Stephens is a 80 y o  male with a PMH of dementia, ambulatory dysfunction, hypertension, history of stroke, hypotension, dyslipidemia who presents with dysphagia failure to thrive  Patient is a poor historian due to his dementia  History is obtained from review of chart as well as his spouse at bedside  His recent endoscopic evaluation noted on epic  Patient noted to have worsening dysphagia for the last few days  He is now not able to eat anything, he has thick tenacious mucus/saliva  He is not able to swallow pills  He has severe nausea and is vomiting  His unable to be the eat anything or keep it down  Outpatient PCP notes, recent GI inputs reviewed on epic    Review of Systems:  Review of Systems   Unable to perform ROS: Dementia       Past Medical and Surgical History:   Past Medical History:   Diagnosis Date    Anxiety     Arthritis     Coronary artery disease     Coronary artery disease involving native coronary artery of native heart without angina pectoris     Depression     Fracture     Hypercholesterolemia     Meningioma ([facility] [address] ) 11/1999    brain tumor    Meningioma ([facility] [address] )     Narcolepsy     daytime drowsiness and dozing off occasionally    Orthostatic hypotension     Palpitations     Prostate CA ([facility] [address] )     Prostate cancer ([facility] [address] )     Stroke [facility])        Past Surgical History:   Procedure Laterality Date    BACK SURGERY      BRAIN SURGERY  11/08/1999    CORONARY ARTERY BYPASS GRAFT      x5    CORONARY ARTERY BYPASS GRAFT         Meds/Allergies:  Prior to Admission medications    Medication Sig Start Date End Date Taking?  Authorizing Provider   clopidogrel (PLAVIX) 75 mg tablet TAKE ONE TABLET BY MOUTH DAILY *PATIENT SUPPLY 3/40/95   Rylee Butt MD   Melatonin 5 MG CAPS Take 5 mg by mouth daily at bedtime    Historical Provider, MD   Melatonin 5 MG TABS TAKE ONE TABLET BY MOUTH EVERY NIGHT AT BEDTIME *PATIENT SUPPLY 6/15/74   Fabian Junior MD   Metamucil Fiber 51 7 % PACK MIX 1 PACKET IN 6-8 OUNCES OF WATER AND CONSUME BY MOUTH DAILY *PATIENT SUPPLY    Fabian Junior MD   metoprolol succinate (TOPROL-XL) 25 mg 24 hr tablet TAKE ONE TABLET BY MOUTH DAILY *PATIENT SUPPLY    Fabian Junior MD   midodrine (PROAMATINE) 5 mg tablet TAKE ONE TABLET BY MOUTH DAILY *PATIENT SUPPLY 65   Fabian Junior MD   omeprazole (PriLOSEC) 40 MG capsule TAKE ONE CAPSULE BY MOUTH EVERY DAY IN THE MORNING *PATIENT SUPPLY    Fabian Junior MD   psyllium (METAMUCIL) 58 6 % packet Take 1 packet by mouth daily    Historical Provider, MD   sertraline (ZOLOFT) 50 mg tablet TAKE 1 & 1/2 TABLETS BY MOUTH DAILY *PATIENT SUPPLY    Fabian Junior MD   simvastatin (ZOCOR) 80 mg tablet TAKE ONE TABLET BY MOUTH EVERY DAY IN THE EVENING *PATIENT SUPPLY    Fabian Junior MD   Stool Softener 100 MG capsule TAKE ONE CAPSULE BY MOUTH TWO TIMES A DAY *PATIENT SUPPLY    Fabian Junior MD     I have reveiwed home medications using records provided by [facility]  Allergies:    Allergies   Allergen Reactions    Aricept [Donepezil] Confusion     confusion    Atorvastatin Myalgia, Dizziness and Headache    Niacin Other (See Comments)     unknown       Social History:  Marital Status: /Civil Union   Occupation:   Patient Pre-hospital Living Situation:  [facility]  Patient Pre-hospital Level of Mobility: Ambulatory dysfunction  Patient Pre-hospital Diet Restrictions:  Dysphagia  Substance Use History:   Social History     Substance and Sexual Activity   Alcohol Use Not Currently    Comment: (history)     Social History     Tobacco Use   Smoking Status Former Smoker    Types: Cigarettes    Quit date: 1995    Years since quittin 6   Smokeless Tobacco Never Used   Tobacco Comment    former cig smoker- quit in      Social History     Substance and Sexual Activity   Drug Use No       Family History:  Family History   Problem Relation Age of Onset    Hypertension Mother         benign essential    Cancer Mother     Osteoporosis Mother     Suicidality Father     Diabetes Family     Heart disease Family     Neuropathy Family         peripheral    Prostate cancer Family     Thyroid disease Family        Physical Exam:     Vitals:   Blood Pressure: 152/93 (08/08/22 1355)  Pulse: 83 (08/08/22 1355)  Temperature: 98 6 °F (37 °C) (08/08/22 1141)  Respirations: 19 (08/08/22 1355)  SpO2: 97 % (08/08/22 1355)    Physical Exam     Comfortably sitting up in bed  Features of protein calorie malnutrition noted  Neck supple  Lungs emphysematous  Diminished breath sounds bilaterally  Heart sounds S1-S2 noted  No murmurs appreciable  Abdomen soft nontender  Awake  Makes minimal pleasant conversation  Moves extremities  No pedal edema  No rash    Additional Data:     Lab Results:      Results from last 7 days   Lab Units 08/08/22  1228   SODIUM mmol/L 140   POTASSIUM mmol/L 3 8   CHLORIDE mmol/L 107   CO2 mmol/L 29   BUN mg/dL 8   CREATININE mg/dL 0 93   ANION GAP mmol/L 4   CALCIUM mg/dL 11 2*   GLUCOSE RANDOM mg/dL 131         Imaging: Reviewed radiology reports from this admission including: procedure reports        ** Please Note: This note has been constructed using a voice recognition system   **

## 2022-08-08 NOTE — ED ATTENDING ATTESTATION
Final Diagnosis:  1  Dysphagia, unspecified type      ED Course as of 08/10/22 0700   Mon Aug 08, 2022   1222 Discussed with GI who thinks admission for calorie counts / dysphagia evaluation would be reasonable  Will do so  Maverick Figueroa MD, saw and evaluated the patient  All available labs and X-rays were ordered by me or the resident and have been reviewed by myself  I discussed the patient with the resident / non-physician and agree with the resident's / non-physician practitioner's findings and plan as documented in the resident's / non-physician practicitioner's note, except where noted  At this point, I agree with the current assessment done in the ED  I was present during key portions of all procedures performed unless otherwise stated  Chief Complaint   Patient presents with    Difficulty Swallowing     Per  EMS pt is at [facility] and has been having difficulty swallowing food  Pt reports feeling like something is stuck in his throat     This is a 80 y o  male presenting for evaluation of dysphagia  Hx of dementia with paralysis per chart, not per wife  Can't take PO meds b/c feels something is stuck in throat  Hx of dysphagia, seen ENT/GI ? scope has been found in the esophagus   Can answer yes/no  No CP/SOB  At baseline per family  PMH:   has a past medical history of Anxiety, Arthritis, Coronary artery disease, Coronary artery disease involving native coronary artery of native heart without angina pectoris, Depression, Fracture, Hypercholesterolemia, Meningioma ([address] ) (11/1999), Meningioma ([address] ), Narcolepsy, Orthostatic hypotension, Palpitations, Prostate CA ([address] ), Prostate cancer ([address] ), and Stroke ([address] )  PSH:   has a past surgical history that includes Back surgery; Brain surgery (11/08/1999); Coronary artery bypass graft; and Coronary artery bypass graft      Social:  Social History     Substance and Sexual Activity   Alcohol Use Not Currently    Comment: (history)     Social History     Tobacco Use   Smoking Status Former Smoker    Types: Cigarettes    Quit date: 1995    Years since quittin 6   Smokeless Tobacco Never Used   Tobacco Comment    former cig smoker- quit in      Social History     Substance and Sexual Activity   Drug Use No     PE:  Vitals:    22 0928 22 1457 22 1741 22 2200   BP: 150/61 147/66  150/69   Pulse: 75 80 104 75   Resp:       Temp:  98 1 °F (36 7 °C)  98 °F (36 7 °C)   TempSrc:       SpO2: 97% 96%  97%   Weight:       General: VSS, NAD, awake, alert  Well-nourished, well-developed  Appears stated age  Head: Normocephalic, atraumatic, nontender  Eyes: PERRL, EOM-I  No diplopia  No hyphema  No subconjunctival hemorrhages  Symmetrical lids  ENTAtraumatic external nose and ears  MMM  No stridor  Able to speak  No drooling  Base of mouth is soft  No mastoid tenderness  Neck: Symmetric, trachea midline  No JVD  CV: Peripheral pulses +2 throughout  No chest wall tenderness  Lungs:   Unlabored   No retractions  No crepitus  No tachypnea  No paradoxical motion  Abd: +BS, soft, NT/ND    MSK:   FROM   Back:   No CVAT  Skin: Dry, intact  Neuro: awake  Alert  GCS 15, CN II-XII grossly intact  Motor grossly intact  Psychiatric/Behavioral: Appropriate mood and affect   Exam: deferred  A:  - food bolus? P:  - Talk to GI given scope results on 2022      - 13 point ROS was performed and all are normal unless stated in the history above  - Nursing note reviewed  Vitals reviewed  - Orders placed by myself and/or advanced practitioner / resident     - Previous chart was reviewed  - No language barrier    - History obtained from patient wife   - There are no limitations to the history obtained  - Critical care time: Not applicable for this patient       Code Status: Level 1 - Full Code  Advance Directive and Living Will:      Power of :    POLST:      Medications psyllium (METAMUCIL) 1 packet (1 packet Oral Not Given 8/9/22 1033)   pravastatin (PRAVACHOL) tablet 40 mg (40 mg Oral Given 8/9/22 1709)   sertraline (ZOLOFT) tablet 75 mg (75 mg Oral Not Given 8/9/22 1033)   midodrine (PROAMATINE) tablet 5 mg (5 mg Oral Not Given 8/9/22 1032)   sodium chloride 0 9 % infusion (0 mL/hr Intravenous Stopped 8/9/22 0336)   ondansetron (ZOFRAN) injection 4 mg (has no administration in time range)   enoxaparin (LOVENOX) subcutaneous injection 40 mg (40 mg Subcutaneous Given 8/9/22 0934)   fluconazole (DIFLUCAN) oral suspension 200 mg (has no administration in time range)   pantoprazole (PROTONIX) EC tablet 40 mg (40 mg Oral Given 8/10/22 0535)   metoprolol tartrate (LOPRESSOR) partial tablet 12 5 mg (12 5 mg Oral Given 8/9/22 1741)   fluconazole (DIFLUCAN) oral suspension 400 mg (400 mg Oral Given 8/9/22 1709)     XR chest portable   Final Result      No acute cardiopulmonary disease  Workstation performed: [id number]           Orders Placed This Encounter   Procedures    XR chest portable    Basic metabolic panel    Basic metabolic panel    CBC and differential    Platelet count    Diet Dysphagia/Modified Consistency; Dysphagia 1-Pureed; Thin Liquid    Nursing communication Continue IV as ordered      Notify admitting physician    Notify admitting physician on arrival    Vital Signs per unit routine    Apply SCD or Foot pumps    Urinary retention protocol    Calorie count    Level 1-Full Code: all life saving measures are indicated    Inpatient consult to gastroenterology    OT eval and treat    PT eval and treat    SPEECH bedside swallowing evaluation and treatment    Place in Observation    Inpatient Admission     Labs Reviewed   BASIC METABOLIC PANEL - Abnormal       Result Value Ref Range Status    Sodium 140  135 - 147 mmol/L Final    Potassium 3 8  3 5 - 5 3 mmol/L Final    Chloride 107  96 - 108 mmol/L Final    CO2 29  21 - 32 mmol/L Final    ANION GAP 4  4 - 13 mmol/L Final    BUN 8  5 - 25 mg/dL Final    Creatinine 0 93  0 60 - 1 30 mg/dL Final    Comment: Standardized to IDMS reference method    Glucose 131  65 - 140 mg/dL Final    Comment: If the patient is fasting, the ADA then defines impaired fasting glucose as > 100 mg/dL and diabetes as > or equal to 123 mg/dL  Specimen collection should occur prior to Sulfasalazine administration due to the potential for falsely depressed results  Specimen collection should occur prior to Sulfapyridine administration due to the potential for falsely elevated results  Calcium 11 2 (*) 8 3 - 10 1 mg/dL Final    eGFR 72  ml/min/1 73sq m Final    Narrative:     [place] guidelines for Chronic Kidney Disease (CKD):     Stage 1 with normal or high GFR (GFR > 90 mL/min/1 73 square meters)    Stage 2 Mild CKD (GFR = 60-89 mL/min/1 73 square meters)    Stage 3A Moderate CKD (GFR = 45-59 mL/min/1 73 square meters)    Stage 3B Moderate CKD (GFR = 30-44 mL/min/1 73 square meters)    Stage 4 Severe CKD (GFR = 15-29 mL/min/1 73 square meters)    Stage 5 End Stage CKD (GFR <15 mL/min/1 73 square meters)  Note: GFR calculation is accurate only with a steady state creatinine     Time reflects when diagnosis was documented in both MDM as applicable and the Disposition within this note       Time User Action Codes Description Comment    8/8/2022  1:10 PM Deion Ivey Add [R13 10] Dysphagia, unspecified type           ED Disposition       ED Disposition   Admit    Condition   Stable    Date/Time   Mon Aug 8, 2022  1:07 PM    Comment   Case was discussed with Dr Bharathi Georges and the patient's admission status was agreed to be Admission Status: inpatient status to the service of Dr Bharathi Georges                  Follow-up Information    None       Current Discharge Medication List        CONTINUE these medications which have NOT CHANGED    Details   clopidogrel (PLAVIX) 75 mg tablet TAKE ONE TABLET BY MOUTH DAILY *PATIENT SUPPLY  Qty: 30 tablet, Refills: 5    Associated Diagnoses: Acute CVA (cerebrovascular accident) ([facility] [address] )      Melatonin 5 MG CAPS Take 5 mg by mouth daily at bedtime      Melatonin 5 MG TABS TAKE ONE TABLET BY MOUTH EVERY NIGHT AT BEDTIME *PATIENT SUPPLY  Qty: 30 tablet, Refills: 5    Associated Diagnoses: Acute CVA (cerebrovascular accident) ([facility] [address] )      Metamucil Fiber 51 7 % PACK MIX 1 PACKET IN 6-8 OUNCES OF WATER AND CONSUME BY MOUTH DAILY *PATIENT SUPPLY  Qty: 44 each, Refills: 5    Associated Diagnoses: Gastroesophageal reflux disease      metoprolol succinate (TOPROL-XL) 25 mg 24 hr tablet TAKE ONE TABLET BY MOUTH DAILY *PATIENT SUPPLY  Qty: 30 tablet, Refills: 0    Associated Diagnoses: Essential (primary) hypertension      midodrine (PROAMATINE) 5 mg tablet TAKE ONE TABLET BY MOUTH DAILY *PATIENT SUPPLY  Qty: 30 tablet, Refills: 5    Associated Diagnoses: Orthostatic hypotension      omeprazole (PriLOSEC) 40 MG capsule TAKE ONE CAPSULE BY MOUTH EVERY DAY IN THE MORNING *PATIENT SUPPLY  Qty: 30 capsule, Refills: 5    Associated Diagnoses: Gastroesophageal reflux disease      psyllium (METAMUCIL) 58 6 % packet Take 1 packet by mouth daily      sertraline (ZOLOFT) 50 mg tablet TAKE 1 & 1/2 TABLETS BY MOUTH DAILY *PATIENT SUPPLY  Qty: 45 tablet, Refills: 5    Associated Diagnoses: Anxiety      simvastatin (ZOCOR) 80 mg tablet TAKE ONE TABLET BY MOUTH EVERY DAY IN THE EVENING *PATIENT SUPPLY  Qty: 30 tablet, Refills: 5    Associated Diagnoses: Pure hypercholesterolemia      Stool Softener 100 MG capsule TAKE ONE CAPSULE BY MOUTH TWO TIMES A DAY *PATIENT SUPPLY  Qty: 60 capsule, Refills: 5    Associated Diagnoses: Constipation, unspecified constipation type           No discharge procedures on file  Prior to Admission Medications   Prescriptions Last Dose Informant Patient Reported? Taking?    Melatonin 5 MG CAPS  Spouse/Significant Other Yes No   Sig: Take 5 mg by mouth daily at bedtime   Melatonin 5 MG TABS   No No   Sig: TAKE ONE TABLET BY MOUTH EVERY NIGHT AT BEDTIME *PATIENT SUPPLY   Metamucil Fiber 51 7 % PACK   No No   Sig: MIX 1 PACKET IN 6-8 OUNCES OF WATER AND CONSUME BY MOUTH DAILY *PATIENT SUPPLY   Stool Softener 100 MG capsule   No No   Sig: TAKE ONE CAPSULE BY MOUTH TWO TIMES A DAY *PATIENT SUPPLY   clopidogrel (PLAVIX) 75 mg tablet   No No   Sig: TAKE ONE TABLET BY MOUTH DAILY *PATIENT SUPPLY   metoprolol succinate (TOPROL-XL) 25 mg 24 hr tablet   No No   Sig: TAKE ONE TABLET BY MOUTH DAILY *PATIENT SUPPLY   midodrine (PROAMATINE) 5 mg tablet   No No   Sig: TAKE ONE TABLET BY MOUTH DAILY *PATIENT SUPPLY   omeprazole (PriLOSEC) 40 MG capsule   No No   Sig: TAKE ONE CAPSULE BY MOUTH EVERY DAY IN THE MORNING *PATIENT SUPPLY   psyllium (METAMUCIL) 58 6 % packet  Spouse/Significant Other Yes No   Sig: Take 1 packet by mouth daily   sertraline (ZOLOFT) 50 mg tablet   No No   Sig: TAKE 1 & 1/2 TABLETS BY MOUTH DAILY *PATIENT SUPPLY   simvastatin (ZOCOR) 80 mg tablet   No No   Sig: TAKE ONE TABLET BY MOUTH EVERY DAY IN THE EVENING *PATIENT SUPPLY      Facility-Administered Medications: None       Portions of the record may have been created with voice recognition software  Occasional wrong word or "sound a like" substitutions may have occurred due to the inherent limitations of voice recognition software  Read the chart carefully and recognize, using context, where substitutions have occurred      Electronically signed by:  Shannen Pierce

## 2022-08-09 ENCOUNTER — APPOINTMENT (OUTPATIENT)
Dept: RADIOLOGY | Facility: HOSPITAL | Age: 87
DRG: 369 | End: 2022-08-09
Attending: FAMILY MEDICINE
Payer: COMMERCIAL

## 2022-08-09 PROBLEM — B37.81 CANDIDA ESOPHAGITIS (HCC): Status: ACTIVE | Noted: 2022-08-08

## 2022-08-09 LAB
ANION GAP SERPL CALCULATED.3IONS-SCNC: 7 MMOL/L (ref 4–13)
BASOPHILS # BLD AUTO: 0.03 THOUSANDS/ΜL (ref 0–0.1)
BASOPHILS NFR BLD AUTO: 0 % (ref 0–1)
BUN SERPL-MCNC: 10 MG/DL (ref 5–25)
CALCIUM SERPL-MCNC: 10.9 MG/DL (ref 8.3–10.1)
CHLORIDE SERPL-SCNC: 111 MMOL/L (ref 96–108)
CO2 SERPL-SCNC: 25 MMOL/L (ref 21–32)
CREAT SERPL-MCNC: 0.74 MG/DL (ref 0.6–1.3)
EOSINOPHIL # BLD AUTO: 0.2 THOUSAND/ΜL (ref 0–0.61)
EOSINOPHIL NFR BLD AUTO: 3 % (ref 0–6)
ERYTHROCYTE [DISTWIDTH] IN BLOOD BY AUTOMATED COUNT: 13.2 % (ref 11.6–15.1)
GFR SERPL CREATININE-BSD FRML MDRD: 81 ML/MIN/1.73SQ M
GLUCOSE SERPL-MCNC: 111 MG/DL (ref 65–140)
HCT VFR BLD AUTO: 37.7 % (ref 36.5–49.3)
HGB BLD-MCNC: 12.2 G/DL (ref 12–17)
IMM GRANULOCYTES # BLD AUTO: 0.03 THOUSAND/UL (ref 0–0.2)
IMM GRANULOCYTES NFR BLD AUTO: 0 % (ref 0–2)
LYMPHOCYTES # BLD AUTO: 1.74 THOUSANDS/ΜL (ref 0.6–4.47)
LYMPHOCYTES NFR BLD AUTO: 26 % (ref 14–44)
MCH RBC QN AUTO: 27.3 PG (ref 26.8–34.3)
MCHC RBC AUTO-ENTMCNC: 32.4 G/DL (ref 31.4–37.4)
MCV RBC AUTO: 84 FL (ref 82–98)
MONOCYTES # BLD AUTO: 0.72 THOUSAND/ΜL (ref 0.17–1.22)
MONOCYTES NFR BLD AUTO: 11 % (ref 4–12)
NEUTROPHILS # BLD AUTO: 4.09 THOUSANDS/ΜL (ref 1.85–7.62)
NEUTS SEG NFR BLD AUTO: 60 % (ref 43–75)
NRBC BLD AUTO-RTO: 0 /100 WBCS
PLATELET # BLD AUTO: 185 THOUSANDS/UL (ref 149–390)
PMV BLD AUTO: 10.4 FL (ref 8.9–12.7)
POTASSIUM SERPL-SCNC: 3.6 MMOL/L (ref 3.5–5.3)
RBC # BLD AUTO: 4.47 MILLION/UL (ref 3.88–5.62)
SODIUM SERPL-SCNC: 143 MMOL/L (ref 135–147)
WBC # BLD AUTO: 6.81 THOUSAND/UL (ref 4.31–10.16)

## 2022-08-09 PROCEDURE — 92610 EVALUATE SWALLOWING FUNCTION: CPT

## 2022-08-09 PROCEDURE — 71045 X-RAY EXAM CHEST 1 VIEW: CPT

## 2022-08-09 PROCEDURE — 85025 COMPLETE CBC W/AUTO DIFF WBC: CPT | Performed by: INTERNAL MEDICINE

## 2022-08-09 PROCEDURE — 80048 BASIC METABOLIC PNL TOTAL CA: CPT | Performed by: INTERNAL MEDICINE

## 2022-08-09 PROCEDURE — 97166 OT EVAL MOD COMPLEX 45 MIN: CPT

## 2022-08-09 PROCEDURE — 97162 PT EVAL MOD COMPLEX 30 MIN: CPT

## 2022-08-09 PROCEDURE — 92526 ORAL FUNCTION THERAPY: CPT

## 2022-08-09 PROCEDURE — 99232 SBSQ HOSP IP/OBS MODERATE 35: CPT | Performed by: FAMILY MEDICINE

## 2022-08-09 PROCEDURE — 99232 SBSQ HOSP IP/OBS MODERATE 35: CPT | Performed by: INTERNAL MEDICINE

## 2022-08-09 RX ORDER — PANTOPRAZOLE SODIUM 40 MG/1
40 TABLET, DELAYED RELEASE ORAL
Status: DISCONTINUED | OUTPATIENT
Start: 2022-08-10 | End: 2022-08-13 | Stop reason: HOSPADM

## 2022-08-09 RX ORDER — METOPROLOL SUCCINATE 25 MG/1
25 TABLET, EXTENDED RELEASE ORAL DAILY
Status: DISCONTINUED | OUTPATIENT
Start: 2022-08-10 | End: 2022-08-09

## 2022-08-09 RX ORDER — FLUCONAZOLE 40 MG/ML
400 POWDER, FOR SUSPENSION ORAL ONCE
Status: COMPLETED | OUTPATIENT
Start: 2022-08-09 | End: 2022-08-09

## 2022-08-09 RX ORDER — FLUCONAZOLE 40 MG/ML
200 POWDER, FOR SUSPENSION ORAL DAILY
Status: DISCONTINUED | OUTPATIENT
Start: 2022-08-10 | End: 2022-08-13 | Stop reason: HOSPADM

## 2022-08-09 RX ADMIN — METOROPROLOL TARTRATE 2.5 MG: 5 INJECTION, SOLUTION INTRAVENOUS at 00:01

## 2022-08-09 RX ADMIN — Medication 12.5 MG: at 17:41

## 2022-08-09 RX ADMIN — ENOXAPARIN SODIUM 40 MG: 40 INJECTION SUBCUTANEOUS at 09:34

## 2022-08-09 RX ADMIN — FLUCONAZOLE 400 MG: 40 POWDER, FOR SUSPENSION ORAL at 17:09

## 2022-08-09 RX ADMIN — PRAVASTATIN SODIUM 40 MG: 40 TABLET ORAL at 17:09

## 2022-08-09 NOTE — ASSESSMENT & PLAN NOTE
Patient failure to thrive  Poor p o   Intake  Spouse at bedside agreeable to PEG tube placement if indicated  GI following for dysphagia evaluation  Continue calorie count

## 2022-08-09 NOTE — OCCUPATIONAL THERAPY NOTE
Occupational Therapy Evaluation     Patient Name: Cristina Fuentes  UDOAA'A Date: 8/9/2022  Problem List  Principal Problem:    Dysphagia  Active Problems:    Essential hypertension    Hyperlipidemia    Orthostatic hypotension    History of stroke    Dementia without behavioral disturbance (HCC)    Candida esophagitis (HCC)    Failure to thrive in adult    Past Medical History  Past Medical History:   Diagnosis Date    Anxiety     Arthritis     Coronary artery disease     Coronary artery disease involving native coronary artery of native heart without angina pectoris     Depression     Fracture     Hypercholesterolemia     Meningioma ([facility] [address] ) 11/1999    brain tumor    Meningioma (HCC)     Narcolepsy     daytime drowsiness and dozing off occasionally    Orthostatic hypotension     Palpitations     Prostate CA ([facility] )     Prostate cancer ([facility] )     Stroke [facility])      Past Surgical History  Past Surgical History:   Procedure Laterality Date    BACK SURGERY      BRAIN SURGERY  11/08/1999    CORONARY ARTERY BYPASS GRAFT      x5    CORONARY ARTERY BYPASS GRAFT        08/09/22 1120   OT Last Visit   OT Visit Date 08/09/22   Note Type   Note type Evaluation   Restrictions/Precautions   Weight Bearing Precautions Per Order No   Other Precautions Cognitive; Chair Alarm; Bed Alarm;Telemetry; Fall Risk   Pain Assessment   Pain Assessment Tool FLACC   Pain Location/Orientation Orientation: Bilateral;Location: Back   Pain Rating: FLACC (Rest) - Face 0   Pain Rating: FLACC (Rest) - Legs 0   Pain Rating: FLACC (Rest) - Activity 0   Pain Rating: FLACC (Rest) - Cry 0   Pain Rating: FLACC (Rest) - Consolability 0   Score: FLACC (Rest) 0   Pain Rating: FLACC (Activity) - Face 1   Pain Rating: FLACC (Activity) - Legs 1   Pain Rating: FLACC (Activity) - Activity 0   Pain Rating: FLACC (Activity) - Cry 0   Pain Rating: FLACC (Activity) - Consolability 0   Score: FLACC (Activity) 2   Home Living   Type of Home SNF  ([facility] locked Dementia Unit) Home Layout One level   Bathroom Shower/Tub Walk-in shower   Bathroom Toilet Raised   Bathroom Equipment Grab bars in shower; Shower chair;Grab bars around toilet   [address] Walker;Cane;Wheelchair-manual;Grab bars   Prior Function   Level of [place] Needs assistance with IADLs; Needs assistance with ADLs and functional mobility   Lives With Facility staff   Receives Help From Family; Other (Comment)  (facility staff)   ADL Assistance Needs assistance   IADLs Needs assistance   Vocational Retired   Lifestyle   Autonomy Assistance with ADL's/IADL's, +RW with functional ambulation   Reciprocal Relationships family, facility   Service to Others retired   Intrinsic Gratification walking, facility activities   ADL   [address] 5  [address] [facility] 5  [address] [facility] 5  Supervision/Setup   [address] 3  Moderate Assistance   [address] 4  [address] 2  [facility]  3  Moderate Assistance   Bed Mobility   Supine to Sit 4  Minimal assistance   Additional items Assist x 1   Sit to Supine 5  Supervision   Transfers   Sit to Stand 4  Minimal assistance   Additional items Assist x 1  (min a x for Cg)   Stand to Sit 4  Minimal assistance  (min a for CG)   Additional items Assist x 1   Additional Comments +RW and cues for safe hand placement   Functional Mobility   Functional Mobility 4  Minimal assistance   Additional Comments min a for CG wtih RW short distance functional mobiltiy into hallway, good activity tolerance   Balance   Static Sitting Fair +   Dynamic Sitting Fair   Static Standing Fair -   Dynamic Standing Fair -   Ambulatory Poor +   Activity Tolerance   Activity Tolerance Patient tolerated treatment well   Medical Staff Made Aware PT tayla   Nurse Made Aware RN cleared pt for therapy   RUE Assessment   RUE Assessment WFL   PALMER Assessment   LUE Assessment WFL   Vision-Basic Assessment   Current Vision Wears glasses all the time   Vision - Complex Assessment   Acuity Able to read clock/calendar on wall without difficulty   Cognition   Overall Cognitive Status Impaired  (Baseline cognitive deficits)   Arousal/Participation Alert; Responsive; Cooperative   Attention Attends with cues to redirect   Orientation Level Oriented to person;Oriented to place; Disoriented to time   Memory Decreased recall of precautions;Decreased recall of recent events;Decreased short term memory;Decreased recall of biographical information;Decreased long term memory   Following Commands Follows one step commands with increased time or repetition   Comments pt alert and oriented x 2 to self, place, not date stating "novemember" unable to state year, unable to recall medical events, decreased recall of social history   Assessment   Assessment Pt is a 80 y o  male who was admitted to [facility] on 8/8/2022 with Dysphagia , failure to thrive, esophagitis, dementia, history of stroke, orthostatic hypotension    Pt's problem list also includes PMH of  has a past medical history of Anxiety, Arthritis, Coronary artery disease, Coronary artery disease involving native coronary artery of native heart without angina pectoris, Depression, Fracture, Hypercholesterolemia, Meningioma ([address] ) (11/1999), Meningioma ([address] ), Narcolepsy, Orthostatic hypotension, Palpitations, Prostate CA ([address] ), Prostate cancer ([address] ), and Stroke ([facility] [address] )    At baseline pt was completing assistance with ADL's/iaDL's, +RW with functional ambulation  Pt lives at [facility] locked Dementia Unit Currently pt requires min/mod a  for overall ADLS and min a for CG for functional mobility/transfers  Pt currently presents with impairments in the following categories -difficulty performing ADLS, difficulty performing IADLS , limited insight into deficits and compliance activity tolerance   These impairments, as well as pt's pain  limit pt's ability to safely engage in all baseline areas of occupation, includinggrooming, bathing, dressing, toileting, functional mobility/transfers and community mobility The patient's raw score on the AM-PAC Daily Activity inpatient short form is 17, standardized score is 37 26, less than 39 4  Patients at this level are likely to benefit from discharge to post-acute rehabilitation services  Please refer to the recommendation of the Occupational Therapist for safe discharge planning  From OT standpoint, recommend return to facility with increased support upon D/C   No further acute OT needs indicated at this time - Anticipate d/c home with family support when medically cleared - d/c from caseload   Goals   Patient Goals walk   Recommendation   OT Discharge Recommendation No rehabilitation needs   Equipment Recommended   (No DME needs)   AM-PAC Daily Activity Inpatient   Lower Body Dressing 2   Bathing 2   Toileting 3   Upper Body Dressing 3   Grooming 3   Eating 4   Daily Activity Raw Score 17   Daily Activity Standardized Score (Calc for Raw Score >=11) 37 26   AM-PAC Applied Cognition Inpatient   Following a Speech/Presentation 1   Understanding Ordinary Conversation 3   Taking Medications 2   Remembering Where Things Are Placed or Put Away 2   Remembering List of 4-5 Errands 1   Taking Care of Complicated Tasks 1   Applied Cognition Raw Score 10   Applied Cognition Standardized Score 24 98     Joanie CHATTERJEE, OTR/L

## 2022-08-09 NOTE — UTILIZATION REVIEW
Initial Clinical Review    Observation 8/8 @ 1310 and changed to Inpatient on 8/9 @ 1600  Pt required continued stay for Dysphagia, Failure to thrive, Esophagitis and treatment    Admission: Date/Time/Statement:   Admission Orders (From admission, onward)     Ordered        08/09/22 1600  Inpatient Admission  Once            08/08/22 1310  Place in Observation  Once                      Orders Placed This Encounter   Procedures    Inpatient Admission     Standing Status:   Standing     Number of Occurrences:   1     Order Specific Question:   Level of Care     Answer:   Med Surg [16]     Order Specific Question:   Estimated length of stay     Answer:   More than 2 Midnights     Order Specific Question:   Certification     Answer:   I certify that inpatient services are medically necessary for this patient for a duration of greater than two midnights  See H&P and MD Progress Notes for additional information about the patient's course of treatment  ED Arrival Information     Expected   -    Arrival   8/8/2022 11:32    Acuity   Urgent            Means of arrival   Ambulance    Escorted by   [facility]    Service   Hospitalist    Admission type   Urgent            Arrival complaint   airway obstruction           Chief Complaint   Patient presents with    Difficulty Swallowing     Per  EMS pt is at [facility] and has been having difficulty swallowing food  Pt reports feeling like something is stuck in his throat       Initial Presentation: 80 y o  male to ED with Dysphagia, failure to thrive, severe nausea and vomiting  Pt noted with worsening dysphagia for past few days  Unable to eat anything and has thick tenacious mucus/saliva  Unable to swallow pills  PMH for Dementia, HTN, Stroke, Hypotension, Dyslipidemia and Ambulatory dysfunction  Admit to Observation level of care for Dysphagia, Failure to thrive, Esophagitis and Poor po intake  Man Gracia Recent EGD revealed esophagitis, Schatzki's ring  Biopsy pending  NPO  IVFs  On Iv PPI  Aspiration precautions  GI consult  Spouse at bedside agreeable to PEG tube placement if needed  8/9 Changed to Inpatient status  GI cons; Dysphagia and Failure to thrive  Swallowing issues for months  Seen outpatient and had barium esophagram done that only showed silent aspiration  Patient then had EGD done 7/26 with biopsies that only resulted on 8/9 and were positive for candida esophagitis  Also noted nonobstructive Schatzki ring requiring no intervention  Start Fluconazole treatment for Candida Esophagitis; 400mg on day 1 followed by 200mg x14 days  Recommend clear liquid diet at this time - advance as tolerated  Discussed possibility of placement of feeding tube should patient not respond to above therapy and patient's wife would like to proceed should this be required  Consider calorie count  Per Speech Therapy Note; Pt w/ known h/o dysphagia and aspiration - VBS 10/5/21 w/ SILENT aspiration of all liquids  Today, assessment limited to puree (pt refused soft solids) and thin liquids  Oral manipulation and transfer of puree is adequate, no significant oral residue  Swallows suspected fairly prompt  Cough x1 w/ thin, unknown if silent aspiration events continuing to occur  Based on research re: negative outcomes of aspirating thickened liquids, consider resuming thin liquids with use of safe swallow strategies and frequent/thorough oral care to minimize risks     Recommended Diet: puree w/ thin if pt and family accepting of aspiration risk vs continue NPO    Recommended Form of Meds: non-oral if possible   Aspiration precautions and swallowing strategies: upright posture, only feed when fully alert and slow rate of feeding      ED Triage Vitals   Temperature Pulse Respirations Blood Pressure SpO2   08/08/22 1141 08/08/22 1141 08/08/22 1141 08/08/22 1141 08/08/22 1141   98 6 °F (37 °C) 76 20 154/69 98 %      Temp Source Heart Rate Source Patient Position - Orthostatic VS BP Location FiO2 (%)   08/08/22 1700 08/08/22 1355 08/08/22 1355 08/08/22 1355 --   Oral Monitor Lying Right arm       Pain Score       08/08/22 1141       No Pain          Wt Readings from Last 1 Encounters:   08/08/22 63 3 kg (139 lb 8 8 oz)     Additional Vital Signs:   08/09/22 07:28:23 98 8 °F (37 1 °C) 82 -- 163/103   Abnormal  123 97 % -- --   08/09/22 0100 -- -- -- -- -- -- None (Room air) --   08/08/22 23:44:15 -- 82 -- 166/90 115 100 % -- --   08/08/22 22:38:22 98 4 °F (36 9 °C) 71 -- 172/77   Abnormal  109 97 % -- --   08/08/22 19:04:33 -- 86 -- 157/70 99 96 % -- --   08/08/22 1700 98 4 °F (36 9 °C) 74 16 163/68 100 97 % None (Room air) Lying   08/08/22 1355 -- 83 19 152/93 -- 97 % None (Room air)      Pertinent Labs/Diagnostic Test Results:   XR chest portable    (Results Pending)         Results from last 7 days   Lab Units 08/09/22  0454 08/08/22  1712   WBC Thousand/uL 6 81  --    HEMOGLOBIN g/dL 12 2  --    HEMATOCRIT % 37 7  --    PLATELETS Thousands/uL 185 181   NEUTROS ABS Thousands/µL 4 09  --          Results from last 7 days   Lab Units 08/09/22  0454 08/08/22  1228   SODIUM mmol/L 143 140   POTASSIUM mmol/L 3 6 3 8   CHLORIDE mmol/L 111* 107   CO2 mmol/L 25 29   ANION GAP mmol/L 7 4   BUN mg/dL 10 8   CREATININE mg/dL 0 74 0 93   EGFR ml/min/1 73sq m 81 72   CALCIUM mg/dL 10 9* 11 2*             Results from last 7 days   Lab Units 08/09/22  0454 08/08/22  1228   GLUCOSE RANDOM mg/dL 111 131     ED Treatment:   None     Past Medical History:   Diagnosis Date    Anxiety     Arthritis     Coronary artery disease     Coronary artery disease involving native coronary artery of native heart without angina pectoris     Depression     Fracture     Hypercholesterolemia     Meningioma ([address] ) 11/1999    brain tumor    Meningioma (HCC)     Narcolepsy     daytime drowsiness and dozing off occasionally    Orthostatic hypotension     Palpitations     [facility]state CA ([facility] )     Prostate cancer ([facility] [address] )     Stroke [facility])      Present on Admission:   Dysphagia   Hyperlipidemia   Essential hypertension   Dementia without behavioral disturbance (HCC)   Orthostatic hypotension   Candida esophagitis (HCC)      Admitting Diagnosis: Airway obstruction [J98 8]  Dysphagia, unspecified type [R13 10]  Age/Sex: 80 y o  male     Admission Orders:  Scheduled Medications:  enoxaparin, 40 mg, Subcutaneous, Daily  [START ON 8/10/2022] fluconazole, 200 mg, Oral, Daily  fluconazole, 400 mg, Oral, Once  metoprolol, 2 5 mg, Intravenous, Q8H  midodrine, 5 mg, Oral, Daily  pantoprazole, 40 mg, Intravenous, Q24H ELAINE  pravastatin, 40 mg, Oral, Daily With Dinner  psyllium, 1 packet, Oral, Daily  sertraline, 75 mg, Oral, Daily      Continuous IV Infusions:  sodium chloride, 50 mL/hr, Intravenous, Continuous      PRN Meds:  ondansetron, 4 mg, Intravenous, Q6H PRN        IP CONSULT TO GASTROENTEROLOGY    Network Utilization Review Department  ATTENTION: Please call with any questions or concerns to [phone] and carefully listen to the prompts so that you are directed to the right person  All voicemails are confidential   Greg Freitas all requests for admission clinical reviews, approved or denied determinations and any other requests to dedicated fax number below belonging to the campus where the patient is receiving treatment   List of dedicated fax numbers for the Facilities:  [address] DENIALS (Administrative/Medical Necessity) [phone]   [address] (Maternity/NICU/Pediatrics) [phone]   [address] [place] [address] [address] [facility] [address] [place] [address] Елена  Kamila  41  [phone]   187 [facility] Brenda Meza 1481 [address] [facility] [place] [address] [phone]

## 2022-08-09 NOTE — PROGRESS NOTES
[address] [facility]  Progress Note - Linard Leventhal 1933, 80 y o  male MRN: [id number]  Unit/Bed#: -01 Encounter: [id number]  Primary Care Provider: Vickie Hayes MD   Date and time admitted to hospital: 8/8/2022 11:37 AM    Candida esophagitis ([facility] [address] )  Assessment & Plan  Recent EGD revealed esophagitis  Biopsy shows suspicious for candidal infection, patient placed on fluconazole as per GI recommendation  Continue PPI    * Dysphagia  Assessment & Plan  Dysphagia to solids and liquids  Now on able to take pills  Recent EGD revealed esophagitis, Schatzki's ring  Biopsy :  Suspicious for Candida infection, continue fluconazole as per GI recommendation, if no improvement, may consider feeding tube-if family agrees  Continue PPI  Aspiration precautions  GI following  Speech and swallow evaluation done-as per note, patient is silently aspirating  Discussion has done with family by speech therapist, since patient is tolerating p o , will proceed with diet at this time knowing that patient remained high risk for respiration  Failure to thrive in adult  Assessment & Plan  Patient failure to thrive  Poor p o   Intake  Spouse at bedside agreeable to PEG tube placement if indicated  GI following for dysphagia evaluation  Continue calorie count      Dementia without behavioral disturbance ([address] )  Assessment & Plan  Monitor for delirium  Reorientation  Sleep hygiene      History of stroke  Assessment & Plan  History of stroke  Continue statin  Plavix presently on hold in anticipation of possible PEG tube placement    Orthostatic hypotension  Assessment & Plan  Monitor blood pressures  Avoid hypotension  Continue Midodrine    Hyperlipidemia  Assessment & Plan  Statin    Essential hypertension  Assessment & Plan  Monitor blood pressures  Avoid hypotension        VTE Pharmacologic Prophylaxis: VTE Score: 5 High Risk (Score >/= 5) - Pharmacological DVT Prophylaxis Ordered: enoxaparin (Lovenox)  Sequential Compression Devices Ordered  Patient Centered Rounds: I performed bedside rounds with nursing staff today  Discussions with Specialists or Other Care Team Provider:  GI, speech therapist    Education and Discussions with Family / Patient:  With patient    Time Spent for Care: 15 minutes  More than 50% of total time spent on counseling and coordination of care as described above  Current Length of Stay: 0 day(s)  Current Patient Status: Inpatient   Certification Statement: The patient will continue to require additional inpatient hospital stay due to To monitor above condition  Discharge Plan: Anticipate discharge in 48-72 hrs to home  Code Status: Level 1 - Full Code    Subjective:   Seen and evaluated during the round  Resting comfortably  Complaining of dysphagia with food  Denies any nausea, vomiting  Objective:     Vitals:   Temp (24hrs), Av 4 °F (36 9 °C), Min:98 1 °F (36 7 °C), Max:98 8 °F (37 1 °C)    Temp:  [98 1 °F (36 7 °C)-98 8 °F (37 1 °C)] 98 1 °F (36 7 °C)  HR:  [71-86] 80  BP: (147-172)/() 147/66  SpO2:  [96 %-100 %] 96 %  Body mass index is 20 61 kg/m²  Input and Output Summary (last 24 hours): Intake/Output Summary (Last 24 hours) at 2022 1719  Last data filed at 2022 1210  Gross per 24 hour   Intake 0 ml   Output --   Net 0 ml       Physical Exam:   Physical Exam  Vitals and nursing note reviewed  Exam conducted with a chaperone present  Constitutional:       Appearance: Normal appearance  He is not ill-appearing or diaphoretic  HENT:      Head: Normocephalic  Nose: Nose normal  No congestion  Mouth/Throat:      Mouth: Mucous membranes are moist       Pharynx: Oropharynx is clear  No oropharyngeal exudate  Eyes:      General: No scleral icterus  Left eye: No discharge  Extraocular Movements: Extraocular movements intact        Conjunctiva/sclera: Conjunctivae normal       Pupils: Pupils are equal, round, and reactive to light  Neck:      Vascular: No carotid bruit  Cardiovascular:      Rate and Rhythm: Normal rate  Heart sounds: No murmur heard  No friction rub  No gallop  Pulmonary:      Effort: Pulmonary effort is normal  No respiratory distress  Breath sounds: No stridor  No wheezing or rhonchi  Abdominal:      General: Abdomen is flat  Bowel sounds are normal  There is no distension  Palpations: There is no mass  Tenderness: There is no abdominal tenderness  Hernia: No hernia is present  Musculoskeletal:         General: No swelling, tenderness, deformity or signs of injury  Normal range of motion  Cervical back: Normal range of motion  No rigidity or tenderness  Lymphadenopathy:      Cervical: No cervical adenopathy  Skin:     General: Skin is warm  Coloration: Skin is not jaundiced or pale  Findings: No bruising or erythema  Neurological:      Mental Status: He is alert and oriented to person, place, and time  Mental status is at baseline  Cranial Nerves: No cranial nerve deficit  Psychiatric:         Mood and Affect: Mood normal          Additional Data:     Labs:  Results from last 7 days   Lab Units 08/09/22  0454   WBC Thousand/uL 6 81   HEMOGLOBIN g/dL 12 2   HEMATOCRIT % 37 7   PLATELETS Thousands/uL 185   NEUTROS PCT % 60   LYMPHS PCT % 26   MONOS PCT % 11   EOS PCT % 3     Results from last 7 days   Lab Units 08/09/22  0454   SODIUM mmol/L 143   POTASSIUM mmol/L 3 6   CHLORIDE mmol/L 111*   CO2 mmol/L 25   BUN mg/dL 10   CREATININE mg/dL 0 74   ANION GAP mmol/L 7   CALCIUM mg/dL 10 9*   GLUCOSE RANDOM mg/dL 111                       Lines/Drains:  Invasive Devices  Report    None                       Imaging: No pertinent imaging reviewed      Recent Cultures (last 7 days):         Last 24 Hours Medication List:   Current Facility-Administered Medications   Medication Dose Route Frequency Provider Last Rate    enoxaparin  40 mg Subcutaneous Daily MD Simone Sinha [facility] ON 8/10/2022] fluconazole  200 mg Oral Daily Rickey Verma DO      metoprolol tartrate  12 5 mg Oral Q12H [address] Lena White MD      midodrine  5 mg Oral Daily Freda Mendez MD      ondansetron  4 mg Intravenous Q6H PRN MD Simone Sinha [facility] ON 8/10/2022] pantoprazole  40 mg Oral Early Morning Lena White MD      pravastatin  40 mg Oral Daily With [place], MD      psyllium  1 packet Oral Daily Freda Mendez MD      sertraline  75 mg Oral Daily Freda Mendez MD      sodium chloride  50 mL/hr Intravenous Continuous Freda Mendez MD Stopped (08/09/22 2345)        Today, Patient Was Seen By: Lena White MD    **Please Note: This note may have been constructed using a voice recognition system  **

## 2022-08-09 NOTE — SPEECH THERAPY NOTE
Speech Language/Pathology    Speech/Language Pathology Progress Note    Patient Name: Malena Frazier  [facility] Date: 8/9/2022     Problem List  Principal Problem:    Dysphagia  Active Problems:    Essential hypertension    Hyperlipidemia    Orthostatic hypotension    History of stroke    Dementia without behavioral disturbance (HCC)    Candida esophagitis (HCC)    Failure to thrive in adult       Past Medical History  Past Medical History:   Diagnosis Date    Anxiety     Arthritis     Coronary artery disease     Coronary artery disease involving native coronary artery of native heart without angina pectoris     Depression     Fracture     Hypercholesterolemia     [facility]ioma ([facility] ) 11/1999    brain tumor    Meningioma ([facility] )     Narcolepsy     daytime drowsiness and dozing off occasionally    Orthostatic hypotension     Palpitations     Prostate CA ([facility] )     Prostate cancer ([facility] )     Stroke [facility])         Past Surgical History  Past Surgical History:   Procedure Laterality Date    BACK SURGERY      BRAIN SURGERY  11/08/1999    CORONARY ARTERY BYPASS GRAFT      x5    CORONARY ARTERY BYPASS GRAFT           Subjective:  Pt's family present  Pt awake and alert  CXR obtained though not yet read  Objective:  Pt seen for dx dysphagia tx/education  Previous VBS findings thoroughly reviewed w/ pt and pt's family  Education provided on anatomy/physiology of the swallow and identified pharyngeal impairments  Education provided on silent aspiration and potential medical complications associated w/ aspiration  Options provided on continuing PO diet w/ acceptance of aspiration risk vs  ongoing discussions for potential alt means nutrition to reduce prandial aspiration risk  Education provided on strategies to optimize swallow safety, including the importance of oral care and s/s medical complications associated w/ aspiration to monitor for and notify medical team of should they arise   Pt and pt's family w/ several relevant questions, all answered to their satisfaction w/ understanding  Pt/pt's family verbalized understanding of options and at this time wish to accept risks of aspiration and trial continuing PO diet (puree w/ thin liquids) w/ close monitoring for indicators of aspiration pna  Physician notified of decision  Assessment:  Pt and pt's family w/ good understanding of pt's h/o and current s/s oropharyngeal dysphagia/aspiration  Pt and pt's family aware and understanding of risk of aspiration, though at this time wish to accept that risk if no medical indicators of aspiration pna       Plan/Recommendations:  Consider puree w/ thin  Strict aspiration precautions  Frequent and thorough oral care

## 2022-08-09 NOTE — PLAN OF CARE
Problem: MOBILITY - ADULT  Goal: Maintain or return to baseline ADL function  Description: INTERVENTIONS:  -  Assess patient's ability to carry out ADLs; assess patient's baseline for ADL function and identify physical deficits which impact ability to perform ADLs (bathing, care of mouth/teeth, toileting, grooming, dressing, etc )  - Assess/evaluate cause of self-care deficits   - Assess range of motion  - Assess patient's mobility; develop plan if impaired  - Assess patient's need for assistive devices and provide as appropriate  - Encourage maximum independence but intervene and supervise when necessary  - Involve family in performance of ADLs  - Assess for home care needs following discharge   - Consider OT consult to assist with ADL evaluation and planning for discharge  - Provide patient education as appropriate  Outcome: Progressing     Problem: PAIN - ADULT  Goal: Verbalizes/displays adequate comfort level or baseline comfort level  Description: Interventions:  - Encourage patient to monitor pain and request assistance  - Assess pain using appropriate pain scale  - Administer analgesics based on type and severity of pain and evaluate response  - Implement non-pharmacological measures as appropriate and evaluate response  - Consider cultural and social influences on pain and pain management  - Notify physician/advanced practitioner if interventions unsuccessful or patient reports new pain  Outcome: Progressing     Problem: INFECTION - ADULT  Goal: Absence or prevention of progression during hospitalization  Description: INTERVENTIONS:  - Assess and monitor for signs and symptoms of infection  - Monitor lab/diagnostic results  - Monitor all insertion sites, i e  indwelling lines, tubes, and drains  - Monitor endotracheal if appropriate and nasal secretions for changes in amount and color  - Prince appropriate cooling/warming therapies per order  - Administer medications as ordered  - Instruct and encourage patient and family to use good hand hygiene technique  - Identify and instruct in appropriate isolation precautions for identified infection/condition  Outcome: Progressing

## 2022-08-09 NOTE — CASE MANAGEMENT
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) ([facility] ) 2020    Calcification of aorta ([facility] ) 2020    Need for influenza vaccination 2020    Depression 05/15/2020    History of stroke 2020    Acute CVA (cerebrovascular accident), suspected embolic in nature     Hypercalcemia 2019    History of resection of meningioma 2019    Lumbar spondylosis 2019    Lumbar radiculopathy 2019    Convulsions ([facility] ) 2019    Orthostatic hypotension 2018    Contusion of rib on right side 2018    Cognitive decline 2018    History of meningioma 2018    Idiopathic peripheral neuropathy 2018    Iron deficiency anemia 2018    Constipation 2018    Iron deficiency 2018    Other constipation 2018    Vitamin B12 deficiency 2017    Anemia 2017    Insomnia 2016    Arteriosclerotic cardiovascular disease 2015    Esophageal reflux 2014    Cervical spinal stenosis 2013    Spinal stenosis of lumbar region with neurogenic claudication 2013    Backache 2013    Essential hypertension 2013    Hyperlipidemia 2013    Depression 2013      LOS (days): 0  Geometric Mean LOS (GMLOS) (days):   Days to GMLOS:     OBJECTIVE:              Current admission status: Observation       Preferred Pharmacy:   [facility]  [address] [place] [address]  Phone: [phone] Fax: [phone]    [address], [place] - [address] [facility]  [address] [facility] [address]  Phone: [phone] Fax: [phone]    Primary Care Provider: Rylee Butt MD    Primary Insurance: [facility]  Secondary Insurance:     ASSESSMENT:  Melony Young Proxies    There are no active Health Care Proxies on file  Readmission Root Cause  30 Day Readmission: No    Patient Information  Admitted from[de-identified] Facility ([address] [place] [address])  Mental Status: Confused  During Assessment patient was accompanied by: Not accompanied during assessment (Rosario from [facility])  Assessment information provided by[de-identified] Other - please comment (Rosario at [facility])  Primary Caregiver:  [facility])  Support Systems: Spouse/significant other, Nikki Mung[address] of Residence: [address] [place] [address] do you live in?: [address] entry access options  Select all that apply : No steps to enter home  Type of Current Residence: Apartment (Assisted Living)  Floor Level: 2  Upon entering residence, is there a bedroom on the main floor (no further steps)?: Yes  Upon entering residence, is there a bathroom on the main floor (no further steps)?: Yes  In the last 12 months, was there a time when you were not able to pay the mortgage or rent on time?: No  In the last 12 months, how many places have you lived?: 1  In the last 12 months, was there a time when you did not have a steady place to sleep or slept in a shelter (including now)?: No  Homeless/housing insecurity resource given?: N/A  Living Arrangements: Other (Comment) ([place], Wife live in [address] living same facility)    Activities of Daily Living Prior to Admission  Functional Status: Total dependent  Completes ADLs independently?: No  Level of ADL dependence:  Total Dependent  Ambulates independently?: No  Level of ambulatory dependence: Assistance  Does patient use assisted devices?: Yes  Assisted Devices (DME) used: Jennifer Bearded, Wheelchair, Shower Chair  Does patient currently own DME?: Yes  What DME does the patient currently own?: Jennifer Bearded  Does patient have a history of Outpatient Therapy (PT/OT)?: No  Does the patient have a history of Short-Term Rehab?: Yes ([facility])  Does patient have a history of HHC?: No  Does patient currently have [place] [id number]?: No         Patient Information Continued  Income Source: Pension/long term  Does patient have prescription coverage?: Yes  Within the past 12 months, you worried that your food would run out before you got the money to buy more : Never true  Within the past 12 months, the food you bought just didn't last and you didn't have money to get more : Never true  Food insecurity resource given?: N/A  Does patient receive dialysis treatments?: No  Does patient have a history of substance abuse?: No  Does patient have a history of Mental Health Diagnosis?: No         Means of Transportation  In the past 12 months, has lack of transportation kept you from medical appointments or from getting medications?: Patient refused  In the past 12 months, has lack of transportation kept you from meetings, work, or from getting things needed for daily living?: Patient refused  Was application for public transport provided?: N/A        DISCHARGE DETAILS:    Discharge planning discussed with[de-identified] Rosario     Comments - Freedom of Choice: Per Rosario the facilty will review the patients' d/c status 1  If he gets a Peg tube He cannot be attempting to pull it out   2  Rosario stated she would need to dis cuss the case with administration prior to d/c

## 2022-08-09 NOTE — ASSESSMENT & PLAN NOTE
Dysphagia to solids and liquids  Now on able to take pills  Recent EGD revealed esophagitis, Schatzki's ring  Biopsy :  Suspicious for Candida infection, continue fluconazole as per GI recommendation, if no improvement, may consider feeding tube-if family agrees  Continue PPI  Aspiration precautions  GI following  Speech and swallow evaluation done-as per note, patient is silently aspirating  Discussion has done with family by speech therapist, since patient is tolerating p o , will proceed with diet at this time knowing that patient remained high risk for respiration

## 2022-08-09 NOTE — PHYSICAL THERAPY NOTE
Physical Therapy Evaluation    Patient's Name: Shan Hughes    Admitting Diagnosis  Airway obstruction [J98 8]  Dysphagia, unspecified type [R13 10]    Problem List  Patient Active Problem List   Diagnosis    Constipation    Iron deficiency    Other constipation    Anemia    Arteriosclerotic cardiovascular disease    Backache    Essential hypertension    Cervical spinal stenosis    Depression    Esophageal reflux    Hyperlipidemia    Insomnia    Spinal stenosis of lumbar region with neurogenic claudication    Vitamin B12 deficiency    Idiopathic peripheral neuropathy    Iron deficiency anemia    History of meningioma    Cognitive decline    Contusion of rib on right side    Orthostatic hypotension    Convulsions (HCC)    Lumbar spondylosis    Lumbar radiculopathy    Acute CVA (cerebrovascular accident), suspected embolic in nature    Hypercalcemia    History of resection of meningioma    History of stroke    Depression    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) ([address] )    Calcification of aorta ([address] )    Need for influenza vaccination    Dementia without behavioral disturbance ([address] )    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[address] )    Dysphagia    Candida esophagitis ([address] )    Failure to thrive in adult       Past Medical History  Past Medical History:   Diagnosis Date    Anxiety     Arthritis     Coronary artery disease     Coronary artery disease involving native coronary artery of native heart without angina pectoris     Depression     Fracture     Hypercholesterolemia     Meningioma ([address] ) 11/1999    brain tumor    Meningioma (HCC)     Narcolepsy     daytime drowsiness and dozing off occasionally    Orthostatic hypotension     Palpitations     Prostate CA ([address] )     Prostate cancer ([address] )     Stroke [facility])        Past Surgical History  Past Surgical History:   Procedure Laterality Date    BACK SURGERY      BRAIN SURGERY  11/08/1999    CORONARY ARTERY BYPASS GRAFT      x5    CORONARY ARTERY BYPASS GRAFT          08/09/22 1132   PT Last Visit   PT Visit Date 08/09/22   Note Type   Note type Evaluation   Pain Assessment   Pain Assessment Tool 0-10   Pain Score No Pain   Restrictions/Precautions   Other Precautions Cognitive; Bed Alarm; Fall Risk   Home Living   Type of Home Other (Comment)  ([address] dementia unit)   Home Layout One level   [address] [facility]   Additional Comments Per EMR, pt resides at [address] dementia unit, pt able to confirm the above and notes that he ambulates with RW   Prior Function   Level of [place] Needs assistance with ADLs and functional mobility; Needs assistance with IADLs   Lives With Facility staff   Receives Help From Other (Comment)  (facility staff)   ADL Assistance Needs assistance   IADLs Needs assistance   Cognition   Overall Cognitive Status Impaired   Orientation Level Oriented to person;Oriented to place;Oriented to situation;Disoriented to time   Memory Decreased recall of precautions   Following Commands Follows one step commands with increased time or repetition   Comments pt is pleasantly confused, willing to participate with PT   RLE Assessment   RLE Assessment WFL   LLE Assessment   LLE Assessment WFL   Bed Mobility   Supine to Sit 4  Minimal assistance   Additional items Assist x 1;HOB elevated; Bedrails; Increased time required   Sit to Supine 4  Minimal assistance   Additional items Assist x 1; Increased time required;Verbal cues;LE management   Transfers   Sit to Stand 5  Supervision   Additional items Increased time required;Verbal cues   Stand to Sit 5  Supervision   Additional items Increased time required;Verbal cues   Additional Comments transfers with RW, VCs for proper hand placement and safety   Ambulation/Elevation   Gait pattern Excessively slow; Short stride   Gait Assistance   (CGA)   Additional items Assist x 1   Assistive Device Rolling walker   Distance 70ft - steady, no episodes of LOB   Balance   Static Sitting Fair +   Dynamic Sitting Fair +   Static Standing Fair   Dynamic Standing Fair   Ambulatory Fair -   Activity Tolerance   Activity Tolerance Patient tolerated treatment well   Medical Staff Made Aware Seen with OT 2* pt's medical complexity  PT focus on functional transfers, gait, and balance   Nurse Made Aware ok to see per RN   Assessment   Prognosis Good   Assessment Pt is a 80 y o  male seen for PT evaluation s/p admit to [facility] on 8/8/2022  Pt was admitted with a primary dx of: dysphagia  PT now consulted for assessment of mobility and d/c needs  Pt with PT evaluation orders  Pts current comorbidities effecting treatment include: anxiety, arthritis, CAD, depression, meningioma, narcolepsy, prostate cancer, stroke, dementia  Pts current clinical presentation is Evolving (medium complexity) due to Ongoing medical management for primary dx, Decreased activity tolerance compared to baseline, Trending lab values  Prior to admission, pt was residing at [facility] locked dementia unit  Pt questionable historian 2* dementia, however reports normally ambulating with RW  Upon evaluation, pt currently is requiring Reshma for bed mobility; supervision for transfers; and CGA for ambulation 70 ft w/ RW  Pt presents at PT eval functioning near baseline mobility level  No acute PT needs identified  PT signing off  At conclusion of PT session pt returned BTB and bed alarm engaged with phone and call bell within reach  Pt denies any further questions at this time  The patient's AM-PAC Basic Mobility Inpatient Short Form Raw Score is 19  A Raw score of greater than 16 suggests the patient may benefit from discharge to home  Please also refer to the recommendation of the Physical Therapist for safe discharge planning  Recommend return to [facility] upon hospital D/C     Goals   Patient Goals to feel better   Plan   PT Frequency Other (Comment)  (eval only, D/C PT)   Recommendation   PT Discharge Recommendation No rehabilitation needs  (return to [facility] with continued assistance from staff)   [place] 8 in Bed Without Bedrails 4   Lying on Back to Sitting on Edge of Flat Bed 3   Moving Bed to Chair 3   Standing Up From Chair 3   Walk in Room 3   Climb 3-5 Stairs 3   Basic Mobility Inpatient Raw Score 19   Basic Mobility Standardized Score 42 48   Highest Level Of Mobility   -HLM Goal 6: Walk 10 steps or more   JH-HLM Achieved 7: Walk 25 feet or more   Modified [place] Scale   Modified [place] Scale 3   End of Consult   Patient Position at End of Consult Supine;Bed/Chair alarm activated; All needs within reach       Yair Rather, PT, DPT

## 2022-08-09 NOTE — CONSULTS
GASTROENTEROLOGY CONSULT NOTE     PATIENT INFORMATION     Name: George Kebede   Age & Sex: 80 y o  male   MRN: [id number]  Hospital Stay Days: 0  Unit/Bed#: -47   Encounter: [id number]    ASSESSMENT/PLAN     Principal Problem:    Dysphagia  Active Problems:    Essential hypertension    Hyperlipidemia    Orthostatic hypotension    History of stroke    Dementia without behavioral disturbance (HCC)    Esophagitis    Failure to thrive in adult    Mr Emmy Dean is an 80 y o  male with past medical history of dementia (since 2018), ambulatory dysfunction, hypertension, history of CVA, dyslipidemia who presented on 8/8 for dysphagia and failure to thrive with decreased oral intake  GI has been consulted for evaluation of dysphagia  1  Dysphagia  2  Failure to thrive  Patient with issues swallowing for multiple months  Saw Dr Merissa Mills outpatient for evaluation and had barium esophagram done that only showed silent aspiration  Patient then had EGD done 7/26 with biopsies that only resulted on 8/9 and were positive for candida esophagitis  Also noted nonobstructive Schatzki ring requiring no intervention     · Will initiate fluconazole treatment for Candida Esophagitis  · 400mg on day 1 followed by 200mg x14 days  · Recommend clear liquid diet at this time - advance as tolerated  · Discussed possibility of placement of feeding tube should patient not respond to above therapy and patient's wife would like to proceed should this be required  · Does have concerns in regards to where patient would reside if he had feeding tube placed  · Consider liquid nutritional supplementation with ensure until able to tolerate more  · Monitor strict I/O  · Consider calorie count    VTE Pharmacologic Prophylaxis: Enoxaparin (Lovenox)  VTE Mechanical Prophylaxis: sequential compression device    HISTORY OF PRESENT ILLNESS   Reason for Admission / Principal Problem: dysphagia, failure to thrive  Reason for Consult: dysphagia    Ruthie Odor 80 y o  male with past medical history of dementia (since 2018), ambulatory dysfunction, hypertension, history of CVA, dyslipidemia who presented to [facility] on 08/08 for concern of ongoing dysphagia with failure to thrive  Patient has underlying dementia, and although he is oriented x2-3, he remains confused in regards to overall situation  Due to patient's dementia, majority of history gathered from his wife Filiberto Cortés  Patient weighs states he initially started with issues related to dementia in 2018  Since then, there family has moved him into an assisted living facility [address])  Her  has since been moved to locked unit due to his ongoing issues with wandering, and concern for his safety  She reports over the last few months he has been having trouble swallowing appropriately  Initially thought to be 2/2 eating bites that were too large, but since has been unable to even tolerate swallowing pills  Patient underwent outpatient esophagram without abnormality on 6/24  He then had EGD 7/26 with biopsies showing candida esophagitis  However, these results only became available on 8/9, and treatment had not been started  GI has been consulted for dysphagia resulting in failure to thrive  SUBJECTIVE     Patient seen and examined at bedside  Currently only complaint is feeling as though someone is stuck at the back of his oropharynx prohibiting him from swallowing  REVIEW OF SYSTEMS   Review of Systems   Constitutional: Positive for appetite change  Negative for chills, fatigue and fever  HENT: Positive for trouble swallowing  Negative for ear pain, rhinorrhea, sneezing, sore throat and tinnitus  Eyes: Negative for pain and visual disturbance  Respiratory: Negative for cough and shortness of breath  Cardiovascular: Negative for chest pain, palpitations and leg swelling     Gastrointestinal: Negative for abdominal pain, constipation, diarrhea, nausea and vomiting  Endocrine: Negative for polydipsia and polyuria  Genitourinary: Negative for difficulty urinating and dysuria  Musculoskeletal: Negative for arthralgias and back pain  Skin: Negative for color change and rash  Neurological: Negative for dizziness, weakness, light-headedness and headaches  Psychiatric/Behavioral: Positive for confusion  The patient is not nervous/anxious  OBJECTIVE     Vitals:    22 1904 22 2238 22 2344 22 0728   BP: 157/70 (!) 172/77 166/90 (!) 163/103   BP Location:       Pulse: 86 71 82 82   Resp:       Temp:  98 4 °F (36 9 °C)  98 8 °F (37 1 °C)   TempSrc:       SpO2: 96% 97% 100% 97%   Weight:          Temperature:   Temp (24hrs), Av 6 °F (37 °C), Min:98 4 °F (36 9 °C), Max:98 8 °F (37 1 °C)    Temperature: 98 8 °F (37 1 °C)  Intake & Output:  I/O        0701   07 0701  / 0700 / 0701  08/10 0700    P  O   0     Total Intake(mL/kg)  0 (0)     Net  0                Weights:        Body mass index is 20 61 kg/m²  Weight (last 2 days)     Date/Time Weight    22 1700 63 3 (139 55)        Physical Exam  Vitals reviewed  Constitutional:       General: He is awake  He is not in acute distress  Appearance: Normal appearance  He is well-developed  He is not ill-appearing, toxic-appearing or diaphoretic  HENT:      Head: Normocephalic and atraumatic  Right Ear: External ear normal       Left Ear: External ear normal       Nose: Nose normal       Mouth/Throat:      Mouth: Mucous membranes are dry  Eyes:      General:         Right eye: No discharge  Left eye: No discharge  Conjunctiva/sclera: Conjunctivae normal    Cardiovascular:      Rate and Rhythm: Normal rate and regular rhythm  Pulmonary:      Effort: Pulmonary effort is normal  No respiratory distress  Abdominal:      General: Bowel sounds are normal  There is no distension        Palpations: Abdomen is soft  Tenderness: There is no abdominal tenderness  Musculoskeletal:         General: No swelling or tenderness  Normal range of motion  Cervical back: Normal range of motion  Right lower leg: No edema  Left lower leg: No edema  Skin:     General: Skin is warm and dry  Coloration: Skin is not jaundiced  Findings: No bruising  Neurological:      General: No focal deficit present  Motor: No weakness  Comments: AAOx2-3, able to state name, location, year, but unable to state month or situation  Psychiatric:         Mood and Affect: Mood normal          Behavior: Behavior normal  Behavior is cooperative  Thought Content:  Thought content normal        PAST MEDICAL HISTORY     Past Medical History:   Diagnosis Date    Anxiety     Arthritis     Coronary artery disease     Coronary artery disease involving native coronary artery of native heart without angina pectoris     Depression     Fracture     Hypercholesterolemia     Meningioma ([facility] [address] ) 1999    brain tumor    Meningioma (HCC)     Narcolepsy     daytime drowsiness and dozing off occasionally    Orthostatic hypotension     Palpitations     Prostate CA ([facility] [address] [facility]     Prostate cancer ([facility] [address] [facility]     [facility]      PAST SURGICAL HISTORY     Past Surgical History:   Procedure Laterality Date    BACK SURGERY      BRAIN SURGERY  1999    CORONARY ARTERY BYPASS GRAFT      x5    CORONARY ARTERY BYPASS GRAFT       SOCIAL & FAMILY HISTORY     Social History     Substance and Sexual Activity   Alcohol Use Not Currently    Comment: (history)     Social History     Substance and Sexual Activity   Drug Use No     Social History     Tobacco Use   Smoking Status Former Smoker    Types: Cigarettes    Quit date: 1995    Years since quittin 6   Smokeless Tobacco Never Used   Tobacco Comment    former cig smoker- quit in      Family History   Problem Relation Age of Onset    Hypertension Mother benign essential    Cancer Mother     Osteoporosis Mother     Suicidality Father     Diabetes Family     Heart disease Family     Neuropathy Family         peripheral    Prostate cancer Family     Thyroid disease Family      LABORATORY DATA     Labs: I have personally reviewed pertinent reports  Results from last 7 days   Lab Units 08/09/22  0454 08/08/22  1712   WBC Thousand/uL 6 81  --    HEMOGLOBIN g/dL 12 2  --    HEMATOCRIT % 37 7  --    PLATELETS Thousands/uL 185 181   NEUTROS PCT % 60  --    MONOS PCT % 11  --       Results from last 7 days   Lab Units 08/09/22  0454 08/08/22  1228   POTASSIUM mmol/L 3 6 3 8   CHLORIDE mmol/L 111* 107   CO2 mmol/L 25 29   BUN mg/dL 10 8   CREATININE mg/dL 0 74 0 93   CALCIUM mg/dL 10 9* 11 2*                          Micro:  Lab Results   Component Value Date    URINECX <10,000 cfu/ml  12/10/2021     IMAGING & DIAGNOSTIC TESTS     Imaging: I have personally reviewed pertinent reports  No results found  EKG, Pathology, and Other Studies: I have personally reviewed pertinent reports  ALLERGIES     Allergies   Allergen Reactions    Aricept [Donepezil] Confusion     confusion    Atorvastatin Myalgia, Dizziness and Headache    Niacin Other (See Comments)     unknown     MEDICATIONS PRIOR TO ARRIVAL     Prior to Admission medications    Medication Sig Start Date End Date Taking?  Authorizing Provider   clopidogrel (PLAVIX) 75 mg tablet TAKE ONE TABLET BY MOUTH DAILY *PATIENT SUPPLY 6/14/64   Vickie Hayes MD   Melatonin 5 MG CAPS Take 5 mg by mouth daily at bedtime    Historical Provider, MD   Melatonin 5 MG TABS TAKE ONE TABLET BY MOUTH EVERY NIGHT AT BEDTIME *PATIENT SUPPLY 2/34/07   Vickie Hayes MD   Metamucil Fiber 51 7 % PACK MIX 1 PACKET IN 6-8 OUNCES OF WATER AND CONSUME BY MOUTH DAILY *PATIENT SUPPLY 8/11/31   Vickie Hayes MD   metoprolol succinate (TOPROL-XL) 25 mg 24 hr tablet TAKE ONE TABLET BY MOUTH DAILY *PATIENT SUPPLY 7/14/22   Izaiah MD Ny   midodrine (PROAMATINE) 5 mg tablet TAKE ONE TABLET BY MOUTH DAILY *PATIENT SUPPLY 3/37/23   Krista Espinoza MD   omeprazole (PriLOSEC) 40 MG capsule TAKE ONE CAPSULE BY MOUTH EVERY DAY IN THE MORNING *PATIENT SUPPLY 7/43/17   Krista Espinoza MD   psyllium (METAMUCIL) 58 6 % packet Take 1 packet by mouth daily    Historical Provider, MD   sertraline (ZOLOFT) 50 mg tablet TAKE 1 & 1/2 TABLETS BY MOUTH DAILY *PATIENT SUPPLY 1/53/56   Krista Espinoza MD   simvastatin (ZOCOR) 80 mg tablet TAKE ONE TABLET BY MOUTH EVERY DAY IN THE EVENING *PATIENT SUPPLY 0/51/22   Krista Espinoza MD   Stool Softener 100 MG capsule TAKE ONE CAPSULE BY MOUTH TWO TIMES A DAY *PATIENT SUPPLY 7/61/15   Krista Espinoza MD     MEDICATIONS ADMINISTERED IN LAST 24 HOURS     Medication Administration - last 24 hours from 08/08/2022 0749 to 08/09/2022 0749       Date/Time Order Dose Route Action Action by     08/08/2022 1715 psyllium (METAMUCIL) 1 packet 0 packet Oral Hold Lennis Merlin, RN     08/08/2022 1716 pravastatin (PRAVACHOL) tablet 40 mg 0 mg Oral Hold Lennis Merlin, RN     08/08/2022 1715 sertraline (ZOLOFT) tablet 75 mg 0 mg Oral Hold Lennis Merlin, RN     08/08/2022 1716 midodrine (PROAMATINE) tablet 5 mg 0 mg Oral Hold Lennis Merlin, RN     08/08/2022 1607 metoprolol succinate (TOPROL-XL) 24 hr tablet 25 mg 25 mg Oral Not Given Kelly Arita     08/09/2022 0336 sodium chloride 0 9 % infusion 0 mL/hr Intravenous Stopped Linda Aleman RN     08/08/2022 1731 sodium chloride 0 9 % infusion 50 mL/hr Intravenous Obietharley 37 Lennis Merlin, RN     08/08/2022 1731 enoxaparin (LOVENOX) subcutaneous injection 40 mg 0 mg Subcutaneous Hold Lennis Merlin, RN     08/08/2022 1715 pantoprazole (PROTONIX) injection 40 mg 0 mg Intravenous Hold Lennis Merlin, RN     08/09/2022 0001 metoprolol (LOPRESSOR) injection 2 5 mg 2 5 mg Intravenous Given Linda Aleman RN     08/08/2022 1731 metoprolol (LOPRESSOR) injection 2 5 mg 2 5 mg Intravenous Given Karen Ndiaye RN        CURRENT MEDICATIONS     Current Facility-Administered Medications   Medication Dose Route Frequency Provider Last Rate    enoxaparin  40 mg Subcutaneous Daily Randa Bond MD      metoprolol  2 5 mg Intravenous Q8H Randa Bond MD      midodrine  5 mg Oral Daily Randa Bond MD      ondansetron  4 mg Intravenous Q6H PRN Randa Bond MD      pantoprazole  40 mg Intravenous Q24H [facility] Randa Bond MD      pravastatin  40 mg Oral Daily With Maggie Singh MD      psyllium  1 packet Oral Daily Randa Bond MD      sertraline  75 mg Oral Daily Randa Bond MD      sodium chloride  50 mL/hr Intravenous Continuous Randa Bond MD Stopped (08/09/22 4387)     sodium chloride, 50 mL/hr, Last Rate: Stopped (08/09/22 0336)      ondansetron, 4 mg, Q6H PRN        Portions of the record may have been created with voice recognition software  Occasional wrong word or "sound a like" substitutions may have occurred due to the inherent limitations of voice recognition software    Read the chart carefully and recognize, using context, where substitutions have occurred     ==  Lisa David, [id number] [facility]  Internal Medicine Residency

## 2022-08-09 NOTE — PLAN OF CARE
Problem: MOBILITY - ADULT  Goal: Maintain or return to baseline ADL function  Description: INTERVENTIONS:  -  Assess patient's ability to carry out ADLs; assess patient's baseline for ADL function and identify physical deficits which impact ability to perform ADLs (bathing, care of mouth/teeth, toileting, grooming, dressing, etc )  - Assess/evaluate cause of self-care deficits   - Assess range of motion  - Assess patient's mobility; develop plan if impaired  - Assess patient's need for assistive devices and provide as appropriate  - Encourage maximum independence but intervene and supervise when necessary  - Involve family in performance of ADLs  - Assess for home care needs following discharge   - Consider OT consult to assist with ADL evaluation and planning for discharge  - Provide patient education as appropriate  Outcome: Progressing  Goal: Maintains/Returns to pre admission functional level  Description: INTERVENTIONS:  - Perform BMAT or MOVE assessment daily    - Set and communicate daily mobility goal to care team and patient/family/caregiver  - Collaborate with rehabilitation services on mobility goals if consulted  - Perform Range of Motion 4 times a day  - Reposition patient every 2 hours    - Dangle patient 4 times a day  - Stand patient 3 times a day  - Ambulate patient 3 times a day  - Out of bed to chair 3 times a day   - Out of bed for meals 3 times a day  - Out of bed for toileting  - Record patient progress and toleration of activity level   Outcome: Progressing     Problem: PAIN - ADULT  Goal: Verbalizes/displays adequate comfort level or baseline comfort level  Description: Interventions:  - Encourage patient to monitor pain and request assistance  - Assess pain using appropriate pain scale  - Administer analgesics based on type and severity of pain and evaluate response  - Implement non-pharmacological measures as appropriate and evaluate response  - Consider cultural and social influences on pain and pain management  - Notify physician/advanced practitioner if interventions unsuccessful or patient reports new pain  Outcome: Progressing     Problem: INFECTION - ADULT  Goal: Absence or prevention of progression during hospitalization  Description: INTERVENTIONS:  - Assess and monitor for signs and symptoms of infection  - Monitor lab/diagnostic results  - Monitor all insertion sites, i e  indwelling lines, tubes, and drains  - Monitor endotracheal if appropriate and nasal secretions for changes in amount and color  - [place] appropriate cooling/warming therapies per order  - Administer medications as ordered  - Instruct and encourage patient and family to use good hand hygiene technique  - Identify and instruct in appropriate isolation precautions for identified infection/condition  Outcome: Progressing  Goal: Absence of fever/infection during neutropenic period  Description: INTERVENTIONS:  - Monitor WBC    Outcome: Progressing     Problem: SAFETY ADULT  Goal: Maintain or return to baseline ADL function  Description: INTERVENTIONS:  -  Assess patient's ability to carry out ADLs; assess patient's baseline for ADL function and identify physical deficits which impact ability to perform ADLs (bathing, care of mouth/teeth, toileting, grooming, dressing, etc )  - Assess/evaluate cause of self-care deficits   - Assess range of motion  - Assess patient's mobility; develop plan if impaired  - Assess patient's need for assistive devices and provide as appropriate  - Encourage maximum independence but intervene and supervise when necessary  - Involve family in performance of ADLs  - Assess for home care needs following discharge   - Consider OT consult to assist with ADL evaluation and planning for discharge  - Provide patient education as appropriate  Outcome: Progressing  Goal: Maintains/Returns to pre admission functional level  Description: INTERVENTIONS:  - Perform BMAT or MOVE assessment daily    - Set and communicate daily mobility goal to care team and patient/family/caregiver     - Collaborate with rehabilitation services on mobility goals if consulted  - Out of bed for toileting  - Record patient progress and toleration of activity level   Outcome: Progressing  Goal: Patient will remain free of falls  Description: INTERVENTIONS:  - Educate patient/family on patient safety including physical limitations  - Instruct patient to call for assistance with activity   - Consult OT/PT to assist with strengthening/mobility   - Keep Call bell within reach  - Keep bed low and locked with side rails adjusted as appropriate  - Keep care items and personal belongings within reach  - Initiate and maintain comfort rounds  - Make Fall Risk Sign visible to staff  - Offer Toileting every 2 Hours, in advance of need  - Initiate/Maintain bed alarm  - Obtain necessary fall risk management equipment  - Apply yellow socks and bracelet for high fall risk patients  - Consider moving patient to room near nurses station  Outcome: Progressing     Problem: DISCHARGE PLANNING  Goal: Discharge to home or other facility with appropriate resources  Description: INTERVENTIONS:  - Identify barriers to discharge w/patient and caregiver  - Arrange for needed discharge resources and transportation as appropriate  - Identify discharge learning needs (meds, wound care, etc )  - Arrange for interpretive services to assist at discharge as needed  - Refer to Case Management Department for coordinating discharge planning if the patient needs post-hospital services based on physician/advanced practitioner order or complex needs related to functional status, cognitive ability, or social support system  Outcome: Progressing     Problem: Knowledge Deficit  Goal: Patient/family/caregiver demonstrates understanding of disease process, treatment plan, medications, and discharge instructions  Description: Complete learning assessment and assess knowledge base   Interventions:  - Provide teaching at level of understanding  - Provide teaching via preferred learning methods  Outcome: Progressing     Problem: Potential for Falls  Goal: Patient will remain free of falls  Description: INTERVENTIONS:  - Educate patient/family on patient safety including physical limitations  - Instruct patient to call for assistance with activity   - Consult OT/PT to assist with strengthening/mobility   - Keep Call bell within reach  - Keep bed low and locked with side rails adjusted as appropriate  - Keep care items and personal belongings within reach  - Initiate and maintain comfort rounds  - Make Fall Risk Sign visible to staff  - Apply yellow socks and bracelet for high fall risk patients  - Consider moving patient to room near nurses station  Outcome: Progressing     Problem: Prexisting or High Potential for Compromised Skin Integrity  Goal: Skin integrity is maintained or improved  Description: INTERVENTIONS:  - Identify patients at risk for skin breakdown  - Assess and monitor skin integrity  - Assess and monitor nutrition and hydration status  - Monitor labs   - Assess for incontinence   - Turn and reposition patient  - Assist with mobility/ambulation  - Relieve pressure over bony prominences  - Avoid friction and shearing  - Provide appropriate hygiene as needed including keeping skin clean and dry  - Evaluate need for skin moisturizer/barrier cream  - Collaborate with interdisciplinary team   - Patient/family teaching  - Consider wound care consult   Outcome: Progressing

## 2022-08-09 NOTE — ASSESSMENT & PLAN NOTE
Recent EGD revealed esophagitis  Biopsy shows suspicious for candidal infection, patient placed on fluconazole as per GI recommendation  Continue PPI

## 2022-08-09 NOTE — SPEECH THERAPY NOTE
Speech Language/Pathology    Speech-Language Pathology Bedside Swallow Evaluation      Patient Name: Zara Siemens    Today's Date: 8/9/2022     Problem List  Principal Problem:    Dysphagia  Active Problems:    Essential hypertension    Hyperlipidemia    Orthostatic hypotension    History of stroke    Dementia without behavioral disturbance (HCC)    Candida esophagitis (HCC)    Failure to thrive in adult      Past Medical History  Past Medical History:   Diagnosis Date    Anxiety     Arthritis     Coronary artery disease     Coronary artery disease involving native coronary artery of native heart without angina pectoris     Depression     Fracture     Hypercholesterolemia     M[facility]ioma ([facility] ) 11/1999    brain tumor    Meningioma ([facility] )     Narcolepsy     daytime drowsiness and dozing off occasionally    Orthostatic hypotension     Palpitations     Prostate CA ([facility] )     [facility] )     Stroke [facility])        Past Surgical History  Past Surgical History:   Procedure Laterality Date    BACK SURGERY      BRAIN SURGERY  11/08/1999    CORONARY ARTERY BYPASS GRAFT      x5    CORONARY ARTERY BYPASS GRAFT         Summary   Pt w/ known h/o dysphagia and aspiration - VBS 10/5/21 w/ SILENT aspiration of all liquids  Today, assessment limited to puree (pt refused soft solids) and thin liquids  Oral manipulation and transfer of puree is adequate, no significant oral residue  Swallows suspected fairly prompt  Cough x1 w/ thin, unknown if silent aspiration events continuing to occur  Based on research re: negative outcomes of aspirating thickened liquids, consider resuming thin liquids with use of safe swallow strategies and frequent/thorough oral care to minimize risks  Plan to s/w physician and family regarding acceptance of risk       Risk/s for Aspiration: Jorge     Recommended Diet: puree w/ thin if pt and family accepting of aspiration risk vs continue NPO    Recommended Form of Meds: non-oral if possible   Aspiration precautions and swallowing strategies: upright posture, only feed when fully alert and slow rate of feeding  Other Recommendations: Continue frequent oral care        Current Medical Status  Pt is a 80 y o  male who presented to [facility] with Dysphagia, failure to thrive, severe nausea and vomiting  Pt noted with worsening dysphagia for past few days  Unable to eat anything and has thick tenacious mucus/saliva  Unable to swallow pills  PMH for Dementia, HTN, Stroke, Hypotension, Dyslipidemia and Ambulatory dysfunction  Admit to Observation level of care for Dysphagia, Failure to thrive, Esophagitis and Poor po intake  Carly Merida Recent EGD revealed esophagitis, Schatzki's ring  Biopsy pending  NPO  IVFs  On Iv PPI  Aspiration precautions  GI consult  Spouse at bedside agreeable to PEG tube placement if needed  Current Precautions:   Aspiration      Allergies:  No known food allergies    Past medical history:  Please see H&P for details    Special Studies:  No imaging from this admission       Social/Education/Vocational Hx:  Pt lives [facility] Information   Current Risks for Dysphagia & Aspiration: known history of dysphagia and known history of aspiration  Current Symptoms/Concerns: min intake, c/o solid dysphagia   Current Diet: NPO except medications   Baseline Diet: mechanically altered/level 2 diet and thin liquids       Baseline Assessment   Behavior/Cognition: alert, dementia at baseline   Speech/Language Status: able to participate in conversation and able to follow commands  Patient Positioning: upright in bed  Pain Status/Interventions/Response to Interventions:  No report of or nonverbal indications of pain         Swallow Mechanism Exam  Facial: symmetrical  Labial: WFL  Lingual: WFL  Velum: symmetrical  Mandible: adequate ROM  Dentition: adequate  Vocal quality:clear/adequate   Volitional Cough: strong/productive   Respiratory Status: on RA Consistencies Assessed and Performance   Consistencies Administered: thin liquids and puree, pt declined trials of soft or hard solids, no thickened liquids administered 2/2 known hx of SILENT aspiration of all liquids and current research re: negative outcomes of aspirating thickened liquids  Oral Stage:   Bolus formation and transfer of puree and liquids  No significant oral residue  Pharyngeal Stage:   Swallow Mechanics:  Swallowing initiation appeared fairly prompt  Laryngeal rise was palpated and judged to be reduced  Cough x1 w/ thin though pt w/ known h/o silent aspiration of all liquids  Esophageal Concerns: esophagitis     Strategies and Efficacy: strategies not demonstrated to be effective on previus VBS     Summary and Recommendations (see above)    Results Reviewed with: patient, RN, MD and family     Treatment Recommended: Yes     Frequency of treatment: As able     Patient Stated Goal: "I want to drink it"     Dysphagia LTG  -Patient will demonstrate safe and effective oral intake (without overt s/s significant oral/pharyngeal dysphagia including s/s penetration or aspiration) for the highest appropriate diet level       Short Term Goals:  -Pt will tolerate Dysphagia 2/mechanical soft diet and thin liquid with no significant s/s oral or pharyngeal dysphagia across 1-3 diagnostic session/s     -Patient will comply with a Video/Modified Barium Swallow study for more complete assessment of swallowing anatomy/physiology/aspiration risk and to assess efficacy of treatment techniques so as to best guide treatment plan      Speech Therapy Prognosis   Prognosis: Guarded    Prognosis Considerations: age, medical status and prior medical history

## 2022-08-10 PROBLEM — T17.900A SILENT ASPIRATION: Status: ACTIVE | Noted: 2022-08-10

## 2022-08-10 PROCEDURE — 99232 SBSQ HOSP IP/OBS MODERATE 35: CPT | Performed by: FAMILY MEDICINE

## 2022-08-10 RX ADMIN — SERTRALINE 75 MG: 25 TABLET, FILM COATED ORAL at 13:38

## 2022-08-10 RX ADMIN — Medication 12.5 MG: at 13:53

## 2022-08-10 RX ADMIN — Medication 12.5 MG: at 21:56

## 2022-08-10 RX ADMIN — MIDODRINE HYDROCHLORIDE 5 MG: 5 TABLET ORAL at 13:39

## 2022-08-10 RX ADMIN — PANTOPRAZOLE SODIUM 40 MG: 40 TABLET, DELAYED RELEASE ORAL at 05:35

## 2022-08-10 RX ADMIN — PRAVASTATIN SODIUM 40 MG: 40 TABLET ORAL at 18:16

## 2022-08-10 RX ADMIN — PSYLLIUM HUSK 1 PACKET: 3.4 POWDER ORAL at 13:40

## 2022-08-10 RX ADMIN — ENOXAPARIN SODIUM 40 MG: 40 INJECTION SUBCUTANEOUS at 13:37

## 2022-08-10 RX ADMIN — FLUCONAZOLE 200 MG: 40 POWDER, FOR SUSPENSION ORAL at 18:16

## 2022-08-10 NOTE — ASSESSMENT & PLAN NOTE
Patient failure to thrive  Poor p o   Intake  agreeable to PEG tube placement if indicated  GI following for dysphagia evaluation  Continue calorie count

## 2022-08-10 NOTE — PROGRESS NOTES
[address] [facility]  Progress Note - Mata Maxwell 1933, 80 y o  male MRN: [id number]  Unit/Bed#: [facility] 316-01 Encounter: [id number]  Primary Care Provider: Jeffrey Blanco MD   Date and time admitted to hospital: 8/8/2022 11:37 AM    Silent aspiration  Assessment & Plan  As per is speech evaluation, patient is silently aspirating  Chest x-ray remained stable  After having detailed discussion with family member by speech, patient placed on dysphagia diet, patient and family understand the risk  GI on board, if patient condition does not improve, consider feeding tube    Candida esophagitis (HCC)  Assessment & Plan  Recent EGD revealed esophagitis  Biopsy shows suspicious for candidal infection, patient placed on fluconazole as per GI recommendation  Continue PPI    * Dysphagia  Assessment & Plan  Dysphagia to solids and liquids  Recent EGD revealed esophagitis, Schatzki's ring  Biopsy :  Suspicious for Candida infection, continue fluconazole as per GI recommendation, if no improvement, may consider feeding tube-if family agrees  Calorie count  Continue PPI  Aspiration precautions  GI following  Speech and swallow evaluation done-as per note, patient is silently aspirating  Discussion has done with family by speech therapist, since patient is tolerating p o , will proceed with diet at this time knowing that patient remained high risk for respiration  Failure to thrive in adult  Assessment & Plan  Patient failure to thrive  Poor p o   Intake  agreeable to PEG tube placement if indicated  GI following for dysphagia evaluation  Continue calorie count      Dementia without behavioral disturbance ([address] )  Assessment & Plan  Monitor for delirium  Reorientation  Sleep hygiene      History of stroke  Assessment & Plan  History of stroke  Continue statin  Plavix presently on hold in anticipation of possible PEG tube placement    Orthostatic hypotension  Assessment & Plan  Monitor blood pressures  Avoid hypotension  Continue Midodrine    Hyperlipidemia  Assessment & Plan  Statin    Essential hypertension  Assessment & Plan  Monitor blood pressures  Avoid hypotension        VTE Pharmacologic Prophylaxis: VTE Score: 5 High Risk (Score >/= 5) - Pharmacological DVT Prophylaxis Ordered: enoxaparin (Lovenox)  Sequential Compression Devices Ordered  Patient Centered Rounds: I performed bedside rounds with nursing staff today  Discussions with Specialists or Other Care Team Provider:  None    Education and Discussions with Family / Patient: Updated  (wife) via phone  Time Spent for Care: 15 minutes  More than 50% of total time spent on counseling and coordination of care as described above  Current Length of Stay: 1 day(s)  Current Patient Status: Inpatient   Certification Statement: The patient will continue to require additional inpatient hospital stay due to To monitor above conditions  Discharge Plan: Anticipate discharge in >72 hrs to home  Code Status: Level 1 - Full Code    Subjective:   Seen and evaluated during the round  Resting comfortably  Denies any significant complaint  Objective:     Vitals:   Temp (24hrs), Av 4 °F (36 9 °C), Min:98 °F (36 7 °C), Max:98 8 °F (37 1 °C)    Temp:  [98 °F (36 7 °C)-98 8 °F (37 1 °C)] 98 3 °F (36 8 °C)  HR:  [] 81  Resp:  [17] 17  BP: (149-165)/(68-80) 155/75  SpO2:  [95 %-97 %] 95 %  Body mass index is 20 61 kg/m²  Input and Output Summary (last 24 hours):   No intake or output data in the 24 hours ending 08/10/22 0444    Physical Exam:   Physical Exam  Vitals and nursing note reviewed  Exam conducted with a chaperone present  Constitutional:       Appearance: Normal appearance  He is not ill-appearing or diaphoretic  HENT:      Head: Normocephalic  Nose: Nose normal       Mouth/Throat:      Mouth: Mucous membranes are moist       Pharynx: Oropharynx is clear  No oropharyngeal exudate     Eyes:      General: No scleral icterus  Left eye: No discharge  Extraocular Movements: Extraocular movements intact  Conjunctiva/sclera: Conjunctivae normal       Pupils: Pupils are equal, round, and reactive to light  Cardiovascular:      Rate and Rhythm: Normal rate  Heart sounds: Normal heart sounds  No murmur heard  No friction rub  No gallop  Pulmonary:      Effort: Pulmonary effort is normal  No respiratory distress  Breath sounds: No stridor  No wheezing  Abdominal:      General: Abdomen is flat  Bowel sounds are normal  There is no distension  Palpations: There is no mass  Tenderness: There is no abdominal tenderness  Hernia: No hernia is present  Musculoskeletal:         General: No swelling, tenderness or deformity  Normal range of motion  Cervical back: Normal range of motion  No rigidity  Lymphadenopathy:      Cervical: No cervical adenopathy  Skin:     General: Skin is warm  Capillary Refill: Capillary refill takes less than 2 seconds  Neurological:      General: No focal deficit present  Mental Status: He is alert  Cranial Nerves: No cranial nerve deficit  Sensory: No sensory deficit  Motor: No weakness           Additional Data:     Labs:  Results from last 7 days   Lab Units 08/09/22  0454   WBC Thousand/uL 6 81   HEMOGLOBIN g/dL 12 2   HEMATOCRIT % 37 7   PLATELETS Thousands/uL 185   NEUTROS PCT % 60   LYMPHS PCT % 26   MONOS PCT % 11   EOS PCT % 3     Results from last 7 days   Lab Units 08/09/22  0454   SODIUM mmol/L 143   POTASSIUM mmol/L 3 6   CHLORIDE mmol/L 111*   CO2 mmol/L 25   BUN mg/dL 10   CREATININE mg/dL 0 74   ANION GAP mmol/L 7   CALCIUM mg/dL 10 9*   GLUCOSE RANDOM mg/dL 111                       Lines/Drains:  Invasive Devices  Report    None                       Imaging: Reviewed radiology reports from this admission including: chest xray    Recent Cultures (last 7 days):         Last 24 Hours Medication List: Current Facility-Administered Medications   Medication Dose Route Frequency Provider Last Rate    enoxaparin  40 mg Subcutaneous Daily Waldemar Ramirez MD      fluconazole  200 mg Oral Daily Verlon [place], DO      metoprolol tartrate  12 5 mg Oral Q12H [address] Darryle Aldo, MD      midodrine  5 mg Oral Daily Waldemar Ramirez MD      ondansetron  4 mg Intravenous Q6H PRN Waldemar Ramirez MD      pantoprazole  40 mg Oral Early Morning Darryle Aldo, MD      pravastatin  40 mg Oral Daily With Brittnee Cheek MD      psyllium  1 packet Oral Daily Waldemar Ramirez MD      sertraline  75 mg Oral Daily Waldemar Ramirez MD          Today, Patient Was Seen By: Darryle Aldo, MD    **Please Note: This note may have been constructed using a voice recognition system  **

## 2022-08-10 NOTE — UTILIZATION REVIEW
Continued Stay Review    Date: 08/10 Day 2:                           Current Patient Class: IP Current Level of Care: MS    HPI:89 y o  male initially admitted on 08/08/2022    Assessment/Plan: Pt with no c/o  CXR remains stable  Pt silently aspirating per speech eval  Placed on dysphagia diet  Per GI, if condition does not improve to consider feeding tube  Cont PPI  Calorie ct  Aspiration precautions  Cont floconazole  Vital Signs: /75 (BP Location: Left arm)   Pulse 81   Temp 98 3 °F (36 8 °C) (Oral)   Resp 17   Wt 63 3 kg (139 lb 8 8 oz)   SpO2 95%   BMI 20 61 kg/m²       Pertinent Labs/Diagnostic Results:   08/09 CXR: No acute cardiopulmonary disease        Results from last 7 days   Lab Units 08/09/22  0454 08/08/22  1712   WBC Thousand/uL 6 81  --    HEMOGLOBIN g/dL 12 2  --    HEMATOCRIT % 37 7  --    PLATELETS Thousands/uL 185 181   NEUTROS ABS Thousands/µL 4 09  --          Results from last 7 days   Lab Units 08/09/22  0454 08/08/22  1228   SODIUM mmol/L 143 140   POTASSIUM mmol/L 3 6 3 8   CHLORIDE mmol/L 111* 107   CO2 mmol/L 25 29   ANION GAP mmol/L 7 4   BUN mg/dL 10 8   CREATININE mg/dL 0 74 0 93   EGFR ml/min/1 73sq m 81 72   CALCIUM mg/dL 10 9* 11 2*             Results from last 7 days   Lab Units 08/09/22  0454 08/08/22  1228   GLUCOSE RANDOM mg/dL 111 131       Medications:   Scheduled Medications:  enoxaparin, 40 mg, Subcutaneous, Daily  fluconazole, 200 mg, Oral, Daily  metoprolol tartrate, 12 5 mg, Oral, Q12H ELAINE  midodrine, 5 mg, Oral, Daily  pantoprazole, 40 mg, Oral, Early Morning  pravastatin, 40 mg, Oral, Daily With Dinner  psyllium, 1 packet, Oral, Daily  sertraline, 75 mg, Oral, Daily      Continuous IV Infusions:     PRN Meds:  ondansetron, 4 mg, Intravenous, Q6H PRN        Discharge Plan: TBD    Network Utilization Review Department  ATTENTION: Please call with any questions or concerns to [phone] and carefully listen to the prompts so that you are directed to the right person  All voicemails are confidential   Lisa Bee all requests for admission clinical reviews, approved or denied determinations and any other requests to dedicated fax number below belonging to the campus where the patient is receiving treatment   List of dedicated fax numbers for the Facilities:  [address] DENIALS (Administrative/Medical Necessity) [phone]   [address] (Maternity/NICU/Pediatrics) [phone] [address] [place] [address]  [address] [facility] [place] [phone] [id number] [place] [address] [address] Brenda [address] [facility] [phone]

## 2022-08-10 NOTE — PLAN OF CARE
Problem: PAIN - ADULT  Goal: Verbalizes/displays adequate comfort level or baseline comfort level  Description: Interventions:  - Encourage patient to monitor pain and request assistance  - Assess pain using appropriate pain scale  - Administer analgesics based on type and severity of pain and evaluate response  - Implement non-pharmacological measures as appropriate and evaluate response  - Consider cultural and social influences on pain and pain management  - Notify physician/advanced practitioner if interventions unsuccessful or patient reports new pain  Outcome: Progressing     Problem: SAFETY ADULT  Goal: Patient will remain free of falls  Description: INTERVENTIONS:  - Educate patient/family on patient safety including physical limitations  - Instruct patient to call for assistance with activity   - Consult OT/PT to assist with strengthening/mobility   - Keep Call bell within reach  - Keep bed low and locked with side rails adjusted as appropriate  - Keep care items and personal belongings within reach  - Initiate and maintain comfort rounds  - Make Fall Risk Sign visible to staff  - Offer Toileting every Hours, in advance of need  - Initiate/Maintain alarm  - Obtain necessary fall risk management equipment:   - Apply yellow socks and bracelet for high fall risk patients  - Consider moving patient to room near nurses station  Outcome: Progressing     Problem: Knowledge Deficit  Goal: Patient/family/caregiver demonstrates understanding of disease process, treatment plan, medications, and discharge instructions  Description: Complete learning assessment and assess knowledge base    Interventions:  - Provide teaching at level of understanding  - Provide teaching via preferred learning methods  Outcome: Progressing     Problem: Potential for Falls  Goal: Patient will remain free of falls  Description: INTERVENTIONS:  - Educate patient/family on patient safety including physical limitations  - Instruct patient to call for assistance with activity   - Consult OT/PT to assist with strengthening/mobility   - Keep Call bell within reach  - Keep bed low and locked with side rails adjusted as appropriate  - Keep care items and personal belongings within reach  - Initiate and maintain comfort rounds  - Make Fall Risk Sign visible to staff  - Offer Toileting every Hours, in advance of need  - Initiate/Maintain alarm  - Obtain necessary fall risk management equipment:   - Apply yellow socks and bracelet for high fall risk patients  - Consider moving patient to room near nurses station  Outcome: Progressing

## 2022-08-10 NOTE — ASSESSMENT & PLAN NOTE
Dysphagia to solids and liquids  Recent EGD revealed esophagitis, Schatzki's ring  Biopsy :  Suspicious for Candida infection, continue fluconazole as per GI recommendation, if no improvement, may consider feeding tube-if family agrees  Calorie count  Continue PPI  Aspiration precautions  GI following  Speech and swallow evaluation done-as per note, patient is silently aspirating  Discussion has done with family by speech therapist, since patient is tolerating p o , will proceed with diet at this time knowing that patient remained high risk for respiration

## 2022-08-10 NOTE — ASSESSMENT & PLAN NOTE
As per is speech evaluation, patient is silently aspirating  Chest x-ray remained stable  After having detailed discussion with family member by speech, patient placed on dysphagia diet, patient and family understand the risk    GI on board, if patient condition does not improve, consider feeding tube

## 2022-08-11 PROBLEM — E44.0 MODERATE PROTEIN-CALORIE MALNUTRITION (HCC): Status: ACTIVE | Noted: 2022-08-11

## 2022-08-11 PROCEDURE — 99232 SBSQ HOSP IP/OBS MODERATE 35: CPT | Performed by: INTERNAL MEDICINE

## 2022-08-11 PROCEDURE — 92526 ORAL FUNCTION THERAPY: CPT

## 2022-08-11 RX ORDER — MIRTAZAPINE 15 MG/1
7.5 TABLET, FILM COATED ORAL
Status: DISCONTINUED | OUTPATIENT
Start: 2022-08-11 | End: 2022-08-11

## 2022-08-11 RX ADMIN — FLUCONAZOLE 200 MG: 40 POWDER, FOR SUSPENSION ORAL at 09:48

## 2022-08-11 RX ADMIN — MIDODRINE HYDROCHLORIDE 5 MG: 5 TABLET ORAL at 09:47

## 2022-08-11 RX ADMIN — ENOXAPARIN SODIUM 40 MG: 40 INJECTION SUBCUTANEOUS at 09:47

## 2022-08-11 RX ADMIN — PSYLLIUM HUSK 1 PACKET: 3.4 POWDER ORAL at 09:48

## 2022-08-11 RX ADMIN — SERTRALINE 75 MG: 25 TABLET, FILM COATED ORAL at 09:47

## 2022-08-11 RX ADMIN — Medication 12.5 MG: at 09:47

## 2022-08-11 RX ADMIN — PRAVASTATIN SODIUM 40 MG: 40 TABLET ORAL at 17:59

## 2022-08-11 RX ADMIN — Medication 12.5 MG: at 21:42

## 2022-08-11 RX ADMIN — PANTOPRAZOLE SODIUM 40 MG: 40 TABLET, DELAYED RELEASE ORAL at 05:16

## 2022-08-11 NOTE — PLAN OF CARE
Problem: MOBILITY - ADULT  Goal: Maintain or return to baseline ADL function  Description: INTERVENTIONS:  -  Assess patient's ability to carry out ADLs; assess patient's baseline for ADL function and identify physical deficits which impact ability to perform ADLs (bathing, care of mouth/teeth, toileting, grooming, dressing, etc )  - Assess/evaluate cause of self-care deficits   - Assess range of motion  - Assess patient's mobility; develop plan if impaired  - Assess patient's need for assistive devices and provide as appropriate  - Encourage maximum independence but intervene and supervise when necessary  - Involve family in performance of ADLs  - Assess for home care needs following discharge   - Consider OT consult to assist with ADL evaluation and planning for discharge  - Provide patient education as appropriate  Outcome: Progressing  Goal: Maintains/Returns to pre admission functional level  Description: INTERVENTIONS:  - Perform BMAT or MOVE assessment daily    - Set and communicate daily mobility goal to care team and patient/family/caregiver  - Collaborate with rehabilitation services on mobility goals if consulted  - Perform Range of Motion  times a day  - Reposition patient every      hours    - Dangle patient    times a day  - Stand patient      times a day  - Ambulate patient    times a day  - Out of bed to chair    times a day   - Out of bed for meals      times a day  - Out of bed for toileting  - Record patient progress and toleration of activity level   Outcome: Progressing     Problem: PAIN - ADULT  Goal: Verbalizes/displays adequate comfort level or baseline comfort level  Description: Interventions:  - Encourage patient to monitor pain and request assistance  - Assess pain using appropriate pain scale  - Administer analgesics based on type and severity of pain and evaluate response  - Implement non-pharmacological measures as appropriate and evaluate response  - Consider cultural and social influences on pain and pain management  - Notify physician/advanced practitioner if interventions unsuccessful or patient reports new pain  Outcome: Progressing     Problem: INFECTION - ADULT  Goal: Absence or prevention of progression during hospitalization  Description: INTERVENTIONS:  - Assess and monitor for signs and symptoms of infection  - Monitor lab/diagnostic results  - Monitor all insertion sites, i e  indwelling lines, tubes, and drains  - Monitor endotracheal if appropriate and nasal secretions for changes in amount and color  - [place] appropriate cooling/warming therapies per order  - Administer medications as ordered  - Instruct and encourage patient and family to use good hand hygiene technique  - Identify and instruct in appropriate isolation precautions for identified infection/condition  Outcome: Progressing  Goal: Absence of fever/infection during neutropenic period  Description: INTERVENTIONS:  - Monitor WBC    Outcome: Progressing     Problem: SAFETY ADULT  Goal: Maintain or return to baseline ADL function  Description: INTERVENTIONS:  -  Assess patient's ability to carry out ADLs; assess patient's baseline for ADL function and identify physical deficits which impact ability to perform ADLs (bathing, care of mouth/teeth, toileting, grooming, dressing, etc )  - Assess/evaluate cause of self-care deficits   - Assess range of motion  - Assess patient's mobility; develop plan if impaired  - Assess patient's need for assistive devices and provide as appropriate  - Encourage maximum independence but intervene and supervise when necessary  - Involve family in performance of ADLs  - Assess for home care needs following discharge   - Consider OT consult to assist with ADL evaluation and planning for discharge  - Provide patient education as appropriate  Outcome: Progressing  Goal: Maintains/Returns to pre admission functional level  Description: INTERVENTIONS:  - Perform BMAT or MOVE assessment daily    - Set and communicate daily mobility goal to care team and patient/family/caregiver  - Collaborate with rehabilitation services on mobility goals if consulted  - Perform Range of Motion    times a day  - Reposition patient every    hours    - Dangle patient      times a day  - Stand patient    times a day  - Ambulate patient    times a day  - Out of bed to chair      times a day   - Out of bed for meals    times a day  - Out of bed for toileting  - Record patient progress and toleration of activity level   Outcome: Progressing  Goal: Patient will remain free of falls  Description: INTERVENTIONS:  - Educate patient/family on patient safety including physical limitations  - Instruct patient to call for assistance with activity   - Consult OT/PT to assist with strengthening/mobility   - Keep Call bell within reach  - Keep bed low and locked with side rails adjusted as appropriate  - Keep care items and personal belongings within reach  - Initiate and maintain comfort rounds  - Make Fall Risk Sign visible to staff  - Offer Toileting every    Hours, in advance of need  - Initiate/Maintain     alarm  - Obtain necessary fall risk management equipment:     - Apply yellow socks and bracelet for high fall risk patients  - Consider moving patient to room near nurses station  Outcome: Progressing     Problem: DISCHARGE PLANNING  Goal: Discharge to home or other facility with appropriate resources  Description: INTERVENTIONS:  - Identify barriers to discharge w/patient and caregiver  - Arrange for needed discharge resources and transportation as appropriate  - Identify discharge learning needs (meds, wound care, etc )  - Arrange for interpretive services to assist at discharge as needed  - Refer to Case Management Department for coordinating discharge planning if the patient needs post-hospital services based on physician/advanced practitioner order or complex needs related to functional status, cognitive ability, or social support system  Outcome: Progressing     Problem: Knowledge Deficit  Goal: Patient/family/caregiver demonstrates understanding of disease process, treatment plan, medications, and discharge instructions  Description: Complete learning assessment and assess knowledge base    Interventions:  - Provide teaching at level of understanding  - Provide teaching via preferred learning methods  Outcome: Progressing     Problem: Potential for Falls  Goal: Patient will remain free of falls  Description: INTERVENTIONS:  - Educate patient/family on patient safety including physical limitations  - Instruct patient to call for assistance with activity   - Consult OT/PT to assist with strengthening/mobility   - Keep Call bell within reach  - Keep bed low and locked with side rails adjusted as appropriate  - Keep care items and personal belongings within reach  - Initiate and maintain comfort rounds  - Make Fall Risk Sign visible to staff  - Offer Toileting every    Hours, in advance of need  - Initiate/Maintain     alarm  - Obtain necessary fall risk management equipment:       - Apply yellow socks and bracelet for high fall risk patients  - Consider moving patient to room near nurses station  Outcome: Progressing     Problem: Prexisting or High Potential for Compromised Skin Integrity  Goal: Skin integrity is maintained or improved  Description: INTERVENTIONS:  - Identify patients at risk for skin breakdown  - Assess and monitor skin integrity  - Assess and monitor nutrition and hydration status  - Monitor labs   - Assess for incontinence   - Turn and reposition patient  - Assist with mobility/ambulation  - Relieve pressure over bony prominences  - Avoid friction and shearing  - Provide appropriate hygiene as needed including keeping skin clean and dry  - Evaluate need for skin moisturizer/barrier cream  - Collaborate with interdisciplinary team   - Patient/family teaching  - Consider wound care consult   Outcome: Progressing     Problem: Nutrition/Hydration-ADULT  Goal: Nutrient/Hydration intake appropriate for improving, restoring or maintaining nutritional needs  Description: Monitor and assess patient's nutrition/hydration status for malnutrition  Collaborate with interdisciplinary team and initiate plan and interventions as ordered  Monitor patient's weight and dietary intake as ordered or per policy  Utilize nutrition screening tool and intervene as necessary  Determine patient's food preferences and provide high-protein, high-caloric foods as appropriate       INTERVENTIONS:  - Monitor oral intake, urinary output, labs, and treatment plans  - Assess nutrition and hydration status and recommend course of action  - Evaluate amount of meals eaten  - Assist patient with eating if necessary   - Allow adequate time for meals  - Recommend/ encourage appropriate diets, oral nutritional supplements, and vitamin/mineral supplements  - Order, calculate, and assess calorie counts as needed  - Recommend, monitor, and adjust tube feedings and TPN/PPN based on assessed needs  - Assess need for intravenous fluids  - Provide specific nutrition/hydration education as appropriate  - Include patient/family/caregiver in decisions related to nutrition  Outcome: Progressing

## 2022-08-11 NOTE — SPEECH THERAPY NOTE
Speech Language/Pathology    Speech/Language Pathology Progress Note    Patient Name: Zuly Johnson  TXOIO'U Date: 8/11/2022     Problem List  Principal Problem:    Dysphagia  Active Problems:    Essential hypertension    Hyperlipidemia    Orthostatic hypotension    History of stroke    Dementia without behavioral disturbance (HCC)    Candida esophagitis (HCC)    Failure to thrive in adult    Silent aspiration       Past Medical History  Past Medical History:   Diagnosis Date    Anxiety     Arthritis     Coronary artery disease     Coronary artery disease involving native coronary artery of native heart without angina pectoris     Depression     Fracture     Hypercholesterolemia     Meningioma ([facility] [address] ) 11/1999    brain tumor    Meningioma ([facility] [address] [facility]     Narcolepsy     daytime drowsiness and dozing off occasionally    Orthostatic hypotension     Palpitations     Prostate CA ([facility] [address] [facility]     [facility]     [facility]roke [facility])         Past Surgical History  Past Surgical History:   Procedure Laterality Date    BACK SURGERY      BRAIN SURGERY  11/08/1999    CORONARY ARTERY BYPASS GRAFT      x5    CORONARY ARTERY BYPASS GRAFT           Subjective:  CXR 8/9: No acute cardiopulmonary disease  Pt lethargic but agreeable to trials  Pt's wife present  "I haven't been caring for the food too much"      Current Diet:  Puree w/ thin     Objective:  Pt seen for dx dysphagia tx f/u  Pt states he feels his swallow skill is improved, eager to trial upgrades  Pt seen w/ trials of [facility] soft solids (baseline) and thin liquids  Mastication and oral organization is timely and effective  Transfers adequate w/ no significant oral residue  Swallows suspected fairly prompt  No overt s/s aspiration though known history of silent aspiration  Reviewed aspiration risks and current POC, pt and pt's wife agreeable to continue w/ current plan/upgrade to [facility] soft/acceptance of aspiration risk       Assessment:  Pt w/ appropriate for upgrade to baseline diet [facility] soft, continue thin liquids w/ known risk of aspiration       Plan/Recommendations:  [facility] soft w/ thin   Frequent and thorough oral care  Strict aspiration precautions  ST f/u as able and appropriate

## 2022-08-11 NOTE — MALNUTRITION/BMI
This medical record reflects one or more clinical indicators suggestive of malnutrition  Malnutrition Findings:   Adult Malnutrition type: Chronic illness  Adult Degree of Malnutrition: Malnutrition of moderate degree  Malnutrition Characteristics: Muscle loss, Weight loss                  360 Statement: Chronic/moderate malnutrition r/t condition, as evidenced by 10% wt loss x 2 months (6/18/22: 155#, 8/8/22: 139#), [facility]  Treatment: PO diet + nutrition supplements, monitoring intake for adequacy       Body mass index is 20 61 kg/m²  See Nutrition note dated 8/11/22 for additional details  Completed nutrition assessment is viewable in the nutrition documentation

## 2022-08-11 NOTE — ASSESSMENT & PLAN NOTE
Dysphagia to solids and liquids  Recent EGD revealed esophagitis, Schatzki's ring  Biopsy :  Suspicious for Candida infection, continue fluconazole as per GI recommendation, if no improvement, may consider feeding tube-if family agrees  Calorie count-appreciate nutrition assistance  Continue PPI  Aspiration precautions  GI following  Speech and swallow evaluation done-as per note, patient is silently aspirating  Discussion has done with family by speech therapist, since patient is tolerating p o , will proceed with diet at this time knowing that patient remained high risk for respiration      During my evaluation on 08/11 patient reporting improvement in terms of swallowing and pain

## 2022-08-11 NOTE — ASSESSMENT & PLAN NOTE
Malnutrition Findings:   Adult Malnutrition type: Chronic illness  Adult Degree of Malnutrition: Malnutrition of moderate degree  Malnutrition Characteristics: Muscle loss, Weight loss                  360 Statement: Chronic/moderate malnutrition r/t condition, as evidenced by 10% wt loss x 2 months (6/18/22: 155#, 8/8/22: 139#), [facility]  Treatment: PO diet + nutrition supplements, monitoring intake for adequacy    BMI Findings: Body mass index is 20 61 kg/m²

## 2022-08-11 NOTE — PROGRESS NOTES
[facility]  Progress Note - Ruthie Odor 1933, 80 y o  male MRN: [id number]  Unit/Bed#: [facility] 316-01 Encounter: [id number]  Primary Care Provider: Rylee Butt MD   Date and time admitted to hospital: 8/8/2022 11:37 AM    Moderate protein-calorie malnutrition ([facility] [address] )  Assessment & Plan  Malnutrition Findings:   Adult Malnutrition type: Chronic illness  Adult Degree of Malnutrition: Malnutrition of moderate degree  Malnutrition Characteristics: Muscle loss, Weight loss                  360 Statement: Chronic/moderate malnutrition r/t condition, as evidenced by 10% wt loss x 2 months (6/18/22: 155#, 8/8/22: 139#), and mild muscle mass depletion at temples  Treatment: PO diet + nutrition supplements, monitoring intake for adequacy    BMI Findings: Body mass index is 20 61 kg/m²  Silent aspiration  Assessment & Plan  As per is speech evaluation, patient is silently aspirating  Chest x-ray remained stable  After having detailed discussion with family member by speech, patient placed on dysphagia diet, patient and family understand the risk  GI on board, if patient condition does not improve, consider feeding tube    Failure to thrive in adult  Assessment & Plan  Patient failure to thrive  Poor p o   Intake  agreeable to PEG tube placement if indicated  GI following for dysphagia evaluation  Continue calorie count      Candida esophagitis (HCC)  Assessment & Plan  Recent EGD revealed esophagitis  Biopsy shows suspicious for candidal infection, patient placed on fluconazole as per GI recommendation  Continue PPI    Dementia without behavioral disturbance ([facility] [address] )  Assessment & Plan  Monitor for delirium  Reorientation  Sleep hygiene      History of stroke  Assessment & Plan  History of stroke  Continue statin  Plavix presently on hold in anticipation of possible PEG tube placement    Orthostatic hypotension  Assessment & Plan  Monitor blood pressures  Avoid hypotension  Continue Midodrine    Hyperlipidemia  Assessment & Plan  Statin  Patient denies any myalgias     Essential hypertension  Assessment & Plan  Monitor blood pressures  Avoid hypotension    * Dysphagia  Assessment & Plan  Dysphagia to solids and liquids  Recent EGD revealed esophagitis, Schatzki's ring  Biopsy :  Suspicious for Candida infection, continue fluconazole as per GI recommendation, if no improvement, may consider feeding tube-if family agrees  Calorie count-appreciate nutrition assistance  Continue PPI  Aspiration precautions  GI following  Speech and swallow evaluation done-as per note, patient is silently aspirating  Discussion has done with family by speech therapist, since patient is tolerating p o , will proceed with diet at this time knowing that patient remained high risk for respiration  During my evaluation on 08/11 patient reporting improvement in terms of swallowing and pain            VTE Pharmacologic Prophylaxis: VTE Score: 5 High Risk (Score >/= 5) - Pharmacological DVT Prophylaxis Ordered: enoxaparin (Lovenox)  Sequential Compression Devices Ordered  Patient Centered Rounds: I performed bedside rounds with nursing staff today  Discussions with Specialists or Other Care Team Provider: GI    Education and Discussions with Family / Patient: Updated  (wife) via phone  Time Spent for Care: 30 minutes  More than 50% of total time spent on counseling and coordination of care as described above  Current Length of Stay: 2 day(s)  Current Patient Status: Inpatient   Certification Statement: The patient will continue to require additional inpatient hospital stay due to Pending nutrition assessment possible PEG tube  Discharge Plan: Anticipate discharge in >72 hrs to discharge location to be determined pending rehab evaluations  Code Status: Level 1 - Full Code    Subjective:   Patient denies any pain with swallowing today, reports his a poor appetite    Denies difficulty swallowing, he is alert oriented x4    Objective:     Vitals:   Temp (24hrs), Av 6 °F (37 °C), Min:98 4 °F (36 9 °C), Max:98 8 °F (37 1 °C)    Temp:  [98 4 °F (36 9 °C)-98 8 °F (37 1 °C)] 98 8 °F (37 1 °C)  HR:  [72-84] 84  Resp:  [15-16] 15  BP: (103-156)/(41-63) 156/63  SpO2:  [96 %-97 %] 97 %  Body mass index is 20 61 kg/m²  Input and Output Summary (last 24 hours): Intake/Output Summary (Last 24 hours) at 2022 1536  Last data filed at 8/10/2022 1800  Gross per 24 hour   Intake 60 ml   Output --   Net 60 ml       Physical Exam:   Physical Exam  Vitals and nursing note reviewed  Constitutional:       General: He is not in acute distress  Appearance: He is well-developed  He is not ill-appearing, toxic-appearing or diaphoretic  HENT:      Head: Normocephalic and atraumatic  Eyes:      General: No scleral icterus  Conjunctiva/sclera: Conjunctivae normal    Cardiovascular:      Rate and Rhythm: Normal rate and regular rhythm  Heart sounds: No murmur heard  No friction rub  No gallop  Pulmonary:      Effort: Pulmonary effort is normal  No respiratory distress  Breath sounds: Normal breath sounds  No stridor  No wheezing, rhonchi or rales  Chest:      Chest wall: No tenderness  Abdominal:      General: There is no distension  Palpations: Abdomen is soft  There is no mass  Tenderness: There is no abdominal tenderness  There is no guarding or rebound  Hernia: No hernia is present  Musculoskeletal:         General: No swelling, tenderness, deformity or signs of injury  Cervical back: Neck supple  Skin:     General: Skin is warm and dry  Coloration: Skin is not jaundiced or pale  Findings: No bruising or erythema  Neurological:      Mental Status: He is alert and oriented to person, place, and time            Additional Data:     Labs:  Results from last 7 days   Lab Units 22  0454   WBC Thousand/uL 6 81   HEMOGLOBIN g/dL 12 2   HEMATOCRIT % 37 7   PLATELETS Thousands/uL 185   NEUTROS PCT % 60   LYMPHS PCT % 26   MONOS PCT % 11   EOS PCT % 3     Results from last 7 days   Lab Units 08/09/22  0454   SODIUM mmol/L 143   POTASSIUM mmol/L 3 6   CHLORIDE mmol/L 111*   CO2 mmol/L 25   BUN mg/dL 10   CREATININE mg/dL 0 74   ANION GAP mmol/L 7   CALCIUM mg/dL 10 9*   GLUCOSE RANDOM mg/dL 111                       Lines/Drains:  Invasive Devices  Report    None                       Imaging: No pertinent imaging reviewed  Recent Cultures (last 7 days):         Last 24 Hours Medication List:   Current Facility-Administered Medications   Medication Dose Route Frequency Provider Last Rate    enoxaparin  40 mg Subcutaneous Daily Kendra Berrios MD      fluconazole  200 mg Oral Daily Boubacar Bee DO      metoprolol tartrate  12 5 mg Oral Q12H [facility] Stephania Silva MD      midodrine  5 mg Oral Daily Kendra Berrios MD      ondansetron  4 mg Intravenous Q6H PRN Kendra Berrios MD      pantoprazole  40 mg Oral Early Morning Stephania Silva MD      pravastatin  40 mg Oral Daily With Gilberto Cali MD      psyllium  1 packet Oral Daily Kendra Berrios MD      sertraline  75 mg Oral Daily Kendra Berrios MD          Today, Patient Was Seen By: Crys Valenzuela DO    **Please Note: This note may have been constructed using a voice recognition system  **

## 2022-08-11 NOTE — NUTRITION
Consult- calorie count      08/11/22 3346   Recommendations/Interventions   Interventions/Recommendations Calorie Count; Supplement adjust   Intervention Comments Changed Ensure to Glucerna (TID)  Added magic cup BID for pt to try  Calorie count posted for 8/11 - 8/12   RD to follow up with results   Recommendations to Provider Advance diet per SLP recs: mechanical soft, thin liquids

## 2022-08-11 NOTE — PROGRESS NOTES
GASTROENTEROLOGY PROGRESS NOTE     PATIENT INFORMATION     Name: Jennifer Frank   Age & Sex: 80 y o  male   MRN: [id number]  Hospital Stay Days: 2  Unit/Bed#: [facility] 316-01   Encounter: [id number]    ASSESSMENT/PLAN     Principal Problem:    Dysphagia  Active Problems:    Essential hypertension    Hyperlipidemia    Orthostatic hypotension    History of stroke    Dementia without behavioral disturbance (HCC)    Candida esophagitis (HCC)    Failure to thrive in adult    Silent aspiration    Mr Thomas Dowd is an 80 y o  male with past medical history of dementia (since 2018), ambulatory dysfunction, hypertension, history of CVA, dyslipidemia who presented on 8/8 for dysphagia and failure to thrive with decreased oral intake  GI has been consulted for evaluation of dysphagia      1  Dysphagia  2  Failure to thrive  Patient with issues swallowing for multiple months  Saw Dr Raimundo Guillory outpatient for evaluation and had barium esophagram done that only showed silent aspiration  Patient then had EGD done 7/26 with biopsies that only resulted on 8/9 and were positive for candida esophagitis  Also noted nonobstructive Schatzki ring requiring no intervention     · Continue fluconazole 200 mg x 14 days total  · Dysphagia symptoms improving with fluconazole treatment  · Speech following-appreciate recommendations  · RN documenting that patient eating approximately 25% of his meals   · Decrease intake likely secondary to dementia at this point and lack of appetite  · Consider appetite stimulant  · Will place order for assistance with feeding at all meals  · Aspiration precautions ordered  · Nutrition consult placed-appreciate recommendations  · Will start new calorie count now that patient has swelling improving with fluconazole treatment  · Discussed with primary team - plan to address goals of care with family and determination of whether PEG tube would ultimately benefit in the long term  · Decision also depend on 48 hour calorie count  · Monitor strict ins and outs    SUBJECTIVE     Patient seen and examined  No acute events overnight  Patient reports he no longer feels exam his neck in his throat and he is no longer having trouble swallowing  He endorses decreased appetite  Nursing documenting patient eating approximately 25% of his meals  No other acute complaints other than being fatigued at this time  OBJECTIVE     Vitals:    08/10/22 1500 08/10/22 2120 08/10/22 2243 22 0700   BP: 155/75 113/50 (!) 103/41 156/63   BP Location: Left arm  Right arm Right arm   Pulse: 81 74 72 84   Resp: 17  16 15   Temp: 98 3 °F (36 8 °C)  98 4 °F (36 9 °C) 98 8 °F (37 1 °C)   TempSrc: Oral  Oral Oral   SpO2: 95%  96% 97%   Weight:          Temperature:   Temp (24hrs), Av 5 °F (36 9 °C), Min:98 3 °F (36 8 °C), Max:98 8 °F (37 1 °C)    Temperature: 98 8 °F (37 1 °C)  Intake & Output:  I/O        0701  08/10 0700 08/10 07 07 0701   0700    P  O  0 135     Total Intake(mL/kg) 0 (0) 135 (2 1)     Net 0 +135            Unmeasured Urine Occurrence 1 x          Weights:        Body mass index is 20 61 kg/m²  Weight (last 2 days)     None        Physical Exam  Vitals reviewed  Constitutional:       General: He is awake  He is not in acute distress  Appearance: He is well-developed  He is not ill-appearing, toxic-appearing or diaphoretic  HENT:      Head: Normocephalic and atraumatic  Right Ear: External ear normal       Left Ear: External ear normal       Nose: Nose normal       Mouth/Throat:      Pharynx: Oropharynx is clear  Eyes:      General:         Right eye: No discharge  Left eye: No discharge  Conjunctiva/sclera: Conjunctivae normal    Cardiovascular:      Rate and Rhythm: Normal rate and regular rhythm  Pulmonary:      Effort: Pulmonary effort is normal  No respiratory distress  Abdominal:      General: Bowel sounds are normal  There is no distension        Palpations: Abdomen is soft  Tenderness: There is no abdominal tenderness  Comments: Epigastric scars noted   Musculoskeletal:         General: No swelling or tenderness  Normal range of motion  Cervical back: Normal range of motion  Right lower leg: No edema  Left lower leg: No edema  Skin:     General: Skin is warm and dry  Coloration: Skin is not jaundiced  Findings: No bruising  Neurological:      General: No focal deficit present  Mental Status: He is alert  Motor: No weakness  Comments: AAOx2-3, confused   Psychiatric:         Mood and Affect: Mood is depressed  Affect is flat  Behavior: Behavior normal  Behavior is cooperative  Thought Content: Thought content normal        LABORATORY DATA     Labs: I have personally reviewed pertinent reports  Results from last 7 days   Lab Units 08/09/22  0454 08/08/22  1712   WBC Thousand/uL 6 81  --    HEMOGLOBIN g/dL 12 2  --    HEMATOCRIT % 37 7  --    PLATELETS Thousands/uL 185 181   NEUTROS PCT % 60  --    MONOS PCT % 11  --       Results from last 7 days   Lab Units 08/09/22  0454 08/08/22  1228   POTASSIUM mmol/L 3 6 3 8   CHLORIDE mmol/L 111* 107   CO2 mmol/L 25 29   BUN mg/dL 10 8   CREATININE mg/dL 0 74 0 93   CALCIUM mg/dL 10 9* 11 2*                            IMAGING & DIAGNOSTIC TESTING     Radiology Results: I have personally reviewed pertinent reports  XR chest portable    Result Date: 8/9/2022  Impression: No acute cardiopulmonary disease  Workstation performed: [id number]     Other Diagnostic Testing: I have personally reviewed pertinent reports        ACTIVE MEDICATIONS     Current Facility-Administered Medications   Medication Dose Route Frequency    enoxaparin (LOVENOX) subcutaneous injection 40 mg  40 mg Subcutaneous Daily    fluconazole (DIFLUCAN) oral suspension 200 mg  200 mg Oral Daily    metoprolol tartrate (LOPRESSOR) partial tablet 12 5 mg  12 5 mg Oral Q12H [address]    midodrine (PROAMATINE) tablet 5 mg  5 mg Oral Daily    ondansetron (ZOFRAN) injection 4 mg  4 mg Intravenous Q6H PRN    pantoprazole (PROTONIX) EC tablet 40 mg  40 mg Oral Early Morning    pravastatin (PRAVACHOL) tablet 40 mg  40 mg Oral Daily With Dinner    psyllium (METAMUCIL) 1 packet  1 packet Oral Daily    sertraline (ZOLOFT) tablet 75 mg  75 mg Oral Daily     VTE Pharmacologic Prophylaxis: Enoxaparin (Lovenox)  VTE Mechanical Prophylaxis: sequential compression device    ==  Boubacar Bee,   Internal Medicine Resident, PGY-3  [facility]

## 2022-08-12 LAB
ALBUMIN SERPL BCP-MCNC: 3.2 G/DL (ref 3.5–5)
ALP SERPL-CCNC: 91 U/L (ref 46–116)
ALT SERPL W P-5'-P-CCNC: 19 U/L (ref 12–78)
ANION GAP SERPL CALCULATED.3IONS-SCNC: 3 MMOL/L (ref 4–13)
AST SERPL W P-5'-P-CCNC: 21 U/L (ref 5–45)
BILIRUB SERPL-MCNC: 0.39 MG/DL (ref 0.2–1)
BUN SERPL-MCNC: 18 MG/DL (ref 5–25)
CALCIUM ALBUM COR SERPL-MCNC: 11.6 MG/DL (ref 8.3–10.1)
CALCIUM SERPL-MCNC: 11 MG/DL (ref 8.3–10.1)
CHLORIDE SERPL-SCNC: 115 MMOL/L (ref 96–108)
CO2 SERPL-SCNC: 29 MMOL/L (ref 21–32)
CREAT SERPL-MCNC: 1.01 MG/DL (ref 0.6–1.3)
GFR SERPL CREATININE-BSD FRML MDRD: 65 ML/MIN/1.73SQ M
GLUCOSE SERPL-MCNC: 132 MG/DL (ref 65–140)
POTASSIUM SERPL-SCNC: 3.4 MMOL/L (ref 3.5–5.3)
PREALB SERPL-MCNC: 15 MG/DL (ref 18–40)
PROT SERPL-MCNC: 6.3 G/DL (ref 6.4–8.4)
SODIUM SERPL-SCNC: 147 MMOL/L (ref 135–147)

## 2022-08-12 PROCEDURE — 99232 SBSQ HOSP IP/OBS MODERATE 35: CPT | Performed by: INTERNAL MEDICINE

## 2022-08-12 PROCEDURE — 80053 COMPREHEN METABOLIC PANEL: CPT | Performed by: INTERNAL MEDICINE

## 2022-08-12 PROCEDURE — 84134 ASSAY OF PREALBUMIN: CPT | Performed by: INTERNAL MEDICINE

## 2022-08-12 RX ORDER — CLOPIDOGREL BISULFATE 75 MG/1
75 TABLET ORAL DAILY
Status: DISCONTINUED | OUTPATIENT
Start: 2022-08-12 | End: 2022-08-13 | Stop reason: HOSPADM

## 2022-08-12 RX ORDER — POTASSIUM CHLORIDE 20MEQ/15ML
40 LIQUID (ML) ORAL ONCE
Status: COMPLETED | OUTPATIENT
Start: 2022-08-12 | End: 2022-08-12

## 2022-08-12 RX ADMIN — Medication 12.5 MG: at 09:20

## 2022-08-12 RX ADMIN — ENOXAPARIN SODIUM 40 MG: 40 INJECTION SUBCUTANEOUS at 09:20

## 2022-08-12 RX ADMIN — POTASSIUM CHLORIDE 40 MEQ: 20 SOLUTION ORAL at 09:20

## 2022-08-12 RX ADMIN — CLOPIDOGREL BISULFATE 75 MG: 75 TABLET ORAL at 17:12

## 2022-08-12 RX ADMIN — PSYLLIUM HUSK 1 PACKET: 3.4 POWDER ORAL at 09:20

## 2022-08-12 RX ADMIN — PRAVASTATIN SODIUM 40 MG: 40 TABLET ORAL at 17:12

## 2022-08-12 RX ADMIN — Medication 12.5 MG: at 20:27

## 2022-08-12 RX ADMIN — PANTOPRAZOLE SODIUM 40 MG: 40 TABLET, DELAYED RELEASE ORAL at 05:40

## 2022-08-12 RX ADMIN — FLUCONAZOLE 200 MG: 40 POWDER, FOR SUSPENSION ORAL at 09:22

## 2022-08-12 RX ADMIN — SERTRALINE 75 MG: 25 TABLET, FILM COATED ORAL at 09:20

## 2022-08-12 NOTE — CASE MANAGEMENT
Case Management Discharge Planning Note    Patient name Brooklyn Murray  Location [facility] 316/[facility] 257-88 MRN [id number]  : 1933 Date 2022       Current Admission Date: 2022  Current Admission Diagnosis:Dysphagia   Patient Active Problem List    Diagnosis Date Noted    Moderate protein-calorie malnutrition ([facility] ) 2022    Silent aspiration 08/10/2022    Candida esophagitis ([facility] ) 2022    Failure to thrive in adult 2022    Dysphagia 2021    Dementia without behavioral disturbance ([facility] ) 2021    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[facility] ) 2021    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) ([address] ) 2020    Calcification of aorta ([facility] ) 2020    Need for influenza vaccination 2020    Depression 05/15/2020    History of stroke 2020    Acute CVA (cerebrovascular accident), suspected embolic in nature     Hypercalcemia 2019    History of resection of meningioma 2019    Lumbar spondylosis 2019    Lumbar radiculopathy 2019    Convulsions ([address] ) 2019    Orthostatic hypotension 2018    Contusion of rib on right side 2018    Cognitive decline 2018    History of meningioma 2018    Idiopathic peripheral neuropathy 2018    Iron deficiency anemia 2018    Constipation 2018    Iron deficiency 2018    Other constipation 2018    Vitamin B12 deficiency 2017    Anemia 2017    Insomnia 2016    Arteriosclerotic cardiovascular disease 2015    Esophageal reflux 2014    Cervical spinal stenosis 2013    Spinal stenosis of lumbar region with neurogenic claudication 2013    Backache 2013    Essential hypertension 2013    Hyperlipidemia 2013    Depression 2013      LOS (days): 3  Geometric Mean LOS (GMLOS) (days): 2 10  Days to GMLOS:-0 8     OBJECTIVE:  Risk of Unplanned Readmission Score: 8 9         Current admission status: Inpatient   Preferred Pharmacy:   [facility] [address]  Phone: [phone] Fax: [phone]    [facility] [address] [place] [address]  Phone: [phone] Fax: [phone]    Primary Care Provider: Sammi Larry MD    Primary Insurance: [facility]  Secondary Insurance:     DISCHARGE DETAILS:    Discharge planning discussed with[de-identified] Ju (wife) and Rosario at [facility] of Choice: Yes  Comments - Freedom of Choice: Per Dr Marvin Parents patient should be medically ready on 8/13  CM updated Patients wife who was under the impression that the patient might still be getting a Peg tube on Monday  CM requested  Dr Marvin Parents speak to the wife  Adi Spangler made it very clear if hte patient gets a Peg tube she wants him to go to [address] [place] [address] or [facility]  CM gave the wife an application to [facility]  Per the wife she is moving into a high [facility] apartment at the end of the month  CM told the wife if the patient gets a peg tube the referrals could be made to SNF  CM contacted family/caregiver?: Yes  Were Treatment Team discharge recommendations reviewed with patient/caregiver?: Yes  Did patient/caregiver verbalize understanding of patient care needs?: N/A- going to facility  Were patient/caregiver advised of the risks associated with not following Treatment Team discharge recommendations?: Yes    Contacts  Patient Contacts: Adi Spangler Wife  Relationship to Patient[de-identified] Family  Contact Method:  In Person  Reason/Outcome: Continuity of Care, Emergency Contact, Discharge Planning                        Treatment Team Recommendation: [place]  Discharge Destination Plan[de-identified] [place]

## 2022-08-12 NOTE — ASSESSMENT & PLAN NOTE
As per is speech evaluation, patient is silently aspirating  Chest x-ray remained stable  After having detailed discussion with family member by speech, patient placed on dysphagia diet, patient and family understand the risk

## 2022-08-12 NOTE — ASSESSMENT & PLAN NOTE
Patient failure to thrive  Oral intake now improving as patient is undergoing treatment fluconazole for Candida  GI following for dysphagia evaluation  Continue calorie count-patient achieving adequate caloric intake

## 2022-08-12 NOTE — ASSESSMENT & PLAN NOTE
Recent EGD revealed esophagitis  Biopsy shows suspicious for candidal infection, continue on fluconazole  Continue PPI

## 2022-08-12 NOTE — PROGRESS NOTES
[facility]  Progress Note - Nandini Lilly 1933, 80 y o  male MRN: [id number]  [facility] Encounter: [id number]  Primary Care Provider: Mukesh Atkins MD   Date and time admitted to hospital: 8/8/2022 11:37 AM    Moderate protein-calorie malnutrition ([facility] [address] )  Assessment & Plan  Malnutrition Findings:   Adult Malnutrition type: Chronic illness  Adult Degree of Malnutrition: Malnutrition of moderate degree  Malnutrition Characteristics: Muscle loss, Weight loss                  360 Statement: Chronic/moderate malnutrition r/t condition, as evidenced by 10% wt loss x 2 months (6/18/22: 155#, 8/8/22: 139#), [facility]  Treatment: PO diet + nutrition supplements, monitoring intake for adequacy    BMI Findings: Body mass index is 20 61 kg/m²  Silent aspiration  Assessment & Plan  As per is speech evaluation, patient is silently aspirating  Chest x-ray remained stable  After having detailed discussion with family member by speech, patient placed on dysphagia diet, patient and family understand the risk        Failure to thrive in adult  Assessment & Plan  Patient failure to thrive  Oral intake now improving as patient is undergoing treatment fluconazole for Candida  GI following for dysphagia evaluation  Continue calorie count-patient achieving adequate caloric intake      Candida esophagitis (HCC)  Assessment & Plan  Recent EGD revealed esophagitis  Biopsy shows suspicious for candidal infection, continue on fluconazole  Continue PPI    Dementia without behavioral disturbance ([facility] [address] )  Assessment & Plan  Monitor for delirium  Reorientation  Sleep hygiene      History of stroke  Assessment & Plan  History of stroke  Continue statin  Will resume Plavix as patient will not be undergoing PEG placement    Orthostatic hypotension  Assessment & Plan  Monitor blood pressures  Avoid hypotension      Hyperlipidemia  Assessment & Plan  Statin  Patient denies any myalgias     Essential hypertension  Assessment & Plan  Monitor blood pressures-apart from 1 time spike patient's blood pressures have been well controlled  Will continue on Lopressor 12 5 mg b i d  Will hold midodrine  Avoid hypotension    * Dysphagia  Assessment & Plan  Dysphagia to solids and liquids  Recent EGD revealed esophagitis, Schatzki's ring  Biopsy :  Suspicious for Candida infection, patient's symptoms have improved with fluconazole, patient will not require PEG tube as he is consuming adequate calories  Calorie count-appreciate nutrition assistance  Continue PPI  Aspiration precautions  GI following  Speech and swallow evaluation done-as per note, patient is silently aspirating  Discussion has done with family by speech therapist, since patient is tolerating p o , will proceed with diet at this time knowing that patient remained high risk for respiration  -will advance diet to mechanical soft level to              VTE Pharmacologic Prophylaxis: VTE Score: 5 High Risk (Score >/= 5) - Pharmacological DVT Prophylaxis Ordered: enoxaparin (Lovenox)  Sequential Compression Devices Ordered  Patient Centered Rounds: I performed bedside rounds with nursing staff today  Discussions with Specialists or Other Care Team Provider: GI    Education and Discussions with Family / Patient: Updated  (significant other) at bedside  Time Spent for Care: 30 minutes  More than 50% of total time spent on counseling and coordination of care as described above  Current Length of Stay: 3 day(s)  Current Patient Status: Inpatient   Certification Statement: The patient will continue to require additional inpatient hospital stay due to Pending calorie count, likely discharge tomorrow  Discharge Plan: Anticipate discharge tomorrow to home      Code Status: Level 1 - Full Code    Subjective:   Patient denies any difficulty swallowing, any pain swallowing, sore throat, fever chills nausea vomiting abdominal pain  Patient currently achieving all his caloric needs    Objective:     Vitals:   Temp (24hrs), Av 4 °F (36 9 °C), Min:98 °F (36 7 °C), Max:98 7 °F (37 1 °C)    Temp:  [98 °F (36 7 °C)-98 7 °F (37 1 °C)] 98 7 °F (37 1 °C)  HR:  [72-84] 72  Resp:  [14] 14  BP: (127-175)/(57-75) 175/75  SpO2:  [93 %-98 %] 98 %  Body mass index is 20 61 kg/m²  Input and Output Summary (last 24 hours): Intake/Output Summary (Last 24 hours) at 2022 1512  Last data filed at 2022 0900  Gross per 24 hour   Intake 390 ml   Output 200 ml   Net 190 ml       Physical Exam:   Physical Exam  Vitals and nursing note reviewed  Constitutional:       General: He is not in acute distress  Appearance: He is well-developed  He is not ill-appearing, toxic-appearing or diaphoretic  HENT:      Head: Normocephalic and atraumatic  Eyes:      General: No scleral icterus  Conjunctiva/sclera: Conjunctivae normal    Cardiovascular:      Rate and Rhythm: Normal rate and regular rhythm  Heart sounds: No murmur heard  No friction rub  No gallop  Pulmonary:      Effort: Pulmonary effort is normal  No respiratory distress  Breath sounds: Normal breath sounds  No stridor  No wheezing, rhonchi or rales  Chest:      Chest wall: No tenderness  Abdominal:      General: There is no distension  Palpations: Abdomen is soft  There is no mass  Tenderness: There is no abdominal tenderness  There is no guarding or rebound  Hernia: No hernia is present  Musculoskeletal:         General: No swelling, tenderness, deformity or signs of injury  Cervical back: Neck supple  Skin:     General: Skin is warm and dry  Coloration: Skin is not jaundiced or pale  Findings: No bruising or erythema  Neurological:      Mental Status: He is alert and oriented to person, place, and time            Additional Data:     Labs:  Results from last 7 days   Lab Units 08/09/22  0454   WBC Thousand/uL 6 81   HEMOGLOBIN g/dL 12 2   HEMATOCRIT % 37 7   PLATELETS Thousands/uL 185   NEUTROS PCT % 60   LYMPHS PCT % 26   MONOS PCT % 11   EOS PCT % 3     Results from last 7 days   Lab Units 08/12/22  0609   SODIUM mmol/L 147   POTASSIUM mmol/L 3 4*   CHLORIDE mmol/L 115*   CO2 mmol/L 29   BUN mg/dL 18   CREATININE mg/dL 1 01   ANION GAP mmol/L 3*   CALCIUM mg/dL 11 0*   ALBUMIN g/dL 3 2*   TOTAL BILIRUBIN mg/dL 0 39   ALK PHOS U/L 91   ALT U/L 19   AST U/L 21   GLUCOSE RANDOM mg/dL 132                       Lines/Drains:  Invasive Devices  Report    None                       Imaging: No pertinent imaging reviewed  Recent Cultures (last 7 days):         Last 24 Hours Medication List:   Current Facility-Administered Medications   Medication Dose Route Frequency Provider Last Rate    clopidogrel  75 mg Oral Daily Pedrito Jain DO      enoxaparin  40 mg Subcutaneous Daily Kasey Cardenas MD      fluconazole  200 mg Oral Daily Tio Marcos DO      metoprolol tartrate  12 5 mg Oral Q12H [address] Mary Pagan MD      pantoprazole  40 mg Oral Early Morning Mary Pagan MD      pravastatin  40 mg Oral Daily With Sophie Sousa MD      psyllium  1 packet Oral Daily Kasey Cardenas MD      sertraline  75 mg Oral Daily Kasey Cardenas MD          Today, Patient Was Seen By: Nando Ramos DO    **Please Note: This note may have been constructed using a voice recognition system  **

## 2022-08-12 NOTE — ASSESSMENT & PLAN NOTE
Malnutrition Findings:   Adult Malnutrition type: Chronic illness  Adult Degree of Malnutrition: Malnutrition of moderate degree  Malnutrition Characteristics: Muscle loss, Weight loss                  360 Statement: Chronic/moderate malnutrition r/t condition, as evidenced by 10% wt loss x 2 months (6/18/22: 155#, 8/8/22: 139#), [facility]  Treatment: PO diet + nutrition supplements, monitoring intake for adequacy    BMI Findings: Body mass index is 20 61 kg/m²

## 2022-08-12 NOTE — ASSESSMENT & PLAN NOTE
Dysphagia to solids and liquids  Recent EGD revealed esophagitis, Schatzki's ring  Biopsy :  Suspicious for Candida infection, patient's symptoms have improved with fluconazole, patient will not require PEG tube as he is consuming adequate calories  Calorie count-appreciate nutrition assistance  Continue PPI  Aspiration precautions  GI following  Speech and swallow evaluation done-as per note, patient is silently aspirating  Discussion has done with family by speech therapist, since patient is tolerating p o , will proceed with diet at this time knowing that patient remained high risk for respiration    -will advance diet to mechanical soft level to

## 2022-08-12 NOTE — ASSESSMENT & PLAN NOTE
Monitor blood pressures-apart from 1 time spike patient's blood pressures have been well controlled  Will continue on Lopressor 12 5 mg b i d    Will hold midodrine  Avoid hypotension

## 2022-08-12 NOTE — ASSESSMENT & PLAN NOTE
History of stroke  Continue statin  Will resume Plavix as patient will not be undergoing PEG placement

## 2022-08-12 NOTE — PLAN OF CARE
Problem: MOBILITY - ADULT  Goal: Maintain or return to baseline ADL function  Description: INTERVENTIONS:  -  Assess patient's ability to carry out ADLs; assess patient's baseline for ADL function and identify physical deficits which impact ability to perform ADLs (bathing, care of mouth/teeth, toileting, grooming, dressing, etc )  - Assess/evaluate cause of self-care deficits   - Assess range of motion  - Assess patient's mobility; develop plan if impaired  - Assess patient's need for assistive devices and provide as appropriate  - Encourage maximum independence but intervene and supervise when necessary  - Involve family in performance of ADLs  - Assess for home care needs following discharge   - Consider OT consult to assist with ADL evaluation and planning for discharge  - Provide patient education as appropriate  Outcome: Progressing  Goal: Maintains/Returns to pre admission functional level  Description: INTERVENTIONS:  - Perform BMAT or MOVE assessment daily    - Set and communicate daily mobility goal to care team and patient/family/caregiver  - Collaborate with rehabilitation services on mobility goals if consulted  - Perform Range of Motion 3 times a day  - Reposition patient every 2 hours    - Dangle patient 3 times a day  - Stand patient 3 times a day  - Ambulate patient 3 times a day  - Out of bed to chair 3 times a day   - Out of bed for meals 3 times a day  - Out of bed for toileting  - Record patient progress and toleration of activity level   Outcome: Progressing     Problem: PAIN - ADULT  Goal: Verbalizes/displays adequate comfort level or baseline comfort level  Description: Interventions:  - Encourage patient to monitor pain and request assistance  - Assess pain using appropriate pain scale  - Administer analgesics based on type and severity of pain and evaluate response  - Implement non-pharmacological measures as appropriate and evaluate response  - Consider cultural and social influences on pain and pain management  - Notify physician/advanced practitioner if interventions unsuccessful or patient reports new pain  Outcome: Progressing     Problem: INFECTION - ADULT  Goal: Absence or prevention of progression during hospitalization  Description: INTERVENTIONS:  - Assess and monitor for signs and symptoms of infection  - Monitor lab/diagnostic results  - Monitor all insertion sites, i e  indwelling lines, tubes, and drains  - Monitor endotracheal if appropriate and nasal secretions for changes in amount and color  - [place] appropriate cooling/warming therapies per order  - Administer medications as ordered  - Instruct and encourage patient and family to use good hand hygiene technique  - Identify and instruct in appropriate isolation precautions for identified infection/condition  Outcome: Progressing  Goal: Absence of fever/infection during neutropenic period  Description: INTERVENTIONS:  - Monitor WBC    Outcome: Progressing     Problem: SAFETY ADULT  Goal: Maintain or return to baseline ADL function  Description: INTERVENTIONS:  -  Assess patient's ability to carry out ADLs; assess patient's baseline for ADL function and identify physical deficits which impact ability to perform ADLs (bathing, care of mouth/teeth, toileting, grooming, dressing, etc )  - Assess/evaluate cause of self-care deficits   - Assess range of motion  - Assess patient's mobility; develop plan if impaired  - Assess patient's need for assistive devices and provide as appropriate  - Encourage maximum independence but intervene and supervise when necessary  - Involve family in performance of ADLs  - Assess for home care needs following discharge   - Consider OT consult to assist with ADL evaluation and planning for discharge  - Provide patient education as appropriate  Outcome: Progressing  Goal: Maintains/Returns to pre admission functional level  Description: INTERVENTIONS:  - Perform BMAT or MOVE assessment daily    - Set and communicate daily mobility goal to care team and patient/family/caregiver  - Collaborate with rehabilitation services on mobility goals if consulted  - Perform Range of Motion 3 times a day  - Reposition patient every 2 hours    - Dangle patient 3 times a day  - Stand patient 3 times a day  - Ambulate patient 3 times a day  - Out of bed to chair 3 times a day   - Out of bed for meals 3 times a day  - Out of bed for toileting  - Record patient progress and toleration of activity level   Outcome: Progressing  Goal: Patient will remain free of falls  Description: INTERVENTIONS:  - Educate patient/family on patient safety including physical limitations  - Instruct patient to call for assistance with activity   - Consult OT/PT to assist with strengthening/mobility   - Keep Call bell within reach  - Keep bed low and locked with side rails adjusted as appropriate  - Keep care items and personal belongings within reach  - Initiate and maintain comfort rounds  - Make Fall Risk Sign visible to staff  - Offer Toileting every 2 Hours, in advance of need  - Initiate/Maintain alarm  - Obtain necessary fall risk management equipment  - Apply yellow socks and bracelet for high fall risk patients  - Consider moving patient to room near nurses station  Outcome: Progressing     Problem: DISCHARGE PLANNING  Goal: Discharge to home or other facility with appropriate resources  Description: INTERVENTIONS:  - Identify barriers to discharge w/patient and caregiver  - Arrange for needed discharge resources and transportation as appropriate  - Identify discharge learning needs (meds, wound care, etc )  - Arrange for interpretive services to assist at discharge as needed  - Refer to Case Management Department for coordinating discharge planning if the patient needs post-hospital services based on physician/advanced practitioner order or complex needs related to functional status, cognitive ability, or social support system  Outcome: Progressing     Problem: Knowledge Deficit  Goal: Patient/family/caregiver demonstrates understanding of disease process, treatment plan, medications, and discharge instructions  Description: Complete learning assessment and assess knowledge base    Interventions:  - Provide teaching at level of understanding  - Provide teaching via preferred learning methods  Outcome: Progressing     Problem: Potential for Falls  Goal: Patient will remain free of falls  Description: INTERVENTIONS:  - Educate patient/family on patient safety including physical limitations  - Instruct patient to call for assistance with activity   - Consult OT/PT to assist with strengthening/mobility   - Keep Call bell within reach  - Keep bed low and locked with side rails adjusted as appropriate  - Keep care items and personal belongings within reach  - Initiate and maintain comfort rounds  - Make Fall Risk Sign visible to staff  - Offer Toileting every 2 Hours, in advance of need  - Initiate/Maintain alarm  - Obtain necessary fall risk management equipment  - Apply yellow socks and bracelet for high fall risk patients  - Consider moving patient to room near nurses station  Outcome: Progressing     Problem: Prexisting or High Potential for Compromised Skin Integrity  Goal: Skin integrity is maintained or improved  Description: INTERVENTIONS:  - Identify patients at risk for skin breakdown  - Assess and monitor skin integrity  - Assess and monitor nutrition and hydration status  - Monitor labs   - Assess for incontinence   - Turn and reposition patient  - Assist with mobility/ambulation  - Relieve pressure over bony prominences  - Avoid friction and shearing  - Provide appropriate hygiene as needed including keeping skin clean and dry  - Evaluate need for skin moisturizer/barrier cream  - Collaborate with interdisciplinary team   - Patient/family teaching  - Consider wound care consult   Outcome: Progressing     Problem: Nutrition/Hydration-ADULT  Goal: Nutrient/Hydration intake appropriate for improving, restoring or maintaining nutritional needs  Description: Monitor and assess patient's nutrition/hydration status for malnutrition  Collaborate with interdisciplinary team and initiate plan and interventions as ordered  Monitor patient's weight and dietary intake as ordered or per policy  Utilize nutrition screening tool and intervene as necessary  Determine patient's food preferences and provide high-protein, high-caloric foods as appropriate       INTERVENTIONS:  - Monitor oral intake, urinary output, labs, and treatment plans  - Assess nutrition and hydration status and recommend course of action  - Evaluate amount of meals eaten  - Assist patient with eating if necessary   - Allow adequate time for meals  - Recommend/ encourage appropriate diets, oral nutritional supplements, and vitamin/mineral supplements  - Order, calculate, and assess calorie counts as needed  - Recommend, monitor, and adjust tube feedings and TPN/PPN based on assessed needs  - Assess need for intravenous fluids  - Provide specific nutrition/hydration education as appropriate  - Include patient/family/caregiver in decisions related to nutrition  Outcome: Progressing

## 2022-08-13 VITALS
BODY MASS INDEX: 20.61 KG/M2 | WEIGHT: 139.55 LBS | TEMPERATURE: 98 F | RESPIRATION RATE: 17 BRPM | SYSTOLIC BLOOD PRESSURE: 182 MMHG | OXYGEN SATURATION: 94 % | DIASTOLIC BLOOD PRESSURE: 74 MMHG | HEART RATE: 70 BPM

## 2022-08-13 LAB
FLUAV RNA RESP QL NAA+PROBE: NEGATIVE
FLUBV RNA RESP QL NAA+PROBE: NEGATIVE
RSV RNA RESP QL NAA+PROBE: NEGATIVE
SARS-COV-2 RNA RESP QL NAA+PROBE: NEGATIVE

## 2022-08-13 PROCEDURE — 0241U HB NFCT DS VIR RESP RNA 4 TRGT: CPT | Performed by: INTERNAL MEDICINE

## 2022-08-13 PROCEDURE — 99239 HOSP IP/OBS DSCHRG MGMT >30: CPT | Performed by: INTERNAL MEDICINE

## 2022-08-13 RX ORDER — FLUCONAZOLE 40 MG/ML
200 POWDER, FOR SUSPENSION ORAL DAILY
Qty: 50 ML | Refills: 0 | Status: SHIPPED | OUTPATIENT
Start: 2022-08-14 | End: 2022-08-24

## 2022-08-13 RX ORDER — FLUCONAZOLE 40 MG/ML
200 POWDER, FOR SUSPENSION ORAL DAILY
Qty: 50 ML | Refills: 0 | Status: SHIPPED | OUTPATIENT
Start: 2022-08-14 | End: 2022-08-13 | Stop reason: SDUPTHER

## 2022-08-13 RX ADMIN — SERTRALINE 75 MG: 25 TABLET, FILM COATED ORAL at 08:42

## 2022-08-13 RX ADMIN — ENOXAPARIN SODIUM 40 MG: 40 INJECTION SUBCUTANEOUS at 08:42

## 2022-08-13 RX ADMIN — FLUCONAZOLE 200 MG: 40 POWDER, FOR SUSPENSION ORAL at 08:42

## 2022-08-13 RX ADMIN — Medication 12.5 MG: at 08:42

## 2022-08-13 RX ADMIN — PANTOPRAZOLE SODIUM 40 MG: 40 TABLET, DELAYED RELEASE ORAL at 06:46

## 2022-08-13 RX ADMIN — CLOPIDOGREL BISULFATE 75 MG: 75 TABLET ORAL at 08:42

## 2022-08-13 RX ADMIN — PSYLLIUM HUSK 1 PACKET: 3.4 POWDER ORAL at 08:42

## 2022-08-13 NOTE — CASE MANAGEMENT
Case Management Discharge Planning Note    Patient name Zara Siemens  Location [facility] 316/[facility] 277-75 MRN [id number]  : 1933 Date 2022       Current Admission Date: 2022  Current Admission Diagnosis:Dysphagia   Patient Active Problem List    Diagnosis Date Noted    Moderate protein-calorie malnutrition ([facility] [address] ) 2022    Silent aspiration 08/10/2022    Candida esophagitis ([facility] [address] ) 2022    Failure to thrive in adult 2022    Dysphagia 2021    Dementia without behavioral disturbance ([facility] [address] ) 2021    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 [address] ) 2020    Need for influenza vaccination 2020    Depression 05/15/2020    History of stroke 2020    Acute CVA (cerebrovascular accident), suspected embolic in nature     Hypercalcemia 2019    History of resection of meningioma 2019    Lumbar spondylosis 2019    Lumbar radiculopathy 2019    Convulsions ([address] ) 2019    Orthostatic hypotension 2018    Contusion of rib on right side 2018    Cognitive decline 2018    History of meningioma 2018    Idiopathic peripheral neuropathy 2018    Iron deficiency anemia 2018    Constipation 2018    Iron deficiency 2018    Other constipation 2018    Vitamin B12 deficiency 2017    Anemia 2017    Insomnia 2016    Arteriosclerotic cardiovascular disease 2015    Esophageal reflux 2014    Cervical spinal stenosis 2013    Spinal stenosis of lumbar region with neurogenic claudication 2013    Backache 2013    Essential hypertension 2013    Hyperlipidemia 2013    Depression 2013      LOS (days): 4  Geometric Mean LOS (GMLOS) (days): 2 10  Days to GMLOS:-1 7     OBJECTIVE:  Risk of Unplanned Readmission Score: 9 02         Current admission status: Inpatient   Preferred Pharmacy:   [address] [facility] [address]  Phone: [phone] Fax: [phone]    [address] [address]  Phone: [phone] Fax: [phone]    Primary Care Provider: Sammi Larry MD    Primary Insurance: [facility]  Secondary Insurance:     DISCHARGE DETAILS:    Discharge planning discussed with[de-identified] Joshi Mage wife  Freedom of Choice: Yes  Comments - Freedom of Choice: Pt to return to [facility]  [place] wife she wants to transport  CM explained to wife that the patient cannot leave the hsopital until the Covid swab results are back and Negative  Wife Adi Spangler verbalized understanding  CM left VM for Samanta at [facility] (covering administration per [facility] on Friday) await call back  CM contacted family/caregiver?: Yes  Were Treatment Team discharge recommendations reviewed with patient/caregiver?: Yes  Did patient/caregiver verbalize understanding of patient care needs?: N/A- going to facility  Were patient/caregiver advised of the risks associated with not following Treatment Team discharge recommendations?: Yes    Contacts  Patient Contacts: juan jose  Relationship to Patient[de-identified] Family  Contact Method: Phone  Phone Number: [phone]  Reason/Outcome: Continuity of Care, Emergency Contact, Discharge Planning                        Treatment Team Recommendation: Assisted Living  Discharge Destination Plan[de-identified] Assisted Living  Transport at Discharge : [address], [address] : [facility]  Receiving Facility/Agency Phone Number: [phone]  Facility/Agency Fax Number: [phone]         Catarina Gonzalo called back CM updated her about d/c and wife driving the patient

## 2022-08-13 NOTE — PLAN OF CARE
Problem: MOBILITY - ADULT  Goal: Maintain or return to baseline ADL function  Description: INTERVENTIONS:  -  Assess patient's ability to carry out ADLs; assess patient's baseline for ADL function and identify physical deficits which impact ability to perform ADLs (bathing, care of mouth/teeth, toileting, grooming, dressing, etc )  - Assess/evaluate cause of self-care deficits   - Assess range of motion  - Assess patient's mobility; develop plan if impaired  - Assess patient's need for assistive devices and provide as appropriate  - Encourage maximum independence but intervene and supervise when necessary  - Involve family in performance of ADLs  - Assess for home care needs following discharge   - Consider OT consult to assist with ADL evaluation and planning for discharge  - Provide patient education as appropriate  Outcome: Completed  Goal: Maintains/Returns to pre admission functional level  Description: INTERVENTIONS:  - Perform BMAT or MOVE assessment daily    - Set and communicate daily mobility goal to care team and patient/family/caregiver  - Collaborate with rehabilitation services on mobility goals if consulted  - Perform Range of Motion  times a day  - Reposition patient every  hours    - Dangle patient  times a day  - Stand patient  times a day  - Ambulate patient  times a day  - Out of bed to chair  times a day   - Out of bed for meals  times a day  - Out of bed for toileting  - Record patient progress and toleration of activity level   Outcome: Completed     Problem: PAIN - ADULT  Goal: Verbalizes/displays adequate comfort level or baseline comfort level  Description: Interventions:  - Encourage patient to monitor pain and request assistance  - Assess pain using appropriate pain scale  - Administer analgesics based on type and severity of pain and evaluate response  - Implement non-pharmacological measures as appropriate and evaluate response  - Consider cultural and social influences on pain and pain management  - Notify physician/advanced practitioner if interventions unsuccessful or patient reports new pain  Outcome: Completed     Problem: INFECTION - ADULT  Goal: Absence or prevention of progression during hospitalization  Description: INTERVENTIONS:  - Assess and monitor for signs and symptoms of infection  - Monitor lab/diagnostic results  - Monitor all insertion sites, i e  indwelling lines, tubes, and drains  - Monitor endotracheal if appropriate and nasal secretions for changes in amount and color  - [place] appropriate cooling/warming therapies per order  - Administer medications as ordered  - Instruct and encourage patient and family to use good hand hygiene technique  - Identify and instruct in appropriate isolation precautions for identified infection/condition  Outcome: Completed  Goal: Absence of fever/infection during neutropenic period  Description: INTERVENTIONS:  - Monitor WBC    Outcome: Completed     Problem: SAFETY ADULT  Goal: Maintain or return to baseline ADL function  Description: INTERVENTIONS:  -  Assess patient's ability to carry out ADLs; assess patient's baseline for ADL function and identify physical deficits which impact ability to perform ADLs (bathing, care of mouth/teeth, toileting, grooming, dressing, etc )  - Assess/evaluate cause of self-care deficits   - Assess range of motion  - Assess patient's mobility; develop plan if impaired  - Assess patient's need for assistive devices and provide as appropriate  - Encourage maximum independence but intervene and supervise when necessary  - Involve family in performance of ADLs  - Assess for home care needs following discharge   - Consider OT consult to assist with ADL evaluation and planning for discharge  - Provide patient education as appropriate  Outcome: Completed  Goal: Maintains/Returns to pre admission functional level  Description: INTERVENTIONS:  - Perform BMAT or MOVE assessment daily    - Set and communicate daily mobility goal to care team and patient/family/caregiver  - Collaborate with rehabilitation services on mobility goals if consulted  - Perform Range of Motion  times a day  - Reposition patient every  hours    - Dangle patient  times a day  - Stand patient  times a day  - Ambulate patient  times a day  - Out of bed to chair  times a day   - Out of bed for meals  times a day  - Out of bed for toileting  - Record patient progress and toleration of activity level   Outcome: Completed  Goal: Patient will remain free of falls  Description: INTERVENTIONS:  - Educate patient/family on patient safety including physical limitations  - Instruct patient to call for assistance with activity   - Consult OT/PT to assist with strengthening/mobility   - Keep Call bell within reach  - Keep bed low and locked with side rails adjusted as appropriate  - Keep care items and personal belongings within reach  - Initiate and maintain comfort rounds  - Make Fall Risk Sign visible to staff  - Offer Toileting every  Hours, in advance of need  - Initiate/Maintain alarm  - Obtain necessary fall risk management equipment:   - Apply yellow socks and bracelet for high fall risk patients  - Consider moving patient to room near nurses station  Outcome: Completed     Problem: DISCHARGE PLANNING  Goal: Discharge to home or other facility with appropriate resources  Description: INTERVENTIONS:  - Identify barriers to discharge w/patient and caregiver  - Arrange for needed discharge resources and transportation as appropriate  - Identify discharge learning needs (meds, wound care, etc )  - Arrange for interpretive services to assist at discharge as needed  - Refer to Case Management Department for coordinating discharge planning if the patient needs post-hospital services based on physician/advanced practitioner order or complex needs related to functional status, cognitive ability, or social support system  Outcome: Completed     Problem: Knowledge Deficit  Goal: Patient/family/caregiver demonstrates understanding of disease process, treatment plan, medications, and discharge instructions  Description: Complete learning assessment and assess knowledge base    Interventions:  - Provide teaching at level of understanding  - Provide teaching via preferred learning methods  Outcome: Completed     Problem: Potential for Falls  Goal: Patient will remain free of falls  Description: INTERVENTIONS:  - Educate patient/family on patient safety including physical limitations  - Instruct patient to call for assistance with activity   - Consult OT/PT to assist with strengthening/mobility   - Keep Call bell within reach  - Keep bed low and locked with side rails adjusted as appropriate  - Keep care items and personal belongings within reach  - Initiate and maintain comfort rounds  - Make Fall Risk Sign visible to staff  - Offer Toileting every  Hours, in advance of need  - Initiate/Maintain alarm  - Obtain necessary fall risk management equipment:  - Apply yellow socks and bracelet for high fall risk patients  - Consider moving patient to room near nurses station  Outcome: Completed     Problem: Prexisting or High Potential for Compromised Skin Integrity  Goal: Skin integrity is maintained or improved  Description: INTERVENTIONS:  - Identify patients at risk for skin breakdown  - Assess and monitor skin integrity  - Assess and monitor nutrition and hydration status  - Monitor labs   - Assess for incontinence   - Turn and reposition patient  - Assist with mobility/ambulation  - Relieve pressure over bony prominences  - Avoid friction and shearing  - Provide appropriate hygiene as needed including keeping skin clean and dry  - Evaluate need for skin moisturizer/barrier cream  - Collaborate with interdisciplinary team   - Patient/family teaching  - Consider wound care consult   Outcome: Completed     Problem: Nutrition/Hydration-ADULT  Goal: Nutrient/Hydration intake appropriate for improving, restoring or maintaining nutritional needs  Description: Monitor and assess patient's nutrition/hydration status for malnutrition  Collaborate with interdisciplinary team and initiate plan and interventions as ordered  Monitor patient's weight and dietary intake as ordered or per policy  Utilize nutrition screening tool and intervene as necessary  Determine patient's food preferences and provide high-protein, high-caloric foods as appropriate       INTERVENTIONS:  - Monitor oral intake, urinary output, labs, and treatment plans  - Assess nutrition and hydration status and recommend course of action  - Evaluate amount of meals eaten  - Assist patient with eating if necessary   - Allow adequate time for meals  - Recommend/ encourage appropriate diets, oral nutritional supplements, and vitamin/mineral supplements  - Order, calculate, and assess calorie counts as needed  - Recommend, monitor, and adjust tube feedings and TPN/PPN based on assessed needs  - Assess need for intravenous fluids  - Provide specific nutrition/hydration education as appropriate  - Include patient/family/caregiver in decisions related to nutrition  Outcome: Completed

## 2022-08-13 NOTE — CASE MANAGEMENT
Case Management Discharge Planning Note    Patient name Alissa Alejandro  Location [facility] 461-57 MRN [id number]  : 1933 Date 2022       Current Admission Date: 2022  Current Admission Diagnosis:Dysphagia   Patient Active Problem List    Diagnosis Date Noted    Moderate protein-calorie malnutrition ([facility] ) 2022    Silent aspiration 08/10/2022    Candida esophagitis ([facility] ) 2022    Failure to thrive in adult 2022    Dysphagia 2021    Dementia without behavioral disturbance ([address] ) 2021    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[facility] ) 2021    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) ([address] ) 2020    Calcification of aorta ([facility] ) 2020    Need for influenza vaccination 2020    Depression 05/15/2020    History of stroke 2020    Acute CVA (cerebrovascular accident), suspected embolic in nature     Hypercalcemia 2019    History of resection of meningioma 2019    Lumbar spondylosis 2019    Lumbar radiculopathy 2019    Convulsions ([address] ) 2019    Orthostatic hypotension 2018    Contusion of rib on right side 2018    Cognitive decline 2018    History of meningioma 2018    Idiopathic peripheral neuropathy 2018    Iron deficiency anemia 2018    Constipation 2018    Iron deficiency 2018    Other constipation 2018    Vitamin B12 deficiency 2017    Anemia 2017    Insomnia 2016    Arteriosclerotic cardiovascular disease 2015    Esophageal reflux 2014    Cervical spinal stenosis 2013    Spinal stenosis of lumbar region with neurogenic claudication 2013    Backache 2013    Essential hypertension 2013    Hyperlipidemia 2013    Depression 2013      LOS (days): 4  Geometric Mean LOS (GMLOS) (days): 2 10  Days to GMLOS:-1 7     OBJECTIVE:  Risk of Unplanned Readmission Score: 9 02         Current admission status: Inpatient   Preferred Pharmacy:   [facility] [address]  Phone: [phone] Fax: [phone]    [address] [address]  Phone: [phone] Fax: [phone]    Primary Care Provider: Jeffrey Blanco MD    Primary Insurance: [facility]  Secondary Insurance:     DISCHARGE DETAILS:    Discharge planning discussed with[de-identified] Lex Mercado wife  Freedom of Choice: Yes  Comments - Freedom of Choice: Pt to return to [facility]  [place] wife she wants to transport  CM explained to wife that the patient cannot leave the hsopital until the Covid swab results are back and Negative  Wife Lex Mercado verbalized understanding  CM left VM for Samanta at [facility] (covering administration per [facility] on Friday) await call back    CM contacted family/caregiver?: Yes  Were Treatment Team discharge recommendations reviewed with patient/caregiver?: Yes  Did patient/caregiver verbalize understanding of patient care needs?: N/A- going to facility  Were patient/caregiver advised of the risks associated with not following Treatment Team discharge recommendations?: Yes    Contacts  Patient Contacts: juan jose  Relationship to Patient[de-identified] Family  Contact Method: Phone  Phone Number: [phone]  Reason/Outcome: Continuity of Care, Emergency Contact, Discharge Planning                        Treatment Team Recommendation: Assisted Living  Discharge Destination Plan[de-identified] Assisted Living  Transport at Discharge : [address], [address] : [facility]  Receiving Facility/Agency Phone Number: [phone]  Facility/Agency Fax Number: [phone]

## 2022-08-16 ENCOUNTER — TRANSITIONAL CARE MANAGEMENT (OUTPATIENT)
Dept: FAMILY MEDICINE CLINIC | Facility: CLINIC | Age: 87
End: 2022-08-16

## 2022-08-17 NOTE — ASSESSMENT & PLAN NOTE
Monitor blood pressures-apart from 1 time spike patient's blood pressures have been well controlled  Will continue on Lopressor 12 5 mg b i d      Avoid hypotension

## 2022-08-17 NOTE — ASSESSMENT & PLAN NOTE
Malnutrition Findings:   Adult Malnutrition type: Chronic illness  Adult Degree of Malnutrition: Malnutrition of moderate degree  Malnutrition Characteristics: Muscle loss, Weight loss                  360 Statement: Chronic/moderate malnutrition r/t condition, as evidenced by 10% wt loss x 2 months (6/18/22: 155#, 8/8/22: 139#), and [facility]  Treatment: PO diet + nutrition supplements, monitoring intake for adequacy    BMI Findings: Body mass index is 20 61 kg/m²

## 2022-08-17 NOTE — DISCHARGE SUMMARY
[address]  Discharge- Malena Frazier 1933, 80 y o  male MRN: [id number]  Unit/Bed#: [facility] 316-01 Encounter: [id number]  Primary Care Provider: Cheyenne Herrera MD   Date and time admitted to hospital: 8/8/2022 11:37 AM    Moderate protein-calorie malnutrition ([facility] )  Assessment & Plan  Malnutrition Findings:   Adult Malnutrition type: Chronic illness  Adult Degree of Malnutrition: Malnutrition of moderate degree  Malnutrition Characteristics: Muscle loss, Weight loss                  360 Statement: Chronic/moderate malnutrition r/t condition, as evidenced by 10% wt loss x 2 months (6/18/22: 155#, 8/8/22: 139#), and mild muscle mass depletion at temples  Treatment: PO diet + nutrition supplements, monitoring intake for adequacy    BMI Findings: Body mass index is 20 61 kg/m²  Silent aspiration  Assessment & Plan  As per is speech evaluation, patient is silently aspirating  Chest x-ray remained stable  After having detailed discussion with family member by speech, patient placed on dysphagia diet, patient and family understand the risk        Failure to thrive in adult  Assessment & Plan  Patient failure to thrive  Oral intake now improving as patient is undergoing treatment fluconazole for Candida  GI following for dysphagia evaluation  Continue calorie count-patient achieving adequate caloric intake      Candida esophagitis (HCC)  Assessment & Plan  Recent EGD revealed esophagitis  Biopsy shows suspicious for candidal infection, continue on fluconazole  Continue PPI    Dementia without behavioral disturbance ([facility] )  Assessment & Plan  Monitor for delirium  Reorientation  Sleep hygiene      History of stroke  Assessment & Plan  History of stroke  Continue statin  Will resume Plavix as patient will not be undergoing PEG placement    Orthostatic hypotension  Assessment & Plan  Monitor blood pressures  Avoid hypotension      Hyperlipidemia  Assessment & Plan  Statin  Patient denies any myalgias     Essential hypertension  Assessment & Plan  Monitor blood pressures-apart from 1 time spike patient's blood pressures have been well controlled  Will continue on Lopressor 12 5 mg b i d  Avoid hypotension    * Dysphagia  Assessment & Plan  Dysphagia to solids and liquids  Recent EGD revealed esophagitis, Schatzki's ring  Biopsy :  Suspicious for Candida infection, patient's symptoms have improved with fluconazole, patient will not require PEG tube as he is consuming adequate calories  Calorie count-appreciate nutrition assistance  Continue PPI  Aspiration precautions  GI following  Speech and swallow evaluation done-as per note, patient is silently aspirating  Discussion has done with family by speech therapist, since patient is tolerating p o , will proceed with diet at this time knowing that patient remained high risk for respiration  -will advance diet to mechanical soft level             Medical Problems             Resolved Problems  Date Reviewed: 8/17/2022   None               Discharging Physician / Practitioner: Edilia Orlando DO  PCP: Cori Roberts MD  Admission Date:   Admission Orders (From admission, onward)     Ordered        08/09/22 1600  Inpatient Admission  Once            08/08/22 1310  Place in Observation  Once                      Discharge Date: 08/13/22  Consultations During Hospital Stay:  · Gi    Procedures Performed:   · Monda Bamberger biopsy recently    Significant Findings / Test Results:   XR chest portable   Final Result by Minnie Marie MD (08/09 2116)      No acute cardiopulmonary disease                    Workstation performed: [id number]         ·     Incidental Findings:   · As under imaging     Test Results Pending at Discharge (will require follow up):   · none     Outpatient Tests Requested:  · none    Complications:  none    Reason for Admission: dysphagia    Hospital Course:   George Kebede is a 80 y o  male patient who originally presented to the hospital on 8/8/2022 due to dysphagia and failure to thrive  Review of recent EGD with biopsy shows white plaques concerning for Candida  Patient was treated with fluconazole with significant improvement in his dysphagia inability to swallow  Patient passed nutrition evaluation and was discharged  He did not require PEG tube placement        Please see above list of diagnoses and related plan for additional information  Condition at Discharge: stable    Discharge Day Visit / Exam:   Subjective:  Patient denies any acute complaints  Vitals: Blood Pressure: (!) 182/74 (08/13/22 0700)  Pulse: 70 (08/13/22 0700)  Temperature: 98 °F (36 7 °C) (08/13/22 0700)  Temp Source: Oral (08/13/22 0700)  Respirations: 17 (08/13/22 0700)  Weight - Scale: 63 3 kg (139 lb 8 8 oz) (08/08/22 1700)  SpO2: 94 % (08/13/22 0700)  Exam:   Physical Exam  Vitals and nursing note reviewed  Constitutional:       General: He is not in acute distress  Appearance: He is well-developed  He is not ill-appearing, toxic-appearing or diaphoretic  HENT:      Head: Normocephalic and atraumatic  Eyes:      General: No scleral icterus  Conjunctiva/sclera: Conjunctivae normal    Cardiovascular:      Rate and Rhythm: Normal rate and regular rhythm  Heart sounds: No murmur heard  No friction rub  No gallop  Pulmonary:      Effort: Pulmonary effort is normal  No respiratory distress  Breath sounds: Normal breath sounds  No stridor  No wheezing, rhonchi or rales  Chest:      Chest wall: No tenderness  Abdominal:      General: There is no distension  Palpations: Abdomen is soft  There is no mass  Tenderness: There is no abdominal tenderness  There is no guarding or rebound  Hernia: No hernia is present  Musculoskeletal:         General: No swelling, tenderness, deformity or signs of injury  Cervical back: Neck supple  Skin:     General: Skin is warm and dry  Coloration: Skin is not jaundiced or pale  Findings: No bruising or erythema  Neurological:      Mental Status: He is alert and oriented to person, place, and time  Discussion with Family: Updated  (wife) at bedside  Discharge instructions/Information to patient and family:   See after visit summary for information provided to patient and family  Provisions for Follow-Up Care:  See after visit summary for information related to follow-up care and any pertinent home health orders  Disposition:   Home    Planned Readmission: no     Discharge Statement:  I spent 35 minutes discharging the patient  This time was spent on the day of discharge  I had direct contact with the patient on the day of discharge  Greater than 50% of the total time was spent examining patient, answering all patient questions, arranging and discussing plan of care with patient as well as directly providing post-discharge instructions  Additional time then spent on discharge activities  Discharge Medications:  See after visit summary for reconciled discharge medications provided to patient and/or family        **Please Note: This note may have been constructed using a voice recognition system**

## 2022-08-17 NOTE — ASSESSMENT & PLAN NOTE
Dysphagia to solids and liquids  Recent EGD revealed esophagitis, Schatzki's ring  Biopsy :  Suspicious for Candida infection, patient's symptoms have improved with fluconazole, patient will not require PEG tube as he is consuming adequate calories  Calorie count-appreciate nutrition assistance  Continue PPI  Aspiration precautions  GI following  Speech and swallow evaluation done-as per note, patient is silently aspirating  Discussion has done with family by speech therapist, since patient is tolerating p o , will proceed with diet at this time knowing that patient remained high risk for respiration    -will advance diet to mechanical soft level

## 2022-08-17 NOTE — UTILIZATION REVIEW
Notification of Discharge   This is a Notification of Discharge from our facility [address]  Please be advised that this patient has been discharge from our facility  Below you will find the admission and discharge date and time including the patients disposition  UTILIZATION REVIEW CONTACT:  Bakari Ornelas  Utilization   Network Utilization Review Department  Phone: [phone] x carefully listen to the prompts  All voicemails are confidential   Email: [email or web address]     PHYSICIAN ADVISORY SERVICES:  FOR [email or web address] REVIEW - MEDICAL NECESSITY DENIAL  Phone: [phone]  Fax: [phone]  Email: [email or web address]     PRESENTATION DATE: 8/8/2022 11:37 AM  OBERVATION ADMISSION DATE:   INPATIENT ADMISSION DATE: 8/9/22  4:00 PM   DISCHARGE DATE: 8/13/2022  2:18 PM  DISPOSITION: Home/Self Care Home/Self Care      IMPORTANT INFORMATION:  Send all requests for admission clinical reviews, approved or denied determinations and any other requests to dedicated fax number below belonging to the campus where the patient is receiving treatment   List of dedicated fax numbers:  [address] DENIALS (Administrative/Medical Necessity) [phone]   [address] (Maternity/NICU/Pediatrics) [phone]   Macey Gregory [phone]   [address] [facility] [phone]   [address] [place] [address] [phone]   [address] [phone]   [facility]  [phone]   [address] [facility], [facility]  [address] [facility] 1000 [facility] [phone]

## 2022-08-23 ENCOUNTER — TELEPHONE (OUTPATIENT)
Dept: CARDIOLOGY CLINIC | Facility: CLINIC | Age: 87
End: 2022-08-23

## 2022-08-23 NOTE — TELEPHONE ENCOUNTER
Wife called ,  is getting moved to locked unit at [facility] because his demetia is getting worse  Wife will be moving and will not be there to do the device monitor  Please call [phone] and ask for wellness dept to have them help  Next device report is due on 9/2/22      Thank you

## 2022-09-02 ENCOUNTER — REMOTE DEVICE CLINIC VISIT (OUTPATIENT)
Dept: CARDIOLOGY CLINIC | Facility: CLINIC | Age: 87
End: 2022-09-02
Payer: COMMERCIAL

## 2022-09-02 DIAGNOSIS — Z95.818 PRESENCE OF OTHER CARDIAC IMPLANTS AND GRAFTS: Primary | ICD-10-CM

## 2022-09-02 PROCEDURE — G2066 INTER DEVC REMOTE 30D: HCPCS | Performed by: INTERNAL MEDICINE

## 2022-09-02 PROCEDURE — 93298 REM INTERROG DEV EVAL SCRMS: CPT | Performed by: INTERNAL MEDICINE

## 2022-09-02 NOTE — PROGRESS NOTES
MDT LOOP   CARELINK TRANSMISSION: LOOP RECORDER  PRESENTING RHYTHM VS @ 87 BPM  BATTERY STATUS "OK " 6 TACHY EPISODES W/ EGRAMS SHOWING ST vs SVT @ 150-171 BPM  3 DEVICE CLASSIFIED AF EPISODES, AVAILABLE STRIPS DEMONSTRATE NO ATRIAL FIBRILLATION  INSTEAD EGM'S SHOW SR W/ PACs  AND PVCs  AF BURDEN IS 0%  AF BURDEN MAYBE OVERESTIMATED DUE TO INAPPROPRIATE CLASSIFICATION OF AF  SOME STRIPS MAY NOT BE INTERPRETABLE OR AVAILABLE, CANNOT DEFINITIVELY RULE OUT ATRIAL FIBRILLATION  NO PATIENT ACTIVATED EPISODES  NORMAL DEVICE FUNCTION   DL

## 2022-09-04 NOTE — PROGRESS NOTES
Cardiology Follow Up    Eliane Maria  1933  [id number]  [facility] NATALIE [address]  [phone]    1  Essential (primary) hypertension     2  Pure hypercholesterolemia     3  Coronary arteriosclerosis     4  Abnormal EKG     5  Presence of other cardiac implants and grafts         Interval History:  Patient is here for a follow-up visit  He had CABG  He is on Plavix in the setting of prior CVA  [facility] does have issues with orthostatic hypotension   He is on midodrine   Echocardiogram done 11/19 demonstrated an LVEF of 50% with a basal inferior WMA noted   There was no significant valvular heart disease except fibrocalcific disease of the AV and no significant change compared to a prior study done May 2015  Melquiades [place] has a ILR since 2/18 in reference to syncope   Recent interrogation done 9/2022   AFib could not be excluded but the burden was 0%  Patient hospitalized August 2022 with FTT and treatment for candidal esophageal infection  EGD done also demonstrating a Schatzki's ring  He has had no chest pain or significant dyspnea  His vital signs are stable today  [facility] lives at [facility] with his wife  He is in the dementia unit      Patient Active Problem List   Diagnosis    Constipation    Iron deficiency    Other constipation    Anemia    Arteriosclerotic cardiovascular disease    Backache    Essential hypertension    Cervical spinal stenosis    Depression    Esophageal reflux    Hyperlipidemia    Insomnia    Spinal stenosis of lumbar region with neurogenic claudication    Vitamin B12 deficiency    Idiopathic peripheral neuropathy    Iron deficiency anemia    History of meningioma    Cognitive decline    Contusion of rib on right side    Orthostatic hypotension    Convulsions ([address] )    Lumbar spondylosis    Lumbar radiculopathy    Acute CVA (cerebrovascular accident), suspected embolic in nature    Hypercalcemia    History of resection of meningioma    History of stroke    Depression    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) ([facility] )    Calcification of aorta ([facility] )    Need for influenza vaccination    Dementia without behavioral disturbance ([facility] )    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[facility] )    Dysphagia    Candida esophagitis (HCC)    Failure to thrive in adult    Silent aspiration    Moderate protein-calorie malnutrition (HCC)     Past Medical History:   Diagnosis Date    Anxiety     Arthritis     Coronary artery disease     Coronary artery disease involving native coronary artery of native heart without angina pectoris     Depression     Fracture     Hypercholesterolemia     Meningioma ([facility] ) 1999    brain tumor    Meningioma (HCC)     Narcolepsy     daytime drowsiness and dozing off occasionally    Orthostatic hypotension     Palpitations     Prostate CA ([facility] )     Prostate cancer ([place] [address] )     Stroke ([place] [address] )      Social History     Socioeconomic History    Marital status: /Civil Union     Spouse name: Not on file    Number of children: Not on file    Years of education: Not on file    Highest education level: Not on file   Occupational History    Occupation: retired   Tobacco Use    Smoking status: Former Smoker     Types: Cigarettes     Quit date: 1995     Years since quittin 7    Smokeless tobacco: Never Used    Tobacco comment: former cig smoker- quit in    Vaping Use    Vaping Use: Never used   Substance and Sexual Activity    Alcohol use: Not Currently     Comment: (history)    Drug use: No    Sexual activity: Not Currently   Other Topics Concern    Not on file   Social History Narrative    ** Merged History Encounter **         Pt lives with wife     Social Determinants of Health     Financial Resource Strain: Not on file   Food Insecurity: No Food Insecurity    Worried About Running Out of Food in the Last Year: Never true    Ran Out of Food in the Last Year: Never true   Transportation Needs: Unknown    Lack of Transportation (Medical): Patient refused    Lack of Transportation (Non-Medical): Patient refused   Physical Activity: Not on file   Stress: Not on file   Social Connections: Not on file   Intimate Partner Violence: Not on file   Housing Stability: [address] Unable to Pay for Housing in the Last Year: No    Number of Places Lived in the Last Year: 1    Unstable Housing in the Last Year: No      Family History   Problem Relation Age of Onset    Hypertension Mother         benign essential    Cancer Mother     Osteoporosis Mother     Suicidality Father     Diabetes Family     Heart disease Family     Neuropathy Family         peripheral    Prostate cancer Family     Thyroid disease Family      Past Surgical History:   Procedure Laterality Date    BACK SURGERY      BRAIN SURGERY  11/08/1999    CORONARY ARTERY BYPASS GRAFT      x5    CORONARY ARTERY BYPASS GRAFT         Current Outpatient Medications:     clopidogrel (PLAVIX) 75 mg tablet, TAKE ONE TABLET BY MOUTH DAILY *PATIENT SUPPLY, Disp: 30 tablet, Rfl: 5    Melatonin 5 MG TABS, TAKE ONE TABLET BY MOUTH EVERY NIGHT AT BEDTIME *PATIENT SUPPLY, Disp: 30 tablet, Rfl: 5    memantine (NAMENDA) 5 mg tablet, Take 5 mg by mouth daily at bedtime, Disp: , Rfl:     Metamucil Fiber 51 7 % PACK, MIX 1 PACKET IN 6-8 OUNCES OF WATER AND CONSUME BY MOUTH DAILY *PATIENT SUPPLY, Disp: 44 each, Rfl: 5    metoprolol succinate (TOPROL-XL) 25 mg 24 hr tablet, TAKE ONE TABLET BY MOUTH DAILY *PATIENT SUPPLY, Disp: 30 tablet, Rfl: 0    midodrine (PROAMATINE) 5 mg tablet, TAKE ONE TABLET BY MOUTH DAILY *PATIENT SUPPLY, Disp: 30 tablet, Rfl: 5    omeprazole (PriLOSEC) 40 MG capsule, TAKE ONE CAPSULE BY MOUTH EVERY DAY IN THE MORNING *PATIENT SUPPLY, Disp: 30 capsule, Rfl: 5    sertraline (ZOLOFT) 50 mg tablet, TAKE 1 & 1/2 TABLETS BY MOUTH DAILY *PATIENT SUPPLY, Disp: 45 tablet, Rfl: 5    simvastatin (ZOCOR) 80 mg tablet, TAKE ONE TABLET BY MOUTH EVERY DAY IN THE EVENING *PATIENT SUPPLY, Disp: 30 tablet, Rfl: 5    Stool Softener 100 MG capsule, TAKE ONE CAPSULE BY MOUTH TWO TIMES A DAY *PATIENT SUPPLY, Disp: 60 capsule, Rfl: 5    Melatonin 5 MG CAPS, Take 5 mg by mouth daily at bedtime, Disp: , Rfl:     psyllium (METAMUCIL) 58 6 % packet, Take 1 packet by mouth daily, Disp: , Rfl:   Allergies   Allergen Reactions    Aricept [Donepezil] Confusion     confusion    Atorvastatin Myalgia, Dizziness and Headache    Niacin Other (See Comments)     unknown       Labs:not applicable  Imaging: XR chest portable    Result Date: 8/9/2022  Narrative: CHEST INDICATION:   Aspiration  COMPARISON:  Chest CT from 11/11/2019, chest radiograph from 8/23/2021  EXAM PERFORMED/VIEWS:  XR CHEST PORTABLE FINDINGS: Normal heart size, CABG  Loop recorder in the left chest wall  The lungs are clear  No pneumothorax or pleural effusion  Bilateral skin folds  Skeleton normal for age  Old right posterior rib fracture  Impression: No acute cardiopulmonary disease  Workstation performed: [id number]     Cardiac EP device report    Result Date: 9/2/2022  Narrative: MDT LOOP CARELINK TRANSMISSION: LOOP RECORDER  PRESENTING RHYTHM VS @ 87 BPM  BATTERY STATUS "OK " 6 TACHY EPISODES W/ EGRAMS SHOWING ST vs SVT @ 150-171 BPM  3 DEVICE CLASSIFIED AF EPISODES, AVAILABLE STRIPS DEMONSTRATE NO ATRIAL FIBRILLATION  INSTEAD EGM'S SHOW SR W/ PACs  AND PVCs  AF BURDEN IS 0%  AF BURDEN MAYBE OVERESTIMATED DUE TO INAPPROPRIATE CLASSIFICATION OF AF  SOME STRIPS MAY NOT BE INTERPRETABLE OR AVAILABLE, CANNOT DEFINITIVELY RULE OUT ATRIAL FIBRILLATION  NO PATIENT ACTIVATED EPISODES  NORMAL DEVICE FUNCTION  DL       Review of Systems:  Review of Systems   All other systems reviewed and are negative        Physical Exam:  /58 (BP Location: Right arm, Patient Position: Sitting, Cuff Size: Standard)   Pulse 84   Ht 5' 9" (1 753 m)   Wt 64 4 kg (141 lb 14 4 oz)   SpO2 98%   BMI 20 95 kg/m²   Physical Exam  Vitals reviewed  Constitutional:       Appearance: He is well-developed  HENT:      Head: Normocephalic and atraumatic  Eyes:      Conjunctiva/sclera: Conjunctivae normal       Pupils: Pupils are equal, round, and reactive to light  Cardiovascular:      Rate and Rhythm: Normal rate  Heart sounds: Normal heart sounds  Pulmonary:      Effort: Pulmonary effort is normal       Breath sounds: Normal breath sounds  Musculoskeletal:      Cervical back: Normal range of motion and neck supple  Skin:     General: Skin is warm and dry  Neurological:      Mental Status: He is alert and oriented to person, place, and time  Discussion/Summary:I will continue the patient's present medical regimen  The patient appears well compensated  I have asked the patient to call if there is a problem in the interim otherwise I will see the patient in six months time

## 2022-09-13 ENCOUNTER — OFFICE VISIT (OUTPATIENT)
Dept: CARDIOLOGY CLINIC | Facility: CLINIC | Age: 87
End: 2022-09-13
Payer: COMMERCIAL

## 2022-09-13 VITALS
WEIGHT: 141.9 LBS | OXYGEN SATURATION: 98 % | SYSTOLIC BLOOD PRESSURE: 138 MMHG | HEART RATE: 84 BPM | DIASTOLIC BLOOD PRESSURE: 58 MMHG | BODY MASS INDEX: 21.02 KG/M2 | HEIGHT: 69 IN

## 2022-09-13 DIAGNOSIS — R94.31 ABNORMAL EKG: ICD-10-CM

## 2022-09-13 DIAGNOSIS — I10 ESSENTIAL (PRIMARY) HYPERTENSION: Primary | ICD-10-CM

## 2022-09-13 DIAGNOSIS — Z95.818 PRESENCE OF OTHER CARDIAC IMPLANTS AND GRAFTS: ICD-10-CM

## 2022-09-13 DIAGNOSIS — E78.00 PURE HYPERCHOLESTEROLEMIA: ICD-10-CM

## 2022-09-13 DIAGNOSIS — I25.10 CORONARY ARTERIOSCLEROSIS: ICD-10-CM

## 2022-09-13 PROCEDURE — 1160F RVW MEDS BY RX/DR IN RCRD: CPT | Performed by: INTERNAL MEDICINE

## 2022-09-13 PROCEDURE — 99214 OFFICE O/P EST MOD 30 MIN: CPT | Performed by: INTERNAL MEDICINE

## 2022-09-13 RX ORDER — MEMANTINE HYDROCHLORIDE 5 MG/1
5 TABLET ORAL
COMMUNITY
Start: 2022-08-31

## 2022-09-15 ENCOUNTER — TELEPHONE (OUTPATIENT)
Dept: CARDIOLOGY CLINIC | Facility: CLINIC | Age: 87
End: 2022-09-15

## 2022-09-15 NOTE — TELEPHONE ENCOUNTER
Dinorah called from Michael requesting last ov notes and after summary visit, faxed over to [phone] and notified faxed if doesn't receive let us know

## 2022-10-11 DIAGNOSIS — I10 ESSENTIAL (PRIMARY) HYPERTENSION: ICD-10-CM

## 2022-10-11 RX ORDER — METOPROLOL SUCCINATE 25 MG/1
TABLET, EXTENDED RELEASE ORAL
Qty: 30 TABLET | Refills: 1 | Status: SHIPPED | OUTPATIENT
Start: 2022-10-11

## 2022-10-19 ENCOUNTER — APPOINTMENT (EMERGENCY)
Dept: RADIOLOGY | Facility: HOSPITAL | Age: 87
End: 2022-10-19
Payer: COMMERCIAL

## 2022-10-19 ENCOUNTER — HOSPITAL ENCOUNTER (EMERGENCY)
Facility: HOSPITAL | Age: 87
Discharge: NURSING, REHAB OR LONG-TERM CARE | End: 2022-10-19
Attending: EMERGENCY MEDICINE
Payer: COMMERCIAL

## 2022-10-19 VITALS
RESPIRATION RATE: 18 BRPM | SYSTOLIC BLOOD PRESSURE: 159 MMHG | OXYGEN SATURATION: 99 % | DIASTOLIC BLOOD PRESSURE: 80 MMHG | TEMPERATURE: 97 F | HEART RATE: 76 BPM | WEIGHT: 143.96 LBS
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
## 2022-10-19 LAB
BASE EXCESS BLDA CALC-SCNC: 3 MMOL/L (ref -2–3)
CA-I BLD-SCNC: 1.39 MMOL/L (ref 1.12–1.32)
GLUCOSE SERPL-MCNC: 113 MG/DL (ref 65–140)
HCO3 BLDA-SCNC: 28.5 MMOL/L (ref 24–30)
HCT VFR BLD CALC: 39 % (ref 36.5–49.3)
HGB BLDA-MCNC: 13.3 G/DL (ref 12–17)
PCO2 BLD: 30 MMOL/L (ref 21–32)
PCO2 BLD: 47 MM HG (ref 42–50)
PH BLD: 7.39 [PH] (ref 7.3–7.4)
PO2 BLD: 21 MM HG (ref 35–45)
POTASSIUM BLD-SCNC: 4.2 MMOL/L (ref 3.5–5.3)
SAO2 % BLD FROM PO2: 33 % (ref 60–85)
SODIUM BLD-SCNC: 139 MMOL/L (ref 136–145)
SPECIMEN SOURCE: ABNORMAL

## 2022-10-19 PROCEDURE — 99284 EMERGENCY DEPT VISIT MOD MDM: CPT

## 2022-10-19 PROCEDURE — 82803 BLOOD GASES ANY COMBINATION: CPT

## 2022-10-19 PROCEDURE — 84295 ASSAY OF SERUM SODIUM: CPT

## 2022-10-19 PROCEDURE — 84132 ASSAY OF SERUM POTASSIUM: CPT

## 2022-10-19 PROCEDURE — 82947 ASSAY GLUCOSE BLOOD QUANT: CPT

## 2022-10-19 PROCEDURE — 82330 ASSAY OF CALCIUM: CPT

## 2022-10-19 PROCEDURE — 71045 X-RAY EXAM CHEST 1 VIEW: CPT

## 2022-10-19 PROCEDURE — 93308 TTE F-UP OR LMTD: CPT | Performed by: EMERGENCY MEDICINE

## 2022-10-19 PROCEDURE — 85014 HEMATOCRIT: CPT

## 2022-10-19 PROCEDURE — 76705 ECHO EXAM OF ABDOMEN: CPT | Performed by: EMERGENCY MEDICINE

## 2022-10-19 PROCEDURE — 70450 CT HEAD/BRAIN W/O DYE: CPT

## 2022-10-19 PROCEDURE — NC001 PR NO CHARGE: Performed by: EMERGENCY MEDICINE

## 2022-10-19 PROCEDURE — 72125 CT NECK SPINE W/O DYE: CPT

## 2022-10-19 PROCEDURE — 99204 OFFICE O/P NEW MOD 45 MIN: CPT | Performed by: EMERGENCY MEDICINE

## 2022-10-19 NOTE — PROCEDURES
POC FAST US    Date/Time: 10/19/2022 9:44 AM  Performed by: Kenny Sarmiento DO  Authorized by: Kenny Sarmiento DO     Patient location:  Trauma  Procedure details:     Exam Type:  Diagnostic    Indications: blunt abdominal trauma      Assess for:  Intra-abdominal fluid and pericardial effusion    Technique: FAST      Views obtained:  Heart - Pericardial sac, RUQ - Perez's Pouch, LUQ - Splenorenal space and Suprapubic - Pouch of Juan    Image quality: diagnostic      Image availability:  Images available in PACS  FAST Findings:     RUQ (Hepatorenal) free fluid: absent      LUQ (Splenorenal) free fluid: absent      Suprapubic free fluid: absent      Cardiac wall motion: identified      Pericardial effusion: absent    Interpretation:     Impressions: negative

## 2022-10-19 NOTE — ED PROVIDER NOTES
Emergency Department Airway Evaluation and Management Form    History  Obtained from:  EMS  Patient has no allergy information on record  Chief Complaint   Patient presents with   • Fall     Unwitnessed fall at [facility]  +thinners, unknown HS  HPI  80-year-old male, history of dementia, presenting from locked dementia unit at [facility] for evaluation of unwitnessed fall  Patient is unable to provide any history  History reviewed  No pertinent past medical history  Past Surgical History:   Procedure Laterality Date   • BRAIN SURGERY     • CARDIAC SURGERY       History reviewed  No pertinent family history  Social History     Tobacco Use   • Smoking status: Former Smoker     Types: Cigarettes   • Smokeless tobacco: Never Used   Vaping Use   • Vaping Use: Never used   Substance Use Topics   • Alcohol use: Not Currently     I have reviewed and agree with the history as documented  Review of Systems    Physical Exam  /80   Pulse 76   Temp (!) 97 °F (36 1 °C) (Tympanic)   Resp 18   Wt 65 3 kg (143 lb 15 4 oz)   SpO2 99%     Physical Exam  Airway intact  Trachea midline  Breath sounds bilaterally  Chest nontender  Pulses intact  Vitals reviewed  GCS 14  Moves all 4 extremities  ED Medications  Medications - No data to display    Intubation  Procedures    Notes  No acute airway issues      Final Diagnosis  Final diagnoses:   Fall, initial encounter       ED Provider  Electronically Signed by     Macie Hutchison MD  10/19/22 6425

## 2022-10-19 NOTE — H&P
[address] [facility]  H&P- Mima Lee 1933, 80 y o  male MRN: [id number]  Unit/Bed#: ED 26 Encounter: [id number]  Primary Care Provider: MICHELE Vines   Date and time admitted to hospital: 10/19/2022  9:26 AM    * Fall  Assessment & Plan  - Fall backward striking hip  - no traumatic injuries noted on workup  - D/c Back to nursing facility  Trauma Alert: Level B   Model of Arrival: Ambulance    Trauma Team: Attending Ni Toth, Residents Abdi Mg and Fellow John Zhao  Consultants:     None     History of Present Illness     Chief Complaint: Fall  Mechanism:Fall     HPI:    Mima Lee is a 80 y o  male who presents after unwitnessed fall at his dementia unit  He fell backward onto his right hip  Unknown head strike  Denies LOC  He denies pain  On Plavix     Review of Systems   Constitutional: Negative for activity change, fatigue and fever  HENT: Negative for congestion, ear pain, facial swelling, nosebleeds, postnasal drip, rhinorrhea, sinus pressure, sinus pain, sneezing and sore throat  Respiratory: Negative  Negative for chest tightness, shortness of breath and wheezing  Cardiovascular: Negative for chest pain and palpitations  Gastrointestinal: Negative for abdominal distention, abdominal pain, constipation, diarrhea, nausea and vomiting  Genitourinary: Negative for dysuria, flank pain, hematuria and urgency  Musculoskeletal: Negative for arthralgias, back pain, joint swelling, neck pain and neck stiffness  Skin: Negative for color change, rash and wound  Neurological: Negative for dizziness, seizures, weakness, light-headedness, numbness and headaches  12-point, complete review of systems was reviewed and negative except as stated above  Historical Information       Past Medical History     Diagnosis Date Age Comment Src   PL   Stroke [facility])               Prostate cancer ([facility] [address] )               Prostate CA ([facility] [address] )               Palpitations Orthostatic hypotension               Narcolepsy   daytime drowsiness and dozing off occasionally            Meningioma ([facility] [address] ) 11/1999 66y brain tumor            Meningioma ([facility] [address] )               Hypercholesterolemia               Fracture               Depression               Coronary artery disease involving native coronary artery of native heart without angina pectoris               Coronary artery disease               Arthritis               Anxiety                    History reviewed  No pertinent past medical history  Past Surgical History:   Procedure Laterality Date   • BRAIN SURGERY     • CARDIAC SURGERY        Unable to obtain history due to Dementia    Social History     Tobacco Use   • Smoking status: Former Smoker     Types: Cigarettes   • Smokeless tobacco: Never Used   Vaping Use   • Vaping Use: Never used   Substance Use Topics   • Alcohol use: Not Currently       There is no immunization history on file for this patient  Last Tetanus: n/a  Family History: Non-contributory     Meds/Allergies   all current active meds have been reviewed  Allergies have not been reviewed;   Not on File    Objective   Initial Vitals:   Temperature: (!) 97 °F (36 1 °C) (10/19/22 0935)  Pulse: 104 (10/19/22 0935)  Respirations: 18 (10/19/22 0935)  Blood Pressure: 130/89 (10/19/22 0935)    Primary Survey:   Airway:        Status: patent;        Pre-hospital Interventions: none        Hospital Interventions: none  Breathing:        Pre-hospital Interventions: none       Effort: normal       Right breath sounds: normal       Left breath sounds: normal  Circulation:        Rhythm: regular       Rate: regular   Right Pulses Left Pulses    R radial: 2+  R femoral: 2+  R pedal: 2+     L radial: 2+  L femoral: 2+  L pedal: 2+       Disability:        GCS: Eye: 4; Verbal: 5 Motor: 6 Total: 15       Right Pupil: 3 mm;  round;  reactive         Left Pupil:  3 mm;  round;  reactive      R Motor Strength L Motor Strength R : 5/5  R dorsiflex: 5/5  R plantarflex: 5/5 L : 5/5  L dorsiflex: 5/5  L plantarflex: 5/5        Sensory:  No sensory deficit  Exposure:       Completed: Yes      Secondary Survey:  Physical Exam  Vitals and nursing note reviewed  Constitutional:       General: Dorethea Scheuermann is not in acute distress  Appearance: Normal appearance  Dorethea Scheuermann is not ill-appearing or toxic-appearing  HENT:      Head: Normocephalic  Right Ear: Tympanic membrane normal       Left Ear: Tympanic membrane normal       Nose: Nose normal  No congestion or rhinorrhea  Mouth/Throat:      Mouth: Mucous membranes are moist       Pharynx: Oropharynx is clear  No oropharyngeal exudate  Eyes:      Extraocular Movements: Extraocular movements intact  Conjunctiva/sclera: Conjunctivae normal       Pupils: Pupils are equal, round, and reactive to light  Cardiovascular:      Rate and Rhythm: Normal rate  Heart sounds: No murmur heard  No friction rub  No gallop  Pulmonary:      Effort: Pulmonary effort is normal       Breath sounds: No wheezing, rhonchi or rales  Abdominal:      General: Abdomen is flat  There is no distension  Palpations: Abdomen is soft  Tenderness: There is no abdominal tenderness  There is no guarding or rebound  Musculoskeletal:      Right shoulder: Normal       Left shoulder: Normal       Right upper arm: Normal       Left upper arm: Normal       Right elbow: Normal       Left elbow: Normal       Right forearm: Normal       Left forearm: Normal       Right wrist: Normal       Left wrist: Normal       Right hand: Normal       Left hand: Normal       Cervical back: No deformity or tenderness  Thoracic back: No deformity or tenderness  Lumbar back: Normal  No swelling, deformity or tenderness        Right hip: Normal       Left hip: Normal       Right upper leg: Normal       Left upper leg: Normal       Right knee: Normal       Left knee: Normal       Right lower leg: Normal       Left lower leg: Normal       Right ankle: Normal       Left ankle: Normal       Right foot: Normal       Left foot: Normal    Skin:     General: Skin is warm and dry  Capillary Refill: Capillary refill takes less than 2 seconds  Findings: No bruising  Neurological:      General: No focal deficit present  Mental Status: Dorethea Scheuermann is alert  Mental status is at baseline  GCS: GCS eye subscore is 4  GCS verbal subscore is 5  GCS motor subscore is 6  Cranial Nerves: No cranial nerve deficit  Comments: AAOX2         Invasive Devices  Report    Peripheral Intravenous Line  Duration           Peripheral IV 10/19/22 Left Antecubital <1 day              Lab Results: BMP/CMP:   Lab Results   Component Value Date    CO2 30 10/19/2022    GLUCOSE 113 10/19/2022    and CBC:   Lab Results   Component Value Date    HGB 13 3 10/19/2022    HCT 39 10/19/2022       Imaging Results: I have personally reviewed pertinent films in PACS  Chest Xray(s): negative for acute findings   FAST exam(s): negative for acute findings   CT Scan(s): negative for acute findings   Additional Xray(s): negative for acute findings     Other Studies: none    Code Status: No Order      Cervical Collar Clearance: The patient had a CT scan of the cervical spine demonstrating no acute injury  On exam, the patient had no midline point tenderness or paresthesias/numbness/weakness in the extremities  The patient had full range of motion (was then able to flex, extend, and rotate head laterally) without pain  There were no distracting injuries and the patient was not intoxicated  The patient's cervical spine was cleared radiologically and clinically  Cervical collar removed at this time       Joelle Chang  10/19/2022 11:35 AM

## 2022-10-19 NOTE — ASSESSMENT & PLAN NOTE
- Fall backward striking hip  - no traumatic injuries noted on workup  - D/c Back to nursing facility

## 2022-12-21 ENCOUNTER — APPOINTMENT (OUTPATIENT)
Dept: LAB | Facility: CLINIC | Age: 87
End: 2022-12-21

## 2022-12-21 DIAGNOSIS — C61 MALIGNANT NEOPLASM OF PROSTATE (HCC): ICD-10-CM

## 2022-12-21 LAB — PSA SERPL-MCNC: 0.2 NG/ML (ref 0–4)

## 2022-12-30 DIAGNOSIS — I95.1 ORTHOSTATIC HYPOTENSION: ICD-10-CM

## 2022-12-30 RX ORDER — MIDODRINE HYDROCHLORIDE 5 MG/1
TABLET ORAL
Qty: 90 TABLET | Refills: 3 | Status: SHIPPED | OUTPATIENT
Start: 2022-12-30

## 2022-12-30 NOTE — TELEPHONE ENCOUNTER
Requested medication(s) are due for refill today: Yes  Patient has already received a courtesy refill: No  Other reason request has been forwarded to provider: Gabo gentile

## 2023-01-31 DIAGNOSIS — E78.00 PURE HYPERCHOLESTEROLEMIA: ICD-10-CM

## 2023-01-31 RX ORDER — SIMVASTATIN 80 MG
TABLET ORAL
Qty: 90 TABLET | Refills: 3 | Status: SHIPPED | OUTPATIENT
Start: 2023-01-31

## 2023-02-07 DIAGNOSIS — I10 ESSENTIAL (PRIMARY) HYPERTENSION: ICD-10-CM

## 2023-02-07 DIAGNOSIS — K21.9 GASTROESOPHAGEAL REFLUX DISEASE: ICD-10-CM

## 2023-02-07 DIAGNOSIS — I63.9 ACUTE CVA (CEREBROVASCULAR ACCIDENT) (HCC): ICD-10-CM

## 2023-02-07 RX ORDER — METOPROLOL SUCCINATE 25 MG/1
TABLET, EXTENDED RELEASE ORAL
Qty: 90 TABLET | Refills: 3 | Status: SHIPPED | OUTPATIENT
Start: 2023-02-07

## 2023-02-07 RX ORDER — OMEPRAZOLE 40 MG/1
CAPSULE, DELAYED RELEASE ORAL
Qty: 100 CAPSULE | Refills: 3 | Status: SHIPPED | OUTPATIENT
Start: 2023-02-07

## 2023-02-08 RX ORDER — CLOPIDOGREL BISULFATE 75 MG/1
TABLET ORAL
Qty: 100 TABLET | Refills: 3 | Status: SHIPPED | OUTPATIENT
Start: 2023-02-08

## 2023-03-02 ENCOUNTER — APPOINTMENT (EMERGENCY)
Dept: RADIOLOGY | Facility: HOSPITAL | Age: 88
End: 2023-03-02

## 2023-03-02 ENCOUNTER — HOSPITAL ENCOUNTER (INPATIENT)
Facility: HOSPITAL | Age: 88
LOS: 1 days | Discharge: HOME | End: 2023-03-02
Attending: EMERGENCY MEDICINE | Admitting: SURGERY

## 2023-03-02 VITALS
RESPIRATION RATE: 18 BRPM | TEMPERATURE: 98.1 F | HEART RATE: 87 BPM | SYSTOLIC BLOOD PRESSURE: 171 MMHG | OXYGEN SATURATION: 94 % | DIASTOLIC BLOOD PRESSURE: 87 MMHG

## 2023-03-02 DIAGNOSIS — S32.810A MULTIPLE CLOSED FRACTURES OF PELVIS WITH STABLE DISRUPTION OF PELVIC RING, INITIAL ENCOUNTER (HCC): ICD-10-CM

## 2023-03-02 DIAGNOSIS — S32.592A CLOSED FRACTURE OF LEFT INFERIOR PUBIC RAMUS, INITIAL ENCOUNTER (HCC): ICD-10-CM

## 2023-03-02 DIAGNOSIS — S32.9XXA PELVIC FRACTURE (HCC): Primary | ICD-10-CM

## 2023-03-02 PROBLEM — S32.82XA MULTIPLE FRACTURES OF PELVIS (HCC): Status: ACTIVE | Noted: 2023-03-02

## 2023-03-02 LAB
ABO GROUP BLD: NORMAL
ANION GAP SERPL CALCULATED.3IONS-SCNC: 3 MMOL/L (ref 4–13)
BASOPHILS # BLD AUTO: 0.02 THOUSANDS/ÂΜL (ref 0–0.1)
BASOPHILS NFR BLD AUTO: 0 % (ref 0–1)
BLD GP AB SCN SERPL QL: NEGATIVE
BUN SERPL-MCNC: 10 MG/DL (ref 5–25)
CALCIUM SERPL-MCNC: 10.3 MG/DL (ref 8.3–10.1)
CHLORIDE SERPL-SCNC: 109 MMOL/L (ref 96–108)
CO2 SERPL-SCNC: 24 MMOL/L (ref 21–32)
CREAT SERPL-MCNC: 0.87 MG/DL (ref 0.6–1.3)
EOSINOPHIL # BLD AUTO: 0.01 THOUSAND/ÂΜL (ref 0–0.61)
EOSINOPHIL NFR BLD AUTO: 0 % (ref 0–6)
ERYTHROCYTE [DISTWIDTH] IN BLOOD BY AUTOMATED COUNT: 13.5 % (ref 11.6–15.1)
GFR SERPL CREATININE-BSD FRML MDRD: 75 ML/MIN/1.73SQ M
GLUCOSE SERPL-MCNC: 149 MG/DL (ref 65–140)
HCT VFR BLD AUTO: 35.7 % (ref 36.5–49.3)
HGB BLD-MCNC: 11.7 G/DL (ref 12–17)
IMM GRANULOCYTES # BLD AUTO: 0.03 THOUSAND/UL (ref 0–0.2)
IMM GRANULOCYTES NFR BLD AUTO: 0 % (ref 0–2)
LYMPHOCYTES # BLD AUTO: 1.48 THOUSANDS/ÂΜL (ref 0.6–4.47)
LYMPHOCYTES NFR BLD AUTO: 15 % (ref 14–44)
MCH RBC QN AUTO: 27.7 PG (ref 26.8–34.3)
MCHC RBC AUTO-ENTMCNC: 32.8 G/DL (ref 31.4–37.4)
MCV RBC AUTO: 85 FL (ref 82–98)
MONOCYTES # BLD AUTO: 0.85 THOUSAND/ÂΜL (ref 0.17–1.22)
MONOCYTES NFR BLD AUTO: 9 % (ref 4–12)
NEUTROPHILS # BLD AUTO: 7.47 THOUSANDS/ÂΜL (ref 1.85–7.62)
NEUTS SEG NFR BLD AUTO: 76 % (ref 43–75)
NRBC BLD AUTO-RTO: 0 /100 WBCS
PLATELET # BLD AUTO: 207 THOUSANDS/UL (ref 149–390)
PMV BLD AUTO: 10.2 FL (ref 8.9–12.7)
POTASSIUM SERPL-SCNC: 3.8 MMOL/L (ref 3.5–5.3)
RBC # BLD AUTO: 4.22 MILLION/UL (ref 3.88–5.62)
RH BLD: POSITIVE
SODIUM SERPL-SCNC: 136 MMOL/L (ref 135–147)
SPECIMEN EXPIRATION DATE: NORMAL
WBC # BLD AUTO: 9.86 THOUSAND/UL (ref 4.31–10.16)

## 2023-03-02 RX ORDER — DOCUSATE SODIUM 100 MG/1
100 CAPSULE, LIQUID FILLED ORAL 2 TIMES DAILY
Status: DISCONTINUED | OUTPATIENT
Start: 2023-03-02 | End: 2023-03-02 | Stop reason: HOSPADM

## 2023-03-02 RX ORDER — CLOPIDOGREL BISULFATE 75 MG/1
75 TABLET ORAL DAILY
Status: DISCONTINUED | OUTPATIENT
Start: 2023-03-02 | End: 2023-03-02 | Stop reason: HOSPADM

## 2023-03-02 RX ORDER — ENOXAPARIN SODIUM 100 MG/ML
40 INJECTION SUBCUTANEOUS DAILY
Qty: 11.2 ML | Refills: 0 | Status: SHIPPED | OUTPATIENT
Start: 2023-03-02 | End: 2023-03-30

## 2023-03-02 RX ORDER — ACETAMINOPHEN 325 MG/1
975 TABLET ORAL EVERY 8 HOURS SCHEDULED
Status: DISCONTINUED | OUTPATIENT
Start: 2023-03-02 | End: 2023-03-02 | Stop reason: HOSPADM

## 2023-03-02 RX ORDER — ACETAMINOPHEN 325 MG/1
650 TABLET ORAL EVERY 6 HOURS SCHEDULED
Status: DISCONTINUED | OUTPATIENT
Start: 2023-03-02 | End: 2023-03-02

## 2023-03-02 RX ORDER — HEPARIN SODIUM 5000 [USP'U]/ML
5000 INJECTION, SOLUTION INTRAVENOUS; SUBCUTANEOUS EVERY 8 HOURS SCHEDULED
Status: DISCONTINUED | OUTPATIENT
Start: 2023-03-02 | End: 2023-03-02 | Stop reason: HOSPADM

## 2023-03-02 RX ORDER — POLYETHYLENE GLYCOL 3350 17 G/17G
17 POWDER, FOR SOLUTION ORAL DAILY
Status: DISCONTINUED | OUTPATIENT
Start: 2023-03-02 | End: 2023-03-02 | Stop reason: HOSPADM

## 2023-03-02 RX ORDER — LANOLIN ALCOHOL/MO/W.PET/CERES
3 CREAM (GRAM) TOPICAL
Status: DISCONTINUED | OUTPATIENT
Start: 2023-03-02 | End: 2023-03-02 | Stop reason: HOSPADM

## 2023-03-02 RX ORDER — MEMANTINE HYDROCHLORIDE 5 MG/1
5 TABLET ORAL
Status: DISCONTINUED | OUTPATIENT
Start: 2023-03-02 | End: 2023-03-02 | Stop reason: HOSPADM

## 2023-03-02 RX ORDER — MIDODRINE HYDROCHLORIDE 5 MG/1
5 TABLET ORAL DAILY
Status: DISCONTINUED | OUTPATIENT
Start: 2023-03-02 | End: 2023-03-02 | Stop reason: HOSPADM

## 2023-03-02 RX ORDER — ONDANSETRON 2 MG/ML
4 INJECTION INTRAMUSCULAR; INTRAVENOUS EVERY 6 HOURS PRN
Status: DISCONTINUED | OUTPATIENT
Start: 2023-03-02 | End: 2023-03-02 | Stop reason: HOSPADM

## 2023-03-02 RX ORDER — SENNOSIDES 8.6 MG
2 TABLET ORAL DAILY
Status: DISCONTINUED | OUTPATIENT
Start: 2023-03-02 | End: 2023-03-02 | Stop reason: HOSPADM

## 2023-03-02 RX ORDER — PANTOPRAZOLE SODIUM 20 MG/1
20 TABLET, DELAYED RELEASE ORAL
Status: DISCONTINUED | OUTPATIENT
Start: 2023-03-02 | End: 2023-03-02 | Stop reason: HOSPADM

## 2023-03-02 RX ORDER — OXYCODONE HYDROCHLORIDE 5 MG/1
2.5 TABLET ORAL EVERY 4 HOURS PRN
Status: DISCONTINUED | OUTPATIENT
Start: 2023-03-02 | End: 2023-03-02 | Stop reason: HOSPADM

## 2023-03-02 RX ORDER — LIDOCAINE 50 MG/G
1 PATCH TOPICAL DAILY
Status: DISCONTINUED | OUTPATIENT
Start: 2023-03-02 | End: 2023-03-02 | Stop reason: HOSPADM

## 2023-03-02 RX ORDER — OXYCODONE HYDROCHLORIDE 5 MG/1
5 TABLET ORAL EVERY 4 HOURS PRN
Status: DISCONTINUED | OUTPATIENT
Start: 2023-03-02 | End: 2023-03-02 | Stop reason: HOSPADM

## 2023-03-02 RX ORDER — LIDOCAINE 50 MG/G
1 PATCH TOPICAL DAILY
Qty: 14 PATCH | Refills: 0 | Status: SHIPPED | OUTPATIENT
Start: 2023-03-02

## 2023-03-02 RX ORDER — ACETAMINOPHEN 325 MG/1
975 TABLET ORAL EVERY 8 HOURS SCHEDULED
Qty: 120 TABLET | Refills: 0 | Status: SHIPPED | OUTPATIENT
Start: 2023-03-02

## 2023-03-02 NOTE — OCCUPATIONAL THERAPY NOTE
Occupational Therapy Evaluation     Patient Name: Semaj AGUILAR Date: 3/2/2023  Problem List  Active Problems:    Fall    Multiple fractures of pelvis [facility])    Past Medical History  Past Medical History:   Diagnosis Date    Anxiety     Arthritis     Coronary artery disease     Coronary artery disease involving native coronary artery of native heart without angina pectoris     Depression     Fracture     Hypercholesterolemia     Meningioma ([facility] [address] ) 11/1999    brain tumor    Meningioma (H[facility])     Narcolepsy     daytime drowsiness and dozing off occasionally    Orthostatic hypotension     Palpitations     Prostate CA ([facility] )     Prostate cancer ([facility] )     Stroke [facility])      Past Surgical History  Past Surgical History:   Procedure Laterality Date    BACK SURGERY      BRAIN SURGERY  11/08/1999    BRAIN SURGERY      CARDIAC SURGERY      CORONARY ARTERY BYPASS GRAFT      x5    CORONARY ARTERY BYPASS GRAFT           03/02/23 1054   OT Last Visit   OT Visit Date 03/02/23   Note Type   Note type Evaluation   Pain Assessment   Pain Assessment Tool 0-10   Pain Score 5   Pain Location/Orientation Orientation: Left; Location: Leg   Patient's Stated Pain Goal No pain   Hospital Pain Intervention(s) Ambulation/increased activity; Emotional support;Repositioned   Restrictions/Precautions   Weight Bearing Precautions Per Order Yes   RLE Weight Bearing Per Order WBAT   LLE Weight Bearing Per Order WBAT   Other Precautions Chair Alarm;Cognitive; Bed Alarm; Fall Risk;Pain   Home Living   Type of Home SNF  ([address] [place] [address]- dementia unit)   Home Layout One level   Bathroom Shower/Tub Walk-in shower   Bathroom Toilet Raised   Bathroom Equipment Grab bars in shower; Shower chair;Grab bars around toilet   [facility]  (rollator)   Prior Function   Level of [place] Independent with functional mobility; Independent with ADLs; Needs assistance with [facility] staff Receives Help From Family;Personal care attendant   IADLs Family/Friend/Other provides medication management; Family/Friend/Other provides transportation; Family/Friend/Other provides meals   Falls in the last 6 months 1 to 4   Vocational Retired   Lifestyle   Autonomy pta, pt was I W ADL, rq A For IADL and completed transfers/FM w rw  -    Reciprocal Relationships supportive staff at [facility] who can assist as needed  Service to Others retired   Intrinsic Gratification spending time w spouse   Subjective   Subjective "I am ok"   ADL   Where Assessed Edge of bed   Eating Assistance 6  Modified independent   Grooming Assistance 6  Modified Independent   UB Bathing Assistance 5  Supervision/Setup   LB Bathing Assistance 3  Moderate Assistance   UB Dressing Assistance 5  Supervision/Setup   LB Dressing Assistance 3  Moderate [address]  3  Moderate Assistance   Functional Assistance 3  Moderate Assistance   Bed Mobility   Supine to Sit 3  Moderate assistance   Additional items Assist x 2; Increased time required;Verbal cues;LE management   Sit to Supine 3  Moderate assistance   Additional items Assist x 2; Increased time required;Verbal cues;LE management   Additional Comments pt found and left in bed w all needs in reach and alarm on   Transfers   Sit to Stand 3  Moderate assistance   Additional items Assist x 1; Increased time required;Verbal cues   Stand to Sit 3  Moderate assistance   Additional items Assist x 1; Increased time required;Verbal cues   Additional Comments c rw, vc for hand placement and inc overall safety awareness     Functional Mobility   Functional Mobility 3  Moderate assistance   Additional Comments ax1, few steps within the room   Additional items Rolling walker   Balance   Static Sitting Fair   Dynamic Sitting Fair -   Static Standing Poor +   Dynamic Standing Poor   Ambulatory Poor   Activity Tolerance   Activity Tolerance Patient limited by fatigue   Medical Staff Made Aware DPT Tom 2' pts med complexity, comorbidities and regression from baseline   Nurse Made Aware ok per RN   RUE Assessment   RUE Assessment WFL   LUE Assessment   LUE Assessment WFL   Hand Function   Gross Motor Coordination Functional   Fine Motor Coordination Functional   Psychosocial   Psychosocial (WDL) WDL   Cognition   Overall Cognitive Status Impaired   Arousal/Participation Responsive; Alert   Attention Attends with cues to redirect   Orientation Level Disoriented to time   Memory Decreased recall of precautions;Decreased recall of recent events;Decreased short term memory   Following Commands Follows one step commands with increased time or repetition   Comments pt pleasant and cooperative, does have overall dec safety awareness/insight to condition  requires inc time to follow one step commands  Assessment   Limitation Decreased ADL status; Decreased UE strength;Decreased Safe judgement during ADL;Decreased cognition;Decreased endurance;Decreased self-care trans;Decreased high-level ADLs   Prognosis Fair   Assessment Pt is a 80 y o  male admitted 3/2/23 s/p fall at [facility] with L leg pain  Pt found to have L inferior pubic ramus fx and nondisplaced posterior wall acetabular fx, is WBAT in LLE  No plan for sx at this time  PMH includes  has a past medical history of Anxiety, Arthritis, Coronary artery disease, Coronary artery disease involving native coronary artery of native heart without angina pectoris, Depression, Fracture, Hypercholesterolemia, Meningioma ([facility] [address] ), Meningioma ([facility] [address] ), Narcolepsy, Orthostatic hypotension, Palpitations, Prostate CA ([facility] [address] ), Prostate cancer ([facility] [address] ), and Stroke ([facility] [address] )  Pt lives at [facility], ind for ADL, rq A for IADL And completed transfers/FM w RW  Currently, pt is Min Ax1 for UB ADL, Mod Ax1 for LB ADL, and completed transfers/FM w Mod Ax1   Pt is limited at this time 2* decreased endurance/activtiy tolerance, decreased cognition, decreased ADL/High-level ADL status, decreased self-care trans, decreased safety awareness, limited home support and is a fall risk  This impacts pt's ability to complete UB and LB dressing and bathing, toileting, transfers, functional mobility, community mobility, home and health maintenance, and safe engagement in typical daily routine  The patient's raw score on the AM-PAC Daily Activity inpatient short form is 16, standardized score is 35 96, less than 39 4  Patients at this level are likely to benefit from discharge to post-acute rehabilitation services  Please refer to the recommendation of the Occupational Therapist for safe discharge planning  From OT standpoint, pt should D/C to STR when medically stable  Pt will benefit from continued acute OT services 2-3 x/wk for 10-14 days to meet goals  Goals   Patient Goals to get better   LTG Time Frame 10-14   Long Term Goal #1 see below  Plan   Treatment Interventions ADL retraining;Functional transfer training;UE strengthening/ROM; Endurance training;Cognitive reorientation;Patient/family training;Equipment evaluation/education; Compensatory technique education; Energy conservation; Activityengagement   Goal Expiration Date 03/16/23   OT Frequency 2-3x/wk   Recommendation   OT Discharge Recommendation Return to facility with rehabilitation services   [facility] Daily Activity Inpatient   Lower Body Dressing 2   Bathing 2   Toileting 2   Upper Body Dressing 3   Grooming 3   Eating 4   Daily Activity Raw Score 16   Daily Activity Standardized Score (Calc for Raw Score >=11) 35 96   [facility] Applied Cognition Inpatient   Following a Speech/Presentation 3   Understanding Ordinary Conversation 3   Taking Medications 2   Remembering Where Things Are Placed or Put Away 2   Remembering List of 4-5 Errands 1   Taking Care of Complicated Tasks 1   Applied Cognition Raw Score 12   Applied Cognition Standardized Score 28 82   Modified Griggs Scale   Modified Naveen Scale 4   End of Consult   Education Provided Yes   Patient Position at End of Consult Bed/Chair alarm activated; All needs within reach; Supine   Nurse Communication Nurse aware of consult     Pt will complete functional mobility with Mod I using appropriate DME as needed  Pt will complete UB dressing and bathing with Mod I using appropriate DME as needed  Pt will complete LB dressing and bathing with Mod I using appropriate DME as needed  Pt will complete transfers with Mod I using appropriate DME as needed  Pt will complete toileting with Mod I using appropriate DME as needed  Pt will utilize energy conservation techniques throughout functional activity/ADL s/p skilled education  Pt will demonstrate increased safety awareness during functional tasks/ADL's s/p skilled education  Pt will increase activity tolerance to 30 minutes in order to complete ADL's/ functional tasks, using appropriate DME as needed  Pt will engage in ongoing cog assessment w/ G participation to assist with safe d/c planning  Pt will inc UE strength +1 MMT in order to increase overall ability to engage in typical daily routine           Paris Cordial, MOT, OTR/L

## 2023-03-02 NOTE — CASE MANAGEMENT
Case Management Discharge Planning Note    Patient name Gem Levi  Location ED 09/ED 09 MRN [id number]  : 1933 Date 3/2/2023       Current Admission Date: 3/2/2023  Current Admission Diagnosis:Multiple fractures of pelvis [facility])   Patient Active Problem List    Diagnosis Date Noted   • Multiple fractures of pelvis ([facility] [address] ) 2023   • Fall 10/19/2022   • Moderate protein-calorie malnutrition ([facility] ) 2022   • Silent aspiration 08/10/2022   • Candida esophagitis ([facility] ) 2022   • Failure to thrive in adult 2022   • Dysphagia 2021   • Dementia without behavioral disturbance ([facility] ) 2021   • 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   • 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) ([facility] ) 2020   • Calcification of aorta ([facility] ) 2020   • Need for influenza vaccination 2020   • Depression 05/15/2020   • History of stroke 2020   • Acute CVA (cerebrovascular accident), suspected embolic in nature    • Hypercalcemia 2019   • History of resection of meningioma 2019   • Lumbar spondylosis 2019   • Lumbar radiculopathy 2019   • Convulsions ([facility] [address] ) 2019   • Orthostatic hypotension 2018   • Contusion of rib on right side 2018   • Cognitive decline 2018   • History of meningioma 2018   • Idiopathic peripheral neuropathy 2018   • Iron deficiency anemia 2018   • Constipation 2018   • Iron deficiency 2018   • Other constipation 2018   • Vitamin B12 deficiency 2017   • Anemia 2017   • Insomnia 2016   • Arteriosclerotic cardiovascular disease 2015   • Esophageal reflux 2014   • Cervical spinal stenosis 2013   • Spinal stenosis of lumbar region with neurogenic claudication 2013   • Backache 2013   • Essential hypertension 2013   • Hyperlipidemia 2013   • Depression 2013      LOS (days): 0  Geometric Mean LOS (GMLOS) (days): 3 80  Days to GMLOS:3 5     OBJECTIVE:  Risk of Unplanned Readmission Score: 12 62         Current admission status: Inpatient   Preferred Pharmacy:   [facility] [address]  Phone: [phone] Fax: [phone]    [address] [place] [address]  Phone: [phone] Fax: [phone]    Primary Care Provider: MICHELE De La Cruz    Primary Insurance: [facility]  Secondary Insurance:     DISCHARGE DETAILS:    Cm spoke to pt's wife again as she had questions  She is in agreement with a return to [facility] today, but CM informed her that CM will place blanket referral to SNF for potential long term care  Pt's wife's plan is for the pt to eventually d/c to a SNF for long term care  Purpose of CM reaching out now, is to have pt's wife establish a connection with these locations now to prepare for something in the near future

## 2023-03-02 NOTE — ASSESSMENT & PLAN NOTE
Patient reportedly fell out of bed at his group home  No headstrike or LOC, only complaint of L leg pain  Given patient is an unreliable historian, will obtain CTH to rule out acute findings  Will hold DVT ppx, Plavix until r/o hemorrhage on [address]

## 2023-03-02 NOTE — ED PROVIDER NOTES
History  Chief Complaint   Patient presents with   • Fall     Pt from locked dementia unit,fell out of bed, (+) thinners, pt denies head strike, (-) loc, pt only c/o left leg pain     Patient is a 27-year-old male with history of dementia who presents to the emergency department for fall  Per EMS patient came from a locked dementia unit where he fell out of his bed  Patient did not hit his head  Patient is on Plavix  Patient is complaining of having pain in his left hip and knee at this time  He denies having any headache, weakness, chest pain, difficulty breathing  Prior to Admission Medications   Prescriptions Last Dose Informant Patient Reported? Taking?    Melatonin 5 MG CAPS   Yes No   Sig: Take 5 mg by mouth daily at bedtime   Melatonin 5 MG TABS   No No   Sig: TAKE ONE TABLET BY MOUTH EVERY NIGHT AT BEDTIME *PATIENT SUPPLY   Metamucil Fiber 51 7 % PACK   No No   Sig: MIX 1 PACKET IN 6-8 OUNCES OF WATER AND CONSUME BY MOUTH DAILY *PATIENT SUPPLY   Stool Softener 100 MG capsule   No No   Sig: TAKE ONE CAPSULE BY MOUTH TWO TIMES A DAY *PATIENT SUPPLY   clopidogrel (PLAVIX) 75 mg tablet   No No   Sig: TAKE ONE TABLET BY MOUTH EVERY DAY   memantine (NAMENDA) 5 mg tablet   Yes No   Sig: Take 5 mg by mouth daily at bedtime   metoprolol succinate (TOPROL-XL) 25 mg 24 hr tablet   No No   Sig: TAKE ONE TABLET BY MOUTH EVERY DAY   midodrine (PROAMATINE) 5 mg tablet   No No   Sig: TAKE ONE TABLET BY MOUTH EVERY DAY   omeprazole (PriLOSEC) 40 MG capsule   No No   Sig: TAKE ONE CAPSULE BY MOUTH EVERY DAY   psyllium (METAMUCIL) 58 6 % packet   Yes No   Sig: Take 1 packet by mouth daily   sertraline (ZOLOFT) 50 mg tablet   No No   Sig: TAKE 1 & 1/2 TABLETS BY MOUTH DAILY *PATIENT SUPPLY   simvastatin (ZOCOR) 80 mg tablet   No No   Sig: TAKE ONE TABLET BY MOUTH EVERY DAY      Facility-Administered Medications: None       Past Medical History:   Diagnosis Date   • Anxiety    • Arthritis    • Coronary artery disease    • Coronary artery disease involving native coronary artery of native heart without angina pectoris    • Depression    • Fracture    • Hypercholesterolemia    • Meningioma ([facility] ) 1999    brain tumor   • Meningioma (HCC)    • Narcolepsy     daytime drowsiness and dozing off occasionally   • Orthostatic hypotension    • Palpitations    • Prostate CA (HCC)    • Prostate cancer ([facility] )    • Stroke [facility])        Past Surgical History:   Procedure Laterality Date   • BACK SURGERY     • BRAIN SURGERY  1999   • BRAIN SURGERY     • CARDIAC SURGERY     • CORONARY ARTERY BYPASS GRAFT      x5   • CORONARY ARTERY BYPASS GRAFT         Family History   Problem Relation Age of Onset   • Hypertension Mother         benign essential   • Cancer Mother    • Osteoporosis Mother    • Suicidality Father    • Diabetes Family    • Heart disease Family    • Neuropathy Family         peripheral   • Prostate cancer Family    • Thyroid disease Family      I have reviewed and agree with the history as documented  E-Cigarette/Vaping   • E-Cigarette Use Never User      E-Cigarette/Vaping Substances   • Nicotine No    • THC No    • CBD No    • Flavoring No    • Other No    • Unknown No      Social History     Tobacco Use   • Smoking status: Former     Types: Cigarettes     Quit date: 1995     Years since quittin 1   • Smokeless tobacco: Never   • Tobacco comments:     former cig smoker- quit in    Vaping Use   • Vaping Use: Never used   Substance Use Topics   • Alcohol use: Not Currently     Comment: (history)   • Drug use: No        Review of Systems   Constitutional: Negative for fever  HENT: Negative for congestion  Eyes: Negative for pain  Respiratory: Negative for cough  Cardiovascular: Negative for chest pain  Gastrointestinal: Negative for diarrhea and vomiting  Genitourinary: Negative for dysuria  Musculoskeletal: Positive for gait problem  Negative for back pain          Left leg pain   Skin: Negative for rash  Neurological: Negative for dizziness  All other systems reviewed and are negative  Physical Exam  ED Triage Vitals   Temperature Pulse Respirations Blood Pressure SpO2   03/02/23 0245 03/02/23 0244 03/02/23 0244 03/02/23 0244 03/02/23 0244   98 1 °F (36 7 °C) 78 18 165/74 98 %      Temp Source Heart Rate Source Patient Position - Orthostatic VS BP Location FiO2 (%)   03/02/23 0245 03/02/23 0244 03/02/23 0244 03/02/23 0244 --   Oral Monitor Lying Right arm       Pain Score       03/02/23 0244       5             Orthostatic Vital Signs  Vitals:    03/02/23 0244 03/02/23 0300 03/02/23 0515   BP: 165/74 155/66 (!) 171/87   Pulse: 78 78 87   Patient Position - Orthostatic VS: Lying Lying Lying       Physical Exam  Vitals and nursing note reviewed  Constitutional:       General: He is not in acute distress  Appearance: Normal appearance  HENT:      Head: Normocephalic and atraumatic  Mouth/Throat:      Mouth: Mucous membranes are moist    Eyes:      Extraocular Movements: Extraocular movements intact  Conjunctiva/sclera: Conjunctivae normal       Pupils: Pupils are equal, round, and reactive to light  Cardiovascular:      Rate and Rhythm: Normal rate and regular rhythm  Pulses: Normal pulses  Heart sounds: Normal heart sounds  Pulmonary:      Effort: Pulmonary effort is normal       Breath sounds: Normal breath sounds  Abdominal:      Palpations: Abdomen is soft  Tenderness: There is no abdominal tenderness  Musculoskeletal:      Cervical back: Neck supple  Comments: Tenderness to palpation left hip and knee  Pain in left hip and knee with range of motion of leg  Distal sensation and pulses intact   Skin:     General: Skin is warm and dry  Capillary Refill: Capillary refill takes less than 2 seconds  Neurological:      General: No focal deficit present  Mental Status: He is alert and oriented to person, place, and time   Mental status is at baseline  Cranial Nerves: No cranial nerve deficit  Sensory: No sensory deficit  Motor: No weakness  Psychiatric:         Mood and Affect: Mood normal          ED Medications  Medications   melatonin tablet 3 mg (has no administration in time range)   memantine (NAMENDA) tablet 5 mg (has no administration in time range)   psyllium (METAMUCIL) 1 packet (has no administration in time range)   metoprolol tartrate (LOPRESSOR) partial tablet 12 5 mg (has no administration in time range)   midodrine (PROAMATINE) tablet 5 mg (has no administration in time range)   pantoprazole (PROTONIX) EC tablet 20 mg (has no administration in time range)   sertraline (ZOLOFT) tablet 50 mg (has no administration in time range)   docusate sodium (COLACE) capsule 100 mg (has no administration in time range)   senna (SENOKOT) tablet 17 2 mg (has no administration in time range)   polyethylene glycol (MIRALAX) packet 17 g (has no administration in time range)   ondansetron (ZOFRAN) injection 4 mg (has no administration in time range)       Diagnostic Studies  Results Reviewed     None                 CT head wo contrast   Final Result by Katie Mcqueen MD (03/02 0226)      No acute intracranial process or significant interval change  No skull fracture  Chronic microangiopathy  Workstation performed: [id number]         CT lower extremity wo contrast left   Final Result by Junior Keaton MD (03/02 0441)      1  Acetabular fractures extending into the posterior wall   2    Inferior pubic ramus fractures         Workstation performed: [id number]         XR hip/pelv 2-3 vws left if performed   ED Interpretation by Kenji Gabriel MD (03/02 1951)   Question pelvis fracture left acetabulum and left pubic ramus      XR knee 4+ vw left injury   ED Interpretation by Kenji Gabriel MD (03/02 0335)   No acute knee or distal femur fracture            Procedures  Procedures      ED Course  ED Course as of 03/02/23 0607   u Mar 02, 2023   0447 CT hip - 1  Acetabular fractures extending into the posterior wall  2  Inferior pubic ramus fractures   0449 Called trauma /red surgery - OK to contact resident                               SBIRT 20yo+    Tracy Christian Most Recent Value   SBIRT (25 yo +)    In order to provide better care to our patients, we are screening all of our patients for alcohol and drug use  Would it be okay to ask you these screening questions? No Filed at: 03/02/2023 0247                Medical Decision Making  Patient is a 80-year-old male with PMH of dementia who presents to the ED with left leg pain status post fall  Vital signs stable  On exam tenderness to palpation left knee and hip, increased pain with range of motion  No other tenderness to palpation, neurological exam intact  History and exam most consistent with concern for left hip/pelvis fracture  Plan x-ray left hip and knee  No apparent fractures on x-ray, patient with continued pain in his left hip though which is severe with minimal range of motion  CT hip ordered which showed evidence of fracture  Trauma consulted and patient accepted for admission    View ED course above for further discussion on patient workup  On review of previous records patient seen for unwitnessed fall previously in October 2022  Upon re-evaluation patient stable, no pain at baseline  Case discussed with trauma resident and patient accepted for admission for further evaluation and management of acetabular and inferior pubic ramus fracture  Patient stable at this time  Amount and/or Complexity of Data Reviewed  Radiology: ordered and independent interpretation performed              Disposition  Final diagnoses:   Closed fracture of left inferior pubic ramus, initial encounter [facility])     Time reflects when diagnosis was documented in both MDM as applicable and the Disposition within this note     Time User Action Codes Description Comment    3/2/2023  6:01 AM [place] Splinter Add [S31 432S] Closed fracture of left inferior pubic ramus, initial encounter [facility])       ED Disposition     None      Follow-up Information    None         Patient's Medications   Discharge Prescriptions    No medications on file     No discharge procedures on file  PDMP Review     None           ED Provider  Attending physically available and evaluated Shine Germain  I managed the patient along with the ED Attending      Electronically Signed by         Nuno eLonardo DO  03/02/23 9128

## 2023-03-02 NOTE — CONSULTS
Orthopedics   Mike Marlow 80 y o  male MRN: [id number]  Unit/Bed#: Cat Scan      Chief Complaint:   left hip pain    HPI:   80 y o male community ambulator status post fall from bed this AM while within his group home complaining of left hip pain  No Headstrike, No LOC, Takes plavix  Pain is dull in character, Located L groin, acute in onset, constant in duration, mild in intensity  Exacerbating factors weight bearing, remitting factors rest  Nonradiating, no numbness, no tingling, no open wounds noted  No other complaints at this time  PMH significant for CAD, HLD, prostate cancer, stroke, and severe dementia  Occupation retired      Review Of Systems:   · Skin: Normal  · Neuro: See HPI  · Musculoskeletal: See HPI  · 14 point review of systems negative except as stated above     Past Medical History:   Past Medical History:   Diagnosis Date   • Anxiety    • Arthritis    • Coronary artery disease    • Coronary artery disease involving native coronary artery of native heart without angina pectoris    • Depression    • Fracture    • Hypercholesterolemia    • Meningioma ([facility] ) 11/1999    brain tumor   • Meningioma (HCC)    • Narcolepsy     daytime drowsiness and dozing off occasionally   • Orthostatic hypotension    • Palpitations    • Prostate CA (HCC)    • Prostate cancer ([facility] )    • Stroke [facility])        Past Surgical History:   Past Surgical History:   Procedure Laterality Date   • BACK SURGERY     • BRAIN SURGERY  11/08/1999   • BRAIN SURGERY     • CARDIAC SURGERY     • CORONARY ARTERY BYPASS GRAFT      x5   • CORONARY ARTERY BYPASS GRAFT         Family History:  Family history reviewed and non-contributory  Family History   Problem Relation Age of Onset   • Hypertension Mother         benign essential   • Cancer Mother    • Osteoporosis Mother    • Suicidality Father    • Diabetes Family    • Heart disease Family    • Neuropathy Family         peripheral   • Prostate cancer Family    • Thyroid disease Family Social History:  Social History     Socioeconomic History   • Marital status: /Civil Union     Spouse name: None   • Number of children: None   • Years of education: None   • Highest education level: None   Occupational History   • Occupation: retired   Tobacco Use   • Smoking status: Former     Types: Cigarettes     Quit date: 1995     Years since quittin 1   • Smokeless tobacco: Never   • Tobacco comments:     former cig smoker- quit in    Vaping Use   • Vaping Use: Never used   Substance and Sexual Activity   • Alcohol use: Not Currently     Comment: (history)   • Drug use: No   • Sexual activity: Not Currently   Other Topics Concern   • None   Social History Narrative    ** Merged History Encounter **         ** Merged History Encounter **         Pt lives with wife     Social Determinants of Health     Financial Resource Strain: Not on file   Food Insecurity: No Food Insecurity   • Worried About [address] in the Last Year: Never true   • Ran Out of Food in the Last Year: Never true   Transportation Needs: Unknown   • Lack of Transportation (Medical): Patient refused   • Lack of Transportation (Non-Medical): Patient refused   Physical Activity: Not on file   Stress: Not on file   Social Connections: Not on file   Intimate Partner Violence: Not on file   Housing Stability: [address] to Pay for Housing in the Last Year: No   • Number of [place] in the Last Year: 1   • Unstable Housing in the Last Year: No       Allergies:    Allergies   Allergen Reactions   • Aricept [Donepezil] Confusion     confusion   • Atorvastatin Myalgia, Dizziness and Headache   • Niacin Other (See Comments)     unknown           Labs:  0   Lab Value Date/Time    HCT 39 10/19/2022 0942    HCT 37 7 2022 0454    HCT 39 9 2022 1245    HCT 42 7 2021 1012    HCT 36 8 2015 1046    HCT 39 0 2015 1130    HCT 39 8 2014 0959    HGB 13 3 10/19/2022 0942    HGB 12 2 08/09/2022 0454    HGB 13 0 06/07/2022 1245    HGB 13 9 09/13/2021 1012    HGB 11 8 (L) 06/09/2015 1046    HGB 13 1 05/08/2015 1130    HGB 13 0 08/20/2014 0959    INR 1 09 11/10/2019 0604    INR 1 04 05/08/2015 1130    WBC 6 81 08/09/2022 0454    WBC 6 98 06/07/2022 1245    WBC 6 45 09/13/2021 1012    WBC 6 46 06/09/2015 1046    WBC 6 60 05/08/2015 1130    WBC 7 30 08/20/2014 0959       Meds:    Current Facility-Administered Medications:   •  acetaminophen (TYLENOL) tablet 650 mg, 650 mg, Oral, Q6H [address], Jordi Funes MD  •  docusate sodium (COLACE) capsule 100 mg, 100 mg, Oral, BID, Jordi Funes MD  •  melatonin tablet 3 mg, 3 mg, Oral, HS, Jordi Funes MD  •  memantine [facility]) tablet 5 mg, 5 mg, Oral, HS, Jordi Funes MD  •  metoprolol tartrate (LOPRESSOR) partial tablet 12 5 mg, 12 5 mg, Oral, Q12H [address], Jordi Funes MD  •  midodrine (PROAMATINE) tablet 5 mg, 5 mg, Oral, Daily, Jordi Funes MD  •  naloxone (NARCAN) 0 04 mg/mL syringe 0 04 mg, 0 04 mg, Intravenous, Q1MIN PRN, Jordi Funes MD  •  ondansetron [facility]) injection 4 mg, 4 mg, Intravenous, Q6H PRN, Jordi Funes MD  •  oxyCODONE (ROXICODONE) IR tablet 2 5 mg, 2 5 mg, Oral, Q4H PRN, Jordi Funes MD  •  oxyCODONE (ROXICODONE) IR tablet 5 mg, 5 mg, Oral, Q4H PRN, Jordi Funes MD  •  pantoprazole (PROTONIX) EC tablet 20 mg, 20 mg, Oral, Early Morning, Jordi Funes MD  •  polyethylene glycol (MIRALAX) packet 17 g, 17 g, Oral, Daily, Jordi Funes MD  •  psyllium (METAMUCIL) 1 packet, 1 packet, Oral, Daily, Jordi Funes MD  •  senna (SENOKOT) tablet 17 2 mg, 2 tablet, Oral, Daily, Jordi Funes MD  •  sertraline (ZOLOFT) tablet 50 mg, 50 mg, Oral, Daily, Jordi Funes MD    Current Outpatient Medications:   •  clopidogrel (PLAVIX) 75 mg tablet, TAKE ONE TABLET BY MOUTH EVERY DAY, Disp: 100 tablet, Rfl: 3  •  Melatonin 5 MG CAPS, Take 5 mg by mouth daily at bedtime, Disp: , Rfl:   • Melatonin 5 MG TABS, TAKE ONE TABLET BY MOUTH EVERY NIGHT AT BEDTIME *PATIENT SUPPLY, Disp: 30 tablet, Rfl: 5  •  memantine (NAMENDA) 5 mg tablet, Take 5 mg by mouth daily at bedtime, Disp: , Rfl:   •  Metamucil Fiber 51 7 % PACK, MIX 1 PACKET IN 6-8 OUNCES OF WATER AND CONSUME BY MOUTH DAILY *PATIENT SUPPLY, Disp: 44 each, Rfl: 5  •  metoprolol succinate (TOPROL-XL) 25 mg 24 hr tablet, TAKE ONE TABLET BY MOUTH EVERY DAY, Disp: 90 tablet, Rfl: 3  •  midodrine (PROAMATINE) 5 mg tablet, TAKE ONE TABLET BY MOUTH EVERY DAY, Disp: 90 tablet, Rfl: 3  •  omeprazole (PriLOSEC) 40 MG capsule, TAKE ONE CAPSULE BY MOUTH EVERY DAY, Disp: 100 capsule, Rfl: 3  •  psyllium (METAMUCIL) 58 6 % packet, Take 1 packet by mouth daily, Disp: , Rfl:   •  sertraline (ZOLOFT) 50 mg tablet, TAKE 1 & 1/2 TABLETS BY MOUTH DAILY *PATIENT SUPPLY, Disp: 45 tablet, Rfl: 5  •  simvastatin (ZOCOR) 80 mg tablet, TAKE ONE TABLET BY MOUTH EVERY DAY, Disp: 90 tablet, Rfl: 3  •  Stool Softener 100 MG capsule, TAKE ONE CAPSULE BY MOUTH TWO TIMES A DAY *PATIENT SUPPLY, Disp: 60 capsule, Rfl: 5    Blood Culture:   No results found for: BLOODCX    Wound Culture:   No results found for: WOUNDCULT    Ins and Outs:  No intake/output data recorded            Physical Exam:   BP (!) 171/87 (BP Location: Right arm)   Pulse 87   Temp 98 1 °F (36 7 °C) (Oral)   Resp 18   SpO2 94%   Gen: No acute distress, resting comfortably in bed  HEENT: Eyes clear, moist mucus membranes, hearing intact  Respiratory: No audible wheezing or stridor  Cardiovascular: Well Perfused peripherally, 2+ distal pulse  Abdomen: nondistended, no peritoneal signs  Musculoskeletal: left lower extremity  · Skin intact  · Leg lengths equal, in neutral position  · Tender to deep palpation over hip  · Patient observed actively flexing and extending his hip, knee spontaneously while in bed  · Can perform straight leg raise  · Sensation intact DP/SP/Tib/Kathy/Saph nerve distributions  · Motor intact EHL/FHL/TA/GS  · 2+  DP and PT pulses   · Musculature is soft and compressible, no pain with passive stretch    Radiology:   I personally reviewed the films  X-rays AP pelvis and obturator oblique views, and CT shows left inferior pubic ramus fracture and left posterior wall acetabular fracture without displacement  Assessment:  80 y o male S/P mech fall from bed with left inferior pubic ramus fracture and nondisplaced posterior wall acetabular fracture    Plan:   · Weight bearing as tolerated left lower extremity  · Analgesics for pain  · DVT ppx for 28 days post injury  · PT/OT  · Follow up in 4 weeks  · There is no height or weight on file to calculate BMI     · Dispo: Ortho will follow    Val Singh MD

## 2023-03-02 NOTE — CASE MANAGEMENT
Case Management Discharge Planning Note    Patient name Ish Beach  Location ED 09/ED 09 MRN [id number]  : 1933 Date 3/2/2023       Current Admission Date: 3/2/2023  Current Admission Diagnosis:Multiple fractures of pelvis [facility])   Patient Active Problem List    Diagnosis Date Noted   • Multiple fractures of pelvis ([facility] ) 2023   • Fall 10/19/2022   • Moderate protein-calorie malnutrition ([facility] ) 2022   • Silent aspiration 08/10/2022   • Candida esophagitis ([facility] ) 2022   • Failure to thrive in adult 2022   • Dysphagia 2021   • Dementia without behavioral disturbance ([facility] ) 2021   • 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   • 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) ([facility] ) 2020   • Calcification of aorta ([facility] ) 2020   • Need for influenza vaccination 2020   • Depression 05/15/2020   • History of stroke 2020   • Acute CVA (cerebrovascular accident), suspected embolic in nature    • Hypercalcemia 2019   • History of resection of meningioma 2019   • Lumbar spondylosis 2019   • Lumbar radiculopathy 2019   • Convulsions ([facility] ) 2019   • Orthostatic hypotension 2018   • Contusion of rib on right side 2018   • Cognitive decline 2018   • History of meningioma 2018   • Idiopathic peripheral neuropathy 2018   • Iron deficiency anemia 2018   • Constipation 2018   • Iron deficiency 2018   • Other constipation 2018   • Vitamin B12 deficiency 2017   • Anemia 2017   • Insomnia 2016   • Arteriosclerotic cardiovascular disease 2015   • Esophageal reflux 2014   • Cervical spinal stenosis 2013   • Spinal stenosis of lumbar region with neurogenic claudication 2013   • Backache 2013   • Essential hypertension 2013   • Hyperlipidemia 2013   • Depression 2013      LOS (days): 0  Geometric Mean LOS (GMLOS) (days): 3 80  Days to GMLOS:3 6     OBJECTIVE:  Risk of Unplanned Readmission Score: 10 31         Current admission status: Inpatient   Preferred Pharmacy:   [address] [facility] [address]  Phone: [phone] Fax: [phone]    [address] [address] [address]  Phone: [phone] Fax: [phone]    Primary Care Provider: MICHELE Lazo    Primary Insurance: [facility] [facility] RE[facility]  Secondary Insurance:     DISCHARGE DETAILS:    [address]         Is the patient interested in [facility] at discharge?: Yes  [address] requested[de-identified] Physical Therapy, [address] [place] [address] Name[de-identified] Other [address] [place] [address])  [address] [place] [address] Provider[de-identified] PCP  Home Health Services Needed[de-identified] Evaluate Functional Status and Safety, Gait/ADL Training, Strengthening/Theraputic Exercises to Improve Function  Homebound Criteria Met[de-identified] Requires the Assistance of Another Person for Safe Ambulation or to Leave the Home, Requires Medical Transportation  Supporting Clincal Findings[de-identified] Fatigues Easliy in [employer], Limited Endurance    Other Referral/Resources/Interventions Provided:  Interventions: [facility]    Pt was seen by PT/OT and recommended for a return to [facility] with added therapies (through [facility])  CM then spoke to [facility] liaison and she is in agreement with this plan   Plan to return there today if medically stable

## 2023-03-02 NOTE — PLAN OF CARE
Problem: OCCUPATIONAL THERAPY ADULT  Goal: Performs self-care activities at highest level of function for planned discharge setting  See evaluation for individualized goals  Description: Treatment Interventions: ADL retraining, Functional transfer training, UE strengthening/ROM, Endurance training, Cognitive reorientation, Patient/family training, Equipment evaluation/education, Compensatory technique education, Energy conservation, Activityengagement          See flowsheet documentation for full assessment, interventions and recommendations  Note: Limitation: Decreased ADL status, Decreased UE strength, Decreased Safe judgement during ADL, Decreased cognition, Decreased endurance, Decreased self-care trans, Decreased high-level ADLs  Prognosis: Fair  Assessment: Pt is a 80 y o  male admitted 3/2/23 s/p fall at [facility] with L leg pain  Pt found to have L inferior pubic ramus fx and nondisplaced posterior wall acetabular fx, is WBAT in LLE  No plan for sx at this time  PMH includes  has a past medical history of Anxiety, Arthritis, Coronary artery disease, Coronary artery disease involving native coronary artery of native heart without angina pectoris, Depression, Fracture, Hypercholesterolemia, Meningioma ([facility] [address] ), Meningioma ([facility] [address] ), Narcolepsy, Orthostatic hypotension, Palpitations, Prostate CA ([facility] [address] ), Prostate cancer ([facility] [address] ), and Stroke ([facility] [address] )  Pt lives at [facility], ind for ADL, rq A for IADL And completed transfers/FM w RW  Currently, pt is Min Ax1 for UB ADL, Mod Ax1 for LB ADL, and completed transfers/FM w Mod Ax1  Pt is limited at this time 2* decreased endurance/activtiy tolerance, decreased cognition, decreased ADL/High-level ADL status, decreased self-care trans, decreased safety awareness, limited home support and is a fall risk   This impacts pt's ability to complete UB and LB dressing and bathing, toileting, transfers, functional mobility, community mobility, home and health maintenance, and safe engagement in typical daily routine  The patient's raw score on the AM-PAC Daily Activity inpatient short form is 16, standardized score is 35 96, less than 39 4  Patients at this level are likely to benefit from discharge to post-acute rehabilitation services  Please refer to the recommendation of the Occupational Therapist for safe discharge planning  From OT standpoint, pt should D/C to STR when medically stable  Pt will benefit from continued acute OT services 2-3 x/wk for 10-14 days to meet goals       OT Discharge Recommendation: Return to facility with rehabilitation services

## 2023-03-02 NOTE — ED ATTENDING ATTESTATION
3/2/2023  I, Kenji Gabriel MD, saw and evaluated the patient  I have discussed the patient with the resident/non-physician practitioner and agree with the resident's/non-physician practitioner's findings, Plan of Care, and MDM as documented in the resident's/non-physician practitioner's note, except where noted  All available labs and Radiology studies were reviewed  I was present for key portions of any procedure(s) performed by the resident/non-physician practitioner and I was immediately available to provide assistance  At this point I agree with the current assessment done in the Emergency Department  I have conducted an independent evaluation of this patient a history and physical is as follows:    ED Course     Emergency Department Note- Aubrey Berman 80 y o  male MRN: [id number]    Unit/Bed#: ED 09 Encounter: [id number]    Aubrey Berman is a 80 y o  male who presents with   Chief Complaint   Patient presents with   • Fall     Pt from locked dementia unit,fell out of bed, (+) thinners, pt denies head strike, (-) loc, pt only c/o left leg pain         History of Present Illness   HPI:  Aubrey Berman is a 80 y o  male who presents for evaluation of:  Left hip and knee pain after a fall at the locked dementia unit  The patient does take anticoagulants; there is no report of any head strike  Patient denies any headache or head pain in the ED  Patient is only complaining of leg pain  Further history and review of systems is unobtainable secondary to dementia      Review of Systems   Unable to perform ROS: Dementia       Historical Information   Past Medical History:   Diagnosis Date   • Anxiety    • Arthritis    • Coronary artery disease    • Coronary artery disease involving native coronary artery of native heart without angina pectoris    • Depression    • Fracture    • Hypercholesterolemia    • Meningioma ([facility] [address] ) 11/1999    brain tumor   • Meningioma (HCC)    • Narcolepsy     daytime drowsiness and dozing off occasionally   • Orthostatic hypotension    • Palpitations    • Prostate CA (HCC)    • Prostate cancer (HCC)    • Stroke [facility])      Past Surgical History:   Procedure Laterality Date   • BACK SURGERY     • BRAIN SURGERY  1999   • BRAIN SURGERY     • CARDIAC SURGERY     • CORONARY ARTERY BYPASS GRAFT      x5   • CORONARY ARTERY BYPASS GRAFT       Social History   Social History     Substance and Sexual Activity   Alcohol Use Not Currently    Comment: (history)     Social History     Substance and Sexual Activity   Drug Use No     Social History     Tobacco Use   Smoking Status Former   • Types: Cigarettes   • Quit date: 1995   • Years since quittin 1   Smokeless Tobacco Never   Tobacco Comments    former cig smoker- quit in      Family History:   Family History   Problem Relation Age of Onset   • Hypertension Mother         benign essential   • Cancer Mother    • Osteoporosis Mother    • Suicidality Father    • Diabetes Family    • Heart disease Family    • Neuropathy Family         peripheral   • Prostate cancer Family    • Thyroid disease Family        Meds/Allergies   PTA meds:   Prior to Admission Medications   Prescriptions Last Dose Informant Patient Reported? Taking?    Melatonin 5 MG CAPS   Yes No   Sig: Take 5 mg by mouth daily at bedtime   Melatonin 5 MG TABS   No No   Sig: TAKE ONE TABLET BY MOUTH EVERY NIGHT AT BEDTIME *PATIENT SUPPLY   Metamucil Fiber 51 7 % PACK   No No   Sig: MIX 1 PACKET IN 6-8 OUNCES OF WATER AND CONSUME BY MOUTH DAILY *PATIENT SUPPLY   Stool Softener 100 MG capsule   No No   Sig: TAKE ONE CAPSULE BY MOUTH TWO TIMES A DAY *PATIENT SUPPLY   clopidogrel (PLAVIX) 75 mg tablet   No No   Sig: TAKE ONE TABLET BY MOUTH EVERY DAY   memantine (NAMENDA) 5 mg tablet   Yes No   Sig: Take 5 mg by mouth daily at bedtime   metoprolol succinate (TOPROL-XL) 25 mg 24 hr tablet   No No   Sig: TAKE ONE TABLET BY MOUTH EVERY DAY   midodrine (PROAMATINE) 5 mg tablet   No No   Sig: TAKE ONE TABLET BY MOUTH EVERY DAY   omeprazole (PriLOSEC) 40 MG capsule   No No   Sig: TAKE ONE CAPSULE BY MOUTH EVERY DAY   psyllium (METAMUCIL) 58 6 % packet   Yes No   Sig: Take 1 packet by mouth daily   sertraline (ZOLOFT) 50 mg tablet   No No   Sig: TAKE 1 & 1/2 TABLETS BY MOUTH DAILY *PATIENT SUPPLY   simvastatin (ZOCOR) 80 mg tablet   No No   Sig: TAKE ONE TABLET BY MOUTH EVERY DAY      Facility-Administered Medications: None     Allergies   Allergen Reactions   • Aricept [Donepezil] Confusion     confusion   • Atorvastatin Myalgia, Dizziness and Headache   • Niacin Other (See Comments)     unknown       Objective   First Vitals:   Blood Pressure: 165/74 (03/02/23 0244)  Pulse: 78 (03/02/23 0244)  Temperature: 98 1 °F (36 7 °C) (03/02/23 0245)  Temp Source: Oral (03/02/23 0245)  Respirations: 18 (03/02/23 0244)  SpO2: 98 % (03/02/23 0244)    Current Vitals:   Blood Pressure: 155/66 (03/02/23 0300)  Pulse: 78 (03/02/23 0300)  Temperature: 98 1 °F (36 7 °C) (03/02/23 0245)  Temp Source: Oral (03/02/23 0245)  Respirations: 18 (03/02/23 0300)  SpO2: 97 % (03/02/23 0300)    No intake or output data in the 24 hours ending 03/02/23 0335    Invasive Devices     None                 Physical Exam  Vitals and nursing note reviewed  Constitutional:       General: He is not in acute distress  Appearance: Normal appearance  He is well-developed  HENT:      Head: Normocephalic and atraumatic  Right Ear: External ear normal       Left Ear: External ear normal       Nose: Nose normal       Mouth/Throat:      Pharynx: No oropharyngeal exudate  Eyes:      Conjunctiva/sclera: Conjunctivae normal       Pupils: Pupils are equal, round, and reactive to light  Cardiovascular:      Rate and Rhythm: Normal rate and regular rhythm  Pulmonary:      Effort: Pulmonary effort is normal  No respiratory distress  Abdominal:      General: Abdomen is flat  There is no distension        Palpations: Abdomen is soft  Musculoskeletal:         General: Tenderness (x left hip tenderness) present  No deformity  Normal range of motion  Cervical back: Normal range of motion and neck supple  Skin:     General: Skin is warm and dry  Capillary Refill: Capillary refill takes less than 2 seconds  Neurological:      General: No focal deficit present  Mental Status: He is alert  Mental status is at baseline  He is disoriented  Coordination: Coordination normal    Psychiatric:         Behavior: Behavior normal       Comments: Thought content, judgment, and decision-making capacity are impaired secondary to dementia and delirium  Medical Decision Makin  Cute left hip and knee pain after fall tonight: X-ray rule out fracture  No results found for this or any previous visit (from the past 36 hour(s))  XR hip/pelv 2-3 vws left if performed   ED Interpretation   No acute hip or pelvis fracture      XR knee 4+ vw left injury   ED Interpretation   No acute knee or distal femur fracture            Portions of the record may have been created with voice recognition software  Occasional wrong word or "sound a like" substitutions may have occurred due to the inherent limitations of voice recognition software  Read the chart carefully and recognize, using context, where substitutions have occurred          Critical Care Time  Procedures

## 2023-03-02 NOTE — ASSESSMENT & PLAN NOTE
3/2 CT: L inferior pubic ramus fx, L posterior acetabular fx  Orthopaedics consult, appreciate recs  Pain control  PT/OT

## 2023-03-02 NOTE — CASE MANAGEMENT
Case Management Discharge Planning Note    Patient name Rosie Noland  Location ED 09/ED 09 MRN [id number]  : 1933 Date 3/2/2023       Current Admission Date: 3/2/2023  Current Admission Diagnosis:Multiple fractures of pelvis [facility])   Patient Active Problem List    Diagnosis Date Noted   • Multiple fractures of pelvis ([facility] [address] ) 2023   • Fall 10/19/2022   • Moderate protein-calorie malnutrition ([facility] [address] ) 2022   • Silent aspiration 08/10/2022   • Candida esophagitis ([facility] ) 2022   • Failure to thrive in adult 2022   • Dysphagia 2021   • Dementia without behavioral disturbance ([facility] ) 2021   • 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   • 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) ([facility] ) 2020   • Calcification of aorta ([facility] ) 2020   • Need for influenza vaccination 2020   • Depression 05/15/2020   • History of stroke 2020   • Acute CVA (cerebrovascular accident), suspected embolic in nature    • Hypercalcemia 2019   • History of resection of meningioma 2019   • Lumbar spondylosis 2019   • Lumbar radiculopathy 2019   • Convulsions ([facility] [address] ) 2019   • Orthostatic hypotension 2018   • Contusion of rib on right side 2018   • Cognitive decline 2018   • History of meningioma 2018   • Idiopathic peripheral neuropathy 2018   • Iron deficiency anemia 2018   • Constipation 2018   • Iron deficiency 2018   • Other constipation 2018   • Vitamin B12 deficiency 2017   • Anemia 2017   • Insomnia 2016   • Arteriosclerotic cardiovascular disease 2015   • Esophageal reflux 2014   • Cervical spinal stenosis 2013   • Spinal stenosis of lumbar region with neurogenic claudication 2013   • Backache 2013   • Essential hypertension 2013   • Hyperlipidemia 2013   • Depression 2013      LOS (days): 0  Geometric Mean LOS (GMLOS) (days): 3 80  Days to GMLOS:3 5     OBJECTIVE:  Risk of Unplanned Readmission Score: 12 62         Current admission status: Inpatient   [facility] [address] [address]  Phone: [phone] Fax: [phone]    [address], [address] [address]  Phone: [phone] Fax: [phone]    Primary Care Provider: MICEHLE Aguayo    [facility]  Secondary Insurance:     DISCHARGE DETAILS:    Transport at Discharge : [facility]  Dispatcher Contacted: Yes  Number/Name of Dispatcher: [facility]TS  Transported by Millynoah and Unit #): [employer] of Transport (Date): 03/02/23  ETA of Transport (Time): 1400     Transfer Mode: Stretcher  Accompanied by: Allen Edwardfidencio Christiansen Name, [address] : [address]   Receiving Facility/Agency Phone Number: [phone]  Facility/Agency Fax Number: [phone]      CM met with pt's wife to discuss d/c plan  She is in agreement with the pt returning to [facility] but is interested in a move to a different portion of their facility  CM informed [facility]     Pt will d/c there today @1400 via [facility]

## 2023-03-02 NOTE — CONSULTS
Consultation - Geriatrics   Rosie Noland 80 y o  male MRN: [id number]  Unit/Bed#: ED 09 Encounter: [id number]      Assessment/Plan    Ambulatory dysfunction  At risk for falls secondary to age, hx of fall, gait instability, polypharmacy, visual impairment  Fall precautions  PT/OT  Increased physical exercise, recommend balance and strengthening exercises  Rehab post hospitalization     Acute pain due to trauma  Geriatric pain protocol  Scheduled acetaminophen 975 mg po Q8  Oxycodone 2 5 mg po Q 4 prn moderate pain  Oxycodone 5 mg po Q 4 prn severe pain   Monitor for constipation  Lidoderm patch      Frailty  Clinical Frail Scale: 6- Moderately Frail  Albumin 3 2, recommend protein supplementation  PT/OT  Continue supportive care     Dementia  Likely vascular due to history of stroke  Moderate progressing dementia  MOCA 19/30 (5/15/2020)  No reported prior memory issues  Recommend check TSH and vitamin B12  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  Keep physically, mentally and socially active     Delirium precautions  Baseline: alert and oriented x 2  Provide frequent redirection, reorientation, distraction techniques  Avoid deliriogenic medications such as tramadol, benzodiazepines, anticholinergics,  Benadryl  Treat pain, See geriatric pain protocol  Monitor for constipation and urinary retention  Encourage early and frequent moblization, OOB  Encourage Hydration/ Nutrition   Pureed diet with thin liquids  Implement sleep hygiene, limit night time interuptions, group activities     Insomnia  Related to hospitalization  First line is behavioral therapy  Avoid sedative hypnotics such as benzodiazepines and benadryl  Encourage staying awake during the day  Encourage daytime activity, morning exercise  Decrease or eliminate day time naps  Avoid caffeine especially during late afternoon and evening hours  Establish a night time routine    Multiple fractures of pelvis  Left inferior pubic ramus fx, left posterior acetabular fx  Orthopedics on consult  PT/OT  Geriatric pain protocol  Continue with cane and walker for ambulation, wheelchair as needed  Grab bars in shower and bathroom and walk in shower decreasing risk of fall    Advanced Care Planning  Full code    Home medication review  Confirmed with [facility] physician orders  Colace  100 mg once daily  Metoprolol 25 mg once daily  Simvastatin 80 mg once daily  Clopidogrel 75 mg once daily  Midodrine 5 mg once daily  Omeprazole 40 mg once daily  Sertraline 50 mg (take 1 1/2 tablets) BID  Melatonin 5 mg once at bedtime  Mematine 5 mg once at bedtime  Metamucil fiber packet once daily         History of Present Illness   Physician Requesting Consult: Polly Marquez MD  Reason for Consult / Principal Problem: Fall  Hx and PE limited by: Not applicable  HPI: Chris Lowry is a 80y o  year old male who presents with pain to his left leg after a fall  He was trying to get up out of bed to go to the bathroom  Ava Americo after trying to sit down and had pain to his left leg  He resides at a locked dementia unit  He has CAD, HTN, history of prostate cancer, Stroke, history of brain tumor  Prior to arrival pt lives at [facility]  He needs assistance with IADLs  He needs assistance with ADLs  He ambulates with a walker  He has prior falls  He wears glasses, hearing aids and dentures  On a pureed diet with thin liquids  Drinks boost  He denies issues with sleep  Likes to listen to music and attend happy hour on Fridays  Upon exam patient is lying in bed  He is alert and oriented x2  Wife is at beside to review HPI and review medications  Inpatient consult to Gerontology  Consult performed by: TuesMICHELE Solorzano  Consult ordered by: Stella Rodriguez MD          Review of Systems   Constitutional: Negative for chills, fatigue and fever  HENT: Negative for congestion, rhinorrhea and sore throat  Eyes: Negative for discharge and redness     Respiratory: Negative for cough, chest tightness and shortness of breath  Cardiovascular: Negative for chest pain and palpitations  Gastrointestinal: Negative for abdominal pain, constipation and nausea  Genitourinary: Negative for flank pain  Difficulty urinating: urinary catheter present  Musculoskeletal: Arthralgias: L leg pain post fall  Neurological: Negative for dizziness and weakness  Psychiatric/Behavioral: Confusion: history of dementia, AOx2  The patient is not nervous/anxious          Historical Information   Past Medical History:   Diagnosis Date   • Anxiety    • Arthritis    • Coronary artery disease    • Coronary artery disease involving native coronary artery of native heart without angina pectoris    • Depression    • Fracture    • Hypercholesterolemia    • Meningioma ([facility] ) 1999    brain tumor   • Meningioma (HCC)    • Narcolepsy     daytime drowsiness and dozing off occasionally   • Orthostatic hypotension    • Palpitations    • Prostate CA (HCC)    • Prostate cancer ([facility] )    • Stroke [facility])      Past Surgical History:   Procedure Laterality Date   • BACK SURGERY     • BRAIN SURGERY  1999   • BRAIN SURGERY     • CARDIAC SURGERY     • CORONARY ARTERY BYPASS GRAFT      x5   • CORONARY ARTERY BYPASS GRAFT       Social History   Social History     Substance and Sexual Activity   Alcohol Use Not Currently    Comment: (history)     Social History     Substance and Sexual Activity   Drug Use No     Social History     Tobacco Use   Smoking Status Former   • Types: Cigarettes   • Quit date: 1995   • Years since quittin 1   Smokeless Tobacco Never   Tobacco Comments    former cig smoker- quit in          Family History:   Family History   Problem Relation Age of Onset   • Hypertension Mother         benign essential   • Cancer Mother    • Osteoporosis Mother    • Suicidality Father    • Diabetes Family    • Heart disease Family    • Neuropathy Family         peripheral   • Prostate cancer Family    • Thyroid disease Family        Meds/Allergies   Current meds:   Current Facility-Administered Medications   Medication Dose Route Frequency   • acetaminophen (TYLENOL) tablet 650 mg  650 mg Oral Q6H [address]   • clopidogrel (PLAVIX) tablet 75 mg  75 mg Oral Daily   • docusate sodium (COLACE) capsule 100 mg  100 mg Oral BID   • heparin (porcine) subcutaneous injection 5,000 Units  5,000 Units Subcutaneous Q8H [address]   • melatonin tablet 3 mg  3 mg Oral HS   • memantine (NAMENDA) tablet 5 mg  5 mg Oral HS   • metoprolol tartrate (LOPRESSOR) partial tablet 12 5 mg  12 5 mg Oral Q12H [address]   • midodrine (PROAMATINE) tablet 5 mg  5 mg Oral Daily   • naloxone (NARCAN) 0 04 mg/mL syringe 0 04 mg  0 04 mg Intravenous Q1MIN PRN   • ondansetron (ZOFRAN) injection 4 mg  4 mg Intravenous Q6H PRN   • oxyCODONE (ROXICODONE) IR tablet 2 5 mg  2 5 mg Oral Q4H PRN   • oxyCODONE (ROXICODONE) IR tablet 5 mg  5 mg Oral Q4H PRN   • pantoprazole (PROTONIX) EC tablet 20 mg  20 mg Oral Early Morning   • polyethylene glycol (MIRALAX) packet 17 g  17 g Oral Daily   • psyllium (METAMUCIL) 1 packet  1 packet Oral Daily   • senna (SENOKOT) tablet 17 2 mg  2 tablet Oral Daily   • sertraline (ZOLOFT) tablet 50 mg  50 mg Oral Daily           Allergies   Allergen Reactions   • Aricept [Donepezil] Confusion     confusion   • Atorvastatin Myalgia, Dizziness and Headache   • Niacin Other (See Comments)     unknown       Objective   Vitals: Blood pressure (!) 171/87, pulse 87, temperature 98 1 °F (36 7 °C), temperature source Oral, resp  rate 18, SpO2 94 %  ,There is no height or weight on file to calculate BMI  Physical Exam  Vitals reviewed  Constitutional:       Comments: Thin and frail   HENT:      Head: Normocephalic  Mouth/Throat:      Mouth: Mucous membranes are dry  Eyes:      Pupils: Pupils are equal, round, and reactive to light  Cardiovascular:      Rate and Rhythm: Normal rate and regular rhythm  Pulses: Normal pulses  Heart sounds: Normal heart sounds  Pulmonary:      Effort: Pulmonary effort is normal       Breath sounds: Normal breath sounds  Abdominal:      General: Abdomen is flat  Bowel sounds are normal       Palpations: Abdomen is soft  Musculoskeletal:         General: Tenderness (L leg) present  Normal range of motion  Cervical back: Normal range of motion  Right lower leg: Edema present  Left lower leg: Edema present  Skin:     General: Skin is warm and dry  Capillary Refill: Capillary refill takes less than 2 seconds  Neurological:      Mental Status: He is alert  Mental status is at baseline  Psychiatric:         Mood and Affect: Mood normal          Behavior: Behavior normal          Lab Results:            Invalid input(s): LABALBU    Imaging Studies: I have personally reviewed pertinent reports  EKG, Pathology, and Other Studies: I have personally reviewed pertinent reports      VTE Prophylaxis: Sequential compression device (Venodyne)     Code Status: Level 1 - Full Code

## 2023-03-02 NOTE — PLAN OF CARE
Problem: PHYSICAL THERAPY ADULT  Goal: Performs mobility at highest level of function for planned discharge setting  See evaluation for individualized goals  Description: Treatment/Interventions: Functional transfer training, LE strengthening/ROM, Therapeutic exercise, Endurance training, Patient/family training, Equipment eval/education, Bed mobility, Gait training, Spoke to nursing, OT  Equipment Recommended: Eldon Jimenez (MIRELLA)       See flowsheet documentation for full assessment, interventions and recommendations  Note: Prognosis: Good  Problem List: Decreased strength, Decreased range of motion, Decreased endurance, Impaired balance, Decreased mobility, Pain, Decreased cognition, Decreased safety awareness, Impaired judgement  Assessment: Pt is 80 y o  male seen for PT evaluation s/p admit to [facility] on 3/2/2023  Two pt identifiers were used to confirm  Pt presented s/p fall out of bed at facility  Pt was admitted with a primary dx of: Left inferior pubic ramus fracture, L nondisplaced posterior wall acetabular fracture  Per ortho pt is WBAT to b/l LE and non op management for fxs  PT now consulted for assessment of mobility and d/c needs  Pt with Up in chair orders  Pts current co morbidities affecting treatment include:  has a past medical history of Anxiety, Arthritis, Coronary artery disease, Coronary artery disease involving native coronary artery of native heart without angina pectoris, Depression, Fracture, Hypercholesterolemia, Meningioma ([address] ), Meningioma ([address] ), Narcolepsy, Orthostatic hypotension, Palpitations, Prostate CA ([address] ), Prostate cancer ([address] ), and Stroke ([facility] )    Pts current clinical presentation is Unstable/ Unpredictable (high complexity) due to Ongoing medical management for primary dx, Increased reliance on more restrictive AD compared to baseline, Decreased activity tolerance compared to baseline, Fall risk, Increased assistance needed from caregiver at current time, Cog status, Continuous pulse oximetry monitoring     Upon evaluation, pt currently is requiring Mod Ax2 for bed mobility; Mod Ax1 for transfers and Mod Ax1 for ambulation w/ RW  Pt presents at PT eval functioning below baseline and currently w/ overall mobility deficits 2* to: BLE weakness, decreased ROM, impaired balance, decreased endurance, gait deviations, pain, decreased activity tolerance compared to baseline, decreased safety awareness, impaired judgement, fall risk, decreased cognition  Pt currently at a fall risk 2* to impairments listed above  Based on the aforementioned PT evaluation, pt will continue to benefit from skilled Acute PT interventions to address stated impairments; to maximize functional mobility; for ongoing pt/ family training; and DME needs  At conclusion of PT session pt returned BTB with phone and call bell within reach  Pt denies any further questions at this time  PT is currently recommending Return to facility with appropriate assist/support  and continued PT services  PT will continue to follow during hospital stay  PT Discharge Recommendation: Return to facility with rehabilitation services    See flowsheet documentation for full assessment

## 2023-03-02 NOTE — CASE MANAGEMENT
Case Management Assessment & Discharge Planning Note    Patient name Julio Webb  Location ED 09/ED 09 MRN [id number]  : 1933 Date 3/2/2023       Current Admission Date: 3/2/2023  Current Admission Diagnosis:Multiple fractures of pelvis [facility])   Patient Active Problem List    Diagnosis Date Noted   • Multiple fractures of pelvis ([facility] ) 2023   • Fall 10/19/2022   • Moderate protein-calorie malnutrition ([facility] ) 2022   • Silent aspiration 08/10/2022   • Candida esophagitis ([facility] [facility] ) 2022   • Failure to thrive in adult 2022   • Dysphagia 2021   • Dementia without behavioral disturbance ([facility] ) 2021   • 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[facility] ) 2021   • 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) ([facility] ) 2020   • Calcification of aorta ([facility] ) 2020   • Need for influenza vaccination 2020   • Depression 05/15/2020   • History of stroke 2020   • Acute CVA (cerebrovascular accident), suspected embolic in nature    • Hypercalcemia 2019   • History of resection of meningioma 2019   • Lumbar spondylosis 2019   • Lumbar radiculopathy 2019   • Convulsions ([facility] ) 2019   • Orthostatic hypotension 2018   • Contusion of rib on right side 2018   • Cognitive decline 2018   • History of meningioma 2018   • Idiopathic peripheral neuropathy 2018   • Iron deficiency anemia 2018   • Constipation 2018   • Iron deficiency 2018   • Other constipation 2018   • Vitamin B12 deficiency 2017   • Anemia 2017   • Insomnia 2016   • Arteriosclerotic cardiovascular disease 2015   • Esophageal reflux 2014   • Cervical spinal stenosis 2013   • Spinal stenosis of lumbar region with neurogenic claudication 2013   • Backache 2013   • Essential hypertension 2013   • Hyperlipidemia 2013   • Depression 2013      LOS (days): 0  Geometric Mean LOS (GMLOS) (days): 3 80  Days to GMLOS:3 7     OBJECTIVE:    Risk of Unplanned Readmission Score: 10 31         Current admission status: Inpatient       Preferred Pharmacy:   [facility] [address]  Phone: [phone] Fax: [phone]    [address], [place] - [address] [address]  Phone: [phone] Fax: [phone]    Primary Care Provider: MICHELE Gonzalez    Primary Insurance: [facility]  Secondary Insurance:     ASSESSMENT:  Melony Young Proxies    There are no active Health Care Proxies on file  Advance Directives  Does patient have a Health Care POA?: Yes  Does patient have Advance Directives?: Yes  Advance Directives: Living will, Power of  for health care  Primary Contact: Nely Moses (Daughter) [phone]         Readmission Root Cause  30 Day Readmission: No    Patient Information  Admitted from[de-identified] Facility [facility])  Mental Status: Alert, Confused  During Assessment patient was accompanied by: Not accompanied during assessment  Assessment information provided by[de-identified] Patient  Primary Caregiver: Self  Support Systems: Spouse/significant other, Children, [facility] of Residence: [address] [place] [address] do you live in?: [address] entry access options   Select all that apply : No steps to enter home  Type of Current Residence: Facility [facility])  Upon entering residence, is there a bedroom on the main floor (no further steps)?: Yes  Upon entering residence, is there a bathroom on the main floor (no further steps)?: Yes  In the last 12 months, was there a time when you were not able to pay the mortgage or rent on time?: No  In the last 12 months, how many places have you lived?: 1  In the last 12 months, was there a time when you did not have a steady place to sleep or slept in a shelter (including now)?: No  Homeless/housing insecurity resource given?: N/A  Living Arrangements: Lives w/ Spouse/significant other  Is patient a ?: No    Activities of Daily Living Prior to Admission  Functional Status: Assistance  Completes ADLs independently?: No  Level of ADL dependence: Assistance  Ambulates independently?: Yes  Does patient use assisted devices?: Yes  Assisted Devices (DME) used: Ann Marie Hollingsworth  Does patient currently own DME?: Yes  What DME does the patient currently own?: Chonga Dotter  Does patient have a history of Outpatient Therapy (PT/OT)?: Yes  Does the patient have a history of Short-Term Rehab?: Yes  Does patient have a history of HHC?: Yes  Does patient currently have [facility]?: No    Patient Information Continued  Income Source: Pension/jail  Does patient have prescription coverage?: Yes  Within the past 12 months, you worried that your food would run out before you got the money to buy more : Never true  Within the past 12 months, the food you bought just didn't last and you didn't have money to get more : Never true  Food insecurity resource given?: N/A  Does patient receive dialysis treatments?: No  Does patient have a history of substance abuse?: No  Does patient have a history of Mental Health Diagnosis?: No    Means of Transportation  Means of Transport to Appts[de-identified] Family transport  In the past 12 months, has lack of transportation kept you from medical appointments or from getting medications?: No  In the past 12 months, has lack of transportation kept you from meetings, work, or from getting things needed for daily living?: No    DISCHARGE DETAILS:    Discharge planning discussed with[de-identified] Nikki Crespo liaison  [place] of Choice: Yes     CM contacted family/caregiver?: Yes  Were Treatment Team discharge recommendations reviewed with patient/caregiver?: Yes  Did patient/caregiver verbalize understanding of patient care needs?: N/A- going to facility  Were patient/caregiver advised of the risks associated with not following Treatment Team discharge recommendations?: Yes    Contacts  Patient Contacts: Kamini Carter (Daughter) [phone]  Relationship to Patient[de-identified] Family  Contact Method: Phone  Phone Number: [phone]  Reason/Outcome: Continuity of Care, Emergency Contact, Discharge [address]         Is the patient interested in Korina Conrad at discharge?: No    DME Referral Provided  Referral made for DME?: No    Other Referral/Resources/Interventions Provided:  Interventions: Assisted Living    Treatment Team Recommendation: Assisted Living  Discharge Destination Plan[de-identified] Assisted Living      Cm spoke to [facility] of [facility]  Pt resides there in their Dementia Unit  Pt is an assist x1 for ADLs but its mostly verbal cues  Pt ambulates with a walker  Pt's had 2-3 falls  CM informed [facility] that pt could possibly return there today once therapy evaluates the pt  She would like notification about pt's therapy evaluation when available  CM reviewed d/c planning process including the following: identifying help at home, patient preference for d/c planning needs, Discharge Lounge, [facility] Meds to Bed program, availability of treatment team to discuss questions or concerns patient and/or family may have regarding understanding medications and recognizing signs and symptoms once discharged  CM also encouraged patient to follow up with all recommended appointments after discharge  Patient advised of importance for patient and family to participate in managing patient’s medical well being

## 2023-03-02 NOTE — ASSESSMENT & PLAN NOTE
Patient reportedly fell out of bed at his group home  No headstrike or LOC, only complaint of L leg pain  Given patient is an unreliable historian, will obtain CTH to rule out acute findings  CTH: No acute intracranial process or significant interval change  No skull fracture    Will hold DVT ppx, Plavix until r/o hemorrhage on [facility]

## 2023-03-02 NOTE — H&P
[facility]  H&P- Chris Lowry 1/16/1933, 80 y o  male MRN: [id number]  Unit/Bed#: ED 09 Encounter: [id number]  Primary Care Provider: MICHELE Guy   Date and time admitted to hospital: 3/2/2023  2:40 AM    Multiple fractures of pelvis [facility])  Assessment & Plan  3/2 CT: L inferior pubic ramus fx, L posterior acetabular fx  Orthopaedics consult, appreciate recs  Pain control  PT/OT    Fall  Assessment & Plan  Patient reportedly fell out of bed at his group home  No headstrike or LOC, only complaint of L leg pain  Given patient is an unreliable historian, will obtain CTH to rule out acute findings  Will hold DVT ppx, Plavix until r/o hemorrhage on [facility]      Trauma Alert: Evaluation; trauma team arrived at 5:30    Model of Arrival: Ambulance    Trauma Team: Attending Fanny Paez, Residents Gemma Jha and Fellow [employer]  Consultants:     Orthopedics: routine consult; Epic consult order placed; History of Present Illness     Chief Complaint: L leg pain  Mechanism:Fall     HPI:    Chris Lowry is a 80 y o  male who presents after a fall out of bed at his group home with L leg pain  At baseline, patient uses a walker and resides at a locked dementia unit  Patient fell out of bed earlier this evening, -headstrike or LOC  Takes plavix  PMH significant for CAD, HLD, prostate cancer, stroke  Patient denies headache, neck pain, abdominal pain, chest pain  Review of Systems   Constitutional: Negative  HENT: Negative  Respiratory: Negative  Cardiovascular: Negative  Gastrointestinal: Negative  Genitourinary: Negative  Musculoskeletal:        L leg pain   Skin: Negative  Neurological: Negative  Psychiatric/Behavioral: Negative  12-point, complete review of systems was reviewed and negative except as stated above       Historical Information     Past Medical History:   Diagnosis Date   • Anxiety    • Arthritis    • Coronary artery disease    • Coronary artery disease involving native coronary artery of native heart without angina pectoris    • Depression    • Fracture    • Hypercholesterolemia    • Meningioma ([facility] [address] ) 1999    brain tumor   • Meningioma (H[facility])    • Narcolepsy     daytime drowsiness and dozing off occasionally   • Orthostatic hypotension    • Palpitations    • Prostate CA ([facility] [facility] )    • [facility] )    • Stroke [facility])      Past Surgical History:   Procedure Laterality Date   • BACK SURGERY     • BRAIN SURGERY  1999   • BRAIN SURGERY     • CARDIAC SURGERY     • CORONARY ARTERY BYPASS GRAFT      x5   • CORONARY ARTERY BYPASS GRAFT          Social History     Tobacco Use   • Smoking status: Former     Types: Cigarettes     Quit date: 1995     Years since quittin    • Smokeless tobacco: Never   • Tobacco comments:     former cig smoker- quit in [facility] Use   • Vaping Use: Never used   Substance Use Topics   • Alcohol use: Not Currently     Comment: (history)   • Drug use: No     Immunization History   Administered Date(s) Administered   • COVID-19 MODERNA VACC 0 5 ML IM 2021, 2021   • Influenza Split High Dose Preservative Free IM 11/10/2014, 2016, 2016, 2017   • Influenza, high dose seasonal 0 7 mL 2018, 10/29/2019, 2020, 10/27/2021   • Influenza, seasonal, injectable 10/01/2007, 2009, 2009, 11/10/2010, 2012   • Pneumococcal Conjugate 13-Valent 2016   • Pneumococcal Polysaccharide PPV23 10/01/2007   • Tdap 2018, 2020     Last Tetanus: Unknown  Family History: Non-contributory    1  Before the illness or injury that brought you to the Emergency, did you need someone to help you on a regular basis? 1=Yes   2  Since the illness or injury that brought you to the Emergency, have you needed more help than usual to take care of yourself? 1=Yes   3   Have you been hospitalized for one or more nights during the past 6 months (excluding a stay in the Emergency Department)? 0=No   4  In general, do you see well? 1=No   5  In general, do you have serious problems with your memory? 1=Yes   6  Do you take more than three different medications everyday?  1=Yes   TOTAL   5     Did you order a geriatric consult if the score was 2 or greater?: yes     Meds/Allergies   all current active meds have been reviewed and current meds:   Current Facility-Administered Medications   Medication Dose Route Frequency   • docusate sodium (COLACE) capsule 100 mg  100 mg Oral BID   • melatonin tablet 3 mg  3 mg Oral HS   • memantine (NAMENDA) tablet 5 mg  5 mg Oral HS   • metoprolol tartrate (LOPRESSOR) partial tablet 12 5 mg  12 5 mg Oral Q12H ELAINE   • midodrine (PROAMATINE) tablet 5 mg  5 mg Oral Daily   • ondansetron (ZOFRAN) injection 4 mg  4 mg Intravenous Q6H PRN   • pantoprazole (PROTONIX) EC tablet 20 mg  20 mg Oral Early Morning   • polyethylene glycol (MIRALAX) packet 17 g  17 g Oral Daily   • psyllium (METAMUCIL) 1 packet  1 packet Oral Daily   • senna (SENOKOT) tablet 17 2 mg  2 tablet Oral Daily   • sertraline (ZOLOFT) tablet 50 mg  50 mg Oral Daily        Allergies   Allergen Reactions   • Aricept [Donepezil] Confusion     confusion   • Atorvastatin Myalgia, Dizziness and Headache   • Niacin Other (See Comments)     unknown       Objective   Initial Vitals:   Temperature: 98 1 °F (36 7 °C) (03/02/23 0245)  Pulse: 78 (03/02/23 0244)  Respirations: 18 (03/02/23 0244)  Blood Pressure: 165/74 (03/02/23 0244)    Primary Survey:   Airway:        Status: patent;        Pre-hospital Interventions: none        Hospital Interventions: none  Breathing:        Pre-hospital Interventions: none       Effort: normal       Right breath sounds: normal       Left breath sounds: normal  Circulation:        Rhythm: regular       Rate: regular   Right Pulses Left Pulses    R radial: 2+                    Disability:        GCS: Eye: 4; Verbal: 5 Motor: 6 Total: 15       Right Pupil:       Left Pupil:     R Motor Strength L Motor Strength             Sensory:  No sensory deficit  Exposure:       Completed: Yes      Secondary Survey:  Physical Exam  HENT:      Head: Normocephalic  Eyes:      Pupils: Pupils are equal, round, and reactive to light  Cardiovascular:      Rate and Rhythm: Normal rate and regular rhythm  Pulses: Normal pulses  Heart sounds: Normal heart sounds  Pulmonary:      Effort: Pulmonary effort is normal    Abdominal:      General: Abdomen is flat  Palpations: Abdomen is soft  Musculoskeletal:      Cervical back: Normal range of motion  No tenderness  Comments: LLE: TTP over anterior pelvis, sensation intact, able to plantarflex/dorsiflex, +EHL/FHL   Skin:     General: Skin is warm and dry  Neurological:      General: No focal deficit present  Mental Status: He is alert and oriented to person, place, and time  Psychiatric:         Mood and Affect: Mood normal          Behavior: Behavior normal          Invasive Devices     None               Lab Results: BMP/CMP: No results found for: SODIUM, K, CL, CO2, ANIONGAP, BUN, CREATININE, GLUCOSE, CALCIUM, AST, ALT, ALKPHOS, PROT, BILITOT, EGFR and CBC: No results found for: WBC, HGB, HCT, MCV, PLT, ADJUSTEDWBC, MCH, MCHC, RDW, MPV, NRBC    Imaging Results: I have personally reviewed pertinent reports  Chest Xray(s): negative for acute findings   FAST exam(s): negative for acute findings   CT Scan(s): positive for acute findings: L inferior pubic ramus fx and L posterior acetabular fx   Additional Xray(s): N/A     Other Studies: n/a    Code Status: Level 1 - Full Code  Advance Directive and Living Will:      Power of :    POLST:    I have spent 30 minutes with Patient  today in which greater than 50% of this time was spent in counseling/coordination of care regarding Diagnostic results and Prognosis

## 2023-03-02 NOTE — DISCHARGE INSTR - AVS FIRST PAGE
Discharge Instructions - Orthopedics  Leana Chang 80 y o  male MRN: [id number]  Unit/Bed#: Cat Scan    Weight Bearing Status:                                           Weight bearing as tolerated both legs  DVT prophylaxis  Lovenox 40 mg daily for 28 days post injury  Pain:  Continue analgesics as directed  Dressing Instructions:   None    Appt Instructions: If you do not have your appointment, please call the clinic at [phone] to follow up in 4 weeks  Otherwise followup as scheduled     Contact the office sooner if you experience any increased numbness/tingling in the extremities  Miscellaneous:  None    Trauma Discharge Instructions:    Please follow-up as instructed  If you need a follow-up appointment, please call the office when you leave to schedule an appointment  Activity:  - PT and OT evaluation and treatment as indicated  - You may resume activity as tolerated  - Walking and normal light activities are encouraged  - Normal daily activities including climbing steps are okay  - No driving until no longer using pain medications  Return to work:    - You may return to work once cleared by the [employer]  Diet:    - You may resume your normal diet  Medications:  - You should continue your current medication regimen after discharge unless otherwise instructed  Please refer to your discharge medication list for further details  - Please take the pain medications as directed  - You are encouraged to use non-narcotic pain medications first and whenever possible  Reserve the use of narcotic pain medication for moderate to severe pain not controlled by non-narcotic medications   - No driving while taking narcotic pain medications  - You may become constipated, especially if taking pain medications  You may take any over the counter stool softeners or laxatives as needed  Examples: Milk of Magnesia, Colace, Senna      Additional Instructions:  - May shower daily   - If you have any questions or concerns after discharge please call the office   - Call office or return to ER if fever greater than 101, chills, persistent nausea/vomiting, worsening/uncontrollable pain, develop productive cough, increasing shortness of breath, difficulty breathing, and/or increasing redness or purulent/foul smelling drainage from incision(s)

## 2023-03-02 NOTE — PHYSICAL THERAPY NOTE
PHYSICAL THERAPY EVALUATION  NAME:  Abner Raw  DATE: 03/02/23    AGE:   80 y o  Mrn:   [id number]  ADMIT DX:  No admission diagnoses are documented for this encounter  Past Medical History:   Diagnosis Date    Anxiety     Arthritis     Coronary artery disease     Coronary artery disease involving native coronary artery of native heart without angina pectoris     Depression     Fracture     Hypercholesterolemia     M[facility] ([facility] ) 11/1999    brain tumor    Meningioma (HCC)     Narcolepsy     daytime drowsiness and dozing off occasionally    Orthostatic hypotension     Palpitations     Prostate CA ([facility] )     Prostate cancer ([place] [address] )     Stroke [facility])        Past Surgical History:   Procedure Laterality Date    BACK SURGERY      BRAIN SURGERY  11/08/1999    BRAIN SURGERY      CARDIAC SURGERY      CORONARY ARTERY BYPASS GRAFT      x5    CORONARY ARTERY BYPASS GRAFT         Length Of Stay: 0    PHYSICAL THERAPY EVALUATION:        03/02/23 1055   Note Type   Note type Evaluation   Pain Assessment   Pain Assessment Tool 0-10   Pain Score 5   Pain Location/Orientation Orientation: Left; Location: Leg   Pain Onset/Description Onset: Ongoing;Frequency: Constant/Continuous; Descriptor: Aching   Effect of Pain on Daily Activities increased pain with activity   Patient's Stated Pain Goal No pain   Hospital Pain Intervention(s) Ambulation/increased activity;Repositioned   Restrictions/Precautions   Weight Bearing Precautions Per Order Yes   RLE Weight Bearing Per Order WBAT   LLE Weight Bearing Per Order WBAT   Other Precautions Cognitive; Chair Alarm; Bed Alarm;Multiple lines; Fall Risk;Pain   Home Living   Type of Home SNF  ([address] [facility])   Home Layout One level   Home Equipment Leim Amel; Other (Comment)  (rollator)   Additional Comments Pt reports living at [facility] where pt has assist as needed from staff   Prior Function   Level of [place] Independent with functional mobility   Lives With Facility staff Receives Help From Family;Personal care attendant   Falls in the last 6 months 1 to 4   Comments Pt reports the use of a rollator for ambulation PTA  Pt questionable historian however pts spouse present and able to confirm   General   Family/Caregiver Present Yes  (spouse)   Cognition   Overall Cognitive Status Impaired   Arousal/Participation Alert   Orientation Level Oriented to person;Oriented to place; Disoriented to time;Oriented to situation   Memory Decreased recall of precautions;Decreased recall of recent events;Decreased short term memory   Following Commands Follows one step commands with increased time or repetition   RUE Assessment   RUE Assessment WFL   LUE Assessment   LUE Assessment WFL   RLE Assessment   RLE Assessment X   Strength RLE   RLE Overall Strength 3-/5  (limited by pain)   LLE Assessment   LLE Assessment X   Strength LLE   LLE Overall Strength 3-/5  (limited by pain)   Bed Mobility   Supine to Sit 3  Moderate assistance   Additional items Assist x 2; Increased time required;Verbal cues   Sit to Supine 3  Moderate assistance   Additional items Assist x 2; Increased time required;Verbal cues   Transfers   Sit to Stand 3  Moderate assistance   Additional items Assist x 1; Increased time required;Verbal cues   Stand to Sit 3  Moderate assistance   Additional items Assist x 1; Increased time required;Verbal cues   Additional Comments cues needed for hand placement during transfers   Ambulation/Elevation   Gait pattern Excessively slow; Short stride; Foward flexed; Inconsistent esther   Gait Assistance 3  Moderate assist   Additional items Assist x 1   Assistive Device Rolling walker   Distance 4ft forwad and back  (limited by pain)   Balance   Static Sitting Fair -   Static Standing Poor +   Ambulatory Poor   Endurance Deficit   Endurance Deficit Yes   Endurance Deficit Description fatigue, pain   Activity Tolerance   Activity Tolerance Patient limited by fatigue;Patient limited by pain   Medical Staff Made Aware Deisy Marcano OT; OT present for co evaluation due to pts current medical presentation   Nurse Made Aware Pt appropriate to be seen and mobilize per nsg   Assessment   Prognosis Good   Problem List Decreased strength;Decreased range of motion;Decreased endurance; Impaired balance;Decreased mobility;Pain;Decreased cognition;Decreased safety awareness; Impaired judgement   Assessment Pt is 80 y o  male seen for PT evaluation s/p admit to [facility] on 3/2/2023  Two pt identifiers were used to confirm  Pt presented s/p fall out of bed at facility  Pt was admitted with a primary dx of: Left inferior pubic ramus fracture, L nondisplaced posterior wall acetabular fracture  Per ortho pt is WBAT to b/l LE and non op management for fxs  PT now consulted for assessment of mobility and d/c needs  Pt with Up in chair orders  Pts current co morbidities affecting treatment include:  has a past medical history of Anxiety, Arthritis, Coronary artery disease, Coronary artery disease involving native coronary artery of native heart without angina pectoris, Depression, Fracture, Hypercholesterolemia, M[facility]ioma ([facility] ), Meningioma ([facility] ), Narcolepsy, Orthostatic hypotension, Palpitations, Prostate CA ([facility] ), Prostate cancer ([facility] ), and Stroke ([facility] )    Pts current clinical presentation is Unstable/ Unpredictable (high complexity) due to Ongoing medical management for primary dx, Increased reliance on more restrictive AD compared to baseline, Decreased activity tolerance compared to baseline, Fall risk, Increased assistance needed from caregiver at current time, Cog status, Continuous pulse oximetry monitoring     Upon evaluation, pt currently is requiring Mod Ax2 for bed mobility; Mod Ax1 for transfers and Mod Ax1 for ambulation w/ RW   Pt presents at PT eval functioning below baseline and currently w/ overall mobility deficits 2* to: BLE weakness, decreased ROM, impaired balance, decreased endurance, gait deviations, pain, decreased activity tolerance compared to baseline, decreased safety awareness, impaired judgement, fall risk, decreased cognition  Pt currently at a fall risk 2* to impairments listed above  Based on the aforementioned PT evaluation, pt will continue to benefit from skilled Acute PT interventions to address stated impairments; to maximize functional mobility; for ongoing pt/ family training; and DME needs  At conclusion of PT session pt returned BTB with phone and call bell within reach  Pt denies any further questions at this time  PT is currently recommending Return to facility with appropriate assist/support  and continued PT services  PT will continue to follow during hospital stay  Goals   Patient Goals " to get better"   STG Expiration Date 03/12/23   Short Term Goal #1 In 10 days pt will complete: 1) Bed mobility skills with S to increase safety and independence as well as decrease caregiver burden  2) Functional transfers with S to promote increased independence, safety, and QOL  3) Ambulate 150' using least restrictive AD with S without LOB and stable vitals so that pt can negotiate previous living environment safely and promote independence with functional mobility and return to PLOF  4) Improve balance grades by 1/2 grade to increase safety with all mobility and decrease fall risk  5) Improve BLE strength by 1/2 grade to help increase overall functional mobility and decrease fall risk  Plan   Treatment/Interventions Functional transfer training;LE strengthening/ROM; Therapeutic exercise; Endurance training;Patient/family training;Equipment eval/education; Bed mobility;Gait training;Spoke to nursing;OT   PT Frequency 3-5x/wk   Recommendation   PT Discharge Recommendation Return to facility with rehabilitation services   Equipment Recommended Shabnam Hassan  (RW)   Al[address] 74 walker   AM-PAC Basic Mobility Inpatient   Turning in Flat Bed Without Bedrails 2   Lying on Back to Sitting on Edge of Flat Bed Without Bedrails 1   Moving Bed to Chair 2   Standing Up From Chair Using Arms 2   Walk in Room 2   Climb 3-5 Stairs With Railing 2   Basic Mobility Inpatient Raw Score 11   Basic Mobility Standardized Score 30 25   Highest Level Of Mobility   -[facility] Goal 4: Move to chair/commode   -HL Achieved 5: Stand (1 or more minutes)   Modified Frederick Scale   Modified Frederick Scale 4   Barthel Index   Feeding 10   Bathing 0   Grooming Score 0   Dressing Score 5   Bladder Score 0   Bowels Score 10   Toilet Use Score 5   Transfers (Bed/Chair) Score 5   Mobility (Level Surface) Score 0   Stairs Score 0   Barthel Index Score 35   Portions of the documentation may have been created using voice recognition software  Occasional wrong word or sound alike substitutions may have occurred due to the inherent limitations of the voice recognition software  Read the chart carefully and recognize, using context, where substitutions have occurred      Magalys Ruffin, PT, DPT

## 2023-03-03 NOTE — UTILIZATION REVIEW
NOTIFICATION OF INPATIENT ADMISSION   AUTHORIZATION REQUEST   SERVICING FACILITY:   [facility]  Address: [address] [place] [address], [address] [place] [address]  Tax ID: [id number]  NPI: [id number] ATTENDING PROVIDER:  Attending Name and NPI#: Rose Melvin Md [[id number]]  Address: [address] [place] [address]  Phone: [phone]   ADMISSION INFORMATION:  Place of Service: [place] [address]  Place of Service Code: 21  Inpatient Admission Date/Time: 3/2/23  6:02 AM  Discharge Date/Time: 3/2/2023  3:03 PM  Admitting Diagnosis Code/Description:  No admission diagnoses are documented for this encounter  UTILIZATION REVIEW CONTACT:  Stephon Ramos, Utilization   Network Utilization Review Department  Phone: [phone]  Fax: [phone]  Email: [email or web address]  Contact for approvals/pending authorizations, clinical reviews, and discharge  PHYSICIAN ADVISORY SERVICES:  Medical Necessity Denial & Tlkn-cp-Qyfl Review  Phone: [phone]  Fax: [phone]  Email: [email or web address]

## 2023-03-03 NOTE — UTILIZATION REVIEW
Initial Clinical Review    Admission: Date/Time/Statement:   Admission Orders (From admission, onward)     Ordered        03/02/23 0602  Inpatient Admission  Once                      Orders Placed This Encounter   Procedures   • Inpatient Admission     Standing Status:   Standing     Number of Occurrences:   1     Order Specific Question:   Level of Care     Answer:   Med Surg [16]     Order Specific Question:   Bed Type     Answer:   Trauma [7]     Order Specific Question:   Estimated length of stay     Answer:   More than 2 Midnights     Order Specific Question:   Certification     Answer:   I certify that inpatient services are medically necessary for this patient for a duration of greater than two midnights  See H&P and MD Progress Notes for additional information about the patient's course of treatment  ED Arrival Information     Expected   -    Arrival   3/2/2023 02:40    Acuity   Urgent            Means of arrival   Ambulance    Escorted by   [facility]    Service   Emergency Medicine    Admission type   Emergency            Arrival complaint   Fall           Chief Complaint   Patient presents with   • Fall     Pt from locked dementia unit,fell out of bed, (+) thinners, pt denies head strike, (-) loc, pt only c/o left leg pain       Initial Presentation: 80 y o  male presents to ed via ems from assisted living dementia unit for evaluation and treatment of left leg pain from a fall out of bed  PMHX :CABG on Plavix, CAD, MENINGIOMA, PROSTATE CA, Stroke  Clinical assessment significant for left hip pain  Imaging shows L acetabulum and L pubic rami fractures  Admit to inpatient med surg       Consult orthopedics  Plan wt bear as tolerated, analgesia, PT/OT evaluations   Geriatric consult : pain control with scheduled tylenol, prn oxycodone , lidoderm patch      Note: family opted for return to assisted living facility rather than inpatient admission     ED Triage Vitals   03/02/23 0245 03/02/23 0244 03/02/23 0244 03/02/23 0244 03/02/23 0244   98 1 °F (36 7 °C) 78 18 165/74 98 %      Oral Monitor         Pain Score       5          10/19/22 65 3 kg (143 lb 15 4 oz)     Additional Vital Signs:     Date/Time Temp Pulse Resp BP MAP (mmHg) SpO2 O2 Device   03/02/23 0515 -- 87 18 171/87   Abnormal  119 94 % None (Room air)   03/02/23 0300 -- 78 18 155/66 95 97 % None (Room air)   03/02/23 0245 98 1 °F (36 7 °C) -- -- -- -- -- --   03/02/23 0244 -- 78 18 165/74 106 98 % None (Room air)           Pertinent Labs/Diagnostic Test Results:       CT head wo contrast   Final  (03/02 0547)      No acute intracranial process or significant interval change  No skull fracture  Chronic microangiopathy  CT lower extremity wo contrast left   Final R (03/02 0441)      1  Acetabular fractures extending into the posterior wall   2  Inferior pubic ramus fractures            XR hip/pelv 2-3 vws left if performed      Final  (03/02 1422)      The known left acetabular fractures are not well seen radiographically  Avulsion fracture at the left inferior pubic ramus  XR knee 4+ vw left injury      Final  (03/02 1420)      No acute osseous abnormality              Results from last 7 days   Lab Units 03/02/23  1018   WBC Thousand/uL 9 86   HEMOGLOBIN g/dL 11 7*   HEMATOCRIT % 35 7*   PLATELETS Thousands/uL 207   NEUTROS ABS Thousands/µL 7 47         Results from last 7 days   Lab Units 03/02/23  1018   SODIUM mmol/L 136   POTASSIUM mmol/L 3 8   CHLORIDE mmol/L 109*   CO2 mmol/L 24   ANION GAP mmol/L 3*   BUN mg/dL 10   CREATININE mg/dL 0 87   EGFR ml/min/1 73sq m 75   CALCIUM mg/dL 10 3*             Results from last 7 days   Lab Units 03/02/23  1018   GLUCOSE RANDOM mg/dL 149*       ED Treatment:   Medication Administration from 03/02/2023 0240 to 03/03/2023 0842 none     Past Medical History:   Diagnosis Date   • Anxiety    • Arthritis    • Coronary artery disease    • Coronary artery disease involving native coronary artery of native heart without angina pectoris    • Depression    • Fracture    • Hypercholesterolemia    • Meningioma ([facility] ) 11/1999    brain tumor   • Meningioma (HCC)    • Narcolepsy     daytime drowsiness and dozing off occasionally   • Orthostatic hypotension    • Palpitations    • Prostate CA [facility])    • Prostate cancer ([facility] )    • Stroke [facility])      Present on Admission:  • Fall      Admitting Diagnosis: No admission diagnoses are documented for this encounter  Age/Sex: 80 y o  male    IP CONSULT TO CASE MANAGEMENT  IP CONSULT TO GERONTOLOGY  IP CONSULT TO ORTHOPEDIC SURGERY    Network Utilization Review Department  ATTENTION: Please call with any questions or concerns to [phone] and carefully listen to the prompts so that you are directed to the right person  All voicemails are confidential   Olvin Arteaga all requests for admission clinical reviews, approved or denied determinations and any other requests to dedicated fax number below belonging to the campus where the patient is receiving treatment   List of dedicated fax numbers for the Facilities:  [address] DENIALS (Administrative/Medical Necessity) [phone]   [address] (Maternity/NICU/Pediatrics) [phone]   [address] [phone]   [facility] 77 [phone]   [address] [place] [address] [facility] [place] [place] [address] [place] [address] Ruben Frost 28 [phone]   [address] [place] [facility] [place] [address] [phone]

## 2023-03-06 NOTE — UTILIZATION REVIEW
NOTIFICATION OF ADMISSION DISCHARGE   This is a Notification of Discharge from [facility]  Please be advised that this patient has been discharge from our facility  Below you will find the admission and discharge date and time including the patient’s disposition  UTILIZATION REVIEW CONTACT:  Kayli Kiser  Utilization   Network Utilization Review Department  Phone: [phone] x carefully listen to the prompts  All voicemails are confidential   Email: [email or web address]     ADMISSION INFORMATION  PRESENTATION DATE: 3/2/2023  2:40 AM  OBERVATION ADMISSION DATE:   INPATIENT ADMISSION DATE: 3/2/23  6:02 AM   DISCHARGE DATE: 3/2/2023  3:03 PM   DISPOSITION:Home/Self Care    IMPORTANT INFORMATION:  Send all requests for admission clinical reviews, approved or denied determinations and any other requests to dedicated fax number below belonging to the campus where the patient is receiving treatment   List of dedicated fax numbers:  [address] DENIALS (Administrative/Medical Necessity) [phone]   [address] (Maternity/NICU/Pediatrics) [phone]   [facility] [phone]   [facility] [phone]   Raya Gaiola 134 [phone]   220 [facility] [phone]   90 [facility] [phone]   [address] [place], [place] [address] [phone]   [facility]  [phone]   [facility] [phone]   [facility] [address] [facility] [phone]

## 2023-03-13 ENCOUNTER — HOSPITAL ENCOUNTER (INPATIENT)
Facility: HOSPITAL | Age: 88
LOS: 3 days | Discharge: NURSING, REHAB OR LONG-TERM CARE | End: 2023-03-16
Attending: STUDENT IN AN ORGANIZED HEALTH CARE EDUCATION/TRAINING PROGRAM | Admitting: INTERNAL MEDICINE

## 2023-03-13 ENCOUNTER — REMOTE DEVICE CLINIC VISIT (OUTPATIENT)
Dept: CARDIOLOGY CLINIC | Facility: CLINIC | Age: 88
End: 2023-03-13

## 2023-03-13 ENCOUNTER — APPOINTMENT (EMERGENCY)
Dept: RADIOLOGY | Facility: HOSPITAL | Age: 88
End: 2023-03-13

## 2023-03-13 DIAGNOSIS — F03.90 DEMENTIA, UNSPECIFIED DEMENTIA SEVERITY, UNSPECIFIED DEMENTIA TYPE, UNSPECIFIED WHETHER BEHAVIORAL, PSYCHOTIC, OR MOOD DISTURBANCE OR ANXIETY (HCC): ICD-10-CM

## 2023-03-13 DIAGNOSIS — Z95.818 PRESENCE OF OTHER CARDIAC IMPLANTS AND GRAFTS: ICD-10-CM

## 2023-03-13 DIAGNOSIS — I10 PRIMARY HYPERTENSION: ICD-10-CM
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
## 2023-03-13 DIAGNOSIS — R26.2 AMBULATORY DYSFUNCTION: ICD-10-CM

## 2023-03-13 DIAGNOSIS — R13.10 DYSPHAGIA, UNSPECIFIED TYPE: ICD-10-CM

## 2023-03-13 DIAGNOSIS — R77.8 ELEVATED TROPONIN: ICD-10-CM

## 2023-03-13 PROBLEM — Z86.73 HISTORY OF CVA (CEREBROVASCULAR ACCIDENT): Status: ACTIVE | Noted: 2023-03-13

## 2023-03-13 PROBLEM — R74.01 ELEVATED AST (SGOT): Status: ACTIVE | Noted: 2023-03-13

## 2023-03-13 PROBLEM — E44.0 MODERATE PROTEIN-CALORIE MALNUTRITION (HCC): Status: ACTIVE | Noted: 2023-03-13

## 2023-03-13 LAB
2HR DELTA HS TROPONIN: -45 NG/L
4HR DELTA HS TROPONIN: -63 NG/L
ALBUMIN SERPL BCP-MCNC: 3.7 G/DL (ref 3.5–5)
ALP SERPL-CCNC: 69 U/L (ref 46–116)
ALT SERPL W P-5'-P-CCNC: 45 U/L (ref 12–78)
ANION GAP SERPL CALCULATED.3IONS-SCNC: 2 MMOL/L (ref 4–13)
AST SERPL W P-5'-P-CCNC: 63 U/L (ref 5–45)
ATRIAL RATE: 147 BPM
BASOPHILS # BLD AUTO: 0.02 THOUSANDS/ÂΜL (ref 0–0.1)
BASOPHILS NFR BLD AUTO: 0 % (ref 0–1)
BILIRUB SERPL-MCNC: 0.73 MG/DL (ref 0.2–1)
BILIRUB UR QL STRIP: NEGATIVE
BUN SERPL-MCNC: 8 MG/DL (ref 5–25)
CALCIUM SERPL-MCNC: 10.9 MG/DL (ref 8.3–10.1)
CARDIAC TROPONIN I PNL SERPL HS: 277 NG/L
CARDIAC TROPONIN I PNL SERPL HS: 295 NG/L
CARDIAC TROPONIN I PNL SERPL HS: 340 NG/L
CHLORIDE SERPL-SCNC: 109 MMOL/L (ref 96–108)
CK SERPL-CCNC: 143 U/L (ref 39–308)
CLARITY UR: CLEAR
CO2 SERPL-SCNC: 28 MMOL/L (ref 21–32)
COLOR UR: YELLOW
CREAT SERPL-MCNC: 0.87 MG/DL (ref 0.6–1.3)
EOSINOPHIL # BLD AUTO: 0.11 THOUSAND/ÂΜL (ref 0–0.61)
EOSINOPHIL NFR BLD AUTO: 2 % (ref 0–6)
ERYTHROCYTE [DISTWIDTH] IN BLOOD BY AUTOMATED COUNT: 13.9 % (ref 11.6–15.1)
GFR SERPL CREATININE-BSD FRML MDRD: 75 ML/MIN/1.73SQ M
GLUCOSE SERPL-MCNC: 126 MG/DL (ref 65–140)
GLUCOSE UR STRIP-MCNC: NEGATIVE MG/DL
HCT VFR BLD AUTO: 38.1 % (ref 36.5–49.3)
HGB BLD-MCNC: 12.4 G/DL (ref 12–17)
HGB UR QL STRIP.AUTO: NEGATIVE
IMM GRANULOCYTES # BLD AUTO: 0.02 THOUSAND/UL (ref 0–0.2)
IMM GRANULOCYTES NFR BLD AUTO: 0 % (ref 0–2)
KETONES UR STRIP-MCNC: NEGATIVE MG/DL
LEUKOCYTE ESTERASE UR QL STRIP: NEGATIVE
LYMPHOCYTES # BLD AUTO: 2.11 THOUSANDS/ÂΜL (ref 0.6–4.47)
LYMPHOCYTES NFR BLD AUTO: 38 % (ref 14–44)
MCH RBC QN AUTO: 27.7 PG (ref 26.8–34.3)
MCHC RBC AUTO-ENTMCNC: 32.5 G/DL (ref 31.4–37.4)
MCV RBC AUTO: 85 FL (ref 82–98)
MONOCYTES # BLD AUTO: 0.59 THOUSAND/ÂΜL (ref 0.17–1.22)
MONOCYTES NFR BLD AUTO: 11 % (ref 4–12)
NEUTROPHILS # BLD AUTO: 2.72 THOUSANDS/ÂΜL (ref 1.85–7.62)
NEUTS SEG NFR BLD AUTO: 49 % (ref 43–75)
NITRITE UR QL STRIP: NEGATIVE
NRBC BLD AUTO-RTO: 0 /100 WBCS
PH UR STRIP.AUTO: 7.5 [PH] (ref 4.5–8)
PLATELET # BLD AUTO: 199 THOUSANDS/UL (ref 149–390)
PMV BLD AUTO: 10.4 FL (ref 8.9–12.7)
POTASSIUM SERPL-SCNC: 3.6 MMOL/L (ref 3.5–5.3)
PROT SERPL-MCNC: 6.7 G/DL (ref 6.4–8.4)
PROT UR STRIP-MCNC: NEGATIVE MG/DL
QRS AXIS: -48 DEGREES
QRSD INTERVAL: 106 MS
QT INTERVAL: 398 MS
QTC INTERVAL: 453 MS
RBC # BLD AUTO: 4.47 MILLION/UL (ref 3.88–5.62)
SODIUM SERPL-SCNC: 139 MMOL/L (ref 135–147)
SP GR UR STRIP.AUTO: 1.01 (ref 1–1.03)
T WAVE AXIS: 93 DEGREES
UROBILINOGEN UR QL STRIP.AUTO: 0.2 E.U./DL
VENTRICULAR RATE: 78 BPM
WBC # BLD AUTO: 5.57 THOUSAND/UL (ref 4.31–10.16)

## 2023-03-13 RX ORDER — CLOPIDOGREL BISULFATE 75 MG/1
75 TABLET ORAL DAILY
Status: DISCONTINUED | OUTPATIENT
Start: 2023-03-14 | End: 2023-03-16 | Stop reason: HOSPADM

## 2023-03-13 RX ORDER — ATORVASTATIN CALCIUM 40 MG/1
40 TABLET, FILM COATED ORAL
Status: DISCONTINUED | OUTPATIENT
Start: 2023-03-13 | End: 2023-03-13

## 2023-03-13 RX ORDER — SODIUM CHLORIDE 9 MG/ML
75 INJECTION, SOLUTION INTRAVENOUS CONTINUOUS
Status: DISCONTINUED | OUTPATIENT
Start: 2023-03-13 | End: 2023-03-13

## 2023-03-13 RX ORDER — ACETAMINOPHEN 325 MG/1
650 TABLET ORAL EVERY 8 HOURS PRN
Status: DISCONTINUED | OUTPATIENT
Start: 2023-03-13 | End: 2023-03-16 | Stop reason: HOSPADM

## 2023-03-13 RX ORDER — PANTOPRAZOLE SODIUM 40 MG/1
40 TABLET, DELAYED RELEASE ORAL
Status: DISCONTINUED | OUTPATIENT
Start: 2023-03-13 | End: 2023-03-16 | Stop reason: HOSPADM

## 2023-03-13 RX ORDER — METOPROLOL SUCCINATE 25 MG/1
25 TABLET, EXTENDED RELEASE ORAL DAILY
Status: DISCONTINUED | OUTPATIENT
Start: 2023-03-13 | End: 2023-03-16 | Stop reason: HOSPADM

## 2023-03-13 RX ORDER — LABETALOL HYDROCHLORIDE 5 MG/ML
INJECTION, SOLUTION INTRAVENOUS
Status: COMPLETED
Start: 2023-03-13 | End: 2023-03-13

## 2023-03-13 RX ORDER — LANOLIN ALCOHOL/MO/W.PET/CERES
3 CREAM (GRAM) TOPICAL
Status: DISCONTINUED | OUTPATIENT
Start: 2023-03-13 | End: 2023-03-16 | Stop reason: HOSPADM

## 2023-03-13 RX ORDER — ENOXAPARIN SODIUM 100 MG/ML
40 INJECTION SUBCUTANEOUS DAILY
Status: DISCONTINUED | OUTPATIENT
Start: 2023-03-13 | End: 2023-03-16 | Stop reason: HOSPADM

## 2023-03-13 RX ORDER — MEMANTINE HYDROCHLORIDE 5 MG/1
5 TABLET ORAL
Status: DISCONTINUED | OUTPATIENT
Start: 2023-03-13 | End: 2023-03-16 | Stop reason: HOSPADM

## 2023-03-13 RX ORDER — LABETALOL HYDROCHLORIDE 5 MG/ML
10 INJECTION, SOLUTION INTRAVENOUS ONCE
Status: COMPLETED | OUTPATIENT
Start: 2023-03-13 | End: 2023-03-13

## 2023-03-13 RX ORDER — LABETALOL HYDROCHLORIDE 5 MG/ML
10 INJECTION, SOLUTION INTRAVENOUS EVERY 4 HOURS PRN
Status: DISCONTINUED | OUTPATIENT
Start: 2023-03-13 | End: 2023-03-16 | Stop reason: HOSPADM

## 2023-03-13 RX ADMIN — MEMANTINE HYDROCHLORIDE 5 MG: 5 TABLET, FILM COATED ORAL at 20:46

## 2023-03-13 RX ADMIN — ACETAMINOPHEN 650 MG: 325 TABLET ORAL at 20:47

## 2023-03-13 RX ADMIN — SERTRALINE HYDROCHLORIDE 75 MG: 50 TABLET ORAL at 13:57

## 2023-03-13 RX ADMIN — PANTOPRAZOLE SODIUM 40 MG: 40 TABLET, DELAYED RELEASE ORAL at 13:59

## 2023-03-13 RX ADMIN — LABETALOL HYDROCHLORIDE 10 MG: 5 INJECTION, SOLUTION INTRAVENOUS at 10:01

## 2023-03-13 RX ADMIN — MELATONIN 3 MG: at 20:46

## 2023-03-13 RX ADMIN — ENOXAPARIN SODIUM 40 MG: 40 INJECTION SUBCUTANEOUS at 14:00

## 2023-03-13 RX ADMIN — METOPROLOL SUCCINATE 25 MG: 25 TABLET, EXTENDED RELEASE ORAL at 13:57

## 2023-03-13 RX ADMIN — Medication 1 PACKET: at 13:55

## 2023-03-13 NOTE — H&P
[address] [facility]  H&P- Dayday Haley 1/16/1933, 80 y o  male MRN: [id number]  Unit/Bed#: ED 29 Encounter: [id number]  Primary Care Provider: MICHELE Roy   Date and time admitted to hospital: 3/13/2023  8:05 AM    * [address]  Patient noted to have an unwitnessed fall at a closed dementia unit  As per the patient, no head strike and he did not lose consciousness  Patient does report of having poor oral intake over the course of past few days and he does have history of orthostatic hypotension  He underwent imaging in the ED including CT and x-ray studies which were negative for any acute pathology  Likely related to patients history of dementia and orthostatic hypotension      - Fall precautions  - Imaging studies reviewed  - Pain control as needed  - PT and OT ordered  - On DVT prophylaxis   - Orthostatics ordered  - Echo ordered, last echo in 2019  - Patient has MDT loop recorder, interrogation ordered        Dysphagia  Assessment & Plan  Patient has a history of dysphagia, that has been noted at previous admissions as well  He also is at risk for silent aspiration which is also noted on CT imaging at this admission  He also has history of Schatzki ring that was seen on prior EGD along with possible Candida esophagitis  -Speech evaluation ordered  -Currently on clear liquids diet  -We will advance diet based on speech recommendations      Elevated troponin  Assessment & Plan  Admitted with elevated troponin with initial troponin at 340 with subsequent 2hr troponin at 295  Patient currently chest pain free but did have elevated BP readings up to [id number] systolics on presentation (likely in the setting of pain)  Based on prior echo from duplicate chart, EF appears to be 60% but this is based on echocardiographic findings from 2019   In setting of fall, may consider another echocardiogram     -Echo ordered   -EKG reviewed, noted to have T-wave inversion in V2-V3 which may be secondary to RV conduction delay (patient has history of RBBB)  -Will continue to monitor troponins, currently appear to be downtrending  -Device interrogation ordered to evaluate for any arrhythmia or burden thereof    Hypertension  Assessment & Plan  Patient with history of orthostatic hypotension (previously on midodrine) now presenting in a hypertensive state  Noted to have SBPs 170-180s requiring antihypertensive regimen  Likely in the setting of fall and related pain   -Restarted home dose Toprol XL  -On PRN labetalol 10 mg Q6H PRN for SBP > 160 mmHg  -Will continue to monitor    History of CVA (cerebrovascular accident)  Assessment & Plan  History of CVA following which he was switched from aspirin to plavix  - On plavix and statin    Ambulatory dysfunction  Assessment & Plan  Patient with recent history of pelvic fracture following which he was sent to [address] [place] [address] for physical therapy  Has been mostly wheelchair bound and getting intermittent physical therapy  Also very likely that his dementia maybe complicating this as well  -PT and OT ordered  -Will work with CM towards placement   -Fall precautions    Dementia [facility])  Assessment & Plan  History of dementia  Patient has longstanding history of dementia, he has previously been seen by geriatrics at last admission  Based on chart review, patient likely has dementia in the setting of stroke and it may be of vascular etiology  CT head performed at this admission, imaging results noted-no acute pathology or abnormality at this time     -On memantine 5 mg daily  -On sertraline 75 mg daily  -Sleep hygiene  -Monitor for delirium  -Frequent reorientation        Elevated AST (SGOT)  Assessment & Plan  Noted to have slightly elevated AST at 45 POA     -Will hold statin for now  -Can resume once it normalizes  -Monitor with CMPs    Moderate protein-calorie malnutrition ([facility] [address] )  Assessment & Plan  Malnutrition Findings: Likely in the setting of dementia and poor oral intake  Will work with SLP and nutrition services towards appropriate nutrition for patient  BMI of around 20         BMI Findings: There is no height or weight on file to calculate BMI  VTE Pharmacologic Prophylaxis:   Moderate Risk (Score 3-4) - Pharmacological DVT Prophylaxis Ordered: enoxaparin (Lovenox)  Code Status: Level 1 - Full Code as discussed with patient and his wife  Discussion with family: Updated  (wife) at bedside  Anticipated Length of Stay: Patient will be admitted on an inpatient basis with an anticipated length of stay of greater than 2 midnights secondary to Ongoing evaluation for elevated troponins, fall  Total Time Spent on Date of Encounter in care of patient: 55 minutes This time was spent on one or more of the following: performing physical exam; counseling and coordination of care; obtaining or reviewing history; documenting in the medical record; reviewing/ordering tests, medications or procedures; communicating with other healthcare professionals and discussing with patient's family/caregivers  Chief Complaint: Fall    History of Present Illness:  Conchita Gray is a 80 y o  male with a PMH of dementia, aphasia, hypertension, history of CVA who presents with fall  As per the patient, he is from [facility] where he resides in a locked dementia unit and was undergoing physical therapy for a recent pelvic injury  He has been mostly wheelchair-bound over the course of last few days and was somewhat improving with physical therapy  Earlier this morning, as patient was getting out of bed, patient had a unwitnessed fall  As per the patient, he ended up on his knees without head strike and did not lose consciousness  Patient's wife who is by bedside does report that patient has dementia and is also hard of hearing    She also states that patient has had poor oral intake because of modified diet  In the ED, his BP was 206/91, heart rate at 86, temperature 96 9 F, patient saturating at 98% room air  He underwent trauma work-up including CT and x-ray imaging that was unremarkable for any acute abnormality  CT imaging was indicative of silent aspiration  He also had a EKG performed that demonstrated slight V2-V3 T wave inversions with elevated troponin  Due to these concerning findings as well as for further work-up for fall, patient was admitted to [place] [address]  As per the patient and his wife, he is level 1 full code  Review of Systems:  Review of Systems   Constitutional: Positive for activity change  Negative for chills and fever  Respiratory: Negative for shortness of breath  Cardiovascular: Negative for chest pain  Gastrointestinal: Negative for nausea and vomiting  Skin: Negative for wound  Neurological: Positive for weakness  Negative for dizziness  Psychiatric/Behavioral: Negative for confusion  Past Medical and Surgical History:   No past medical history on file  No past surgical history on file  Meds/Allergies:  Prior to Admission medications    Not on File     I have reviewed home medications with patient personally      Allergies: Not on File    Social History:  Marital Status: /Civil Union   Patient Pre-hospital Living Situation: [facility]  Patient Pre-hospital Level of Mobility: manual wheelchair  Substance Use History:   Social History     Substance and Sexual Activity   Alcohol Use Not on file     Social History     Tobacco Use   Smoking Status Not on file   Smokeless Tobacco Not on file     Social History     Substance and Sexual Activity   Drug Use Not on file       Physical Exam:     Vitals:   Blood Pressure: (!) 186/85 (03/13/23 1015)  Pulse: 72 (03/13/23 1015)  Temperature: 97 9 °F (36 6 °C) (03/13/23 0830)  Temp Source: Oral (03/13/23 0830)  Respirations: 18 (03/13/23 1015)  Weight - Scale: 61 3 kg (135 lb 2 3 oz) (03/13/23 0813)  SpO2: 98 % (03/13/23 1015)    Physical Exam  Vitals reviewed  Constitutional:       Appearance: He is underweight  Comments: Patient's wife by bedside  Patient is hard of hearing bilaterally  HENT:      Head: Normocephalic  Cardiovascular:      Rate and Rhythm: Normal rate and regular rhythm  Pulses: Normal pulses  Heart sounds: Normal heart sounds  Pulmonary:      Effort: Pulmonary effort is normal       Breath sounds: Normal breath sounds  Abdominal:      General: Bowel sounds are normal       Palpations: Abdomen is soft  Skin:     General: Skin is warm and dry  Capillary Refill: Capillary refill takes less than 2 seconds  Neurological:      Mental Status: He is alert and oriented to person, place, and time  Comments: Is able to move all 4 extremities spontaneously  Is able to follow commands and speak in full sentences without any obvious shortness of breath          Additional Data:     Lab Results:  Results from last 7 days   Lab Units 03/13/23  0817   WBC Thousand/uL 5 57   HEMOGLOBIN g/dL 12 4   HEMATOCRIT % 38 1   PLATELETS Thousands/uL 199   NEUTROS PCT % 49   LYMPHS PCT % 38   MONOS PCT % 11   EOS PCT % 2     Results from last 7 days   Lab Units 03/13/23  0817   SODIUM mmol/L 139   POTASSIUM mmol/L 3 6   CHLORIDE mmol/L 109*   CO2 mmol/L 28   BUN mg/dL 8   CREATININE mg/dL 0 87   ANION GAP mmol/L 2*   CALCIUM mg/dL 10 9*   ALBUMIN g/dL 3 7   TOTAL BILIRUBIN mg/dL 0 73   ALK PHOS U/L 69   ALT U/L 45   AST U/L 63*   GLUCOSE RANDOM mg/dL 126                       Lines/Drains:  Invasive Devices     Peripheral Intravenous Line  Duration           Peripheral IV 03/13/23 Left Antecubital <1 day                    Imaging: Reviewed radiology reports from this admission including: chest xray, CT head and xray(s)  TRAUMA - CT head wo contrast   Final Result by Fer Hernandez MD (03/13 8730)      No acute intracranial abnormality in postsurgical brain  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  Small amount of dystrophic calcifications in the anterior corpus callosum and cingulum, may be treatment-related change  Confluent white matter hypodensities in bilateral frontal lobes (left greater than right), may be treatment-related change  I personally discussed this study with Lynette Marc 21 on 3/13/2023 at 8:44 AM                            Workstation performed: [id number]         TRAUMA - CT spine cervical wo contrast   Final Result by Kellie Tinajero MD (03/13 3008)      No cervical spine fracture or traumatic malalignment  Small lucent osseous lesion in C2 spinous process extending into left lamina, indeterminate  Osseous metastasis, myeloma, or focal fatty osseous change in the differential       Secretions in posterior pharyngeal wall, right vallecula, hypopharynx (extending into bilateral piriform sinuses)  Patient is at risk for aspiration  I personally discussed this study with Lynette Marc 21 on 3/13/2023 at 8:44 AM                          Workstation performed: [id number]         XR Trauma multiple (SLB/SLRA trauma bay ONLY)   Final Result by Kellie Tinajero MD (03/13 8698)      No acute cardiopulmonary disease within limitations of supine imaging  No acute osseous abnormality of visualized pelvis      I personally discussed this study with Lynette Marc 21 on 3/13/2023 at 8:44 AM                   Workstation performed: [id number]         XR chest 1 view    (Results Pending)   XR pelvis ap only 1 or 2 vw    (Results Pending)       EKG and Other Studies Reviewed on Admission:   · EKG: NSR  Noted to have T wave inversions in V2-V3 along with a incomplete right bundle branch block    T wave inversions likely indicative of right ventricular conduction delay  ** Please Note: This note has been constructed using a voice recognition system   **

## 2023-03-13 NOTE — PROGRESS NOTES
Results for orders placed or performed in visit on 03/13/23   Cardiac EP device report    Narrative    MDT LOOP  CARELINK TRANSMISSION (ILR): BATTERY STATUS "OK " PRESENTING RHYTHM SHOWS SR, @ ~70 BPM  NO PATIENT ACTIVATED EPISODES  7 DEVICE TACHY EPISODES W/ ECG'S SHOWING ST  MOST RECENT EPISODE DURATION 01:51 MINS @  BPM   19 DEVICE CLASSIFIED AF EPISODES, AVAILABLE STRIPS DEMONSTRATE NO ATRIAL FIBRILLATION  INSTEAD EGM'S SHOW SR W/ PACs  AND PVCs  AF BURDEN IS 0%  SOME STRIPS MAY NOT BE INTERPRETABLE OR AVAILABLE, CANNOT DEFINITIVELY RULE OUT ATRIAL FIBRILLATION  NORMAL DEVICE FUNCTION    ES

## 2023-03-13 NOTE — ASSESSMENT & PLAN NOTE
Noted to have slightly elevated AST at 45 POA     -Will hold statin for now  -Can resume once it normalizes  -Monitor with CMPs

## 2023-03-13 NOTE — ED PROVIDER NOTES
Emergency Department Airway Evaluation and Management Form    History  Obtained from: EMS      Chief Complaint:  Trauma Alert    HPI: Pt is a 80 y o  male presents s/p being found unwitnessed fall at his facility, history of dementia with known pelvic fracture  On Plavix  I have reviewed and agree with the history as documented      Allergies  Allergies: see nurses notes    Physical Exam    Vitals:    03/13/23 0813   BP: (!) 198/88   Pulse: 82   Resp: 18   Temp:    SpO2: 98%     Supplemental Oxygen: None    GCS: 15    Neuro: Alert and oriented x3  Psych: not combative, not anxious, cooperative for exam  Neck: In collar, No JVD, No midline tenderness  Respiratory: Clear to auscultation  Mouth: No signs of trauma  Pharynx: Patent        ED Medications given  See nursing notes    Intubation    No intubation required    Final Diagnosis:  Acute fall, chronic dementia, chronic anticoagulation    ED Provider  Electronically Signed by       Layo Sanz DO  03/13/23 0815

## 2023-03-13 NOTE — QUICK NOTE
Upon my personal review of the medical record of the unmerged chart, tetanus update in 2020    PMH: CVA, meningioma, dementia, spinal stenosis, anemia       Medications include:     acetaminophen (TYLENOL) 325 mg tablet  clopidogrel (PLAVIX) 75 mg tablet  enoxaparin (Lovenox) 40 mg/0 4 mL  lidocaine (LIDODERM) 5 %  Melatonin 5 MG CAPS  Melatonin 5 MG TABS  memantine (NAMENDA) 5 mg tablet  Metamucil Fiber 51 7 % PACK  metoprolol succinate (TOPROL-XL) 25 mg 24 hr tablet  midodrine (PROAMATINE) 5 mg tablet  omeprazole (PriLOSEC) 40 MG capsule  psyllium (METAMUCIL) 58 6 % packet  sertraline (ZOLOFT) 50 mg tablet  simvastatin (ZOCOR) 80 mg tablet  Stool Softener 100 MG capsule

## 2023-03-13 NOTE — SPEECH THERAPY NOTE
Speech Language/Pathology  Speech-Language Pathology Bedside Swallow Evaluation      Patient Name: Agnieszka Spaulding    Today's Date: 3/13/2023     Problem List  Principal Problem:    Fall  Active Problems:    Ambulatory dysfunction    Elevated troponin    History of CVA (cerebrovascular accident)    Dementia (HCC)    Elevated AST (SGOT)    Hypertension    Moderate protein-calorie malnutrition ([address] )    Dysphagia      Past Medical History  No past medical history on file  Past Surgical History  No past surgical history on file  Summary   Pt presented with baseline dysphagia and minimal s/s at the bedside today w/ soft and thin  He does not have his upper partial here but was able to tolerate soft solids /wo difficulty  Risk/s for Aspiration: h/o dysphagia, reduced mobility    Recommended Diet: mechanically altered/level 2 diet and thin liquids   Recommended Form of Meds: whole with puree and crushed with puree   Aspiration precautions and swallowing strategies: upright posture, only feed when fully alert, slow rate of feeding, small bites/sips and alternating bites and sips  Other Recommendations: Continue frequent oral care, will follow at least x1        Current Medical Status  Pt is a 80 y o  male who presented to [facility] s/p fall at his [facility] facility  Patient has a history of dysphagia, that has been noted at previous admissions as well  He also is at risk for silent aspiration which is also noted on CT imaging at this admission  He also has history of Schatzki ring that was seen on prior EGD along with possible Candida esophagitis  Current Precautions:  Fall  Aspiration   Allergies:  No known food allergies    Past medical history:  Please see H&P for details    Special Studies:   VBS 10/5/21 Summary:  Images are on PACS for review     Pt presents w/ mild/mod oropharyngeal dysphagia seema by reduced mastication & manipulation of solids w/ reduced oral clearing   +min loss prior to the swallows w/ some material   The swallow is fairly prompt but airway protection during the swallow w/ liquids is reduced w/ trace to mod amts of aspiration occurring even w/ tsp attempted amounts of thick  Attempted strategies but they were unsuccessful in eliminating aspiration  The pt also had difficulty following strategies & carrying over  He has not had any fevers or upper respiratory infections to date related to aspiration  Oral care was reviewed w/ the pt & his wife following the study       Recommendations:  Diet: should ideally follow a level 2  [facility] altered, moist diet  Liquids: for now thin in smaller sips, mult swallows  Pt w/ noted small amts of aspiration on all liquids  Strategies ineffective  Meds: whole in puree or crushed   Strategies: mult swallows  Frequent oral care  Upright position  ?if speech therapy     CT head -No acute intracranial abnormality in postsurgical brain        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      Small amount of dystrophic calcifications in the anterior corpus callosum and cingulum, may be treatment-related change      Confluent white matter hypodensities in bilateral frontal lobes (left greater than right), may be treatment-related change  CT cervical spine IMPRESSION:     No cervical spine fracture or traumatic malalignment      Small lucent osseous lesion in C2 spinous process extending into left lamina, indeterminate  Osseous metastasis, myeloma, or focal fatty osseous change in the differential      Secretions in posterior pharyngeal wall, right vallecula, hypopharynx (extending into bilateral piriform sinuses)    Patient is at risk for aspiration       CXR trauma-No acute cardiopulmonary disease within limitations of supine imaging      No acute osseous abnormality of visualized pelvis    Social/Education/Vocational Hx:  Pt lives in SNF/ECF    Swallow Information   Current Risks for Dysphagia & Aspiration: known history of dysphagia  Current Symptoms/Concerns: -  Current Diet: NPO   Baseline Diet: mechanically altered/level 2 diet and thin liquids      Baseline Assessment   Behavior/Cognition: alert  Speech/Language Status: able to participate in conversation  Patient Positioning: upright in bed  Pain Status/Interventions/Response to Interventions:   No report of or nonverbal indications of pain  Swallow Mechanism Exam  Facial: symmetrical  Labial: WFL  Lingual: WFL  Velum: unable to visualize  Mandible: adequate ROM  Dentition: upper dentures and limited dentition  Vocal quality: mildly wet when conversing  Volitional Cough: fair   Respiratory Status: on RA         Consistencies Assessed and Performance   Consistencies Administered: ice chips, thin liquids, puree, mechanical soft solids and soft solids    Oral Stage: minimal, mild, decreased bolus acceptance and decreased bolus propulsion  Mastication was mildly prolonged with the materials administered today  Bolus formation and transfer were functional with no significant oral residue noted  No overt s/s reduced oral control  Pharyngeal Stage: suspected, minimal and multiple swallows  Swallow Mechanics:  Swallowing initiation appeared prompt  Laryngeal rise was palpated and judged to be within functional limits  No coughing, throat clearing, change in vocal quality or respiratory status noted today       Esophageal Concerns: none reported    Strategies and Efficacy: small amounts at a time    Summary and Recommendations (see above)    Results Reviewed with: patient, RN and MD     Treatment Recommended: will follow w/ tray, ?ability to upgrade to even three while here     Frequency of treatment: as able     Patient Stated Goal:-    Dysphagia LTG  -Patient will demonstrate safe and effective oral intake (without overt s/s significant oral/pharyngeal dysphagia including s/s penetration or aspiration) for the highest appropriate diet level       Short Term Goals:  -Pt will tolerate Dysphagia 2/mechanical soft diet and thin liquid with no significant s/s oral or pharyngeal dysphagia across 1-3 diagnostic session/s     -Patient will tolerate trials of upgraded food and/or liquid texture with no significant s/s of oral or pharyngeal dysphagia including aspiration across 1-3 diagnostic sessions     -Patient will comply with a Video/Modified Barium Swallow study for more complete assessment of swallowing anatomy/physiology/aspiration risk and to assess efficacy of treatment techniques so as to best guide treatment plan    Speech Therapy Prognosis   Prognosis: fair    Prognosis Considerations: age, medical status and cognitive status

## 2023-03-13 NOTE — ASSESSMENT & PLAN NOTE
Patient noted to have an unwitnessed fall at a closed dementia unit  As per the patient, no head strike and he did not lose consciousness  Patient does report of having poor oral intake over the course of past few days and he does have history of orthostatic hypotension  He underwent imaging in the ED including CT and x-ray studies which were negative for any acute pathology   Likely related to patients history of dementia and orthostatic hypotension      - Fall precautions  - Imaging studies reviewed  - Pain control as needed  - PT and OT ordered  - On DVT prophylaxis   - Orthostatics ordered  - Echo ordered, last echo in 2019  - Patient has MDT loop recorder, interrogation ordered

## 2023-03-13 NOTE — CASE MANAGEMENT
Case Management Assessment    Patient name Jj Camejo  Location [address] MRN [id number]  : 1933 Date 3/13/2023       Current Admission Date: 3/13/2023  Current Admission Diagnosis:Fall   Patient Active Problem List    Diagnosis Date Noted   • Fall 2023   • Ambulatory dysfunction 2023   • Elevated troponin 2023   • History of CVA (cerebrovascular accident) 2023   • Dementia ([facility] [address] ) 2023   • Elevated AST (SGOT) 2023   • Hypertension 2023   • Moderate protein-calorie malnutrition ([facility] [address] ) 2023   • Dysphagia 2023      LOS (days): 0  Geometric Mean LOS (GMLOS) (days):   Days to GMLOS:     OBJECTIVE:    Risk of Unplanned Readmission Score: 8 3         Current admission status: Inpatient       Preferred Pharmacy: No Pharmacies Listed  Primary Care Provider: MICHELE Colin    Primary Insurance: Jenine Bernheim [facility]  Secondary Insurance:     ASSESSMENT:  Melony [employer] Proxies    There are no active Health Care Proxies on file  CM attempted outreach to patient's spouse, left voicemail requesting return call

## 2023-03-13 NOTE — ASSESSMENT & PLAN NOTE
History of dementia  Patient has longstanding history of dementia, he has previously been seen by geriatrics at last admission  Based on chart review, patient likely has dementia in the setting of stroke and it may be of vascular etiology    CT head performed at this admission, imaging results noted-no acute pathology or abnormality at this time     -On memantine 5 mg daily  -On sertraline 75 mg daily  -Sleep hygiene  -Monitor for delirium  -Frequent reorientation

## 2023-03-13 NOTE — QUICK NOTE
Cervical Collar Clearance: The patient had a CT scan of the cervical spine demonstrating no acute injury  On exam, the patient had no midline point tenderness or paresthesias/numbness/weakness in the extremities  The patient had full range of motion (was then able to flex, extend, and rotate head laterally) without pain  There were no distracting injuries and the patient was not intoxicated  The patient's cervical spine was cleared radiologically and clinically  Cervical collar removed at this time       Dodie Hardy PA-C  3/13/2023 11:50 AM

## 2023-03-13 NOTE — H&P
[address]  H&P- Kendra Morgan 1/16/1933, 80 y o  male MRN: [id number]  Unit/Bed#: ED 29 Encounter: [id number]  Primary Care Provider: No primary care provider on file  Date and time admitted to hospital: 3/13/2023  8:05 AM    [address] fall at a closed dementia unit  - Patient states he was getting out of bed and fell  - Unsure LOC  - +Plavix  - Per facility at baseline  - No complaints per patient  - CT Neck pending 3/13  - Ct head pending 3/13  - Cxr Chest negative for acute pathology  - Pelvis xray pending  - interrogate loop recorder       Trauma Alert: Level B   Model of Arrival: Ambulance    Trauma Team: Attending Les Ormond  Consultants:     None     History of Present Illness     Chief Complaint: fall   Mechanism:Fall     HPI:    Patient is a [address]  resident at a closed dementia unit  Patient states he tried to get out of bed this morning and slipped  He does take plavix  Staff states they found him on the ground  Patient with no complaints at this time  Patient states he has a known pelvis fracture  Review of Systems   Unable to perform ROS: Dementia   All other systems reviewed and are negative  12-point, complete review of systems was reviewed and negative except as stated above  Historical Information     No past medical history on file  No past surgical history on file  There is no immunization history on file for this patient  Last Tetanus: pending chart review  Family History: Non-contributory     Meds/Allergies   Pending medical review   Allergies have not been reviewed;   Not on File    Objective   Initial Vitals:   Temperature: (!) 96 9 °F (36 1 °C) (03/13/23 0806)  Pulse: 86 (03/13/23 0806)  Respirations: 18 (03/13/23 0806)  Blood Pressure: (!) 206/91 (03/13/23 0806)    Primary Survey:   Airway:        Status: patent;        Pre-hospital Interventions: none        Hospital Interventions: none  Breathing:        Pre-hospital Interventions: none       Effort: normal       Right breath sounds: normal       Left breath sounds: normal  Circulation:        Rhythm:           Right Pulses Left Pulses    R radial: 2+  R femoral: 2+      R popliteal: 2+ L radial: 2+  L femoral: 2+      L popliteal: 2+   Disability:        GCS: Eye: 4; Verbal: 5 Motor: 6 Total: 15       Right Pupil: 2 mm;  round;  reactive         Left Pupil:  2 mm;  round;  reactive      R Motor Strength L Motor Strength    R : 5/5  R dorsiflex: 5/5  R plantarflex: 5/5 L : 5/5  L dorsiflex: 5/5  L plantarflex: 5/5        Sensory:  No sensory deficit  Exposure:       Completed: Yes      Secondary Survey:  Physical Exam  Vitals reviewed  HENT:      Head: Normocephalic and atraumatic  Right Ear: External ear normal  There is impacted cerumen  Left Ear: External ear normal  There is impacted cerumen  Nose: Nose normal       Mouth/Throat:      Mouth: Mucous membranes are dry  Eyes:      Extraocular Movements: Extraocular movements intact  Pupils: Pupils are equal, round, and reactive to light  Neck:      Comments: c collar in place  Cardiovascular:      Rate and Rhythm: Normal rate and regular rhythm  Pulses: Normal pulses  Heart sounds: Normal heart sounds  Pulmonary:      Breath sounds: Normal breath sounds  Abdominal:      General: Abdomen is flat  Tenderness: There is no abdominal tenderness  There is no guarding  Musculoskeletal:      Right lower leg: No edema  Left lower leg: No edema  Skin:     General: Skin is warm  Capillary Refill: Capillary refill takes less than 2 seconds  Neurological:      Mental Status: He is alert  Mental status is at baseline  Invasive Devices     None               Lab Results: no results     Imaging Results: I have personally reviewed pertinent reports      Chest Xray(s): pending   FAST exam(s): negative for acute findings   CT Scan(s): pending   Additional Xray(s): pending     Other Studies:     Code Status: No Order  Advance Directive and Living Will:      Power of :    POLST:    I have spent 20 minutes with Patient  today in which greater than 50% of this time was spent in counseling/coordination of care regarding Impressions, Documenting in the medical record, Reviewing / ordering tests, medicine, procedures  , Obtaining or reviewing history   and Communicating with other healthcare professionals

## 2023-03-13 NOTE — ASSESSMENT & PLAN NOTE
- Unwitnessed fall at a closed dementia unit  - Patient states he was getting out of bed and fell  - Unsure LOC  - +Plavix  - Per facility at baseline  - No complaints per patient  - CT Neck pending 3/13  - Ct head pending 3/13  - Cxr Chest negative for acute pathology  - Pelvis xray pending  - interrogate loop recorder

## 2023-03-13 NOTE — ASSESSMENT & PLAN NOTE
Malnutrition Findings:                        Likely in the setting of dementia and poor oral intake  Will work with SLP and nutrition services towards appropriate nutrition for patient  BMI of around 20         BMI Findings: There is no height or weight on file to calculate BMI

## 2023-03-13 NOTE — ASSESSMENT & PLAN NOTE
Patient with history of orthostatic hypotension (previously on midodrine) now presenting in a hypertensive state  Noted to have SBPs 170-180s requiring antihypertensive regimen   Likely in the setting of fall and related pain   -Restarted home dose Toprol XL  -On PRN labetalol 10 mg Q6H PRN for SBP > 160 mmHg  -Will continue to monitor

## 2023-03-13 NOTE — PROCEDURES
POC FAST US    Date/Time: 3/13/2023 8:15 AM  Performed by: Liana King PA-C  Authorized by: Liana King PA-C     Patient location:  ED and Trauma  Procedure details:     Exam Type:  Diagnostic    Indications: blunt abdominal trauma and blunt chest trauma      Assess for:  Intra-abdominal fluid, hemothorax, pericardial effusion and pneumothorax    Technique: extended FAST      Views obtained:  Heart - Pericardial sac, RUQ - Perez's Pouch, LUQ - Splenorenal space, Right thorax, Left thorax and Suprapubic - Pouch of Juan    Image quality: diagnostic      Image availability:  Images available in PACS  FAST Findings:     RUQ (Hepatorenal) free fluid: absent      LUQ (Splenorenal) free fluid: absent      Suprapubic free fluid: absent      Cardiac wall motion: identified      Pericardial effusion: absent    extended FAST (Pulmonary) findings:     Left lung sliding: Present      Right lung sliding: Present      Left pleural effusion: Absent      Right pleural effusion: Absent    Interpretation:     Impressions: negative

## 2023-03-13 NOTE — ASSESSMENT & PLAN NOTE
Patient has a history of dysphagia, that has been noted at previous admissions as well  He also is at risk for silent aspiration which is also noted on CT imaging at this admission  He also has history of Schatzki ring that was seen on prior EGD along with possible Candida esophagitis       -Speech evaluation ordered  -Currently on clear liquids diet  -We will advance diet based on speech recommendations

## 2023-03-13 NOTE — ASSESSMENT & PLAN NOTE
Admitted with elevated troponin with initial troponin at 340 with subsequent 2hr troponin at 295  Patient currently chest pain free but did have elevated BP readings up to 808-656U systolics on presentation (likely in the setting of pain)  Based on prior echo from duplicate chart, EF appears to be 60% but this is based on echocardiographic findings from 2019   In setting of fall, may consider another echocardiogram     -Echo ordered   -EKG reviewed, noted to have T-wave inversion in V2-V3 which may be secondary to RV conduction delay (patient has history of RBBB)  -Will continue to monitor troponins, currently appear to be downtrending  -Device interrogation ordered to evaluate for any arrhythmia or burden thereof

## 2023-03-13 NOTE — ASSESSMENT & PLAN NOTE
Patient with recent history of pelvic fracture following which he was sent to [address] [place] [address] for physical therapy  Has been mostly wheelchair bound and getting intermittent physical therapy  Also very likely that his dementia maybe complicating this as well       -PT and OT ordered  -Will work with CM towards placement   -Fall precautions

## 2023-03-14 ENCOUNTER — APPOINTMENT (INPATIENT)
Dept: NON INVASIVE DIAGNOSTICS | Facility: HOSPITAL | Age: 88
End: 2023-03-14

## 2023-03-14 LAB
ANION GAP SERPL CALCULATED.3IONS-SCNC: 1 MMOL/L (ref 4–13)
AORTIC ROOT: 3.6 CM
ASCENDING AORTA: 2.9 CM
BASOPHILS # BLD AUTO: 0.02 THOUSANDS/ÂΜL (ref 0–0.1)
BASOPHILS NFR BLD AUTO: 0 % (ref 0–1)
BUN SERPL-MCNC: 10 MG/DL (ref 5–25)
CALCIUM SERPL-MCNC: 11.1 MG/DL (ref 8.3–10.1)
CHLORIDE SERPL-SCNC: 107 MMOL/L (ref 96–108)
CO2 SERPL-SCNC: 30 MMOL/L (ref 21–32)
CREAT SERPL-MCNC: 0.9 MG/DL (ref 0.6–1.3)
E WAVE DECELERATION TIME: 215 MS
EOSINOPHIL # BLD AUTO: 0.15 THOUSAND/ÂΜL (ref 0–0.61)
EOSINOPHIL NFR BLD AUTO: 3 % (ref 0–6)
ERYTHROCYTE [DISTWIDTH] IN BLOOD BY AUTOMATED COUNT: 14 % (ref 11.6–15.1)
FRACTIONAL SHORTENING: 24 % (ref 28–44)
GFR SERPL CREATININE-BSD FRML MDRD: 74 ML/MIN/1.73SQ M
GLUCOSE SERPL-MCNC: 105 MG/DL (ref 65–140)
HCT VFR BLD AUTO: 35.6 % (ref 36.5–49.3)
HGB BLD-MCNC: 11.4 G/DL (ref 12–17)
IMM GRANULOCYTES # BLD AUTO: 0.02 THOUSAND/UL (ref 0–0.2)
IMM GRANULOCYTES NFR BLD AUTO: 0 % (ref 0–2)
INTERVENTRICULAR SEPTUM IN DIASTOLE (PARASTERNAL SHORT AXIS VIEW): 1 CM
INTERVENTRICULAR SEPTUM: 1 CM (ref 0.6–1.1)
LA/AORTA RATIO 2D: 0.83
LAAS-AP2: 13.6 CM2
LAAS-AP4: 7.6 CM2
LEFT ATRIUM SIZE: 3 CM
LEFT INTERNAL DIMENSION IN SYSTOLE: 2.6 CM (ref 2.1–4)
LEFT VENTRICULAR INTERNAL DIMENSION IN DIASTOLE: 3.4 CM (ref 3.5–6)
LEFT VENTRICULAR POSTERIOR WALL IN END DIASTOLE: 1 CM
LEFT VENTRICULAR STROKE VOLUME: 23 ML
LVSV (TEICH): 23 ML
LYMPHOCYTES # BLD AUTO: 2.6 THOUSANDS/ÂΜL (ref 0.6–4.47)
LYMPHOCYTES NFR BLD AUTO: 44 % (ref 14–44)
MCH RBC QN AUTO: 27.6 PG (ref 26.8–34.3)
MCHC RBC AUTO-ENTMCNC: 32 G/DL (ref 31.4–37.4)
MCV RBC AUTO: 86 FL (ref 82–98)
MONOCYTES # BLD AUTO: 0.62 THOUSAND/ÂΜL (ref 0.17–1.22)
MONOCYTES NFR BLD AUTO: 10 % (ref 4–12)
MRSA NOSE QL CULT: NORMAL
MV E'TISSUE VEL-SEP: 6 CM/S
MV PEAK A VEL: 1.17 M/S
MV PEAK E VEL: 62 CM/S
MV STENOSIS PRESSURE HALF TIME: 63 MS
MV VALVE AREA P 1/2 METHOD: 3.49 CM2
NEUTROPHILS # BLD AUTO: 2.55 THOUSANDS/ÂΜL (ref 1.85–7.62)
NEUTS SEG NFR BLD AUTO: 43 % (ref 43–75)
NRBC BLD AUTO-RTO: 0 /100 WBCS
PLATELET # BLD AUTO: 193 THOUSANDS/UL (ref 149–390)
PMV BLD AUTO: 10.5 FL (ref 8.9–12.7)
POTASSIUM SERPL-SCNC: 3.7 MMOL/L (ref 3.5–5.3)
RA PRESSURE ESTIMATED: 3 MMHG
RBC # BLD AUTO: 4.13 MILLION/UL (ref 3.88–5.62)
RIGHT VENTRICLE ID DIMENSION: 3.2 CM
SL CV LV EF: 50
SL CV PED ECHO LEFT VENTRICLE DIASTOLIC VOLUME (MOD BIPLANE) 2D: 47 ML
SL CV PED ECHO LEFT VENTRICLE SYSTOLIC VOLUME (MOD BIPLANE) 2D: 25 ML
SODIUM SERPL-SCNC: 138 MMOL/L (ref 135–147)
TRICUSPID ANNULAR PLANE SYSTOLIC EXCURSION: 1.8 CM
WBC # BLD AUTO: 5.96 THOUSAND/UL (ref 4.31–10.16)

## 2023-03-14 RX ADMIN — Medication 1 PACKET: at 08:48

## 2023-03-14 RX ADMIN — ENOXAPARIN SODIUM 40 MG: 40 INJECTION SUBCUTANEOUS at 08:48

## 2023-03-14 RX ADMIN — CLOPIDOGREL BISULFATE 75 MG: 75 TABLET ORAL at 08:48

## 2023-03-14 RX ADMIN — ACETAMINOPHEN 650 MG: 325 TABLET ORAL at 15:56

## 2023-03-14 RX ADMIN — PANTOPRAZOLE SODIUM 40 MG: 40 TABLET, DELAYED RELEASE ORAL at 05:36

## 2023-03-14 RX ADMIN — METOPROLOL SUCCINATE 25 MG: 25 TABLET, EXTENDED RELEASE ORAL at 08:48

## 2023-03-14 RX ADMIN — MELATONIN 3 MG: at 21:06

## 2023-03-14 RX ADMIN — SERTRALINE HYDROCHLORIDE 75 MG: 50 TABLET ORAL at 08:48

## 2023-03-14 RX ADMIN — MEMANTINE HYDROCHLORIDE 5 MG: 5 TABLET, FILM COATED ORAL at 21:06

## 2023-03-14 NOTE — CASE MANAGEMENT
Case Management Discharge Planning Note    Patient name Clovis White  [facility] [address] [facility] [id number]  : 1933 Date 3/14/2023       Current Admission Date: 3/13/2023  Current Admission Diagnosis:Fall   Patient Active Problem List    Diagnosis Date Noted   • Fall 2023   • Ambulatory dysfunction 2023   • Elevated troponin 2023   • History of CVA (cerebrovascular accident) 2023   • Dementia ([facility] [address] ) 2023   • Elevated AST (SGOT) 2023   • Hypertension 2023   • Moderate protein-calorie malnutrition ([facility] [address] ) 2023   • Dysphagia 2023      LOS (days): 1  Geometric Mean LOS (GMLOS) (days): 2 10  Days to GMLOS:0 9     OBJECTIVE:  Risk of Unplanned Readmission Score: 9 29         Current admission status: Inpatient   Preferred Pharmacy: No Pharmacies Listed  Primary Care Provider: MICHELE Navarro    Primary Insurance: Ajay Zapata [facility]  Secondary Insurance:     DISCHARGE DETAILS:          Additional Comments: CM connected with Rosario ([phone]) from [facility]  CM faxed over PT/OT notes to Rosario at [phone]

## 2023-03-14 NOTE — ASSESSMENT & PLAN NOTE
· Patient with recent history of pelvic fracture following which he was sent to [address] [place] [address] for physical therapy  Has been mostly wheelchair bound and getting intermittent physical therapy  Also very likely that his dementia maybe complicating this as well     · PT and OT ordered  · Will work with CM towards placement   · Fall precautions

## 2023-03-14 NOTE — PHYSICAL THERAPY NOTE
Physical Therapy Evaluation     Patient's Name: Denita Gonzalez    Admitting Diagnosis  Fall, initial encounter [W19  XXXA]  Ambulatory dysfunction [R26 2]  Other injury of unspecified body region, initial encounter [T14  8XXA]    Problem List  Patient Active Problem List   Diagnosis    Fall    Ambulatory dysfunction    Elevated troponin    History of CVA (cerebrovascular accident)    Dementia ([facility] [address] )    Elevated AST (SGOT)    Hypertension    Moderate protein-calorie malnutrition ([facility] [address] )    Dysphagia       Past Medical History  No past medical history on file  Past Surgical History  No past surgical history on file        03/14/23 0834   PT Last Visit   PT Visit Date 03/14/23   Note Type   Note type Evaluation   Pain Assessment   Pain Assessment Tool FLACC   Pain Location/Orientation Orientation: Left; Location: Hip   Effect of Pain on Daily Activities AX2 for mobility   Patient's Stated Pain Goal No pain   Hospital Pain Intervention(s) Ambulation/increased activity;Repositioned   Pain Rating: FLACC (Rest) - Face 0   Pain Rating: FLACC (Rest) - Legs 0   Pain Rating: FLACC (Rest) - Activity 0   Pain Rating: FLACC (Rest) - Cry 0   Pain Rating: FLACC (Rest) - Consolability 0   Score: FLACC (Rest) 0   Pain Rating: FLACC (Activity) - Face 1   Pain Rating: FLACC (Activity) - Legs 0   Pain Rating: FLACC (Activity) - Activity 0   Pain Rating: FLACC (Activity) - Cry 1   Pain Rating: FLACC (Activity) - Consolability 0   Score: FLACC (Activity) 2   Restrictions/Precautions   LLE Weight Bearing Per Order WBAT  (patient with L pelvic fracture on 3/2; per ortho cosult at that time- patient WBAT L LE)   Braces or Orthoses   (none)   Other Precautions Visual impairment;Pain; Fall Risk;Bed Alarm; Chair Alarm;Cognitive   Home Living   Type of Home SNF  ([facility] for rehab)   Home Layout Two level;Elevator   Bathroom Shower/Tub Walk-in shower   Bathroom Toilet Raised   Bathroom Equipment Grab bars in shower; Shower chair   Bathroom Accessibility Accessible via wheelchair   [facility]   Additional Comments Patient is alert and oriented x 3 today  He states that normally he resides "at [facility]" however since his most recent fall he has been at [facility] rehab  Patient states that prior to even his first fall, he was only transferring to a wheelchair for mobility  He states that he gets assistance getting out of bed, pivoting to manual WC, and then does self propel manual WC short distances with arms and legs  Assist with ADLs and iADLS  Does feed himself  Prior Function   Level of [place] Needs assistance with IADLS;Needs assistance with functional mobility; Needs assistance with ADLs   Lives With Facility staff   Receives Help From Other (Comment)  (staff)   IADLs Family/Friend/Other provides medication management; Family/Friend/Other provides meals; Family/Friend/Other provides transportation   Falls in the last 6 months 1 to 4  (at least 2)   Vocational Retired   General   Additional Pertinent History 80year old male admitted to -B on 3/13/2023 after unwitnessed fall while at SNF  Poor oral intake and + h/o orthostatic hypotension  Per chart review, patient with mechanical fall also on 3/2 and identified to have L sided pelvic fracture  WBAT L LE per orthopedics at that time  XR trauma multiple on 3/13 states "no acute fracture visualized in pelvis"     Family/Caregiver Present No   Cognition   Arousal/Participation Alert   Orientation Level Oriented to person;Oriented to place;Oriented to situation   Memory Decreased short term memory   Following Commands Follows one step commands without difficulty   Comments pleasant, cooperative, VC to maintain eyes open   Subjective   Subjective "I dont know when I last walked"   RUE Assessment   RUE Assessment WFL   LUE Assessment   LUE Assessment WFL   RLE Assessment   RLE Assessment X  (4-/5 grossly)   LLE Assessment   LLE Assessment X  (3+/5 grossly)   Bed Mobility   Supine to Sit 3  Moderate assistance   Additional items Assist x 1;HOB elevated; Increased time required;LE management   Sit to Supine Unable to assess   Additional Comments post evaluation patient OOB in chair with alarm active   Transfers   Sit to Stand 3  Moderate assistance   Additional items Assist x 2; Increased time required;Verbal cues   Stand to Sit 3  Moderate assistance   Additional items Assist x 2; Increased time required;Verbal cues   Stand pivot 3  Moderate assistance   Additional items Assist x 2; Increased time required;Verbal cues   Additional Comments tranfser with hand held assist   Ambulation/Elevation   Gait pattern Excessively slow;Decreased foot clearance; Antalgic   Gait Assistance 3  Moderate assist   Additional items Assist x 2   Assistive Device Other (Comment)  (b/l HHA)   Distance 2 feet (bed to chair)   Balance   Static Sitting Fair   Static Standing Poor +   Ambulatory Poor   Endurance Deficit   Endurance Deficit Yes   Endurance Deficit Description pain in L LE with sitting and stance, gross weakness   Activity Tolerance   Activity Tolerance Patient limited by fatigue;Patient limited by pain   Medical Staff Made Aware This high complexity evaluation was performed with an occupational therapist due to the patient's co-morbidities, clinically unstable presentation, and present impairments which are a regression from the patient's baseline  Assessment   Prognosis Fair   Problem List Decreased strength;Decreased endurance; Impaired balance;Decreased mobility;Pain   Assessment PT completed evaluation of 80year old male [facility] to [facility] on 3[facility]13/2023 after unwitnessed fall while at SNF  Poor oral intake and + h/o orthostatic hypotension  Per chart review, patient with mechanical fall also on 3/2 and identified to have L sided pelvic fracture  WBAT L LE per orthopedics at that time  XR trauma multiple on 3/13 states "no acute fracture visualized in pelvis"       Current status instabilities include pain (L leg), continuous O2/HR monitoring, falls risk, and bed/chair alarms  PMH is significant for CVA, dysphagia, craniotomy, and dementia  Patient is alert and oriented x 3 today  He states that normally he resides "at [facility]" however since his most recent fall he has been at [facility] rehab  Patient states that prior to even his first fall, he was only transferring to a wheelchair for mobility  He states that he gets assistance getting out of bed, pivoting to manual WC, and then does self propel manual WC short distances with arms and legs  Assist with ADLs and iADLS  Does feed himself  Current impairments include pain, decreased balance and gross strength and activity tolerance  During PT evaluation patient required mod-AX1 for supine-->sit transfer and maintained fair static sitting balance  He required mod-AX2 for stand pivot transfer (bed to chair) with hand held assist  Patient did initiate small side steps  Post evaluation patient seated OOB in chair and feeding himself  PT d/c recommendation is for return to short term rehab  Patient will continue to benefit from continued skilled PT this admission to achieve maximal function and safety  Goals   Patient Goals to eat breakfast   LTG Expiration Date 03/28/23   Long Term Goal #1 1) Perform bed mobility S level to participate in frequent repositioning and improve skin integrity; 2) Perform functional transfers S level to promote I with toileting and OOB mobility; 3) Ambulate 50 feet min-AX1 w/ RW to participate in household  level mobility; 4) Improve b/l LE strength by 1/2 grade in order to improve efficiency of tranfers; 5) Improve balance by 1 grade to reduce risk for falls; 6) Self propel manual WC x 75 feet S level in order to participate in household level mobility   PT Treatment Day 0   Plan   Treatment/Interventions Functional transfer training;LE strengthening/ROM; Therapeutic exercise; Endurance training;Patient/family training;Equipment eval/education; Bed mobility;Gait training;OT;Spoke to nursing   PT Frequency 2-3x/wk   Recommendation   PT Discharge Recommendation (S)  Post acute rehabilitation services  (return to rehab  (from [facility] rehab per patient and chart review))   Equipment Recommended Wheelchair;Walker   AM-PAC Basic Mobility Inpatient   Turning in Flat Bed Without Bedrails 2   Lying on Back to Sitting on Edge of Flat Bed Without Bedrails 2   Moving Bed to Chair 1   Standing Up From Chair Using Arms 1   Walk in Room 1   Climb 3-5 Stairs With Railing 1   Basic Mobility Inpatient Raw Score 8   Turning Head Towards Sound 4   Follow Simple Instructions 3   Low Function Basic Mobility Raw Score 15   Low Function Basic Mobility Standardized Score 23 9   Highest Level Of Mobility   JH-HLM Goal 3: Sit at edge of bed   JH-HLM Achieved 4: Move to chair/commode     The patient's AM-PAC Basic Mobility Inpatient Standardized Score is less than 42 9, suggesting this patient may benefit from discharge to post-acute rehabilitation services  Please also refer to the recommendation of the Physical Therapist for safe discharge planning        Gogo Valle, PT, DPT

## 2023-03-14 NOTE — ASSESSMENT & PLAN NOTE
· Patient noted to have an unwitnessed fall at a closed dementia unit  As per the patient, no head strike and he did not lose consciousness  Patient does report of having poor oral intake over the course of past few days and he does have history of orthostatic hypotension  He underwent imaging in the ED including CT and x-ray studies which were negative for any acute pathology  Likely related to patients history of dementia and orthostatic hypotension     · Fall precautions  · Imaging studies reviewed  · Pain control as needed  · PT and OT ordered  · On DVT prophylaxis   · Orthostatics ordered  · Echo ordered, last echo in 2019  · Loop interrogation as below

## 2023-03-14 NOTE — PLAN OF CARE
Problem: MOBILITY - ADULT  Goal: Maintain or return to baseline ADL function  Description: INTERVENTIONS:  -  Assess patient's ability to carry out ADLs; assess patient's baseline for ADL function and identify physical deficits which impact ability to perform ADLs (bathing, care of mouth/teeth, toileting, grooming, dressing, etc )  - Assess/evaluate cause of self-care deficits   - Assess range of motion  - Assess patient's mobility; develop plan if impaired  - Assess patient's need for assistive devices and provide as appropriate  - Encourage maximum independence but intervene and supervise when necessary  - Involve family in performance of ADLs  - Assess for home care needs following discharge   - Consider OT consult to assist with ADL evaluation and planning for discharge  - Provide patient education as appropriate  Outcome: Progressing  Goal: Maintains/Returns to pre admission functional level  Description: INTERVENTIONS:  - Perform BMAT or MOVE assessment daily    - Set and communicate daily mobility goal to care team and patient/family/caregiver  - Collaborate with rehabilitation services on mobility goals if consulted  - Perform Range of Motion  times a day  - Reposition patient every  hours    - Dangle patient  times a day  - Stand patient  times a day  - Ambulate patient  times a day  - Out of bed to chair  times a day   - Out of bed for meals  times a day  - Out of bed for toileting  - Record patient progress and toleration of activity level   Outcome: Progressing     Problem: PAIN - ADULT  Goal: Verbalizes/displays adequate comfort level or baseline comfort level  Description: Interventions:  - Encourage patient to monitor pain and request assistance  - Assess pain using appropriate pain scale  - Administer analgesics based on type and severity of pain and evaluate response  - Implement non-pharmacological measures as appropriate and evaluate response  - Consider cultural and social influences on pain and pain management  - Notify physician/advanced practitioner if interventions unsuccessful or patient reports new pain  Outcome: Progressing     Problem: INFECTION - ADULT  Goal: Absence or prevention of progression during hospitalization  Description: INTERVENTIONS:  - Assess and monitor for signs and symptoms of infection  - Monitor lab/diagnostic results  - Monitor all insertion sites, i e  indwelling lines, tubes, and drains  - Monitor endotracheal if appropriate and nasal secretions for changes in amount and color  - [place] appropriate cooling/warming therapies per order  - Administer medications as ordered  - Instruct and encourage patient and family to use good hand hygiene technique  - Identify and instruct in appropriate isolation precautions for identified infection/condition  Outcome: Progressing  Goal: Absence of fever/infection during neutropenic period  Description: INTERVENTIONS:  - Monitor WBC    Outcome: Progressing     Problem: SAFETY ADULT  Goal: Maintain or return to baseline ADL function  Description: INTERVENTIONS:  -  Assess patient's ability to carry out ADLs; assess patient's baseline for ADL function and identify physical deficits which impact ability to perform ADLs (bathing, care of mouth/teeth, toileting, grooming, dressing, etc )  - Assess/evaluate cause of self-care deficits   - Assess range of motion  - Assess patient's mobility; develop plan if impaired  - Assess patient's need for assistive devices and provide as appropriate  - Encourage maximum independence but intervene and supervise when necessary  - Involve family in performance of ADLs  - Assess for home care needs following discharge   - Consider OT consult to assist with ADL evaluation and planning for discharge  - Provide patient education as appropriate  Outcome: Progressing  Goal: Maintains/Returns to pre admission functional level  Description: INTERVENTIONS:  - Perform BMAT or MOVE assessment daily    - Set and communicate daily mobility goal to care team and patient/family/caregiver  - Collaborate with rehabilitation services on mobility goals if consulted  - Perform Range of Motion  times a day  - Reposition patient every hours    - Dangle patient  times a day  - Stand patient  times a day  - Ambulate patient  times a day  - Out of bed to chair  times a day   - Out of bed for meals  times a day  - Out of bed for toileting  - Record patient progress and toleration of activity level   Outcome: Progressing  Goal: Patient will remain free of falls  Description: INTERVENTIONS:  - Educate patient/family on patient safety including physical limitations  - Instruct patient to call for assistance with activity   - Consult OT/PT to assist with strengthening/mobility   - Keep Call bell within reach  - Keep bed low and locked with side rails adjusted as appropriate  - Keep care items and personal belongings within reach  - Initiate and maintain comfort rounds  - Make Fall Risk Sign visible to staff  - Offer Toileting every  Hours, in advance of need  - Initiate/Maintain alarm  - Obtain necessary fall risk management equipment  - Apply yellow socks and bracelet for high fall risk patients  - Consider moving patient to room near nurses station  Outcome: Progressing     Problem: DISCHARGE PLANNING  Goal: Discharge to home or other facility with appropriate resources  Description: INTERVENTIONS:  - Identify barriers to discharge w/patient and caregiver  - Arrange for needed discharge resources and transportation as appropriate  - Identify discharge learning needs (meds, wound care, etc )  - Arrange for interpretive services to assist at discharge as needed  - Refer to Case Management Department for coordinating discharge planning if the patient needs post-hospital services based on physician/advanced practitioner order or complex needs related to functional status, cognitive ability, or social support system  Outcome: Progressing Problem: Knowledge Deficit  Goal: Patient/family/caregiver demonstrates understanding of disease process, treatment plan, medications, and discharge instructions  Description: Complete learning assessment and assess knowledge base    Interventions:  - Provide teaching at level of understanding  - Provide teaching via preferred learning methods  Outcome: Progressing     Problem: Prexisting or High Potential for Compromised Skin Integrity  Goal: Skin integrity is maintained or improved  Description: INTERVENTIONS:  - Identify patients at risk for skin breakdown  - Assess and monitor skin integrity  - Assess and monitor nutrition and hydration status  - Monitor labs   - Assess for incontinence   - Turn and reposition patient  - Assist with mobility/ambulation  - Relieve pressure over bony prominences  - Avoid friction and shearing  - Provide appropriate hygiene as needed including keeping skin clean and dry  - Evaluate need for skin moisturizer/barrier cream  - Collaborate with interdisciplinary team   - Patient/family teaching  - Consider wound care consult   Outcome: Progressing

## 2023-03-14 NOTE — OCCUPATIONAL THERAPY NOTE
Occupational Therapy Evaluation     Patient Name: Denita Gonzalez  Today's Date: 3/14/2023  Problem List  Principal Problem:    Fall  Active Problems:    Ambulatory dysfunction    Elevated troponin    History of CVA (cerebrovascular accident)    Dementia ([facility] [address] )    Elevated AST (SGOT)    Hypertension    Moderate protein-calorie malnutrition ([facility] [address] )    Dysphagia    Past Medical History  No past medical history on file  Past Surgical History  No past surgical history on file       03/14/23 0836   OT Last Visit   OT Visit Date 03/14/23   Note Type   Note type Evaluation   Pain Assessment   Pain Assessment Tool FLACC   Pain Rating: FLACC (Rest) - Face 0   Pain Rating: FLACC (Rest) - Legs 0   Pain Rating: FLACC (Rest) - Activity 0   Pain Rating: FLACC (Rest) - Cry 0   Pain Rating: FLACC (Rest) - Consolability 0   Score: FLACC (Rest) 0   Pain Rating: FLACC (Activity) - Face 1   Pain Rating: FLACC (Activity) - Legs 0   Pain Rating: FLACC (Activity) - Activity 0   Pain Rating: FLACC (Activity) - Cry 1   Pain Rating: FLACC (Activity) - Consolability 0   Score: FLACC (Activity) 2   Restrictions/Precautions   LLE Weight Bearing Per Order WBAT  (w/ L pelvic fx on 3/2, per ortho at the time, pt WBAT LLE)   Other Precautions Visual impairment; Fall Risk;Pain;Cognitive; Chair Alarm   Home Living   Type of Home SNF   Home Layout Two level;Elevator   Bathroom Shower/Tub Walk-in shower   Bathroom Toilet Raised   Bathroom Equipment Grab bars in shower; Shower chair   [address] [facility]   Additional Comments since recent fall has been in [facility] rehab- SPT to w/c only w/ A x 1, A for ADLS, assist for all IADLs, feeds self   Prior Function   Level of [place] Needs assistance with IADLS;Needs assistance with ADLs   Lives With Facility staff   Receives Help From   (staff)   IADLs Family/Friend/Other provides transportation; Family/Friend/Other provides meals; Family/Friend/Other provides medication management   Falls in the last 6 months 1 to 4  (2)   Vocational Retired   Lifestyle   Autonomy since recent fall has been in [facility] rehab- SPT to w/c only w/ A x 1, A for ADLS, assist for all IADLs, feeds self   Reciprocal Relationships supportive facility   Intrinsic Gratification watching tv   Subjective   Subjective "I'm hungry"   ADL   Where Assessed Chair   Eating Assistance 5  Supervision/Setup   Grooming Assistance 5  Supervision/Setup   UB Bathing Assistance 3  Moderate Assistance   LB Bathing Assistance 2  Maximal Assistance   UB Dressing Assistance 3  Moderate Assistance   LB Dressing Assistance 2  Maximal [address]  2  Maximal Assistance   Bed Mobility   Supine to Sit 3  Moderate assistance   Additional items Assist x 1; Increased time required   Transfers   Sit to Stand 3  Moderate assistance   Additional items Assist x 2; Increased time required;Verbal cues   Stand to Sit 3  Moderate assistance   Additional items Assist x 2; Increased time required;Verbal cues   Stand pivot 3  Moderate assistance   Additional items Assist x 2; Increased time required;Verbal cues   Balance   Static Sitting Fair   Static Standing Poor +   Ambulatory Poor   Activity Tolerance   Activity Tolerance Patient limited by fatigue   Medical Staff Made Aware PT cinthia 2* medical complexity/comorbidities   Nurse Made Aware okay to see per RN   RUE Assessment   RUE Assessment WFL   LUE Assessment   LUE Assessment WFL   Hand Function   Gross Motor Coordination Functional   Fine Motor Coordination Functional   Cognition   Overall Cognitive Status Impaired   Arousal/Participation Cooperative   Attention Attends with cues to redirect   Orientation Level Oriented X4   Memory Decreased recall of precautions;Decreased recall of recent events   Following Commands Follows one step commands without difficulty   Comments pleasant and cooperative   Assessment   Limitation Decreased ADL status; Decreased Safe judgement during ADL;Decreased cognition;Decreased endurance;Decreased self-care trans;Decreased high-level ADLs   Prognosis Fair   Assessment Pt is a 80 y o  YO  male admitted to [facility] on 3/13/2023 w/ fall at [facility]  Pt w/ recent fall 3/2 w/ LLE pelvic fx, WBAT  Pt  W/ hx ofCVA, dementia, HTN, and dysphagia  Pt with active OT orders*   since recent fall has been in [facility] rehab- SPT to w/c only w/ A x 1, A for ADLS, assist for all IADLs, feeds self Currently pt is mod a for ub adls, max a for lb adls, and mod a x 2 for transfers  Pt is limited at this time 2*: pain, endurance, activity tolerance, functional mobility, decreased I w/ ADLS/IADLS, cognitive impairments, decreased safety awareness and decreased insight into deficits  The following Occupational Performance Areas to address include: grooming, bathing/shower, toilet hygiene, dressing and functional mobility  Based on the aforementioned OT evaluation, functional performance deficits, and assessments, pt has been identified as a high complexity evaluation  From OT standpoint, anticipate d/c STR  Pt to continue to benefit from acute immediate OT services to address the following goals 2-3x/week to  w/in 14 days:  The patient's raw score on the AM-PAC Daily Activity Inpatient Short Form is 16  A raw score of less than 19 suggests the patient may benefit from discharge to post-acute rehabilitation services  Please refer to the recommendation of the Occupational Therapist for safe discharge planning     Goals   Patient Goals eat breakfast   LTG Time Frame 10-14   Plan   Treatment Interventions ADL retraining   Goal Expiration Date 23   OT Frequency 1-2x/wk   Recommendation   OT Discharge Recommendation Post acute rehabilitation services   AM-PAC Daily Activity Inpatient   Lower Body Dressing 2   Bathing 2   Toileting 2   Upper Body Dressing 3   Grooming 3   Eating 4   Daily Activity Raw Score 16   Daily Activity Standardized Score (Calc for Raw Score >=11) 35 96   AM-PAC Applied Cognition Inpatient   Following a Speech/Presentation 3   Understanding Ordinary Conversation 4   Taking Medications 3   Remembering Where Things Are Placed or Put Away 3   Remembering List of 4-5 Errands 3   Taking Care of Complicated Tasks 3   Applied Cognition Raw Score 19   Applied Cognition Standardized Score 39 77   Modified [place] Scale   Modified [place] Scale 4       GOALS    1) Pt will increase activity tolerance to G for 30 min txment sessions    2) Pt will complete UB/LB dressing/self care w/ S using adaptive device and DME as needed    3) Pt will complete bathing w/ S w/ use of AE and DME as needed    4) Pt will complete toileting w/ S w/ G hygiene/thoroughness using DME as needed    5) Pt will improve functional transfers to S on/off all surfaces using DME as needed w/ G balance/safety     6) Pt will improve functional mobility during ADL/IADL/leisure tasks to S using DME as needed w/ G balance/safety     7) Pt will participate in simulated IADL management task to increase independence to  w/ G safety and endurance    8) Pt will demonstrate G carryover of pt/caregiver education and training as appropriate      9) Pt will demonstrate 100% carryover of energy conservation techniques t/o functional I/ADL/leisure tasks w/o cues s/p skilled education    10) Pt will engage in ongoing cognitive assessment w/ G participation to assist w/ safe d/c planning/recommendations    Zeferino Sy MS, OTR/L

## 2023-03-14 NOTE — ASSESSMENT & PLAN NOTE
· History of dementia  Patient has longstanding history of dementia, he has previously been seen by geriatrics at last admission    · Likely vascular etiology  · On memantine 5 mg daily  · On sertraline 75 mg daily  · Monitor for delirium

## 2023-03-14 NOTE — UTILIZATION REVIEW
Initial Clinical Review    Admission: Date/Time/Statement:   Admission Orders (From admission, onward)     Ordered        03/13/23 0950  INPATIENT ADMISSION  Once                      Orders Placed This Encounter   Procedures   • INPATIENT ADMISSION     Standing Status:   Standing     Number of Occurrences:   1     Order Specific Question:   Level of Care     Answer:   Med Surg [16]     Order Specific Question:   Estimated length of stay     Answer:   More than 2 Midnights     Order Specific Question:   Certification     Answer:   I certify that inpatient services are medically necessary for this patient for a duration of greater than two midnights  See H&P and MD Progress Notes for additional information about the patient's course of treatment  ED Arrival Information     Expected   -    Arrival   3/13/2023 08:04    Acuity   Emergent            Means of arrival   Ambulance    Escorted by   [facility]    Service   Hospitalist    Admission type   Emergency            Arrival complaint   Fall           Chief Complaint   Patient presents with   • Fall     See trauma chart       Initial Presentation: 80 y o  male presents to ed from nursing  home via ems  for evaluation and treatment of injury from unwitnessed fall from standing  Patient says he slipped getting out of bed  Patient states he has a pelvic fracture  PMHX: DEMENTIA, PELVIC FRACTURE, ON PLAVIX  Clinical assessment significant for temp 96 9, hypertension  Troponin 340  Imaging shows lucent osseous lesion in C2 spinous process  Possibly metastasis or myeloma  Admit to inpatient med surg for cardiac work up, interrogate loop recorder, ECHO, trend troponins, telemetry, PT/OT evaluations  Date: 3-14-23  Day 2: inpatient med surg     Today obtain orthostatic BP, Echo and therapy evaluations  Patient denies chest pain or pelvic pain  Advance diet from clear liquid to dysphagia       ED Triage Vitals   03/13/23 8030 03/13/23 4267 03/13/23 0806 03/13/23 0806 03/13/23 0806   (!) 96 9 °F (36 1 °C) 86 18 (!) 206/91 98 %      Tympanic Monitor         No Pain          03/13/23 61 3 kg (135 lb 2 3 oz)     Additional Vital Signs:     Date/Time Temp Pulse Resp BP MAP SpO2 O2 Device   03/14/23 07:49:03 96 6 °F   (35 9 °C)   Abnormal  -- 18 159/73 102 -- --   03/13/23 21:32:43 97 5 °F (36 4 °C) 78 18 109/51 70 100 % --   03/13/23 2047 -- -- -- -- -- -- None (Room air)   03/13/23 1700 -- -- -- -- -- -- None (Room air)   03/13/23 16:03:55 97 5 °F (36 4 °C) -- -- 110/53 72 -- --   03/13/23 1357 -- 75 -- 154/67 -- -- --   03/13/23 1230 -- 62 18 121/57 82 100 % None (Room air)   03/13/23 1015 -- 72 18 186/85   Abnormal  118 98 % None (Room air)   03/13/23 0945 -- 70 18 164/109   Abnormal  -- 100 % None (Room air)   03/13/23 0915 -- 78 18 217/103   Abnormal  -- 98 % None (Room air)   03/13/23 0900 -- 82 18 203/91   Abnormal  -- 98 % None (Room air)   03/13/23 0845 -- 70 15 197/81   Abnormal  -- 99 % None (Room air)   03/13/23 0830 97 9 °F   (36 6 °C) 82 16 210/88   Abnormal  -- 99 % None (Room air)   03/13/23 0815 -- 88 20 211/89   Abnormal  -- 98 % None (Room air)   03/13/23 08:13:19 -- 82 18 198/88   Abnormal  -- 98 % None (Room air)   03/13/23 08:06:20 96 9 °F   (36 1 °C)   Abnormal  86 18 206/91   Abnormal  -- 98 % None (Room air)               Pertinent Labs/Diagnostic Test Results:     Collection Time Result Time Vent R Atrial R CT Int  QRSD Int  QT Int  QTC Int  P Axis QRS Axis T Wave Ax    03/13/23 08:31:42 03/13/23 15:50:02 78 147  106 398 453  -29 93        Normal sinus rhythm   Premature ventricular complexes   Incomplete right bundle branch block   Left anterior fascicular block   Cannot rule out Inferior infarct (masked by fascicular block?) , age undetermined   Abnormal ECG   No previous ECGs available                 TRAUMA - CT head wo contrast   Final    (03/13 0846)      No acute intracranial abnormality in postsurgical brain          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  Small amount of dystrophic calcifications in the anterior corpus callosum and cingulum, may be treatment-related change  Confluent white matter hypodensities in bilateral frontal lobes (left greater than right), may be treatment-related change  TRAUMA - CT spine cervical wo contrast   Final    (03/13 0846)      No cervical spine fracture or traumatic malalignment  Small lucent osseous lesion in C2 spinous process extending into left lamina, indeterminate  Osseous metastasis, myeloma, or focal fatty osseous change in the differential       Secretions in posterior pharyngeal wall, right vallecula, hypopharynx (extending into bilateral piriform sinuses)  Patient is at risk for aspiration  XR Trauma multiple ([facility] trauma bay ONLY)   Final    (03/13 0846)      No acute cardiopulmonary disease within limitations of supine imaging        No acute osseous abnormality of visualized pelvis            Results from last 7 days   Lab Units 03/14/23  0617 03/13/23  0817   WBC Thousand/uL 5 96 5 57   HEMOGLOBIN g/dL 11 4* 12 4   HEMATOCRIT % 35 6* 38 1   PLATELETS Thousands/uL 193 199   NEUTROS ABS Thousands/µL 2 55 2 72         Results from last 7 days   Lab Units 03/14/23  0617 03/13/23  0817   SODIUM mmol/L 138 139   POTASSIUM mmol/L 3 7 3 6   CHLORIDE mmol/L 107 109*   CO2 mmol/L 30 28   ANION GAP mmol/L 1* 2*   BUN mg/dL 10 8   CREATININE mg/dL 0 90 0 87   EGFR ml/min/1 73sq m 74 75   CALCIUM mg/dL 11 1* 10 9*     Results from last 7 days   Lab Units 03/13/23  0817   AST U/L 63*   ALT U/L 45   ALK PHOS U/L 69   TOTAL PROTEIN g/dL 6 7   ALBUMIN g/dL 3 7   TOTAL BILIRUBIN mg/dL 0 73         Results from last 7 days   Lab Units 03/14/23  0617 03/13/23  0817   GLUCOSE RANDOM mg/dL 105 126             Results from last 7 days   Lab Units 03/13/23  0817   CK TOTAL U/L 143     Results from last 7 days   Lab Units 03/13/23  1218 03/13/23  1002 03/13/23  0817   HS TNI 0HR ng/L  --   --  340*   HS TNI 2HR ng/L  --  295*  --    HSTNI D2 ng/L  --  -45  --    HS TNI 4HR ng/L 277*  --   --    HSTNI D4 ng/L -63  --   --          Results from last 7 days   Lab Units 03/13/23  1004   CLARITY UA  Clear   COLOR UA  Yellow   SPEC GRAV UA  1 015   PH UA  7 5   GLUCOSE UA mg/dl Negative   KETONES UA mg/dl Negative   BLOOD UA  Negative   PROTEIN UA mg/dl Negative   NITRITE UA  Negative   BILIRUBIN UA  Negative   UROBILINOGEN UA E U /dl 0 2   LEUKOCYTES UA  Negative         ED Treatment:   Medication Administration from 03/13/2023 0800 to 03/13/2023 1551       Date/Time Order Dose Route Action     03/13/2023 1001 EDT labetalol (NORMODYNE) injection 10 mg 10 mg Intravenous Given     03/13/2023 1400 EDT enoxaparin (LOVENOX) subcutaneous injection 40 mg 40 mg Subcutaneous Given     03/13/2023 1359 EDT pantoprazole (PROTONIX) EC tablet 40 mg 40 mg Oral Given     03/13/2023 1357 EDT metoprolol succinate (TOPROL-XL) 24 hr tablet 25 mg 25 mg Oral Given     03/13/2023 1357 EDT sertraline (ZOLOFT) tablet 75 mg 75 mg Oral Given     03/13/2023 1355 EDT psyllium (METAMUCIL) 1 packet 1 packet Oral Given        No past medical history on file  Present on Admission:  • Fall  • Ambulatory dysfunction  • Elevated troponin      Admitting Diagnosis:     Fall, initial encounter [W19  XXXA]  Ambulatory dysfunction [R26 2]  Other injury of unspecified body region, initial encounter [T14  8XXA]    Age/Sex: 80 y o  male    Scheduled Medications:    clopidogrel, 75 mg, Oral, Daily  enoxaparin, 40 mg, Subcutaneous, Daily  melatonin, 3 mg, Oral, HS  memantine, 5 mg, Oral, HS  metoprolol succinate, 25 mg, Oral, Daily  pantoprazole, 40 mg, Oral, Early Morning  psyllium, 1 packet, Oral, Daily  sertraline, 75 mg, Oral, Daily      Continuous IV Infusions:     PRN Meds:  acetaminophen, 650 mg, Oral, Q8H PRN  labetalol, 10 mg, Intravenous, Q4H PRN        IP CONSULT TO CASE MANAGEMENT    Network Utilization Review Department  ATTENTION: Please call with any questions or concerns to [phone] and carefully listen to the prompts so that you are directed to the right person  All voicemails are confidential   Deacon Silverio all requests for admission clinical reviews, approved or denied determinations and any other requests to dedicated fax number below belonging to the campus where the patient is receiving treatment   List of dedicated fax numbers for the Facilities:  [address] DENIALS (Administrative/Medical Necessity) [phone]   [address] (Maternity/NICU/Pediatrics) [phone]   [address] [phone]   [facility] [phone]   [address] [address] [address] [phone]   [address] [facility] [phone]

## 2023-03-14 NOTE — PLAN OF CARE
Problem: MOBILITY - ADULT  Goal: Maintain or return to baseline ADL function  Description: INTERVENTIONS:  -  Assess patient's ability to carry out ADLs; assess patient's baseline for ADL function and identify physical deficits which impact ability to perform ADLs (bathing, care of mouth/teeth, toileting, grooming, dressing, etc )  - Assess/evaluate cause of self-care deficits   - Assess range of motion  - Assess patient's mobility; develop plan if impaired  - Assess patient's need for assistive devices and provide as appropriate  - Encourage maximum independence but intervene and supervise when necessary  - Involve family in performance of ADLs  - Assess for home care needs following discharge   - Consider OT consult to assist with ADL evaluation and planning for discharge  - Provide patient education as appropriate  Outcome: Progressing  Goal: Maintains/Returns to pre admission functional level  Description: INTERVENTIONS:  - Perform BMAT or MOVE assessment daily    - Set and communicate daily mobility goal to care team and patient/family/caregiver  - Collaborate with rehabilitation services on mobility goals if consulted  - Perform Range of Motion  times a day  - Reposition patient every  hours    - Dangle patient  times a day  - Stand patient  times a day  - Ambulate patient  times a day  - Out of bed to chair  times a day   - Out of bed for meals  times a day  - Out of bed for toileting  - Record patient progress and toleration of activity level   Outcome: Progressing     Problem: PAIN - ADULT  Goal: Verbalizes/displays adequate comfort level or baseline comfort level  Description: Interventions:  - Encourage patient to monitor pain and request assistance  - Assess pain using appropriate pain scale  - Administer analgesics based on type and severity of pain and evaluate response  - Implement non-pharmacological measures as appropriate and evaluate response  - Consider cultural and social influences on pain and pain management  - Notify physician/advanced practitioner if interventions unsuccessful or patient reports new pain  Outcome: Progressing     Problem: INFECTION - ADULT  Goal: Absence or prevention of progression during hospitalization  Description: INTERVENTIONS:  - Assess and monitor for signs and symptoms of infection  - Monitor lab/diagnostic results  - Monitor all insertion sites, i e  indwelling lines, tubes, and drains  - Monitor endotracheal if appropriate and nasal secretions for changes in amount and color  - [place] appropriate cooling/warming therapies per order  - Administer medications as ordered  - Instruct and encourage patient and family to use good hand hygiene technique  - Identify and instruct in appropriate isolation precautions for identified infection/condition  Outcome: Progressing  Goal: Absence of fever/infection during neutropenic period  Description: INTERVENTIONS:  - Monitor WBC    Outcome: Progressing     Problem: SAFETY ADULT  Goal: Maintain or return to baseline ADL function  Description: INTERVENTIONS:  -  Assess patient's ability to carry out ADLs; assess patient's baseline for ADL function and identify physical deficits which impact ability to perform ADLs (bathing, care of mouth/teeth, toileting, grooming, dressing, etc )  - Assess/evaluate cause of self-care deficits   - Assess range of motion  - Assess patient's mobility; develop plan if impaired  - Assess patient's need for assistive devices and provide as appropriate  - Encourage maximum independence but intervene and supervise when necessary  - Involve family in performance of ADLs  - Assess for home care needs following discharge   - Consider OT consult to assist with ADL evaluation and planning for discharge  - Provide patient education as appropriate  Outcome: Progressing  Goal: Maintains/Returns to pre admission functional level  Description: INTERVENTIONS:  - Perform BMAT or MOVE assessment daily    - Set and communicate daily mobility goal to care team and patient/family/caregiver  - Collaborate with rehabilitation services on mobility goals if consulted  - Perform Range of Motion  times a day  - Reposition patient every  hours    - Dangle patient  times a day  - Stand patient  times a day  - Ambulate patient  times a day  - Out of bed to chair  times a day   - Out of bed for meals  times a day  - Out of bed for toileting  - Record patient progress and toleration of activity level   Outcome: Progressing  Goal: Patient will remain free of falls  Description: INTERVENTIONS:  - Educate patient/family on patient safety including physical limitations  - Instruct patient to call for assistance with activity   - Consult OT/PT to assist with strengthening/mobility   - Keep Call bell within reach  - Keep bed low and locked with side rails adjusted as appropriate  - Keep care items and personal belongings within reach  - Initiate and maintain comfort rounds  - Make Fall Risk Sign visible to staff  - Offer Toileting every  Hours, in advance of need  - Initiate/Maintain alarm  - Obtain necessary fall risk management equipment:   - Apply yellow socks and bracelet for high fall risk patients  - Consider moving patient to room near nurses station  Outcome: Progressing     Problem: DISCHARGE PLANNING  Goal: Discharge to home or other facility with appropriate resources  Description: INTERVENTIONS:  - Identify barriers to discharge w/patient and caregiver  - Arrange for needed discharge resources and transportation as appropriate  - Identify discharge learning needs (meds, wound care, etc )  - Arrange for interpretive services to assist at discharge as needed  - Refer to Case Management Department for coordinating discharge planning if the patient needs post-hospital services based on physician/advanced practitioner order or complex needs related to functional status, cognitive ability, or social support system  Outcome: Progressing Problem: Knowledge Deficit  Goal: Patient/family/caregiver demonstrates understanding of disease process, treatment plan, medications, and discharge instructions  Description: Complete learning assessment and assess knowledge base    Interventions:  - Provide teaching at level of understanding  - Provide teaching via preferred learning methods  Outcome: Progressing

## 2023-03-14 NOTE — PROGRESS NOTES
Progress Note - Trauma Tertiary [place]   Asia Agee 80 y o  male [id number]   Unit/Bed#: [facility] 921-01 Encounter: [id number]     Assessment & Plan   Summary of Diagnosed Injuries:   1  Fall  2  Ambulatory Dysfunction  3  Dementia  4  Dysphagia    PLAN:  -Trauma signing off at this time  -No further work-up  -Review of imaging reveals no acute traumatic findings  -May call back with questions or concerns  -Rest of care per primary team  -Patient may be discharged from a trauma perspective    Disposition: DC from trauma service  Call back with questions or concerns  Code status:  Level 1 - Full Code    Consultants: IP CONSULT TO CASE MANAGEMENT     Subjective   Transfer from: Not a transfer    Mechanism of Injury:Fall     Chief Complaint: " I have no pain "    HPI/Last 24 hour events: Patient reports no new pain today on presentation  Reports he is feeling much better  Denying any new nausea or vomiting  No chest pain or shortness of breath  No new headaches  No blurry vision or double vision  Objective   Vitals:   Temp:  [96 9 °F (36 1 °C)-97 9 °F (36 6 °C)] 97 5 °F (36 4 °C)  HR:  [62-88] 78  Resp:  [15-20] 18  BP: (109-217)/() 109/51    I/O       03/12 0701  03/13 0700 03/13 0701  03/14 0700 03/14 0701  03/15 0700    Urine (mL/kg/hr)  250     Total Output  250     Net  -250                   Physical Exam:   GENERAL APPEARANCE: No acute distress  NEURO: No focal deficits appreciated on exam  HEENT: Normocephalic  CV: Regular rate and rhythm  LUNGS: CTA bilaterally  GI: Nontender, nondistended  : No Clemons  MSK: +2 pulses on extremities  SKIN: Warm, dry, intact    Invasive Devices     Peripheral Intravenous Line  Duration           Peripheral IV 03/13/23 Left Antecubital <1 day                   1  Before the illness or injury that brought you to the Emergency, did you need someone to help you on a regular basis? 1=Yes   2   Since the illness or injury that brought you to the Emergency, have you needed more help than usual to take care of yourself? 1=Yes   3  Have you been hospitalized for one or more nights during the past 6 months (excluding a stay in the Emergency Department)? 0=No   4  In general, do you see well? 0=Yes   5  In general, do you have serious problems with your memory? 0=No   6  Do you take more than three different medications everyday? 1=Yes   TOTAL   3     Did you order a geriatric consult if the score was 2 or greater?: n/a         Lab Results:   Results: I have personally reviewed all pertinent laboratory/tests results, BMP/CMP:   Lab Results   Component Value Date    SODIUM 138 03/14/2023    K 3 7 03/14/2023     03/14/2023    CO2 30 03/14/2023    BUN 10 03/14/2023    CREATININE 0 90 03/14/2023    CALCIUM 11 1 (H) 03/14/2023    EGFR 74 03/14/2023    and CBC:   Lab Results   Component Value Date    WBC 5 96 03/14/2023    HGB 11 4 (L) 03/14/2023    HCT 35 6 (L) 03/14/2023    MCV 86 03/14/2023     03/14/2023    MCH 27 6 03/14/2023    MCHC 32 0 03/14/2023    RDW 14 0 03/14/2023    MPV 10 5 03/14/2023    NRBC 0 03/14/2023       Imaging Results: I have personally reviewed pertinent reports      Chest Xray(s): negative for acute findings   FAST exam(s): negative for acute findings   CT Scan(s): negative for acute findings   Additional Xray(s): negative for acute findings     Other Studies: No other studies

## 2023-03-14 NOTE — ASSESSMENT & PLAN NOTE
· Patient has a history of dysphagia, that has been noted at previous admissions as well  He also is at risk for silent aspiration which is also noted on CT imaging at this admission  · He also has history of Schatzki ring that was seen on prior EGD along with possible Candida esophagitis     · S/p VBS   · Speech eval - should ideally follow a level 2  mech altered, moist diet w/ thin liquids

## 2023-03-14 NOTE — PROGRESS NOTES
[facility]  Progress Note - Asia Agee 1/16/1933, 80 y o  male MRN: [id number]  [facility] Encounter: [id number]  Primary Care Provider: MICHELE Moyer   Date and time admitted to hospital: 3/13/2023  8:05 AM    * [address]  · Patient noted to have an unwitnessed fall at a closed dementia unit  As per the patient, no head strike and he did not lose consciousness  Patient does report of having poor oral intake over the course of past few days and he does have history of orthostatic hypotension  He underwent imaging in the ED including CT and x-ray studies which were negative for any acute pathology  Likely related to patients history of dementia and orthostatic hypotension  · Fall precautions  · Imaging studies reviewed  · Pain control as needed  · PT and OT ordered  · On DVT prophylaxis   · Orthostatics ordered  · Echo ordered, last echo in 2019  · Loop interrogation as below         Elevated troponin  Assessment & Plan  · Admitted with elevated troponin with initial troponin at 340 with subsequent 2hr troponin at 295  · Prior echo results noted   · Currently pending repeat echo given elevated trops   · EKG reviewed, noted to have T-wave inversion in V2-V3   · MDT loop interrogated (in old chart) - shows PVC's, and PAC's no a fib or other arrhythmias     Dysphagia  Assessment & Plan  · Patient has a history of dysphagia, that has been noted at previous admissions as well  He also is at risk for silent aspiration which is also noted on CT imaging at this admission  · He also has history of Schatzki ring that was seen on prior EGD along with possible Candida esophagitis     · S/p VBS   · Speech eval - should ideally follow a level 2  [facility] altered, moist diet w/ thin liquids       Ambulatory dysfunction  Assessment & Plan  · Patient with recent history of pelvic fracture following which he was sent to [address] [place] [address] for physical therapy  Has been mostly wheelchair bound and getting intermittent physical therapy  Also very likely that his dementia maybe complicating this as well  · PT and OT ordered  · Will work with CM towards placement   · Fall precautions    Dementia [facility])  Assessment & Plan  · History of dementia  Patient has longstanding history of dementia, he has previously been seen by geriatrics at last admission  · Likely vascular etiology  · On memantine 5 mg daily  · On sertraline 75 mg daily  · Monitor for delirium         History of CVA (cerebrovascular accident)  Assessment & Plan  · History of CVA following which he was switched from aspirin to plavix  · On plavix and statin    Elevated AST (SGOT)  Assessment & Plan  Noted to have slightly elevated AST at 45 POA  -Will hold statin for now  -Can resume once it normalizes  -Monitor with CMPs    Hypertension  Assessment & Plan  · Patient with history of orthostatic hypotension (previously on midodrine)   · Was hypertensive on admission likely form pain/fall   · Has not needed PRN meds and bp improved   · Monitor       Moderate protein-calorie malnutrition (HCC)  Assessment & Plan  Malnutrition Findings:                        Likely in the setting of dementia and poor oral intake  Will work with SLP and nutrition services towards appropriate nutrition for patient  BMI of around 20         BMI Findings: There is no height or weight on file to calculate BMI  VTE Pharmacologic Prophylaxis:   Moderate Risk (Score 3-4) - Pharmacological DVT Prophylaxis Ordered: enoxaparin (Lovenox)  Patient Centered Rounds: I performed bedside rounds with nursing staff today  Discussions with Specialists or Other Care Team Provider: CM, primary RN     Education and Discussions with Family / Patient: Updated  (wife) via phone      Total Time Spent on Date of Encounter in care of patient: 25 minutes This time was spent on one or more of the following: performing physical exam; counseling and coordination of care; obtaining or reviewing history; documenting in the medical record; reviewing/ordering tests, medications or procedures; communicating with other healthcare professionals and discussing with patient's family/caregivers  Current Length of Stay: 1 day(s)  Current Patient Status: Inpatient   Certification Statement: The patient will continue to require additional inpatient hospital stay due to pending PT eval, echo, orthostatic blood pressures   Discharge Plan: Anticipate discharge in 24-48 hrs to pending rehab evals     Code Status: Level 1 - Full Code    Subjective:   He has no acute complaints at this time  Denies chest pain, shortness of breath, abdominal pain, or other symptoms  Objective:     Vitals:   Temp (24hrs), Av 2 °F (36 2 °C), Min:96 6 °F (35 9 °C), Max:97 5 °F (36 4 °C)    Temp:  [96 6 °F (35 9 °C)-97 5 °F (36 4 °C)] 96 6 °F (35 9 °C)  HR:  [62-78] 78  Resp:  [18] 18  BP: (109-186)/(51-85) 159/73  SpO2:  [98 %-100 %] 100 %  There is no height or weight on file to calculate BMI  Input and Output Summary (last 24 hours): Intake/Output Summary (Last 24 hours) at 3/14/2023 1010  Last data filed at 3/13/2023 1701  Gross per 24 hour   Intake --   Output 250 ml   Net -250 ml       Physical Exam:   Physical Exam  Vitals and nursing note reviewed  Constitutional:       General: He is not in acute distress  HENT:      Head: Normocephalic and atraumatic  Cardiovascular:      Rate and Rhythm: Normal rate and regular rhythm  Pulmonary:      Effort: Pulmonary effort is normal       Breath sounds: Normal breath sounds  Abdominal:      General: Abdomen is flat  Palpations: Abdomen is soft  Musculoskeletal:      Right lower leg: No edema  Left lower leg: No edema  Skin:     General: Skin is warm and dry  Coloration: Skin is not jaundiced  Neurological:      Mental Status: He is alert            Additional Data: Labs:  Results from last 7 days   Lab Units 03/14/23  0617   WBC Thousand/uL 5 96   HEMOGLOBIN g/dL 11 4*   HEMATOCRIT % 35 6*   PLATELETS Thousands/uL 193   NEUTROS PCT % 43   LYMPHS PCT % 44   MONOS PCT % 10   EOS PCT % 3     Results from last 7 days   Lab Units 03/14/23  0617 03/13/23  0817   SODIUM mmol/L 138 139   POTASSIUM mmol/L 3 7 3 6   CHLORIDE mmol/L 107 109*   CO2 mmol/L 30 28   BUN mg/dL 10 8   CREATININE mg/dL 0 90 0 87   ANION GAP mmol/L 1* 2*   CALCIUM mg/dL 11 1* 10 9*   ALBUMIN g/dL  --  3 7   TOTAL BILIRUBIN mg/dL  --  0 73   ALK PHOS U/L  --  69   ALT U/L  --  45   AST U/L  --  63*   GLUCOSE RANDOM mg/dL 105 126                       Lines/Drains:  Invasive Devices     Peripheral Intravenous Line  Duration           Peripheral IV 03/13/23 Left Antecubital 1 day                      Imaging: No pertinent imaging reviewed  Recent Cultures (last 7 days):         Last 24 Hours Medication List:   Current Facility-Administered Medications   Medication Dose Route Frequency Provider Last Rate   • acetaminophen  650 mg Oral Q8H PRN Malena Borges MD     • clopidogrel  75 mg Oral Daily Malena Borges MD     • enoxaparin  40 mg Subcutaneous Daily Malena Borges MD     • labetalol  10 mg Intravenous Q4H PRN Malena Borges MD     • melatonin  3 mg Oral HS Malena Borges MD     • memantine  5 mg Oral HS Malena Borges MD     • metoprolol succinate  25 mg Oral Daily Malena Borges MD     • pantoprazole  40 mg Oral Early Morning Malena Borges MD     • psyllium  1 packet Oral Daily Malena Borges MD     • sertraline  75 mg Oral Daily Malena Borges MD          Today, Patient Was Seen By: Jonnie Solitario PA-C    **Please Note: This note may have been constructed using a voice recognition system  **

## 2023-03-14 NOTE — PLAN OF CARE
Problem: OCCUPATIONAL THERAPY ADULT  Goal: Performs self-care activities at highest level of function for planned discharge setting  See evaluation for individualized goals  Description: Treatment Interventions: ADL retraining          See flowsheet documentation for full assessment, interventions and recommendations  Note: Limitation: Decreased ADL status, Decreased Safe judgement during ADL, Decreased cognition, Decreased endurance, Decreased self-care trans, Decreased high-level ADLs  Prognosis: Fair  Assessment: Pt is a 80 y o  YO  male admitted to [facility] on 3/13/2023 w/ fall at [facility]  Pt w/ recent fall 3 w/ LLE pelvic fx, WBAT  Pt  W/ hx ofCVA, dementia, HTN, and dysphagia  Pt with active OT orders*   since recent fall has been in [facility] rehab- SPT to w/c only w/ A x 1, A for ADLS, assist for all IADLs, feeds self Currently pt is mod a for ub adls, max a for lb adls, and mod a x 2 for transfers  Pt is limited at this time 2*: pain, endurance, activity tolerance, functional mobility, decreased I w/ ADLS/IADLS, cognitive impairments, decreased safety awareness and decreased insight into deficits  The following Occupational Performance Areas to address include: grooming, bathing/shower, toilet hygiene, dressing and functional mobility  Based on the aforementioned OT evaluation, functional performance deficits, and assessments, pt has been identified as a high complexity evaluation  From OT standpoint, anticipate d/c STR  Pt to continue to benefit from acute immediate OT services to address the following goals 2-3x/week to  w/in 14 days:  The patient's raw score on the -PAC Daily Activity Inpatient Short Form is 16  A raw score of less than 19 suggests the patient may benefit from discharge to post-acute rehabilitation services  Please refer to the recommendation of the Occupational Therapist for safe discharge planning       OT Discharge Recommendation: Post acute rehabilitation services

## 2023-03-14 NOTE — UTILIZATION REVIEW
NOTIFICATION OF INPATIENT ADMISSION   AUTHORIZATION REQUEST   SERVICING FACILITY:   [facility]  Address: [address] [place] [address]  Tax ID: [id number]  NPI: [id number] ATTENDING PROVIDER:  Attending Name and NPI#: Altha Collet, Md [[id number]]  Address: [address] [place] [address]  Phone: [phone]   ADMISSION INFORMATION:  [facility]  Place of Service Code: 21  Inpatient Admission Date/Time: 3/13/23  9:50 AM  Discharge Date/Time: No discharge date for patient encounter  Admitting Diagnosis Code/Description:  Fall, initial encounter [W19  XXXA]  Ambulatory dysfunction [R26 2]  Other injury of unspecified body region, initial encounter [T14  8XXA]     UTILIZATION REVIEW CONTACT:  Dayana Kelly Utilization   Network Utilization Review Department  Phone: [phone]  Fax: [phone]  Email: [email or web address]  Contact for approvals/pending authorizations, clinical reviews, and discharge  PHYSICIAN ADVISORY SERVICES:  Medical Necessity Denial & Eroz-qh-Ydwg Review  Phone: [phone]  Fax: [phone]  Email: [email or web address]

## 2023-03-14 NOTE — ASSESSMENT & PLAN NOTE
· Patient with history of orthostatic hypotension (previously on midodrine)   · Was hypertensive on admission likely form pain/fall   · Has not needed PRN meds and bp improved   · Monitor

## 2023-03-14 NOTE — ASSESSMENT & PLAN NOTE
· Admitted with elevated troponin with initial troponin at 340 with subsequent 2hr troponin at 295     · Prior echo results noted   · Currently pending repeat echo given elevated trops   · EKG reviewed, noted to have T-wave inversion in V2-V3   · MDT loop interrogated (in old chart) - shows PVC's, and PAC's no a fib or other arrhythmias

## 2023-03-14 NOTE — PLAN OF CARE
Problem: PHYSICAL THERAPY ADULT  Goal: Performs mobility at highest level of function for planned discharge setting  See evaluation for individualized goals  Description: Treatment/Interventions: Functional transfer training, LE strengthening/ROM, Therapeutic exercise, Endurance training, Patient/family training, Equipment eval/education, Bed mobility, Gait training, OT, Spoke to nursing  Equipment Recommended: Wheelchair, Grisel Carl       See flowsheet documentation for full assessment, interventions and recommendations  Note: Prognosis: Fair  Problem List: Decreased strength, Decreased endurance, Impaired balance, Decreased mobility, Pain  Assessment: PT completed evaluation of 80year old male admitted to [facility] on 3/13/2023 after unwitnessed fall while at SNF  Poor oral intake and + h/o orthostatic hypotension  Per chart review, patient with mechanical fall also on 3/2 and identified to have L sided pelvic fracture  WBAT L LE per orthopedics at that time  XR trauma multiple on 3/13 states "no acute fracture visualized in pelvis"  Current status instabilities include pain (L leg), continuous O2/HR monitoring, falls risk, and bed/chair alarms  PMH is significant for CVA, dysphagia, craniotomy, and dementia  Patient is alert and oriented x 3 today  He states that normally he resides "at [facility]" however since his most recent fall he has been at [facility] rehab  Patient states that prior to even his first fall, he was only transferring to a wheelchair for mobility  He states that he gets assistance getting out of bed, pivoting to manual WC, and then does self propel manual WC short distances with arms and legs  Assist with ADLs and iADLS  Does feed himself  Current impairments include pain, decreased balance and gross strength and activity tolerance  During PT evaluation patient required mod-AX1 for supine-->sit transfer and maintained fair static sitting balance   He required mod-AX2 for stand pivot transfer (bed to chair) with hand held assist  Patient did initiate small side steps  Post evaluation patient seated OOB in chair and feeding himself  PT d/c recommendation is for return to short term rehab  Patient will continue to benefit from continued skilled PT this admission to achieve maximal function and safety  PT Discharge Recommendation: (S) Post acute rehabilitation services (return to rehab  (from [facility] rehab per patient and chart review))    See flowsheet documentation for full assessment

## 2023-03-14 NOTE — CASE MANAGEMENT
Case Management Assessment & Discharge Planning Note    Patient name Naseem Jo  [facility] [id number]  : 1933 Date 3/14/2023       Current Admission Date: 3/13/2023  Current Admission Diagnosis:Fall   Patient Active Problem List    Diagnosis Date Noted   • Fall 2023   • Ambulatory dysfunction 2023   • Elevated troponin 2023   • History of CVA (cerebrovascular accident) 2023   • Dementia ([facility] [address] ) 2023   • Elevated AST (SGOT) 2023   • Hypertension 2023   • Moderate protein-calorie malnutrition ([facility] [address] ) 2023   • Dysphagia 2023      LOS (days): 1  Geometric Mean LOS (GMLOS) (days):   Days to GMLOS:     OBJECTIVE:    Risk of Unplanned Readmission Score: 9 27         Current admission status: Inpatient       Preferred Pharmacy: No Pharmacies Listed  Primary Care Provider: MICHELE Unger    Primary Insurance: Nanette Huitron [facility]  Secondary Insurance:     ASSESSMENT:  Melony 26 Proxies    There are no active Health Care Proxies on file  Advance Directives  Does patient have a Health Care POA?: Yes  Primary Contact: Dennis Acuna - [phone]         Readmission Root Cause  30 Day Readmission: No    Patient Information  Admitted from[de-identified] Facility  Mental Status: Confused  During Assessment patient was accompanied by: Not accompanied during assessment  Assessment information provided by[de-identified] Spouse  Primary Caregiver:  Other (Comment)  Caregiver's Name[de-identified] [address] Relationship to Patient[de-identified] Other ([facility]) (Facility Staff)  Caregiver's Telephone Number[de-identified] [phone]  Support Systems: Spouse/significant other, Daughter  [place] of Residence: [address] [place] [address] do you live in?: [place]  Type of Current Residence: Facility  Upon entering residence, is there a bedroom on the main floor (no further steps)?: Yes  Upon entering residence, is there a bathroom on the main floor (no further steps)?: Yes  In the last 12 months, was there a time when you were not able to pay the mortgage or rent on time?: No  In the last 12 months, how many places have you lived?: 1  In the last 12 months, was there a time when you did not have a steady place to sleep or slept in a shelter (including now)?: No  Homeless/housing insecurity resource given?: N/A  Living Arrangements: Lives Alone  Is patient a ?: No    Activities of Daily Living Prior to Admission  Functional Status: Assistance  Completes ADLs independently?: No  Level of ADL dependence: Assistance  Ambulates independently?: No  Level of ambulatory dependence: Assistance  Does patient use assisted devices?: Yes  Assisted Devices (DME) used: Joanna Ludin, Wheelchair  Does patient currently own DME?: Yes  What DME does the patient currently own?: Roro Hernandez, Wheelchair  Does patient have a history of Outpatient Therapy (PT/OT)?: No  Does patient have a history of [address]?: Yes ([facility] in [place])  Does patient currently have [address]?: No         Patient Information Continued  Income Source: Pension/snf  Does patient have prescription coverage?: Yes  Within the past 12 months, you worried that your food would run out before you got the money to buy more : Never true  Within the past 12 months, the food you bought just didn't last and you didn't have money to get more : Never true  Food insecurity resource given?: N/A  Does patient receive dialysis treatments?: No  Does patient have a history of substance abuse?: No  Does patient have a history of Mental Health Diagnosis?: No (Pt's wife reported pt experienced anxiety in the past, not documented on pt's chart)         Means of Transportation  Means of Transport to Appts[de-identified] Other (Comment)  In the past 12 months, has lack of transportation kept you from medical appointments or from getting medications?: No  In the past 12 months, has lack of transportation kept you from meetings, work, or from getting things needed for daily living?: No  Was application for public transport provided?: N/A        DISCHARGE DETAILS:    Discharge planning discussed with[de-identified] Pt's wifeBenito Stephania of Choice: Yes  Comments - Freedom of Choice: REED to [facility] pending recs  CM contacted family/caregiver?: Yes (Pt's wife)             Contacts  Patient Contacts: Inga Gillette  Relationship to Patient[de-identified] Family  Contact Method: Phone  Phone Number: [phone]  Reason/Outcome: Continuity of Care, Emergency Contact, Discharge Planning            Other Referral/Resources/Interventions Provided:  Referral Comments: Pending recs         Treatment Team Recommendation: Other (Return to [facility] pending recs)  Discharge Destination Plan[de-identified] Other (Return to [facility])  Transport at Discharge : BLS Ambulance                 Additional Comments: CM called pt's wife, Rosa Nur, to complete open assessment  CM introduced self and role  CM reported that CM assists with d/c planning and that pt will be evaluated by PT/OT

## 2023-03-15 PROBLEM — R74.01 ELEVATED AST (SGOT): Status: RESOLVED | Noted: 2023-03-13 | Resolved: 2023-03-15

## 2023-03-15 LAB
ALBUMIN SERPL BCP-MCNC: 3 G/DL (ref 3.5–5)
ALP SERPL-CCNC: 59 U/L (ref 46–116)
ALT SERPL W P-5'-P-CCNC: 35 U/L (ref 12–78)
ANION GAP SERPL CALCULATED.3IONS-SCNC: 5 MMOL/L (ref 4–13)
AST SERPL W P-5'-P-CCNC: 41 U/L (ref 5–45)
BILIRUB SERPL-MCNC: 0.44 MG/DL (ref 0.2–1)
BUN SERPL-MCNC: 9 MG/DL (ref 5–25)
CALCIUM ALBUM COR SERPL-MCNC: 11.4 MG/DL (ref 8.3–10.1)
CALCIUM SERPL-MCNC: 10.6 MG/DL (ref 8.3–10.1)
CHLORIDE SERPL-SCNC: 110 MMOL/L (ref 96–108)
CO2 SERPL-SCNC: 23 MMOL/L (ref 21–32)
CREAT SERPL-MCNC: 0.79 MG/DL (ref 0.6–1.3)
ERYTHROCYTE [DISTWIDTH] IN BLOOD BY AUTOMATED COUNT: 14 % (ref 11.6–15.1)
GFR SERPL CREATININE-BSD FRML MDRD: 79 ML/MIN/1.73SQ M
GLUCOSE SERPL-MCNC: 94 MG/DL (ref 65–140)
HCT VFR BLD AUTO: 36.6 % (ref 36.5–49.3)
HGB BLD-MCNC: 12.1 G/DL (ref 12–17)
MCH RBC QN AUTO: 28.1 PG (ref 26.8–34.3)
MCHC RBC AUTO-ENTMCNC: 33.1 G/DL (ref 31.4–37.4)
MCV RBC AUTO: 85 FL (ref 82–98)
PLATELET # BLD AUTO: 201 THOUSANDS/UL (ref 149–390)
PMV BLD AUTO: 10.3 FL (ref 8.9–12.7)
POTASSIUM SERPL-SCNC: 3.8 MMOL/L (ref 3.5–5.3)
PROT SERPL-MCNC: 6 G/DL (ref 6.4–8.4)
RBC # BLD AUTO: 4.31 MILLION/UL (ref 3.88–5.62)
SODIUM SERPL-SCNC: 138 MMOL/L (ref 135–147)
WBC # BLD AUTO: 6.72 THOUSAND/UL (ref 4.31–10.16)

## 2023-03-15 RX ADMIN — TRIMETHOBENZAMIDE HYDROCHLORIDE 200 MG: 100 INJECTION INTRAMUSCULAR at 20:44

## 2023-03-15 RX ADMIN — PANTOPRAZOLE SODIUM 40 MG: 40 TABLET, DELAYED RELEASE ORAL at 05:09

## 2023-03-15 RX ADMIN — Medication 1 PACKET: at 08:48

## 2023-03-15 RX ADMIN — METOPROLOL SUCCINATE 25 MG: 25 TABLET, EXTENDED RELEASE ORAL at 08:46

## 2023-03-15 RX ADMIN — SERTRALINE HYDROCHLORIDE 75 MG: 50 TABLET ORAL at 08:46

## 2023-03-15 RX ADMIN — CLOPIDOGREL BISULFATE 75 MG: 75 TABLET ORAL at 08:46

## 2023-03-15 RX ADMIN — ENOXAPARIN SODIUM 40 MG: 40 INJECTION SUBCUTANEOUS at 08:45

## 2023-03-15 NOTE — PLAN OF CARE
Problem: MOBILITY - ADULT  Goal: Maintain or return to baseline ADL function  Description: INTERVENTIONS:  -  Assess patient's ability to carry out ADLs; assess patient's baseline for ADL function and identify physical deficits which impact ability to perform ADLs (bathing, care of mouth/teeth, toileting, grooming, dressing, etc )  - Assess/evaluate cause of self-care deficits   - Assess range of motion  - Assess patient's mobility; develop plan if impaired  - Assess patient's need for assistive devices and provide as appropriate  - Encourage maximum independence but intervene and supervise when necessary  - Involve family in performance of ADLs  - Assess for home care needs following discharge   - Consider OT consult to assist with ADL evaluation and planning for discharge  - Provide patient education as appropriate  Outcome: Progressing  Goal: Maintains/Returns to pre admission functional level  Description: INTERVENTIONS:  - Perform BMAT or MOVE assessment daily    - Set and communicate daily mobility goal to care team and patient/family/caregiver  - Collaborate with rehabilitation services on mobility goals if consulted  - Perform Range of Motion 3 times a day  - Reposition patient every 3 hours    - Dangle patient 3 times a day  - Stand patient 3 times a day  - Ambulate patient 3 times a day  - Out of bed to chair 3 times a day   - Out of bed for meals 3 times a day  - Out of bed for toileting  - Record patient progress and toleration of activity level   Outcome: Progressing     Problem: PAIN - ADULT  Goal: Verbalizes/displays adequate comfort level or baseline comfort level  Description: Interventions:  - Encourage patient to monitor pain and request assistance  - Assess pain using appropriate pain scale  - Administer analgesics based on type and severity of pain and evaluate response  - Implement non-pharmacological measures as appropriate and evaluate response  - Consider cultural and social influences on pain and pain management  - Notify physician/advanced practitioner if interventions unsuccessful or patient reports new pain  Outcome: Progressing     Problem: INFECTION - ADULT  Goal: Absence or prevention of progression during hospitalization  Description: INTERVENTIONS:  - Assess and monitor for signs and symptoms of infection  - Monitor lab/diagnostic results  - Monitor all insertion sites, i e  indwelling lines, tubes, and drains  - Monitor endotracheal if appropriate and nasal secretions for changes in amount and color  - [place] appropriate cooling/warming therapies per order  - Administer medications as ordered  - Instruct and encourage patient and family to use good hand hygiene technique  - Identify and instruct in appropriate isolation precautions for identified infection/condition  Outcome: Progressing  Goal: Absence of fever/infection during neutropenic period  Description: INTERVENTIONS:  - Monitor WBC    Outcome: Progressing     Problem: DISCHARGE PLANNING  Goal: Discharge to home or other facility with appropriate resources  Description: INTERVENTIONS:  - Identify barriers to discharge w/patient and caregiver  - Arrange for needed discharge resources and transportation as appropriate  - Identify discharge learning needs (meds, wound care, etc )  - Arrange for interpretive services to assist at discharge as needed  - Refer to Case Management Department for coordinating discharge planning if the patient needs post-hospital services based on physician/advanced practitioner order or complex needs related to functional status, cognitive ability, or social support system  Outcome: Progressing

## 2023-03-15 NOTE — ASSESSMENT & PLAN NOTE
-Admitted with elevated troponin with initial troponin at 340 with subsequent 2hr troponin at 295    -Prior echo results noted   -Echo:  LVEF 50-55%  G1DD  Although no diagnostic regional wall motion abnormality was identified, this possibility cannot be completely excluded on the basis of this study  Normal RV systolic fxn  Mild AR/MR  No significant changes compared to 11/8/2019 study  noted compared to the prior study    -EKG reviewed, noted to have T-wave inversion in V2-V3   -MDT loop interrogated (in old chart) - shows PVC's, and PAC's no a fib or other arrhythmias   -c/s cards

## 2023-03-15 NOTE — ASSESSMENT & PLAN NOTE
-Patient with recent history of pelvic fracture following which he was sent to [address] [place] [address] for physical therapy  Has been mostly wheelchair bound and getting intermittent physical therapy  Also very likely that his dementia maybe complicating this as well     -PT/OT/CM

## 2023-03-15 NOTE — ASSESSMENT & PLAN NOTE
· Patient has longstanding history of dementia, he has previously been seen by geriatrics at last admission    · Likely vascular etiology  · On memantine 5 mg daily  · On sertraline 75 mg daily  · Monitor for delirium

## 2023-03-15 NOTE — CASE MANAGEMENT
Case Management Discharge Planning Note    Patient name Vaibhav Schultz  [facility] [address] [facility] [address] [facility]-10 MRN [id number]  : 1933 Date 3/15/2023       Current Admission Date: 3/13/2023  Current Admission Diagnosis:Fall   Patient Active Problem List    Diagnosis Date Noted   • Fall 2023   • Ambulatory dysfunction 2023   • Elevated troponin 2023   • History of CVA (cerebrovascular accident) 2023   • Dementia ([facility] [address] ) 2023   • Primary hypertension 2023   • Moderate protein-calorie malnutrition ([facility] [address] ) 2023   • Dysphagia 2023      LOS (days): 2  Geometric Mean LOS (GMLOS) (days): 2 10  Days to GMLOS:-0 1     OBJECTIVE:  Risk of Unplanned Readmission Score: 8 55         Current admission status: Inpatient   Preferred Pharmacy: No Pharmacies Listed  Primary Care Provider: MICHELE Gonzalez    Primary Insurance: Bradley Reeder [facility]  Secondary Insurance:     DISCHARGE DETAILS:       Additional Comments: Per Rosario at [facility], a conversation was had with pt's wife and she is in agreement with [facility] to transfer pt to their memory care unit due to PT/OT recs  Per chart review, pt will likely be medically clear for d/c tomorrow pending levels  CM to follow to set up transport for d/c  Hefsy also faxed over the Documentation of Medical Evaluation (DME) for provider to complete and CM to fax back over

## 2023-03-15 NOTE — SPEECH THERAPY NOTE
Speech Language/Pathology    Speech/Language Pathology Progress Note    Patient Name: Sonal Greenfield  Today's Date: 3/15/2023     Problem List  Principal Problem:    Fall  Active Problems:    Ambulatory dysfunction    Elevated troponin    History of CVA (cerebrovascular accident)    Dementia ([facility] [address] )    Primary hypertension    Moderate protein-calorie malnutrition ([facility] [address] )    Dysphagia       Past Medical History  No past medical history on file  Past Surgical History  No past surgical history on file  Subjective:  Pt self feeding lunch meal     Current Diet: level 2 w/ thin   Objective:  Pt seen for ongoing dx dysphagia w/ level 2 lunch tray  He does not have his dentures here and this diet was his baseline prior to admit  Adequate manipulation and transfer of all solids on the tray  Mult swallows  Pt at times will alternate with a sip of thin  No overt coughing w/ any material   Discussed options for diet- pt states he is ok with the diet at this time and does not wish for an upgrade  Assessment:  Pt is tolerating a previously recommended diet of level 2 w/ thin  No need for upgrade or changes  He does not have his dentures here at this time       Plan/Recommendations:   level 2 w/ thin  No further f/u at this time

## 2023-03-15 NOTE — PROGRESS NOTES
[facility]  Progress Note - Emma Alvarado 1/16/1933, 80 y o  male MRN: [id number]  Unit/Bed#: [facility] 921-01 Encounter: [id number]  Primary Care Provider: MICHELE Gardner   Date and time admitted to hospital: 3/13/2023  8:05 AM    Elevated troponin  Assessment & Plan  -Admitted with elevated troponin with initial troponin at 340 with subsequent 2hr troponin at 295    -Prior echo results noted   -Echo:  LVEF 50-55%  G1DD  Although no diagnostic regional wall motion abnormality was identified, this possibility cannot be completely excluded on the basis of this study  Normal RV systolic fxn  Mild AR/MR  No significant changes compared to 11/8/2019 study  noted compared to the prior study  -EKG reviewed, noted to have T-wave inversion in V2-V3   -MDT loop interrogated (in old chart) - shows PVC's, and PAC's no a fib or other arrhythmias   -c/s cards    * Fall  Assessment & Plan  -Patient noted to have an unwitnessed fall at a closed dementia unit  As per the patient, no head strike and he did not lose consciousness  Patient does report of having poor oral intake over the course of past few days and he does have history of orthostatic hypotension  He underwent imaging in the ED including CT and x-ray studies which were negative for any acute pathology  Likely related to patients history of dementia and orthostatic hypotension  -PT/OT  -Fall precautions  -Imaging studies reviewed, no acute findings (fracture, ICH, etc)  -see elevated trop problem below        Dysphagia  Assessment & Plan  · Patient has a history of dysphagia, that has been noted at previous admissions as well  He also is at risk for silent aspiration which is also noted on CT imaging at this admission  · He also has history of Schatzki ring that was seen on prior EGD along with possible Candida esophagitis     · S/p VBS   · Speech eval - should ideally follow a level 2 mech altered, moist diet w/ thin liquids       Moderate protein-calorie malnutrition ([facility] [address] )  Assessment & Plan  Malnutrition Findings:                        Likely in the setting of dementia and poor oral intake  Will work with SLP and nutrition services towards appropriate nutrition for patient  BMI of around 20         BMI Findings: Body mass index is 19 94 kg/m²  Primary hypertension  Assessment & Plan  · Patient with history of orthostatic hypotension (previously on midodrine)   · Was hypertensive on admission likely form pain/fall   · Cont BB        Dementia ([facility] [address] )  Assessment & Plan  · Patient has longstanding history of dementia, he has previously been seen by geriatrics at last admission  · Likely vascular etiology  · On memantine 5 mg daily  · On sertraline 75 mg daily  · Monitor for delirium         History of CVA (cerebrovascular accident)  Assessment & Plan  · History of CVA following which he was switched from aspirin to plavix  · On plavix and statin    Ambulatory dysfunction  Assessment & Plan  -Patient with recent history of pelvic fracture following which he was sent to [address] [place] [address] for physical therapy  Has been mostly wheelchair bound and getting intermittent physical therapy  Also very likely that his dementia maybe complicating this as well  -PT/OT/CM      Elevated AST (SGOT)-resolved as of 3/15/2023  Assessment & Plan  Noted to have slightly elevated AST at 45 POA  -Will hold statin for now  -Can resume once it normalizes  -Monitor with CMPs        VTE Pharmacologic Prophylaxis:   Lovenox    Patient Centered Rounds: I performed bedside rounds with nursing staff today      Total Time Spent on Date of Encounter in care of patient: 35 minutes This time was spent on one or more of the following: performing physical exam; counseling and coordination of care; obtaining or reviewing history; documenting in the medical record; reviewing/ordering tests, medications or procedures; communicating with other healthcare professionals and discussing with patient's family/caregivers  Current Length of Stay: 2 day(s)  Current Patient Status: Inpatient   Certification Statement: The patient will continue to require additional inpatient hospital stay due to elevated trop  Discharge Plan: Anticipate discharge tomorrow to discharge location to be determined pending rehab evaluations  Code Status: Level 1 - Full Code    Subjective:   Pt says "I'm cured " Denies CP/SOB  Objective:     Vitals:   Temp (24hrs), Av 4 °F (36 3 °C), Min:97 3 °F (36 3 °C), Max:97 5 °F (36 4 °C)    Temp:  [97 3 °F (36 3 °C)-97 5 °F (36 4 °C)] 97 3 °F (36 3 °C)  Resp:  [14-19] 15  BP: (125-163)/(48-73) 134/54  Body mass index is 19 94 kg/m²  Input and Output Summary (last 24 hours):      Intake/Output Summary (Last 24 hours) at 3/15/2023 1143  Last data filed at 3/15/2023 0900  Gross per 24 hour   Intake 600 ml   Output 682 ml   Net -82 ml       Physical Exam:   Gen: NAD, Awake and alert, appears chronically ill  Eyes: EOMI, PERRLA, no scleral icterus  ENMT:  no nasal discharge, no otic discharge, moist mucous membranes  Neck:  Supple  Cardiovascular:  Regular rate and rhythm, normal S1-S2, no murmurs, rubs, or gallops  Lungs:  Clear to auscultation bilaterally, no wheezes, or rales, or rhonchi  Abdomen:  Positive bowel sounds, soft, nontender, nondistended, no palpable organomegaly   Skin:  Intact, no obvious lesions or rashes, no edema  Neuro: Cranial nerves 2-12 are intact, non-focal, moves all 4 extremities      Additional Data:     Labs:  Results from last 7 days   Lab Units 03/15/23  0522 23  0617   WBC Thousand/uL 6 72 5 96   HEMOGLOBIN g/dL 12 1 11 4*   HEMATOCRIT % 36 6 35 6*   PLATELETS Thousands/uL 201 193   NEUTROS PCT %  --  43   LYMPHS PCT %  --  44   MONOS PCT %  --  10   EOS PCT %  --  3     Results from last 7 days   Lab Units 03/15/23  0522   SODIUM mmol/L 138   POTASSIUM mmol/L 3 8   CHLORIDE mmol/L 110*   CO2 mmol/L 23   BUN mg/dL 9   CREATININE mg/dL 0 79   ANION GAP mmol/L 5   CALCIUM mg/dL 10 6*   ALBUMIN g/dL 3 0*   TOTAL BILIRUBIN mg/dL 0 44   ALK PHOS U/L 59   ALT U/L 35   AST U/L 41   GLUCOSE RANDOM mg/dL 94                       Lines/Drains:  Invasive Devices     Peripheral Intravenous Line  Duration           Peripheral IV 03/13/23 Left Antecubital 2 days          Drain  Duration           External Urinary Catheter <1 day              Recent Cultures (last 7 days):         Last 24 Hours Medication List:   Current Facility-Administered Medications   Medication Dose Route Frequency Provider Last Rate   • acetaminophen  650 mg Oral Q8H PRN Estela Tobin MD     • clopidogrel  75 mg Oral Daily Estela Tobin MD     • enoxaparin  40 mg Subcutaneous Daily Estela Tobin MD     • labetalol  10 mg Intravenous Q4H PRN Estela Tobin MD     • melatonin  3 mg Oral HS Estela Tobin MD     • memantine  5 mg Oral HS Esetla Tobin MD     • metoprolol succinate  25 mg Oral Daily Estela Tobin MD     • pantoprazole  40 mg Oral Early Morning Estela Tobin MD     • psyllium  1 packet Oral Daily Estela Tobin MD     • sertraline  75 mg Oral Daily Estela Tobin MD          Today, Patient Was Seen By: Navi Tee MD    **Please Note: This note may have been constructed using a voice recognition system  **

## 2023-03-15 NOTE — PROGRESS NOTES
-- Patient:  -- MRN: [id number]  -- Aidin Request ID: [id number]  -- Level of care reserved: Leif Pelayo  -- Partner Reserved: [facility] Steve Franks, [place], [address] [place] [address] [phone]  -- Clinical needs requested:  -- Geography searched: [place] miles around [address]  -- Start of Service:  -- Request sent: 2:39pm EDT on 3/15/2023 by Gabriel Aggarwal  -- Partner reserved: 2:50pm EDT on 3/15/2023 by Gabriel Aggarwal  -- Choice list shared: 2:50pm EDT on 3/15/2023 by Gabriel Aggarwal

## 2023-03-15 NOTE — ASSESSMENT & PLAN NOTE
· Patient with history of orthostatic hypotension (previously on midodrine)   · Was hypertensive on admission likely form pain/fall   · Cont BB

## 2023-03-15 NOTE — CASE MANAGEMENT
E[facility]el[facility] [address] received request for authorization from Care Manager    Authorization request for: SNF  Facility Name: [facility]  PAQ:[id number]  Facility MD: Camelia Guadarrama  NPI: [id number]  Authorization initiated by contacting insurance: Etienne Via: fax  Pending Reference #:Not Generated  Clinicals submitted via:Genevieve Sharma

## 2023-03-15 NOTE — CONSULTS
Consultation - Cardiology Team One  Fran Adjutant 80 y o  male MRN: [id number]  Unit/Bed#: [facility] 921-01 Encounter: [id number]    Inpatient consult to Cardiology  Consult performed by: Ada Ragland PA-C  Consult ordered by: Josue Cedillo MD          Physician Requesting Consult: Josue Cedillo MD  Reason for Consult / Principal Problem: Fall    Assessment:    1  Nonischemic myocardial injury: Flat nonspecific troponin in the setting of a fall  EKG with no acute ischemic change  2   Fall: Staff found patient on the ground at nursing facility  He states he fell out of bed while trying to go to the bathroom  He denies any loss of consciousness  Pan imaging revealed no acute traumatic injury  3   History of syncope s/p ILR: Loop interrogated this admission and revealed sinus rhythm with PACs/PVCs with no evidence of AF  4  CAD: s/p remote CABG  Maintained on Plavix, beta-blocker and statin  5   Preserved biventricular systolic function: EF 96-46% with no WMA, G1DD, normal RV function, no significant valvular abnormality  No significant change when compared to the [place] from 11/2019   6   Essential hypertension: Average /58 on Toprol 25 mg daily  7   Orthostatic hypotension: Maintained on midodrine that is currently on hold  8  Hyperlipidemia: Lipid panel 12/2020 , , HDL 52, LDL 91 on simvastatin 80 mg daily  9   Severe dementia: Resides in closed unit at [facility]      10  History of CVA: Maintained on plavix and statin      Plan/Recommendations:  · No evidence of ACS  · No evidence of arrhythmia to account for possible syncope  · By patient report appears to be mechanical fall  · Differentials include orthostatic hypotension therefore would recommend patient liberalize fluid intake  · Recommend compression stockings, increase salt in diet, abdominal binder  · Continue midodrine as needed  · Further recommendations from a cardiac standpoint  _________________________________________________________________________    CC: Fall      History of Present Illness   HPI: Quan Freire is a 80y o  year old male who has CAD s/p CABG, history of syncope s/p ILR, essential hypertension, orthostatic hypotension, hyperlipidemia, history of CVA, dementia who resides in a closed unit at [facility] who follows with cardiologist Dr Flower Nina  Patient presented to the emergency room at [place] [address] on 3/13/2023 after staff found him on the ground  On arrival to the ED patient's BP was 206/91 with oxygen saturation 90% on room air  Pan imaging was negative for any acute traumatic injury  Labs revealed stable CMP, troponin 340--> 295--> 277, stable CBC  UA unremarkable  Loop recorder was interrogated without any arrhythmia noted  An echocardiogram was done that revealed preserved biventricular systolic function with no significant valvular abnormality  Cardiology has been consulted  Home medication regimen includes: Plavix 75 mg daily, Toprol XL 25 mg daily, midodrine 5 mg, simvastatin 80 mg daily  Patient sitting in a chair during consultation and states that he was trying to get out of bed to use the bathroom when he landed on his knees  He recalls falling out of bed and denies any loss of consciousness  He denies any shortness of breath, palpitations, lightheadedness or dizziness  He reports having chest discomfort all the time that can last a second and goes away without any intervention  He currently denies any chest pain  Echocardiogram 3/14/2023: EF 50-55% with no WMA, G1DD, normal RV function, no significant valvular abnormality  No significant change when compared to the [place] from 11/2019  Loop interrogation 3/13/2023: No evidence of atrial fibrillation  Sinus rhythm with PACs/PVCs noted    EKG reviewed personally: 3/13/2023-sinus rhythm at a rate of 78 bpm with incomplete RBBB, LAFB    No significant change compared to the EKG from 11/7/2019  Telemetry reviewed personally: Not on telemetry      Review of Systems   Constitutional: Negative  Negative for chills  Cardiovascular: Negative for chest pain, dyspnea on exertion, leg swelling, near-syncope, orthopnea, palpitations, paroxysmal nocturnal dyspnea and syncope  Respiratory: Negative  Negative for cough, shortness of breath and wheezing  Endocrine: Negative  Hematologic/Lymphatic: Negative  Skin: Negative  Musculoskeletal: Negative  Gastrointestinal: Negative  Negative for diarrhea, nausea and vomiting  Neurological: Negative for dizziness, light-headedness and weakness  Psychiatric/Behavioral: Negative  Negative for altered mental status  All other systems reviewed and are negative  Historical Information   No past medical history on file  No past surgical history on file  Social History     Substance and Sexual Activity   Alcohol Use Not on file     Social History     Substance and Sexual Activity   Drug Use Not on file     Social History     Tobacco Use   Smoking Status Not on file   Smokeless Tobacco Not on file     Family History: No family history on file      Meds/Allergies   all current active meds have been reviewed, current meds:   Current Facility-Administered Medications   Medication Dose Route Frequency   • acetaminophen (TYLENOL) tablet 650 mg  650 mg Oral Q8H PRN   • clopidogrel (PLAVIX) tablet 75 mg  75 mg Oral Daily   • enoxaparin (LOVENOX) subcutaneous injection 40 mg  40 mg Subcutaneous Daily   • labetalol (NORMODYNE) injection 10 mg  10 mg Intravenous Q4H PRN   • melatonin tablet 3 mg  3 mg Oral HS   • memantine (NAMENDA) tablet 5 mg  5 mg Oral HS   • metoprolol succinate (TOPROL-XL) 24 hr tablet 25 mg  25 mg Oral Daily   • pantoprazole (PROTONIX) EC tablet 40 mg  40 mg Oral Early Morning   • psyllium (METAMUCIL) 1 packet  1 packet Oral Daily   • sertraline (ZOLOFT) tablet 75 mg  75 mg Oral Daily    and PTA meds:   None          Not on File    Objective   Vitals: Blood pressure 134/54, pulse 78, temperature (!) 97 3 °F (36 3 °C), temperature source Temporal, resp  rate 15, height 5' 9" (1 753 m), weight 61 2 kg (135 lb), SpO2 100 %  ,     Body mass index is 19 94 kg/m²  ,     Systolic (95XUQ), UXT:690 , Min:125 , WQD:353     Diastolic (17NWC), HPP:64, Min:48, Max:73    Wt Readings from Last 3 Encounters:   03/14/23 61 2 kg (135 lb)      Lab Results   Component Value Date    CREATININE 0 79 03/15/2023    CREATININE 0 90 03/14/2023    CREATININE 0 87 03/13/2023             Intake/Output Summary (Last 24 hours) at 3/15/2023 1307  Last data filed at 3/15/2023 0900  Gross per 24 hour   Intake 600 ml   Output 682 ml   Net -82 ml     Weight (last 2 days)     Date/Time Weight    03/14/23 1030 61 2 (135)    03/13/23 08:13:19 61 3 (135 14)        Invasive Devices     Peripheral Intravenous Line  Duration           Peripheral IV 03/13/23 Left Antecubital 2 days          Drain  Duration           External Urinary Catheter <1 day                  Physical Exam  Vitals and nursing note reviewed  Constitutional:       General: He is not in acute distress  Appearance: He is well-developed  Comments: On RA in NAD   HENT:      Head: Normocephalic and atraumatic  Neck:      Vascular: No JVD  Cardiovascular:      Rate and Rhythm: Normal rate and regular rhythm  Heart sounds: Normal heart sounds  No murmur heard  No friction rub  No gallop  Pulmonary:      Effort: Pulmonary effort is normal  No respiratory distress  Breath sounds: Normal breath sounds  No wheezing or rales  Chest:      Chest wall: No tenderness  Abdominal:      General: Bowel sounds are normal  There is no distension  Palpations: Abdomen is soft  Tenderness: There is no abdominal tenderness  Musculoskeletal:         General: No tenderness  Normal range of motion  Cervical back: Normal range of motion and neck supple  Right lower leg: No edema  Left lower leg: No edema  Skin:     General: Skin is warm and dry  Coloration: Skin is not pale  Findings: No erythema  Neurological:      Mental Status: He is alert and oriented to person, place, and time  Psychiatric:         Mood and Affect: Mood normal          Behavior: Behavior normal          Thought Content: Thought content normal          Judgment: Judgment normal            LABORATORY RESULTS:  Results from last 7 days   Lab Units 03/13/23  0817   CK TOTAL U/L 143     CBC with diff:   Results from last 7 days   Lab Units 03/15/23  0522 03/14/23  0617 03/13/23  0817   WBC Thousand/uL 6 72 5 96 5 57   HEMOGLOBIN g/dL 12 1 11 4* 12 4   HEMATOCRIT % 36 6 35 6* 38 1   MCV fL 85 86 85   PLATELETS Thousands/uL 201 193 199   MCH pg 28 1 27 6 27 7   MCHC g/dL 33 1 32 0 32 5   RDW % 14 0 14 0 13 9   MPV fL 10 3 10 5 10 4   NRBC AUTO /100 WBCs  --  0 0       CMP:  Results from last 7 days   Lab Units 03/15/23  0522 03/14/23  0617 03/13/23  0817   POTASSIUM mmol/L 3 8 3 7 3 6   CHLORIDE mmol/L 110* 107 109*   CO2 mmol/L 23 30 28   BUN mg/dL 9 10 8   CREATININE mg/dL 0 79 0 90 0 87   CALCIUM mg/dL 10 6* 11 1* 10 9*   AST U/L 41  --  63*   ALT U/L 35  --  45   ALK PHOS U/L 59  --  69   EGFR ml/min/1 73sq m 79 74 75       BMP:  Results from last 7 days   Lab Units 03/15/23  0522 03/14/23  0617 03/13/23  0817   POTASSIUM mmol/L 3 8 3 7 3 6   CHLORIDE mmol/L 110* 107 109*   CO2 mmol/L 23 30 28   BUN mg/dL 9 10 8   CREATININE mg/dL 0 79 0 90 0 87   CALCIUM mg/dL 10 6* 11 1* 10 9*          No results found for: NTBNP    Lipid Profile:   No results found for: CHOL  No results found for: HDL  No results found for: LDLCALC  No results found for: TRIG      Cardiac testing:   No results found for this or any previous visit  No results found for this or any previous visit  No valid procedures specified  No results found for this or any previous visit          Imaging: I have personally reviewed pertinent reports  TRAUMA - CT head wo contrast    Result Date: 3/13/2023  Narrative: CT BRAIN - WITHOUT CONTRAST INDICATION:   TRAUMA  COMPARISON:  Same day trauma studies  TECHNIQUE:  CT examination of the brain was performed  In addition to axial images, sagittal and coronal 2D reformatted images were created and submitted for interpretation  Radiation dose length product (DLP) for this visit:  911 23 mGy-cm   This examination, like all CT scans performed in the [facility], was performed utilizing techniques to minimize radiation dose exposure, including the use of iterative  reconstruction and automated exposure control  IMAGE QUALITY:  Diagnostic  FINDINGS: PARENCHYMA: 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  Small amount of dystrophic calcifications in the anterior corpus callosum and cingulum, may  be treatment-related change  Confluent white matter hypodensities in bilateral frontal lobes (left greater than right), may be treatment-related change  No CT signs of acute infarction  No intracranial mass, mass effect or midline shift  No acute parenchymal hemorrhage  Arterial calcifications of carotid siphons and right intradural vertebral artery  VENTRICLES AND EXTRA-AXIAL SPACES:  Ex-vacuo dilatation of left lateral ventricle with 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  VISUALIZED ORBITS: Normal visualized orbits  PARANASAL SINUSES: Normal visualized paranasal sinuses  CALVARIUM AND EXTRACRANIAL SOFT TISSUES:  Biparietal vertex craniotomy with microplate fixation  No acute calvarial fracture cranial soft tissue abnormality  Impression: No acute intracranial abnormality in postsurgical brain    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  Small amount of dystrophic calcifications in the anterior corpus callosum and cingulum, may be treatment-related change  Confluent white matter hypodensities in bilateral frontal lobes (left greater than right), may be treatment-related change  I personally discussed this study with [address] Eduardo Marc 21 on 3/13/2023 at 8:44 AM  Workstation performed: [id number]     TRAUMA - CT spine cervical wo contrast    Result Date: 3/13/2023  Narrative: CT CERVICAL SPINE - WITHOUT CONTRAST INDICATION:   TRAUMA  COMPARISON:  Same day CT head without contrast  TECHNIQUE:  CT examination of the cervical spine was performed without intravenous contrast   Contiguous axial images were obtained  Sagittal and coronal reconstructions were performed  Radiation dose length product (DLP) for this visit:  321 74 mGy-cm   This examination, like all CT scans performed in the [facility], was performed utilizing techniques to minimize radiation dose exposure, including the use of iterative  reconstruction and automated exposure control  IMAGE QUALITY:  Diagnostic  FINDINGS: ALIGNMENT:  Grade 1 retrolisthesis C3-C4, degenerative  Grade 1 anterolisthesis C5-C6 and C6-C7, likely degenerative  No traumatic malalignment  VERTEBRAE:  No fracture  Small lucent osseous lesion in C2 spinous process extending into left lamina  DEGENERATIVE CHANGES:  Moderate-to-severe multilevel cervical degenerative changes with facet arthropathy  No critical central canal stenosis  PREVERTEBRAL AND PARASPINAL SOFT TISSUES: No prevertebral soft tissue swelling or paraspinal hematoma  THORACIC INLET:  Normal  OTHER: Secretions in posterior pharyngeal wall, right vallecula, hypopharynx (extending into bilateral piriform sinuses mild scattered calcified atherosclerotic disease  Impression: No cervical spine fracture or traumatic malalignment   Small lucent osseous lesion in C2 spinous process extending into left lamina, indeterminate  Osseous metastasis, myeloma, or focal fatty osseous change in the differential  Secretions in posterior pharyngeal wall, right vallecula, hypopharynx (extending into bilateral piriform sinuses)  Patient is at risk for aspiration  I personally discussed this study with Lynette Marc 21 on 3/13/2023 at 8:44 AM   Workstation performed: [id number]     XR chest 1 view    Result Date: 3/14/2023  Narrative: TRAUMA SERIES INDICATION:  TRAUMA  COMPARISON:  None VIEWS:  XR TRAUMA MULTIPLE 2  FINDINGS: CHEST: Supine frontal view of the chest is obtained  Telemetry wires and leads overlie the chest  Normal heart size  CABG  Poststernotomy  Left chest loop recorder  Lungs are clear  No layering pleural effusions detected  Mildly elevated right hemidiaphragm  No pneumothorax is seen on this supine film  Upright images are more sensitive to detect anterior pneumothoraces if relevant  No acute displaced fractures  Chronic healed right rib fracture deformity  PELVIS: No acute fracture in visualized pelvis  Vascular calcifications  Impression: No acute cardiopulmonary disease within limitations of supine imaging  No acute osseous abnormality of visualized pelvis I personally discussed this study with Lynette Marc 21 on 3/13/2023 at 8:44 AM  Workstation performed: [id number]     XR pelvis ap only 1 or 2 vw    Result Date: 3/14/2023  Narrative: TRAUMA SERIES INDICATION:  TRAUMA  COMPARISON:  None VIEWS:  XR TRAUMA MULTIPLE 2  FINDINGS: CHEST: Supine frontal view of the chest is obtained  Telemetry wires and leads overlie the chest  Normal heart size  CABG  Poststernotomy  Left chest loop recorder  Lungs are clear  No layering pleural effusions detected  Mildly elevated right hemidiaphragm  No pneumothorax is seen on this supine film  Upright images are more sensitive to detect anterior pneumothoraces if relevant  No acute displaced fractures  Chronic healed right rib fracture deformity  PELVIS: No acute fracture in visualized pelvis  Vascular calcifications  Impression: No acute cardiopulmonary disease within limitations of supine imaging  No acute osseous abnormality of visualized pelvis I personally discussed this study with Lynette Marc 21 on 3/13/2023 at 8:44 AM  Workstation performed: [id number]     XR Trauma multiple ([facility] trauma bay ONLY)    Result Date: 3/13/2023  Narrative: TRAUMA SERIES INDICATION:  TRAUMA  COMPARISON:  None VIEWS:  XR TRAUMA MULTIPLE 2  FINDINGS: CHEST: Supine frontal view of the chest is obtained  Telemetry wires and leads overlie the chest  Normal heart size  CABG  Poststernotomy  Left chest loop recorder  Lungs are clear  No layering pleural effusions detected  Mildly elevated right hemidiaphragm  No pneumothorax is seen on this supine film  Upright images are more sensitive to detect anterior pneumothoraces if relevant  No acute displaced fractures  Chronic healed right rib fracture deformity  PELVIS: No acute fracture in visualized pelvis  Vascular calcifications  Impression: No acute cardiopulmonary disease within limitations of supine imaging  No acute osseous abnormality of visualized pelvis I personally discussed this study with Lynette Marc 21 on 3/13/2023 at 8:44 AM  Workstation performed: [id number]     [address] [facility] [facility] bedside procedure    Result Date: 3/13/2023  Narrative: [phone] [id number]  0 [id number]  6 [id number]    Echo complete w/ contrast if indicated    Result Date: 3/14/2023  Narrative: •  Left Ventricle: Left ventricular cavity size is normal  Wall thickness is normal  The left ventricular ejection fraction is 50-55%  Systolic function is low normal  Although no diagnostic regional wall motion abnormality was identified, this possibility cannot be completely excluded on the basis of this study  Diastolic function is mildly abnormal, consistent with grade I (abnormal) relaxation    Left atrial filling pressure is normal  • IVS: There is abnormal septal motion consistent with bundle branch block  •  Right Ventricle: Right ventricle is not well visualized  Systolic function is normal  •  Aortic Valve: There is mild regurgitation  •  Mitral Valve: There is mild regurgitation  •  Prior TTE study available for comparison  Prior study date: 11/8/2019  No significant changes noted compared to the prior study  •  Technically difficult study  Counseling / Coordination of Care  Total floor / unit time spent today 45 minutes  Greater than 50% of total time was spent with the patient and / or family counseling and / or coordination of care  A description of the counseling / coordination of care: Review of history, current assessment, development of a plan  Code Status: Level 1 - Full Code    ** Please Note: Dragon 360 Dictation voice to text software may have been used in the creation of this document   **

## 2023-03-15 NOTE — ASSESSMENT & PLAN NOTE
Malnutrition Findings:                        Likely in the setting of dementia and poor oral intake  Will work with SLP and nutrition services towards appropriate nutrition for patient  BMI of around 20         BMI Findings: Body mass index is 19 94 kg/m²

## 2023-03-15 NOTE — ASSESSMENT & PLAN NOTE
-Patient noted to have an unwitnessed fall at a closed dementia unit  As per the patient, no head strike and he did not lose consciousness  Patient does report of having poor oral intake over the course of past few days and he does have history of orthostatic hypotension  He underwent imaging in the ED including CT and x-ray studies which were negative for any acute pathology  Likely related to patients history of dementia and orthostatic hypotension     -PT/OT  -Fall precautions  -Imaging studies reviewed, no acute findings (fracture, ICH, etc)  -see elevated trop problem below

## 2023-03-16 VITALS
HEART RATE: 52 BPM | RESPIRATION RATE: 18 BRPM | TEMPERATURE: 97 F | HEIGHT: 69 IN | DIASTOLIC BLOOD PRESSURE: 72 MMHG | OXYGEN SATURATION: 97 % | BODY MASS INDEX: 19.99 KG/M2 | WEIGHT: 135 LBS | SYSTOLIC BLOOD PRESSURE: 169 MMHG

## 2023-03-16 RX ORDER — CLOPIDOGREL BISULFATE 75 MG/1
75 TABLET ORAL DAILY
Refills: 0
Start: 2023-03-17

## 2023-03-16 RX ORDER — SERTRALINE HYDROCHLORIDE 25 MG/1
75 TABLET, FILM COATED ORAL DAILY
Refills: 0
Start: 2023-03-17

## 2023-03-16 RX ORDER — MEMANTINE HYDROCHLORIDE 5 MG/1
5 TABLET ORAL
Refills: 0
Start: 2023-03-16

## 2023-03-16 RX ORDER — LANOLIN ALCOHOL/MO/W.PET/CERES
3 CREAM (GRAM) TOPICAL
Refills: 0
Start: 2023-03-16

## 2023-03-16 RX ORDER — PANTOPRAZOLE SODIUM 40 MG/1
40 TABLET, DELAYED RELEASE ORAL
Refills: 0
Start: 2023-03-17

## 2023-03-16 RX ORDER — METOPROLOL SUCCINATE 25 MG/1
25 TABLET, EXTENDED RELEASE ORAL DAILY
Refills: 0
Start: 2023-03-17

## 2023-03-16 RX ADMIN — CLOPIDOGREL BISULFATE 75 MG: 75 TABLET ORAL at 09:07

## 2023-03-16 RX ADMIN — ACETAMINOPHEN 650 MG: 325 TABLET ORAL at 09:22

## 2023-03-16 RX ADMIN — ENOXAPARIN SODIUM 40 MG: 40 INJECTION SUBCUTANEOUS at 09:07

## 2023-03-16 RX ADMIN — PANTOPRAZOLE SODIUM 40 MG: 40 TABLET, DELAYED RELEASE ORAL at 05:05

## 2023-03-16 RX ADMIN — METOPROLOL SUCCINATE 25 MG: 25 TABLET, EXTENDED RELEASE ORAL at 09:07

## 2023-03-16 RX ADMIN — Medication 1 PACKET: at 09:07

## 2023-03-16 RX ADMIN — SERTRALINE HYDROCHLORIDE 75 MG: 50 TABLET ORAL at 09:07

## 2023-03-16 NOTE — ASSESSMENT & PLAN NOTE
· Patient has longstanding history of dementia, he has previously been seen by geriatrics at last admission    · Likely vascular etiology  · On memantine/sertraline

## 2023-03-16 NOTE — CASE MANAGEMENT
Ewelina Argueta received approval for transport authorization from:   Type of transport: BLS  Date of transport: 03/16/2023  End Location: [address] [place] [address] Road #: [phone]  CM notified: Yadira Marroquin

## 2023-03-16 NOTE — ASSESSMENT & PLAN NOTE
-Admitted with elevated troponin with initial troponin at 340 with subsequent 2hr troponin at 295    -Prior echo results noted   -Echo:  LVEF 50-55%  G1DD  Although no diagnostic regional wall motion abnormality was identified, this possibility cannot be completely excluded on the basis of this study  Normal RV systolic fxn  Mild AR/MR  No significant changes compared to 11/8/2019 study  noted compared to the prior study  -EKG reviewed, noted to have T-wave inversion in V2-V3   -MDT loop interrogated (in old chart) - shows PVC's, and PAC's no a fib or other arrhythmias   -Appreciate cardiology  No further cardiac work-up indicated  This was nonischemic myocardial injury

## 2023-03-16 NOTE — PROGRESS NOTES
[facility]  Progress Note - Leti Godwin 1/16/1933, 80 y o  male MRN: [id number]  Unit/Bed#: [facility] 921-01 Encounter: [id number]  Primary Care Provider: MICHELE Brennan   Date and time admitted to hospital: 3/13/2023  8:05 AM    Elevated troponin  Assessment & Plan  -Admitted with elevated troponin with initial troponin at 340 with subsequent 2hr troponin at 295    -Prior echo results noted   -Echo:  LVEF 50-55%  G1DD  Although no diagnostic regional wall motion abnormality was identified, this possibility cannot be completely excluded on the basis of this study  Normal RV systolic fxn  Mild AR/MR  No significant changes compared to 11/8/2019 study  noted compared to the prior study  -EKG reviewed, noted to have T-wave inversion in V2-V3   -MDT loop interrogated (in old chart) - shows PVC's, and PAC's no a fib or other arrhythmias   -Appreciate cardiology  No further cardiac work-up indicated  This was nonischemic myocardial injury  * Fall  Assessment & Plan  -Patient noted to have an unwitnessed fall at a closed dementia unit  As per the patient, no head strike and he did not lose consciousness  Patient does report of having poor oral intake over the course of past few days and he does have history of orthostatic hypotension  He underwent imaging in the ED including CT and x-ray studies which were negative for any acute pathology  Likely related to patients history of dementia and orthostatic hypotension  -PT/OT  -Fall precautions  -Imaging studies reviewed, no acute findings (fracture, ICH, etc)  -see elevated trop problem below        Dysphagia  Assessment & Plan  · Patient has a history of dysphagia, that has been noted at previous admissions as well  He also is at risk for silent aspiration which is also noted on CT imaging at this admission  · He also has history of Schatzki ring that was seen on prior EGD along with possible Candida esophagitis  · S/p VBS   · Speech eval - [facility], moist diet w/ thin liquids       Moderate protein-calorie malnutrition (HCC)  Assessment & Plan  Malnutrition Findings:                        Likely in the setting of dementia and poor oral intake  Will work with SLP and nutrition services towards appropriate nutrition for patient  BMI of around 20         BMI Findings: Body mass index is 19 94 kg/m²  Primary hypertension  Assessment & Plan  · Patient with history of orthostatic hypotension (previously on midodrine)   · Was hypertensive on admission likely form pain/fall   · Cont BB        Dementia ([facility] [address] )  Assessment & Plan  · Patient has longstanding history of dementia, he has previously been seen by geriatrics at last admission  · Likely vascular etiology  · On memantine 5 mg daily  · On sertraline 75 mg daily  · Monitor for delirium         History of CVA (cerebrovascular accident)  Assessment & Plan  · History of CVA following which he was switched from aspirin to plavix  · On plavix and statin    Ambulatory dysfunction  Assessment & Plan  -Patient with recent history of pelvic fracture following which he was sent to [address] [place] [address] for physical therapy  Has been mostly wheelchair bound and getting intermittent physical therapy  Also very likely that his dementia maybe complicating this as well  -PT/OT/CM          VTE Pharmacologic Prophylaxis:   Lovenox    Patient Centered Rounds: I performed bedside rounds with nursing staff today  Total Time Spent on Date of Encounter in care of patient: 35 minutes This time was spent on one or more of the following: performing physical exam; counseling and coordination of care; obtaining or reviewing history; documenting in the medical record; reviewing/ordering tests, medications or procedures; communicating with other healthcare professionals and discussing with patient's family/caregivers      Current Length of Stay: 3 day(s)  Current Patient Status: Inpatient   Discharge Plan: Medically stable for discharge  Case management aware  Code Status: Level 1 - Full Code    Subjective:   Patient denies chest pain, shortness of breath, abdominal pain  Objective:     Vitals:   Temp (24hrs), Av 5 °F (36 4 °C), Min:97 °F (36 1 °C), Max:97 8 °F (36 6 °C)    Temp:  [97 °F (36 1 °C)-97 8 °F (36 6 °C)] 97 °F (36 1 °C)  HR:  [72-75] 72  Resp:  [18] 18  BP: (125-169)/(54-72) 169/72  SpO2:  [97 %-98 %] 97 %  Body mass index is 19 94 kg/m²  Input and Output Summary (last 24 hours):      Intake/Output Summary (Last 24 hours) at 3/16/2023 1137  Last data filed at 3/16/2023 0230  Gross per 24 hour   Intake 360 ml   Output 200 ml   Net 160 ml       Physical Exam:   Gen: remains NAD, Awake and alert, appears chronically ill  Eyes: EOMI, PERRLA, no scleral icterus  ENMT:  no nasal discharge, no otic discharge, moist mucous membranes  Neck:  Supple  Cardiovascular: remains Regular rate and rhythm, normal S1-S2, no murmurs, rubs, or gallops  Lungs: remains Clear to auscultation bilaterally, no wheezes, or rales, or rhonchi  Abdomen:  Positive bowel sounds, soft, nontender, nondistended, no palpable organomegaly   Skin:  Intact, no obvious lesions or rashes, no edema  Neuro: Cranial nerves 2-12 are intact, non-focal, moves all 4 extremities    Additional Data:     Labs:  Results from last 7 days   Lab Units 03/15/23  0522 23  0617   WBC Thousand/uL 6 72 5 96   HEMOGLOBIN g/dL 12 1 11 4*   HEMATOCRIT % 36 6 35 6*   PLATELETS Thousands/uL 201 193   NEUTROS PCT %  --  43   LYMPHS PCT %  --  44   MONOS PCT %  --  10   EOS PCT %  --  3     Results from last 7 days   Lab Units 03/15/23  0522   SODIUM mmol/L 138   POTASSIUM mmol/L 3 8   CHLORIDE mmol/L 110*   CO2 mmol/L 23   BUN mg/dL 9   CREATININE mg/dL 0 79   ANION GAP mmol/L 5   CALCIUM mg/dL 10 6*   ALBUMIN g/dL 3 0*   TOTAL BILIRUBIN mg/dL 0 44   ALK PHOS U/L 59   ALT U/L 35   AST U/L 41 GLUCOSE RANDOM mg/dL 94                       Lines/Drains:  Invasive Devices     Peripheral Intravenous Line  Duration           Peripheral IV 03/13/23 Left Antecubital 3 days          Drain  Duration           External Urinary Catheter 1 day                Recent Cultures (last 7 days):         Last 24 Hours Medication List:   Current Facility-Administered Medications   Medication Dose Route Frequency Provider Last Rate   • acetaminophen  650 mg Oral Q8H PRN Joshua Lester MD     • clopidogrel  75 mg Oral Daily Joshua Lester MD     • enoxaparin  40 mg Subcutaneous Daily Joshua Lester MD     • labetalol  10 mg Intravenous Q4H PRN Joshua Lester MD     • melatonin  3 mg Oral HS Joshua Lester MD     • memantine  5 mg Oral HS Joshua Lester MD     • metoprolol succinate  25 mg Oral Daily Joshua Lester MD     • pantoprazole  40 mg Oral Early Morning Joshua Lester MD     • psyllium  1 packet Oral Daily Joshua Lester MD     • sertraline  75 mg Oral Daily Joshua Lester MD          Today, Patient Was Seen By: Nj Waterman MD    **Please Note: This note may have been constructed using a voice recognition system  **

## 2023-03-16 NOTE — ASSESSMENT & PLAN NOTE
-Patient with recent history of pelvic fracture following which he was sent to [address] [place] [address] for physical therapy  Has been mostly wheelchair bound and getting intermittent physical therapy  Also very likely that his dementia maybe complicating this as well     -PT/OT/CM

## 2023-03-16 NOTE — CASE MANAGEMENT
Case Management Discharge Planning Note    Patient name Nicolle Eli  [facility] [id number]  : 1933 Date 3/16/2023       Current Admission Date: 3/13/2023  Current Admission Diagnosis:Fall   Patient Active Problem List    Diagnosis Date Noted   • Fall 2023   • Ambulatory dysfunction 2023   • Elevated troponin 2023   • History of CVA (cerebrovascular accident) 2023   • Dementia ([facility] [address] ) 2023   • Primary hypertension 2023   • Moderate protein-calorie malnutrition ([facility] [address] ) 2023   • Dysphagia 2023      LOS (days): 3  Geometric Mean LOS (GMLOS) (days): 2 10  Days to GMLOS:-0 9     OBJECTIVE:  Risk of Unplanned Readmission Score: 8 67         Current admission status: Inpatient   Preferred Pharmacy:   [address] [place] [address],, [facility] [place] [address] [facility] [place] [facility] [address] 210 [facility]  Phone: [phone] Fax: [phone]    Primary Care Provider: MICHELE Luis    Primary Insurance: [facility]  Secondary Insurance:     [address] [place] [address] Avenue Number: [id number] ([address])

## 2023-03-16 NOTE — ASSESSMENT & PLAN NOTE
-Patient with recent history of pelvic fracture following which he was sent to [address] [place] [address] for physical therapy  Has been mostly wheelchair bound and getting intermittent physical therapy  Also very likely that his dementia maybe complicating this as well     -PT/OT/CM

## 2023-03-16 NOTE — DISCHARGE SUMMARY
[facility] [facility]  Discharge- Azell Remedies 1/16/1933, 80 y o  male MRN: [id number]  [facility]: [id number]  Primary Care Provider: MICHELE Gonzalez   Date and time admitted to hospital: 3/13/2023  8:05 AM    Elevated troponin  Assessment & Plan  -Admitted with elevated troponin with initial troponin at 340 with subsequent 2hr troponin at 295    -Prior echo results noted   -Echo:  LVEF 50-55%  G1DD  Although no diagnostic regional wall motion abnormality was identified, this possibility cannot be completely excluded on the basis of this study  Normal RV systolic fxn  Mild AR/MR  No significant changes compared to 11/8/2019 study  noted compared to the prior study  -EKG reviewed, noted to have T-wave inversion in V2-V3   -MDT loop interrogated (in old chart) - shows PVC's, and PAC's no a fib or other arrhythmias   -Appreciate cardiology  No further cardiac work-up indicated  This was nonischemic myocardial injury  * Fall  Assessment & Plan  -Patient noted to have an unwitnessed fall at a closed dementia unit  As per the patient, no head strike and he did not lose consciousness  Patient does report of having poor oral intake over the course of past few days and he does have history of orthostatic hypotension  He underwent imaging in the ED including CT and x-ray studies which were negative for any acute pathology  Likely related to patients history of dementia and orthostatic hypotension  -PT/OT  -Fall precautions  -Imaging studies reviewed, no acute findings (fracture, ICH, etc)  -see elevated trop problem below        Dysphagia  Assessment & Plan  · Patient has a history of dysphagia, that has been noted at previous admissions as well  He also is at risk for silent aspiration which is also noted on CT imaging at this admission  · He also has history of Schatzki ring that was seen on prior EGD along with possible Candida esophagitis     · S/p VBS   · Speech eval - should ideally follow a level 2 [facility] altered, moist diet w/ thin liquids       Moderate protein-calorie malnutrition (HCC)  Assessment & Plan  Malnutrition Findings:                        Likely in the setting of dementia and poor oral intake  Will work with SLP and nutrition services towards appropriate nutrition for patient  BMI of around 20         BMI Findings: Body mass index is 19 94 kg/m²  Primary hypertension  Assessment & Plan  · Patient with history of orthostatic hypotension (previously on midodrine)   · Was hypertensive on admission likely from pain/fall   · Cont BB        Dementia [facility])  Assessment & Plan  · Patient has longstanding history of dementia, he has previously been seen by geriatrics at last admission  · Likely vascular etiology  · On memantine/sertraline       History of CVA (cerebrovascular accident)  Assessment & Plan  · History of CVA following which he was switched from aspirin to plavix  · On plavix and statin    Ambulatory dysfunction  Assessment & Plan  -Patient with recent history of pelvic fracture following which he was sent to [address] [place] [address] for physical therapy  Has been mostly wheelchair bound and getting intermittent physical therapy  Also very likely that his dementia maybe complicating this as well     -PT/OT/CM        Medical Problems     Resolved Problems  Date Reviewed: 3/16/2023          Resolved    Elevated AST (SGOT) 3/15/2023     Resolved by  Josue Cedillo MD        Discharging Physician / Practitioner: Josue Cedillo MD  PCP: MICHELE Schneider  Admission Date:   Admission Orders (From admission, onward)     Ordered        03/13/23 [address]  Once                      Discharge Date: 03/16/23    Consultations During Hospital Stay:  · Cardiology    Procedures Performed:   · None    Significant Findings / Test Results:   · CT brain: No acute intracranial abnormality in postsurgical brain        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  Small amount of dystrophic calcifications in the anterior corpus callosum and cingulum, may be treatment-related change  Confluent white matter hypodensities in bilateral frontal lobes (left greater than right), may be treatment-related change     · CT C-spine: No cervical spine fracture or traumatic malalignment  Small lucent osseous lesion in C2 spinous process extending into left lamina, indeterminate   Osseous metastasis, myeloma, or focal fatty osseous change in the differential      Secretions in posterior pharyngeal wall, right vallecula, hypopharynx (extending into bilateral piriform sinuses)   Patient is at risk for aspiration  · Trauma Xrays: No acute cardiopulmonary disease within limitations of supine imaging  No acute osseous abnormality of visualized pelvis     Incidental Findings:   · None    Test Results Pending at Discharge (will require follow up): · None     Outpatient Tests Requested:  · None    Complications: None    Reason for Admission: [facility] Course:   Sophia Atkinson is a 80 y o  male patient who originally presented to the hospital on 3/13/2023 due to fall as outlined in the H&P done on admission  Hospital course by problem list:    Please see above list of diagnoses and related plan for additional information  Condition at Discharge: good    Discharge Day Visit / Exam:   * Please refer to separate progress note for these details *    Discharge instructions/Information to patient and family:   See after visit summary for information provided to patient and family  Provisions for Follow-Up Care:  See after visit summary for information related to follow-up care and any pertinent home health orders  Disposition:   SNF     Discharge Statement:  I spent 35 minutes discharging the patient   This time was spent on the day of discharge  I had direct contact with the patient on the day of discharge  Greater than 50% of the total time was spent examining patient, answering all patient questions, arranging and discussing plan of care with patient as well as directly providing post-discharge instructions  Additional time then spent on discharge activities  Discharge Medications:  See after visit summary for reconciled discharge medications provided to patient and/or family        **Please Note: This note may have been constructed using a voice recognition system**

## 2023-03-16 NOTE — CASE MANAGEMENT
Ewelina Whittington [facility] received request for transport authorization from Care Manager  Type of transport: BLS  Date of transport: 03/16/2023  Tonia Talley 1935 NPI#: [id number]  Start Location - [address] [place] [address] NPI#: [id number]  Med Necessity: Pt is a fall and safety risk  Fall  Dementia  Ambulatory dysfunction  Decreased strength; Decreased endurance; Impaired balance; Decreased mobility; Pain    Authorization initiated by contacting:  Skye Narayan Via: Fax   [phone]

## 2023-03-16 NOTE — ASSESSMENT & PLAN NOTE
· Patient with history of orthostatic hypotension (previously on midodrine)   · Was hypertensive on admission likely from pain/fall   · Cont BB

## 2023-03-16 NOTE — CASE MANAGEMENT
Case Management Discharge Planning Note    Patient name Rahat Reed  Location 99 [facility] 934/[facility] 745-14 MRN [id number]  : 1933 Date 3/16/2023       Current Admission Date: 3/13/2023  Current Admission Diagnosis:Fall   Patient Active Problem List    Diagnosis Date Noted   • Fall 2023   • Ambulatory dysfunction 2023   • Elevated troponin 2023   • History of CVA (cerebrovascular accident) 2023   • Dementia ([facility] [address] ) 2023   • Primary hypertension 2023   • Moderate protein-calorie malnutrition ([facility] [address] ) 2023   • Dysphagia 2023      LOS (days): 3  Geometric Mean LOS (GMLOS) (days): 2 10  Days to GMLOS:-1 1     OBJECTIVE:  Risk of Unplanned Readmission Score: 8 69         Current admission status: Inpatient   Preferred Pharmacy:   [address] [place], [facility] [address] [facility] [address] [facility]  Phone: [phone] Fax: [phone]    Primary Care Provider: MICHELE Galvan    [facility]: [facility]  Secondary Insurance:     DISCHARGE DETAILS:         [address]         Is the patient interested in [email or web address] at discharge?: No    DME Referral Provided  Referral made for DME?: No    Other Referral/Resources/Interventions Provided:  Interventions: Short Term Rehab, SNF       Transport at Discharge : [facility]  Dispatcher Contacted: Yes  Number/Name of Dispatcher: [id number]  [facility] Unit #[facility] [address]  ETA of Transport (Date): 23  ETA of Transport (Time): 1630              IMM Given (Date):: 23  IMM Given to[de-identified] Family  Family notified[de-identified] [place], notified via phone and CM left a voicemail       Additional Comments: Per provider, pt is clear and insurance authorization was received for [facility]   CM let provider know that pt would also need a COVID test prior to d/c  CM requested transport for 4:30pm and submitted auth to CM d/c support which was approved  CM notified provider, nursing, facility and called pt's wife  CM left a voicemail with transport time, pending COVID result, and CM reviewed IMM on voicemail  CM placed IMM in medical records bin      Accepting Facility Name, [facility] : [address]  Receiving Facility/Agency Phone Number: [phone]  Facility/Agency Fax Number: [phone]

## 2023-03-16 NOTE — PLAN OF CARE
Problem: OCCUPATIONAL THERAPY ADULT  Goal: Performs self-care activities at highest level of function for planned discharge setting  See evaluation for individualized goals  Description: Treatment Interventions: ADL retraining          See flowsheet documentation for full assessment, interventions and recommendations  Outcome: Progressing  Note: Limitation: Decreased ADL status, Decreased Safe judgement during ADL, Decreased cognition, Decreased endurance, Decreased self-care trans, Decreased high-level ADLs  Prognosis: Fair  Assessment: Patient participated in Skilled OT session this date with interventions consisting of ADL re training with the use of correct body mechnaics, Energy Conservation techniques, safety awareness and fall prevention techniques,  therapeutic activities to: increase activity tolerance, increase dynamic sit/ stand balance during functional activity  and increase OOB/ sitting tolerance   Upon arrival patient was found supine in bed  Pt demonstrated the following tasks: MAX A x 2 sup to sit, MOD A x 2 STS and fnxl mobility  Pt requires MIN A for grooming and UB ADLs, MAX A for LB ADLs  Pt also engaged in fnxl reaching task  Pt requires increased cues to maintain arousal and follow commands, unable to complete > 1 step at a time  Patient continues to be functioning below baseline level, occupational performance remains limited secondary to factors listed above and increased risk for falls and injury  From OT standpoint, recommendation at time of d/c would be STR  Patient to benefit from continued Occupational Therapy treatment while in the hospital to address deficits as defined above and maximize level of functional independence with ADLs and functional mobility  Pt was left in chair with alarm on after session with all current needs met  The patient's raw score on the AM-PAC Daily Activity Inpatient Short Form is 15   A raw score of less than 19 suggests the patient may benefit from discharge to post-acute rehabilitation services  Please refer to the recommendation of the Occupational Therapist for safe discharge planning       OT Discharge Recommendation: Post acute rehabilitation services

## 2023-03-16 NOTE — PLAN OF CARE
Problem: PHYSICAL THERAPY ADULT  Goal: Performs mobility at highest level of function for planned discharge setting  See evaluation for individualized goals  Description: Treatment/Interventions: Functional transfer training, LE strengthening/ROM, Therapeutic exercise, Endurance training, Patient/family training, Equipment eval/education, Bed mobility, Gait training, OT, Spoke to nursing  Equipment Recommended: Wheelchair, Carol Chairez       See flowsheet documentation for full assessment, interventions and recommendations  Outcome: Progressing  Note: Prognosis: Fair  Problem List: Decreased strength, Decreased endurance, Impaired balance, Decreased mobility, Pain  Assessment: Pt seen for PT treatment session this date  Therapy session focused on bed mobility, functional transfers, sitting tolerance/ balance, and and endurance training in order to improve overall mobility and independence  Pt requires assist of 2 for all mobility performed this date  Pt with increased lethargy at start of therapy session, pt awakens with increased time and as session progresses  Pt with increased anxiety this date, at first pt non -verbal, hums instead, however as anxiety lessens pt more verbal  Pt able to take short shuffling steps this date with Ax2 using HHA  Pt performed/ tolerated seated therex with no complaints  Pt making slow progress toward goals  Pt was left sitting OOB in recliner at the end of PT session with all needs in reach  Pt would benefit from continued PT services while in hospital to address remaining limitations  PT to continue to follow pt and recommends ST  The patient's AM-PAC Basic Mobility Inpatient Short Form Raw Score is 8  A Raw score of less than or equal to 16 suggests the patient may benefit from discharge to post-acute rehabilitation services  Please also refer to the recommendation of the Physical Therapist for safe discharge planning          PT Discharge Recommendation: Post acute rehabilitation services    See flowsheet documentation for full assessment

## 2023-03-16 NOTE — PLAN OF CARE
Problem: MOBILITY - ADULT  Goal: Maintain or return to baseline ADL function  Description: INTERVENTIONS:  -  Assess patient's ability to carry out ADLs; assess patient's baseline for ADL function and identify physical deficits which impact ability to perform ADLs (bathing, care of mouth/teeth, toileting, grooming, dressing, etc )  - Assess/evaluate cause of self-care deficits   - Assess range of motion  - Assess patient's mobility; develop plan if impaired  - Assess patient's need for assistive devices and provide as appropriate  - Encourage maximum independence but intervene and supervise when necessary  - Involve family in performance of ADLs  - Assess for home care needs following discharge   - Consider OT consult to assist with ADL evaluation and planning for discharge  - Provide patient education as appropriate  Outcome: Progressing     Problem: PAIN - ADULT  Goal: Verbalizes/displays adequate comfort level or baseline comfort level  Description: Interventions:  - Encourage patient to monitor pain and request assistance  - Assess pain using appropriate pain scale  - Administer analgesics based on type and severity of pain and evaluate response  - Implement non-pharmacological measures as appropriate and evaluate response  - Consider cultural and social influences on pain and pain management  - Notify physician/advanced practitioner if interventions unsuccessful or patient reports new pain  Outcome: Progressing     Problem: INFECTION - ADULT  Goal: Absence or prevention of progression during hospitalization  Description: INTERVENTIONS:  - Assess and monitor for signs and symptoms of infection  - Monitor lab/diagnostic results  - Monitor all insertion sites, i e  indwelling lines, tubes, and drains  - Monitor endotracheal if appropriate and nasal secretions for changes in amount and color  - Organ appropriate cooling/warming therapies per order  - Administer medications as ordered  - Instruct and encourage patient and family to use good hand hygiene technique  - Identify and instruct in appropriate isolation precautions for identified infection/condition  Outcome: Progressing     Problem: SAFETY ADULT  Goal: Maintain or return to baseline ADL function  Description: INTERVENTIONS:  -  Assess patient's ability to carry out ADLs; assess patient's baseline for ADL function and identify physical deficits which impact ability to perform ADLs (bathing, care of mouth/teeth, toileting, grooming, dressing, etc )  - Assess/evaluate cause of self-care deficits   - Assess range of motion  - Assess patient's mobility; develop plan if impaired  - Assess patient's need for assistive devices and provide as appropriate  - Encourage maximum independence but intervene and supervise when necessary  - Involve family in performance of ADLs  - Assess for home care needs following discharge   - Consider OT consult to assist with ADL evaluation and planning for discharge  - Provide patient education as appropriate  Outcome: Progressing

## 2023-03-16 NOTE — OCCUPATIONAL THERAPY NOTE
Occupational Therapy Progress Note     Patient Name: Anoop Martinez  Today's Date: 3/16/2023  Problem List  Principal Problem:    Fall  Active Problems:    Ambulatory dysfunction    Elevated troponin    History of CVA (cerebrovascular accident)    Dementia ([facility] [address] )    Primary hypertension    Moderate protein-calorie malnutrition ([facility] [address] )    Dysphagia            03/16/23 1153   OT Last Visit   OT Visit Date 03/16/23   Note Type   Note Type Treatment   Pain Assessment   Pain Assessment Tool FLACC   Pain Rating: FLACC (Rest) - Face 0   Pain Rating: FLACC (Rest) - Legs 0   Pain Rating: FLACC (Rest) - Activity 0   Pain Rating: FLACC (Rest) - Cry 0   Pain Rating: FLACC (Rest) - Consolability 0   Score: FLACC (Rest) 0   Pain Rating: FLACC (Activity) - Face 1   Pain Rating: FLACC (Activity) - Legs 1   Pain Rating: FLACC (Activity) - Activity 1   Pain Rating: FLACC (Activity) - Cry 1   Pain Rating: FLACC (Activity) - Consolability 1   Score: FLACC (Activity) 5   Restrictions/Precautions   Weight Bearing Precautions Per Order Yes   LLE Weight Bearing Per Order WBAT  (recent L pelvic fx)   Other Precautions Chair Alarm;Cognitive; Bed Alarm;Multiple lines; Fall Risk;Pain   Lifestyle   Autonomy since recent fall has been in [facility] rehab- SPT to w/c only w/ A x 1, A for ADLS, assist for all IADLs, feeds self   Reciprocal Relationships supportive facility   Service to Others Retired - worked in a [employer]   ADL   Where Assessed Edge of bed   [facility] 4  Minimal Assistance   Grooming Deficit Brushing hair   Grooming Comments + washing hair with shampoo cap   UB Dressing Assistance 4  Minimal Assistance   UB Dressing Deficit Thread RUE; Thread LUE; Increased time to complete   LB Dressing Assistance 2  Maximal Assistance   LB Dressing Deficit Don/doff L sock; Don/doff R sock   Bed Mobility   Supine to Sit 2  Maximal assistance   Additional items Assist x 2; Increased time required;LE management   Sit to Supine Unable to assess   Transfers   Sit to Stand 3  Moderate assistance   Additional items Assist x 2; Increased time required;Verbal cues   Stand to Sit 3  Moderate assistance   Additional items Assist x 2; Increased time required;Verbal cues   Additional Comments transfers w/ B/L HHA   Functional Mobility   Functional Mobility 3  Moderate assistance   Additional Comments Ax2   Additional items Hand hold assistance   Cognition   Overall Cognitive Status Impaired   Arousal/Participation Cooperative   Attention Attends with cues to redirect   Orientation Level Oriented to person;Oriented to place; Disoriented to time;Disoriented to situation   Memory Decreased recall of precautions   Following Commands Follows one step commands with increased time or repetition   Comments Pt initially with increased fatigue and lethargy, requiring cues to maintain arousal  However, arousal improved as session progressed  Pt unable to follow > 1 step tasks  Pt with anxiousness t/o session, tendency to hum and count when anxiety increases   Additional Activities   Additional Activities Comments Pt also engaged in fnxl item retrieval task with increased cueing required to maintain attention - both cognitively and visually   Activity Tolerance   Activity Tolerance Patient limited by fatigue;Patient limited by pain   Medical Staff Made Aware PT Sally Johnston   Assessment   Assessment Patient participated in Skilled OT session this date with interventions consisting of ADL re training with the use of correct body mechnaics, Energy Conservation techniques, safety awareness and fall prevention techniques,  therapeutic activities to: increase activity tolerance, increase dynamic sit/ stand balance during functional activity  and increase OOB/ sitting tolerance   Upon arrival patient was found supine in bed  Pt demonstrated the following tasks: MAX A x 2 sup to sit, MOD A x 2 STS and fnxl mobility   Pt requires MIN A for grooming and UB ADLs, MAX A for LB ADLs  Pt also engaged in fnxl reaching task  Pt requires increased cues to maintain arousal and follow commands, unable to complete > 1 step at a time  Patient continues to be functioning below baseline level, occupational performance remains limited secondary to factors listed above and increased risk for falls and injury  From OT standpoint, recommendation at time of d/c would be STR  Patient to benefit from continued Occupational Therapy treatment while in the hospital to address deficits as defined above and maximize level of functional independence with ADLs and functional mobility  Pt was left in chair with alarm on after session with all current needs met  The patient's raw score on the AM-PAC Daily Activity Inpatient Short Form is 15  A raw score of less than 19 suggests the patient may benefit from discharge to post-acute rehabilitation services  Please refer to the recommendation of the Occupational Therapist for safe discharge planning  Plan   Treatment Interventions ADL retraining;UE strengthening/ROM; Functional transfer training; Endurance training;Patient/family training;Equipment evaluation/education; Compensatory technique education;Continued evaluation; Energy conservation; Activityengagement;Cognitive reorientation   Goal Expiration Date 03/28/23   OT Treatment Day 1   OT Frequency 1-2x/wk   Recommendation   OT Discharge Recommendation Post acute rehabilitation services   AM-PAC Daily Activity Inpatient   Lower Body Dressing 2   Bathing 2   Toileting 2   Upper Body Dressing 3   Grooming 3   Eating 3   Daily Activity Raw Score 15   Daily Activity Standardized Score (Calc for Raw Score >=11) 34 69   AM-PAC Applied Cognition Inpatient   Following a Speech/Presentation 3   Understanding Ordinary Conversation 3   Taking Medications 3   Remembering Where Things Are Placed or Put Away 3   Remembering List of 4-5 Errands 2   Taking Care of Complicated Tasks 2   Applied Cognition Raw Score 16   Applied Cognition Standardized Score 35 03     Kajal Balderas MS, OTR/L

## 2023-03-16 NOTE — CASE MANAGEMENT
Ewelina Argueta has received approved authorization from insurance:     Called in by Rep:Eladia   Authorization received for: SNF  Facility: [address] [place] [address] #:[id number]  Start of Care:03/16/23  Next Review Date:03/20/23  Care Coordinator: Fawn Kent next review via fax : [phone]   Care Manager notified:

## 2023-03-17 NOTE — UTILIZATION REVIEW
NOTIFICATION OF ADMISSION DISCHARGE   This is a Notification of Discharge from [facility]  Please be advised that this patient has been discharge from our facility  Below you will find the admission and discharge date and time including the patient’s disposition  UTILIZATION REVIEW CONTACT:  Marsh Nissen  Utilization   Network Utilization Review Department  Phone: [phone] x carefully listen to the prompts  All voicemails are confidential   Email: [email or web address]     ADMISSION INFORMATION  PRESENTATION DATE: 3/13/2023  8:05 AM  OBERVATION ADMISSION DATE:   INPATIENT ADMISSION DATE: 3/13/23  9:50 AM   DISCHARGE DATE: 3/16/2023  6:39 PM   DISPOSITION:Discharged/Transferred to Long Term Care/Personal Care Home/Assisted Living    IMPORTANT INFORMATION:  Send all requests for admission clinical reviews, approved or denied determinations and any other requests to dedicated fax number below belonging to the campus where the patient is receiving treatment   List of dedicated fax numbers:  [address] DENIALS (Administrative/Medical Necessity) [phone]   [address] (Maternity/NICU/Pediatrics) [phone]   [facility] [phone]   [place] [address] [phone]   [address] [phone]   220 [facility] [phone] 90 [facility] [phone]   [address] [place] [address] 119 [phone]   [facility]  [phone]   [address] [facility] [phone] [facility] 850 [facility] [phone]

## 2023-03-19 DIAGNOSIS — F41.9 ANXIETY: ICD-10-CM

## 2023-03-20 NOTE — PROGRESS NOTES
Cardiology Follow Up    [facility]  1/16/1933  [id number]  [address]  [phone]  [phone]    1  Essential (primary) hypertension        2  Pure hypercholesterolemia        3  Orthostatic hypotension        4  Coronary arteriosclerosis        5  Presence of other cardiac implants and grafts        6  Abnormal EKG            Interval History: Patient is here for a follow-up visit  He had CABG  He is on Plavix in the setting of prior CVA  Eber Lara does have issues with orthostatic hypotension   He is on midodrine   Patient has a ILR since 11/2019 in reference to syncope   Recent interrogation done 3/2023 showed no evidence of aFib  Predominant rhythm was sinus with PACs and PVCs  Patient hospitalized August 2022 with FTT and treatment for candidal esophageal infection  EGD done also demonstrating a Schatzki's ring  Patient was seen by our group in the hospital March 2023  He presented with a fall out of bed  He was found to have a minimally elevated troponin which did not warrant further work-up  Echocardiogram done March 2023 demonstrated LVEF of 50 to 55% with mild MR and AI with no significant change compared to a prior study done November 8, 2019  He lives at [facility] with his wife  He is in the dementia unit  He is here today with his wife  He has had no chest pain or significant dyspnea      Patient Active Problem List   Diagnosis   • Constipation   • Iron deficiency   • Other constipation   • Anemia   • Arteriosclerotic cardiovascular disease   • Backache   • Essential hypertension   • Cervical spinal stenosis   • Depression   • Esophageal reflux   • Hyperlipidemia   • Insomnia   • Spinal stenosis of lumbar region with neurogenic claudication   • Vitamin B12 deficiency   • Idiopathic peripheral neuropathy   • Iron deficiency anemia   • History of meningioma   • Cognitive decline   • Contusion of rib on right side   • Orthostatic hypotension   • Convulsions (HCC)   • Lumbar spondylosis   • Lumbar radiculopathy   • Acute CVA (cerebrovascular accident), suspected embolic in nature   • Hypercalcemia   • History of resection of meningioma   • History of stroke   • Depression   • 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) ([facility] [address] )   • Calcification of aorta (HCC)   • Need for influenza vaccination   • Dementia without behavioral disturbance (HCC)   • 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HCC)   • Dysphagia   • Candida esophagitis (HCC)   • Failure to thrive in adult   • Silent aspiration   • Moderate protein-calorie malnutrition ([facility] [address] )   • Fall   • Multiple fractures of pelvis ([facility] [address] )   • Fall   • Ambulatory dysfunction   • Elevated troponin   • History of CVA (cerebrovascular accident)   • Dementia ([facility] [address] )   • Primary hypertension   • Moderate protein-calorie malnutrition ([facility] [address] )   • Dysphagia     Past Medical History:   Diagnosis Date   • Anxiety    • Arthritis    • Coronary artery disease    • Coronary artery disease involving native coronary artery of native heart without angina pectoris    • Depression    • Fracture    • Hypercholesterolemia    • Meningioma ([facility] [address] ) 11/1999    brain tumor   • Meningioma (HCC)    • Narcolepsy     daytime drowsiness and dozing off occasionally   • Orthostatic hypotension    • Palpitations    • Prostate CA (HCC)    • Prostate cancer (HCC)    • Stroke [facility])      Social History     Socioeconomic History   • Marital status: /Civil Union     Spouse name: Not on file   • Number of children: Not on file   • Years of education: Not on file   • Highest education level: Not on file   Occupational History   • Occupation: retired   Tobacco Use   • Smoking status: Never   • Smokeless tobacco: Never   Vaping Use   • Vaping Use: Never used   Substance and Sexual Activity   • Alcohol use: Not Currently     Comment: (history)   • Drug use: No   • Sexual activity: Not Currently   Other Topics Concern   • Not on file   Social History Narrative    ** Merged History Encounter **         ** Merged History Encounter **         ** Merged History Encounter **         Pt lives with wife     Social Determinants of Health     Financial Resource Strain: Not on file   Food Insecurity: No Food Insecurity   • Worried About [employer] [employer] in the Last Year: Never true   • Ran Out of Food in the Last Year: Never true   Transportation Needs: No Transportation Needs   • Lack of Transportation (Medical): No   • Lack of Transportation (Non-Medical):  No   Physical Activity: Not on file   Stress: Not on file   Social Connections: Not on file   Intimate Partner Violence: Not on file   Housing Stability: Low Risk    • Unable to Pay for Housing in the Last Year: No   • Number of Places Lived in the Last Year: 1   • Unstable Housing in the Last Year: No      Family History   Problem Relation Age of Onset   • Hypertension Mother         benign essential   • Cancer Mother    • Osteoporosis Mother    • Suicidality Father    • Diabetes Family    • Heart disease Family    • Neuropathy Family         peripheral   • Prostate cancer Family    • Thyroid disease Family      Past Surgical History:   Procedure Laterality Date   • BACK SURGERY     • BRAIN SURGERY  11/08/1999   • BRAIN SURGERY     • CARDIAC SURGERY     • CORONARY ARTERY BYPASS GRAFT      x5   • CORONARY ARTERY BYPASS GRAFT         Current Outpatient Medications:   •  acetaminophen (TYLENOL) 325 mg tablet, Take 3 tablets (975 mg total) by mouth every 8 (eight) hours, Disp: 120 tablet, Rfl: 0  •  clopidogrel (PLAVIX) 75 mg tablet, TAKE ONE TABLET BY MOUTH EVERY DAY, Disp: 100 tablet, Rfl: 3  •  enoxaparin (Lovenox) 40 mg/0 4 mL, Inject 0 4 mL (40 mg total) under the skin in the morning for 28 days, Disp: 11 2 mL, Rfl: 0  •  lidocaine (LIDODERM) 5 %, Apply 1 patch topically over 12 hours daily Remove & Discard patch within 12 hours or as directed by MD, Disp: 14 patch, Rfl: 0  •  melatonin 3 mg, Take 1 tablet (3 mg total) by mouth daily at bedtime, Disp: , Rfl: 0  •  memantine (NAMENDA) 5 mg tablet, Take 5 mg by mouth daily at bedtime, Disp: , Rfl:   •  metoprolol succinate (TOPROL-XL) 25 mg 24 hr tablet, TAKE ONE TABLET BY MOUTH EVERY DAY, Disp: 90 tablet, Rfl: 3  •  pantoprazole (PROTONIX) 40 mg tablet, Take 1 tablet (40 mg total) by mouth daily in the early morning Do not start before March 17, 2023 , Disp: , Rfl: 0  •  psyllium (METAMUCIL) packet, Take 1 packet by mouth daily Do not start before March 17, 2023 , Disp: , Rfl: 0  •  sertraline (ZOLOFT) 25 mg tablet, Take 3 tablets (75 mg total) by mouth daily Do not start before March 17, 2023 , Disp: , Rfl: 0  •  Stool Softener 100 MG capsule, TAKE ONE CAPSULE BY MOUTH TWO TIMES A DAY *PATIENT SUPPLY, Disp: 60 capsule, Rfl: 5  •  clopidogrel (PLAVIX) 75 mg tablet, Take 1 tablet (75 mg total) by mouth daily Do not start before March 17, 2023  (Patient not taking: Reported on 3/30/2023), Disp: , Rfl: 0  •  Melatonin 5 MG CAPS, Take 5 mg by mouth daily at bedtime (Patient not taking: Reported on 3/30/2023), Disp: , Rfl:   •  Melatonin 5 MG TABS, TAKE ONE TABLET BY MOUTH EVERY NIGHT AT BEDTIME *PATIENT SUPPLY (Patient not taking: Reported on 3/30/2023), Disp: 30 tablet, Rfl: 5  •  memantine (NAMENDA) 5 mg tablet, Take 1 tablet (5 mg total) by mouth daily at bedtime (Patient not taking: Reported on 3/30/2023), Disp: , Rfl: 0  •  Metamucil Fiber 51 7 % PACK, MIX 1 PACKET IN 6-8 OUNCES OF WATER AND CONSUME BY MOUTH DAILY *PATIENT SUPPLY (Patient not taking: Reported on 3/30/2023), Disp: 44 each, Rfl: 5  •  metoprolol succinate (TOPROL-XL) 25 mg 24 hr tablet, Take 1 tablet (25 mg total) by mouth daily Do not start before March 17, 2023   (Patient not taking: Reported on 3/30/2023), Disp: , Rfl: 0  •  midodrine (PROAMATINE) 5 mg tablet, TAKE ONE TABLET BY MOUTH EVERY DAY (Patient not taking: Reported on 3/30/2023), Disp: 90 tablet, Rfl: 3  •  omeprazole (PriLOSEC) 40 MG capsule, TAKE ONE CAPSULE BY MOUTH EVERY DAY (Patient not taking: Reported on 3/30/2023), Disp: 100 capsule, Rfl: 3  •  psyllium (METAMUCIL) 58 6 % packet, Take 1 packet by mouth daily (Patient not taking: Reported on 3/30/2023), Disp: , Rfl:   •  sertraline (ZOLOFT) 50 mg tablet, TAKE ONE AND ONE-HALF TABLETS BY MOUTH EVERY DAY (Patient not taking: Reported on 3/30/2023), Disp: 135 tablet, Rfl: 3  •  simvastatin (ZOCOR) 80 mg tablet, TAKE ONE TABLET BY MOUTH EVERY DAY (Patient not taking: Reported on 3/30/2023), Disp: 90 tablet, Rfl: 3  Allergies   Allergen Reactions   • Aricept [Donepezil] Confusion     confusion   • Atorvastatin Myalgia, Dizziness and Headache   • Niacin Other (See Comments)     unknown       Labs:not applicable  Imaging: XR hip/pelv 2-3 vws left if performed    Result Date: 3/2/2023  Narrative: LEFT HIP INDICATION:   fall, left hip pain  COMPARISON:  None VIEWS:  XR HIP/PELV 2-3 VWS LEFT  W PELVIS IF PERFORMED FINDINGS: The known left acetabular fractures are not well seen radiographically  Avulsion fracture at the left inferior pubic ramus  Mild left hip osteoarthritis is seen  No lytic or blastic osseous lesion  There are atherosclerotic calcifications  Soft tissues are otherwise unremarkable  The visualized lumbar spine is unremarkable  Impression: The known left acetabular fractures are not well seen radiographically  Avulsion fracture at the left inferior pubic ramus  Workstation performed: [id number]     XR knee 4+ vw left injury    Result Date: 3/2/2023  Narrative: LEFT KNEE INDICATION:   fall, left knee pain  COMPARISON:  None VIEWS:  XR KNEE 4+ VW LEFT INJURY FINDINGS: There is no acute fracture or dislocation  There is no joint effusion  No significant degenerative changes  No lytic or blastic osseous lesion  There are atherosclerotic calcifications  Metallic clips noted posterior to the distal femur  Impression: No acute osseous abnormality  Workstation performed: [id number]     TRAUMA - CT head wo contrast    Result Date: 3/13/2023  Narrative: CT BRAIN - WITHOUT CONTRAST INDICATION:   TRAUMA  COMPARISON:  Same day trauma studies  TECHNIQUE:  CT examination of the brain was performed  In addition to axial images, sagittal and coronal 2D reformatted images were created and submitted for interpretation  Radiation dose length product (DLP) for this visit:  911 23 mGy-cm   This examination, like all CT scans performed in the 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  Small amount of dystrophic calcifications in the anterior corpus callosum and cingulum, may  be treatment-related change  Confluent white matter hypodensities in bilateral frontal lobes (left greater than right), may be treatment-related change  No CT signs of acute infarction  No intracranial mass, mass effect or midline shift  No acute parenchymal hemorrhage  Arterial calcifications of carotid siphons and right intradural vertebral artery  VENTRICLES AND EXTRA-AXIAL SPACES:  Ex-vacuo dilatation of left lateral ventricle with 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  VISUALIZED ORBITS: Normal visualized orbits  PARANASAL SINUSES: Normal visualized paranasal sinuses  CALVARIUM AND EXTRACRANIAL SOFT TISSUES:  Biparietal vertex craniotomy with microplate fixation  No acute calvarial fracture cranial soft tissue abnormality  Impression: No acute intracranial abnormality in postsurgical brain    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  Small amount of dystrophic calcifications in the anterior corpus callosum and cingulum, may be treatment-related change  Confluent white matter hypodensities in bilateral frontal lobes (left greater than right), may be treatment-related change  I personally discussed this study with [address] on 3/13/2023 at 8:44 AM  Workstation performed: [id number]     CT head wo contrast    Result Date: 3/2/2023  Narrative: CT BRAIN - WITHOUT CONTRAST INDICATION:   Head trauma, minor (Age >= 65y) fall  COMPARISON:  CT head 10/19/2022 and MRI brain 11/8/2019 TECHNIQUE:  CT examination of the brain was performed  In addition to axial images, sagittal and coronal 2D reformatted images were created and submitted for interpretation  Radiation dose length product (DLP) for this visit:  867 33 mGy-cm   This examination, like all CT scans performed in the [facility], was performed utilizing techniques to minimize radiation dose exposure, including the use of iterative  reconstruction and automated exposure control  IMAGE QUALITY:  Diagnostic  FINDINGS: PARENCHYMA:  No intracranial mass, mass effect or midline shift  No CT signs of acute infarction  No acute parenchymal hemorrhage  Left frontal encephalomalacia with subcortical white matter gliosis, unchanged  Periventricular, centrum semiovale, and subcortical white matter hypodensity is a nonspecific finding likely representing chronic microangiopathy  Calcification bilateral cavernous internal carotid and distal vertebral arteries  VENTRICLES AND EXTRA-AXIAL SPACES:  Stable ex vacuo dilation of the left frontal horn  Stable small focal right anterior parafalcine partially calcified nodule attributed to a meningioma  No acute hydrocephalus  Mild prominence of CSF spaces diffusely attributed to generalized volume loss  VISUALIZED ORBITS: Normal visualized orbits   PARANASAL SINUSES: Trace mucosal thickening bilateral ethmoid sinuses  CALVARIUM AND EXTRACRANIAL SOFT TISSUES:  Scalp unremarkable  Frontal craniotomy defects  No skull fracture  Impression: No acute intracranial process or significant interval change  No skull fracture  Chronic microangiopathy  Workstation performed: [id number]     TRAUMA - CT spine cervical wo contrast    Result Date: 3/13/2023  Narrative: CT CERVICAL SPINE - WITHOUT CONTRAST INDICATION:   TRAUMA  COMPARISON:  Same day CT head without contrast  TECHNIQUE:  CT examination of the cervical spine was performed without intravenous contrast   Contiguous axial images were obtained  Sagittal and coronal reconstructions were performed  Radiation dose length product (DLP) for this visit:  321 74 mGy-cm   This examination, like all CT scans performed in the [facility], was performed utilizing techniques to minimize radiation dose exposure, including the use of iterative  reconstruction and automated exposure control  IMAGE QUALITY:  Diagnostic  FINDINGS: ALIGNMENT:  Grade 1 retrolisthesis C3-C4, degenerative  Grade 1 anterolisthesis C5-C6 and C6-C7, likely degenerative  No traumatic malalignment  VERTEBRAE:  No fracture  Small lucent osseous lesion in C2 spinous process extending into left lamina  DEGENERATIVE CHANGES:  Moderate-to-severe multilevel cervical degenerative changes with facet arthropathy  No critical central canal stenosis  PREVERTEBRAL AND PARASPINAL SOFT TISSUES: No prevertebral soft tissue swelling or paraspinal hematoma  THORACIC INLET:  Normal  OTHER: Secretions in posterior pharyngeal wall, right vallecula, hypopharynx (extending into bilateral piriform sinuses mild scattered calcified atherosclerotic disease  Impression: No cervical spine fracture or traumatic malalignment  Small lucent osseous lesion in C2 spinous process extending into left lamina, indeterminate    Osseous metastasis, myeloma, or focal fatty osseous change in the differential  Secretions in posterior pharyngeal wall, right vallecula, hypopharynx (extending into bilateral piriform sinuses)  Patient is at risk for aspiration  I personally discussed this study with Lynette Ying on 3/13/2023 at 8:44 AM   Workstation performed: [id number]     XR chest 1 view    Result Date: 3/14/2023  Narrative: TRAUMA SERIES INDICATION:  TRAUMA  COMPARISON:  None VIEWS:  XR TRAUMA MULTIPLE 2  FINDINGS: CHEST: Supine frontal view of the chest is obtained  Telemetry wires and leads overlie the chest  Normal heart size  CABG  Poststernotomy  Left chest loop recorder  Lungs are clear  No layering pleural effusions detected  Mildly elevated right hemidiaphragm  No pneumothorax is seen on this supine film  Upright images are more sensitive to detect anterior pneumothoraces if relevant  No acute displaced fractures  Chronic healed right rib fracture deformity  PELVIS: No acute fracture in visualized pelvis  Vascular calcifications  Impression: No acute cardiopulmonary disease within limitations of supine imaging  No acute osseous abnormality of visualized pelvis I personally discussed this study with [address] on 3/13/2023 at 8:44 AM  Workstation performed: [id number]     XR pelvis ap only 1 or 2 vw    Result Date: 3/14/2023  Narrative: TRAUMA SERIES INDICATION:  TRAUMA  COMPARISON:  None VIEWS:  XR TRAUMA MULTIPLE 2  FINDINGS: CHEST: Supine frontal view of the chest is obtained  Telemetry wires and leads overlie the chest  Normal heart size  CABG  Poststernotomy  Left chest loop recorder  Lungs are clear  No layering pleural effusions detected  Mildly elevated right hemidiaphragm  No pneumothorax is seen on this supine film  Upright images are more sensitive to detect anterior pneumothoraces if relevant  No acute displaced fractures  Chronic healed right rib fracture deformity  PELVIS: No acute fracture in visualized pelvis  Vascular calcifications  "    Impression: No acute cardiopulmonary disease within limitations of supine imaging  No acute osseous abnormality of visualized pelvis I personally discussed this study with [address] on 3/13/2023 at 8:44 AM  Workstation performed: [id number]     XR Trauma multiple ([facility] trauma bay ONLY)    Result Date: 3/13/2023  Narrative: TRAUMA SERIES INDICATION:  TRAUMA  COMPARISON:  None VIEWS:  XR TRAUMA MULTIPLE 2  FINDINGS: CHEST: Supine frontal view of the chest is obtained  Telemetry wires and leads overlie the chest  Normal heart size  CABG  Poststernotomy  Left chest loop recorder  Lungs are clear  No layering pleural effusions detected  Mildly elevated right hemidiaphragm  No pneumothorax is seen on this supine film  Upright images are more sensitive to detect anterior pneumothoraces if relevant  No acute displaced fractures  Chronic healed right rib fracture deformity  PELVIS: No acute fracture in visualized pelvis  Vascular calcifications  Impression: No acute cardiopulmonary disease within limitations of supine imaging  No acute osseous abnormality of visualized pelvis I personally discussed this study with [address] on 3/13/2023 at 8:44 AM  Workstation performed: [id number]     Cardiac EP device report    Result Date: 3/13/2023  Narrative: MDT LOOP CARELINK TRANSMISSION (ILR): BATTERY STATUS \"OK  \" PRESENTING RHYTHM SHOWS SR, @ ~70 BPM  NO PATIENT ACTIVATED EPISODES  7 DEVICE TACHY EPISODES W/ ECG'S SHOWING ST  MOST RECENT EPISODE DURATION 01:51 MINS @  BPM   19 DEVICE CLASSIFIED AF EPISODES, AVAILABLE STRIPS DEMONSTRATE NO ATRIAL FIBRILLATION  INSTEAD EGM'S SHOW SR W/ PACs  AND PVCs  AF BURDEN IS 0%  SOME STRIPS MAY NOT BE INTERPRETABLE OR AVAILABLE, CANNOT DEFINITIVELY RULE OUT ATRIAL FIBRILLATION  NORMAL DEVICE FUNCTION  ES     US bedside procedure    Result Date: 3/13/2023  Narrative: 5 [id number]  3 [id number]  8 [id number]    CT lower extremity wo " contrast left    Result Date: 3/2/2023  Narrative: CT left hip without IV contrast INDICATION: Hip pain, stress fracture suspected, neg xray hip pain  COMPARISON: X-ray today  TECHNIQUE: CT examination of the above was performed  This examination, like all CT scans performed in the [facility], was performed utilizing techniques to minimize radiation dose exposure, including the use of iterative reconstruction and automated exposure control software  Sagittal and coronal two dimensional reconstructed images were also submitted for interpretation  Rad dose  407 79 mGy-cm FINDINGS: OSSEOUS STRUCTURES:  Mildly comminuted mildly displaced inferior pubic ramus fractures Nondisplaced posterior acetabular column fracture series 400/59, extends into the posterior wall series 401/51 Mildly displaced fracture of the internal pelvic ring, near junction of the anterior and posterior acetabular columns seen on series 2/62 VISUALIZED MUSCULATURE:  Unremarkable  SOFT TISSUES:  Unremarkable  OTHER PERTINENT FINDINGS:  Aortic, iliac common femoral artery atherosclerosis  Impression: 1  Acetabular fractures extending into the posterior wall 2  Inferior pubic ramus fractures Workstation performed: [id number]     Echo complete w/ contrast if indicated    Result Date: 3/14/2023  Narrative: •  Left Ventricle: Left ventricular cavity size is normal  Wall thickness is normal  The left ventricular ejection fraction is 50-55%  Systolic function is low normal  Although no diagnostic regional wall motion abnormality was identified, this possibility cannot be completely excluded on the basis of this study  Diastolic function is mildly abnormal, consistent with grade I (abnormal) relaxation  Left atrial filling pressure is normal  •  IVS: There is abnormal septal motion consistent with bundle branch block  •  Right Ventricle: Right ventricle is not well visualized  Systolic function is normal  •  Aortic Valve:  There is mild regurgitation  •  Mitral Valve: There is mild regurgitation  •  Prior TTE study available for comparison  Prior study date: 11/8/2019  No significant changes noted compared to the prior study  •  Technically difficult study  Review of Systems:  Review of Systems   All other systems reviewed and are negative  Physical Exam:  BP (!) 124/44 (BP Location: Left arm, Patient Position: Sitting, Cuff Size: Standard)   Pulse 65   Wt 64 4 kg (142 lb)   SpO2 99%   BMI 20 97 kg/m²   Physical Exam  Vitals reviewed  Constitutional:       Appearance: He is well-developed  HENT:      Head: Normocephalic and atraumatic  Cardiovascular:      Rate and Rhythm: Normal rate  Heart sounds: Normal heart sounds  Pulmonary:      Effort: Pulmonary effort is normal       Breath sounds: Normal breath sounds  Musculoskeletal:      Cervical back: Normal range of motion  Skin:     General: Skin is warm and dry  Neurological:      Mental Status: He is alert and oriented to person, place, and time  Discussion/Summary:I will continue the patient's present medical regimen  The patient appears well compensated  I have asked the patient to call if there is a problem in the interim otherwise I will see the patient in six months time

## 2023-03-30 ENCOUNTER — OFFICE VISIT (OUTPATIENT)
Dept: CARDIOLOGY CLINIC | Facility: CLINIC | Age: 88
End: 2023-03-30

## 2023-03-30 VITALS
BODY MASS INDEX: 20.97 KG/M2 | HEART RATE: 65 BPM | WEIGHT: 142 LBS | OXYGEN SATURATION: 99 % | DIASTOLIC BLOOD PRESSURE: 44 MMHG | SYSTOLIC BLOOD PRESSURE: 124 MMHG

## 2023-03-30 DIAGNOSIS — I10 ESSENTIAL (PRIMARY) HYPERTENSION: Primary | ICD-10-CM

## 2023-03-30 DIAGNOSIS — R94.31 ABNORMAL EKG: ICD-10-CM

## 2023-03-30 DIAGNOSIS — I25.10 CORONARY ARTERIOSCLEROSIS: ICD-10-CM

## 2023-03-30 DIAGNOSIS — Z95.818 PRESENCE OF OTHER CARDIAC IMPLANTS AND GRAFTS: ICD-10-CM

## 2023-03-30 DIAGNOSIS — E78.00 PURE HYPERCHOLESTEROLEMIA: ICD-10-CM

## 2023-03-30 DIAGNOSIS — I95.1 ORTHOSTATIC HYPOTENSION: ICD-10-CM

## 2023-04-03 ENCOUNTER — HOSPITAL ENCOUNTER (OUTPATIENT)
Dept: RADIOLOGY | Facility: HOSPITAL | Age: 88
Discharge: HOME | End: 2023-04-03
Attending: ORTHOPAEDIC SURGERY

## 2023-04-03 ENCOUNTER — OFFICE VISIT (OUTPATIENT)
Dept: OBGYN CLINIC | Facility: HOSPITAL | Age: 88
End: 2023-04-03

## 2023-04-03 DIAGNOSIS — S32.402A CLOSED NONDISPLACED FRACTURE OF LEFT ACETABULUM, UNSPECIFIED PORTION OF ACETABULUM, INITIAL ENCOUNTER (HCC): Primary | ICD-10-CM

## 2023-04-03 DIAGNOSIS — Z51.89 AFTERCARE: ICD-10-CM

## 2023-04-03 NOTE — PROGRESS NOTES
80 y o  male   Chief complaint: No chief complaint on file  HPI: Debbie Concepcion is a 80 y o  male who presents today for follow up regarding left acetabular fractures following a fall on 2023  Patient has been doing well, denies any symptoms  He has been getting physical therapy  He presents today in a wheelchair      Location: Left hip  Severity: mild   Timin2023  Modifying Factors: none  Associated Signs/Symptoms: none    Past Medical History:   Diagnosis Date   • Anxiety    • Arthritis    • Coronary artery disease    • Coronary artery disease involving native coronary artery of native heart without angina pectoris    • Depression    • Fracture    • Hypercholesterolemia    • Meningioma ([facility] ) 1999    brain tumor   • Meningioma (HCC)    • Narcolepsy     daytime drowsiness and dozing off occasionally   • Orthostatic hypotension    • Palpitations    • Prostate CA (HCC)    • Prostate cancer ([place] [address] )    • Stroke [facility])      Past Surgical History:   Procedure Laterality Date   • BACK SURGERY     • BRAIN SURGERY  1999   • BRAIN SURGERY     • CARDIAC SURGERY     • CORONARY ARTERY BYPASS GRAFT      x5   • CORONARY ARTERY BYPASS GRAFT       Family History   Problem Relation Age of Onset   • Hypertension Mother         benign essential   • Cancer Mother    • Osteoporosis Mother    • Suicidality Father    • Diabetes Family    • Heart disease Family    • Neuropathy Family         peripheral   • Prostate cancer Family    • Thyroid disease Family      Social History     Socioeconomic History   • Marital status: /Civil Union     Spouse name: Not on file   • Number of children: Not on file   • Years of education: Not on file   • Highest education level: Not on file   Occupational History   • Occupation: retired   Tobacco Use   • Smoking status: Never   • Smokeless tobacco: Never   Vaping Use   • Vaping Use: Never used   Substance and Sexual Activity   • Alcohol use: Not Currently     Comment: (history)   • Drug use: No   • Sexual activity: Not Currently   Other Topics Concern   • Not on file   Social History Narrative    ** Merged History Encounter **         ** Merged History Encounter **         ** Merged History Encounter **         Pt lives with wife     Social Determinants of Health     Financial Resource Strain: Not on file   Food Insecurity: No Food Insecurity   • Worried About [employer] in the Last Year: Never true   • Ran Out of Food in the Last Year: Never true   Transportation Needs: No Transportation Needs   • Lack of Transportation (Medical): No   • Lack of Transportation (Non-Medical): No   Physical Activity: Not on file   Stress: Not on file   Social Connections: Not on file   Intimate Partner Violence: Not on file   Housing Stability: Low Risk    • Unable to Pay for Housing in the Last Year: No   • Number of Places Lived in the Last Year: 1   • Unstable Housing in the Last Year: No     Current Outpatient Medications   Medication Sig Dispense Refill   • acetaminophen (TYLENOL) 325 mg tablet Take 3 tablets (975 mg total) by mouth every 8 (eight) hours 120 tablet 0   • clopidogrel (PLAVIX) 75 mg tablet TAKE ONE TABLET BY MOUTH EVERY  tablet 3   • clopidogrel (PLAVIX) 75 mg tablet Take 1 tablet (75 mg total) by mouth daily Do not start before March 17, 2023   (Patient not taking: Reported on 3/30/2023)  0   • enoxaparin (Lovenox) 40 mg/0 4 mL Inject 0 4 mL (40 mg total) under the skin in the morning for 28 days 11 2 mL 0   • lidocaine (LIDODERM) 5 % Apply 1 patch topically over 12 hours daily Remove & Discard patch within 12 hours or as directed by MD 14 patch 0   • melatonin 3 mg Take 1 tablet (3 mg total) by mouth daily at bedtime  0   • Melatonin 5 MG CAPS Take 5 mg by mouth daily at bedtime (Patient not taking: Reported on 3/30/2023)     • Melatonin 5 MG TABS TAKE ONE TABLET BY MOUTH EVERY NIGHT AT BEDTIME *PATIENT SUPPLY (Patient not taking: Reported on 3/30/2023) 30 tablet 5   • memantine (NAMENDA) 5 mg tablet Take 5 mg by mouth daily at bedtime     • memantine (NAMENDA) 5 mg tablet Take 1 tablet (5 mg total) by mouth daily at bedtime (Patient not taking: Reported on 3/30/2023)  0   • Metamucil Fiber 51 7 % PACK MIX 1 PACKET IN 6-8 OUNCES OF WATER AND CONSUME BY MOUTH DAILY *PATIENT SUPPLY (Patient not taking: Reported on 3/30/2023) 44 each 5   • metoprolol succinate (TOPROL-XL) 25 mg 24 hr tablet TAKE ONE TABLET BY MOUTH EVERY DAY 90 tablet 3   • metoprolol succinate (TOPROL-XL) 25 mg 24 hr tablet Take 1 tablet (25 mg total) by mouth daily Do not start before March 17, 2023  (Patient not taking: Reported on 3/30/2023)  0   • midodrine (PROAMATINE) 5 mg tablet TAKE ONE TABLET BY MOUTH EVERY DAY (Patient not taking: Reported on 3/30/2023) 90 tablet 3   • omeprazole (PriLOSEC) 40 MG capsule TAKE ONE CAPSULE BY MOUTH EVERY DAY (Patient not taking: Reported on 3/30/2023) 100 capsule 3   • pantoprazole (PROTONIX) 40 mg tablet Take 1 tablet (40 mg total) by mouth daily in the early morning Do not start before March 17, 2023   0   • psyllium (METAMUCIL) 58 6 % packet Take 1 packet by mouth daily (Patient not taking: Reported on 3/30/2023)     • psyllium (METAMUCIL) packet Take 1 packet by mouth daily Do not start before March 17, 2023   0   • sertraline (ZOLOFT) 25 mg tablet Take 3 tablets (75 mg total) by mouth daily Do not start before March 17, 2023   0   • sertraline (ZOLOFT) 50 mg tablet TAKE ONE AND ONE-HALF TABLETS BY MOUTH EVERY DAY (Patient not taking: Reported on 3/30/2023) 135 tablet 3   • simvastatin (ZOCOR) 80 mg tablet TAKE ONE TABLET BY MOUTH EVERY DAY (Patient not taking: Reported on 3/30/2023) 90 tablet 3   • Stool Softener 100 MG capsule TAKE ONE CAPSULE BY MOUTH TWO TIMES A DAY *PATIENT SUPPLY 60 capsule 5     No current facility-administered medications for this visit       Aricept [donepezil], Atorvastatin, and Niacin    Patient's medications, allergies, past medical, surgical, social and family histories were reviewed and updated as appropriate  Unless otherwise noted above, past medical history, family history, and social history are noncontributory  Review of Systems:  Constitutional: no chills  Respiratory: no chest pain  Cardio: no syncope  GI: no abdominal pain  : no urinary continence  Neuro: no headaches  Psych: no anxiety  Skin: no rash  MS: except as noted in HPI and chief complaint  Allergic/immunology: no contact dermatitis    Physical Exam:  There were no vitals taken for this visit  General:  Constitutional: Patient is cooperative  Does not have a sickly appearance  Does not appear ill  No distress  Head: Atraumatic  Eyes: Conjunctivae are normal    Cardiovascular: 2+ radial pulses bilaterally with brisk cap refill of all fingers  Pulmonary/Chest: Effort normal  No stridor  Abdomen: soft NT/ND  Skin: Skin is warm and dry  No rash noted  No erythema  No skin breakdown  Psychiatric: mood/affect appropriate, behavior is normal   Gait: Appropriate gait observed per baseline ambulatory status  Studies reviewed:  XR performed during today's visit was reviewed with the patient and parent  XR indicates  fracture is healing appropriately with routine healing    Impression:  Left Acetabular Fractures     Plan:  Patient's caretaker was present and provided pertinent history  I personally reviewed all images and discussed them with the caretaker  All plans outlined below were discussed with the patient's caretaker present for this visit  Treatment options were discussed in detail   After considering all various options, the treatment plan will include:  - no treatment necessary at this time   - no restrictions  - I will plan to see this patient as needed      Scribe Attestation    I,:  Salo Gomez am acting as a scribe while in the presence of the attending physician :       I,:  Gwyn Barrett MD personally performed the services described in this documentation    as scribed in my presence :

## 2023-05-09 ENCOUNTER — APPOINTMENT (OUTPATIENT)
Dept: RADIOLOGY | Facility: MEDICAL CENTER | Age: 88
End: 2023-05-09

## 2023-05-09 ENCOUNTER — OFFICE VISIT (OUTPATIENT)
Dept: OBGYN CLINIC | Facility: MEDICAL CENTER | Age: 88
End: 2023-05-09

## 2023-05-09 VITALS — BODY MASS INDEX: 21.03 KG/M2 | HEIGHT: 69 IN | WEIGHT: 142 LBS

## 2023-05-09 DIAGNOSIS — S52.602A CLOSED FRACTURE OF DISTAL ENDS OF LEFT RADIUS AND ULNA, INITIAL ENCOUNTER: Primary | ICD-10-CM

## 2023-05-09 DIAGNOSIS — S52.502A CLOSED FRACTURE OF DISTAL ENDS OF LEFT RADIUS AND ULNA, INITIAL ENCOUNTER: Primary | ICD-10-CM

## 2023-05-09 DIAGNOSIS — M25.532 LEFT WRIST PAIN: ICD-10-CM

## 2023-05-09 DIAGNOSIS — S62.102A LEFT WRIST FRACTURE, CLOSED, INITIAL ENCOUNTER: ICD-10-CM

## 2023-05-09 NOTE — PATIENT INSTRUCTIONS
Patient is to remain nonweightbearing left upper extremity for at least 2 weeks if possible  Follow up in 5 weeks for cast off    Cast Care   AMBULATORY CARE:   Cast care  will help the cast dry and harden correctly, and then protect it until it comes off  Your cast may need up to 48 hours to dry and harden completely  Even after your cast hardens, it can be damaged  Seek care immediately if:   Your cast breaks or gets damaged  You see drainage, or your cast is stained or smells bad  Your skin turns blue or pale  Your skin tingles, burns, or is cold or numb  You have severe pain that is getting worse and does not go away after you take pain medicine  Your limb swells, or your cast looks or feels tighter than it was before  Contact your healthcare provider if:   Something falls into your cast and gets stuck  You have itching, pain, burning, or weakness where you have the cast      You have a fever  You have sores, blisters, or breaks on the skin around the edges of the cast     You have questions or concerns about your condition or care  Care for your cast while it hardens:   Protect the cast   Do not put weight on the cast  Do not bend, lean on, or hit the cast with anything  Use the palms of your hands when you move the cast  Do not use your fingers  Your fingers may leave marks on the cast as it dries  Change positions often  Change your position every 2 hours to help the cast dry faster  Prop your cast on something soft, such as a pillow, to prevent a flat area on your cast      Keep the cast dry  Tie plastic trash bags around your cast to keep it dry while you bathe  You may use a blow dryer on cool or the lowest heat setting to dry your cast if it gets wet  Do not use a high heat setting, because you may burn your skin  Certain casts can get wet  Ask if you have a waterproof cast     Care for your cast after it hardens:   Check your cast every day    Contact your healthcare provider if you notice any cracks, dents, holes, or flaking on your cast      Keep your cast clean and dry  Cover your cast with a towel when you eat  You may have a small piece of cast that can be removed to check on incisions under your cast  Make sure the small piece of cast is kept tightly closed  If your cast gets dirty, use a mild detergent and a damp washcloth to wipe off the outside of your cast  Continue to cover your cast with trash bags to keep it dry while you bathe  Care for the edges of your cast   Cover the cast edges to keep them smooth  Use 4 inch pieces of waterproof tape  Place one end of the tape under the inside edge of your cast and fold it over to the outside surface  Overlap tape strips until the edges are completely covered  Change the tape as directed  Do not pull or repair any of the padding from inside the cast  This could cause blisters and sores on the skin under your cast      Keep weight off your cast   Do not let anyone push down or lean on your cast  This may cause it to break  Do not use sharp objects  Do not use a sharp or pointed object to scratch under your cast  This may cause wounds that can get infected, or you may lose the item inside the cast  If your skin itches, blow cool air under the cast  You may also gently scratch your skin outside the cast with a cloth  Follow up with your healthcare provider as directed: You will need to return to have your cast removed and your bones checked  Write down your questions so you remember to ask them during your visits  © Copyright [employer] 2022 Information is for End User's use only and may not be sold, redistributed or otherwise used for commercial purposes  The above information is an  only  It is not intended as medical advice for individual conditions or treatments  Talk to your doctor, nurse or pharmacist before following any medical regimen to see if it is safe and effective for you

## 2023-05-09 NOTE — LETTER
May 9, 2023     Patient: Aurelia Whitney  YOB: 1933  Date of Visit: 5/9/2023      To Whom it May Concern:    Steve Connors is under my professional care  Angelica Donald was seen in my office on 5/9/2023  patient is to remain nonweightbearing of the left upper extremity if possible for at least the next 2 weeks  I would like to see him back in approximately 5 weeks for cast off and re x-ray  If you have any questions or concerns, please don't hesitate to call           Sincerely,          Lillian Arredondo MD        CC: No Recipients

## 2023-05-24 ENCOUNTER — TELEPHONE (OUTPATIENT)
Dept: OBGYN CLINIC | Facility: MEDICAL CENTER | Age: 88
End: 2023-05-24

## 2023-05-24 NOTE — TELEPHONE ENCOUNTER
Caller: Michael Avelar at [address]: [place]    Reason for call: Michael Avelar is doing physical therapy for this patient who has Closed fracture of distal ends of left radius and ulna,  She is calling on the weight bearing status of this injury  She is asking for a call back relating treatment      Call back#: [phone]

## 2023-06-02 ENCOUNTER — REMOTE DEVICE CLINIC VISIT (OUTPATIENT)
Dept: CARDIOLOGY CLINIC | Facility: CLINIC | Age: 88
End: 2023-06-02

## 2023-06-02 DIAGNOSIS — Z95.818 PRESENCE OF OTHER CARDIAC IMPLANTS AND GRAFTS: Primary | ICD-10-CM

## 2023-06-02 NOTE — PROGRESS NOTES
MDT LOOP   CARELINK TRANSMISSION: LOOP RECORDER  PRESENTING RHYTHM NSR W/ PVC @ 77 BPM  BATTERY RRT SINCE 4/19/23  TASK SENT OT JG FOR POSSIBLE EXPLANT  4 DEVICE CLASSIFIED AF EPISODES, AVAILABLE STRIPS DEMONSTRATE NO ATRIAL FIBRILLATION  INSTEAD EGM'S SHOW SR W/ PACs AND PVCs  AF BURDEN IS 0%  AF BURDEN MAYBE OVERESTIMATED DUE TO INAPPROPRIATE CLASSIFICATION OF AF  SOME STRIPS MAY NOT BE INTERPRETABLE OR AVAILABLE, CANNOT DEFINITIVELY RULE OUT ATRIAL FIBRILLATION  NO PATIENT ACTIVATED EPISODES  NORMAL DEVICE FUNCTION   DL

## 2023-06-06 ENCOUNTER — TELEPHONE (OUTPATIENT)
Dept: CARDIOLOGY CLINIC | Facility: CLINIC | Age: 88
End: 2023-06-06

## 2023-06-06 NOTE — TELEPHONE ENCOUNTER
----- Message from Freda Garnica sent at 6/2/2023  2:32 PM EDT -----  Regarding: FW: Loop battery RRT  Please schedule pt for loop explant per JG  Pt is in [facility] for rehab [phone]  Thanks  ----- Message -----  From: Kym Ragland MD  Sent: 6/2/2023   2:18 PM EDT  To: Freda Garnica  Subject: RE: Loop battery RRT                             Ok to arrange explant, tx  ----- Message -----  From: Freda Garncia  Sent: 6/2/2023   1:52 PM EDT  To: Kym Ragland MD  Subject: Loop battery RRT                                 Please see loop remote below showing low battery  Would you like pt scheduled for loop explant? CARELINK TRANSMISSION: LOOP RECORDER  PRESENTING RHYTHM NSR W/ PVC @ 77 BPM  BATTERY RRT SINCE 4/19/23  TASK SENT OT JG FOR POSSIBLE EXPLANT  4 DEVICE CLASSIFIED AF EPISODES, AVAILABLE STRIPS DEMONSTRATE NO ATRIAL FIBRILLATION  INSTEAD EGM'S SHOW SR W/ PACs AND PVCs  AF BURDEN IS 0%  AF BURDEN MAYBE OVERESTIMATED DUE TO INAPPROPRIATE CLASSIFICATION OF AF  SOME STRIPS MAY NOT BE INTERPRETABLE OR AVAILABLE, CANNOT DEFINITIVELY RULE OUT ATRIAL FIBRILLATION  NO PATIENT ACTIVATED EPISODES  NORMAL DEVICE FUNCTION   DL

## 2023-06-06 NOTE — LETTER
6/6/2023      MRN: [id number]        [address]  [facility]  [phone]      You have been referred to our Cardiology department to be scheduled for a LOOP Recorder Explant procedure  We do not have any openings available at the [facility] to schedule this procedure but can schedule it at [facility] or [facility]  If these do not accommodate your need then I can add you to the [address] [place] [address] waitlist until an opening becomes available  Please call me at  or [phone] to let me know which campus you prefer to schedule your procedure         Thank you,   Lauren Lara  Surgery Coordinator  [facility] Cardiology   [address] [facility], [address]  Ph: [phone]

## 2023-06-08 NOTE — TELEPHONE ENCOUNTER
Spoke with patients wife  She states she would like patient to have loop explant at [facility]       Patient placed on wait list

## 2023-06-13 ENCOUNTER — APPOINTMENT (OUTPATIENT)
Dept: RADIOLOGY | Facility: MEDICAL CENTER | Age: 88
End: 2023-06-13
Payer: MEDICARE

## 2023-06-13 ENCOUNTER — OFFICE VISIT (OUTPATIENT)
Dept: OBGYN CLINIC | Facility: MEDICAL CENTER | Age: 88
End: 2023-06-13
Payer: MEDICARE

## 2023-06-13 VITALS
SYSTOLIC BLOOD PRESSURE: 124 MMHG | HEIGHT: 69 IN | DIASTOLIC BLOOD PRESSURE: 60 MMHG | WEIGHT: 142 LBS | BODY MASS INDEX: 21.03 KG/M2 | HEART RATE: 72 BPM

## 2023-06-13 DIAGNOSIS — S52.602D CLOSED FRACTURE OF DISTAL ENDS OF LEFT RADIUS AND ULNA WITH ROUTINE HEALING, SUBSEQUENT ENCOUNTER: Primary | ICD-10-CM

## 2023-06-13 DIAGNOSIS — S52.602D CLOSED FRACTURE OF DISTAL ENDS OF LEFT RADIUS AND ULNA WITH ROUTINE HEALING, SUBSEQUENT ENCOUNTER: ICD-10-CM

## 2023-06-13 DIAGNOSIS — S52.502D CLOSED FRACTURE OF DISTAL ENDS OF LEFT RADIUS AND ULNA WITH ROUTINE HEALING, SUBSEQUENT ENCOUNTER: Primary | ICD-10-CM

## 2023-06-13 DIAGNOSIS — S52.502D CLOSED FRACTURE OF DISTAL ENDS OF LEFT RADIUS AND ULNA WITH ROUTINE HEALING, SUBSEQUENT ENCOUNTER: ICD-10-CM

## 2023-06-13 PROBLEM — S52.602A CLOSED FRACTURE OF LEFT DISTAL RADIUS AND ULNA: Status: ACTIVE | Noted: 2023-06-13

## 2023-06-13 PROBLEM — S52.502A CLOSED FRACTURE OF LEFT DISTAL RADIUS AND ULNA: Status: ACTIVE | Noted: 2023-06-13

## 2023-06-13 PROCEDURE — 99213 OFFICE O/P EST LOW 20 MIN: CPT | Performed by: EMERGENCY MEDICINE

## 2023-06-13 PROCEDURE — 73110 X-RAY EXAM OF WRIST: CPT

## 2023-06-13 NOTE — PROGRESS NOTES
Assessment/Plan:    Diagnoses and all orders for this visit:    Closed fracture of distal ends of left radius and ulna with routine healing, subsequent encounter  -     XR wrist 3+ vw left; Future  -     Cock Up Wrist Splint    Cast removed, repeat x-rays show healing of the distal radius fracture in stable alignment  Alex Fore may weight bear as tolerated left hand/wrist   We have provided a wrist brace to wear over the next two when when he is weight bearing to provide extra support, but he does not need to wear it when he is resting or sleeping  Return in about 5 weeks (around 7/18/2023)  Subjective:   Patient ID: Misha Griffin is a 80 y o  male  Patient returns for cast off and reXray  Initial note:  Patient presents from [facility] for fracture of the left wrist   It is believed that he fell out of bed and x-ray was obtained May 7 and those images have been reviewed today showing a distal radius and ulna fracture  There is some impaction and minimal angulation  There is involvement into the joint space however there is no displacement or step-off      Review of Systems   Unable to perform ROS: Patient nonverbal       The following portions of the patient's chart were reviewed and updated as appropriate:    Allergy:    Allergies   Allergen Reactions   • Aricept [Donepezil] Confusion     confusion   • Atorvastatin Myalgia, Dizziness and Headache   • Niacin Other (See Comments)     unknown       Medications:    Current Outpatient Medications:   •  acetaminophen (TYLENOL) 325 mg tablet, Take 3 tablets (975 mg total) by mouth every 8 (eight) hours, Disp: 120 tablet, Rfl: 0  •  clopidogrel (PLAVIX) 75 mg tablet, TAKE ONE TABLET BY MOUTH EVERY DAY, Disp: 100 tablet, Rfl: 3  •  lidocaine (LIDODERM) 5 %, Apply 1 patch topically over 12 hours daily Remove & Discard patch within 12 hours or as directed by MD, Disp: 14 patch, Rfl: 0  •  melatonin 3 mg, Take 1 tablet (3 mg total) by mouth daily at bedtime, Disp: , Rfl: 0  •  memantine (NAMENDA) 5 mg tablet, Take 5 mg by mouth daily at bedtime, Disp: , Rfl:   •  metoprolol succinate (TOPROL-XL) 25 mg 24 hr tablet, TAKE ONE TABLET BY MOUTH EVERY DAY, Disp: 90 tablet, Rfl: 3  •  pantoprazole (PROTONIX) 40 mg tablet, Take 1 tablet (40 mg total) by mouth daily in the early morning Do not start before March 17, 2023 , Disp: , Rfl: 0  •  psyllium (METAMUCIL) packet, Take 1 packet by mouth daily Do not start before March 17, 2023 , Disp: , Rfl: 0  •  sertraline (ZOLOFT) 25 mg tablet, Take 3 tablets (75 mg total) by mouth daily Do not start before March 17, 2023 , Disp: , Rfl: 0  •  Stool Softener 100 MG capsule, TAKE ONE CAPSULE BY MOUTH TWO TIMES A DAY *PATIENT SUPPLY, Disp: 60 capsule, Rfl: 5  •  clopidogrel (PLAVIX) 75 mg tablet, Take 1 tablet (75 mg total) by mouth daily Do not start before March 17, 2023  (Patient not taking: Reported on 3/30/2023), Disp: , Rfl: 0  •  enoxaparin (Lovenox) 40 mg/0 4 mL, Inject 0 4 mL (40 mg total) under the skin in the morning for 28 days, Disp: 11 2 mL, Rfl: 0  •  Melatonin 5 MG CAPS, Take 5 mg by mouth daily at bedtime (Patient not taking: Reported on 3/30/2023), Disp: , Rfl:   •  Melatonin 5 MG TABS, TAKE ONE TABLET BY MOUTH EVERY NIGHT AT BEDTIME *PATIENT SUPPLY (Patient not taking: Reported on 3/30/2023), Disp: 30 tablet, Rfl: 5  •  memantine (NAMENDA) 5 mg tablet, Take 1 tablet (5 mg total) by mouth daily at bedtime (Patient not taking: Reported on 3/30/2023), Disp: , Rfl: 0  •  Metamucil Fiber 51 7 % PACK, MIX 1 PACKET IN 6-8 OUNCES OF WATER AND CONSUME BY MOUTH DAILY *PATIENT SUPPLY (Patient not taking: Reported on 3/30/2023), Disp: 44 each, Rfl: 5  •  metoprolol succinate (TOPROL-XL) 25 mg 24 hr tablet, Take 1 tablet (25 mg total) by mouth daily Do not start before March 17, 2023   (Patient not taking: Reported on 3/30/2023), Disp: , Rfl: 0  •  midodrine (PROAMATINE) 5 mg tablet, TAKE ONE TABLET BY MOUTH EVERY DAY (Patient not taking: Reported on 3/30/2023), Disp: 90 tablet, Rfl: 3  •  omeprazole (PriLOSEC) 40 MG capsule, TAKE ONE CAPSULE BY MOUTH EVERY DAY (Patient not taking: Reported on 3/30/2023), Disp: 100 capsule, Rfl: 3  •  psyllium (METAMUCIL) 58 6 % packet, Take 1 packet by mouth daily (Patient not taking: Reported on 3/30/2023), Disp: , Rfl:   •  sertraline (ZOLOFT) 50 mg tablet, TAKE ONE AND ONE-HALF TABLETS BY MOUTH EVERY DAY (Patient not taking: Reported on 3/30/2023), Disp: 135 tablet, Rfl: 3  •  simvastatin (ZOCOR) 80 mg tablet, TAKE ONE TABLET BY MOUTH EVERY DAY (Patient not taking: Reported on 3/30/2023), Disp: 90 tablet, Rfl: 3    Patient Active Problem List   Diagnosis   • Constipation   • Iron deficiency   • Other constipation   • Anemia   • Arteriosclerotic cardiovascular disease   • Backache   • Essential hypertension   • Cervical spinal stenosis   • Depression   • Esophageal reflux   • Hyperlipidemia   • Insomnia   • Spinal stenosis of lumbar region with neurogenic claudication   • Vitamin B12 deficiency   • Idiopathic peripheral neuropathy   • Iron deficiency anemia   • History of meningioma   • Cognitive decline   • Contusion of rib on right side   • Orthostatic hypotension   • Convulsions (HCC)   • Lumbar spondylosis   • Lumbar radiculopathy   • Acute CVA (cerebrovascular accident), suspected embolic in nature   • Hypercalcemia   • History of resection of meningioma   • History of stroke   • Depression   • 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 )   • Primary hypertension   • Moderate protein-calorie malnutrition ([facility] )   • Dysphagia   • Closed fracture of left distal radius and ulna       Objective:  /60   Pulse 72   Ht 5' 9\" (1 753 m)   Wt 64 4 kg (142 lb)   BMI 20 97 kg/m²     Left Hand Exam     Tenderness   The patient is experiencing tenderness in the radial area       Range of Motion   Wrist   Extension: abnormal   Flexion: abnormal     Other   Erythema: absent  Sensation: normal  Pulse: present    Comments:  Swelling of wrist            Physical Exam      Neurologic Exam    Procedures    I have personally reviewed pertinent films in PACS and my interpretation is Xrays Left wrist reveal healing fracture in stable alignment            Past Medical History:   Diagnosis Date   • Anxiety    • Arthritis    • Coronary artery disease    • Coronary artery disease involving native coronary artery of native heart without angina pectoris    • Depression    • Fracture    • Hypercholesterolemia    • Meningioma ([facility] ) 11/1999    brain tumor   • Meningioma (HCC)    • Narcolepsy     daytime drowsiness and dozing off occasionally   • Orthostatic hypotension    • Palpitations    • Prostate CA (HCC)    • Prostate cancer (HCC)    • Stroke [facility])        Past Surgical History:   Procedure Laterality Date   • BACK SURGERY     • BRAIN SURGERY  11/08/1999   • BRAIN SURGERY     • CARDIAC SURGERY     • CORONARY ARTERY BYPASS GRAFT      x5   • CORONARY ARTERY BYPASS GRAFT         Social History     Socioeconomic History   • Marital status: /Civil Union     Spouse name: Not on file   • Number of children: Not on file   • Years of education: Not on file   • Highest education level: Not on file   Occupational History   • Occupation: retired   Tobacco Use   • Smoking status: Never   • Smokeless tobacco: Never   Vaping Use   • Vaping Use: Never used   Substance and Sexual Activity   • Alcohol use: Not " Currently     Comment: (history)   • Drug use: No   • Sexual activity: Not Currently   Other Topics Concern   • Not on file   Social History Narrative    ** Merged History Encounter **         ** Merged History Encounter **         ** Merged History Encounter **         Pt lives with wife     Social Determinants of Health     Financial Resource Strain: Not on file   Food Insecurity: No Food Insecurity (3/14/2023)    Hunger Vital Sign    • Worried About Running Out of Food in the Last Year: Never true    • Ran Out of Food in the Last Year: Never true   Transportation Needs: No Transportation Needs (3/14/2023)    PRAPARE - Transportation    • Lack of Transportation (Medical): No    • Lack of Transportation (Non-Medical):  No   Physical Activity: Not on file   Stress: Not on file   Social Connections: Not on file   Intimate Partner Violence: Not on file   Housing Stability: Low Risk  (3/14/2023)    Housing Stability Vital Sign    • Unable to Pay for Housing in the Last Year: No    • Number of Places Lived in the Last Year: 1    • Unstable Housing in the Last Year: No       Family History   Problem Relation Age of Onset   • Hypertension Mother         benign essential   • Cancer Mother    • Osteoporosis Mother    • Suicidality Father    • Diabetes Family    • Heart disease Family    • Neuropathy Family         peripheral   • Prostate cancer Family    • Thyroid disease Family

## 2023-06-13 NOTE — LETTER
June 13, 2023     Patient: Vilma Mancini  YOB: 1933  Date of Visit: 6/13/2023      To Whom it May Concern:    Izzy Herrera is under my professional care  Jake Tovar was seen in my office on 6/13/2023  Jake Tovar may weight bear as tolerated left hand/wrist   We have provided a wrist brace to wear over the next two when when he is weight bearing to provide extra support, but he does not need to wear it when he is resting or sleeping  If you have any questions or concerns, please don't hesitate to call           Sincerely,          Pranay hCen MD        CC: No Recipients

## 2023-07-13 ENCOUNTER — APPOINTMENT (EMERGENCY)
Dept: CT IMAGING | Facility: HOSPITAL | Age: 88
DRG: 177 | End: 2023-07-13
Payer: MEDICARE

## 2023-07-13 ENCOUNTER — HOSPITAL ENCOUNTER (INPATIENT)
Facility: HOSPITAL | Age: 88
LOS: 11 days | Discharge: HOSPICE | DRG: 177 | End: 2023-07-24
Attending: EMERGENCY MEDICINE | Admitting: STUDENT IN AN ORGANIZED HEALTH CARE EDUCATION/TRAINING PROGRAM
Payer: MEDICARE

## 2023-07-13 ENCOUNTER — APPOINTMENT (EMERGENCY)
Dept: RADIOLOGY | Facility: HOSPITAL | Age: 88
DRG: 177 | End: 2023-07-13
Payer: MEDICARE
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
## 2023-07-13 DIAGNOSIS — E44.0 MODERATE PROTEIN-CALORIE MALNUTRITION (HCC): ICD-10-CM

## 2023-07-13 DIAGNOSIS — E83.52 HYPERCALCEMIA: ICD-10-CM

## 2023-07-13 DIAGNOSIS — E87.6 HYPOKALEMIA: ICD-10-CM

## 2023-07-13 DIAGNOSIS — S32.599A INFERIOR PUBIC RAMUS FRACTURE (HCC): ICD-10-CM

## 2023-07-13 DIAGNOSIS — R93.89 ABNORMAL CT OF THE CHEST: ICD-10-CM

## 2023-07-13 DIAGNOSIS — E87.0 HYPERNATREMIA: ICD-10-CM

## 2023-07-13 DIAGNOSIS — S32.82XA: ICD-10-CM

## 2023-07-13 DIAGNOSIS — J69.0 ASPIRATION PNEUMONIA (HCC): ICD-10-CM

## 2023-07-13 DIAGNOSIS — R13.10 DYSPHAGIA, UNSPECIFIED TYPE: ICD-10-CM

## 2023-07-13 DIAGNOSIS — E83.39 HYPOPHOSPHATEMIA: ICD-10-CM

## 2023-07-13 DIAGNOSIS — R41.82 ALTERED MENTAL STATUS: ICD-10-CM

## 2023-07-13 DIAGNOSIS — R94.31 ABNORMAL EKG: ICD-10-CM

## 2023-07-13 PROBLEM — G93.41 ACUTE METABOLIC ENCEPHALOPATHY: Status: ACTIVE | Noted: 2023-01-01

## 2023-07-13 LAB
2HR DELTA HS TROPONIN: -22 NG/L
4HR DELTA HS TROPONIN: -5 NG/L
ABO GROUP BLD: NORMAL
ALBUMIN SERPL BCP-MCNC: 3.2 G/DL (ref 3.5–5)
ALP SERPL-CCNC: 59 U/L (ref 34–104)
ALT SERPL W P-5'-P-CCNC: 14 U/L (ref 7–52)
ANION GAP SERPL CALCULATED.3IONS-SCNC: 3 MMOL/L
APTT PPP: 27 SECONDS (ref 23–37)
AST SERPL W P-5'-P-CCNC: 17 U/L (ref 13–39)
ATRIAL RATE: 106 BPM
ATRIAL RATE: 78 BPM
BASOPHILS # BLD AUTO: 0.02 THOUSANDS/ÂΜL (ref 0–0.1)
BASOPHILS NFR BLD AUTO: 0 % (ref 0–1)
BILIRUB SERPL-MCNC: 0.49 MG/DL (ref 0.2–1)
BILIRUB UR QL STRIP: NEGATIVE
BLD GP AB SCN SERPL QL: NEGATIVE
BNP SERPL-MCNC: 713 PG/ML (ref 0–100)
BUN SERPL-MCNC: 23 MG/DL (ref 5–25)
CALCIUM ALBUM COR SERPL-MCNC: 12.4 MG/DL (ref 8.3–10.1)
CALCIUM SERPL-MCNC: 11.8 MG/DL (ref 8.4–10.2)
CARDIAC TROPONIN I PNL SERPL HS: 130 NG/L
CARDIAC TROPONIN I PNL SERPL HS: 147 NG/L
CARDIAC TROPONIN I PNL SERPL HS: 152 NG/L
CHLORIDE SERPL-SCNC: 117 MMOL/L (ref 96–108)
CLARITY UR: NORMAL
CO2 SERPL-SCNC: 34 MMOL/L (ref 21–32)
COLOR UR: YELLOW
CREAT SERPL-MCNC: 1.1 MG/DL (ref 0.6–1.3)
EOSINOPHIL # BLD AUTO: 0.01 THOUSAND/ÂΜL (ref 0–0.61)
EOSINOPHIL NFR BLD AUTO: 0 % (ref 0–6)
ERYTHROCYTE [DISTWIDTH] IN BLOOD BY AUTOMATED COUNT: 14.2 % (ref 11.6–15.1)
FLUAV RNA RESP QL NAA+PROBE: NEGATIVE
FLUBV RNA RESP QL NAA+PROBE: NEGATIVE
GFR SERPL CREATININE-BSD FRML MDRD: 58 ML/MIN/1.73SQ M
GLUCOSE SERPL-MCNC: 199 MG/DL (ref 65–140)
GLUCOSE UR STRIP-MCNC: NEGATIVE MG/DL
HCT VFR BLD AUTO: 38.5 % (ref 36.5–49.3)
HGB BLD-MCNC: 12.3 G/DL (ref 12–17)
HGB UR QL STRIP.AUTO: NEGATIVE
IMM GRANULOCYTES # BLD AUTO: 0.04 THOUSAND/UL (ref 0–0.2)
IMM GRANULOCYTES NFR BLD AUTO: 0 % (ref 0–2)
INR PPP: 1.08 (ref 0.84–1.19)
KETONES UR STRIP-MCNC: NEGATIVE MG/DL
LACTATE SERPL-SCNC: 2 MMOL/L (ref 0.5–2)
LEUKOCYTE ESTERASE UR QL STRIP: NEGATIVE
LYMPHOCYTES # BLD AUTO: 1.79 THOUSANDS/ÂΜL (ref 0.6–4.47)
LYMPHOCYTES NFR BLD AUTO: 19 % (ref 14–44)
MAGNESIUM SERPL-MCNC: 2 MG/DL (ref 1.9–2.7)
MCH RBC QN AUTO: 26.7 PG (ref 26.8–34.3)
MCHC RBC AUTO-ENTMCNC: 31.9 G/DL (ref 31.4–37.4)
MCV RBC AUTO: 84 FL (ref 82–98)
MONOCYTES # BLD AUTO: 0.82 THOUSAND/ÂΜL (ref 0.17–1.22)
MONOCYTES NFR BLD AUTO: 9 % (ref 4–12)
NEUTROPHILS # BLD AUTO: 6.55 THOUSANDS/ÂΜL (ref 1.85–7.62)
NEUTS SEG NFR BLD AUTO: 72 % (ref 43–75)
NITRITE UR QL STRIP: NEGATIVE
NRBC BLD AUTO-RTO: 0 /100 WBCS
P AXIS: 80 DEGREES
P AXIS: 95 DEGREES
PH UR STRIP.AUTO: 7 [PH]
PHOSPHATE SERPL-MCNC: 1.8 MG/DL (ref 2.3–4.1)
PLATELET # BLD AUTO: 232 THOUSANDS/UL (ref 149–390)
PMV BLD AUTO: 10.4 FL (ref 8.9–12.7)
POTASSIUM SERPL-SCNC: 2.8 MMOL/L (ref 3.5–5.3)
PR INTERVAL: 144 MS
PR INTERVAL: 154 MS
PROCALCITONIN SERPL-MCNC: 0.05 NG/ML
PROT SERPL-MCNC: 6.3 G/DL (ref 6.4–8.4)
PROT UR STRIP-MCNC: NEGATIVE MG/DL
PROTHROMBIN TIME: 14.7 SECONDS (ref 11.6–14.5)
QRS AXIS: -60 DEGREES
QRS AXIS: -64 DEGREES
QRSD INTERVAL: 112 MS
QRSD INTERVAL: 122 MS
QT INTERVAL: 376 MS
QT INTERVAL: 432 MS
QTC INTERVAL: 492 MS
QTC INTERVAL: 499 MS
RBC # BLD AUTO: 4.61 MILLION/UL (ref 3.88–5.62)
RH BLD: POSITIVE
RSV RNA RESP QL NAA+PROBE: NEGATIVE
SARS-COV-2 RNA RESP QL NAA+PROBE: NEGATIVE
SODIUM SERPL-SCNC: 154 MMOL/L (ref 135–147)
SP GR UR STRIP.AUTO: 1.01 (ref 1–1.03)
SPECIMEN EXPIRATION DATE: NORMAL
T WAVE AXIS: 102 DEGREES
T WAVE AXIS: 95 DEGREES
UROBILINOGEN UR STRIP-ACNC: <2 MG/DL
VENTRICULAR RATE: 106 BPM
VENTRICULAR RATE: 78 BPM
WBC # BLD AUTO: 9.23 THOUSAND/UL (ref 4.31–10.16)

## 2023-07-13 PROCEDURE — 36415 COLL VENOUS BLD VENIPUNCTURE: CPT | Performed by: PHYSICIAN ASSISTANT

## 2023-07-13 PROCEDURE — 93010 ELECTROCARDIOGRAM REPORT: CPT | Performed by: INTERNAL MEDICINE

## 2023-07-13 PROCEDURE — 80053 COMPREHEN METABOLIC PANEL: CPT | Performed by: PHYSICIAN ASSISTANT

## 2023-07-13 PROCEDURE — 99223 1ST HOSP IP/OBS HIGH 75: CPT | Performed by: PHYSICIAN ASSISTANT

## 2023-07-13 PROCEDURE — 85730 THROMBOPLASTIN TIME PARTIAL: CPT | Performed by: PHYSICIAN ASSISTANT

## 2023-07-13 PROCEDURE — 72125 CT NECK SPINE W/O DYE: CPT

## 2023-07-13 PROCEDURE — 92610 EVALUATE SWALLOWING FUNCTION: CPT

## 2023-07-13 PROCEDURE — 84100 ASSAY OF PHOSPHORUS: CPT | Performed by: PHYSICIAN ASSISTANT

## 2023-07-13 PROCEDURE — 83735 ASSAY OF MAGNESIUM: CPT | Performed by: PHYSICIAN ASSISTANT

## 2023-07-13 PROCEDURE — 86850 RBC ANTIBODY SCREEN: CPT | Performed by: PHYSICIAN ASSISTANT

## 2023-07-13 PROCEDURE — 71275 CT ANGIOGRAPHY CHEST: CPT

## 2023-07-13 PROCEDURE — 96365 THER/PROPH/DIAG IV INF INIT: CPT

## 2023-07-13 PROCEDURE — 86900 BLOOD TYPING SEROLOGIC ABO: CPT | Performed by: PHYSICIAN ASSISTANT

## 2023-07-13 PROCEDURE — 87040 BLOOD CULTURE FOR BACTERIA: CPT | Performed by: PHYSICIAN ASSISTANT

## 2023-07-13 PROCEDURE — 74177 CT ABD & PELVIS W/CONTRAST: CPT

## 2023-07-13 PROCEDURE — 84145 PROCALCITONIN (PCT): CPT | Performed by: PHYSICIAN ASSISTANT

## 2023-07-13 PROCEDURE — G1004 CDSM NDSC: HCPCS

## 2023-07-13 PROCEDURE — 83880 ASSAY OF NATRIURETIC PEPTIDE: CPT | Performed by: PHYSICIAN ASSISTANT

## 2023-07-13 PROCEDURE — 85610 PROTHROMBIN TIME: CPT | Performed by: PHYSICIAN ASSISTANT

## 2023-07-13 PROCEDURE — 71045 X-RAY EXAM CHEST 1 VIEW: CPT

## 2023-07-13 PROCEDURE — 86901 BLOOD TYPING SEROLOGIC RH(D): CPT | Performed by: PHYSICIAN ASSISTANT

## 2023-07-13 PROCEDURE — 70450 CT HEAD/BRAIN W/O DYE: CPT

## 2023-07-13 PROCEDURE — 84484 ASSAY OF TROPONIN QUANT: CPT | Performed by: PHYSICIAN ASSISTANT

## 2023-07-13 PROCEDURE — 81003 URINALYSIS AUTO W/O SCOPE: CPT | Performed by: PHYSICIAN ASSISTANT

## 2023-07-13 PROCEDURE — 83605 ASSAY OF LACTIC ACID: CPT | Performed by: PHYSICIAN ASSISTANT

## 2023-07-13 PROCEDURE — 96366 THER/PROPH/DIAG IV INF ADDON: CPT

## 2023-07-13 PROCEDURE — 87081 CULTURE SCREEN ONLY: CPT | Performed by: STUDENT IN AN ORGANIZED HEALTH CARE EDUCATION/TRAINING PROGRAM

## 2023-07-13 PROCEDURE — 93005 ELECTROCARDIOGRAM TRACING: CPT

## 2023-07-13 PROCEDURE — 99285 EMERGENCY DEPT VISIT HI MDM: CPT

## 2023-07-13 PROCEDURE — 99285 EMERGENCY DEPT VISIT HI MDM: CPT | Performed by: PHYSICIAN ASSISTANT

## 2023-07-13 PROCEDURE — 0241U HB NFCT DS VIR RESP RNA 4 TRGT: CPT | Performed by: PHYSICIAN ASSISTANT

## 2023-07-13 PROCEDURE — 97167 OT EVAL HIGH COMPLEX 60 MIN: CPT

## 2023-07-13 PROCEDURE — 85025 COMPLETE CBC W/AUTO DIFF WBC: CPT | Performed by: PHYSICIAN ASSISTANT

## 2023-07-13 PROCEDURE — 99223 1ST HOSP IP/OBS HIGH 75: CPT | Performed by: INTERNAL MEDICINE

## 2023-07-13 PROCEDURE — 96368 THER/DIAG CONCURRENT INF: CPT

## 2023-07-13 PROCEDURE — 71260 CT THORAX DX C+: CPT

## 2023-07-13 RX ORDER — POTASSIUM CHLORIDE 20 MEQ/1
40 TABLET, EXTENDED RELEASE ORAL ONCE
Status: DISCONTINUED | OUTPATIENT
Start: 2023-07-13 | End: 2023-07-13

## 2023-07-13 RX ORDER — POTASSIUM CHLORIDE 29.8 MG/ML
40 INJECTION INTRAVENOUS ONCE
Status: DISCONTINUED | OUTPATIENT
Start: 2023-07-13 | End: 2023-07-13 | Stop reason: ALTCHOICE

## 2023-07-13 RX ORDER — SODIUM CHLORIDE 9 MG/ML
150 INJECTION, SOLUTION INTRAVENOUS CONTINUOUS
Status: DISCONTINUED | OUTPATIENT
Start: 2023-07-13 | End: 2023-07-14

## 2023-07-13 RX ORDER — CALCIUM CARBONATE 500 MG/1
1000 TABLET, CHEWABLE ORAL DAILY PRN
Status: DISCONTINUED | OUTPATIENT
Start: 2023-07-13 | End: 2023-07-18

## 2023-07-13 RX ORDER — CEFTRIAXONE 2 G/50ML
2000 INJECTION, SOLUTION INTRAVENOUS ONCE
Status: COMPLETED | OUTPATIENT
Start: 2023-07-13 | End: 2023-07-13

## 2023-07-13 RX ORDER — METRONIDAZOLE 500 MG/100ML
500 INJECTION, SOLUTION INTRAVENOUS ONCE
Status: COMPLETED | OUTPATIENT
Start: 2023-07-13 | End: 2023-07-13

## 2023-07-13 RX ORDER — ONDANSETRON 2 MG/ML
4 INJECTION INTRAMUSCULAR; INTRAVENOUS EVERY 6 HOURS PRN
Status: DISCONTINUED | OUTPATIENT
Start: 2023-07-13 | End: 2023-07-24 | Stop reason: HOSPADM

## 2023-07-13 RX ORDER — POTASSIUM CHLORIDE 14.9 MG/ML
20 INJECTION INTRAVENOUS ONCE
Status: COMPLETED | OUTPATIENT
Start: 2023-07-13 | End: 2023-07-13

## 2023-07-13 RX ORDER — POTASSIUM CHLORIDE 14.9 MG/ML
20 INJECTION INTRAVENOUS
Status: COMPLETED | OUTPATIENT
Start: 2023-07-13 | End: 2023-07-13

## 2023-07-13 RX ORDER — ENOXAPARIN SODIUM 100 MG/ML
40 INJECTION SUBCUTANEOUS DAILY
Status: DISCONTINUED | OUTPATIENT
Start: 2023-07-14 | End: 2023-07-24 | Stop reason: HOSPADM

## 2023-07-13 RX ADMIN — CEFTRIAXONE 2000 MG: 2 INJECTION, SOLUTION INTRAVENOUS at 12:40

## 2023-07-13 RX ADMIN — METRONIDAZOLE 500 MG: 500 SOLUTION INTRAVENOUS at 11:32

## 2023-07-13 RX ADMIN — SODIUM CHLORIDE 150 ML/HR: 0.9 INJECTION, SOLUTION INTRAVENOUS at 23:46

## 2023-07-13 RX ADMIN — POTASSIUM CHLORIDE 20 MEQ: 14.9 INJECTION, SOLUTION INTRAVENOUS at 18:49

## 2023-07-13 RX ADMIN — SODIUM CHLORIDE 150 ML/HR: 0.9 INJECTION, SOLUTION INTRAVENOUS at 10:57

## 2023-07-13 RX ADMIN — IOHEXOL 80 ML: 350 INJECTION, SOLUTION INTRAVENOUS at 11:56

## 2023-07-13 RX ADMIN — IOHEXOL 80 ML: 350 INJECTION, SOLUTION INTRAVENOUS at 10:23

## 2023-07-13 RX ADMIN — POTASSIUM CHLORIDE 20 MEQ: 14.9 INJECTION, SOLUTION INTRAVENOUS at 10:58

## 2023-07-13 RX ADMIN — POTASSIUM CHLORIDE 20 MEQ: 14.9 INJECTION, SOLUTION INTRAVENOUS at 15:46

## 2023-07-13 NOTE — ED PROVIDER NOTES
Emergency Department Trauma Note  Mahad Celaya 80 y.o. male MRN: [id number]  Unit/Bed#: /-01 Encounter: [id number]      Trauma Alert: Trauma Acuity: Trauma Evaluation  Model of Arrival: Mode of Arrival: ALS via Trauma Squad Name and Number: Jan Ghosh  Trauma Team: Current Providers  Attending Provider: Joanna Meyer DO  Attending Provider: Johan Lopez MD  Physician Assistant: Glenn Torrez PA-C  Registered Nurse: Buck Messer, RN  Consulting Physician: Bia Francois MD  Physician Assistant: Sabrina Teague PA-C  Registered Nurse: Fiorella Seo RN  Consultants:     None      History of Present Illness     Chief Complaint:   Chief Complaint   Patient presents with   • Fall     Patient presents to the ER via EMS from [facility] with report of having a fall on Monday with a steady decline in mentation since and reportedly having abnormal labs (Low Potassium and Low Sodium). HPI:  Mahad Celaya is a 80 y.o. male who presents with altered mental status after a fall. Mechanism:Details of Incident: Patient reportedly had and unwitnessed fall at [facility] on Monday with reports of having increased change in mentation and having low Sodium and Potassium Injury Date: 07/10/23 Injury Time: 1200      Patient is a 81 y/o M with h/o CVA on Plavix, HTN, Hyperlipidemia that was BIBA from [facility] for altered mental status after a fall that occurred 3 days ago. History limited due to acuity of condition. History obtained from nursing home and EMS. Patient has no complaints. He is alert to person only. Patient also had abnormal labs as outpatient. He had hypernatremia and hypokalemia with muscle twitching per nursing home.        History provided by:  Patient, EMS personnel and nursing home  History limited by:  Mental status change  Fall  Mechanism of injury: fall    Incident location:  Unable to specify  Time since incident:  3 days  Arrived directly from scene: no    Fall: Fall occurred:  Unable to specify    Impact surface:  Unable to specify    Point of impact:  Unable to specify  Protective equipment: none    Suspicion of alcohol use: no    Suspicion of drug use: no    Tetanus status:  Unknown  Prior to arrival data:     Loss of consciousness: unknown. Immobilization:  None  Associated symptoms: no abdominal pain, no back pain, no chest pain, no difficulty breathing, no headaches and no vomiting    Risk factors: anticoagulation therapy      Review of Systems   Unable to perform ROS: Mental status change   Constitutional: Negative for fever. HENT: Negative. Respiratory: Negative for cough. Cardiovascular: Negative for chest pain. Gastrointestinal: Negative for abdominal pain and vomiting. Musculoskeletal: Negative for back pain. Skin: Positive for pallor. Neurological: Positive for weakness. Negative for headaches. Psychiatric/Behavioral: Positive for confusion.        Historical Information     Immunizations:   Immunization History   Administered Date(s) Administered   • COVID-19 MODERNA VACC 0.5 ML IM 01/12/2021, 02/09/2021   • Influenza Split High Dose Preservative Free IM 11/10/2014, 01/12/2016, 11/22/2016, 11/08/2017   • Influenza, high dose seasonal 0.7 mL 11/14/2018, 10/29/2019, 11/18/2020, 10/27/2021   • Influenza, seasonal, injectable 10/01/2007, 11/01/2009, 11/02/2009, 11/10/2010, 12/17/2012   • Pneumococcal Conjugate 13-Valent 01/12/2016   • Pneumococcal Polysaccharide PPV23 10/01/2007   • Tdap 08/01/2018, 05/14/2020       Past Medical History:   Diagnosis Date   • Anxiety    • Arthritis    • Coronary artery disease    • Coronary artery disease involving native coronary artery of native heart without angina pectoris    • Depression    • Fracture    • Hypercholesterolemia    • Meningioma ([address]) 11/1999    brain tumor   • Meningioma (HCC)    • Narcolepsy     daytime drowsiness and dozing off occasionally   • Orthostatic hypotension    • Palpitations • Prostate CA ([address])    • Prostate cancer ([address])    • Stroke [facility])        Family History   Problem Relation Age of Onset   • Hypertension Mother         benign essential   • Cancer Mother    • Osteoporosis Mother    • Suicidality Father    • Diabetes Family    • Heart disease Family    • Neuropathy Family         peripheral   • Prostate cancer Family    • Thyroid disease Family      Past Surgical History:   Procedure Laterality Date   • BACK SURGERY     • BRAIN SURGERY  11/08/1999   • BRAIN SURGERY     • CARDIAC SURGERY     • CORONARY ARTERY BYPASS GRAFT      x5   • CORONARY ARTERY BYPASS GRAFT       Social History     Tobacco Use   • Smoking status: Never   • Smokeless tobacco: Never   Vaping Use   • Vaping Use: Never used   Substance Use Topics   • Alcohol use: Not Currently     Comment: (history)   • Drug use: No     E-Cigarette/Vaping   • E-Cigarette Use Never User      E-Cigarette/Vaping Substances   • Nicotine No    • THC No    • CBD No    • Flavoring No    • Other No    • Unknown No        Family History: non-contributory    Meds/Allergies   Prior to Admission Medications   Prescriptions Last Dose Informant Patient Reported? Taking?    Melatonin 5 MG CAPS  Outside Facility (Specify) Yes No   Sig: Take 5 mg by mouth daily at bedtime   Patient not taking: Reported on 3/30/2023   Melatonin 5 MG TABS  Outside Facility (Specify) No No   Sig: TAKE ONE TABLET BY MOUTH EVERY NIGHT AT BEDTIME *PATIENT SUPPLY   Patient not taking: Reported on 3/30/2023   Metamucil Fiber 51.7 % PACK  Outside Facility (Specify) No No   Sig: MIX 1 PACKET IN 6-8 OUNCES OF WATER AND CONSUME BY MOUTH DAILY *PATIENT SUPPLY   Patient not taking: Reported on 3/30/2023   Stool Softener 100 MG capsule  Outside Facility (Specify) No No   Sig: TAKE ONE CAPSULE BY MOUTH TWO TIMES A DAY *PATIENT SUPPLY   acetaminophen (TYLENOL) 325 mg tablet  Outside Facility (Specify) No No   Sig: Take 3 tablets (975 mg total) by mouth every 8 (eight) hours clopidogrel (PLAVIX) 75 mg tablet  Outside Facility (Specify) No No   Sig: TAKE ONE TABLET BY MOUTH EVERY DAY   clopidogrel (PLAVIX) 75 mg tablet  Outside Facility (Specify) No No   Sig: Take 1 tablet (75 mg total) by mouth daily Do not start before March 17, 2023. Patient not taking: Reported on 3/30/2023   enoxaparin (Lovenox) 40 mg/0.4 mL  Outside Facility (Specify) No No   Sig: Inject 0.4 mL (40 mg total) under the skin in the morning for 28 days   lidocaine (LIDODERM) 5 %  Outside Facility (Specify) No No   Sig: Apply 1 patch topically over 12 hours daily Remove & Discard patch within 12 hours or as directed by MD   melatonin 3 mg  Outside Facility (Specify) No No   Sig: Take 1 tablet (3 mg total) by mouth daily at bedtime   memantine (NAMENDA) 5 mg tablet  Outside Facility (Specify) Yes No   Sig: Take 5 mg by mouth daily at bedtime   memantine (NAMENDA) 5 mg tablet  Outside Facility (Specify) No No   Sig: Take 1 tablet (5 mg total) by mouth daily at bedtime   Patient not taking: Reported on 3/30/2023   metoprolol succinate (TOPROL-XL) 25 mg 24 hr tablet  Outside Facility (Specify) No No   Sig: TAKE ONE TABLET BY MOUTH EVERY DAY   metoprolol succinate (TOPROL-XL) 25 mg 24 hr tablet  Outside Facility (Specify) No No   Sig: Take 1 tablet (25 mg total) by mouth daily Do not start before March 17, 2023. Patient not taking: Reported on 3/30/2023   midodrine (PROAMATINE) 5 mg tablet  Outside Facility (Specify) No No   Sig: TAKE ONE TABLET BY MOUTH EVERY DAY   Patient not taking: Reported on 3/30/2023   omeprazole (PriLOSEC) 40 MG capsule  Outside Facility (Specify) No No   Sig: TAKE ONE CAPSULE BY MOUTH EVERY DAY   Patient not taking: Reported on 3/30/2023   pantoprazole (PROTONIX) 40 mg tablet  Outside Facility (Specify) No No   Sig: Take 1 tablet (40 mg total) by mouth daily in the early morning Do not start before March 17, 2023.    psyllium (METAMUCIL) 58.6 % packet  Outside Facility (Specify) Yes No   Sig: Take 1 packet by mouth daily   Patient not taking: Reported on 3/30/2023   psyllium (METAMUCIL) packet  Outside Facility (Specify) No No   Sig: Take 1 packet by mouth daily Do not start before March 17, 2023. sertraline (ZOLOFT) 25 mg tablet  Outside Facility (Specify) No No   Sig: Take 3 tablets (75 mg total) by mouth daily Do not start before March 17, 2023. sertraline (ZOLOFT) 50 mg tablet  Outside Facility (Specify) No No   Sig: TAKE ONE AND ONE-HALF TABLETS BY MOUTH EVERY DAY   Patient not taking: Reported on 3/30/2023   simvastatin (ZOCOR) 80 mg tablet  Outside Facility (Specify) No No   Sig: TAKE ONE TABLET BY MOUTH EVERY DAY   Patient not taking: Reported on 3/30/2023      Facility-Administered Medications: None       Allergies   Allergen Reactions   • Aricept [Donepezil] Confusion     confusion   • Atorvastatin Myalgia, Dizziness and Headache   • Niacin Other (See Comments)     unknown       PHYSICAL EXAM    PE limited by: nothing    Objective   Vitals:   First set: Temperature: 97.6 °F (36.4 °C) (07/13/23 1009)  Pulse: 77 (07/13/23 1002)  Respirations: 18 (07/13/23 1002)  Blood Pressure: 156/70 (07/13/23 1009)  SpO2: 93 % (07/13/23 1002)    Primary Survey:   (A) Airway: patent  (B) Breathing: normal  (C) Circulation: Pulses:   normal  (D) Disabliity:  GCS Total:  13  (E) Expose:  Completed    Secondary Survey: (Click on Physical Exam tab above)  Physical Exam  Vitals and nursing note reviewed. Constitutional:       General: He is sleeping. He is not in acute distress. Appearance: Normal appearance. He is underweight. He is ill-appearing. He is not diaphoretic. HENT:      Head: Normocephalic and atraumatic. Right Ear: Hearing normal.      Left Ear: Hearing normal.      Nose: Nose normal.      Mouth/Throat:      Mouth: Mucous membranes are pale and dry.    Eyes:      Conjunctiva/sclera: Conjunctivae normal.      Pupils: Pupils are equal.   Cardiovascular:      Rate and Rhythm: Normal rate and regular rhythm. Heart sounds: Normal heart sounds. Pulmonary:      Effort: Pulmonary effort is normal.      Breath sounds: Normal breath sounds. No wheezing, rhonchi or rales. Chest:      Chest wall: No deformity, swelling or tenderness. Abdominal:      General: Abdomen is flat. Bowel sounds are normal.      Palpations: Abdomen is soft. Tenderness: There is no abdominal tenderness. Musculoskeletal:      Cervical back: Normal range of motion. No spinous process tenderness or muscular tenderness. Skin:     General: Skin is warm and dry. Coloration: Skin is pale. Findings: No bruising, erythema or rash. Neurological:      Mental Status: He is lethargic. GCS: GCS eye subscore is 3. GCS verbal subscore is 4. GCS motor subscore is 6. Motor: Weakness (B/L UE strength 4/5, B/L LE 2/5. ) present. Comments: Oriented to person only. Cervical spine cleared by clinical criteria?  No (imaging required)      Invasive Devices     Peripheral Intravenous Line  Duration           Peripheral IV 07/13/23 Left Antecubital <1 day    Peripheral IV 07/13/23 Right Antecubital <1 day          Drain  Duration           Urethral Catheter Latex 16 Fr. <1 day                Lab Results:   Results Reviewed     Procedure Component Value Units Date/Time    HS Troponin I 2hr [[id number]]  (Abnormal) Collected: 07/13/23 1203    Lab Status: Final result Specimen: Blood from Arm, Right Updated: 07/13/23 1233     hs TnI 2hr 130 ng/L      Delta 2hr hsTnI -22 ng/L     HS Troponin I 4hr [[id number]]     Lab Status: No result Specimen: Blood     FLU/RSV/COVID - if FLU/RSV clinically relevant [[id number]]  (Normal) Collected: 07/13/23 1037    Lab Status: Final result Specimen: Nares from Nasopharyngeal Swab Updated: 07/13/23 1146     SARS-CoV-2 Negative     INFLUENZA A PCR Negative     INFLUENZA B PCR Negative     RSV PCR Negative    Narrative:      FOR PEDIATRIC PATIENTS - copy/paste COVID Guidelines URL to browser: [email or web address]    SARS-CoV-2 assay is a Nucleic Acid Amplification assay intended for the  qualitative detection of nucleic acid from SARS-CoV-2 in nasopharyngeal  swabs. Results are for the presumptive identification of SARS-CoV-2 RNA. Positive results are indicative of infection with SARS-CoV-2, the virus  causing COVID-19, but do not rule out bacterial infection or co-infection  with other viruses. Laboratories within the [facility] and its  territories are required to report all positive results to the appropriate  public health authorities. Negative results do not preclude SARS-CoV-2  infection and should not be used as the sole basis for treatment or other  patient management decisions. Negative results must be combined with  clinical observations, patient history, and epidemiological information. This test has not been FDA cleared or approved. This test has been authorized by FDA under an Emergency Use Authorization  (EUA). This test is only authorized for the duration of time the  declaration that circumstances exist justifying the authorization of the  emergency use of an in vitro diagnostic tests for detection of SARS-CoV-2  virus and/or diagnosis of COVID-19 infection under section 564(b)(1) of  the Act, 21 U. S.C. 914YSA-8(S)(1), unless the authorization is terminated  or revoked sooner. The test has been validated but independent review by FDA  and CLIA is pending. Test performed using [employer] GeneXpert: This RT-PCR assay targets N2,  a region unique to SARS-CoV-2. A conserved region in the E-gene was chosen  for pan-Sarbecovirus detection which includes SARS-CoV-2. According to CMS-2020-01-R, this platform meets the definition of high-throughput technology.     Phosphorus [[id number]]  (Abnormal) Collected: 07/13/23 1014    Lab Status: Final result Specimen: Blood from Arm, Right Updated: 07/13/23 1133     Phosphorus 1.8 mg/dL     Procalcitonin [[id number]]  (Normal) Collected: 07/13/23 1041    Lab Status: Final result Specimen: Blood from Arm, Right Updated: 07/13/23 1125     Procalcitonin 0.05 ng/ml     Lactic acid, plasma (w/reflex if result > 2.0) [[id number]]  (Normal) Collected: 07/13/23 1041    Lab Status: Final result Specimen: Blood from Arm, Right Updated: 07/13/23 1114     LACTIC ACID 2.0 mmol/L     Narrative:      Result may be elevated if tourniquet was used during collection. Magnesium [[id number]]  (Normal) Collected: 07/13/23 1014    Lab Status: Final result Specimen: Blood from Arm, Right Updated: 07/13/23 1105     Magnesium 2.0 mg/dL     UA w Reflex to Microscopic w Reflex to Culture [[id number]] Collected: 07/13/23 1041    Lab Status: Final result Specimen: Urine, Straight Cath Updated: 07/13/23 1101     Color, UA Yellow     Clarity, UA Slightly Cloudy     Specific Gravity, UA 1.015     pH, UA 7.0     Leukocytes, UA Negative     Nitrite, UA Negative     Protein, UA Negative mg/dl      Glucose, UA Negative mg/dl      Ketones, UA Negative mg/dl      Urobilinogen, UA <2.0 mg/dl      Bilirubin, UA Negative     Occult Blood, UA Negative    Blood culture #2 [[id number]] Collected: 07/13/23 1041    Lab Status: In process Specimen: Blood from Arm, Left Updated: 07/13/23 1056    Blood culture #1 [[id number]] Collected: 07/13/23 1041    Lab Status:  In process Specimen: Blood from Arm, Right Updated: 07/13/23 1056    B-Type Natriuretic Peptide(BNP) [[id number]]  (Abnormal) Collected: 07/13/23 1014    Lab Status: Final result Specimen: Blood from Arm, Right Updated: 07/13/23 1052      pg/mL     Comprehensive metabolic panel [[id number]]  (Abnormal) Collected: 07/13/23 1014    Lab Status: Final result Specimen: Blood from Arm, Right Updated: 07/13/23 1049     Sodium 154 mmol/L      Potassium 2.8 mmol/L      Chloride 117 mmol/L      CO2 34 mmol/L      ANION GAP 3 mmol/L      BUN 23 mg/dL      Creatinine 1.10 mg/dL Glucose 199 mg/dL      Calcium 11.8 mg/dL      Corrected Calcium 12.4 mg/dL      AST 17 U/L      ALT 14 U/L      Alkaline Phosphatase 59 U/L      Total Protein 6.3 g/dL      Albumin 3.2 g/dL      Total Bilirubin 0.49 mg/dL      eGFR 58 ml/min/1.73sq m     Narrative:      [facility] guidelines for Chronic Kidney Disease (CKD):   •  Stage 1 with normal or high GFR (GFR > 90 mL/min/1.73 square meters)  •  Stage 2 Mild CKD (GFR = 60-89 mL/min/1.73 square meters)  •  Stage 3A Moderate CKD (GFR = 45-59 mL/min/1.73 square meters)  •  Stage 3B Moderate CKD (GFR = 30-44 mL/min/1.73 square meters)  •  Stage 4 Severe CKD (GFR = 15-29 mL/min/1.73 square meters)  •  Stage 5 End Stage CKD (GFR <15 mL/min/1.73 square meters)  Note: GFR calculation is accurate only with a steady state creatinine    HS Troponin 0hr (reflex protocol) [[id number]]  (Abnormal) Collected: 07/13/23 1014    Lab Status: Final result Specimen: Blood from Arm, Right Updated: 07/13/23 1043     hs TnI 0hr 152 ng/L     Protime-INR [[id number]]  (Abnormal) Collected: 07/13/23 1014    Lab Status: Final result Specimen: Blood from Arm, Right Updated: 07/13/23 1036     Protime 14.7 seconds      INR 1.08    APTT [[id number]]  (Normal) Collected: 07/13/23 1014    Lab Status: Final result Specimen: Blood from Arm, Right Updated: 07/13/23 1036     PTT 27 seconds     CBC and differential [[id number]]  (Abnormal) Collected: 07/13/23 1014    Lab Status: Final result Specimen: Blood from Arm, Right Updated: 07/13/23 1021     WBC 9.23 Thousand/uL      RBC 4.61 Million/uL      Hemoglobin 12.3 g/dL      Hematocrit 38.5 %      MCV 84 fL      MCH 26.7 pg      MCHC 31.9 g/dL      RDW 14.2 %      MPV 10.4 fL      Platelets 780 Thousands/uL      nRBC 0 /100 WBCs      Neutrophils Relative 72 %      Immat GRANS % 0 %      Lymphocytes Relative 19 %      Monocytes Relative 9 %      Eosinophils Relative 0 %      Basophils Relative 0 %      Neutrophils Absolute 6.55 Thousands/µL      Immature Grans Absolute 0.04 Thousand/uL      Lymphocytes Absolute 1.79 Thousands/µL      Monocytes Absolute 0.82 Thousand/µL      Eosinophils Absolute 0.01 Thousand/µL      Basophils Absolute 0.02 Thousands/µL                  Imaging Studies:   Direct to CT: No  CTA ED chest PE study   Final Result by Destiny Bettencourt MD (07/13 1226)      Compromised by respiratory motion, particularly in the lower lobes, but with no definite acute pulmonary emboli. Mild bilateral lower lobe bronchiectasis with tubular opacities in the right lower lobe due to mucus and debris in the bronchi. Given debris in the superior segment right lower lobe bronchi, patchy groundglass opacity in the right upper lobe, and    tree-in-bud nodularity in both lower lobes, this is likely due to aspiration. Workstation performed: [id number]         TRAUMA - CT head wo contrast   Final Result by Taya Hollingsworth MD (07/13 1059)      No acute intracranial abnormality. The study was marked in [facility] for immediate notification. Workstation performed: [id number]         TRAUMA - CT spine cervical wo contrast   Final Result by Taya Hollingsworth MD (07/13 1107)      No cervical spine fracture or traumatic malalignment. Degenerative changes. New groundglass opacities in the right lung apex. Please see chest CT for further result. The study was marked in [facility] for immediate notification. Workstation performed: [id number]         TRAUMA - CT chest abdomen pelvis w contrast   Final Result by Taya Hollingsworth MD (07/13 1121)      No evidence of solid organ trauma or acute fracture with the exception of L3 where there is compression fracture new since 2019 although it is age indeterminate based on imaging. Correlation with any additional back pain is advised.       There is a left inferior pubic ramus fracture with some sclerosis which is partially healed suggesting this is recent more likely than acute and again should be correlated with any pain in the area. No definite pelvic fracture is seen elsewhere. Infiltrates in the right lower lobe with evidence of mucous secretions opacifying right lower lobe bronchi. There is additional opacities in the right upper lobe and left lower lobe. Findings are suspicious for aspiration pneumonia. Branching opacity may    represent mucous debris although this could also represent poor enhancement of the right lower lobe vasculature that could be seen in a pulmonary embolus. If there is high index of suspicion a follow-up CTA or lung scan may be useful. Marked bladder distention should be correlated for any recent voiding. Clemons catheter may be useful. 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 This could be correlated with distal pulses. This was discussed with JAVIER Hernandez at 11:05 a.m. Workstation performed: [id number]         XR Trauma chest portable   Final Result by Tamra Urias MD (07/13 1035)      Hypoventilation with atelectatic changes at the bases. No definite pneumothorax.                   Workstation performed: [id number]               Procedures  ECG 12 Lead Documentation Only    Date/Time: 7/13/2023 10:22 AM    Performed by: Melia Pineda PA-C  Authorized by: Melia Pineda PA-C    Indications / Diagnosis:  Altered mental status  Patient location:  ED  Previous ECG:     Previous ECG:  Compared to current    Comparison ECG info:  T wave inversion now present in V2, V3.  PVC now present, LAFB now present    Similarity:  Changes noted  Rate:     ECG rate:  78  Rhythm:     Rhythm: sinus rhythm    Ectopy:     Ectopy: PVCs      PVCs:  Infrequent  Conduction:     Conduction: abnormal      Abnormal conduction: complete RBBB, LAFB and bifascicular block    T waves:     T waves: inverted      Inverted:  V2 and V3             ED Course  ED Course as of 07/13/23 1326   Thu Jul 13, 2023   1056 Cardiology paged concerning abnormal ekg and elevated trop. 1111 Patient was straight cathed after CT scan, had over 1liter of urine. Will place little. 200 Discussed with Dr. Brice Garcia via TT, patient stable for admission at [facility]. [address]  paged for admission. 12 Dr. Dee Avilez would like CTA PE study prior to admission. [facility] with wife, Marianela Grijalva, she is aware patient is being admitted. She prefers to be called on her home phone. She states patient had pelvic fracture a couple months ago, left pubic ramus fracture most likely subacute. 1229 No PE on CT scan, Dr. Dee Avilez aware. 1242 Repeat ekg and trop Tigertexted to Dr. Brice Garcia. Medical Decision Making  Patient with altered mental status, abnormal labs after a fall, will order CT head to r/o hemorrhage, CT chest abd pelvis to r/o organ/rib injury, pneumonia, colitis, UTI, anemia, electrolyte abnormality. Patient with pneumonia, will admit on IV abx. No acute injuries. Patient has h/o pubic ramus fracture, most likely subacute. Patient with elevated trop and abnormal ekg, most likely from electrolyte abnormalities. Will replace potassium IV, unable to give Po due to altered mental status. Abnormal CT of the chest: acute illness or injury  Abnormal EKG: acute illness or injury  Altered mental status: acute illness or injury  Aspiration pneumonia ([address]): acute illness or injury  Fall: acute illness or injury  Hypercalcemia: acute illness or injury  Hypernatremia: acute illness or injury  Hypokalemia: acute illness or injury  Inferior pubic ramus fracture ([address]): acute illness or injury  Amount and/or Complexity of Data Reviewed  Independent Historian: EMS     Details: altered mental status  External Data Reviewed: labs, ECG and notes. Labs: ordered. Radiology: ordered. ECG/medicine tests: ordered and independent interpretation performed.   Discussion of management or test interpretation with external provider(s): D/w cardiology and SLIM. Risk  Prescription drug management. Decision regarding hospitalization. Disposition  [facility] Transfer: No  Final diagnoses:   Fall   Altered mental status   Abnormal EKG   Hypokalemia   Hypernatremia   Hypercalcemia   Aspiration pneumonia (HCC)   Inferior pubic ramus fracture (HCC) - left, history of prior, could be subacute   Abnormal CT of the chest - debris RLL vs PE. Time reflects when diagnosis was documented in both MDM as applicable and the Disposition within this note     Time User Action Codes Description Comment    7/13/2023 10:57 AM Oto Rickers Add [S35. XXXA] Fall     7/13/2023 10:57 AM Oto Rickers Add [R41.82] Altered mental status     7/13/2023 10:57 AM Oto Rickers Add [R94.31] Abnormal EKG     7/13/2023 10:57 AM Oto Rickers Add [E87.6] Hypokalemia     7/13/2023 10:57 AM Sarah Fongf M Add [E87.0] Hypernatremia     7/13/2023 10:57 AM Oto Rickers Add [E83.52] Hypercalcemia     7/13/2023 11:07 AM Oto Rickers Add [J69.0] Aspiration pneumonia ([address])     7/13/2023 11:13 AM Oto Rickers Add [S32.599A] Inferior pubic ramus fracture ([address])     7/13/2023 11:24 AM Oto Rickers Modify [S32.599A] Inferior pubic ramus fracture ([address]) left    7/13/2023 11:25 AM Oto Rickers Add [R93.89] Abnormal CT of the chest     7/13/2023 11:25 AM Oto Rickers Modify [R93.89] Abnormal CT of the chest debris RLL vs PE.    7/13/2023 11:33 AM Oto Rickers Modify [S32.599A] Inferior pubic ramus fracture ([address]) left, history of prior, could be subacute      ED Disposition     ED Disposition   Admit    Condition   Stable    Date/Time   Thu Jul 13, 2023 12:34 PM    Comment   Case was discussed with Dr. Brittni Negron and the patient's admission status was agreed to be Admission Status: inpatient status to the service of Dr. Brittni Negron .            Follow-up Information    None       Current Discharge Medication List      CONTINUE these medications which have NOT CHANGED    Details   acetaminophen (TYLENOL) 325 mg tablet Take 3 tablets (975 mg total) by mouth every 8 (eight) hours  Qty: 120 tablet, Refills: 0    Associated Diagnoses: Multiple closed fractures of pelvis with stable disruption of pelvic ring, initial encounter ([address])      ! ! clopidogrel (PLAVIX) 75 mg tablet TAKE ONE TABLET BY MOUTH EVERY DAY  Qty: 100 tablet, Refills: 3    Associated Diagnoses: Acute CVA (cerebrovascular accident) ([address])      ! ! clopidogrel (PLAVIX) 75 mg tablet Take 1 tablet (75 mg total) by mouth daily Do not start before March 17, 2023. Refills: 0    Associated Diagnoses: Elevated troponin      enoxaparin (Lovenox) 40 mg/0.4 mL Inject 0.4 mL (40 mg total) under the skin in the morning for 28 days  Qty: 11.2 mL, Refills: 0    Associated Diagnoses: Multiple closed fractures of pelvis with stable disruption of pelvic ring, initial encounter ([facility])      lidocaine (LIDODERM) 5 % Apply 1 patch topically over 12 hours daily Remove & Discard patch within 12 hours or as directed by MD  Qty: 14 patch, Refills: 0    Associated Diagnoses: Multiple closed fractures of pelvis with stable disruption of pelvic ring, initial encounter ([address]      ! ! melatonin 3 mg Take 1 tablet (3 mg total) by mouth daily at bedtime  Refills: 0    Associated Diagnoses: Dementia, unspecified dementia severity, unspecified dementia type, unspecified whether behavioral, psychotic, or mood disturbance or anxiety ([facility])      Melatonin 5 MG CAPS Take 5 mg by mouth daily at bedtime      !! Melatonin 5 MG TABS TAKE ONE TABLET BY MOUTH EVERY NIGHT AT BEDTIME *PATIENT SUPPLY  Qty: 30 tablet, Refills: 5    Associated Diagnoses: Acute CVA (cerebrovascular accident) ([address])      ! ! memantine (NAMENDA) 5 mg tablet Take 5 mg by mouth daily at bedtime      !! memantine (NAMENDA) 5 mg tablet Take 1 tablet (5 mg total) by mouth daily at bedtime  Refills: 0    Associated Diagnoses: Dementia, unspecified dementia severity, unspecified dementia type, unspecified whether behavioral, psychotic, or mood disturbance or anxiety (HCC)      Metamucil Fiber 51.7 % PACK MIX 1 PACKET IN 6-8 OUNCES OF WATER AND CONSUME BY MOUTH DAILY *PATIENT SUPPLY  Qty: 44 each, Refills: 5    Associated Diagnoses: Gastroesophageal reflux disease      !! metoprolol succinate (TOPROL-XL) 25 mg 24 hr tablet TAKE ONE TABLET BY MOUTH EVERY DAY  Qty: 90 tablet, Refills: 3    Associated Diagnoses: Essential (primary) hypertension      !! metoprolol succinate (TOPROL-XL) 25 mg 24 hr tablet Take 1 tablet (25 mg total) by mouth daily Do not start before March 17, 2023. Refills: 0    Associated Diagnoses: Primary hypertension      midodrine (PROAMATINE) 5 mg tablet TAKE ONE TABLET BY MOUTH EVERY DAY  Qty: 90 tablet, Refills: 3    Associated Diagnoses: Orthostatic hypotension      omeprazole (PriLOSEC) 40 MG capsule TAKE ONE CAPSULE BY MOUTH EVERY DAY  Qty: 100 capsule, Refills: 3    Associated Diagnoses: Gastroesophageal reflux disease      pantoprazole (PROTONIX) 40 mg tablet Take 1 tablet (40 mg total) by mouth daily in the early morning Do not start before March 17, 2023. Refills: 0    Associated Diagnoses: Dysphagia, unspecified type      ! ! psyllium (METAMUCIL) 58.6 % packet Take 1 packet by mouth daily      ! ! psyllium (METAMUCIL) packet Take 1 packet by mouth daily Do not start before March 17, 2023. Refills: 0    Associated Diagnoses: Dysphagia, unspecified type      !! sertraline (ZOLOFT) 25 mg tablet Take 3 tablets (75 mg total) by mouth daily Do not start before March 17, 2023. Refills: 0    Associated Diagnoses: Dementia, unspecified dementia severity, unspecified dementia type, unspecified whether behavioral, psychotic, or mood disturbance or anxiety ([address])      ! ! sertraline (ZOLOFT) 50 mg tablet TAKE ONE AND ONE-HALF TABLETS BY MOUTH EVERY DAY  Qty: 135 tablet, Refills: 3    Associated Diagnoses: Anxiety      simvastatin (ZOCOR) 80 mg tablet TAKE ONE TABLET BY MOUTH EVERY DAY  Qty: 90 tablet, Refills: 3    Associated Diagnoses: Pure hypercholesterolemia      Stool Softener 100 MG capsule TAKE ONE CAPSULE BY MOUTH TWO TIMES A DAY *PATIENT SUPPLY  Qty: 60 capsule, Refills: 5    Associated Diagnoses: Constipation, unspecified constipation type       !! - Potential duplicate medications found. Please discuss with provider. No discharge procedures on file.     PDMP Review     None          ED Provider  Electronically Signed by         Ashtyn Palencia PA-C  07/13/23 [id number]

## 2023-07-13 NOTE — OCCUPATIONAL THERAPY NOTE
Occupational Therapy Evaluation     Patient Name: Lulu Garrido  [id number] Date: 7/13/2023  Problem List  Active Problems: There are no active Hospital Problems. Past Medical History  Past Medical History:   Diagnosis Date    Anxiety     Arthritis     Coronary artery disease     Coronary artery disease involving native coronary artery of native heart without angina pectoris     Depression     Fracture     Hypercholesterolemia     Meningioma ([address]) 11/1999    brain tumor    Meningioma (HCC)     Narcolepsy     daytime drowsiness and dozing off occasionally    Orthostatic hypotension     Palpitations     Prostate CA ([address])     Prostate cancer ([address])     Stroke [facility])      Past Surgical History  Past Surgical History:   Procedure Laterality Date    BACK SURGERY      BRAIN SURGERY  11/08/1999    BRAIN SURGERY      CARDIAC SURGERY      CORONARY ARTERY BYPASS GRAFT      x5    CORONARY ARTERY BYPASS GRAFT               07/13/23 1533   OT Last Visit   OT Visit Date 07/13/23   Note Type   Note type Evaluation   Pain Assessment   Pain Assessment Tool FLACC   Pain Rating: FLACC (Rest) - Face 0   Pain Rating: FLACC (Rest) - Legs 0   Pain Rating: FLACC (Rest) - Activity 0   Pain Rating: FLACC (Rest) - Cry 0   Pain Rating: FLACC (Rest) - Consolability 0   Score: FLACC (Rest) 0   Pain Rating: FLACC (Activity) - Face 1   Pain Rating: FLACC (Activity) - Legs 0   Pain Rating: FLACC (Activity) - Activity 1   Pain Rating: FLACC (Activity) - Cry 0   Pain Rating: FLACC (Activity) - Consolability 0   Score: FLACC (Activity) 2   Restrictions/Precautions   Weight Bearing Precautions Per Order No   Other Precautions Cognitive; Bed Alarm; Fall Risk;Multiple lines;Telemetry   Home Living   Type of Home Other (Comment)  (Dementia Unit)   Home Equipment Walker; Wheelchair-manual   Additional Comments Per chart, pt from [facility] Dementia unit. Per cart in 3/2023, pt was able to pivot to [facility] with RW.    Prior Function   Level of [place] Needs assistance with ADLs; Needs assistance with IADLS;Needs assistance with functional mobility   Lives With Facility staff   Receives Help From Family;Personal care attendant   IADLs Family/Friend/Other provides transportation; Family/Friend/Other provides meals; Family/Friend/Other provides medication management   Falls in the last 6 months   (Unknown #, + fall Monday, + falls in March)   General   Additional Pertinent History CT:  left inferior pubic ramus fracture with some sclerosis which is partially healed. Pt with +fall in March which resulted in pubic rami fxs   Family/Caregiver Present No   Additional General Comments Pt noncommunicative during session, unable to obtain info from pt. All info obtained from chart   Subjective   Subjective Pt received in fetal position in bed, non communicative. Pt making repetitive vocalizations without intent   ADL   Eating Assistance 1  Total Assistance   Grooming Assistance 1  Total Assistance   UB Bathing Assistance 1  Total Assistance   LB Bathing Assistance 1  Total Assistance   UB Dressing Assistance 1  Total Assistance   LB Dressing Assistance 1  Total Assistance   Toileting Assistance  1  Total Assistance   Bed Mobility   Rolling R 2  Maximal assistance   Additional items Assist x 2; Increased time required;LE management   Rolling L 2  Maximal assistance   Additional items Assist x 2;Verbal cues; Increased time required   Supine to Sit Unable to assess   Sit to Supine Unable to assess   Additional Comments Dependent x2 to boost toward [facility].  Evaluation limited d/t JADE/command following   Transfers   Sit to Stand Unable to assess   Balance   Static Sitting Zero   Activity Tolerance   Activity Tolerance Other (Comment)  (JADE/cognition)   Medical Staff Made Aware SLP JAVIER Alvarado   Nurse Made Aware TYRESE Dougherty   RUANN Assessment   RUE Assessment WFL  (Appeared functional during bed mobility, unable to full assess)   LUE Assessment   LUE Assessment WFL  (Appeared functional during bed mobility, unable to full assess)   Psychosocial   Psychosocial (WDL) X   Patient Behaviors/Mood Withdrawn   Cognition   Overall Cognitive Status Impaired   Arousal/Participation Poorly responsive;Persistent stimuli required   Attention LUIS   Orientation Level Unable to assess   Memory Unable to assess   Following Commands Unable to follow one step commands   Assessment   Limitation Decreased ADL status; Decreased Safe judgement during ADL;Decreased endurance;Decreased self-care trans;Decreased high-level ADLs; Decreased cognition   Prognosis Poor   Assessment Pt is a 80 y.o. male seen for OT evaluation at [facility], admitted 7/13/2023 w/ fall & change in mental status. OT completed extensive review of pt's medical and social history. Comorbidities affecting pt's functional performance at time of assessment include: dementia, HTN, cervical spinal stenosis, anemia, h/o orthostatic hypotension, convulsions, lumbar radiculopathy/spondylosis, h/o CVA, h/o resection of meningioma, B/L paralysis, malnutrition, +falls, ambulatory dysfunction, dysphagia, fx of L distal radius & ulna. Personal factors affecting pt at time of IE include: behavioral pattern, difficulty performing ADLS, difficulty performing IADLS , limited insight into deficits and decreased initiation and engagement . Prior to admission, pt was living at [facility]. Pt was A w/  ADLS and w/ IADLS, (-) drove, & required use of wheelchair  and RW PTA. All information obtained from chart review. Upon evaluation: Pt requires Max Ax for bed mobility, ULIS for functional mobility/transfers, total A for UB ADLs and total A for LB ADLS 2* the following deficits impacting occupational performance: weakness, decreased strength, decreased tolerance, impaired arousal and impaired memory. Full objective findings from OT assessment regarding body systems outlined above.  Pt to benefit from continued skilled OT tx while in the hospital to address deficits as defined above and maximize level of functional independence w/ ADL's and functional mobility. Occupational Performance areas to address include: eating, grooming, bathing/shower, toilet hygiene, dressing, functional mobility and clothing management. Based on findings, pt is of high complexity. The patient's raw score on the AM-PAC Daily Activity inpatient short form is 6, standardized score is 14.9, less than 39.4. Patients at this level are likely to benefit from DC to post-acute rehabilitation services. However, please refer to therapist recommendation for discharge planning given other factors that may influence destination. At this time, OT recommendations at time of discharge are DC with Level II resources. Goals   Patient Goals Pt noncommunicative this session   Plan   Treatment Interventions ADL retraining;Functional transfer training;UE strengthening/ROM; Cognitive reorientation;Patient/family training;Equipment evaluation/education; Compensatory technique education;Continued evaluation; Activityengagement   Goal Expiration Date 07/23/23   OT Treatment Day 0   OT Frequency 3-5x/wk   Recommendation   UB Rehab Discharge Recommendation (PT/OT) Level 2   AM-PAC Daily Activity Inpatient   Lower Body Dressing 1   Bathing 1   Toileting 1   Upper Body Dressing 1   Grooming 1   Eating 1   Daily Activity Raw Score 6   Turning Head Towards Sound 2   Follow Simple Instructions 1   Low Function Daily Activity Raw Score 9   Low Function Daily Activity Standardized Score  14.9   AM-PAC Applied Cognition Inpatient   Following a Speech/Presentation 1   Understanding Ordinary Conversation 1   Taking Medications 1   Remembering Where Things Are Placed or Put Away 1   Remembering List of 4-5 Errands 1   Taking Care of Complicated Tasks 1   Applied Cognition Raw Score 6   Applied Cognition Standardized Score 7.69   End of Consult   Patient Position at End of Consult Supine;Bed/Chair alarm activated; All needs within reach   Nurse Communication Nurse aware of consult     Pt will achieve the following goals within 10 days. *Pt will maintain arousal for 10 min with verbal/tactile cues PRN to increased participation in ADLs    *Pt will feed self finger foods with S & VCs PRN. *Pt will complete grooming with Mod A.    *Pt will complete UB bathing and dressing with Mod A.    *Pt will complete bed mobility with Min Ax1, with HOB elevated & use of side rails PRN to prep for purposeful tasks    *Pt will sit on EOB for 3+ minutes with Min A for increased safety with seated activity tolerance during ADL tasks.     Guillermo Steward, OTR/L

## 2023-07-13 NOTE — ASSESSMENT & PLAN NOTE
· Trop 152 --> 130 --> 147  · ECG with ST depressions and T wave inversions in anterior leads  · Secondary to electrolyte disarray vs demand ischemia in acutely ill patient  · No active chest pain/SOB  · Monitor on telemetry. Will will check morning ECG and troponin. · On Plavix, Toprol-XL, statin.   Hold until mental status improves  · Cardiology following

## 2023-07-13 NOTE — H&P
[facility]  H&P  Name: Cathie Javier 80 y.o. male I MRN: [id number]  Unit/Bed#: -01 I Date of Admission: 7/13/2023   Date of Service: 7/13/2023 I Hospital Day: 0      Assessment/Plan   * Acute metabolic encephalopathy  Assessment & Plan  Patient presents from [facility] due to altered mental status, which has been progressing since a fall 3 days ago. Underlying dementia/cognitive decline. · Vital signs stable -normotensive, not requiring supplemental O2  · AAO x1 to self only, able to follow simple commands with multiple prompts  · Significant electrolyte disarray -suspect poor oral intake of both water and solids  · Hypernatremic: Na+ 156 (after glucose correction)  · Hypercalcemia: Corrected Ca 12.4  · Hypokalemia: K+ 2.8  · CT head: No acute intracranial abnormality  · CT/CTA chest: Consistent with aspiration  · Suspect aspiration pneumonitis  · Less likely pneumonia - no leukocytosis, fevers. Procalcitonin WNL. · Suspect this is mainly due to dehydration and poor p.o. intake/malnutrition with underlying chronic cognitive deficit  · Check TSH  · Give IVF, follow electrolytes. Monitor for improvement in mental status. Fall  Assessment & Plan  Fall with unclear etiology 3 days ago  Possibly mechanical, possibly secondary to weakness from hypokalemia and/or severe dehydration  Imaging remarkable for new L3 compression fracture and inferior pubic rami fracture with some sclerosis  Does not appear to be in pain  Ortho consult  PT/OT      Hypernatremia  Assessment & Plan  Na+ 154, after glucose correction --> 156 (on admission)  Also with elevated chloride  Suspect dehydration in setting of acute metabolic encephalopathy  Continue IVF overnight  Repeat BMP in a.m.     Hypokalemia  Assessment & Plan  K+ 2.8  Suspect secondary to poor p.o. intake  No GI losses observed  ECG with some ST and T wave changes  Replete potassium  Monitor on telemetry    Dysphagia  Assessment & Plan  · Known dysphagia. Assessed previously by speech therapy while patient had capacity and patient understood and accepted risks of aspiration. · CT imaging consistent with aspiration pneumonitis  · Currently n.p.o. given mental status change  · Restart puréed diet with thin liquid once mental status improves    Hypercalcemia  Assessment & Plan  Corrected Ca+ 12.4   Suspect secondary to dehydration  Give IVF  If continues to be elevated, will check PTH    Aspiration pneumonitis (HCC)  Assessment & Plan  · Known dysphagia  · CT imaging consistent with aspiration event. Additional aspiration event witnessed with thin liquids during speech therapy evaluation. · No leukocytosis, fevers. Procalcitonin WNL  · Received IV antibiotics in ED, hold further antibiotics  · Trend WBC/fever curve    Moderate protein-calorie malnutrition (HCC)  Assessment & Plan  Malnutrition Findings: Body mass index is 20.97 kg/m². Nutrition consult  We will add ensures once patient mental status is improved    Dementia ([address])  Assessment & Plan  [facility] of [facility]  On memantine, hold until mental status improved    Elevated troponin  Assessment & Plan  · Trop 152 --> 130 --> 147  · ECG with ST depressions and T wave inversions in anterior leads  · Secondary to electrolyte disarray vs demand ischemia in acutely ill patient  · No active chest pain/SOB  · Monitor on telemetry. Will will check morning ECG and troponin. · On Plavix, Toprol-XL, statin. Hold until mental status improves  · Cardiology following           VTE Pharmacologic Prophylaxis: VTE Score: 3 Moderate Risk (Score 3-4) - Pharmacological DVT Prophylaxis Ordered: enoxaparin (Lovenox). Code Status: Level 1 - Full Code   Discussion with family: Updated  (wife) via phone.     Anticipated Length of Stay: Patient will be admitted on an inpatient basis with an anticipated length of stay of greater than 2 midnights secondary to Acute metabolic encephalopathy. Total Time Spent on Date of Encounter in care of patient: 65 minutes This time was spent on one or more of the following: performing physical exam; counseling and coordination of care; obtaining or reviewing history; documenting in the medical record; reviewing/ordering tests, medications or procedures; communicating with other healthcare professionals and discussing with patient's family/caregivers. Chief Complaint:     History of Present Illness:  Lyubov Diop is a 80 y.o. male with a PMH of CAD, CVA, HTN, HLD, dementia, prostate cancer who presents to [facility] with altered mental status following a fall 3 days ago. Patient is unable to provide any history due to his altered mental status. On arrival to the ED, patient is nonverbal but more dynamically stable. He is not requiring any supplemental O2. His lab work has significant electrolyte disarrangement including hypernatremia, hypokalemia, hypercalcemia, hypophosphatemia, and mild hyperglycemia. Additionally, he has elevated troponin and BNP on arrival, with some ECG changes. Underwent CT imaging for his fall which demonstrated inferior pubic rami fracture (unclear chronicity, also present on imaging in March 2023), as well as a new L3 compression fracture. CT head was negative for any intracranial abnormalities. Also underwent CT chest/abdomen/pelvis which demonstrated signs consistent with aspiration. WBC and procalcitonin WNL. Received a dose of IV antibiotics. Will be admitted for further work-up and treatment for his metabolic encephalopathy as well as monitoring for development of aspiration pneumonia. Review of Systems:  Review of Systems   Unable to perform ROS: Mental status change   Respiratory: Negative for chest tightness and shortness of breath.         Past Medical and Surgical History:   Past Medical History:   Diagnosis Date   • Anxiety    • Arthritis    • Coronary artery disease    • Coronary artery disease involving native coronary artery of native heart without angina pectoris    • Depression    • Fracture    • Hypercholesterolemia    • Meningioma ([address]) 11/1999    brain tumor   • Meningioma (HCC)    • Narcolepsy     daytime drowsiness and dozing off occasionally   • Orthostatic hypotension    • Palpitations    • Prostate CA ([address])    • Prostate cancer ([address])    • Stroke [facility])        Past Surgical History:   Procedure Laterality Date   • BACK SURGERY     • BRAIN SURGERY  11/08/1999   • BRAIN SURGERY     • CARDIAC SURGERY     • CORONARY ARTERY BYPASS GRAFT      x5   • CORONARY ARTERY BYPASS GRAFT         Meds/Allergies:  Prior to Admission medications    Medication Sig Start Date End Date Taking? Authorizing Provider   clopidogrel (PLAVIX) 75 mg tablet TAKE ONE TABLET BY MOUTH EVERY DAY 6/5/67  Yes Oskar Hebert MD   melatonin 3 mg Take 1 tablet (3 mg total) by mouth daily at bedtime 3/16/23  Yes Bianka De La Torre MD   [facility] 5 mg tablet Take 1 tablet (5 mg total) by mouth daily at bedtime 3/16/23  Yes Bianka De La Torre MD   metoprolol succinate (TOPROL-XL) 25 mg 24 hr tablet TAKE ONE TABLET BY MOUTH EVERY DAY 2/7/23  Yes Jennifer Gardner MD   psyllium (METAMUCIL) packet Take 1 packet by mouth daily Do not start before March 17, 2023. 3/17/23  Yes Bianka De La Torre MD   sertraline (ZOLOFT) 25 mg tablet Take 3 tablets (75 mg total) by mouth daily Do not start before March 17, 2023. 3/17/23  Yes Bianka De La Torre MD   acetaminophen (TYLENOL) 325 mg tablet Take 3 tablets (975 mg total) by mouth every 8 (eight) hours 3/2/23   Max De Guzman MD   clopidogrel (PLAVIX) 75 mg tablet Take 1 tablet (75 mg total) by mouth daily Do not start before March 17, 2023.   Patient not taking: Reported on 3/30/2023 3/17/23   Bianka De La Torre MD   enoxaparin (Lovenox) 40 mg/0.4 mL Inject 0.4 mL (40 mg total) under the skin in the morning for 28 days 3/2/23 3/30/23  Max De Guzman MD   lidocaine (LIDODERM) 5 % Apply 1 patch topically over 12 hours daily Remove & Discard patch within 12 hours or as directed by MD 3/2/23   Es Gomez MD   Melatonin 5 MG CAPS Take 5 mg by mouth daily at bedtime  Patient not taking: Reported on 3/30/2023    Historical Provider, MD   Melatonin 5 MG TABS TAKE ONE TABLET BY MOUTH EVERY NIGHT AT BEDTIME *PATIENT SUPPLY  Patient not taking: Reported on 3/30/2023 8/95/38   Breanna Carty MD   memantine [facility]) 5 mg tablet Take 5 mg by mouth daily at bedtime 8/31/22   Historical Provider, MD   Metamucil Fiber 51.7 % PACK MIX 1 PACKET IN 6-8 OUNCES OF WATER AND CONSUME BY MOUTH DAILY *PATIENT SUPPLY  Patient not taking: Reported on 3/30/2023 9/61/43   Breanna Carty MD   metoprolol succinate (TOPROL-XL) 25 mg 24 hr tablet Take 1 tablet (25 mg total) by mouth daily Do not start before March 17, 2023.   Patient not taking: Reported on 3/30/2023 3/17/23   Shanthi Barth MD   midodrine (PROAMATINE) 5 mg tablet TAKE ONE TABLET BY MOUTH EVERY DAY  Patient not taking: Reported on 3/30/2023 12/30/22   Mo Ayon MD   omeprazole ([address]) 40 MG capsule TAKE ONE CAPSULE BY MOUTH EVERY DAY  Patient not taking: Reported on 3/30/2023 4/4/37   Breanna Carty MD   pantoprazole (PROTONIX) 40 mg tablet Take 1 tablet (40 mg total) by mouth daily in the early morning Do not start before March 17, 2023. 3/17/23   Shanthi Barth MD   psyllium (METAMUCIL) 58.6 % packet Take 1 packet by mouth daily  Patient not taking: Reported on 3/30/2023    Historical Provider, MD   sertraline (ZOLOFT) 50 mg tablet TAKE ONE AND ONE-HALF TABLETS BY MOUTH EVERY DAY 6/49/93   Breanna Carty MD   simvastatin (ZOCOR) 80 mg tablet TAKE ONE TABLET BY MOUTH EVERY DAY  Patient not taking: Reported on 3/30/2023 1/31/23   Sabina Montiel MD   Stool Softener 100 MG capsule TAKE ONE CAPSULE BY MOUTH TWO TIMES A DAY *PATIENT SUPPLY 2/01/89   Doneta Paci, MD     I have reveiwed home medications using records provided by SNF.    Allergies: Allergies   Allergen Reactions   • Aricept [Donepezil] Confusion     confusion   • Atorvastatin Myalgia, Dizziness and Headache   • Niacin Other (See Comments)     unknown       Social History:  Marital Status: /Civil Union   Occupation: Noncontributory  Patient Pre-hospital Living Situation: [facility]  Patient Pre-hospital Level of Mobility: unable to be assessed at time of evaluation  Patient Pre-hospital Diet Restrictions: Puréed diet  Substance Use History:   Social History     Substance and Sexual Activity   Alcohol Use Not Currently    Comment: (history)     Social History     Tobacco Use   Smoking Status Never   Smokeless Tobacco Never     Social History     Substance and Sexual Activity   Drug Use No       Family History:  Family History   Problem Relation Age of Onset   • Hypertension Mother         benign essential   • Cancer Mother    • Osteoporosis Mother    • Suicidality Father    • Diabetes Family    • Heart disease Family    • Neuropathy Family         peripheral   • Prostate cancer Family    • Thyroid disease Family        Physical Exam:     Vitals:   Blood Pressure: 134/85 (07/13/23 1801)  Pulse: 83 (07/13/23 1801)  Temperature: 98.3 °F (36.8 °C) (07/13/23 1320)  Temp Source: Oral (07/13/23 1009)  Respirations: 18 (07/13/23 1801)  Height: 5' 9" (175.3 cm) (07/13/23 1002)  Weight - Scale: 64.4 kg (142 lb) (07/13/23 1002)  SpO2: 95 % (07/13/23 1801)    Physical Exam  Vitals and nursing note reviewed. Constitutional:       Appearance: Normal appearance. HENT:      Head: Normocephalic and atraumatic. Mouth/Throat:      Mouth: Mucous membranes are moist.      Pharynx: Oropharynx is clear. No oropharyngeal exudate. Eyes:      Extraocular Movements: Extraocular movements intact. Cardiovascular:      Rate and Rhythm: Normal rate and regular rhythm. Pulses: Normal pulses. Heart sounds: Normal heart sounds. No murmur heard. No friction rub. No gallop. Pulmonary:      Effort: Pulmonary effort is normal. No respiratory distress. Breath sounds: Normal breath sounds. No stridor. No wheezing or rales. Abdominal:      General: Abdomen is flat. Bowel sounds are normal. There is no distension. Palpations: Abdomen is soft. Tenderness: There is no abdominal tenderness. Musculoskeletal:      Right lower leg: No edema. Left lower leg: No edema. Skin:     General: Skin is warm and dry. Neurological:      Mental Status: He is alert. Comments: Unable to assess neuro status, however witnessed moving all 4 extremities and was able to follow simple commands after multiple prompts.   AAO x1          Additional Data:     Lab Results:  Results from last 7 days   Lab Units 07/13/23  1014   WBC Thousand/uL 9.23   HEMOGLOBIN g/dL 12.3   HEMATOCRIT % 38.5   PLATELETS Thousands/uL 232   NEUTROS PCT % 72   LYMPHS PCT % 19   MONOS PCT % 9   EOS PCT % 0     Results from last 7 days   Lab Units 07/13/23  1014   SODIUM mmol/L 154*   POTASSIUM mmol/L 2.8*   CHLORIDE mmol/L 117*   CO2 mmol/L 34*   BUN mg/dL 23   CREATININE mg/dL 1.10   ANION GAP mmol/L 3   CALCIUM mg/dL 11.8*   ALBUMIN g/dL 3.2*   TOTAL BILIRUBIN mg/dL 0.49   ALK PHOS U/L 59   ALT U/L 14   AST U/L 17   GLUCOSE RANDOM mg/dL 199*     Results from last 7 days   Lab Units 07/13/23  1014   INR  1.08             Results from last 7 days   Lab Units 07/13/23  1041   LACTIC ACID mmol/L 2.0   PROCALCITONIN ng/ml 0.05       Lines/Drains:  Invasive Devices     Peripheral Intravenous Line  Duration           Peripheral IV 07/13/23 Left Antecubital <1 day    Peripheral IV 07/13/23 Right Antecubital <1 day          Drain  Duration           Urethral Catheter Latex 16 Fr. <1 day              Urinary Catheter:  Goal for removal: Voiding trial when ambulation improves             Imaging: Reviewed radiology reports from this admission including: chest CT scan, abdominal/pelvic CT and CT head  CTA ED chest PE study   Final Result by Liberty Ceja MD (07/13 1226)      Compromised by respiratory motion, particularly in the lower lobes, but with no definite acute pulmonary emboli. Mild bilateral lower lobe bronchiectasis with tubular opacities in the right lower lobe due to mucus and debris in the bronchi. Given debris in the superior segment right lower lobe bronchi, patchy groundglass opacity in the right upper lobe, and    tree-in-bud nodularity in both lower lobes, this is likely due to aspiration. Workstation performed: [id number]         TRAUMA - CT head wo contrast   Final Result by Suzanne Amaro MD (07/13 1059)      No acute intracranial abnormality. The study was marked in [facility] for immediate notification. Workstation performed: [id number]         TRAUMA - CT spine cervical wo contrast   Final Result by Suzanne Amaro MD (07/13 1107)      No cervical spine fracture or traumatic malalignment. Degenerative changes. New groundglass opacities in the right lung apex. Please see chest CT for further result. The study was marked in [facility] for immediate notification. Workstation performed: [id number]         TRAUMA - CT chest abdomen pelvis w contrast   Final Result by Suzanne Amaro MD (07/13 1121)      No evidence of solid organ trauma or acute fracture with the exception of L3 where there is compression fracture new since 2019 although it is age indeterminate based on imaging. Correlation with any additional back pain is advised. There is a left inferior pubic ramus fracture with some sclerosis which is partially healed suggesting this is recent more likely than acute and again should be correlated with any pain in the area. No definite pelvic fracture is seen elsewhere. Infiltrates in the right lower lobe with evidence of mucous secretions opacifying right lower lobe bronchi.  There is additional opacities in the right upper lobe and left lower lobe. Findings are suspicious for aspiration pneumonia. Branching opacity may    represent mucous debris although this could also represent poor enhancement of the right lower lobe vasculature that could be seen in a pulmonary embolus. If there is high index of suspicion a follow-up CTA or lung scan may be useful. Marked bladder distention should be correlated for any recent voiding. Clemons catheter may be useful. 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 This could be correlated with distal pulses. This was discussed with JAVIER Blair at 11:05 a.m. Workstation performed: [id number]         XR Trauma chest portable   Final Result by Harvinder Gonzalez MD (07/13 1035)      Hypoventilation with atelectatic changes at the bases. No definite pneumothorax. Workstation performed: [id number]             EKG and Other Studies Reviewed on Admission:   · EKG: Sinus Tachycardia. , RBBB, ST depressions and T wave inversions in anterior leads. ** Please Note: This note has been constructed using a voice recognition system.  **

## 2023-07-13 NOTE — PLAN OF CARE
Problem: OCCUPATIONAL THERAPY ADULT  Goal: Performs self-care activities at highest level of function for planned discharge setting. See evaluation for individualized goals. Description: Treatment Interventions: ADL retraining, Functional transfer training, UE strengthening/ROM, Cognitive reorientation, Patient/family training, Equipment evaluation/education, Compensatory technique education, Continued evaluation, Activityengagement          See flowsheet documentation for full assessment, interventions and recommendations. Note: Limitation: Decreased ADL status, Decreased Safe judgement during ADL, Decreased endurance, Decreased self-care trans, Decreased high-level ADLs, Decreased cognition  Prognosis: Poor  Assessment: Pt is a 80 y.o. male seen for OT evaluation at [address] [place] [address], admitted 7/13/2023 w/ fall & change in mental status. OT completed extensive review of pt's medical and social history. Comorbidities affecting pt's functional performance at time of assessment include: dementia, HTN, cervical spinal stenosis, anemia, h/o orthostatic hypotension, convulsions, lumbar radiculopathy/spondylosis, h/o CVA, h/o resection of meningioma, B/L paralysis, malnutrition, +falls, ambulatory dysfunction, dysphagia, fx of L distal radius & ulna. Personal factors affecting pt at time of IE include: behavioral pattern, difficulty performing ADLS, difficulty performing IADLS , limited insight into deficits and decreased initiation and engagement . Prior to admission, pt was living at [facility]. Pt was A w/  ADLS and w/ IADLS, (-) drove, & required use of wheelchair  and RW PTA. All information obtained from chart review.  Upon evaluation: Pt requires Max Ax for bed mobility, LUIS for functional mobility/transfers, total A for UB ADLs and total A for LB ADLS 2* the following deficits impacting occupational performance: weakness, decreased strength, decreased tolerance, impaired arousal and impaired memory. Full objective findings from OT assessment regarding body systems outlined above. Pt to benefit from continued skilled OT tx while in the hospital to address deficits as defined above and maximize level of functional independence w/ ADL's and functional mobility. Occupational Performance areas to address include: eating, grooming, bathing/shower, toilet hygiene, dressing, functional mobility and clothing management. Based on findings, pt is of high complexity. The patient's raw score on the -PAC Daily Activity inpatient short form is 6, standardized score is 14.9, less than 39.4. Patients at this level are likely to benefit from DC to post-acute rehabilitation services. However, please refer to therapist recommendation for discharge planning given other factors that may influence destination. At this time, OT recommendations at time of discharge are DC with Level II resources.

## 2023-07-13 NOTE — ASSESSMENT & PLAN NOTE
Na+ 154, after glucose correction --> 156 (on admission)  Also with elevated chloride  Suspect dehydration in setting of acute metabolic encephalopathy  Continue IVF overnight  Repeat BMP in a.m.

## 2023-07-13 NOTE — ASSESSMENT & PLAN NOTE
K+ 2.8  Suspect secondary to poor p.o. intake  No GI losses observed  ECG with some ST and T wave changes  Replete potassium  Monitor on telemetry

## 2023-07-13 NOTE — CONSULTS
Consult - Cardiology   Lashon Balderas 80 y.o. male MRN: [id number]  Unit/Bed#: ED 09 Encounter: [id number]        Reason For Consult: Abnormal EKG  Outpatient Cardiologist: Dr. Marcel Cottrell:  1. Abnormal EKG  a. Prior baseline EKG NSR with right bundle branch block  b. 7/2023 EKG: Sinus tachycardia, new anterior T wave inversions with ST depression in V3, no ST elevation  i. Suspect secondary to severe electrolyte derangements  2. Abnormal lab work + fall earlier this week (sent in from nursing home)  a. 7/13/2023 CMP: Potassium 2.8, sodium 154, corrected calcium 12.4  3. Non-MI troponin elevation 152 --> 130  4. Remote history of CABG  a. 3/2023 Echo: LVEF 33-60%, grade 1 diastolic dysfunction, mild AI, mild MR  5. [employer] loop recorder in situ  6. History of hypertension  7. History of orthostatic hypotension for which he takes midodrine  8. Dyslipidemia  9. History of CVA  10. Severe dementia, essentially nonverbal and resides in close unit at the nursing home        PLAN/ DISCUSSION:     • Abnormal EKG in the setting of severe electrolyte derangements with minimal troponin elevation. No evidence of ACS. At this time given his advanced age, advanced dementia, and high fall risk would not pursue an ischemic work-up  • For completeness we will interrogate loop recorder but if unremarkable we will sign off    History Of Present Illness:  Al Reed is a 54-year-old gentleman presenting from a locked dementia unit at the nursing home. He is nonverbal at the time of my visit. History is taken entirely from the chart. He reportedly had a fall earlier this week at the nursing home but was not brought to the emergency department. He has had a steady decline in mentation since then according to charts. He had outpatient lab work performed which showed low potassium and abnormal sodium although its unclear if the sodium was high or low and was sent to the emergency department.   In the ER he has been nonverbal.  He is nontoxic-appearing. Lab work confirmed severe electrolyte derangements as above. An EKG was performed which was abnormal as above for which we have been asked see him in consultation. Past Medical History:        Past Medical History:   Diagnosis Date   • Anxiety    • Arthritis    • Coronary artery disease    • Coronary artery disease involving native coronary artery of native heart without angina pectoris    • Depression    • Fracture    • Hypercholesterolemia    • Meningioma ([address]) 11/1999    brain tumor   • Meningioma (HCC)    • Narcolepsy     daytime drowsiness and dozing off occasionally   • Orthostatic hypotension    • Palpitations    • Prostate CA (HCC)    • Prostate cancer ([address])    • Stroke [facility])       Past Surgical History:   Procedure Laterality Date   • BACK SURGERY     • BRAIN SURGERY  11/08/1999   • BRAIN SURGERY     • CARDIAC SURGERY     • CORONARY ARTERY BYPASS GRAFT      x5   • CORONARY ARTERY BYPASS GRAFT          Allergy:        Allergies   Allergen Reactions   • Aricept [Donepezil] Confusion     confusion   • Atorvastatin Myalgia, Dizziness and Headache   • Niacin Other (See Comments)     unknown       Medications:       Prior to Admission medications    Medication Sig Start Date End Date Taking? Authorizing Provider   acetaminophen (TYLENOL) 325 mg tablet Take 3 tablets (975 mg total) by mouth every 8 (eight) hours 3/2/23   Anuradha Schafer MD   clopidogrel (PLAVIX) 75 mg tablet TAKE ONE TABLET BY MOUTH EVERY DAY 3/8/51   Ciarra Stephenson MD   clopidogrel (PLAVIX) 75 mg tablet Take 1 tablet (75 mg total) by mouth daily Do not start before March 17, 2023.   Patient not taking: Reported on 3/30/2023 3/17/23   Perez Diego MD   enoxaparin (Lovenox) 40 mg/0.4 mL Inject 0.4 mL (40 mg total) under the skin in the morning for 28 days 3/2/23 3/30/23  Anuradha Schafer MD   lidocaine (LIDODERM) 5 % Apply 1 patch topically over 12 hours daily Remove & Discard patch within 12 hours or as directed by MD 3/2/23   Julio César Laurent MD   melatonin 3 mg Take 1 tablet (3 mg total) by mouth daily at bedtime 3/16/23   Dony Ramon MD   Melatonin 5 MG CAPS Take 5 mg by mouth daily at bedtime  Patient not taking: Reported on 3/30/2023    Historical Provider, MD   Melatonin 5 MG TABS TAKE ONE TABLET BY MOUTH EVERY NIGHT AT BEDTIME *PATIENT SUPPLY  Patient not taking: Reported on 3/30/2023 7/97/94   Urbano Garrido MD   memantine [facility]) 5 mg tablet Take 5 mg by mouth daily at bedtime 8/31/22   Historical Provider, MD   memantine (NAMENDA) 5 mg tablet Take 1 tablet (5 mg total) by mouth daily at bedtime  Patient not taking: Reported on 3/30/2023 3/16/23   Dony Ramon MD   Metamucil Fiber 51.7 % PACK MIX 1 PACKET IN 6-8 OUNCES OF WATER AND CONSUME BY MOUTH DAILY *PATIENT SUPPLY  Patient not taking: Reported on 3/30/2023 8/98/06   Urbano Garrido MD   metoprolol succinate (TOPROL-XL) 25 mg 24 hr tablet TAKE ONE TABLET BY MOUTH EVERY DAY 2/7/23   Berny Sheriadn MD   metoprolol succinate (TOPROL-XL) 25 mg 24 hr tablet Take 1 tablet (25 mg total) by mouth daily Do not start before March 17, 2023.   Patient not taking: Reported on 3/30/2023 3/17/23   Dony Ramon MD   midodrine (PROAMATINE) 5 mg tablet TAKE ONE TABLET BY MOUTH EVERY DAY  Patient not taking: Reported on 3/30/2023 12/30/22   Emily Santiago MD   omeprazole ([address] [facility]) 40 MG capsule TAKE ONE CAPSULE BY MOUTH EVERY DAY  Patient not taking: Reported on 3/30/2023 6/0/98   Urbano Garrido MD   pantoprazole (PROTONIX) 40 mg tablet Take 1 tablet (40 mg total) by mouth daily in the early morning Do not start before March 17, 2023. 3/17/23   Dony Ramon MD   psyllium (METAMUCIL) 58.6 % packet Take 1 packet by mouth daily  Patient not taking: Reported on 3/30/2023    Historical Provider, MD   psyllium (METAMUCIL) packet Take 1 packet by mouth daily Do not start before March 17, 2023. 3/17/23   Dony Ramon MD   sertraline (ZOLOFT) 25 mg tablet Take 3 tablets (75 mg total) by mouth daily Do not start before March 17, 2023. 3/17/23   Loida Cunningham MD   sertraline (ZOLOFT) 50 mg tablet TAKE ONE AND ONE-HALF TABLETS BY MOUTH EVERY DAY  Patient not taking: Reported on 3/30/2023 8/59/06   Opal Bence, MD   simvastatin (ZOCOR) 80 mg tablet TAKE ONE TABLET BY MOUTH EVERY DAY  Patient not taking: Reported on 3/30/2023 1/31/23   Dari Blackburn MD   Stool Softener 100 MG capsule TAKE ONE CAPSULE BY MOUTH TWO TIMES A DAY *PATIENT SUPPLY 6/81/29   Opal Bence, MD       Family History:     Family History   Problem Relation Age of Onset   • Hypertension Mother         benign essential   • Cancer Mother    • Osteoporosis Mother    • Suicidality Father    • Diabetes Family    • Heart disease Family    • Neuropathy Family         peripheral   • Prostate cancer Family    • Thyroid disease Family         Social History:       Social History     Socioeconomic History   • Marital status: /Civil Union     Spouse name: None   • Number of children: None   • Years of education: None   • Highest education level: None   Occupational History   • Occupation: retired   Tobacco Use   • Smoking status: Never   • Smokeless tobacco: Never   Vaping Use   • Vaping Use: Never used   Substance and Sexual Activity   • Alcohol use: Not Currently     Comment: (history)   • Drug use: No   • Sexual activity: Not Currently   Other Topics Concern   • None   Social History Narrative    ** Merged History Encounter **         ** Merged History Encounter **         ** Merged History Encounter **         Pt lives with wife     Social Determinants of Health     Financial Resource Strain: Not on file   Food Insecurity: No Food Insecurity (3/14/2023)    Hunger Vital Sign    • Worried About Running Out of Food in the Last Year: Never true    • Ran Out of Food in the Last Year: Never true   Transportation Needs: No Transportation Needs (3/14/2023)    PRAPARE - Transportation    • Lack of Transportation (Medical): No    • Lack of Transportation (Non-Medical): No   Physical Activity: Not on file   Stress: Not on file   Social Connections: Not on file   Intimate Partner Violence: Not on file   Housing Stability: Low Risk  (3/14/2023)    Housing Stability Vital Sign    • Unable to Pay for Housing in the Last Year: No    • Number of Places Lived in the Last Year: 1    • Unstable Housing in the Last Year: No       ROS:  Unable to perform, patient non verbal    Exam:  General: elderly male, non verbal  Head: Normocephalic, atraumatic. Eyes:  EOMI. Pupils - equal, round, reactive to accomodation. No icterus. Normal Conjunctiva. Oropharynx: moist and normal-appearing mucosa  Neck: supple, symmetrical, trachea midline and no JVD  Heart:  RRR, No: murmer, rub or gallop, S1 & S2 normal   Respiratory effort / Chest Inspection: unlabored  Lungs:  normal air entry, lungs clear to auscultation and no rales, rhonchi or wheezing   Abdomen: flat, normal findings: bowel sounds normal and soft, non-tender  Lower Limbs:  no pitting edema  Pulses[de-identified]  RLE - DP: present 2+                 LLE - DP: present 2+  Musculoskeletal: ROM grossly normal        DATA:      ECG:                     Telemetry: Normal sinus rhythm, . HR 80's          Echocardiogram:           Ischemic Testing:         Weights: Wt Readings from Last 3 Encounters:   07/13/23 64.4 kg (142 lb)   06/13/23 64.4 kg (142 lb)   05/09/23 64.4 kg (142 lb)   , Body mass index is 20.97 kg/m².          Lab Studies:             Results from last 7 days   Lab Units 07/13/23  1014   WBC Thousand/uL 9.23   HEMOGLOBIN g/dL 12.3   HEMATOCRIT % 38.5   PLATELETS Thousands/uL 232   ,   Results from last 7 days   Lab Units 07/13/23  1014   POTASSIUM mmol/L 2.8*   CHLORIDE mmol/L 117*   CO2 mmol/L 34*   BUN mg/dL 23   CREATININE mg/dL 1.10   CALCIUM mg/dL 11.8*   ALK PHOS U/L 59   ALT U/L 14   AST U/L 17

## 2023-07-13 NOTE — ASSESSMENT & PLAN NOTE
· Known dysphagia. Assessed previously by speech therapy while patient had capacity and patient understood and accepted risks of aspiration.   · CT imaging consistent with aspiration pneumonitis  · Currently n.p.o. given mental status change  · Restart puréed diet with thin liquid once mental status improves

## 2023-07-13 NOTE — ASSESSMENT & PLAN NOTE
History of dementia, resides at [facility]  On memantine, hold until mental status improved

## 2023-07-13 NOTE — ASSESSMENT & PLAN NOTE
Corrected Ca+ 12.4   Suspect secondary to dehydration  Give IVF  If continues to be elevated, will check PTH

## 2023-07-13 NOTE — ASSESSMENT & PLAN NOTE
Malnutrition Findings: Body mass index is 20.97 kg/m².    Nutrition consult  We will add ensures once patient mental status is improved

## 2023-07-13 NOTE — APP STUDENT NOTE
CLIFFORD STUDENT  Inpatient H&P Exam for TRAINING ONLY  Not Part of Legal Medical Record       H&P Exam - Princess Hernandez 80 y.o. male MRN: [id number]  Unit/Bed#: [facility] Encounter: [id number]      Assessment and Plan:  · Aspiration pneumonitis  · Patient came to the ED 7/13 from [facility] after a fall on Monday 7/10 with progressive mental status decline and abnormal outpatient labs. · CTA chest: no definite acute pulmonary emboli. Mild bilateral lower lobe bronchiectasis with tubular opacities in the right lower lobe due to mucus and debris in the bronchi. Given debris in the superior segment right lower lobe bronchi, patchy groundglass opacity in the right upper lobe, and tree-in-bud nodularity in both lower lobes, this is likely due to aspiration. · CXR & CT chest/abd/pelvis also consistent w aspiration pneumonitis  · Patient given one dose of ceftriaxone and metronidazole. · Discontinue antibiotics due to absent signs of bacterial infection. · WBCs and procal WNL on admission. · Trend WBCs, procalcitonin, and fever curve. · If patient starts to display signs of infection, strep pneumo and legionella urine antigens can be ordered and restart IV antibiotics. · Initiate 2L via nasal cannula if O2 drops below 90% on room air. · Patient has a history of dysphagia, SLP consult. NPO. · Once mental status improves, can restart home diet (pureed)  · Patient accepted risk of thin liquid diet. · Pain control as needed. · Falls  · Patient had a fall 7/10. Unsure if witnessed or unwitnessed event. · CT Head wo contrast: No acute intracranial abnormality. · CT spine cervical wo contrast: No cervical spine fracture or traumatic malalignment. Degenerative changes. · CT chest/abd/pelvis w contrast: New compression fracture of L3 since 2019 although it is age indeterminate based on imaging. Correlation with any additional back pain is advised. · Fall precautions. Add PT/OT consult.    · Discuss modification of living situation to prevent future falls with nursing home at discharge. · Left inferior pubic ramus fracture  · Partially healed left inferior pubic ramus fracture with some sclerosis seen on CT chest/abdomen/pelvis. · Orthopedic consult. · Monitor pain scales. · Fracture can increase hypercoagulability. Monitor for signs of VTE. · Urinary bladder distention  · Insert little catheter to resolve marked bladder distention seen on CT chest/abd/pelvis w contrast.   · Monitor I/Os. · After patient is more stable and bladder is decompressed, trial Little discontinuation with sequential bladder scans to monitor success. · Hypernatremia  · Na 154, glucose 199, corrected Na 156. · Likely dehydration. Initiate IV fluids. · Monitor BMP and I/Os. · Hypokalemia  · K 2.8, Mg 2.0  · Initiate IV potassium chloride 40 mEq infused over 4 hours. · Monitor BMP. · Continuous telemetry monitoring. · Hypercalcemia  · Ca 12.4  · Initiate IV fluids and recheck calcium on morning BMP before pursuing further hypercalcemia work up. · Malnutrition  · Nutrition consult. · Add ensures once mental status improves. · Non-MI troponin elevation & abnormal EKG  · Troponin 0hr 152, 2hrs 130, 4hrs 147. · Repeat troponin tomorrow morning with ECG. · Cardiology consulted   · Deemed troponin elevation to be non-MI etiology  · Abnormal EKG thought to be due to electrolyte derangement superimposed on chronic EKG changes. · History of CVA  · Takes plavix at home. · Patient will be placed on Lovenox for admission stay. · History of MI with CABG  · 3/2023 Echo: LVEF 54-16%, grade 1 diastolic dysfunction, mild AI, mild MR  · Cardiology consulted  · Continue plavix and metoprolol. · Parkinson's Disease:  · Patient takes memantine for PD at home. · Restart once restarting PO intake. · Severe dementia  · Patient has history of dementia noted by nursing home.    · Patient's current mental status is worse than baseline. · Try to contact family members or nursing home to get a better understanding of current deficit. · Monitor mental status examinations. · Sleep aid prn if patient experiences sun downing/insomnia. VTE Prophylaxis: Enoxaparin (Lovenox)  / sequential compression device   POLST: There is no POLST form on file for this patient (pre-hospital)    Anticipated Length of Stay:  Patient will be admitted on an Inpatient basis with an anticipated length of stay of greater than 2 midnights. Justification for Hospital Stay: aspiration pneumonitis and change in baseline mental status. Total Time for Visit, including Counseling / Coordination of Care: 30 minutes. Greater than 50% of this total time spent on direct patient counseling and coordination of care. Chief Complaint:   Change in mentation after fall 3 days ago  History of Present Illness:    Lyubov Diop is a 80 y.o. male who presents to the ED with altered mental status after a fall that occurred on Monday at [facility]. Unclear if this was a witnessed or unwitnessed event. History was obtained from nursing home and EMS by the ED staff. Patient's mental status has been declining since the fall and he obtained abnormal lab results that led to him coming to ER today. Patient had limited verbal responses which made history limited, denied chest pain or difficulty breathing. Patient experiencing muscle twitching. Patient has history of CVA on Plavix, prior MI with CABG, hyperlipidemia, Parkinson's disease, anemia, failure to thrive, prostate cancer, resected meningioma, and dysphagia. Review of Systems:   Obtained via nursing home, EMS, and patient, but was limited due to mental status changes    Review of Systems   Constitutional: Positive for activity change. Negative for chills. HENT: Positive for trouble swallowing. Respiratory: Positive for cough. Negative for shortness of breath, wheezing and stridor. Cardiovascular: Negative for chest pain, palpitations and leg swelling. Gastrointestinal: Negative for abdominal pain and vomiting. Genitourinary: Positive for decreased urine volume and difficulty urinating. Skin: Positive for pallor. Neurological: Positive for tremors and speech difficulty. Psychiatric/Behavioral: Positive for confusion and decreased concentration. Past Medical and Surgical History:     Past Medical History:   Diagnosis Date   • Anxiety    • Arthritis    • Coronary artery disease    • Coronary artery disease involving native coronary artery of native heart without angina pectoris    • Depression    • Fracture    • Hypercholesterolemia    • Meningioma ([address]) 11/1999    brain tumor   • Meningioma (HCC)    • Narcolepsy     daytime drowsiness and dozing off occasionally   • Orthostatic hypotension    • Palpitations    • Prostate CA (HCC)    • Prostate cancer ([address])    • Stroke [facility])        Past Surgical History:   Procedure Laterality Date   • BACK SURGERY     • BRAIN SURGERY  11/08/1999   • BRAIN SURGERY     • CARDIAC SURGERY     • CORONARY ARTERY BYPASS GRAFT      x5   • CORONARY ARTERY BYPASS GRAFT         Meds/Allergies:    Prior to Admission medications    Medication Sig Start Date End Date Taking?  Authorizing Provider   clopidogrel (PLAVIX) 75 mg tablet TAKE ONE TABLET BY MOUTH EVERY DAY 5/9/47  Yes Alphonso Simms MD   melatonin 3 mg Take 1 tablet (3 mg total) by mouth daily at bedtime 3/16/23  Yes Tere Mcintosh MD   [facility]) 5 mg tablet Take 1 tablet (5 mg total) by mouth daily at bedtime 3/16/23  Yes Tere Mcintosh MD   metoprolol succinate (TOPROL-XL) 25 mg 24 hr tablet TAKE ONE TABLET BY MOUTH EVERY DAY 2/7/23  Yes Mayco Waller MD   psyllium [facility]) packet Take 1 packet by mouth daily Do not start before March 17, 2023. 3/17/23  Yes Tere Mcintosh MD   sertraline (ZOLOFT) 25 mg tablet Take 3 tablets (75 mg total) by mouth daily Do not start before March 17, 2023. 3/17/23  Yes Marianela Ramirez MD   acetaminophen (TYLENOL) 325 mg tablet Take 3 tablets (975 mg total) by mouth every 8 (eight) hours 3/2/23   Mamie Lopez MD   clopidogrel (PLAVIX) 75 mg tablet Take 1 tablet (75 mg total) by mouth daily Do not start before March 17, 2023. Patient not taking: Reported on 3/30/2023 3/17/23   Marianela Ramirez MD   enoxaparin (Lovenox) 40 mg/0.4 mL Inject 0.4 mL (40 mg total) under the skin in the morning for 28 days 3/2/23 3/30/23  Mamie Lopez MD   lidocaine (LIDODERM) 5 % Apply 1 patch topically over 12 hours daily Remove & Discard patch within 12 hours or as directed by MD 3/2/23   Mamie Lopez MD   Melatonin 5 MG CAPS Take 5 mg by mouth daily at bedtime  Patient not taking: Reported on 3/30/2023    Historical Provider, MD   Melatonin 5 MG TABS TAKE ONE TABLET BY MOUTH EVERY NIGHT AT BEDTIME *PATIENT SUPPLY  Patient not taking: Reported on 3/30/2023 1/05/39   Lindsay Wesley MD   [facility]) 5 mg tablet Take 5 mg by mouth daily at bedtime 8/31/22   Historical Provider, MD   Metamucil Fiber 51.7 % PACK MIX 1 PACKET IN 6-8 OUNCES OF WATER AND CONSUME BY MOUTH DAILY *PATIENT SUPPLY  Patient not taking: Reported on 3/30/2023 5/78/47   Lindsay Wesley MD   metoprolol succinate (TOPROL-XL) 25 mg 24 hr tablet Take 1 tablet (25 mg total) by mouth daily Do not start before March 17, 2023.   Patient not taking: Reported on 3/30/2023 3/17/23   Marianela Ramirez MD   midodrine (PROAMATINE) 5 mg tablet TAKE ONE TABLET BY MOUTH EVERY DAY  Patient not taking: Reported on 3/30/2023 12/30/22   Reynaldo Barbosa MD   omeprazole (PriLOSEC) 40 MG capsule TAKE ONE CAPSULE BY MOUTH EVERY DAY  Patient not taking: Reported on 3/30/2023 8/3/30   Lindsay Wesley MD   pantoprazole (PROTONIX) 40 mg tablet Take 1 tablet (40 mg total) by mouth daily in the early morning Do not start before March 17, 2023. 3/17/23   Marianela Ramirez MD   psyllium (METAMUCIL) 58.6 % packet Take 1 packet by mouth daily  Patient not taking: Reported on 3/30/2023    Historical Provider, MD   sertraline (ZOLOFT) 50 mg tablet TAKE ONE AND ONE-HALF TABLETS BY MOUTH EVERY DAY 1/00/54   Sweta Wilson MD   simvastatin (ZOCOR) 80 mg tablet TAKE ONE TABLET BY MOUTH EVERY DAY  Patient not taking: Reported on 3/30/2023 1/31/23   Gertrude Gibson MD   Stool Softener 100 MG capsule TAKE ONE CAPSULE BY MOUTH TWO TIMES A DAY *PATIENT SUPPLY 1/12/06   Sweta Wilson MD     I have reviewed home medications with patient personally. Allergies: Allergies   Allergen Reactions   • Aricept [Donepezil] Confusion     confusion   • Atorvastatin Myalgia, Dizziness and Headache   • Niacin Other (See Comments)     unknown       Social History:     Marital Status: /Civil Union   Patient Pre-hospital Living Situation: nursing home    Substance Use History:   Social History     Substance and Sexual Activity   Alcohol Use Not Currently    Comment: (history)     Social History     Tobacco Use   Smoking Status Never   Smokeless Tobacco Never     Social History     Substance and Sexual Activity   Drug Use No       Family History:    unable to be obtained due to patient mental status change    Physical Exam:     Vitals:   Blood Pressure: 114/97 (07/13/23 1320)  Pulse: 100 (07/13/23 1320)  Temperature: 98.3 °F (36.8 °C) (07/13/23 1320)  Temp Source: Oral (07/13/23 1009)  Respirations: 18 (07/13/23 1320)  Height: 5' 9" (175.3 cm) (07/13/23 1002)  Weight - Scale: 64.4 kg (142 lb) (07/13/23 1002)  SpO2: 95 % (07/13/23 1500)    Physical Exam  Constitutional:       Appearance: He is underweight. He is not diaphoretic. HENT:      Head: Normocephalic and atraumatic. Mouth/Throat:      Mouth: Mucous membranes are dry. Cardiovascular:      Rate and Rhythm: Normal rate and regular rhythm. Occasional Extrasystoles are present. Pulses: Normal pulses. Heart sounds: S1 normal and S2 normal. No murmur heard. No friction rub.  No gallop. Pulmonary:      Breath sounds: Decreased air movement present. No stridor. Examination of the right-upper field reveals rales. Examination of the right-middle field reveals rales. Examination of the right-lower field reveals rales. Examination of the left-lower field reveals rales. Rales present. No wheezing. Musculoskeletal:      Right lower leg: No edema. Left lower leg: No edema. Skin:     General: Skin is warm. Coloration: Skin is pale. Neurological:      Mental Status: He is lethargic, disoriented and confused. GCS: GCS eye subscore is 3. GCS verbal subscore is 4. GCS motor subscore is 6. Motor: Weakness and tremor present. Psychiatric:         Attention and Perception: He is inattentive. Speech: Speech is not slurred. Behavior: Behavior is slowed. Behavior is cooperative. Cognition and Memory: Cognition is impaired. Memory is impaired. Additional Data:     Lab Results: I have personally reviewed pertinent reports. Results from last 7 days   Lab Units 07/13/23  1014   WBC Thousand/uL 9.23   HEMOGLOBIN g/dL 12.3   HEMATOCRIT % 38.5   PLATELETS Thousands/uL 232   NEUTROS PCT % 72   LYMPHS PCT % 19   MONOS PCT % 9   EOS PCT % 0     Results from last 7 days   Lab Units 07/13/23  1014   SODIUM mmol/L 154*   POTASSIUM mmol/L 2.8*   CHLORIDE mmol/L 117*   CO2 mmol/L 34*   BUN mg/dL 23   CREATININE mg/dL 1.10   ANION GAP mmol/L 3   CALCIUM mg/dL 11.8*   ALBUMIN g/dL 3.2*   TOTAL BILIRUBIN mg/dL 0.49   ALK PHOS U/L 59   ALT U/L 14   AST U/L 17   GLUCOSE RANDOM mg/dL 199*     Results from last 7 days   Lab Units 07/13/23  1014   INR  1.08             Results from last 7 days   Lab Units 07/13/23  1041   LACTIC ACID mmol/L 2.0   PROCALCITONIN ng/ml 0.05       Imaging: I have personally reviewed pertinent reports.       CTA ED chest PE study   Final Result by Zuleyma Dupree MD (07/13 1226)      Compromised by respiratory motion, particularly in the lower lobes, but with no definite acute pulmonary emboli. Mild bilateral lower lobe bronchiectasis with tubular opacities in the right lower lobe due to mucus and debris in the bronchi. Given debris in the superior segment right lower lobe bronchi, patchy groundglass opacity in the right upper lobe, and    tree-in-bud nodularity in both lower lobes, this is likely due to aspiration. Workstation performed: [id number]         TRAUMA - CT head wo contrast   Final Result by Ravin Ayon MD (07/13 1059)      No acute intracranial abnormality. The study was marked in [facility] for immediate notification. Workstation performed: [id number]         TRAUMA - CT spine cervical wo contrast   Final Result by Ravin Ayon MD (07/13 1107)      No cervical spine fracture or traumatic malalignment. Degenerative changes. New groundglass opacities in the right lung apex. Please see chest CT for further result. The study was marked in [facility] for immediate notification. Workstation performed: [id number]         TRAUMA - CT chest abdomen pelvis w contrast   Final Result by Ravin Ayon MD (07/13 1121)      No evidence of solid organ trauma or acute fracture with the exception of L3 where there is compression fracture new since 2019 although it is age indeterminate based on imaging. Correlation with any additional back pain is advised. There is a left inferior pubic ramus fracture with some sclerosis which is partially healed suggesting this is recent more likely than acute and again should be correlated with any pain in the area. No definite pelvic fracture is seen elsewhere. Infiltrates in the right lower lobe with evidence of mucous secretions opacifying right lower lobe bronchi. There is additional opacities in the right upper lobe and left lower lobe. Findings are suspicious for aspiration pneumonia.  Branching opacity may    represent mucous debris although this could also represent poor enhancement of the right lower lobe vasculature that could be seen in a pulmonary embolus. If there is high index of suspicion a follow-up CTA or lung scan may be useful. Marked bladder distention should be correlated for any recent voiding. Clemons catheter may be useful. 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 This could be correlated with distal pulses. This was discussed with JAVIER Simental at 11:05 a.m. Workstation performed: [id number]         XR Trauma chest portable   Final Result by Neri Villalta MD (07/13 1035)      Hypoventilation with atelectatic changes at the bases. No definite pneumothorax. Workstation performed: [id number]             EKG, Pathology, and Other Studies Reviewed on Admission:   EKG: Sinus tachycardia with PAC with Aberrant conduction, Biatrial enlargement. Pulmonary disease pattern. Right bundle branch block. Left bifascicular block. Left ventricular hypertrophy with repolarization abnormality    Allscripts / Epic Records Reviewed: Yes     ** Please Note: This note has been constructed using a voice recognition system.  **

## 2023-07-13 NOTE — ASSESSMENT & PLAN NOTE
· Known dysphagia  · CT imaging consistent with aspiration event. Additional aspiration event witnessed with thin liquids during speech therapy evaluation. · No leukocytosis, fevers.   Procalcitonin WNL  · Received IV antibiotics in ED, hold further antibiotics  · Trend WBC/fever curve

## 2023-07-13 NOTE — ASSESSMENT & PLAN NOTE
Fall with unclear etiology 3 days ago  Possibly mechanical, possibly secondary to weakness from hypokalemia and/or severe dehydration  Imaging remarkable for new L3 compression fracture and inferior pubic rami fracture with some sclerosis  Does not appear to be in pain  Ortho consult  PT/OT

## 2023-07-13 NOTE — SPEECH THERAPY NOTE
Speech Language/Pathology    Speech-Language Pathology Bedside Swallow Evaluation      Patient Name: Lashon Balderas    TAASP'U Date: 7/13/2023     Problem List  Active Problems: There are no active Hospital Problems. Past Medical History  Past Medical History:   Diagnosis Date   • Anxiety    • Arthritis    • Coronary artery disease    • Coronary artery disease involving native coronary artery of native heart without angina pectoris    • Depression    • Fracture    • Hypercholesterolemia    • Meningioma ([address]) 11/1999    brain tumor   • Meningioma (HCC)    • Narcolepsy     daytime drowsiness and dozing off occasionally   • Orthostatic hypotension    • Palpitations    • Prostate CA (HCC)    • Prostate cancer ([address])    • Stroke [facility])        Past Surgical History  Past Surgical History:   Procedure Laterality Date   • BACK SURGERY     • BRAIN SURGERY  11/08/1999   • BRAIN SURGERY     • CARDIAC SURGERY     • CORONARY ARTERY BYPASS GRAFT      x5   • CORONARY ARTERY BYPASS GRAFT         Summary   Pt presented with s/s suggestive severe oropharyngeal dysphagia. Pt opens oral cavity to PO presentation though min volitional oral manipulation. Decreased bolus propulsion and delayed transfers. Swallows suspected delayed, reduced hyo-laryngeal movement to palpation. Immediate wet cough response to trials of thin liquids. Pt w/ h/o SILENT aspiration of all liquid consistencies and has been on thin liquids based on research re: negative outcomes of aspirating thickened liquids. SLIM provider, Lon Flores PA-C, arrived to room during evaluation. Plan to keep pt NPO until improved mentation and further discussion re: GOC.      Risk/s for Aspiration: High      Recommended Diet: NPO   Recommended Form of Meds: non-oral if possible   Aspiration precautions and swallowing strategies: -  Other Recommendations: Continue frequent oral care        Current Medical Status  Pt is a 80 y.o. male who presented to [address] [place] [address] with h/o CVA on Plavix, HTN, Hyperlipidemia that was BIBA from [facility] for altered mental status after a fall that occurred 3 days ago. History limited due to acuity of condition. History obtained from nursing home and EMS. Patient has no complaints. He is alert to person only. Patient also had abnormal labs as outpatient. He had hypernatremia and hypokalemia with muscle twitching per nursing home. Current Precautions: Allergies:  No known food allergies    Past medical history:  Please see H&P for details    Special Studies:  CTA chest PE study 7/13: Compromised by respiratory motion, particularly in the lower lobes, but with no definite acute pulmonary emboli.     Mild bilateral lower lobe bronchiectasis with tubular opacities in the right lower lobe due to mucus and debris in the bronchi. Given debris in the superior segment right lower lobe bronchi, patchy groundglass opacity in the right upper lobe, and   tree-in-bud nodularity in both lower lobes, this is likely due to aspiration. CT chest abdomen 7/13: No evidence of solid organ trauma or acute fracture with the exception of L3 where there is compression fracture new since 2019 although it is age indeterminate based on imaging. Correlation with any additional back pain is advised.     There is a left inferior pubic ramus fracture with some sclerosis which is partially healed suggesting this is recent more likely than acute and again should be correlated with any pain in the area. No definite pelvic fracture is seen elsewhere.     Infiltrates in the right lower lobe with evidence of mucous secretions opacifying right lower lobe bronchi. There is additional opacities in the right upper lobe and left lower lobe. Findings are suspicious for aspiration pneumonia. Branching opacity may   represent mucous debris although this could also represent poor enhancement of the right lower lobe vasculature that could be seen in a pulmonary embolus.  If there is high index of suspicion a follow-up CTA or lung scan may be useful.     Marked bladder distention should be correlated for any recent voiding. Clemons catheter may be useful.     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 This could be correlated with distal pulses.     CT spine cervical 7/13: No cervical spine fracture or traumatic malalignment.     Degenerative changes.     New groundglass opacities in the right lung apex. Please see chest CT for further result.     CT head 7/13: No acute intracranial abnormality. CXR 7/13: Hypoventilation with atelectatic changes at the bases. No definite pneumothorax. Social/Education/Vocational Hx:  Pt lives in SNF/ECF    Swallow Information   Current Risks for Dysphagia & Aspiration: CVA, known history of dysphagia, known history of aspiration and AMS  Current Symptoms/Concerns: AMS  Current Diet: NPO   Baseline Diet: puree/level 1 diet and thin liquids      Baseline Assessment   Behavior/Cognition: lethargic and low alertness level  Speech/Language Status: not able to to follow commands and no verbal output noted  Patient Positioning: upright in bed  Pain Status/Interventions/Response to Interventions:  No report of or nonverbal indications of pain. Swallow Mechanism Exam  Facial: symmetrical  Labial: unable to test 2/2 limited command following  Lingual: unable to test 2/2 limited command following  Velum: unable to visualize  Mandible: unable to test 2/2 limited command following  Dentition: upper dentures  Vocal quality:unable to assess   Volitional Cough: unable to initiate volitional cough   Respiratory Status: on RA      Consistencies Assessed and Performance   Consistencies Administered: thin liquids and puree    Oral Stage: moderate  Mastication was adequate with the materials administered today. Bolus formation and transfer were functional with no significant oral residue noted.   No overt s/s reduced oral control. Pharyngeal Stage: severe  Swallow Mechanics:  Swallowing initiation suspected delayed. Laryngeal rise was palpated and judged to be reduced. +significant cough response to thin liquids. Esophageal Concerns: none reported    Strategies and Efficacy: -    Summary and Recommendations (see above)    Results Reviewed with: patient, RN and PA     Treatment Recommended: Yes     Frequency of treatment: As able/appropriate     Patient Stated Goal: none stated     Dysphagia LTG  -Patient will demonstrate safe and effective oral intake (without overt s/s significant oral/pharyngeal dysphagia including s/s penetration or aspiration) for the highest appropriate diet level.      Short Term Goals:  -Pt will tolerate Dysphagia 1/pureed diet and thin liquid with no significant s/s oral or pharyngeal dysphagia across 1-3 diagnostic session/s          Speech Therapy Prognosis   Prognosis: Guarded    Prognosis Considerations: age, medical status, prior medical history, cognitive status and progressive disease process

## 2023-07-13 NOTE — ASSESSMENT & PLAN NOTE
Patient presents from [facility] due to altered mental status, which has been progressing since a fall 3 days ago. Underlying dementia/cognitive decline. · Vital signs stable -normotensive, not requiring supplemental O2  · AAO x1 to self only, able to follow simple commands with multiple prompts  · Significant electrolyte disarray -suspect poor oral intake of both water and solids  · Hypernatremic: Na+ 156 (after glucose correction)  · Hypercalcemia: Corrected Ca 12.4  · Hypokalemia: K+ 2.8  · CT head: No acute intracranial abnormality  · CT/CTA chest: Consistent with aspiration  · Suspect aspiration pneumonitis  · Less likely pneumonia - no leukocytosis, fevers. Procalcitonin WNL. · Suspect this is mainly due to dehydration and poor p.o. intake/malnutrition with underlying chronic cognitive deficit  · Check TSH  · Give IVF, follow electrolytes. Monitor for improvement in mental status.

## 2023-07-14 PROBLEM — E83.39 HYPOPHOSPHATEMIA: Status: ACTIVE | Noted: 2023-01-01

## 2023-07-14 LAB
25(OH)D3 SERPL-MCNC: 36.2 NG/ML (ref 30–100)
ANION GAP SERPL CALCULATED.3IONS-SCNC: 6 MMOL/L
ANION GAP SERPL CALCULATED.3IONS-SCNC: 7 MMOL/L
BASE EX.OXY STD BLDV CALC-SCNC: 83.5 % (ref 60–80)
BASE EXCESS BLDV CALC-SCNC: -2.1 MMOL/L
BASOPHILS # BLD AUTO: 0.02 THOUSANDS/ÂΜL (ref 0–0.1)
BASOPHILS NFR BLD AUTO: 0 % (ref 0–1)
BUN SERPL-MCNC: 18 MG/DL (ref 5–25)
BUN SERPL-MCNC: 20 MG/DL (ref 5–25)
CALCIUM SERPL-MCNC: 10.5 MG/DL (ref 8.4–10.2)
CALCIUM SERPL-MCNC: 11.3 MG/DL (ref 8.4–10.2)
CHLORIDE SERPL-SCNC: 123 MMOL/L (ref 96–108)
CHLORIDE SERPL-SCNC: 124 MMOL/L (ref 96–108)
CO2 SERPL-SCNC: 26 MMOL/L (ref 21–32)
CO2 SERPL-SCNC: 27 MMOL/L (ref 21–32)
CREAT SERPL-MCNC: 0.97 MG/DL (ref 0.6–1.3)
CREAT SERPL-MCNC: 1 MG/DL (ref 0.6–1.3)
EOSINOPHIL # BLD AUTO: 0.01 THOUSAND/ÂΜL (ref 0–0.61)
EOSINOPHIL NFR BLD AUTO: 0 % (ref 0–6)
ERYTHROCYTE [DISTWIDTH] IN BLOOD BY AUTOMATED COUNT: 14.3 % (ref 11.6–15.1)
GFR SERPL CREATININE-BSD FRML MDRD: 65 ML/MIN/1.73SQ M
GFR SERPL CREATININE-BSD FRML MDRD: 68 ML/MIN/1.73SQ M
GLUCOSE SERPL-MCNC: 123 MG/DL (ref 65–140)
GLUCOSE SERPL-MCNC: 133 MG/DL (ref 65–140)
HCO3 BLDV-SCNC: 21.3 MMOL/L (ref 24–30)
HCT VFR BLD AUTO: 38.1 % (ref 36.5–49.3)
HGB BLD-MCNC: 12 G/DL (ref 12–17)
IMM GRANULOCYTES # BLD AUTO: 0.06 THOUSAND/UL (ref 0–0.2)
IMM GRANULOCYTES NFR BLD AUTO: 1 % (ref 0–2)
LYMPHOCYTES # BLD AUTO: 1.82 THOUSANDS/ÂΜL (ref 0.6–4.47)
LYMPHOCYTES NFR BLD AUTO: 15 % (ref 14–44)
MCH RBC QN AUTO: 26.5 PG (ref 26.8–34.3)
MCHC RBC AUTO-ENTMCNC: 31.5 G/DL (ref 31.4–37.4)
MCV RBC AUTO: 84 FL (ref 82–98)
MONOCYTES # BLD AUTO: 1.03 THOUSAND/ÂΜL (ref 0.17–1.22)
MONOCYTES NFR BLD AUTO: 9 % (ref 4–12)
MRSA NOSE QL CULT: NORMAL
NEUTROPHILS # BLD AUTO: 8.85 THOUSANDS/ÂΜL (ref 1.85–7.62)
NEUTS SEG NFR BLD AUTO: 75 % (ref 43–75)
NRBC BLD AUTO-RTO: 0 /100 WBCS
O2 CT BLDV-SCNC: 14.2 ML/DL
PCO2 BLDV: 32.2 MM HG (ref 42–50)
PH BLDV: 7.44 [PH] (ref 7.3–7.4)
PLATELET # BLD AUTO: 231 THOUSANDS/UL (ref 149–390)
PMV BLD AUTO: 10.7 FL (ref 8.9–12.7)
PO2 BLDV: 50.8 MM HG (ref 35–45)
POTASSIUM SERPL-SCNC: 3 MMOL/L (ref 3.5–5.3)
POTASSIUM SERPL-SCNC: 3.6 MMOL/L (ref 3.5–5.3)
PTH-INTACT SERPL-MCNC: 171.9 PG/ML (ref 12–88)
RBC # BLD AUTO: 4.52 MILLION/UL (ref 3.88–5.62)
SODIUM SERPL-SCNC: 156 MMOL/L (ref 135–147)
SODIUM SERPL-SCNC: 157 MMOL/L (ref 135–147)
TSH SERPL DL<=0.05 MIU/L-ACNC: 1.26 UIU/ML (ref 0.45–4.5)
WBC # BLD AUTO: 11.79 THOUSAND/UL (ref 4.31–10.16)

## 2023-07-14 PROCEDURE — 82306 VITAMIN D 25 HYDROXY: CPT | Performed by: INTERNAL MEDICINE

## 2023-07-14 PROCEDURE — 82805 BLOOD GASES W/O2 SATURATION: CPT | Performed by: STUDENT IN AN ORGANIZED HEALTH CARE EDUCATION/TRAINING PROGRAM

## 2023-07-14 PROCEDURE — 97163 PT EVAL HIGH COMPLEX 45 MIN: CPT

## 2023-07-14 PROCEDURE — 99232 SBSQ HOSP IP/OBS MODERATE 35: CPT | Performed by: INTERNAL MEDICINE

## 2023-07-14 PROCEDURE — 80048 BASIC METABOLIC PNL TOTAL CA: CPT | Performed by: INTERNAL MEDICINE

## 2023-07-14 PROCEDURE — 85025 COMPLETE CBC W/AUTO DIFF WBC: CPT | Performed by: STUDENT IN AN ORGANIZED HEALTH CARE EDUCATION/TRAINING PROGRAM

## 2023-07-14 PROCEDURE — 92526 ORAL FUNCTION THERAPY: CPT

## 2023-07-14 PROCEDURE — 80048 BASIC METABOLIC PNL TOTAL CA: CPT | Performed by: STUDENT IN AN ORGANIZED HEALTH CARE EDUCATION/TRAINING PROGRAM

## 2023-07-14 PROCEDURE — 84443 ASSAY THYROID STIM HORMONE: CPT | Performed by: STUDENT IN AN ORGANIZED HEALTH CARE EDUCATION/TRAINING PROGRAM

## 2023-07-14 PROCEDURE — 83970 ASSAY OF PARATHORMONE: CPT | Performed by: INTERNAL MEDICINE

## 2023-07-14 RX ORDER — METOPROLOL TARTRATE 5 MG/5ML
5 INJECTION INTRAVENOUS ONCE
Status: COMPLETED | OUTPATIENT
Start: 2023-07-14 | End: 2023-07-14

## 2023-07-14 RX ORDER — SODIUM CHLORIDE 450 MG/100ML
75 INJECTION, SOLUTION INTRAVENOUS CONTINUOUS
Status: DISCONTINUED | OUTPATIENT
Start: 2023-07-14 | End: 2023-07-15

## 2023-07-14 RX ADMIN — POTASSIUM PHOSPHATE, MONOBASIC POTASSIUM PHOSPHATE, DIBASIC 12 MMOL: 224; 236 INJECTION, SOLUTION, CONCENTRATE INTRAVENOUS at 10:37

## 2023-07-14 RX ADMIN — METOPROLOL TARTRATE 5 MG: 1 INJECTION, SOLUTION INTRAVENOUS at 22:09

## 2023-07-14 RX ADMIN — SODIUM CHLORIDE 75 ML/HR: 0.45 INJECTION, SOLUTION INTRAVENOUS at 10:47

## 2023-07-14 RX ADMIN — ENOXAPARIN SODIUM 40 MG: 100 INJECTION SUBCUTANEOUS at 10:37

## 2023-07-14 NOTE — PROGRESS NOTES
[facility]  Progress Note  Name: Kendell Mars  MRN: [id number]  Unit/Bed#: -01 I Date of Admission: 7/13/2023   Date of Service: 7/14/2023 I Hospital Day: 1    Assessment/Plan   Dysphagia  Assessment & Plan  History of dysphagia   Presently n.p.o. state  Await speech inputs  Aspiration precautions      * Acute metabolic encephalopathy  Assessment & Plan  Patient is a resident of [address] [place] [address], presents with altered mental status  Encephalopathy likely multifactorial  Noted to hypernatremia  We will provide hypotonic IV fluids  Optimize metabolic parameters  Avoid neurotoxins  Monitor    Hypophosphatemia  Assessment & Plan  Replete  Monitor    Aspiration pneumonitis ([address])  Assessment & Plan  · Known history of dysphagia  · Imaging concerning for aspiration pneumonitis  · Received IV antibiotics in the ED hold further antibiotics presently  · Monitor counts temperatures procalcitonin levels  · Aspiration precautions      Hypokalemia  Assessment & Plan  Replete  Monitor    Hypernatremia  Assessment & Plan  Sodium 157 today  Likely due to p.o. intake dehydration  We will provide hypotonic IV fluids  Avoid rapid correction  Monitor closely      Moderate protein-calorie malnutrition ([address])  Assessment & Plan  Malnutrition Findings: Body mass index is 20.97 kg/m².    Nutrition consult      Dementia [facility])  Assessment & Plan  History of dementia  Continue memantine  Monitor for delirium  Reorientation  Sleep hygiene  Supportive cares    Elevated troponin  Assessment & Plan  Elevated troponins noted  Likely non-MI troponin elevation  Cardiology inputs noted      Fall  Assessment & Plan  Patient had a fall about 3 days back  At high risk for falls  Imaging revealed left inferior pubic ramus fracture  Safe ambulation  Fall precautions  Physical therapy    Hypercalcemia  Assessment & Plan  Mild hypercalcemia noted  Check PTH vitamin D levels  IV fluids  Monitor closely  Will likely need outpatient follow-up                     VTE Pharmacologic Prophylaxis:  High Risk (Score >/= 5) - Pharmacological DVT Prophylaxis Ordered: enoxaparin (Lovenox). Sequential Compression Devices Ordered. Patient Centered Rounds: I performed bedside rounds with nursing staff today. Discussions with Specialists or Other Care Team Provider:     Education and Discussions with Family / Patient: Patient, left a message for Daughter Denisa Hoffman. Total Time Spent on Date of Encounter in care of patient: 35 minutes This time was spent on one or more of the following: performing physical exam; counseling and coordination of care; obtaining or reviewing history; documenting in the medical record; reviewing/ordering tests, medications or procedures; communicating with other healthcare professionals and discussing with patient's family/caregivers. Current Length of Stay: 1 day(s)  Current Patient Status: Inpatient   Certification Statement: The patient will continue to require additional inpatient hospital stay due to As outlined  Discharge Plan: Awaiting clinical and symptomatic improvement    Code Status: Level 1 - Full Code    Subjective:       Comfortably in bed  Discussed with RN at bedside  History chart labs medications reviewed    Objective:     Vitals:   Temp (24hrs), Av.8 °F (37.1 °C), Min:98.4 °F (36.9 °C), Max:99.2 °F (37.3 °C)    Temp:  [98.4 °F (36.9 °C)-99.2 °F (37.3 °C)] 99.2 °F (37.3 °C)  HR:  [] 109  Resp:  [18-24] 19  BP: (129-195)/(84-85) 186/84  SpO2:  [85 %-95 %] 85 %  Body mass index is 20.97 kg/m². Input and Output Summary (last 24 hours):      Intake/Output Summary (Last 24 hours) at 2023 1339  Last data filed at 2023 0500  Gross per 24 hour   Intake 1000 ml   Output 1050 ml   Net -50 ml       Physical Exam:   Physical Exam       Comfortably in bed  Features of protein calorie malnutrition noted  Neck supple  Diminished breath sounds bilateral  Heart sounds S1 and S2 noted  Abdomen soft nontender  Awake  Encephalopathy noted  No pedal edema  No rash    Additional Data:     Labs:  Results from last 7 days   Lab Units 07/14/23  0212   WBC Thousand/uL 11.79*   HEMOGLOBIN g/dL 12.0   HEMATOCRIT % 38.1   PLATELETS Thousands/uL 231   NEUTROS PCT % 75   LYMPHS PCT % 15   MONOS PCT % 9   EOS PCT % 0     Results from last 7 days   Lab Units 07/14/23  0212 07/13/23  1014   SODIUM mmol/L 157* 154*   POTASSIUM mmol/L 3.0* 2.8*   CHLORIDE mmol/L 124* 117*   CO2 mmol/L 27 34*   BUN mg/dL 18 23   CREATININE mg/dL 0.97 1.10   ANION GAP mmol/L 6 3   CALCIUM mg/dL 10.5* 11.8*   ALBUMIN g/dL  --  3.2*   TOTAL BILIRUBIN mg/dL  --  0.49   ALK PHOS U/L  --  59   ALT U/L  --  14   AST U/L  --  17   GLUCOSE RANDOM mg/dL 133 199*     Results from last 7 days   Lab Units 07/13/23  1014   INR  1.08             Results from last 7 days   Lab Units 07/13/23  1041   LACTIC ACID mmol/L 2.0   PROCALCITONIN ng/ml 0.05       Lines/Drains:  Invasive Devices     Peripheral Intravenous Line  Duration           Peripheral IV 07/13/23 Left Antecubital 1 day    Peripheral IV 07/14/23 Distal;Right;Ventral (anterior) Forearm <1 day          Drain  Duration           Urethral Catheter Latex 16 Fr. 1 day              Urinary Catheter:  Goal for removal: Voiding trial when ambulation improves           Telemetry:  Telemetry Orders (From admission, onward)             24 Hour Telemetry Monitoring  (ED Bridging Orders Panel)  Continuous x 24 Hours (Telem)        Question:  Reason for 24 Hour Telemetry  Answer:  Metabolic/electrolyte disturbance with high probability of dysrhythmia.  K level <3 or >6 OR KCL infusion >10mEq/hr                 Telemetry Reviewed: Sinus rhythm  Indication for Continued Telemetry Use: Metabolic/electrolyte disturbance with high probability of dysrhythmia             Imaging: Reviewed radiology reports from this admission including: chest CT scan, abdominal/pelvic CT, CT head and ECHO    Recent Cultures (last 7 days):   Results from last 7 days   Lab Units 07/13/23  1041   BLOOD CULTURE  Received in Microbiology Lab. Culture in Progress. Received in Microbiology Lab. Culture in Progress. Last 24 Hours Medication List:   Current Facility-Administered Medications   Medication Dose Route Frequency Provider Last Rate   • calcium carbonate  1,000 mg Oral Daily PRN Anand Sr PA-C     • enoxaparin  40 mg Subcutaneous Daily Genevieve Whiteside PA-C     • ondansetron  4 mg Intravenous Q6H PRN Genevieve Jenkins PA-C     • potassium phosphate  12 mmol Intravenous Once Michael Peralta MD 12 mmol (07/14/23 1037)   • sodium chloride  75 mL/hr Intravenous Continuous Michael Peralta MD 75 mL/hr (07/14/23 1047)        Today, Patient Was Seen By: Michael Peralta MD    **Please Note: This note may have been constructed using a voice recognition system. **

## 2023-07-14 NOTE — ASSESSMENT & PLAN NOTE
· Known history of dysphagia  · Imaging concerning for aspiration pneumonitis  · Received IV antibiotics in the ED hold further antibiotics presently  · Monitor counts temperatures procalcitonin levels  · Aspiration precautions

## 2023-07-14 NOTE — ASSESSMENT & PLAN NOTE
Sodium 157 today  Likely due to p.o. intake dehydration  We will provide hypotonic IV fluids  Avoid rapid correction  Monitor closely

## 2023-07-14 NOTE — PLAN OF CARE
Problem: Potential for Falls  Goal: Patient will remain free of falls  Description: INTERVENTIONS:  - Educate patient/family on patient safety including physical limitations  - Instruct patient to call for assistance with activity   - Consult OT/PT to assist with strengthening/mobility   - Keep Call bell within reach  - Keep bed low and locked with side rails adjusted as appropriate  - Keep care items and personal belongings within reach  - Initiate and maintain comfort rounds  - Make Fall Risk Sign visible to staff  - Offer Toileting every Hours, in advance of need  - Initiate/Maintain alarm  - Obtain necessary fall risk management equipment:   - Apply yellow socks and bracelet for high fall risk patients  - Consider moving patient to room near nurses station  Outcome: Progressing     Problem: MOBILITY - ADULT  Goal: Maintain or return to baseline ADL function  Description: INTERVENTIONS:  -  Assess patient's ability to carry out ADLs; assess patient's baseline for ADL function and identify physical deficits which impact ability to perform ADLs (bathing, care of mouth/teeth, toileting, grooming, dressing, etc.)  - Assess/evaluate cause of self-care deficits   - Assess range of motion  - Assess patient's mobility; develop plan if impaired  - Assess patient's need for assistive devices and provide as appropriate  - Encourage maximum independence but intervene and supervise when necessary  - Involve family in performance of ADLs  - Assess for home care needs following discharge   - Consider OT consult to assist with ADL evaluation and planning for discharge  - Provide patient education as appropriate  Outcome: Progressing  Goal: Maintains/Returns to pre admission functional level  Description: INTERVENTIONS:  - Perform BMAT or MOVE assessment daily.   - Set and communicate daily mobility goal to care team and patient/family/caregiver.    - Collaborate with rehabilitation services on mobility goals if consulted  - Perform Range of Motion  times a day. - Reposition patient every  hours.   - Dangle patient  times a day  - Stand patient  times a day  - Ambulate patient  times a day  - Out of bed to chair  times a day   - Out of bed for meals  times a day  - Out of bed for toileting  - Record patient progress and toleration of activity level   Outcome: Progressing     Problem: PAIN - ADULT  Goal: Verbalizes/displays adequate comfort level or baseline comfort level  Description: Interventions:  - Encourage patient to monitor pain and request assistance  - Assess pain using appropriate pain scale  - Administer analgesics based on type and severity of pain and evaluate response  - Implement non-pharmacological measures as appropriate and evaluate response  - Consider cultural and social influences on pain and pain management  - Notify physician/advanced practitioner if interventions unsuccessful or patient reports new pain  Outcome: Progressing     Problem: INFECTION - ADULT  Goal: Absence or prevention of progression during hospitalization  Description: INTERVENTIONS:  - Assess and monitor for signs and symptoms of infection  - Monitor lab/diagnostic results  - Monitor all insertion sites, i.e. indwelling lines, tubes, and drains  - Monitor endotracheal if appropriate and nasal secretions for changes in amount and color  - [place] appropriate cooling/warming therapies per order  - Administer medications as ordered  - Instruct and encourage patient and family to use good hand hygiene technique  - Identify and instruct in appropriate isolation precautions for identified infection/condition  Outcome: Progressing  Goal: Absence of fever/infection during neutropenic period  Description: INTERVENTIONS:  - Monitor WBC    Outcome: Progressing     Problem: SAFETY ADULT  Goal: Patient will remain free of falls  Description: INTERVENTIONS:  - Educate patient/family on patient safety including physical limitations  - Instruct patient to call for assistance with activity   - Consult OT/PT to assist with strengthening/mobility   - Keep Call bell within reach  - Keep bed low and locked with side rails adjusted as appropriate  - Keep care items and personal belongings within reach  - Initiate and maintain comfort rounds  - Make Fall Risk Sign visible to staff  - Offer Toileting every Hours, in advance of need  - Initiate/Maintain alarm  - Obtain necessary fall risk management equipment:   - Apply yellow socks and bracelet for high fall risk patients  - Consider moving patient to room near nurses station  Outcome: Progressing  Goal: Maintain or return to baseline ADL function  Description: INTERVENTIONS:  -  Assess patient's ability to carry out ADLs; assess patient's baseline for ADL function and identify physical deficits which impact ability to perform ADLs (bathing, care of mouth/teeth, toileting, grooming, dressing, etc.)  - Assess/evaluate cause of self-care deficits   - Assess range of motion  - Assess patient's mobility; develop plan if impaired  - Assess patient's need for assistive devices and provide as appropriate  - Encourage maximum independence but intervene and supervise when necessary  - Involve family in performance of ADLs  - Assess for home care needs following discharge   - Consider OT consult to assist with ADL evaluation and planning for discharge  - Provide patient education as appropriate  Outcome: Progressing  Goal: Maintains/Returns to pre admission functional level  Description: INTERVENTIONS:  - Perform BMAT or MOVE assessment daily.   - Set and communicate daily mobility goal to care team and patient/family/caregiver. - Collaborate with rehabilitation services on mobility goals if consulted  - Perform Range of Motion  times a day. - Reposition patient every  hours.   - Dangle patient  times a day  - Stand patient  times a day  - Ambulate patient  times a day  - Out of bed to chair  times a day   - Out of bed for meals  times a day  - Out of bed for toileting  - Record patient progress and toleration of activity level   Outcome: Progressing     Problem: DISCHARGE PLANNING  Goal: Discharge to home or other facility with appropriate resources  Description: INTERVENTIONS:  - Identify barriers to discharge w/patient and caregiver  - Arrange for needed discharge resources and transportation as appropriate  - Identify discharge learning needs (meds, wound care, etc.)  - Arrange for interpretive services to assist at discharge as needed  - Refer to Case Management Department for coordinating discharge planning if the patient needs post-hospital services based on physician/advanced practitioner order or complex needs related to functional status, cognitive ability, or social support system  Outcome: Progressing     Problem: Knowledge Deficit  Goal: Patient/family/caregiver demonstrates understanding of disease process, treatment plan, medications, and discharge instructions  Description: Complete learning assessment and assess knowledge base. Interventions:  - Provide teaching at level of understanding  - Provide teaching via preferred learning methods  Outcome: Progressing     Problem: NEUROSENSORY - ADULT  Goal: Achieves stable or improved neurological status  Description: INTERVENTIONS  - Monitor and report changes in neurological status  - Monitor vital signs such as temperature, blood pressure, glucose, and any other labs ordered   - Initiate measures to prevent increased intracranial pressure  - Monitor for seizure activity and implement precautions if appropriate      Outcome: Progressing  Goal: Achieves maximal functionality and self care  Description: INTERVENTIONS  - Monitor swallowing and airway patency with patient fatigue and changes in neurological status  - Encourage and assist patient to increase activity and self care.    - Encourage visually impaired, hearing impaired and aphasic patients to use assistive/communication devices  Outcome: Progressing     Problem: SAFETY,RESTRAINT: NV/NON-SELF DESTRUCTIVE BEHAVIOR  Goal: Remains free of harm/injury (restraint for non violent/non self-detsructive behavior)  Description: INTERVENTIONS:  - Instruct patient/family regarding restraint use   - Assess and monitor physiologic and psychological status   - Provide interventions and comfort measures to meet assessed patient needs   - Identify and implement measures to help patient regain control  - Assess readiness for release of restraint   Outcome: Progressing  Goal: Returns to optimal restraint-free functioning  Description: INTERVENTIONS:  - Assess the patient's behavior and symptoms that indicate continued need for restraint  - Identify and implement measures to help patient regain control  - Assess readiness for release of restraint   Outcome: Progressing     Problem: Nutrition/Hydration-ADULT  Goal: Nutrient/Hydration intake appropriate for improving, restoring or maintaining nutritional needs  Description: Monitor and assess patient's nutrition/hydration status for malnutrition. Collaborate with interdisciplinary team and initiate plan and interventions as ordered. Monitor patient's weight and dietary intake as ordered or per policy. Utilize nutrition screening tool and intervene as necessary. Determine patient's food preferences and provide high-protein, high-caloric foods as appropriate.      INTERVENTIONS:  - Monitor oral intake, urinary output, labs, and treatment plans  - Assess nutrition and hydration status and recommend course of action  - Evaluate amount of meals eaten  - Assist patient with eating if necessary   - Allow adequate time for meals  - Recommend/ encourage appropriate diets, oral nutritional supplements, and vitamin/mineral supplements  - Order, calculate, and assess calorie counts as needed  - Recommend, monitor, and adjust tube feedings and TPN/PPN based on assessed needs  - Assess need for intravenous fluids  - Provide specific nutrition/hydration education as appropriate  - Include patient/family/caregiver in decisions related to nutrition  Outcome: Progressing     Problem: Prexisting or High Potential for Compromised Skin Integrity  Goal: Skin integrity is maintained or improved  Description: INTERVENTIONS:  - Identify patients at risk for skin breakdown  - Assess and monitor skin integrity  - Assess and monitor nutrition and hydration status  - Monitor labs   - Assess for incontinence   - Turn and reposition patient  - Assist with mobility/ambulation  - Relieve pressure over bony prominences  - Avoid friction and shearing  - Provide appropriate hygiene as needed including keeping skin clean and dry  - Evaluate need for skin moisturizer/barrier cream  - Collaborate with interdisciplinary team   - Patient/family teaching  - Consider wound care consult   Outcome: Progressing

## 2023-07-14 NOTE — PROGRESS NOTES
Progress Note - Cardiology   Burgess Rodgers 80 y.o. male MRN: [id number]  Unit/Bed#: -01 Encounter: [id number]        Problem List:  Principal Problem:    Acute metabolic encephalopathy  Active Problems:    Hypercalcemia    Dysphagia    Fall    Elevated troponin    Dementia (HCC)    Moderate protein-calorie malnutrition (HCC)    Hypernatremia    Hypokalemia    Aspiration pneumonitis (HCC)      Assessment:  1. Abnormal EKG  a. Prior baseline EKG NSR with right bundle branch block  b. 7/2023 EKG: Sinus tachycardia, new anterior T wave inversions with ST depression in V3, no ST elevation  i. Suspect secondary to severe electrolyte derangements  2. Abnormal lab work + fall earlier this week (sent in from nursing home)  a. 7/13/2023 CMP: Potassium 2.8, sodium 154, corrected calcium 12.4  3. Non-MI troponin elevation 152 --> 130  4. Remote history of CABG  a. 3/2023 Echo: LVEF 62-67%, grade 1 diastolic dysfunction, mild AI, mild MR  5. u[employer] me loop recorder in situ  6. History of hypertension  7. History of orthostatic hypotension for which he takes midodrine  8. Dyslipidemia  9. History of CVA  10. Severe dementia, essentially nonverbal and resides in close unit at the nursing home    Plan/ Discussion:  • Loop interrogation reviewed in detail including all recent histograms. He has predominantly sinus rhythm with some brief episodes of SVT possibly an atrial tachycardia. These are generally very short-lived less than 1 minute. He has no prolonged pauses. His overall burden is less than 0.1%. Some of the R-R intervals are somewhat irregular and would be difficult to rule out very brief AF. All of the arrhythmias occurring recently are in the setting of severe electrolyte derangements.  They are predominantly in the early morning hours, and histograms have significant artifact making a definitive diagnosis difficult    • Given his advanced age, orthostatic hypotension, frail body habitus, frequent falls, and progressive dementia I would at this point recommend conservative management. Even this admission he had a fall earlier this week and his resulting pubic ramus fracture. He would certainly be high risk for anticoagulation if found to have definitive AF. Along the same lines, would not pursue further work-up for his elevated troponin which represents non-MI troponin elevation secondary to severe electrolyte derangements. He has no symptoms of angina nor signs of CHF. • We will follow peripherally, please call if needed    Subjective:  Shakes head "no" when asked if he has chest pain or shortness of breath but otherwise provides no history    Vitals:  Vitals:    07/13/23 1002   Weight: 64.4 kg (142 lb)   ,  Vitals:    07/13/23 1500 07/13/23 1801 07/13/23 2226 07/14/23 0737   BP:  134/85 129/84 (!) 195/85   BP Location:   Right arm    Pulse:  83 (!) 107 98   Resp:  18 18 (!) 24   Temp:   98.4 °F (36.9 °C) 99.2 °F (37.3 °C)   TempSrc:   Oral    SpO2: 95% 95% 92% 93%   Weight:       Height:           Exam:  General: Frail elderly male laying in hospital bed. Nontoxic-appearing. In restraints.   Heart:  Regular rate and rhythm, no murmurs, Normal S1, no edema    Respiratory effort/ Lungs:  Breathing comfortably on room air, clear bilaterally without wheezing, rales, crackles   Abdominal: Non-tender to palpation, + bowel sounds, soft, no masses or distension  Lower Limbs:  No edema            Telemetry:       Normal sinus rhythm, , Heart Rate 90s and low 100    Medications:    Current Facility-Administered Medications:   •  calcium carbonate (TUMS) chewable tablet 1,000 mg, 1,000 mg, Oral, Daily PRN, Arther MaskLOGAN  •  enoxaparin (LOVENOX) subcutaneous injection 40 mg, 40 mg, Subcutaneous, Daily, Eileen Jenkins PA-C  •  ondansetron [facility]) injection 4 mg, 4 mg, Intravenous, Q6H PRN, Arther Mask PALIGIA  •  sodium chloride 0.9 % infusion, 150 mL/hr, Intravenous, Continuous, Jw Beasley PA-C, Last Rate: 150 mL/hr at 07/13/23 2346, 150 mL/hr at 07/13/23 2346      Labs/Data:        Results from last 7 days   Lab Units 07/14/23  0212 07/13/23  1014   WBC Thousand/uL 11.79* 9.23   HEMOGLOBIN g/dL 12.0 12.3   HEMATOCRIT % 38.1 38.5   PLATELETS Thousands/uL 231 232     Results from last 7 days   Lab Units 07/14/23  0212 07/13/23  1014   POTASSIUM mmol/L 3.0* 2.8*   CHLORIDE mmol/L 124* 117*   CO2 mmol/L 27 34*   BUN mg/dL 18 23   CREATININE mg/dL 0.97 1.10

## 2023-07-14 NOTE — ASSESSMENT & PLAN NOTE
History of dementia  Continue memantine  Monitor for delirium  Reorientation  Sleep hygiene  Supportive cares

## 2023-07-14 NOTE — SPEECH THERAPY NOTE
Speech Language/Pathology Treatment Note    Patient Name: Rafaela Moeller    BNIRF'M Date: 7/14/2023     Problem List  Principal Problem:    Acute metabolic encephalopathy  Active Problems:    Hypercalcemia    Dysphagia    Fall    Elevated troponin    Dementia (HCC)    Moderate protein-calorie malnutrition (HCC)    Hypernatremia    Hypokalemia    Aspiration pneumonitis (HCC)         Past Medical History  Past Medical History:   Diagnosis Date   • Anxiety    • Arthritis    • Coronary artery disease    • Coronary artery disease involving native coronary artery of native heart without angina pectoris    • Depression    • Fracture    • Hypercholesterolemia    • Meningioma ([address]) 11/1999    brain tumor   • Meningioma (HCC)    • Narcolepsy     daytime drowsiness and dozing off occasionally   • Orthostatic hypotension    • Palpitations    • Prostate CA (HCC)    • Prostate cancer ([address])    • Stroke [facility])        Past Surgical History  Past Surgical History:   Procedure Laterality Date   • BACK SURGERY     • BRAIN SURGERY  11/08/1999   • BRAIN SURGERY     • CARDIAC SURGERY     • CORONARY ARTERY BYPASS GRAFT      x5   • CORONARY ARTERY BYPASS GRAFT           Subjective:  Pt in bed with eyes closed as SLP entered the room. Bilateral wrist restraints; room air. FLACC pain scale for Dementia: 0. Max alerting strategies utilized with slight success. Objective:  Given max cues and processing time, pt accepted x2 single sips of thin liquids via straw. Inconsistent ability to retreive bolus from straw as liquids would not consistently reach top of straw when sucking. Minimal dry cough s/p initial sip of thin liquids, though lips and oral cavity appear to be dry. Attempted gentle mouth care, pt closing mouth and turning on side. Assessment:  Pt accepted x2 sips of thin liquids only before closing eyes/mouth and turning away from clinician.  Pt appeared to go back to sleep and was unable to fully alert for PO presentation/trials despite max cues.        Plan/Recommendations:  NPO  ST to follow and reassess for PO intake

## 2023-07-14 NOTE — CASE MANAGEMENT
Case Management Assessment & Discharge Planning Note    Patient name Ayesha Edgar  Location /-72 MRN [id number]  : 1933 Date 2023       Current Admission Date: 2023  Current Admission Diagnosis:Acute metabolic encephalopathy   Patient Active Problem List    Diagnosis Date Noted   • Hypophosphatemia 2023   • Acute metabolic encephalopathy    • Hypernatremia 2023   • Hypokalemia 2023   • Aspiration pneumonitis ([address]) 2023   • Closed fracture of left distal radius and ulna 2023   • Fall 2023   • Ambulatory dysfunction 2023   • Elevated troponin 2023   • History of CVA (cerebrovascular accident) 2023   • Dementia ([address]) 2023   • Primary hypertension 2023   • Moderate protein-calorie malnutrition ([address]) 2023   • Dysphagia 2023   • Multiple fractures of pelvis ([address]) 2023   • Fall 10/19/2022   • Moderate protein-calorie malnutrition ([address]) 2022   • Silent aspiration 08/10/2022   • Candida esophagitis ([address]) 2022   • Failure to thrive in adult 2022   • Dysphagia 2021   • Dementia without behavioral disturbance ([address]) 2021   • 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   • 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) ([address]) 2020   • Calcification of aorta ([address]) 2020   • Need for influenza vaccination 2020   • Depression 05/15/2020   • History of stroke 2020   • Acute CVA (cerebrovascular accident), suspected embolic in nature    • Hypercalcemia 2019   • History of resection of meningioma 2019   • Lumbar spondylosis 2019   • Lumbar radiculopathy 2019   • Convulsions ([address]) 2019   • Orthostatic hypotension 2018   • Contusion of rib on right side 2018   • Cognitive decline 2018   • History of meningioma 2018   • Idiopathic peripheral neuropathy 2018   • Iron deficiency anemia 03/29/2018   • Constipation 01/25/2018   • Iron deficiency 01/25/2018   • Other constipation 01/25/2018   • Vitamin B12 deficiency 07/21/2017   • Anemia 06/30/2017   • Insomnia 02/05/2016   • Arteriosclerotic cardiovascular disease 02/03/2015   • Esophageal reflux 09/30/2014   • Cervical spinal stenosis 12/30/2013   • Spinal stenosis of lumbar region with neurogenic claudication 11/05/2013   • Backache 06/05/2013   • Essential hypertension 06/03/2013   • Hyperlipidemia 06/03/2013   • Depression 04/01/2013      LOS (days): 1  Geometric Mean LOS (GMLOS) (days): 5.20  Days to GMLOS:4     OBJECTIVE:    Risk of Unplanned Readmission Score: 21.64      Current admission status: Inpatient    Preferred Pharmacy:   [facility] [place] [address]  Phone: [phone] Fax: [address], [address] [facility] [address]  Phone: [phone] Fax: [address] - [address] [place] [address] [facility] [address] [address]  Phone: [phone] Fax: [phone]    Primary Care Provider: MICHELE Olmos    Primary Insurance: MEDICARE  Secondary Insurance:     ASSESSMENT:  [address] [place], [place] [address] [facility]   Primary Phone: [phone] [facility]  Home Phone: [phone]               Advance Directives  Does patient have a [address]?: Yes  Does patient have Advance Directives?: Yes  Advance Directives: Living will, Power of  for health care    Readmission Root Cause  30 Day Readmission: No    Patient Information  Admitted from[de-identified] Facility  Mental Status: Confused  During Assessment patient was accompanied by: Not accompanied during assessment  Assessment information provided by[de-identified] Spouse  Primary Caregiver:  Other (Comment)  Caregiver's Name[de-identified] [address] [place] [address] Systems: Spouse/significant other, Daughter  [place] of Residence: [address] [facility] do you live in?: [address] [facility] entry access options. Select all that apply.: No steps to enter home  Type of Current Residence: Facility  Upon entering residence, is there a bedroom on the main floor (no further steps)?: Yes  Upon entering residence, is there a bathroom on the main floor (no further steps)?: Yes  In the last 12 months, was there a time when you were not able to pay the mortgage or rent on time?: No  In the last 12 months, how many places have you lived?: 1  In the last 12 months, was there a time when you did not have a steady place to sleep or slept in a shelter (including now)?: No  Homeless/housing insecurity resource given?: N/A  Living Arrangements: Lives Alone, Other (Comment) (SNF)  Is patient a ?: No    Activities of Daily Living Prior to Admission  Functional Status: Assistance  Completes ADLs independently?: No  Level of ADL dependence: Assistance  Ambulates independently?: No  Level of ambulatory dependence: Assistance  Does patient use assisted devices?: Yes  Assisted Devices (DME) used:  Wheelchair  Does patient currently own DME?: Yes  What DME does the patient currently own?: Wheelchair  Does patient have a history of Outpatient Therapy (PT/OT)?: No  Does the patient have a history of Short-Term Rehab?: Yes  Does patient have a history of HHC?: Yes  Does patient currently have [address]?: No    Patient Information Continued  Does patient have prescription coverage?: Yes  Within the past 12 months, you worried that your food would run out before you got the money to buy more.: Never true  Within the past 12 months, the food you bought just didn't last and you didn't have money to get more.: Never true  Food insecurity resource given?: N/A  Does patient receive dialysis treatments?: No  Does patient have a history of substance abuse?: No  Does patient have a history of Mental Health Diagnosis?: No    Means of Transportation  In the past 12 months, has lack of transportation kept you from medical appointments or from getting medications?: No  In the past 12 months, has lack of transportation kept you from meetings, work, or from getting things needed for daily living?: No  Was application for public transport provided?: N/A    DISCHARGE DETAILS:    Discharge planning discussed with[de-identified] pt's spouse  Freedom of Choice: Yes     CM contacted family/caregiver?: Yes  Were Treatment Team discharge recommendations reviewed with patient/caregiver?: Yes  Did patient/caregiver verbalize understanding of patient care needs?: Yes  Were patient/caregiver advised of the risks associated with not following Treatment Team discharge recommendations?: Yes    Contacts  Patient Contacts: Javier Salas; Dimitrios Lambert from [facility]  Relationship to Patient[de-identified] Family  Contact Method: Phone  Phone Number: [phone]; [phone]  Reason/Outcome: Continuity of Care, Emergency Contact, Discharge [address]         Is the patient interested in [address] [facility] at discharge?: No    DME Referral Provided  Referral made for DME?: No    Treatment Team Recommendation: SNF  Discharge Destination Plan[de-identified] SNF  Transport at Discharge : BLS Ambulance  Transfer Mode: Stretcher        IMM Given (Date):: 07/14/23  IMM Given to[de-identified] Family  Family notified[de-identified] Reviewed with his spouse. Additional Comments: Cm spoke with pt's spouse and reviewed cm role. Pt is LTC at [facility]. He can normally self propel his wc. His cognititon at baseline is a/o x1. Pt has no MH or D/A hx. Pt will return at d/c. PT/OT evals pending.

## 2023-07-14 NOTE — ASSESSMENT & PLAN NOTE
Mild hypercalcemia noted  Check PTH vitamin D levels  IV fluids  Monitor closely  Will likely need outpatient follow-up

## 2023-07-14 NOTE — ASSESSMENT & PLAN NOTE
Patient is a resident of [facility], presents with altered mental status  Encephalopathy likely multifactorial  Noted to hypernatremia  We will provide hypotonic IV fluids  Optimize metabolic parameters  Avoid neurotoxins  Monitor

## 2023-07-14 NOTE — PHYSICAL THERAPY NOTE
PHYSICAL THERAPY EVALUATION NOTE      Patient Name: Riddhi ESPINAL Date: 2023    AGE:   80 y.o. Mrn:   [id number]  ADMIT DX:  Hypercalcemia [E83.52]  Hypokalemia [E87.6]  Hypernatremia [E87.0]  Aspiration pneumonia ([address]) [J69.0]  Altered mental status [R41.82]  Abnormal EKG [R94.31]  Abnormal CT of the chest [R93.89]  Inferior pubic ramus fracture ([address]) [S32.599A]  Unspecified multiple injuries, initial encounter [T07. XXXA]    Past Medical History:   Diagnosis Date    Anxiety     Arthritis     Coronary artery disease     Coronary artery disease involving native coronary artery of native heart without angina pectoris     Depression     Fracture     Hypercholesterolemia     Meningioma ([address]) 1999    brain tumor    Meningioma (HCC)     Narcolepsy     daytime drowsiness and dozing off occasionally    Orthostatic hypotension     Palpitations     Prostate CA ([address])     Prostate cancer ([address])     Stroke ([address])      Length Of Stay: 1  PHYSICAL THERAPY EVALUATION :   Patient's identity confirmed via 2 patient identifiers (full name and ) at start of session       23 1427   PT Last Visit   PT Visit Date 23   Note Type   Note type Evaluation   Pain Assessment   Pain Assessment Tool FLACC   Pain Rating: FLACC (Rest) - Face 0   Pain Rating: FLACC (Rest) - Legs 0   Pain Rating: FLACC (Rest) - Activity 0   Pain Rating: FLACC (Rest) - Cry 0   Pain Rating: FLACC (Rest) - Consolability 0   Score: FLACC (Rest) 0   Pain Rating: FLACC (Activity) - Face 0   Pain Rating: FLACC (Activity) - Legs 0   Pain Rating: FLACC (Activity) - Activity 0   Pain Rating: FLACC (Activity) - Cry 0   Pain Rating: FLACC (Activity) - Consolability 0   Score: FLACC (Activity) 0   Restrictions/Precautions   Weight Bearing Precautions Per Order Yes   LUE Weight Bearing Per Order WBAT  (per OP ortho note from  - pt w/ distal radius and ulna fx d/t fall back in May)   Other Precautions Cognitive; Bed Alarm; Restraints;Multiple lines;Telemetry; Fall Risk  (B wrist restraints, IV pole, [place])   Home Living   Type of Home Other (Comment); Assisted living  [facility])   Home Layout One level   [place]; Wheelchair-manual   Additional Comments Pt unable to provide PLOF d/t AMS. Per chart, as of March pt was able to perform SPT w/ RW to his [facility]   Prior Function   Level of [place] Needs assistance with ADLs; Needs assistance with IADLS;Needs assistance with functional mobility   Lives With Facility staff   Receives Help From Family;Personal care attendant   IADLs Family/Friend/Other provides transportation; Family/Friend/Other provides meals; Family/Friend/Other provides medication management   Falls in the last 6 months 1 to 4  (+ falls, most recent was a few days ago)   Vocational Retired   [employer]   Additional Pertinent History Imaging shows L aceteabular fx, but this is "partiall healing" not necessarily suggestive of an acute fx. Pt also w/ age-indeterminated L3 fx compared to images from 2019, Ortho consulted   Family/Caregiver Present No   Cognition   Overall Cognitive Status Impaired   Arousal/Participation Poorly responsive   Attention LUIS   Orientation Level Unable to assess   Memory Unable to assess   Following Commands Unable to follow one step commands   Comments Pt moving extremities, making "humming" noise, per EMS note, staff reports he can usually respond "yes or no", and this is altered from his baseline   RLE Assessment   RLE Assessment WFL  (at least 3/5 functionally observed)   LLE Assessment   LLE Assessment WFL  (at least 3/5 functionally observed)   Bed Mobility   Rolling R 5  Supervision   Supine to Sit 2  Maximal assistance   Additional items Assist x 1; Increased time required;Verbal cues;LE management  (pt actively resisting)   Additional Comments Pt requires total A x 1 to maintian sitting EOB x 10sec before returning to supine. PCA mentioned earlier pt had apparently gotten himself sitting EOB and bed alarm was going off. Transfers   Sit to Stand Unable to assess   Balance   Static Sitting Zero   Activity Tolerance   Activity Tolerance Other (Comment)  (cognition)   Nurse Made Aware RN Sunitha Rivera   Assessment   Problem List Decreased strength;Decreased endurance; Impaired balance;Decreased mobility; Decreased cognition; Impaired judgement;Decreased safety awareness   Assessment Melani Chatman is a 80 y.o. Male who presents to [address] [place] [address] on 7/13/2023 from [facility] w/ c/o AMS and diagnosis of acute metabolic encephalopathy. Orders for PT eval and treat received. Pt presents w/ comorbidities of dementia, HTN, spinal stenosis, hx of meningioma. At baseline, pt mobilizes SPT to [facility], and reports + falls in the last 6 months. Upon evaluation, pt presents w/ the following deficits: weakness, impaired balance, decreased endurance and cognition significantly impacting functional mobility. Upon eval, pt requires S to roll, total A x 1 for supine <> Sit. Based on this PT evaluation today, patient's discharge recommendation is for Level II - however if pt is able to SPT to [facility] then could return to facility. During this admission, pt would benefit from continued skilled inpatient PT in the acute care setting in order to address the abovementioned deficits to maximize function and mobility before DC from acute care.    Goals   Patient Goals none stated   [facility] Expiration Date 07/24/23   Short Term Goal #1 Patient will: Perform all bed mobility tasks w/ supervision to improve pt's independence w/ repositioning for decrease risk of skin breakdown and Perform all transfers w/ min A x1 consistently from various height surfaces in order to improve I w/ engagement w/ real-world environments/situations, pt will be able to tolerate at least 10 min of functional mobility tasks while maintaining alertness   PT Treatment Day 0   Plan Treatment/Interventions Functional transfer training;LE strengthening/ROM; Therapeutic exercise; Endurance training;Cognitive reorientation;Patient/family training;Equipment eval/education; Bed mobility   PT Frequency 2-3x/wk   Recommendation   UB Rehab Discharge Recommendation (PT/OT) Level 2   Equipment Recommended Wheelchair;Walker   AM-PAC Basic Mobility Inpatient   Turning in Flat Bed Without Bedrails 3   Lying on Back to Sitting on Edge of Flat Bed Without Bedrails 1   Moving Bed to Chair 1   Standing Up From Chair Using Arms 1   Walk in Room 1   Climb 3-5 Stairs With Railing 1   Basic Mobility Inpatient Raw Score 8   Turning Head Towards Sound 1   Follow Simple Instructions 1   Low Function Basic Mobility Raw Score  10   Low Function Basic Mobility Standardized Score  14.65   Highest Level Of Mobility   -HL Goal 3: Sit at edge of bed   -HLM Achieved 3: Sit at edge of bed   End of Consult   Patient Position at End of Consult Supine;Bed/Chair alarm activated; All needs within reach  (B wrist restraints in place)         The patient's AM-[facility] Basic Mobility Inpatient Short Form Raw Score is 8, Standardized Score is  . A standardized score less than 38.32 (raw score of 16) suggests the patient may benefit from discharge to post-acute rehabilitation services which may not coincide with above PT recommendations. However please refer to therapist recommendation for discharge planning given other factors that may influence destination.     Pt would benefit from skilled inpatient PT during this admission in order to facilitate progress towards goals to maximize functional independence    Gabriella Fischer, PT, DPT

## 2023-07-14 NOTE — PLAN OF CARE
Problem: PHYSICAL THERAPY ADULT  Goal: Performs mobility at highest level of function for planned discharge setting. See evaluation for individualized goals. Description: Treatment/Interventions: Functional transfer training, LE strengthening/ROM, Therapeutic exercise, Endurance training, Cognitive reorientation, Patient/family training, Equipment eval/education, Bed mobility  Equipment Recommended: Wheelchair, Ar[employer]us Messing       See flowsheet documentation for full assessment, interventions and recommendations. 7/14/2023 1456 by Dyllan Muñoz PT  Note:    Problem List: Decreased strength, Decreased endurance, Impaired balance, Decreased mobility, Decreased cognition, Impaired judgement, Decreased safety awareness  Assessment: Princess Hernandez is a 80 y.o. Male who presents to [address] [place] [address] on 7/13/2023 from [facility] w/ c/o AMS and diagnosis of acute metabolic encephalopathy. Orders for PT eval and treat received. Pt presents w/ comorbidities of dementia, HTN, spinal stenosis, hx of meningioma. At baseline, pt mobilizes SPT to [facility], and reports + falls in the last 6 months. Upon evaluation, pt presents w/ the following deficits: weakness, impaired balance, decreased endurance and cognition significantly impacting functional mobility. Upon eval, pt requires S to roll, total A x 1 for supine <> Sit. Based on this PT evaluation today, patient's discharge recommendation is for Level II - however if pt is able to SPT to [facility] then could return to facility. During this admission, pt would benefit from continued skilled inpatient PT in the acute care setting in order to address the abovementioned deficits to maximize function and mobility before DC from acute care. See flowsheet documentation for full assessment.

## 2023-07-14 NOTE — PLAN OF CARE
Problem: Potential for Falls  Goal: Patient will remain free of falls  Description: INTERVENTIONS:  - Educate patient/family on patient safety including physical limitations  - Instruct patient to call for assistance with activity   - Consult OT/PT to assist with strengthening/mobility   - Keep Call bell within reach  - Keep bed low and locked with side rails adjusted as appropriate  - Keep care items and personal belongings within reach  - Initiate and maintain comfort rounds  - Make Fall Risk Sign visible to staff  - Offer Toileting every Hours, in advance of need  - Initiate/Maintain alarm  - Obtain necessary fall risk management equipment:   - Apply yellow socks and bracelet for high fall risk patients  - Consider moving patient to room near nurses station  Outcome: Progressing     Problem: MOBILITY - ADULT  Goal: Maintain or return to baseline ADL function  Description: INTERVENTIONS:  -  Assess patient's ability to carry out ADLs; assess patient's baseline for ADL function and identify physical deficits which impact ability to perform ADLs (bathing, care of mouth/teeth, toileting, grooming, dressing, etc.)  - Assess/evaluate cause of self-care deficits   - Assess range of motion  - Assess patient's mobility; develop plan if impaired  - Assess patient's need for assistive devices and provide as appropriate  - Encourage maximum independence but intervene and supervise when necessary  - Involve family in performance of ADLs  - Assess for home care needs following discharge   - Consider OT consult to assist with ADL evaluation and planning for discharge  - Provide patient education as appropriate  Outcome: Progressing  Goal: Maintains/Returns to pre admission functional level  Description: INTERVENTIONS:  - Perform BMAT or MOVE assessment daily.   - Set and communicate daily mobility goal to care team and patient/family/caregiver.    - Collaborate with rehabilitation services on mobility goals if consulted  - Perform Range of Motion  times a day. - Reposition patient every  hours.   - Dangle patient  times a day  - Stand patient  times a day  - Ambulate patient  times a day  - Out of bed to chair  times a day   - Out of bed for meals  times a day  - Out of bed for toileting  - Record patient progress and toleration of activity level   Outcome: Progressing     Problem: PAIN - ADULT  Goal: Verbalizes/displays adequate comfort level or baseline comfort level  Description: Interventions:  - Encourage patient to monitor pain and request assistance  - Assess pain using appropriate pain scale  - Administer analgesics based on type and severity of pain and evaluate response  - Implement non-pharmacological measures as appropriate and evaluate response  - Consider cultural and social influences on pain and pain management  - Notify physician/advanced practitioner if interventions unsuccessful or patient reports new pain  Outcome: Progressing     Problem: INFECTION - ADULT  Goal: Absence or prevention of progression during hospitalization  Description: INTERVENTIONS:  - Assess and monitor for signs and symptoms of infection  - Monitor lab/diagnostic results  - Monitor all insertion sites, i.e. indwelling lines, tubes, and drains  - Monitor endotracheal if appropriate and nasal secretions for changes in amount and color  - [place] appropriate cooling/warming therapies per order  - Administer medications as ordered  - Instruct and encourage patient and family to use good hand hygiene technique  - Identify and instruct in appropriate isolation precautions for identified infection/condition  Outcome: Progressing  Goal: Absence of fever/infection during neutropenic period  Description: INTERVENTIONS:  - Monitor WBC    Outcome: Progressing     Problem: SAFETY ADULT  Goal: Patient will remain free of falls  Description: INTERVENTIONS:  - Educate patient/family on patient safety including physical limitations  - Instruct patient to call for assistance with activity   - Consult OT/PT to assist with strengthening/mobility   - Keep Call bell within reach  - Keep bed low and locked with side rails adjusted as appropriate  - Keep care items and personal belongings within reach  - Initiate and maintain comfort rounds  - Make Fall Risk Sign visible to staff  - Offer Toileting every Hours, in advance of need  - Initiate/Maintain alarm  - Obtain necessary fall risk management equipment:   - Apply yellow socks and bracelet for high fall risk patients  - Consider moving patient to room near nurses station  Outcome: Progressing  Goal: Maintain or return to baseline ADL function  Description: INTERVENTIONS:  -  Assess patient's ability to carry out ADLs; assess patient's baseline for ADL function and identify physical deficits which impact ability to perform ADLs (bathing, care of mouth/teeth, toileting, grooming, dressing, etc.)  - Assess/evaluate cause of self-care deficits   - Assess range of motion  - Assess patient's mobility; develop plan if impaired  - Assess patient's need for assistive devices and provide as appropriate  - Encourage maximum independence but intervene and supervise when necessary  - Involve family in performance of ADLs  - Assess for home care needs following discharge   - Consider OT consult to assist with ADL evaluation and planning for discharge  - Provide patient education as appropriate  Outcome: Progressing  Goal: Maintains/Returns to pre admission functional level  Description: INTERVENTIONS:  - Perform BMAT or MOVE assessment daily.   - Set and communicate daily mobility goal to care team and patient/family/caregiver. - Collaborate with rehabilitation services on mobility goals if consulted  - Perform Range of Motion  times a day. - Reposition patient every  hours.   - Dangle patient  times a day  - Stand patient  times a day  - Ambulate patient  times a day  - Out of bed to chair  times a day   - Out of bed for meals  times a day  - Out of bed for toileting  - Record patient progress and toleration of activity level   Outcome: Progressing     Problem: DISCHARGE PLANNING  Goal: Discharge to home or other facility with appropriate resources  Description: INTERVENTIONS:  - Identify barriers to discharge w/patient and caregiver  - Arrange for needed discharge resources and transportation as appropriate  - Identify discharge learning needs (meds, wound care, etc.)  - Arrange for interpretive services to assist at discharge as needed  - Refer to Case Management Department for coordinating discharge planning if the patient needs post-hospital services based on physician/advanced practitioner order or complex needs related to functional status, cognitive ability, or social support system  Outcome: Progressing     Problem: Knowledge Deficit  Goal: Patient/family/caregiver demonstrates understanding of disease process, treatment plan, medications, and discharge instructions  Description: Complete learning assessment and assess knowledge base. Interventions:  - Provide teaching at level of understanding  - Provide teaching via preferred learning methods  Outcome: Progressing     Problem: NEUROSENSORY - ADULT  Goal: Achieves stable or improved neurological status  Description: INTERVENTIONS  - Monitor and report changes in neurological status  - Monitor vital signs such as temperature, blood pressure, glucose, and any other labs ordered   - Initiate measures to prevent increased intracranial pressure  - Monitor for seizure activity and implement precautions if appropriate      Outcome: Progressing  Goal: Achieves maximal functionality and self care  Description: INTERVENTIONS  - Monitor swallowing and airway patency with patient fatigue and changes in neurological status  - Encourage and assist patient to increase activity and self care.    - Encourage visually impaired, hearing impaired and aphasic patients to use assistive/communication devices  Outcome: Progressing

## 2023-07-14 NOTE — PHYSICAL THERAPY NOTE
Physical Therapy Cancellation Note       07/14/23 0750   PT Last Visit   PT Visit Date 07/14/23   Note Type   Note type Cancelled Session   Cancel Reasons Other   Additional Comments Awaiting ortho consult. Will continue to follow. Comfort Preciado

## 2023-07-14 NOTE — OCCUPATIONAL THERAPY NOTE
Occupational Therapy Cx Note     Patient Name: Onel Johnson  [id number] Date: 7/14/2023  Problem List  Principal Problem:    Acute metabolic encephalopathy  Active Problems:    Hypercalcemia    Dysphagia    Fall    Elevated troponin    Dementia (HCC)    Moderate protein-calorie malnutrition (HCC)    Hypernatremia    Hypokalemia    Aspiration pneumonitis ([address])              07/14/23 0918   OT Last Visit   OT Visit Date 07/14/23   Note Type   Note type Cancelled Session   Additional Comments Pt awaiting ortho consult for L3 compression fx.  Will hold & f/u as able & appropriate       Manuel Godwin, OTR/L

## 2023-07-14 NOTE — ASSESSMENT & PLAN NOTE
Patient had a fall about 3 days back  At high risk for falls  Imaging revealed left inferior pubic ramus fracture  Safe ambulation  Fall precautions  Physical therapy

## 2023-07-15 PROBLEM — Z71.89 GOALS OF CARE, COUNSELING/DISCUSSION: Status: ACTIVE | Noted: 2023-01-01

## 2023-07-15 LAB
ANION GAP SERPL CALCULATED.3IONS-SCNC: 4 MMOL/L
ANION GAP SERPL CALCULATED.3IONS-SCNC: 4 MMOL/L
BASOPHILS # BLD AUTO: 0.02 THOUSANDS/ÂΜL (ref 0–0.1)
BASOPHILS NFR BLD AUTO: 0 % (ref 0–1)
BUN SERPL-MCNC: 21 MG/DL (ref 5–25)
BUN SERPL-MCNC: 22 MG/DL (ref 5–25)
CALCIUM SERPL-MCNC: 11.2 MG/DL (ref 8.4–10.2)
CALCIUM SERPL-MCNC: 11.8 MG/DL (ref 8.4–10.2)
CHLORIDE SERPL-SCNC: 120 MMOL/L (ref 96–108)
CHLORIDE SERPL-SCNC: 121 MMOL/L (ref 96–108)
CO2 SERPL-SCNC: 32 MMOL/L (ref 21–32)
CO2 SERPL-SCNC: 32 MMOL/L (ref 21–32)
CREAT SERPL-MCNC: 0.99 MG/DL (ref 0.6–1.3)
CREAT SERPL-MCNC: 1.03 MG/DL (ref 0.6–1.3)
EOSINOPHIL # BLD AUTO: 0.01 THOUSAND/ÂΜL (ref 0–0.61)
EOSINOPHIL NFR BLD AUTO: 0 % (ref 0–6)
ERYTHROCYTE [DISTWIDTH] IN BLOOD BY AUTOMATED COUNT: 14.6 % (ref 11.6–15.1)
GFR SERPL CREATININE-BSD FRML MDRD: 63 ML/MIN/1.73SQ M
GFR SERPL CREATININE-BSD FRML MDRD: 66 ML/MIN/1.73SQ M
GLUCOSE SERPL-MCNC: 120 MG/DL (ref 65–140)
GLUCOSE SERPL-MCNC: 126 MG/DL (ref 65–140)
HCT VFR BLD AUTO: 40.6 % (ref 36.5–49.3)
HGB BLD-MCNC: 12.6 G/DL (ref 12–17)
IMM GRANULOCYTES # BLD AUTO: 0.07 THOUSAND/UL (ref 0–0.2)
IMM GRANULOCYTES NFR BLD AUTO: 1 % (ref 0–2)
LYMPHOCYTES # BLD AUTO: 1.74 THOUSANDS/ÂΜL (ref 0.6–4.47)
LYMPHOCYTES NFR BLD AUTO: 15 % (ref 14–44)
MCH RBC QN AUTO: 26.3 PG (ref 26.8–34.3)
MCHC RBC AUTO-ENTMCNC: 31 G/DL (ref 31.4–37.4)
MCV RBC AUTO: 85 FL (ref 82–98)
MONOCYTES # BLD AUTO: 0.83 THOUSAND/ÂΜL (ref 0.17–1.22)
MONOCYTES NFR BLD AUTO: 7 % (ref 4–12)
NEUTROPHILS # BLD AUTO: 8.75 THOUSANDS/ÂΜL (ref 1.85–7.62)
NEUTS SEG NFR BLD AUTO: 77 % (ref 43–75)
NRBC BLD AUTO-RTO: 0 /100 WBCS
PHOSPHATE SERPL-MCNC: 3 MG/DL (ref 2.3–4.1)
PLATELET # BLD AUTO: 227 THOUSANDS/UL (ref 149–390)
PMV BLD AUTO: 10.8 FL (ref 8.9–12.7)
POTASSIUM SERPL-SCNC: 2.8 MMOL/L (ref 3.5–5.3)
POTASSIUM SERPL-SCNC: 3.1 MMOL/L (ref 3.5–5.3)
PROCALCITONIN SERPL-MCNC: 0.07 NG/ML
RBC # BLD AUTO: 4.8 MILLION/UL (ref 3.88–5.62)
SODIUM SERPL-SCNC: 156 MMOL/L (ref 135–147)
SODIUM SERPL-SCNC: 157 MMOL/L (ref 135–147)
WBC # BLD AUTO: 11.42 THOUSAND/UL (ref 4.31–10.16)

## 2023-07-15 PROCEDURE — NC001 PR NO CHARGE: Performed by: PHYSICIAN ASSISTANT

## 2023-07-15 PROCEDURE — 80048 BASIC METABOLIC PNL TOTAL CA: CPT | Performed by: INTERNAL MEDICINE

## 2023-07-15 PROCEDURE — 84145 PROCALCITONIN (PCT): CPT | Performed by: INTERNAL MEDICINE

## 2023-07-15 PROCEDURE — 84100 ASSAY OF PHOSPHORUS: CPT | Performed by: INTERNAL MEDICINE

## 2023-07-15 PROCEDURE — 85025 COMPLETE CBC W/AUTO DIFF WBC: CPT | Performed by: INTERNAL MEDICINE

## 2023-07-15 PROCEDURE — 99222 1ST HOSP IP/OBS MODERATE 55: CPT | Performed by: PHYSICIAN ASSISTANT

## 2023-07-15 PROCEDURE — 92526 ORAL FUNCTION THERAPY: CPT

## 2023-07-15 PROCEDURE — 99232 SBSQ HOSP IP/OBS MODERATE 35: CPT | Performed by: INTERNAL MEDICINE

## 2023-07-15 RX ORDER — DEXTROSE MONOHYDRATE 50 MG/ML
100 INJECTION, SOLUTION INTRAVENOUS CONTINUOUS
Status: DISCONTINUED | OUTPATIENT
Start: 2023-07-15 | End: 2023-07-17

## 2023-07-15 RX ORDER — POTASSIUM CHLORIDE 29.8 MG/ML
40 INJECTION INTRAVENOUS ONCE
Status: DISCONTINUED | OUTPATIENT
Start: 2023-07-15 | End: 2023-07-15

## 2023-07-15 RX ORDER — MAGNESIUM SULFATE 1 G/100ML
1 INJECTION INTRAVENOUS ONCE
Status: COMPLETED | OUTPATIENT
Start: 2023-07-15 | End: 2023-07-15

## 2023-07-15 RX ORDER — METOPROLOL TARTRATE 5 MG/5ML
2.5 INJECTION INTRAVENOUS EVERY 6 HOURS
Status: DISCONTINUED | OUTPATIENT
Start: 2023-07-15 | End: 2023-07-24 | Stop reason: HOSPADM

## 2023-07-15 RX ADMIN — METOPROLOL TARTRATE 2.5 MG: 5 INJECTION INTRAVENOUS at 14:25

## 2023-07-15 RX ADMIN — METOPROLOL TARTRATE 2.5 MG: 5 INJECTION INTRAVENOUS at 09:21

## 2023-07-15 RX ADMIN — METOPROLOL TARTRATE 2.5 MG: 5 INJECTION INTRAVENOUS at 20:20

## 2023-07-15 RX ADMIN — POTASSIUM PHOSPHATE, MONOBASIC POTASSIUM PHOSPHATE, DIBASIC 12 MMOL: 224; 236 INJECTION, SOLUTION, CONCENTRATE INTRAVENOUS at 09:38

## 2023-07-15 RX ADMIN — SODIUM CHLORIDE 75 ML/HR: 0.45 INJECTION, SOLUTION INTRAVENOUS at 02:23

## 2023-07-15 RX ADMIN — MAGNESIUM SULFATE IN DEXTROSE 1 G: 10 INJECTION, SOLUTION INTRAVENOUS at 09:22

## 2023-07-15 RX ADMIN — DEXTROSE 75 ML/HR: 5 SOLUTION INTRAVENOUS at 09:22

## 2023-07-15 RX ADMIN — ENOXAPARIN SODIUM 40 MG: 100 INJECTION SUBCUTANEOUS at 09:21

## 2023-07-15 NOTE — ASSESSMENT & PLAN NOTE
Hypernatremia  Sodium 157  Hypotonic IV fluids now with 5% dextrose  Monitor closely  Avoid overcorrection

## 2023-07-15 NOTE — ASSESSMENT & PLAN NOTE
Overall guarded prognosis discussed with spouse in detail questions answered  She would like to discuss with her daughter and see him tomorrow

## 2023-07-15 NOTE — ASSESSMENT & PLAN NOTE
· Known history of dysphagia  · Imaging concerning for aspiration pneumonitis  · Received IV antibiotics in the ED, hold further antibiotics presently  · Procalcitonin levels noted  · Monitor counts temperatures  · Aspiration precautions

## 2023-07-15 NOTE — ASSESSMENT & PLAN NOTE
History of dysphagia  Presently n.p.o. state  Aspiration precautions  We will request GI inputs  Communicated with GI via [place] connect

## 2023-07-15 NOTE — PLAN OF CARE
Problem: Potential for Falls  Goal: Patient will remain free of falls  Description: INTERVENTIONS:  - Educate patient/family on patient safety including physical limitations  - Instruct patient to call for assistance with activity   - Consult OT/PT to assist with strengthening/mobility   - Keep Call bell within reach  - Keep bed low and locked with side rails adjusted as appropriate  - Keep care items and personal belongings within reach  - Initiate and maintain comfort rounds  - Make Fall Risk Sign visible to staff  - Offer Toileting every Hours, in advance of need  - Initiate/Maintain alarm  - Obtain necessary fall risk management equipment:   - Apply yellow socks and bracelet for high fall risk patients  - Consider moving patient to room near nurses station  Outcome: Progressing     Problem: MOBILITY - ADULT  Goal: Maintain or return to baseline ADL function  Description: INTERVENTIONS:  -  Assess patient's ability to carry out ADLs; assess patient's baseline for ADL function and identify physical deficits which impact ability to perform ADLs (bathing, care of mouth/teeth, toileting, grooming, dressing, etc.)  - Assess/evaluate cause of self-care deficits   - Assess range of motion  - Assess patient's mobility; develop plan if impaired  - Assess patient's need for assistive devices and provide as appropriate  - Encourage maximum independence but intervene and supervise when necessary  - Involve family in performance of ADLs  - Assess for home care needs following discharge   - Consider OT consult to assist with ADL evaluation and planning for discharge  - Provide patient education as appropriate  Outcome: Progressing  Goal: Maintains/Returns to pre admission functional level  Description: INTERVENTIONS:  - Perform BMAT or MOVE assessment daily.   - Set and communicate daily mobility goal to care team and patient/family/caregiver.    - Collaborate with rehabilitation services on mobility goals if consulted  - Perform Range of Motion  times a day. - Reposition patient every  hours.   - Dangle patient  times a day  - Stand patient  times a day  - Ambulate patient  times a day  - Out of bed to chair  times a day   - Out of bed for meals  times a day  - Out of bed for toileting  - Record patient progress and toleration of activity level   Outcome: Progressing     Problem: PAIN - ADULT  Goal: Verbalizes/displays adequate comfort level or baseline comfort level  Description: Interventions:  - Encourage patient to monitor pain and request assistance  - Assess pain using appropriate pain scale  - Administer analgesics based on type and severity of pain and evaluate response  - Implement non-pharmacological measures as appropriate and evaluate response  - Consider cultural and social influences on pain and pain management  - Notify physician/advanced practitioner if interventions unsuccessful or patient reports new pain  Outcome: Progressing     Problem: INFECTION - ADULT  Goal: Absence or prevention of progression during hospitalization  Description: INTERVENTIONS:  - Assess and monitor for signs and symptoms of infection  - Monitor lab/diagnostic results  - Monitor all insertion sites, i.e. indwelling lines, tubes, and drains  - Monitor endotracheal if appropriate and nasal secretions for changes in amount and color  - [place] appropriate cooling/warming therapies per order  - Administer medications as ordered  - Instruct and encourage patient and family to use good hand hygiene technique  - Identify and instruct in appropriate isolation precautions for identified infection/condition  Outcome: Progressing  Goal: Absence of fever/infection during neutropenic period  Description: INTERVENTIONS:  - Monitor WBC    Outcome: Progressing     Problem: SAFETY ADULT  Goal: Patient will remain free of falls  Description: INTERVENTIONS:  - Educate patient/family on patient safety including physical limitations  - Instruct patient to call for assistance with activity   - Consult OT/PT to assist with strengthening/mobility   - Keep Call bell within reach  - Keep bed low and locked with side rails adjusted as appropriate  - Keep care items and personal belongings within reach  - Initiate and maintain comfort rounds  - Make Fall Risk Sign visible to staff  - Offer Toileting every Hours, in advance of need  - Initiate/Maintain alarm  - Obtain necessary fall risk management equipment:   - Apply yellow socks and bracelet for high fall risk patients  - Consider moving patient to room near nurses station  Outcome: Progressing  Goal: Maintain or return to baseline ADL function  Description: INTERVENTIONS:  -  Assess patient's ability to carry out ADLs; assess patient's baseline for ADL function and identify physical deficits which impact ability to perform ADLs (bathing, care of mouth/teeth, toileting, grooming, dressing, etc.)  - Assess/evaluate cause of self-care deficits   - Assess range of motion  - Assess patient's mobility; develop plan if impaired  - Assess patient's need for assistive devices and provide as appropriate  - Encourage maximum independence but intervene and supervise when necessary  - Involve family in performance of ADLs  - Assess for home care needs following discharge   - Consider OT consult to assist with ADL evaluation and planning for discharge  - Provide patient education as appropriate  Outcome: Progressing  Goal: Maintains/Returns to pre admission functional level  Description: INTERVENTIONS:  - Perform BMAT or MOVE assessment daily.   - Set and communicate daily mobility goal to care team and patient/family/caregiver. - Collaborate with rehabilitation services on mobility goals if consulted  - Perform Range of Motion  times a day. - Reposition patient every  hours.   - Dangle patient  times a day  - Stand patient  times a day  - Ambulate patient  times a day  - Out of bed to chair  times a day   - Out of bed for meals  times a day  - Out of bed for toileting  - Record patient progress and toleration of activity level   Outcome: Progressing     Problem: DISCHARGE PLANNING  Goal: Discharge to home or other facility with appropriate resources  Description: INTERVENTIONS:  - Identify barriers to discharge w/patient and caregiver  - Arrange for needed discharge resources and transportation as appropriate  - Identify discharge learning needs (meds, wound care, etc.)  - Arrange for interpretive services to assist at discharge as needed  - Refer to Case Management Department for coordinating discharge planning if the patient needs post-hospital services based on physician/advanced practitioner order or complex needs related to functional status, cognitive ability, or social support system  Outcome: Progressing     Problem: Knowledge Deficit  Goal: Patient/family/caregiver demonstrates understanding of disease process, treatment plan, medications, and discharge instructions  Description: Complete learning assessment and assess knowledge base. Interventions:  - Provide teaching at level of understanding  - Provide teaching via preferred learning methods  Outcome: Progressing     Problem: NEUROSENSORY - ADULT  Goal: Achieves stable or improved neurological status  Description: INTERVENTIONS  - Monitor and report changes in neurological status  - Monitor vital signs such as temperature, blood pressure, glucose, and any other labs ordered   - Initiate measures to prevent increased intracranial pressure  - Monitor for seizure activity and implement precautions if appropriate      Outcome: Progressing  Goal: Achieves maximal functionality and self care  Description: INTERVENTIONS  - Monitor swallowing and airway patency with patient fatigue and changes in neurological status  - Encourage and assist patient to increase activity and self care.    - Encourage visually impaired, hearing impaired and aphasic patients to use assistive/communication devices  Outcome: Progressing     Problem: SAFETY,RESTRAINT: NV/NON-SELF DESTRUCTIVE BEHAVIOR  Goal: Remains free of harm/injury (restraint for non violent/non self-detsructive behavior)  Description: INTERVENTIONS:  - Instruct patient/family regarding restraint use   - Assess and monitor physiologic and psychological status   - Provide interventions and comfort measures to meet assessed patient needs   - Identify and implement measures to help patient regain control  - Assess readiness for release of restraint   Outcome: Progressing  Goal: Returns to optimal restraint-free functioning  Description: INTERVENTIONS:  - Assess the patient's behavior and symptoms that indicate continued need for restraint  - Identify and implement measures to help patient regain control  - Assess readiness for release of restraint   Outcome: Progressing     Problem: Nutrition/Hydration-ADULT  Goal: Nutrient/Hydration intake appropriate for improving, restoring or maintaining nutritional needs  Description: Monitor and assess patient's nutrition/hydration status for malnutrition. Collaborate with interdisciplinary team and initiate plan and interventions as ordered. Monitor patient's weight and dietary intake as ordered or per policy. Utilize nutrition screening tool and intervene as necessary. Determine patient's food preferences and provide high-protein, high-caloric foods as appropriate.      INTERVENTIONS:  - Monitor oral intake, urinary output, labs, and treatment plans  - Assess nutrition and hydration status and recommend course of action  - Evaluate amount of meals eaten  - Assist patient with eating if necessary   - Allow adequate time for meals  - Recommend/ encourage appropriate diets, oral nutritional supplements, and vitamin/mineral supplements  - Order, calculate, and assess calorie counts as needed  - Recommend, monitor, and adjust tube feedings and TPN/PPN based on assessed needs  - Assess need for intravenous fluids  - Provide specific nutrition/hydration education as appropriate  - Include patient/family/caregiver in decisions related to nutrition  Outcome: Progressing     Problem: Prexisting or High Potential for Compromised Skin Integrity  Goal: Skin integrity is maintained or improved  Description: INTERVENTIONS:  - Identify patients at risk for skin breakdown  - Assess and monitor skin integrity  - Assess and monitor nutrition and hydration status  - Monitor labs   - Assess for incontinence   - Turn and reposition patient  - Assist with mobility/ambulation  - Relieve pressure over bony prominences  - Avoid friction and shearing  - Provide appropriate hygiene as needed including keeping skin clean and dry  - Evaluate need for skin moisturizer/barrier cream  - Collaborate with interdisciplinary team   - Patient/family teaching  - Consider wound care consult   Outcome: Progressing

## 2023-07-15 NOTE — ASSESSMENT & PLAN NOTE
Mild hypercalcemia  Elevated PTH levels noted  Continue IV fluids  Monitor closely  Outpatient follow-up

## 2023-07-15 NOTE — ASSESSMENT & PLAN NOTE
Patient is a resident of [facility], presents with altered mental status  Encephalopathy is multifactorial  Noted to have hypernatremia  Continue hypotonic IV fluids for hypernatremia  Optimize metabolic parameters  Avoid neurotoxins  Monitor closely

## 2023-07-15 NOTE — ASSESSMENT & PLAN NOTE
Patient had a fall about 3 days back  At high risk for falls  Imaging revealed left inferior pubic ramus fracture  Orthopedic inputs noted  Safe ambulation  Fall precautions  Physical therapy

## 2023-07-15 NOTE — PLAN OF CARE
Problem: Potential for Falls  Goal: Patient will remain free of falls  Description: INTERVENTIONS:  - Educate patient/family on patient safety including physical limitations  - Instruct patient to call for assistance with activity   - Consult OT/PT to assist with strengthening/mobility   - Keep Call bell within reach  - Keep bed low and locked with side rails adjusted as appropriate  - Keep care items and personal belongings within reach  - Initiate and maintain comfort rounds  - Make Fall Risk Sign visible to staff  - Offer Toileting every Hours, in advance of need  - Initiate/Maintain alarm  - Obtain necessary fall risk management equipment:   - Apply yellow socks and bracelet for high fall risk patients  - Consider moving patient to room near nurses station  Outcome: Progressing     Problem: MOBILITY - ADULT  Goal: Maintain or return to baseline ADL function  Description: INTERVENTIONS:  -  Assess patient's ability to carry out ADLs; assess patient's baseline for ADL function and identify physical deficits which impact ability to perform ADLs (bathing, care of mouth/teeth, toileting, grooming, dressing, etc.)  - Assess/evaluate cause of self-care deficits   - Assess range of motion  - Assess patient's mobility; develop plan if impaired  - Assess patient's need for assistive devices and provide as appropriate  - Encourage maximum independence but intervene and supervise when necessary  - Involve family in performance of ADLs  - Assess for home care needs following discharge   - Consider OT consult to assist with ADL evaluation and planning for discharge  - Provide patient education as appropriate  Outcome: Progressing  Goal: Maintains/Returns to pre admission functional level  Description: INTERVENTIONS:  - Perform BMAT or MOVE assessment daily.   - Set and communicate daily mobility goal to care team and patient/family/caregiver.    - Collaborate with rehabilitation services on mobility goals if consulted  - Perform Range of Motion  times a day. - Reposition patient every  hours.   - Dangle patient  times a day  - Stand patient  times a day  - Ambulate patient  times a day  - Out of bed to chair  times a day   - Out of bed for meals  times a day  - Out of bed for toileting  - Record patient progress and toleration of activity level   Outcome: Progressing     Problem: PAIN - ADULT  Goal: Verbalizes/displays adequate comfort level or baseline comfort level  Description: Interventions:  - Encourage patient to monitor pain and request assistance  - Assess pain using appropriate pain scale  - Administer analgesics based on type and severity of pain and evaluate response  - Implement non-pharmacological measures as appropriate and evaluate response  - Consider cultural and social influences on pain and pain management  - Notify physician/advanced practitioner if interventions unsuccessful or patient reports new pain  Outcome: Progressing     Problem: INFECTION - ADULT  Goal: Absence or prevention of progression during hospitalization  Description: INTERVENTIONS:  - Assess and monitor for signs and symptoms of infection  - Monitor lab/diagnostic results  - Monitor all insertion sites, i.e. indwelling lines, tubes, and drains  - Monitor endotracheal if appropriate and nasal secretions for changes in amount and color  - [place] appropriate cooling/warming therapies per order  - Administer medications as ordered  - Instruct and encourage patient and family to use good hand hygiene technique  - Identify and instruct in appropriate isolation precautions for identified infection/condition  Outcome: Progressing  Goal: Absence of fever/infection during neutropenic period  Description: INTERVENTIONS:  - Monitor WBC    Outcome: Progressing     Problem: SAFETY ADULT  Goal: Patient will remain free of falls  Description: INTERVENTIONS:  - Educate patient/family on patient safety including physical limitations  - Instruct patient to call for assistance with activity   - Consult OT/PT to assist with strengthening/mobility   - Keep Call bell within reach  - Keep bed low and locked with side rails adjusted as appropriate  - Keep care items and personal belongings within reach  - Initiate and maintain comfort rounds  - Make Fall Risk Sign visible to staff  - Offer Toileting every Hours, in advance of need  - Initiate/Maintain alarm  - Obtain necessary fall risk management equipment:   - Apply yellow socks and bracelet for high fall risk patients  - Consider moving patient to room near nurses station  Outcome: Progressing  Goal: Maintain or return to baseline ADL function  Description: INTERVENTIONS:  -  Assess patient's ability to carry out ADLs; assess patient's baseline for ADL function and identify physical deficits which impact ability to perform ADLs (bathing, care of mouth/teeth, toileting, grooming, dressing, etc.)  - Assess/evaluate cause of self-care deficits   - Assess range of motion  - Assess patient's mobility; develop plan if impaired  - Assess patient's need for assistive devices and provide as appropriate  - Encourage maximum independence but intervene and supervise when necessary  - Involve family in performance of ADLs  - Assess for home care needs following discharge   - Consider OT consult to assist with ADL evaluation and planning for discharge  - Provide patient education as appropriate  Outcome: Progressing  Goal: Maintains/Returns to pre admission functional level  Description: INTERVENTIONS:  - Perform BMAT or MOVE assessment daily.   - Set and communicate daily mobility goal to care team and patient/family/caregiver. - Collaborate with rehabilitation services on mobility goals if consulted  - Perform Range of Motion  times a day. - Reposition patient every  hours.   - Dangle patient  times a day  - Stand patient  times a day  - Ambulate patient  times a day  - Out of bed to chair  times a day   - Out of bed for meals  times a day  - Out of bed for toileting  - Record patient progress and toleration of activity level   Outcome: Progressing     Problem: DISCHARGE PLANNING  Goal: Discharge to home or other facility with appropriate resources  Description: INTERVENTIONS:  - Identify barriers to discharge w/patient and caregiver  - Arrange for needed discharge resources and transportation as appropriate  - Identify discharge learning needs (meds, wound care, etc.)  - Arrange for interpretive services to assist at discharge as needed  - Refer to Case Management Department for coordinating discharge planning if the patient needs post-hospital services based on physician/advanced practitioner order or complex needs related to functional status, cognitive ability, or social support system  Outcome: Progressing     Problem: Knowledge Deficit  Goal: Patient/family/caregiver demonstrates understanding of disease process, treatment plan, medications, and discharge instructions  Description: Complete learning assessment and assess knowledge base. Interventions:  - Provide teaching at level of understanding  - Provide teaching via preferred learning methods  Outcome: Progressing     Problem: NEUROSENSORY - ADULT  Goal: Achieves stable or improved neurological status  Description: INTERVENTIONS  - Monitor and report changes in neurological status  - Monitor vital signs such as temperature, blood pressure, glucose, and any other labs ordered   - Initiate measures to prevent increased intracranial pressure  - Monitor for seizure activity and implement precautions if appropriate      Outcome: Progressing  Goal: Achieves maximal functionality and self care  Description: INTERVENTIONS  - Monitor swallowing and airway patency with patient fatigue and changes in neurological status  - Encourage and assist patient to increase activity and self care.    - Encourage visually impaired, hearing impaired and aphasic patients to use assistive/communication devices  Outcome: Progressing     Problem: SAFETY,RESTRAINT: NV/NON-SELF DESTRUCTIVE BEHAVIOR  Goal: Remains free of harm/injury (restraint for non violent/non self-detsructive behavior)  Description: INTERVENTIONS:  - Instruct patient/family regarding restraint use   - Assess and monitor physiologic and psychological status   - Provide interventions and comfort measures to meet assessed patient needs   - Identify and implement measures to help patient regain control  - Assess readiness for release of restraint   Outcome: Progressing  Goal: Returns to optimal restraint-free functioning  Description: INTERVENTIONS:  - Assess the patient's behavior and symptoms that indicate continued need for restraint  - Identify and implement measures to help patient regain control  - Assess readiness for release of restraint   Outcome: Progressing     Problem: Nutrition/Hydration-ADULT  Goal: Nutrient/Hydration intake appropriate for improving, restoring or maintaining nutritional needs  Description: Monitor and assess patient's nutrition/hydration status for malnutrition. Collaborate with interdisciplinary team and initiate plan and interventions as ordered. Monitor patient's weight and dietary intake as ordered or per policy. Utilize nutrition screening tool and intervene as necessary. Determine patient's food preferences and provide high-protein, high-caloric foods as appropriate.      INTERVENTIONS:  - Monitor oral intake, urinary output, labs, and treatment plans  - Assess nutrition and hydration status and recommend course of action  - Evaluate amount of meals eaten  - Assist patient with eating if necessary   - Allow adequate time for meals  - Recommend/ encourage appropriate diets, oral nutritional supplements, and vitamin/mineral supplements  - Order, calculate, and assess calorie counts as needed  - Recommend, monitor, and adjust tube feedings and TPN/PPN based on assessed needs  - Assess need for intravenous fluids  - Provide specific nutrition/hydration education as appropriate  - Include patient/family/caregiver in decisions related to nutrition  Outcome: Progressing     Problem: Prexisting or High Potential for Compromised Skin Integrity  Goal: Skin integrity is maintained or improved  Description: INTERVENTIONS:  - Identify patients at risk for skin breakdown  - Assess and monitor skin integrity  - Assess and monitor nutrition and hydration status  - Monitor labs   - Assess for incontinence   - Turn and reposition patient  - Assist with mobility/ambulation  - Relieve pressure over bony prominences  - Avoid friction and shearing  - Provide appropriate hygiene as needed including keeping skin clean and dry  - Evaluate need for skin moisturizer/barrier cream  - Collaborate with interdisciplinary team   - Patient/family teaching  - Consider wound care consult   Outcome: Progressing

## 2023-07-15 NOTE — PROGRESS NOTES
Deterioration Index Critical Care Recommendations  Room #: 325  Deterioration index score: 87.8%        Spoke with RN from primary team. Vitals inaccurate, bedside pulse ox with spO2 97% and HR in the 90s. Mental status stable throughout admission. No acute concerns. Brief summary:   Critical care was brought to the patient's bedside via deterioration index alert. The alert was concerning for hypoxia, bradycardia, GCS 9. Please contact critical care via [employer] with any questions or concerns.

## 2023-07-15 NOTE — QUICK NOTE
Pt notably tachycardiac on telemetry. Normally takes metoprolol 25mg daily, however has been held s/d to dysphagia. Lopressor 5mg IV x1 given with appropriate response. Consider schedule lopressor IV if unable to advance diet today.

## 2023-07-15 NOTE — PROGRESS NOTES
[facility]  Progress Note  Name: Ravi Paez  MRN: [id number]  Unit/Bed#: -01 I Date of Admission: 7/13/2023   Date of Service: 7/15/2023 I Hospital Day: 2    Assessment/Plan   Dysphagia  Assessment & Plan  History of dysphagia  Presently n.p.o. state  Aspiration precautions  We will request GI inputs  Communicated with GI via [place] [facility]      * Acute metabolic encephalopathy  Assessment & Plan  Patient is a resident of [facility], presents with altered mental status  Encephalopathy is multifactorial  Noted to have hypernatremia  Continue hypotonic IV fluids for hypernatremia  Optimize metabolic parameters  Avoid neurotoxins  Monitor closely      Hypernatremia  Assessment & Plan  Hypernatremia  Sodium 157  Hypotonic IV fluids now with 5% dextrose  Monitor closely  Avoid overcorrection        Goals of care, counseling/discussion  Assessment & Plan  Overall guarded prognosis discussed with spouse in detail questions answered  She would like to discuss with her daughter and see him tomorrow    Hypophosphatemia  Assessment & Plan  Normalized with repletion  Monitor    Aspiration pneumonitis (HCC)  Assessment & Plan  · Known history of dysphagia  · Imaging concerning for aspiration pneumonitis  · Received IV antibiotics in the ED, hold further antibiotics presently  · Procalcitonin levels noted  · Monitor counts temperatures  · Aspiration precautions      Hypokalemia  Assessment & Plan  Replete  Monitor    Moderate protein-calorie malnutrition ([address])  Assessment & Plan  Malnutrition Findings: Body mass index is 20.97 kg/m².        Dementia [facility])  Assessment & Plan  History of dementia  Continue memantine  Delirium precautions  Reorientation  Sleep hygiene    Elevated troponin  Assessment & Plan  Elevated troponins noted  Likely non-MI troponin elevation  Cardiology inputs noted    Fall  Assessment & Plan  Patient had a fall about 3 days back  At high risk for falls  Imaging revealed left inferior pubic ramus fracture  Orthopedic inputs noted  Safe ambulation  Fall precautions  Physical therapy    Hypercalcemia  Assessment & Plan  Mild hypercalcemia  Elevated PTH levels noted  Continue IV fluids  Monitor closely  Outpatient follow-up             VTE Pharmacologic Prophylaxis:  High Risk (Score >/= 5) - Pharmacological DVT Prophylaxis Ordered: enoxaparin (Lovenox). Sequential Compression Devices Ordered. Patient Centered Rounds: I performed bedside rounds with nursing staff today. Discussions with Specialists or Other Care Team Provider:     Education and Discussions with Family / Patient: Discussed with spouse Anival Rojas in detail overall guarded prognosis explained. .     Total Time Spent on Date of Encounter in care of patient: 34 min This time was spent on one or more of the following: performing physical exam; counseling and coordination of care; obtaining or reviewing history; documenting in the medical record; reviewing/ordering tests, medications or procedures; communicating with other healthcare professionals and discussing with patient's family/caregivers. Current Length of Stay: 2 day(s)  Current Patient Status: Inpatient   Certification Statement: The patient will continue to require additional inpatient hospital stay due to As outlined  Discharge Plan: Awaiting clinical and symptomatic improvement    Code Status: Level 1 - Full Code    Subjective:     Comfortably in bed  Discussed with RN  Continues to have dysphagia with high risk for aspiration and continues to be n.p.o. state      Objective:     Vitals:   Temp (24hrs), Av.8 °F (36.6 °C), Min:96.4 °F (35.8 °C), Max:98.7 °F (37.1 °C)    Temp:  [96.4 °F (35.8 °C)-98.7 °F (37.1 °C)] 96.4 °F (35.8 °C)  HR:  [] 85  Resp:  [19-22] 19  BP: (130-178)/(78-91) 170/86  SpO2:  [85 %-97 %] 97 %  Body mass index is 20.97 kg/m². Input and Output Summary (last 24 hours):      Intake/Output Summary (Last 24 hours) at 7/15/2023 1309  Last data filed at 7/15/2023 0543  Gross per 24 hour   Intake 1170 ml   Output 1900 ml   Net -730 ml       Physical Exam:   Physical Exam       Comfortably in bed  Features of protein calorie malnutrition noted  Neck supple  Lungs diminished breath sounds bilateral  Heart sounds S1-S2 noted  Abdomen soft  Encephalopathy noted  No pedal edema  No rash    Additional Data:     Labs:  Results from last 7 days   Lab Units 07/15/23  0537   WBC Thousand/uL 11.42*   HEMOGLOBIN g/dL 12.6   HEMATOCRIT % 40.6   PLATELETS Thousands/uL 227   NEUTROS PCT % 77*   LYMPHS PCT % 15   MONOS PCT % 7   EOS PCT % 0     Results from last 7 days   Lab Units 07/15/23  0537 07/14/23  0212 07/13/23  1014   SODIUM mmol/L 157*   < > 154*   POTASSIUM mmol/L 3.1*   < > 2.8*   CHLORIDE mmol/L 121*   < > 117*   CO2 mmol/L 32   < > 34*   BUN mg/dL 21   < > 23   CREATININE mg/dL 1.03   < > 1.10   ANION GAP mmol/L 4   < > 3   CALCIUM mg/dL 11.8*   < > 11.8*   ALBUMIN g/dL  --   --  3.2*   TOTAL BILIRUBIN mg/dL  --   --  0.49   ALK PHOS U/L  --   --  59   ALT U/L  --   --  14   AST U/L  --   --  17   GLUCOSE RANDOM mg/dL 120   < > 199*    < > = values in this interval not displayed.      Results from last 7 days   Lab Units 07/13/23  1014   INR  1.08             Results from last 7 days   Lab Units 07/15/23  0537 07/13/23  1041   LACTIC ACID mmol/L  --  2.0   PROCALCITONIN ng/ml 0.07 0.05       Lines/Drains:  Invasive Devices     Peripheral Intravenous Line  Duration           Peripheral IV 07/13/23 Left Antecubital 2 days    Peripheral IV 07/14/23 Distal;Right;Ventral (anterior) Forearm 1 day          Drain  Duration           Urethral Catheter Latex 16 Fr. 2 days              Urinary Catheter:  Goal for removal: N/A - Chronic [place]ons           Telemetry:  Telemetry Orders (From admission, onward)             24 Hour Telemetry Monitoring  (ED Bridging Orders Panel)  Continuous x 24 Hours ([place])        Question:  Reason for 24 Hour Telemetry  Answer:  Metabolic/electrolyte disturbance with high probability of dysrhythmia. K level <3 or >6 OR KCL infusion >10mEq/hr                 Telemetry Reviewed: Sinus rhythm  Indication for Continued Telemetry Use: Metabolic/electrolyte disturbance with high probability of dysrhythmia             Imaging: Reviewed radiology reports from this admission including: chest CT scan and procedure reports    Recent Cultures (last 7 days):   Results from last 7 days   Lab Units 07/13/23  1041   BLOOD CULTURE  No Growth at 24 hrs. No Growth at 24 hrs. Last 24 Hours Medication List:   Current Facility-Administered Medications   Medication Dose Route Frequency Provider Last Rate   • calcium carbonate  1,000 mg Oral Daily PRN Duane Hurtado PA-C     • dextrose  75 mL/hr Intravenous Continuous Renee Samuels MD 75 mL/hr (07/15/23 2307)   • enoxaparin  40 mg Subcutaneous Daily Duane Hurtado PA-C     • metoprolol  2.5 mg Intravenous Q6H Renee Samuels MD     • ondansetron  4 mg Intravenous Q6H PRN Duane Hurtado PA-C     • potassium phosphate  12 mmol Intravenous Once Renee Samuels MD 12 mmol (07/15/23 2626)        Today, Patient Was Seen By: Renee Samuels MD    **Please Note: This note may have been constructed using a voice recognition system. **

## 2023-07-15 NOTE — CONSULTS
Consult Note - Orthopedics   Riddhi Gonsalez 80 y.o. male MRN: [id number]  Unit/Bed#: -01 Encounter: [id number]      Assessment & Plan     Left inferior pubic rami fracture. 1. CT scan of the abdomen pelvis reveals an inferior pubic rami fracture on the left with some signs of healing suggestive of subacute fracture. Although there is a report of a patient fall approximately 5 days ago which may have resulted in the fracture as well. 2. Recommend continued conservative management at this time. 3. WBAT b/l LEs  4. PT/OT  5. DVT prophylaxis per primary team.  6. No further orthopedic intervention necessary. Orthopedics signing off. Call with questions. Patient may follow-up as an outpatient in 6 weeks if he is still experiencing pain in the left hip. Chief Complaint     Left hip pain    History of the Present Illness     Riddhi Gonsalez is a 80 y.o. male seen in orthopedic consultation at the request of Mariela Garzafor evaluation of patient's left hip. Patient was admitted to [address] for altered mental status. Patient is a resident of [facility]. Apparently he sustained a mechanical fall approximately 5 days ago. Patient does have a history of dementia and is primarily nonverbal at baseline. In his work-up a CT scan of the abdomen and pelvis were performed which did reveal a left pubic rami fracture. History is limited due to dementia. Patient does appear comfortable in bed in no acute distress. Physical Exam     /78   Pulse 85   Temp 98.7 °F (37.1 °C) (Oral)   Resp 22   Ht 5' 9" (1.753 m)   Wt 64.4 kg (142 lb)   SpO2 94%   BMI 20.97 kg/m²     Left hip: No gross deformity. Skin intact. No erythema ecchymosis or swelling. No significant tenderness about the hip and pelvis. Pelvis is stable. Passive range of motion of the hip is intact and nearly full without significant signs of pain.   Negative logroll test.  Patient does involuntarily move his left lower extremity. Unable to perform sensory examination due to mental status. Skin is warm and well-perfused distally. Palpable DP pulse. Eyes:  Anicteric sclerae. ENT:  Trachea midline. Lungs:  Clear to auscultation bilaterally. Cardiovascular:  Regular rate and rhythm with audible S1 and S2. Abdomen:  Soft and nontender. Lymphatic:  No palpable lymphadenopathy. Skin:  Intact without erythema. Neurologic: Unable to assess secondary to mental status. Verlena Minder Psychiatric: Currently confused. Data Review     I have personally reviewed pertinent films in PACS, and my interpretation follows:    CT scan chest abdomen pelvis 7/13/2023: Nondisplaced inferior pubic rami fracture. There is signs of sclerosis about the fracture suggestive of subacute healing fracture. I have personally reviewed pertinent lab results.   Lab Results   Component Value Date     05/08/2015    K 3.6 07/14/2023    K 4.0 05/08/2015     (H) 07/14/2023     05/08/2015    CO2 26 07/14/2023    CO2 30 10/19/2022    BUN 20 07/14/2023    BUN 13 06/09/2015    CREATININE 1.00 07/14/2023    CREATININE 0.98 06/09/2015    GLUCOSE 113 10/19/2022    GLUCOSE 170 (H) 05/08/2015     Lab Results   Component Value Date    WBC 11.42 (H) 07/15/2023    WBC 6.46 06/09/2015    HGB 12.6 07/15/2023    HGB 11.8 (L) 06/09/2015     07/15/2023     06/09/2015     Lab Results   Component Value Date    INR 1.08 07/13/2023    INR 1.04 05/08/2015    PTT 27 07/13/2023    PTT 30 05/08/2015       Past Medical History:   Diagnosis Date   • Anxiety    • Arthritis    • Coronary artery disease    • Coronary artery disease involving native coronary artery of native heart without angina pectoris    • Depression    • Fracture    • Hypercholesterolemia    • Meningioma ([address]) 11/1999    brain tumor   • Meningioma (HCC)    • Narcolepsy     daytime drowsiness and dozing off occasionally   • Orthostatic hypotension    • Palpitations    • Prostate CA [facility])    • Prostate cancer ([address])    • Stroke [facility])        Past Surgical History:   Procedure Laterality Date   • BACK SURGERY     • BRAIN SURGERY  11/08/1999   • BRAIN SURGERY     • CARDIAC SURGERY     • CORONARY ARTERY BYPASS GRAFT      x5   • CORONARY ARTERY BYPASS GRAFT         Allergies   Allergen Reactions   • Aricept [Donepezil] Confusion     confusion   • Atorvastatin Myalgia, Dizziness and Headache   • Niacin Other (See Comments)     unknown       No current facility-administered medications on file prior to encounter. Current Outpatient Medications on File Prior to Encounter   Medication Sig Dispense Refill   • clopidogrel (PLAVIX) 75 mg tablet TAKE ONE TABLET BY MOUTH EVERY  tablet 3   • melatonin 3 mg Take 1 tablet (3 mg total) by mouth daily at bedtime  0   • memantine (NAMENDA) 5 mg tablet Take 1 tablet (5 mg total) by mouth daily at bedtime  0   • metoprolol succinate (TOPROL-XL) 25 mg 24 hr tablet TAKE ONE TABLET BY MOUTH EVERY DAY 90 tablet 3   • psyllium (METAMUCIL) packet Take 1 packet by mouth daily Do not start before March 17, 2023.  0   • sertraline (ZOLOFT) 25 mg tablet Take 3 tablets (75 mg total) by mouth daily Do not start before March 17, 2023.  0   • acetaminophen (TYLENOL) 325 mg tablet Take 3 tablets (975 mg total) by mouth every 8 (eight) hours 120 tablet 0   • clopidogrel (PLAVIX) 75 mg tablet Take 1 tablet (75 mg total) by mouth daily Do not start before March 17, 2023.  (Patient not taking: Reported on 3/30/2023)  0   • enoxaparin (Lovenox) 40 mg/0.4 mL Inject 0.4 mL (40 mg total) under the skin in the morning for 28 days 11.2 mL 0   • lidocaine (LIDODERM) 5 % Apply 1 patch topically over 12 hours daily Remove & Discard patch within 12 hours or as directed by MD 14 patch 0   • Melatonin 5 MG CAPS Take 5 mg by mouth daily at bedtime (Patient not taking: Reported on 3/30/2023)     • Melatonin 5 MG TABS TAKE ONE TABLET BY MOUTH EVERY NIGHT AT BEDTIME *PATIENT SUPPLY (Patient not taking: Reported on 3/30/2023) 30 tablet 5   • memantine (NAMENDA) 5 mg tablet Take 5 mg by mouth daily at bedtime     • Metamucil Fiber 51.7 % PACK MIX 1 PACKET IN 6-8 OUNCES OF WATER AND CONSUME BY MOUTH DAILY *PATIENT SUPPLY (Patient not taking: Reported on 3/30/2023) 44 each 5   • metoprolol succinate (TOPROL-XL) 25 mg 24 hr tablet Take 1 tablet (25 mg total) by mouth daily Do not start before March 17, 2023.  (Patient not taking: Reported on 3/30/2023)  0   • midodrine (PROAMATINE) 5 mg tablet TAKE ONE TABLET BY MOUTH EVERY DAY (Patient not taking: Reported on 3/30/2023) 90 tablet 3   • omeprazole (PriLOSEC) 40 MG capsule TAKE ONE CAPSULE BY MOUTH EVERY DAY (Patient not taking: Reported on 3/30/2023) 100 capsule 3   • pantoprazole (PROTONIX) 40 mg tablet Take 1 tablet (40 mg total) by mouth daily in the early morning Do not start before March 17, 2023.  0   • psyllium (METAMUCIL) 58.6 % packet Take 1 packet by mouth daily (Patient not taking: Reported on 3/30/2023)     • sertraline (ZOLOFT) 50 mg tablet TAKE ONE AND ONE-HALF TABLETS BY MOUTH EVERY  tablet 3   • simvastatin (ZOCOR) 80 mg tablet TAKE ONE TABLET BY MOUTH EVERY DAY (Patient not taking: Reported on 3/30/2023) 90 tablet 3   • Stool Softener 100 MG capsule TAKE ONE CAPSULE BY MOUTH TWO TIMES A DAY *PATIENT SUPPLY 60 capsule 5       Social History     Tobacco Use   • Smoking status: Never   • Smokeless tobacco: Never   Vaping Use   • Vaping Use: Never used   Substance Use Topics   • Alcohol use: Not Currently     Comment: (history)   • Drug use: No       Family History   Problem Relation Age of Onset   • Hypertension Mother         benign essential   • Cancer Mother    • Osteoporosis Mother    • Suicidality Father    • Diabetes Family    • Heart disease Family    • Neuropathy Family         peripheral   • Prostate cancer Family    • Thyroid disease Family        Review of Systems     Unable to be performed due to mental status.

## 2023-07-16 PROBLEM — R33.9 URINARY RETENTION: Status: ACTIVE | Noted: 2023-01-01

## 2023-07-16 LAB
ANION GAP SERPL CALCULATED.3IONS-SCNC: 6 MMOL/L
ANION GAP SERPL CALCULATED.3IONS-SCNC: 7 MMOL/L
BUN SERPL-MCNC: 18 MG/DL (ref 5–25)
BUN SERPL-MCNC: 20 MG/DL (ref 5–25)
CALCIUM SERPL-MCNC: 10.7 MG/DL (ref 8.4–10.2)
CALCIUM SERPL-MCNC: 10.9 MG/DL (ref 8.4–10.2)
CHLORIDE SERPL-SCNC: 119 MMOL/L (ref 96–108)
CHLORIDE SERPL-SCNC: 120 MMOL/L (ref 96–108)
CO2 SERPL-SCNC: 25 MMOL/L (ref 21–32)
CO2 SERPL-SCNC: 28 MMOL/L (ref 21–32)
CREAT SERPL-MCNC: 0.92 MG/DL (ref 0.6–1.3)
CREAT SERPL-MCNC: 1 MG/DL (ref 0.6–1.3)
GFR SERPL CREATININE-BSD FRML MDRD: 65 ML/MIN/1.73SQ M
GFR SERPL CREATININE-BSD FRML MDRD: 72 ML/MIN/1.73SQ M
GLUCOSE SERPL-MCNC: 122 MG/DL (ref 65–140)
GLUCOSE SERPL-MCNC: 122 MG/DL (ref 65–140)
GLUCOSE SERPL-MCNC: 136 MG/DL (ref 65–140)
MAGNESIUM SERPL-MCNC: 2.2 MG/DL (ref 1.9–2.7)
POTASSIUM SERPL-SCNC: 2.8 MMOL/L (ref 3.5–5.3)
POTASSIUM SERPL-SCNC: 3 MMOL/L (ref 3.5–5.3)
SODIUM SERPL-SCNC: 151 MMOL/L (ref 135–147)
SODIUM SERPL-SCNC: 154 MMOL/L (ref 135–147)

## 2023-07-16 PROCEDURE — 80048 BASIC METABOLIC PNL TOTAL CA: CPT | Performed by: INTERNAL MEDICINE

## 2023-07-16 PROCEDURE — 83735 ASSAY OF MAGNESIUM: CPT | Performed by: INTERNAL MEDICINE

## 2023-07-16 PROCEDURE — 99222 1ST HOSP IP/OBS MODERATE 55: CPT | Performed by: INTERNAL MEDICINE

## 2023-07-16 PROCEDURE — 99497 ADVNCD CARE PLAN 30 MIN: CPT | Performed by: INTERNAL MEDICINE

## 2023-07-16 PROCEDURE — 99232 SBSQ HOSP IP/OBS MODERATE 35: CPT | Performed by: INTERNAL MEDICINE

## 2023-07-16 PROCEDURE — 82948 REAGENT STRIP/BLOOD GLUCOSE: CPT

## 2023-07-16 RX ORDER — POTASSIUM CHLORIDE 14.9 MG/ML
20 INJECTION INTRAVENOUS
Status: COMPLETED | OUTPATIENT
Start: 2023-07-16 | End: 2023-07-16

## 2023-07-16 RX ORDER — POTASSIUM CHLORIDE 14.9 MG/ML
20 INJECTION INTRAVENOUS ONCE
Status: COMPLETED | OUTPATIENT
Start: 2023-07-16 | End: 2023-07-16

## 2023-07-16 RX ORDER — HYDRALAZINE HYDROCHLORIDE 20 MG/ML
5 INJECTION INTRAMUSCULAR; INTRAVENOUS EVERY 6 HOURS PRN
Status: DISCONTINUED | OUTPATIENT
Start: 2023-07-16 | End: 2023-07-24 | Stop reason: HOSPADM

## 2023-07-16 RX ORDER — MAGNESIUM SULFATE 1 G/100ML
1 INJECTION INTRAVENOUS ONCE
Status: COMPLETED | OUTPATIENT
Start: 2023-07-16 | End: 2023-07-16

## 2023-07-16 RX ORDER — POTASSIUM CHLORIDE 29.8 MG/ML
40 INJECTION INTRAVENOUS ONCE
Status: DISCONTINUED | OUTPATIENT
Start: 2023-07-16 | End: 2023-07-16

## 2023-07-16 RX ADMIN — POTASSIUM CHLORIDE 20 MEQ: 14.9 INJECTION, SOLUTION INTRAVENOUS at 13:53

## 2023-07-16 RX ADMIN — MAGNESIUM SULFATE IN DEXTROSE 1 G: 10 INJECTION, SOLUTION INTRAVENOUS at 10:13

## 2023-07-16 RX ADMIN — METOPROLOL TARTRATE 2.5 MG: 5 INJECTION INTRAVENOUS at 21:31

## 2023-07-16 RX ADMIN — METOPROLOL TARTRATE 2.5 MG: 5 INJECTION INTRAVENOUS at 15:47

## 2023-07-16 RX ADMIN — ENOXAPARIN SODIUM 40 MG: 100 INJECTION SUBCUTANEOUS at 08:19

## 2023-07-16 RX ADMIN — DEXTROSE 100 ML/HR: 5 SOLUTION INTRAVENOUS at 15:54

## 2023-07-16 RX ADMIN — POTASSIUM CHLORIDE 20 MEQ: 14.9 INJECTION, SOLUTION INTRAVENOUS at 12:20

## 2023-07-16 RX ADMIN — DEXTROSE 75 ML/HR: 5 SOLUTION INTRAVENOUS at 02:54

## 2023-07-16 RX ADMIN — METOPROLOL TARTRATE 2.5 MG: 5 INJECTION INTRAVENOUS at 08:19

## 2023-07-16 RX ADMIN — POTASSIUM CHLORIDE 20 MEQ: 14.9 INJECTION, SOLUTION INTRAVENOUS at 17:21

## 2023-07-16 RX ADMIN — METOPROLOL TARTRATE 2.5 MG: 5 INJECTION INTRAVENOUS at 02:54

## 2023-07-16 NOTE — ACP (ADVANCE CARE PLANNING)
Serious Illness Conversation    1. What is your understanding now of where you are with your illness? Prognostic Understanding: appropriate understanding of prognosis  Serious illness conversation with POA wife along with daughter at bedside  They verbalized appropriate understanding of prognosis     2. How much information about what is likely to be ahead with your illness would you like to have? Information: patient wants to be fully informed  Patient's spouse wants to be fully informed     3. What did you (clinician) communicate to the patient? Prognostic Communication: Time - I wish we were not in this situation, but I am worried that time may be as short as months (express as a range, e.g. days to weeks, weeks to months, months to a year). Discussed with spouse and and daughter at bedside  -time in this situation very difficult to express     4. If your health situation worsens, what are your most important goals? Goals: be emotionally at peace, be physically comfortable  Spouse at bedside reports he needs to be at peace and be physically comfortable     5. What are the biggest fears and worries about the future and your health? Fears/Worries: pain  Spouse reports " I hope he will not be in pain"     6. What abilities are so critical to your life that you cannot imagine living without them? Unacceptable Function: being in pain or very uncomfortable, not being able to care for myself, including toileting and feeding  Discussed with spouse she reports it would be very difficult to care for him if he is in pain and uncomfortable. 7. What gives you strength as you think about the future with your illness? 8. If you become sicker, how much are you willing to go through for the possibility of gaining more time?   Be in the hospital: No    Be in the ICU: No    Be on a ventilator: No    Be on dialysis: No    Spouse will discuss with the rest of her children about feeding tube, she will also go over his living will  9. How much does your proxy and family know about your priorities and wishes? Discussion Discussion: wants clinician to talk with family        How does this plan sound to you? I will do everything I can to help you through this.   Patient verbalized understanding of the plan     I have spent 32 minutes speaking with my patient on advanced care planning today or during this visit     Advanced directives             Vinay Zeng MD

## 2023-07-16 NOTE — ASSESSMENT & PLAN NOTE
Patient is a resident of [facility], presents with altered mental status  Encephalopathy is likely multifactorial  Appears to be improving  Noted to have hypernatremia hypercalcemia  Receiving IV fluids  Avoid neurotoxins  Optimize metabolic parameters  Monitor closely

## 2023-07-16 NOTE — ASSESSMENT & PLAN NOTE
Noted to have urinary retention in the ED and a Clemons catheter was placed  Will attempt voiding trial and discontinue Clemons as encephalopathy improves

## 2023-07-16 NOTE — ASSESSMENT & PLAN NOTE
Patient had a fall about 3 days back  At high risk for falls  Imaging revealed inferior pubic ramus fracture  Orthopedic inputs noted  Analgesics  Safe ambulation  Fall precautions  Physical therapy

## 2023-07-16 NOTE — PROGRESS NOTES
[facility]  Progress Note  Name: Ashley Cat  MRN: [id number]  Unit/Bed#: -01 I Date of Admission: 7/13/2023   Date of Service: 7/16/2023 I Hospital Day: 3    Assessment/Plan   Dysphagia  Assessment & Plan  History of dysphagia  Presently n.p.o. state  Encephalopathy is improving  We will request speech therapy to reassess  Discussed with GI  Present precautions      * Acute metabolic encephalopathy  Assessment & Plan  Patient is a resident of [facility], presents with altered mental status  Encephalopathy is likely multifactorial  Appears to be improving  Noted to have hypernatremia hypercalcemia  Receiving IV fluids  Avoid neurotoxins  Optimize metabolic parameters  Monitor closely      Hypernatremia  Assessment & Plan  Hyponatremia present on admission likely due to poor p.o. intake dehydration  Sodium 154  Continue hypotonic IV fluids  Nephrology following  Monitor closely        Urinary retention  Assessment & Plan  Noted to have urinary retention in the ED and a Clemons catheter was placed  Will attempt voiding trial and discontinue Clemons as encephalopathy improves    Goals of care, counseling/discussion  Assessment & Plan  Overall guarded prognosis given dementia dysphagia with aspiration risk  Discussed at length with daughter went spouse at bedside in detail  If he continues to have poor p.o. intake -discussed tube feed option, aspiration risk, hospice  They will review his living will regarding tube feed option      Hypophosphatemia  Assessment & Plan  Normalized with repletion  Monitor    Aspiration pneumonitis (HCC)  Assessment & Plan  · Known history of dysphagia  · Imaging concerning for aspiration pneumonitis  · Received IV antibiotics in the ED, hold further antibiotics presently  · Procalcitonin levels noted  · Monitor counts temperatures  · Aspiration precautions        Hypokalemia  Assessment & Plan  Replete  Monitor    Moderate protein-calorie malnutrition [facility])  Assessment & Plan  Malnutrition Findings: Body mass index is 20.97 kg/m². Dementia [facility])  Assessment & Plan  History of dementia  Continue memantine  Delirium precautions  Reorientation  Sleep hygiene      Elevated troponin  Assessment & Plan  Elevated troponins noted  Likely non-MI troponin elevation  Cardiology inputs noted    Fall  Assessment & Plan  Patient had a fall about 3 days back  At high risk for falls  Imaging revealed inferior pubic ramus fracture  Orthopedic inputs noted  Analgesics  Safe ambulation  Fall precautions  Physical therapy      Hypercalcemia  Assessment & Plan  Mild hypercalcemia  Elevated PTH levels noted  Continue IV fluid hydration  Outpatient                   VTE Pharmacologic Prophylaxis:  High Risk (Score >/= 5) - Pharmacological DVT Prophylaxis Ordered: enoxaparin (Lovenox). Sequential Compression Devices Ordered. Patient Centered Rounds: I performed bedside rounds with nursing staff today. Discussions with Specialists or Other Care Team Provider: GI, nephrology    Education and Discussions with Family / Patient: Discussed with the spouse and daughter at bedside questions answered. Total Time Spent on Date of Encounter in care of patient: 33 min This time was spent on one or more of the following: performing physical exam; counseling and coordination of care; obtaining or reviewing history; documenting in the medical record; reviewing/ordering tests, medications or procedures; communicating with other healthcare professionals and discussing with patient's family/caregivers.     Current Length of Stay: 3 day(s)  Current Patient Status: Inpatient   Certification Statement: The patient will continue to require additional inpatient hospital stay due to As outlined  Discharge Plan: Awaiting clinical and symptomatic improvement    Code Status: Level 1 - Full Code    Subjective:     Comfortably in bed  He is more wakeful today  Encephalopathy seems to be improving  Discussed with spouse and daughter and son-in-law at bedside  Goals of care discussed      Objective:     Vitals:   Temp (24hrs), Av.6 °F (36.4 °C), Min:97.1 °F (36.2 °C), Max:98.3 °F (36.8 °C)    Temp:  [97.1 °F (36.2 °C)-98.3 °F (36.8 °C)] 97.1 °F (36.2 °C)  HR:  [62-78] 62  Resp:  [18-20] 18  BP: (127-176)/(55-91) 176/91  SpO2:  [96 %-98 %] 98 %  Body mass index is 20.97 kg/m². Input and Output Summary (last 24 hours): Intake/Output Summary (Last 24 hours) at 2023 1354  Last data filed at 2023 0537  Gross per 24 hour   Intake --   Output 1550 ml   Net -1550 ml       Physical Exam:   Physical Exam       Comfortably in bed  Features of protein calorie malnutrition noted  Neck supple  Lungs diminished breath sounds bilateral  Heart sounds S1 and S2 noted  Abdomen soft nontender  Awake  Nonverbal  Moves extremities  No rash    Additional Data:     Labs:  Results from last 7 days   Lab Units 07/15/23  0537   WBC Thousand/uL 11.42*   HEMOGLOBIN g/dL 12.6   HEMATOCRIT % 40.6   PLATELETS Thousands/uL 227   NEUTROS PCT % 77*   LYMPHS PCT % 15   MONOS PCT % 7   EOS PCT % 0     Results from last 7 days   Lab Units 23  0535 23  0212 23  1014   SODIUM mmol/L 154*   < > 154*   POTASSIUM mmol/L 2.8*   < > 2.8*   CHLORIDE mmol/L 120*   < > 117*   CO2 mmol/L 28   < > 34*   BUN mg/dL 20   < > 23   CREATININE mg/dL 1.00   < > 1.10   ANION GAP mmol/L 6   < > 3   CALCIUM mg/dL 10.9*   < > 11.8*   ALBUMIN g/dL  --   --  3.2*   TOTAL BILIRUBIN mg/dL  --   --  0.49   ALK PHOS U/L  --   --  59   ALT U/L  --   --  14   AST U/L  --   --  17   GLUCOSE RANDOM mg/dL 136   < > 199*    < > = values in this interval not displayed.      Results from last 7 days   Lab Units 23  1014   INR  1.08             Results from last 7 days   Lab Units 07/15/23  0537 23  1041   LACTIC ACID mmol/L  --  2.0   PROCALCITONIN ng/ml 0.07 0.05       Lines/Drains:  Invasive Devices     Peripheral Intravenous Line  Duration           Peripheral IV 23 Left Antecubital 3 days    Peripheral IV 23 Distal;Right;Ventral (anterior) Forearm 2 days          Drain  Duration           Urethral Catheter Latex 16 Fr. 3 days              Urinary Catheter:  Goal for removal: Voiding trial when ambulation improves           Telemetry:  Telemetry Orders (From admission, onward)             24 Hour Telemetry Monitoring  (ED Bridging Orders Panel)  Continuous x 24 Hours (Telem)           Question:  Reason for 24 Hour Telemetry  Answer:  Metabolic/electrolyte disturbance with high probability of dysrhythmia. K level <3 or >6 OR KCL infusion >10mEq/hr                 Telemetry Reviewed: Normal Sinus Rhythm  Indication for Continued Telemetry Use: Metabolic/electrolyte disturbance with high probability of dysrhythmia             Imaging: Reviewed radiology reports from this admission including: chest CT scan    Recent Cultures (last 7 days):   Results from last 7 days   Lab Units 23  1041   BLOOD CULTURE  No Growth at 48 hrs. No Growth at 48 hrs.        Last 24 Hours Medication List:   Current Facility-Administered Medications   Medication Dose Route Frequency Provider Last Rate   • calcium carbonate  1,000 mg Oral Daily PRN Karin Montes PA-C     • dextrose  100 mL/hr Intravenous Continuous MICHELE Lowe 100 mL/hr (23 1220)   • enoxaparin  40 mg Subcutaneous Daily Chau Jenkins PA-C     • hydrALAZINE  5 mg Intravenous Q6H PRN Hola Lima MD     • metoprolol  2.5 mg Intravenous Q6H Hola Lima MD     • ondansetron  4 mg Intravenous Q6H PRN Karin Montes PA-C     • potassium chloride  20 mEq Intravenous Q2H Hola Lima MD 20 mEq (23 1353)   • potassium chloride  20 mEq Intravenous Once MICHELE Lowe          Today, Patient Was Seen By: Hola Lima MD    **Please Note: This note may have been constructed using a voice recognition system. **

## 2023-07-16 NOTE — ASSESSMENT & PLAN NOTE
History of dysphagia  Presently n.p.o. state  Encephalopathy is improving  We will request speech therapy to reassess  Discussed with GI  Present precautions

## 2023-07-16 NOTE — ASSESSMENT & PLAN NOTE
Hyponatremia present on admission likely due to poor p.o. intake dehydration  Sodium 154  Continue hypotonic IV fluids  Nephrology following  Monitor closely

## 2023-07-16 NOTE — CONSULTS
Consultation - Nephrology   Lashon Balderas 80 y.o. male MRN: [id number]  Unit/Bed#: -01 Encounter: [id number]    ASSESSMENT/PLAN:  Hypernatremia: Due to impaired free water intake. -Presented with sodium of 157 07/14.  -Repeat am Na level minimally improved, currently 154.  -Currently on D5 at 75 cc/hr. Increase to 100 cc/hr. -Remains NPO, failed speech eval.  -Goal to correct 6 to 8 mEq in 24 hours. -Repeat Na level later this evening, further fluid adjustment forthcoming. Moderate hydronephrosis/hydroureter on the left: Seen on CT scan with distended bladder.  -Currently with Clemons catheter. -Monitor for post obstructive diuresis. Hypokalemia:  -Mag level is normal.   -Receiving IV potassium replacement. Give additional 20 mEq's. Hypercalcemia: In the setting of dysphagia with volume depletion vs primary hyperparathyroidism. Improving with IV hydration. -.9, vitamin D 36.2.  -Holding calcium supplements.  -Receiving fluid hydration. Hypophosphatemia: Due to poor oral intake.  -Initial phosphorus level low at 1.8, received phosphorus replacement and repeat level 3.0.  -Patient remains n.p.o., will continue to monitor and replace as needed. Blood pressure: With fluctuation.  -Current medication: Metoprolol 2.5 mg IV every 6 hours. -Recommend avoiding hypotension or high fluctuations with blood pressure. Dysphagia/aspiration pneumonitis: Speech therapy team is following, currently and appropriate for oral intake. -Recommending goals of care discussion. -GI team is following.  -Received IV antibiotics in the emergency department. Other: Dementia, inferior pubic rami fracture.       HISTORY OF PRESENT ILLNESS:  Requesting Physician: Didier Gustafson, *  Reason for Consult: Electrolyte derangements, hyponatremia, hypokalemia, hypophosphatemia, hypercalcemia    Lashon Balderas is a 80y.o. year old male with history of hypertension, CVA, CAD, hyperlipidemia, dementia, dysphagia, recent fall, prostate cancer, who presents from [facility] and was admitted to [address] [place] [address] after presenting after fall 3 days ago. Patient has a history of dementia, unable to obtain further HPI. Information obtained from patient's chart. Lab work in emergency department showed electrolyte derangements. CT scan showed inferior pubic rami fracture. There is concern for aspiration pneumonia. He received antibiotics. A renal consultation is requested today for assistance in the management of electrolyte derangement.     PAST MEDICAL HISTORY:  Past Medical History:   Diagnosis Date   • Anxiety    • Arthritis    • Coronary artery disease    • Coronary artery disease involving native coronary artery of native heart without angina pectoris    • Depression    • Fracture    • Hypercholesterolemia    • Meningioma ([address]) 11/1999    brain tumor   • Meningioma (HCC)    • Narcolepsy     daytime drowsiness and dozing off occasionally   • Orthostatic hypotension    • Palpitations    • Prostate CA (HCC)    • Prostate cancer ([address])    • Stroke ([address])        PAST SURGICAL HISTORY:  Past Surgical History:   Procedure Laterality Date   • BACK SURGERY     • BRAIN SURGERY  11/08/1999   • BRAIN SURGERY     • CARDIAC SURGERY     • CORONARY ARTERY BYPASS GRAFT      x5   • CORONARY ARTERY BYPASS GRAFT         ALLERGIES:  Allergies   Allergen Reactions   • Aricept [Donepezil] Confusion     confusion   • Atorvastatin Myalgia, Dizziness and Headache   • Niacin Other (See Comments)     unknown       SOCIAL HISTORY:  Social History     Substance and Sexual Activity   Alcohol Use Not Currently    Comment: (history)     Social History     Substance and Sexual Activity   Drug Use No     Social History     Tobacco Use   Smoking Status Never   Smokeless Tobacco Never       FAMILY HISTORY:  Family History   Problem Relation Age of Onset   • Hypertension Mother         benign essential   • Cancer Mother    • Osteoporosis Mother    • Suicidality Father    • Diabetes Family    • Heart disease Family    • Neuropathy Family         peripheral   • Prostate cancer Family    • Thyroid disease Family        MEDICATIONS:    Current Facility-Administered Medications:   •  calcium carbonate (TUMS) chewable tablet 1,000 mg, 1,000 mg, Oral, Daily PRN, Josy Jenkins PA-C  •  dextrose 5 % infusion, 75 mL/hr, Intravenous, Continuous, Renee Samuels MD, Last Rate: 75 mL/hr at 07/16/23 0254, 75 mL/hr at 07/16/23 0254  •  enoxaparin (LOVENOX) subcutaneous injection 40 mg, 40 mg, Subcutaneous, Daily, Aziza Barrera PA-C, 40 mg at 07/16/23 5683  •  hydrALAZINE (APRESOLINE) injection 5 mg, 5 mg, Intravenous, Q6H PRN, Renee Samuels MD  •  metoprolol (LOPRESSOR) injection 2.5 mg, 2.5 mg, Intravenous, Q6H, Renee Samuels MD, 2.5 mg at 07/16/23 0819  •  ondansetron (ZOFRAN) injection 4 mg, 4 mg, Intravenous, Q6H PRN, Aziza Barrera PA-C  •  potassium chloride 20 mEq IVPB (premix), 20 mEq, Intravenous, Q2H, Renee Samuels MD    REVIEW OF SYSTEMS:  Unable to complete due to patient's current mental status.     PHYSICAL EXAM:  Current Weight: Weight - Scale: 64.4 kg (142 lb)  First Weight: Weight - Scale: 64.4 kg (142 lb)  Vitals:    07/15/23 1700 07/15/23 1922 07/15/23 2231 07/16/23 0818   BP:  163/71 158/75 (!) 176/91   Pulse: 78   62   Resp:  18 18 18   Temp:  (!) 97.4 °F (36.3 °C) (!) 97.1 °F (36.2 °C)    TempSrc:       SpO2: 96%  97% 98%   Weight:       Height:           Intake/Output Summary (Last 24 hours) at 7/16/2023 1205  Last data filed at 7/16/2023 0537  Gross per 24 hour   Intake --   Output 1550 ml   Net -1550 ml     General: NAD  Skin: warm, dry, intact, no rash  HEENT: Moist mucous membranes, sclera anicteric, normocephalic, atraumatic  Neck: No apparent JVD appreciated  Chest:lung sounds clear B/L, on O2  CVS:Regular rate and rhythm, no murmer   Abdomen: Soft, round, non-tender, +BS  Extremities: No B/L LE edema present  Neuro: alert and oriented  Psych: appropriate mood and affect     Invasive Devices:   Urethral Catheter Latex 16 Fr. (Active)   Reasons to continue Urinary Catheter  Acute urinary retention/obstruction failing urinary retention protocol 07/13/23 2100   Goal for Removal Voiding trial when ambulation improves 07/13/23 2100   Site Assessment Clean;Skin intact 07/13/23 2100   Clemons Care Done 07/15/23 2100   Collection Container Standard drainage bag 07/13/23 2100   Securement Method Securing device (Describe) 07/13/23 2100   Output (mL) 350 mL 07/16/23 0537     Lab Results:   Results from last 7 days   Lab Units 07/16/23  0535 07/15/23  2011 07/15/23  0537 07/14/23  1845 07/14/23  0212 07/13/23  1014   WBC Thousand/uL  --   --  11.42*  --  11.79* 9.23   HEMOGLOBIN g/dL  --   --  12.6  --  12.0 12.3   HEMATOCRIT %  --   --  40.6  --  38.1 38.5   PLATELETS Thousands/uL  --   --  227  --  231 232   SODIUM mmol/L 154* 156* 157*   < > 157* 154*   POTASSIUM mmol/L 2.8* 2.8* 3.1*   < > 3.0* 2.8*   CHLORIDE mmol/L 120* 120* 121*   < > 124* 117*   CO2 mmol/L 28 32 32   < > 27 34*   BUN mg/dL 20 22 21   < > 18 23   CREATININE mg/dL 1.00 0.99 1.03   < > 0.97 1.10   CALCIUM mg/dL 10.9* 11.2* 11.8*   < > 10.5* 11.8*   MAGNESIUM mg/dL 2.2  --   --   --   --  2.0   PHOSPHORUS mg/dL  --   --  3.0  --   --  1.8*   ALK PHOS U/L  --   --   --   --   --  59   ALT U/L  --   --   --   --   --  14   AST U/L  --   --   --   --   --  17    < > = values in this interval not displayed.

## 2023-07-16 NOTE — PLAN OF CARE
Problem: Potential for Falls  Goal: Patient will remain free of falls  Description: INTERVENTIONS:  - Educate patient/family on patient safety including physical limitations  - Instruct patient to call for assistance with activity   - Consult OT/PT to assist with strengthening/mobility   - Keep Call bell within reach  - Keep bed low and locked with side rails adjusted as appropriate  - Keep care items and personal belongings within reach  - Initiate and maintain comfort rounds  - Make Fall Risk Sign visible to staff  - Offer Toileting every Hours, in advance of need  - Initiate/Maintain alarm  - Obtain necessary fall risk management equipment:   - Apply yellow socks and bracelet for high fall risk patients  - Consider moving patient to room near nurses station  Outcome: Progressing     Problem: MOBILITY - ADULT  Goal: Maintain or return to baseline ADL function  Description: INTERVENTIONS:  -  Assess patient's ability to carry out ADLs; assess patient's baseline for ADL function and identify physical deficits which impact ability to perform ADLs (bathing, care of mouth/teeth, toileting, grooming, dressing, etc.)  - Assess/evaluate cause of self-care deficits   - Assess range of motion  - Assess patient's mobility; develop plan if impaired  - Assess patient's need for assistive devices and provide as appropriate  - Encourage maximum independence but intervene and supervise when necessary  - Involve family in performance of ADLs  - Assess for home care needs following discharge   - Consider OT consult to assist with ADL evaluation and planning for discharge  - Provide patient education as appropriate  Outcome: Progressing  Goal: Maintains/Returns to pre admission functional level  Description: INTERVENTIONS:  - Perform BMAT or MOVE assessment daily.   - Set and communicate daily mobility goal to care team and patient/family/caregiver.    - Collaborate with rehabilitation services on mobility goals if consulted  - Perform Range of Motion  times a day. - Reposition patient every  hours.   - Dangle patient  times a day  - Stand patient  times a day  - Ambulate patient  times a day  - Out of bed to chair  times a day   - Out of bed for meals  times a day  - Out of bed for toileting  - Record patient progress and toleration of activity level   Outcome: Progressing     Problem: PAIN - ADULT  Goal: Verbalizes/displays adequate comfort level or baseline comfort level  Description: Interventions:  - Encourage patient to monitor pain and request assistance  - Assess pain using appropriate pain scale  - Administer analgesics based on type and severity of pain and evaluate response  - Implement non-pharmacological measures as appropriate and evaluate response  - Consider cultural and social influences on pain and pain management  - Notify physician/advanced practitioner if interventions unsuccessful or patient reports new pain  Outcome: Progressing     Problem: INFECTION - ADULT  Goal: Absence or prevention of progression during hospitalization  Description: INTERVENTIONS:  - Assess and monitor for signs and symptoms of infection  - Monitor lab/diagnostic results  - Monitor all insertion sites, i.e. indwelling lines, tubes, and drains  - Monitor endotracheal if appropriate and nasal secretions for changes in amount and color  - Myrtle appropriate cooling/warming therapies per order  - Administer medications as ordered  - Instruct and encourage patient and family to use good hand hygiene technique  - Identify and instruct in appropriate isolation precautions for identified infection/condition  Outcome: Progressing  Goal: Absence of fever/infection during neutropenic period  Description: INTERVENTIONS:  - Monitor WBC    Outcome: Progressing     Problem: SAFETY ADULT  Goal: Patient will remain free of falls  Description: INTERVENTIONS:  - Educate patient/family on patient safety including physical limitations  - Instruct patient to call for assistance with activity   - Consult OT/PT to assist with strengthening/mobility   - Keep Call bell within reach  - Keep bed low and locked with side rails adjusted as appropriate  - Keep care items and personal belongings within reach  - Initiate and maintain comfort rounds  - Make Fall Risk Sign visible to staff  - Offer Toileting every Hours, in advance of need  - Initiate/Maintain alarm  - Obtain necessary fall risk management equipment:   - Apply yellow socks and bracelet for high fall risk patients  - Consider moving patient to room near nurses station  Outcome: Progressing  Goal: Maintain or return to baseline ADL function  Description: INTERVENTIONS:  -  Assess patient's ability to carry out ADLs; assess patient's baseline for ADL function and identify physical deficits which impact ability to perform ADLs (bathing, care of mouth/teeth, toileting, grooming, dressing, etc.)  - Assess/evaluate cause of self-care deficits   - Assess range of motion  - Assess patient's mobility; develop plan if impaired  - Assess patient's need for assistive devices and provide as appropriate  - Encourage maximum independence but intervene and supervise when necessary  - Involve family in performance of ADLs  - Assess for home care needs following discharge   - Consider OT consult to assist with ADL evaluation and planning for discharge  - Provide patient education as appropriate  Outcome: Progressing  Goal: Maintains/Returns to pre admission functional level  Description: INTERVENTIONS:  - Perform BMAT or MOVE assessment daily.   - Set and communicate daily mobility goal to care team and patient/family/caregiver. - Collaborate with rehabilitation services on mobility goals if consulted  - Perform Range of Motion  times a day. - Reposition patient every  hours.   - Dangle patient  times a day  - Stand patient  times a day  - Ambulate patient  times a day  - Out of bed to chair  times a day   - Out of bed for meals  times a day  - Out of bed for toileting  - Record patient progress and toleration of activity level   Outcome: Progressing     Problem: DISCHARGE PLANNING  Goal: Discharge to home or other facility with appropriate resources  Description: INTERVENTIONS:  - Identify barriers to discharge w/patient and caregiver  - Arrange for needed discharge resources and transportation as appropriate  - Identify discharge learning needs (meds, wound care, etc.)  - Arrange for interpretive services to assist at discharge as needed  - Refer to Case Management Department for coordinating discharge planning if the patient needs post-hospital services based on physician/advanced practitioner order or complex needs related to functional status, cognitive ability, or social support system  Outcome: Progressing     Problem: Knowledge Deficit  Goal: Patient/family/caregiver demonstrates understanding of disease process, treatment plan, medications, and discharge instructions  Description: Complete learning assessment and assess knowledge base. Interventions:  - Provide teaching at level of understanding  - Provide teaching via preferred learning methods  Outcome: Progressing     Problem: NEUROSENSORY - ADULT  Goal: Achieves stable or improved neurological status  Description: INTERVENTIONS  - Monitor and report changes in neurological status  - Monitor vital signs such as temperature, blood pressure, glucose, and any other labs ordered   - Initiate measures to prevent increased intracranial pressure  - Monitor for seizure activity and implement precautions if appropriate      Outcome: Progressing  Goal: Achieves maximal functionality and self care  Description: INTERVENTIONS  - Monitor swallowing and airway patency with patient fatigue and changes in neurological status  - Encourage and assist patient to increase activity and self care.    - Encourage visually impaired, hearing impaired and aphasic patients to use assistive/communication devices  Outcome: Progressing     Problem: SAFETY,RESTRAINT: NV/NON-SELF DESTRUCTIVE BEHAVIOR  Goal: Remains free of harm/injury (restraint for non violent/non self-detsructive behavior)  Description: INTERVENTIONS:  - Instruct patient/family regarding restraint use   - Assess and monitor physiologic and psychological status   - Provide interventions and comfort measures to meet assessed patient needs   - Identify and implement measures to help patient regain control  - Assess readiness for release of restraint   Outcome: Progressing  Goal: Returns to optimal restraint-free functioning  Description: INTERVENTIONS:  - Assess the patient's behavior and symptoms that indicate continued need for restraint  - Identify and implement measures to help patient regain control  - Assess readiness for release of restraint   Outcome: Progressing     Problem: Nutrition/Hydration-ADULT  Goal: Nutrient/Hydration intake appropriate for improving, restoring or maintaining nutritional needs  Description: Monitor and assess patient's nutrition/hydration status for malnutrition. Collaborate with interdisciplinary team and initiate plan and interventions as ordered. Monitor patient's weight and dietary intake as ordered or per policy. Utilize nutrition screening tool and intervene as necessary. Determine patient's food preferences and provide high-protein, high-caloric foods as appropriate.      INTERVENTIONS:  - Monitor oral intake, urinary output, labs, and treatment plans  - Assess nutrition and hydration status and recommend course of action  - Evaluate amount of meals eaten  - Assist patient with eating if necessary   - Allow adequate time for meals  - Recommend/ encourage appropriate diets, oral nutritional supplements, and vitamin/mineral supplements  - Order, calculate, and assess calorie counts as needed  - Recommend, monitor, and adjust tube feedings and TPN/PPN based on assessed needs  - Assess need for intravenous fluids  - Provide specific nutrition/hydration education as appropriate  - Include patient/family/caregiver in decisions related to nutrition  Outcome: Progressing     Problem: Prexisting or High Potential for Compromised Skin Integrity  Goal: Skin integrity is maintained or improved  Description: INTERVENTIONS:  - Identify patients at risk for skin breakdown  - Assess and monitor skin integrity  - Assess and monitor nutrition and hydration status  - Monitor labs   - Assess for incontinence   - Turn and reposition patient  - Assist with mobility/ambulation  - Relieve pressure over bony prominences  - Avoid friction and shearing  - Provide appropriate hygiene as needed including keeping skin clean and dry  - Evaluate need for skin moisturizer/barrier cream  - Collaborate with interdisciplinary team   - Patient/family teaching  - Consider wound care consult   Outcome: Progressing

## 2023-07-16 NOTE — PLAN OF CARE
Problem: Potential for Falls  Goal: Patient will remain free of falls  Description: INTERVENTIONS:  - Educate patient/family on patient safety including physical limitations  - Instruct patient to call for assistance with activity   - Consult OT/PT to assist with strengthening/mobility   - Keep Call bell within reach  - Keep bed low and locked with side rails adjusted as appropriate  - Keep care items and personal belongings within reach  - Initiate and maintain comfort rounds  - Make Fall Risk Sign visible to staff  - Offer Toileting every Hours, in advance of need  - Initiate/Maintain alarm  - Obtain necessary fall risk management equipment:   - Apply yellow socks and bracelet for high fall risk patients  - Consider moving patient to room near nurses station  Outcome: Progressing     Problem: MOBILITY - ADULT  Goal: Maintain or return to baseline ADL function  Description: INTERVENTIONS:  -  Assess patient's ability to carry out ADLs; assess patient's baseline for ADL function and identify physical deficits which impact ability to perform ADLs (bathing, care of mouth/teeth, toileting, grooming, dressing, etc.)  - Assess/evaluate cause of self-care deficits   - Assess range of motion  - Assess patient's mobility; develop plan if impaired  - Assess patient's need for assistive devices and provide as appropriate  - Encourage maximum independence but intervene and supervise when necessary  - Involve family in performance of ADLs  - Assess for home care needs following discharge   - Consider OT consult to assist with ADL evaluation and planning for discharge  - Provide patient education as appropriate  Outcome: Progressing     Problem: SAFETY ADULT  Goal: Patient will remain free of falls  Description: INTERVENTIONS:  - Educate patient/family on patient safety including physical limitations  - Instruct patient to call for assistance with activity   - Consult OT/PT to assist with strengthening/mobility   - Keep Call bell within reach  - Keep bed low and locked with side rails adjusted as appropriate  - Keep care items and personal belongings within reach  - Initiate and maintain comfort rounds  - Make Fall Risk Sign visible to staff  - Offer Toileting every Hours, in advance of need  - Initiate/Maintain alarm  - Obtain necessary fall risk management equipment:   - Apply yellow socks and bracelet for high fall risk patients  - Consider moving patient to room near nurses station  Outcome: Progressing  Goal: Maintain or return to baseline ADL function  Description: INTERVENTIONS:  -  Assess patient's ability to carry out ADLs; assess patient's baseline for ADL function and identify physical deficits which impact ability to perform ADLs (bathing, care of mouth/teeth, toileting, grooming, dressing, etc.)  - Assess/evaluate cause of self-care deficits   - Assess range of motion  - Assess patient's mobility; develop plan if impaired  - Assess patient's need for assistive devices and provide as appropriate  - Encourage maximum independence but intervene and supervise when necessary  - Involve family in performance of ADLs  - Assess for home care needs following discharge   - Consider OT consult to assist with ADL evaluation and planning for discharge  - Provide patient education as appropriate  Outcome: Progressing

## 2023-07-16 NOTE — ASSESSMENT & PLAN NOTE
Overall guarded prognosis given dementia dysphagia with aspiration risk  Discussed at length with daughter went spouse at bedside in detail  If he continues to have poor p.o. intake -discussed tube feed option, aspiration risk, hospice  They will review his living will regarding tube feed option

## 2023-07-16 NOTE — CONSULTS
Consultation - [address] [place] [address] [facility]     Cathie Prey 80 y.o. male MRN: [id number]  Unit/Bed#: -01 Encounter: [id number]    Consults    ASSESSMENT and PLAN     Patient is a 80-year-old male past medical history of CAD, CVA, dementia who presents with altered mental status after a fall. Found to have aspiration pneumonitis. With dysphagia      1. Dysphagia  2. Acute metabolic encephalopathy  3. Hyponatremia  4. Goals of care  5. Aspiration pneumonitis  6. Moderate protein calorie malnutrition  7. Dementia    Patient presenting with altered mental status and acute metabolic encephalopathy with hyponatremia along with possible aspiration pneumonitis. Prior to this episode he was on a puréed diet. Hopeful that his mental status returns after treatment of his metabolic abnormalities along with possible infection and can resume his puréed diet. Continue to work with speech therapy    Discussed with family member regarding possible feeding tube placement. Ultimately can increase the risk of aspiration pneumonia along with increasing mortality. His wife will look at his living well if there was any indication of whether or not he would want a feeding tube in the situation. No current plans for PEG tube placement but if patient does not improve his mental status or ability to swallow, can consider PEG tube placement understanding the risks versus hospice without feeding tube      Chief Complaint   Patient presents with   • Fall     Patient presents to the ER via EMS from [facility] with report of having a fall on Monday with a steady decline in mentation since and reportedly having abnormal labs (Low Potassium and Low Sodium). Physician Requesting Consult: Siddhartha Naylor, *    HPI  Patient is a 80-year-old male past medical history of CAD, CVA, dementia who presents with altered mental status after a fall.   History is unable to be provided by the patient due to his altered mental status. We are being consulted for his dysphagia. Encephalopathy is likely multifactorial noted to be hyponatremic. Imaging findings concerning for aspiration pneumonitis. History is limited as patient is not at baseline mental status. Continues to have dysphagia with high risk for aspiration. Is working with speech therapy. Prior to this episode he was on a puréed diet.           Historical Information   Past Medical History:   Diagnosis Date   • Anxiety    • Arthritis    • Coronary artery disease    • Coronary artery disease involving native coronary artery of native heart without angina pectoris    • Depression    • Fracture    • Hypercholesterolemia    • Meningioma ([address]) 11/1999    brain tumor   • Meningioma (HCC)    • Narcolepsy     daytime drowsiness and dozing off occasionally   • Orthostatic hypotension    • Palpitations    • Prostate CA (HCC)    • Prostate cancer ([address])    • Stroke [facility])      Past Surgical History:   Procedure Laterality Date   • BACK SURGERY     • BRAIN SURGERY  11/08/1999   • BRAIN SURGERY     • CARDIAC SURGERY     • CORONARY ARTERY BYPASS GRAFT      x5   • CORONARY ARTERY BYPASS GRAFT       Social History   Social History     Substance and Sexual Activity   Alcohol Use Not Currently    Comment: (history)     Social History     Substance and Sexual Activity   Drug Use No     Social History     Tobacco Use   Smoking Status Never   Smokeless Tobacco Never     Family History   Problem Relation Age of Onset   • Hypertension Mother         benign essential   • Cancer Mother    • Osteoporosis Mother    • Suicidality Father    • Diabetes Family    • Heart disease Family    • Neuropathy Family         peripheral   • Prostate cancer Family    • Thyroid disease Family        Meds/Allergies     Current Facility-Administered Medications   Medication Dose Route Frequency   • calcium carbonate (TUMS) chewable tablet 1,000 mg  1,000 mg Oral Daily PRN   • dextrose 5 % infusion 100 mL/hr Intravenous Continuous   • enoxaparin (LOVENOX) subcutaneous injection 40 mg  40 mg Subcutaneous Daily   • hydrALAZINE (APRESOLINE) injection 5 mg  5 mg Intravenous Q6H PRN   • metoprolol (LOPRESSOR) injection 2.5 mg  2.5 mg Intravenous Q6H   • ondansetron (ZOFRAN) injection 4 mg  4 mg Intravenous Q6H PRN   • potassium chloride 20 mEq IVPB (premix)  20 mEq Intravenous Q2H   • potassium chloride 20 mEq IVPB (premix)  20 mEq Intravenous Once     Medications Prior to Admission   Medication   • clopidogrel (PLAVIX) 75 mg tablet   • melatonin 3 mg   • memantine (NAMENDA) 5 mg tablet   • metoprolol succinate (TOPROL-XL) 25 mg 24 hr tablet   • psyllium (METAMUCIL) packet   • sertraline (ZOLOFT) 25 mg tablet   • acetaminophen (TYLENOL) 325 mg tablet   • clopidogrel (PLAVIX) 75 mg tablet   • enoxaparin (Lovenox) 40 mg/0.4 mL   • lidocaine (LIDODERM) 5 %   • Melatonin 5 MG CAPS   • Melatonin 5 MG TABS   • memantine (NAMENDA) 5 mg tablet   • Metamucil Fiber 51.7 % PACK   • metoprolol succinate (TOPROL-XL) 25 mg 24 hr tablet   • midodrine (PROAMATINE) 5 mg tablet   • omeprazole (PriLOSEC) 40 MG capsule   • pantoprazole (PROTONIX) 40 mg tablet   • psyllium (METAMUCIL) 58.6 % packet   • sertraline (ZOLOFT) 50 mg tablet   • simvastatin (ZOCOR) 80 mg tablet   • Stool Softener 100 MG capsule       Allergies   Allergen Reactions   • Aricept [Donepezil] Confusion     confusion   • Atorvastatin Myalgia, Dizziness and Headache   • Niacin Other (See Comments)     unknown       PHYSICAL EXAM    Temp:  [97.1 °F (36.2 °C)-98.3 °F (36.8 °C)] 97.1 °F (36.2 °C)  HR:  [62-78] 62  Resp:  [18-20] 18  BP: (127-176)/(55-91) 176/91   Body mass index is 20.97 kg/m². General Appearance: NAD, cooperative, alert  Eyes: Anicteric  GI:  Soft, non-tender, non-distended; normal bowel sounds; no masses, no organomegaly   Rectal: Deferred  Musculoskeletal: No edema.   Skin:  No jaundice    Lab Results   Component Value Date    GLUCOSE 113 10/19/2022 CALCIUM 10.9 (H) 07/16/2023     05/08/2015    K 2.8 (L) 07/16/2023    CO2 28 07/16/2023     (H) 07/16/2023    BUN 20 07/16/2023    CREATININE 1.00 07/16/2023    CREATININE 0.99 07/15/2023    CREATININE 1.03 07/15/2023     Lab Results   Component Value Date    WBC 11.42 (H) 07/15/2023    WBC 11.79 (H) 07/14/2023    WBC 9.23 07/13/2023    HGB 12.6 07/15/2023    HGB 12.0 07/14/2023    HGB 12.3 07/13/2023    MCV 85 07/15/2023     07/15/2023     07/14/2023     07/13/2023     Lab Results   Component Value Date    ALT 14 07/13/2023    ALT 35 03/15/2023    ALT 45 03/13/2023    AST 17 07/13/2023    AST 41 03/15/2023    AST 63 (H) 03/13/2023    ALKPHOS 59 07/13/2023    ALKPHOS 59 03/15/2023    ALKPHOS 69 03/13/2023    TBILI 0.49 07/13/2023    TBILI 0.44 03/15/2023    TBILI 0.73 03/13/2023     No results found for: "AMYLASE"  No results found for: "LIPASE"  Lab Results   Component Value Date    IRON 64 (L) 06/14/2018    TIBC 344 02/01/2018    FERRITIN 36 02/01/2018     Lab Results   Component Value Date    INR 1.08 07/13/2023    INR 1.09 11/10/2019    INR 1.07 11/07/2019       CTA ED chest PE study    Result Date: 7/13/2023  Narrative: CTA - CHEST WITH IV CONTRAST - PULMONARY ANGIOGRAM INDICATION:   abnormal CT scan possible PE RLL. Per my review of the medical record, chest CT from today with aspiration and debris in the right lower lobe bronchi with questionable pulmonary embolus. Altered mental status with fall 3 days ago. COMPARISON: Chest and abdomen CT from today. TECHNIQUE: CT angiogram timed for optimal opacification of the pulmonary arteries. Axial, sagittal, and coronal 2D reformats created from source data. Coronal 3D MIP postprocessing on the acquisition scanner. Radiation dose length product (DLP):  198 mGy-cm . Radiation dose exposure minimized using iterative reconstruction and automated exposure control.  IV Contrast:  80 mL of iohexol (OMNIPAQUE) FINDINGS: PULMONARY ARTERIES: Compromised by respiratory motion, particularly in the lower lobes, but with no definite acute pulmonary emboli. LUNGS: Patchy groundglass opacity in the right upper lobe with tree-in-bud nodularity in both lower lobes. AIRWAYS: Mild bilateral lower lobe bronchiectasis with endobronchial debris in the right lower lobe. PLEURA:  Unremarkable. HEART/GREAT VESSELS: Normal heart size. Moderate coronary artery calcifications status post CABG. MEDIASTINUM AND PETER:  Unremarkable. CHEST WALL AND LOWER NECK: Unremarkable. UPPER ABDOMEN:  Unremarkable. OSSEOUS STRUCTURES: No acute displaced fracture. Mild degenerative disease in the spine. Impression: Compromised by respiratory motion, particularly in the lower lobes, but with no definite acute pulmonary emboli. Mild bilateral lower lobe bronchiectasis with tubular opacities in the right lower lobe due to mucus and debris in the bronchi. Given debris in the superior segment right lower lobe bronchi, patchy groundglass opacity in the right upper lobe, and tree-in-bud nodularity in both lower lobes, this is likely due to aspiration. Workstation performed: [id number]     TRAUMA - CT chest abdomen pelvis w contrast    Result Date: 7/13/2023  Narrative: CT CHEST, ABDOMEN AND PELVIS WITH IV CONTRAST INDICATION:   TRAUMA. Fall 3 days ago with decreased mentation COMPARISON: 11/11/2019 TECHNIQUE: CT examination of the chest, abdomen and pelvis was performed. Multiplanar 2D reformatted images were created from the source data. Respiratory artifacts limit the examination. This examination, like all CT scans performed in the [facility], was performed utilizing techniques to minimize radiation dose exposure, including the use of iterative reconstruction and automated exposure control. Radiation dose length product (DLP) for this visit:  743.6 mGy-cm IV Contrast:  80 mL of iohexol (OMNIPAQUE) Enteric Contrast: Enteric contrast was administered.  FINDINGS: CHEST LUNGS: Patchy groundglass opacities are noted at the right lung apex which are new and also evident slightly inferiorly in the right upper lobe with a peripheral predilection. The left upper lobe appears clear. There are groundglass opacities with smooth consolidative features of the lower lobes more likely representing infiltrate suggest atelectasis. Branching opacity appears to be evident in the right lower lobe in the region of the bronchus and suggests debris within the bronchus which is also seen in the bronchus intermedius raising the possibility of aspiration. Of note this area is poorly evaluated due to significant respiratory artifact on image 69. It is possible an embolus has occurred in the right lower lobe vasculature although the vessels appear enhanced immediately superior to this level. If there is PLEURA:  Unremarkable. HEART/GREAT VESSELS: Heart is unremarkable for patient's age. No thoracic aortic aneurysm. MEDIASTINUM AND PETER:  Unremarkable. CHEST WALL AND LOWER NECK:  Unremarkable. ABDOMEN LIVER/BILIARY TREE:  Unremarkable. GALLBLADDER:  No calcified gallstones. No pericholecystic inflammatory change. SPLEEN:  Unremarkable. PANCREAS:  Unremarkable. ADRENAL GLANDS:  Unremarkable. KIDNEYS/URETERS: Moderate hydronephrosis and hydroureter on the left is seen with more mild changes on the right. Elma Lent STOMACH AND BOWEL: Moderate fecal stasis. No small bowel dilatation. Probable small hiatal hernia. APPENDIX:  No findings to suggest appendicitis. ABDOMINOPELVIC CAVITY:  No ascites. No pneumoperitoneum. No lymphadenopathy. VESSELS: Atherosclerotic changes of the aorta are noted. The celiac and SMA appear patent. There appears to be some narrowing of the right renal artery. 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 . This is chronic since 2019. PELVIS REPRODUCTIVE ORGANS:  Unremarkable for patient's age.  URINARY BLADDER: The bladder is markedly distended. ABDOMINAL WALL/INGUINAL REGIONS: Small right inguinal hernia is seen with fluid present. OSSEOUS STRUCTURES: There is a fracture of L3 with slight buckling of the anterior cortex new since 2019. Slight protrusion of the posteroinferolateral endplate is noted. Schmorl's nodes are seen elsewhere as well as degenerative changes. Slight deformity of the right 10th rib is chronic posteriorly. Cystic change in the left ilium is an change since the prior study on image 182. There is fracture involving the inferior pubic rami which is incompletely healed but with sclerosis suggesting this is more likely recent than acute. No acute fractures seen elsewhere. This is noted on image 252. Impression: No evidence of solid organ trauma or acute fracture with the exception of L3 where there is compression fracture new since 2019 although it is age indeterminate based on imaging. Correlation with any additional back pain is advised. There is a left inferior pubic ramus fracture with some sclerosis which is partially healed suggesting this is recent more likely than acute and again should be correlated with any pain in the area. No definite pelvic fracture is seen elsewhere. Infiltrates in the right lower lobe with evidence of mucous secretions opacifying right lower lobe bronchi. There is additional opacities in the right upper lobe and left lower lobe. Findings are suspicious for aspiration pneumonia. Branching opacity may  represent mucous debris although this could also represent poor enhancement of the right lower lobe vasculature that could be seen in a pulmonary embolus. If there is high index of suspicion a follow-up CTA or lung scan may be useful. Marked bladder distention should be correlated for any recent voiding. Clemons catheter may be useful. 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  This could be correlated with distal pulses. This was discussed with JAVIER Pryor at 11:05 a.m. Workstation performed: [id number]     TRAUMA - CT spine cervical wo contrast    Result Date: 7/13/2023  Narrative: CT CERVICAL SPINE - WITHOUT CONTRAST INDICATION:   TRAUMA. Status post fall 3 days ago with decreased mentation COMPARISON: 3/13/2023 TECHNIQUE:  CT examination of the cervical spine was performed without intravenous contrast.  Contiguous axial images were obtained. Multiplanar 2D reformatted images were created from the source data. Radiation dose length product (DLP) for this visit:  254.8 mGy-cm . This examination, like all CT scans performed in the [facility], was performed utilizing techniques to minimize radiation dose exposure, including the use of iterative reconstruction and automated exposure control. IMAGE QUALITY:  Diagnostic. FINDINGS: ALIGNMENT: Grade 1 retrolisthesis of C3 as compared to C4 is again demonstrated and appears degenerative. . VERTEBRAE:  No fracture. DEGENERATIVE CHANGES: Mild to moderate multilevel cervical degenerative changes are noted without critical central canal stenosis. This is most pronounced at C3-C4. Facet hypertrophy is also noted. PREVERTEBRAL AND PARASPINAL SOFT TISSUES: Carotid calcification is present THORACIC INLET: Some groundglass opacities are noted at the right lung apex. This is new since March 13. Impression: No cervical spine fracture or traumatic malalignment. Degenerative changes. New groundglass opacities in the right lung apex. Please see chest CT for further result. The study was marked in [facility] for immediate notification. Workstation performed: [id number]     TRAUMA - CT head wo contrast    Result Date: 7/13/2023  Narrative: CT BRAIN - WITHOUT CONTRAST INDICATION:   TRAUMA. Status post fall on Monday with steady decline in mentation COMPARISON: 3/13/2023; 3/2/2023. TECHNIQUE:  CT examination of the brain was performed.   Multiplanar 2D reformatted images were created from the source data. Radiation dose length product (DLP) for this visit:  1003 mGy-cm . This examination, like all CT scans performed in the [facility], was performed utilizing techniques to minimize radiation dose exposure, including the use of iterative reconstruction and automated exposure control. IMAGE QUALITY:  Diagnostic. FINDINGS: PARENCHYMA:  No intracranial mass, mass effect or midline shift. No CT signs of acute infarction. No acute parenchymal hemorrhage. 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 Confluent areas of diminished white matter attenuation elsewhere mostly in the frontal areas this most likely related to microvascular ischemic change/gliosis. Some calcifications are noted in the cingulate gyrus region unchanged. VENTRICLES AND EXTRA-AXIAL SPACES: Sulcal volume loss is proportionate to the ventricles. VISUALIZED ORBITS: Normal visualized orbits. PARANASAL SINUSES: Normal visualized paranasal sinuses. CALVARIUM AND EXTRACRANIAL SOFT TISSUES: Postop changes of the calvarium are noted with previous craniotomy in the left frontal region and metallic devices present. Impression: No acute intracranial abnormality. The study was marked in [facility] for immediate notification. Workstation performed: [id number]     XR Trauma chest portable    Result Date: 7/13/2023  Narrative: CHEST INDICATION:   TRAUMA. Injury status post fall occurred 3 days ago COMPARISON: 3/13/2023. EXAM PERFORMED/VIEWS:  XR CHEST PORTABLE FINDINGS: Hypoventilation is noted. Loop recorder appears to be present. Cardiomediastinal silhouette appears unremarkable. The patient is status post median sternotomy. Hypoventilation appears to be present at the bases with subsegmental atelectasis. No evidence of pneumothorax or effusion on this semierect study. No definite infiltrate elsewhere. Glenna Gambell Healed rib fractures are noted on the right. .     Impression: Hypoventilation with atelectatic changes at the bases. No definite pneumothorax. Workstation performed: [id number]       Imaging Studies: I have personally reviewed pertinent reports. Pathology, and Other Studies: I have personally reviewed pertinent reports.       REVIEW OF SYSTEMS    CONSTITUTIONAL: negative unless stated in HPI  GASTROINTESTINAL: As noted in the HPI

## 2023-07-17 LAB
ANION GAP SERPL CALCULATED.3IONS-SCNC: 4 MMOL/L
ANION GAP SERPL CALCULATED.3IONS-SCNC: 5 MMOL/L
BASOPHILS # BLD AUTO: 0.01 THOUSANDS/ÂΜL (ref 0–0.1)
BASOPHILS NFR BLD AUTO: 0 % (ref 0–1)
BUN SERPL-MCNC: 13 MG/DL (ref 5–25)
BUN SERPL-MCNC: 15 MG/DL (ref 5–25)
CALCIUM SERPL-MCNC: 10.7 MG/DL (ref 8.4–10.2)
CALCIUM SERPL-MCNC: 10.7 MG/DL (ref 8.4–10.2)
CHLORIDE SERPL-SCNC: 112 MMOL/L (ref 96–108)
CHLORIDE SERPL-SCNC: 113 MMOL/L (ref 96–108)
CO2 SERPL-SCNC: 30 MMOL/L (ref 21–32)
CO2 SERPL-SCNC: 31 MMOL/L (ref 21–32)
CREAT SERPL-MCNC: 0.91 MG/DL (ref 0.6–1.3)
CREAT SERPL-MCNC: 0.93 MG/DL (ref 0.6–1.3)
EOSINOPHIL # BLD AUTO: 0.18 THOUSAND/ÂΜL (ref 0–0.61)
EOSINOPHIL NFR BLD AUTO: 2 % (ref 0–6)
ERYTHROCYTE [DISTWIDTH] IN BLOOD BY AUTOMATED COUNT: 14 % (ref 11.6–15.1)
GFR SERPL CREATININE-BSD FRML MDRD: 72 ML/MIN/1.73SQ M
GFR SERPL CREATININE-BSD FRML MDRD: 73 ML/MIN/1.73SQ M
GLUCOSE SERPL-MCNC: 123 MG/DL (ref 65–140)
GLUCOSE SERPL-MCNC: 124 MG/DL (ref 65–140)
GLUCOSE SERPL-MCNC: 143 MG/DL (ref 65–140)
GLUCOSE SERPL-MCNC: 153 MG/DL (ref 65–140)
GLUCOSE SERPL-MCNC: 166 MG/DL (ref 65–140)
HCT VFR BLD AUTO: 38.4 % (ref 36.5–49.3)
HGB BLD-MCNC: 11.9 G/DL (ref 12–17)
IMM GRANULOCYTES # BLD AUTO: 0.05 THOUSAND/UL (ref 0–0.2)
IMM GRANULOCYTES NFR BLD AUTO: 1 % (ref 0–2)
LYMPHOCYTES # BLD AUTO: 1.93 THOUSANDS/ÂΜL (ref 0.6–4.47)
LYMPHOCYTES NFR BLD AUTO: 22 % (ref 14–44)
MCH RBC QN AUTO: 26.2 PG (ref 26.8–34.3)
MCHC RBC AUTO-ENTMCNC: 31 G/DL (ref 31.4–37.4)
MCV RBC AUTO: 84 FL (ref 82–98)
MONOCYTES # BLD AUTO: 0.73 THOUSAND/ÂΜL (ref 0.17–1.22)
MONOCYTES NFR BLD AUTO: 8 % (ref 4–12)
NEUTROPHILS # BLD AUTO: 5.92 THOUSANDS/ÂΜL (ref 1.85–7.62)
NEUTS SEG NFR BLD AUTO: 67 % (ref 43–75)
NRBC BLD AUTO-RTO: 0 /100 WBCS
PLATELET # BLD AUTO: 202 THOUSANDS/UL (ref 149–390)
PMV BLD AUTO: 10.5 FL (ref 8.9–12.7)
POTASSIUM SERPL-SCNC: 2.7 MMOL/L (ref 3.5–5.3)
POTASSIUM SERPL-SCNC: 3.3 MMOL/L (ref 3.5–5.3)
RBC # BLD AUTO: 4.55 MILLION/UL (ref 3.88–5.62)
SODIUM SERPL-SCNC: 147 MMOL/L (ref 135–147)
SODIUM SERPL-SCNC: 148 MMOL/L (ref 135–147)
WBC # BLD AUTO: 8.82 THOUSAND/UL (ref 4.31–10.16)

## 2023-07-17 PROCEDURE — 82948 REAGENT STRIP/BLOOD GLUCOSE: CPT

## 2023-07-17 PROCEDURE — 99232 SBSQ HOSP IP/OBS MODERATE 35: CPT | Performed by: INTERNAL MEDICINE

## 2023-07-17 PROCEDURE — 99232 SBSQ HOSP IP/OBS MODERATE 35: CPT | Performed by: PHYSICIAN ASSISTANT

## 2023-07-17 PROCEDURE — 92526 ORAL FUNCTION THERAPY: CPT

## 2023-07-17 PROCEDURE — 80048 BASIC METABOLIC PNL TOTAL CA: CPT | Performed by: HOSPITALIST

## 2023-07-17 PROCEDURE — 85025 COMPLETE CBC W/AUTO DIFF WBC: CPT | Performed by: INTERNAL MEDICINE

## 2023-07-17 PROCEDURE — 80048 BASIC METABOLIC PNL TOTAL CA: CPT | Performed by: INTERNAL MEDICINE

## 2023-07-17 PROCEDURE — 99232 SBSQ HOSP IP/OBS MODERATE 35: CPT | Performed by: HOSPITALIST

## 2023-07-17 RX ORDER — POTASSIUM CHLORIDE 14.9 MG/ML
20 INJECTION INTRAVENOUS
Status: COMPLETED | OUTPATIENT
Start: 2023-07-17 | End: 2023-07-18

## 2023-07-17 RX ORDER — OLANZAPINE 10 MG/1
5 INJECTION, POWDER, LYOPHILIZED, FOR SOLUTION INTRAMUSCULAR
Status: DISCONTINUED | OUTPATIENT
Start: 2023-07-17 | End: 2023-07-24 | Stop reason: HOSPADM

## 2023-07-17 RX ORDER — POTASSIUM CHLORIDE, DEXTROSE MONOHYDRATE 150; 5 MG/100ML; G/100ML
50 INJECTION, SOLUTION INTRAVENOUS CONTINUOUS
Status: DISCONTINUED | OUTPATIENT
Start: 2023-07-17 | End: 2023-07-21

## 2023-07-17 RX ADMIN — METOPROLOL TARTRATE 2.5 MG: 5 INJECTION INTRAVENOUS at 20:43

## 2023-07-17 RX ADMIN — POTASSIUM CHLORIDE 20 MEQ: 14.9 INJECTION, SOLUTION INTRAVENOUS at 12:59

## 2023-07-17 RX ADMIN — METOPROLOL TARTRATE 2.5 MG: 5 INJECTION INTRAVENOUS at 03:30

## 2023-07-17 RX ADMIN — OLANZAPINE 5 MG: 10 INJECTION, POWDER, LYOPHILIZED, FOR SOLUTION INTRAMUSCULAR at 21:50

## 2023-07-17 RX ADMIN — POTASSIUM CHLORIDE 20 MEQ: 14.9 INJECTION, SOLUTION INTRAVENOUS at 10:52

## 2023-07-17 RX ADMIN — METOPROLOL TARTRATE 2.5 MG: 5 INJECTION INTRAVENOUS at 14:41

## 2023-07-17 RX ADMIN — DEXTROSE AND POTASSIUM CHLORIDE 100 ML/HR: 5; .15 SOLUTION INTRAVENOUS at 13:01

## 2023-07-17 RX ADMIN — METOPROLOL TARTRATE 2.5 MG: 5 INJECTION INTRAVENOUS at 07:51

## 2023-07-17 RX ADMIN — POTASSIUM CHLORIDE 20 MEQ: 14.9 INJECTION, SOLUTION INTRAVENOUS at 06:24

## 2023-07-17 RX ADMIN — ENOXAPARIN SODIUM 40 MG: 100 INJECTION SUBCUTANEOUS at 07:51

## 2023-07-17 RX ADMIN — POTASSIUM CHLORIDE 20 MEQ: 14.9 INJECTION, SOLUTION INTRAVENOUS at 14:38

## 2023-07-17 RX ADMIN — POTASSIUM CHLORIDE 20 MEQ: 14.9 INJECTION, SOLUTION INTRAVENOUS at 08:29

## 2023-07-17 NOTE — ASSESSMENT & PLAN NOTE
Hyponatremia present on admission likely due to poor p.o. intake dehydration  Improving.   Continue hypotonic IV fluids  Nephrology following  Monitor closely

## 2023-07-17 NOTE — PROGRESS NOTES
Progress Note - Lyubov Diop 80 y.o. male MRN: [id number]    Unit/Bed#: -01 Encounter: [id number]    Assessment and Plan:    27-year-old male past medical history of CAD, CVA, dementia who presents with altered mental status after a fall, found to have aspiration pneumonitis. GI consulted due to dysphagia. 1.  Dysphagia  2. Acute metabolic encephalopathy  3. Hyponatremia  4. Goals of care  5. Aspiration pneumonitis  6. Moderate protein calorie malnutrition  7. Dementia     Patient presenting with altered mental status and acute metabolic encephalopathy with hyponatremia along with suspected aspiration pneumonitis. Patient was tolerating puréed diet prior to admission. His mental status has been improving. He is alert and oriented to person and place, but disoriented to time. He tells me he is slightly hungry.    -We are hopeful patient can work with speech therapy today now that his mental status has improved  -Hopefully he will be able to resume pureed diet  -We had discussion with family member regarding possible feeding tube placement  -We explained that feeding tubes can increase risk of aspiration pneumonia, as well as increase risk of mortality  -Patient's family member will review living will to see whether patient would even want a feeding tube  -No current plans for PEG tube placement but if patient does not improve his mental status or ability to swallow, can consider PEG tube placement understanding the risks versus hospice without feeding tube      Subjective:     Patient seen and examined at bedside. He denies pain. He is slightly hungry. Objective:     Vitals: Blood pressure 160/66, pulse 88, temperature (!) 96.9 °F (36.1 °C), resp. rate 17, height 5' 9" (1.753 m), weight 64.4 kg (142 lb), SpO2 97 %. ,Body mass index is 20.97 kg/m².       Intake/Output Summary (Last 24 hours) at 7/17/2023 0836  Last data filed at 7/17/2023 0657  Gross per 24 hour   Intake --   Output 2100 ml   Net -2100 ml       Physical Exam:     General Appearance: Alert and oriented to person and place only, disoriented to time, no acute distress  HEENT: Nasal cannula  Lungs: Clear to auscultation bilaterally  Heart: Regular rate and rhythm  Abdomen: Soft, non-tender, non-distended; bowel sounds normal  Extremities: No cyanosis, edema    Invasive Devices     Peripheral Intravenous Line  Duration           Peripheral IV 07/17/23 Left;Upper Arm <1 day          Drain  Duration           Urethral Catheter Latex 16 Fr. 3 days                Lab Results:  Results from last 7 days   Lab Units 07/17/23  0429   WBC Thousand/uL 8.82   HEMOGLOBIN g/dL 11.9*   HEMATOCRIT % 38.4   PLATELETS Thousands/uL 202   NEUTROS PCT % 67   LYMPHS PCT % 22   MONOS PCT % 8   EOS PCT % 2     Results from last 7 days   Lab Units 07/17/23  0429 07/14/23  0212 07/13/23  1014   POTASSIUM mmol/L 2.7*   < > 2.8*   CHLORIDE mmol/L 113*   < > 117*   CO2 mmol/L 30   < > 34*   BUN mg/dL 15   < > 23   CREATININE mg/dL 0.93   < > 1.10   CALCIUM mg/dL 10.7*   < > 11.8*   ALK PHOS U/L  --   --  59   ALT U/L  --   --  14   AST U/L  --   --  17    < > = values in this interval not displayed. Results from last 7 days   Lab Units 07/13/23  1014   INR  1.08           Imaging Studies: I have personally reviewed pertinent imaging studies. CTA ED chest PE study    Result Date: 7/13/2023  Impression: Compromised by respiratory motion, particularly in the lower lobes, but with no definite acute pulmonary emboli. Mild bilateral lower lobe bronchiectasis with tubular opacities in the right lower lobe due to mucus and debris in the bronchi. Given debris in the superior segment right lower lobe bronchi, patchy groundglass opacity in the right upper lobe, and tree-in-bud nodularity in both lower lobes, this is likely due to aspiration.  Workstation performed: [id number]     TRAUMA - CT chest abdomen pelvis w contrast    Result Date: 7/13/2023  Impression: No evidence of solid organ trauma or acute fracture with the exception of L3 where there is compression fracture new since 2019 although it is age indeterminate based on imaging. Correlation with any additional back pain is advised. There is a left inferior pubic ramus fracture with some sclerosis which is partially healed suggesting this is recent more likely than acute and again should be correlated with any pain in the area. No definite pelvic fracture is seen elsewhere. Infiltrates in the right lower lobe with evidence of mucous secretions opacifying right lower lobe bronchi. There is additional opacities in the right upper lobe and left lower lobe. Findings are suspicious for aspiration pneumonia. Branching opacity may  represent mucous debris although this could also represent poor enhancement of the right lower lobe vasculature that could be seen in a pulmonary embolus. If there is high index of suspicion a follow-up CTA or lung scan may be useful. Marked bladder distention should be correlated for any recent voiding. Clemons catheter may be useful. 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 This could be correlated with distal pulses. This was discussed with JAVIER Sorenson at 11:05 a.m. Workstation performed: [id number]     TRAUMA - CT spine cervical wo contrast    Result Date: 7/13/2023  Impression: No cervical spine fracture or traumatic malalignment. Degenerative changes. New groundglass opacities in the right lung apex. Please see chest CT for further result. The study was marked in [facility] for immediate notification. Workstation performed: [id number]     TRAUMA - CT head wo contrast    Result Date: 7/13/2023  Impression: No acute intracranial abnormality. The study was marked in [facility] for immediate notification. Workstation performed: [id number]     XR Trauma chest portable    Result Date: 7/13/2023  Impression: Hypoventilation with atelectatic changes at the bases.  No definite pneumothorax.  Workstation performed: [id number]

## 2023-07-17 NOTE — PROGRESS NOTES
NEPHROLOGY PROGRESS NOTE   Lashon Balderas 80 y.o. male MRN: [id number]  Unit/Bed#: -01 Encounter: [id number]  Reason for Consult: Hypernatremia    ASSESSMENT AND PLAN:  Hypernatremia  -Suspect secondary to poor free water intake  -Sodium level improving appropriately 147 today  -Currently on IV D5W at 100 mill per hour, continue same  -BMP in a.m. Hypokalemia  -Serum potassium 3.3 slowly improving  -Status post 60 meq potassium chloride this morning and receiving additional 40 meq this afternoon.  -Serum magnesium stable  -BMP to monitor    Hypercalcemia  -Vitamin D 25-hydroxy level 36,   -Suspect PTH mediated hypercalcemia, component of primary hyperparathyroidism  -Will need eventual endocrine evaluation  -Serum calcium has overall improved since admission, 10.7 today    Hypertension  -Blood pressure remains above goal  -Currently remains n.p.o., on IV metoprolol standing dose. Continue IV hydralazine as needed, if BP remains persistently greater than 150/90, consider starting IV hydralazine standing dose    Moderate left-sided hydronephrosis and hydroureter, with some mild changes on the right, markedly distended bladder  -Currently has Clemons catheter  -Consider eventual repeat renal ultrasound. Serum creatinine remains stable 0.9    Suspected aspiration pneumonia based on CT scan, currently remains n.p.o., further management as per primary team.    Discussed above plan in detail with primary team and they agree with above recommendations. SUBJECTIVE:  Patient seen and examined at bedside. Overall confused at the time of encounter.     OBJECTIVE:  Current Weight: Weight - Scale: 64.4 kg (142 lb)  Vitals:    07/17/23 0900   BP:    Pulse: 63   Resp:    Temp:    SpO2:        Intake/Output Summary (Last 24 hours) at 7/17/2023 1308  Last data filed at 7/17/2023 0657  Gross per 24 hour   Intake --   Output 2100 ml   Net -2100 ml     Wt Readings from Last 3 Encounters:   07/13/23 64.4 kg (142 lb) 06/13/23 64.4 kg (142 lb)   05/09/23 64.4 kg (142 lb)     Temp Readings from Last 3 Encounters:   07/17/23 (!) 96.9 °F (36.1 °C)   03/16/23 (!) 97 °F (36.1 °C)   03/02/23 98.1 °F (36.7 °C) (Oral)     BP Readings from Last 3 Encounters:   07/17/23 160/66   06/13/23 124/60   03/30/23 (!) 124/44     Pulse Readings from Last 3 Encounters:   07/17/23 63   06/13/23 72   03/30/23 65        Physical Examination:  Eyes: Mild conjunctival pallor present  Neck: No obvious lymphadenopathy appreciated  Respiratory: Bilateral air entry present  CVS: No significant edema in legs  GI: Soft, nondistended  CNS: Active, alert, oriented x1  Skin: No new rash  Musculoskeletal: No obvious new gross deformity noted    Medications:    Current Facility-Administered Medications:   •  calcium carbonate (TUMS) chewable tablet 1,000 mg, 1,000 mg, Oral, Daily PRN, Ranjit Jenkins PA-C  •  dextrose 5 % with KCl 20 mEq/L infusion (premix), 100 mL/hr, Intravenous, Continuous, MICHELE Borja, Last Rate: 100 mL/hr at 07/17/23 1301, 100 mL/hr at 07/17/23 1301  •  enoxaparin (LOVENOX) subcutaneous injection 40 mg, 40 mg, Subcutaneous, Daily, Ranjit Jenkins PA-C, 40 mg at 07/17/23 1135  •  hydrALAZINE (APRESOLINE) injection 5 mg, 5 mg, Intravenous, Q6H PRN, Alma Matthew MD  •  metoprolol (LOPRESSOR) injection 2.5 mg, 2.5 mg, Intravenous, Q6H, Alma Matthew MD, 2.5 mg at 07/17/23 0751  •  ondansetron (ZOFRAN) injection 4 mg, 4 mg, Intravenous, Q6H PRN, Bernardino Medrano PA-C  •  potassium chloride 20 mEq IVPB (premix), 20 mEq, Intravenous, Q2H, Uli Porras MD, Last Rate: 50 mL/hr at 07/17/23 1259, 20 mEq at 07/17/23 1259    Laboratory Results:  Results from last 7 days   Lab Units 07/17/23  1120 07/17/23  0429 07/16/23  1614 07/16/23  0535 07/15/23  2011 07/15/23  0537 07/14/23  1845 07/14/23  0212 07/13/23  1014   WBC Thousand/uL  --  8.82  --   --   --  11.42*  --  11.79* 9.23   HEMOGLOBIN g/dL  -- 11.9*  --   --   --  12.6  --  12.0 12.3   HEMATOCRIT %  --  38.4  --   --   --  40.6  --  38.1 38.5   PLATELETS Thousands/uL  --  202  --   --   --  227  --  231 232   SODIUM mmol/L 147 148* 151* 154* 156* 157* 156* 157* 154*   POTASSIUM mmol/L 3.3* 2.7* 3.0* 2.8* 2.8* 3.1* 3.6 3.0* 2.8*   CHLORIDE mmol/L 112* 113* 119* 120* 120* 121* 123* 124* 117*   CO2 mmol/L 31 30 25 28 32 32 26 27 34*   BUN mg/dL 13 15 18 20 22 21 20 18 23   CREATININE mg/dL 0.91 0.93 0.92 1.00 0.99 1.03 1.00 0.97 1.10   CALCIUM mg/dL 10.7* 10.7* 10.7* 10.9* 11.2* 11.8* 11.3* 10.5* 11.8*   MAGNESIUM mg/dL  --   --   --  2.2  --   --   --   --  2.0   PHOSPHORUS mg/dL  --   --   --   --   --  3.0  --   --  1.8*       CTA ED chest PE study   Final Result by Leo Cao MD (07/13 1226)      Compromised by respiratory motion, particularly in the lower lobes, but with no definite acute pulmonary emboli. Mild bilateral lower lobe bronchiectasis with tubular opacities in the right lower lobe due to mucus and debris in the bronchi. Given debris in the superior segment right lower lobe bronchi, patchy groundglass opacity in the right upper lobe, and    tree-in-bud nodularity in both lower lobes, this is likely due to aspiration. Workstation performed: [id number]         TRAUMA - CT head wo contrast   Final Result by Ladena Frankel, MD (07/13 6976)      No acute intracranial abnormality. The study was marked in [facility] for immediate notification. Workstation performed: [id number]         TRAUMA - CT spine cervical wo contrast   Final Result by Ladena Frankel, MD (07/13 9391)      No cervical spine fracture or traumatic malalignment. Degenerative changes. New groundglass opacities in the right lung apex. Please see chest CT for further result. The study was marked in [facility] for immediate notification.             Workstation performed: [id number]         TRAUMA - CT chest abdomen pelvis w contrast Final Result by Harvinder Gonzalez MD (07/13 4350)      No evidence of solid organ trauma or acute fracture with the exception of L3 where there is compression fracture new since 2019 although it is age indeterminate based on imaging. Correlation with any additional back pain is advised. There is a left inferior pubic ramus fracture with some sclerosis which is partially healed suggesting this is recent more likely than acute and again should be correlated with any pain in the area. No definite pelvic fracture is seen elsewhere. Infiltrates in the right lower lobe with evidence of mucous secretions opacifying right lower lobe bronchi. There is additional opacities in the right upper lobe and left lower lobe. Findings are suspicious for aspiration pneumonia. Branching opacity may    represent mucous debris although this could also represent poor enhancement of the right lower lobe vasculature that could be seen in a pulmonary embolus. If there is high index of suspicion a follow-up CTA or lung scan may be useful. Marked bladder distention should be correlated for any recent voiding. Clemons catheter may be useful. 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 This could be correlated with distal pulses. This was discussed with JAVIER Blair at 11:05 a.m. Workstation performed: [id number]         XR Trauma chest portable   Final Result by Harvinder Gonzalez MD (07/13 0776)      Hypoventilation with atelectatic changes at the bases. No definite pneumothorax. Workstation performed: [id number]             Portions of the record may have been created with voice recognition software. Occasional wrong word or "sound a like" substitutions may have occurred due to the inherent limitations of voice recognition software. Read the chart carefully and recognize, using context, where substitutions have occurred.

## 2023-07-17 NOTE — SPEECH THERAPY NOTE
Speech Language/Pathology    Speech/Language Pathology Progress Note    Patient Name: Ludmila Rivero  OUXVB'L Date: 7/17/2023     Problem List  Principal Problem:    Acute metabolic encephalopathy  Active Problems:    Hypercalcemia    Dysphagia    Fall    Elevated troponin    Dementia (HCC)    Moderate protein-calorie malnutrition (HCC)    Hypernatremia    Hypokalemia    Aspiration pneumonitis (HCC)    Hypophosphatemia    Goals of care, counseling/discussion    Urinary retention       Past Medical History  Past Medical History:   Diagnosis Date   • Anxiety    • Arthritis    • Coronary artery disease    • Coronary artery disease involving native coronary artery of native heart without angina pectoris    • Depression    • Fracture    • Hypercholesterolemia    • Meningioma ([address]) 11/1999    brain tumor   • Meningioma (HCC)    • Narcolepsy     daytime drowsiness and dozing off occasionally   • Orthostatic hypotension    • Palpitations    • Prostate CA (HCC)    • Prostate cancer ([address])    • Stroke [facility])         Past Surgical History  Past Surgical History:   Procedure Laterality Date   • BACK SURGERY     • BRAIN SURGERY  11/08/1999   • BRAIN SURGERY     • CARDIAC SURGERY     • CORONARY ARTERY BYPASS GRAFT      x5   • CORONARY ARTERY BYPASS GRAFT           Subjective:  Pt w/ improved alertness, per RN asking for breakfast this am.     Current Diet:  Puree w/ thin     Objective:  Pt seen for dx dysphagia tx to assess appropriateness for PO diet initiation. Pt assessed w/ puree and thin liquids. Complete labial seal for retrieval and oral containment. Lingual rocking manipulation w/ reduced transfers. Inconsistent oral residue requiring cues to clear. Swallows suspected delayed. No overt s/s aspiration w/ material though pt does have h/o silent aspiration.  Pt previously recommended resuming thin liquids w/ use of safe swallow strategies and frequent/thorough oral care to minimize risks based on research re: negative outcomes of aspirating thickened liquids. S/w [facility] provider regarding potentially resuming baseline diet. Attempted to call pt's spouse though no response. Assessment:  Pt w/ improved alertness, able to take small amounts of puree and thin liquids today. No overt s/s aspiration.      Plan/Recommendations:  Consider resuming puree/thin if pt's family accepting of risk  Frequent/thorough oral care  ST to f/u as able/appropriate  Pt may benefit from repeat VBS when alertness consistently improved

## 2023-07-17 NOTE — PLAN OF CARE
Problem: Potential for Falls  Goal: Patient will remain free of falls  Description: INTERVENTIONS:  - Educate patient/family on patient safety including physical limitations  - Instruct patient to call for assistance with activity   - Consult OT/PT to assist with strengthening/mobility   - Keep Call bell within reach  - Keep bed low and locked with side rails adjusted as appropriate  - Keep care items and personal belongings within reach  - Initiate and maintain comfort rounds  - Make Fall Risk Sign visible to staff  - Offer Toileting every Hours, in advance of need  - Initiate/Maintain alarm  - Obtain necessary fall risk management equipment:   - Apply yellow socks and bracelet for high fall risk patients  - Consider moving patient to room near nurses station  Outcome: Progressing     Problem: MOBILITY - ADULT  Goal: Maintain or return to baseline ADL function  Description: INTERVENTIONS:  -  Assess patient's ability to carry out ADLs; assess patient's baseline for ADL function and identify physical deficits which impact ability to perform ADLs (bathing, care of mouth/teeth, toileting, grooming, dressing, etc.)  - Assess/evaluate cause of self-care deficits   - Assess range of motion  - Assess patient's mobility; develop plan if impaired  - Assess patient's need for assistive devices and provide as appropriate  - Encourage maximum independence but intervene and supervise when necessary  - Involve family in performance of ADLs  - Assess for home care needs following discharge   - Consider OT consult to assist with ADL evaluation and planning for discharge  - Provide patient education as appropriate  Outcome: Progressing  Goal: Maintains/Returns to pre admission functional level  Description: INTERVENTIONS:  - Perform BMAT or MOVE assessment daily.   - Set and communicate daily mobility goal to care team and patient/family/caregiver.    - Collaborate with rehabilitation services on mobility goals if consulted  - Perform Range of Motion  times a day. - Reposition patient every  hours.   - Dangle patient  times a day  - Stand patient  times a day  - Ambulate patient  times a day  - Out of bed to chair  times a day   - Out of bed for meals  times a day  - Out of bed for toileting  - Record patient progress and toleration of activity level   Outcome: Progressing     Problem: PAIN - ADULT  Goal: Verbalizes/displays adequate comfort level or baseline comfort level  Description: Interventions:  - Encourage patient to monitor pain and request assistance  - Assess pain using appropriate pain scale  - Administer analgesics based on type and severity of pain and evaluate response  - Implement non-pharmacological measures as appropriate and evaluate response  - Consider cultural and social influences on pain and pain management  - Notify physician/advanced practitioner if interventions unsuccessful or patient reports new pain  Outcome: Progressing     Problem: INFECTION - ADULT  Goal: Absence or prevention of progression during hospitalization  Description: INTERVENTIONS:  - Assess and monitor for signs and symptoms of infection  - Monitor lab/diagnostic results  - Monitor all insertion sites, i.e. indwelling lines, tubes, and drains  - Monitor endotracheal if appropriate and nasal secretions for changes in amount and color  - [place] appropriate cooling/warming therapies per order  - Administer medications as ordered  - Instruct and encourage patient and family to use good hand hygiene technique  - Identify and instruct in appropriate isolation precautions for identified infection/condition  Outcome: Progressing  Goal: Absence of fever/infection during neutropenic period  Description: INTERVENTIONS:  - Monitor WBC    Outcome: Progressing     Problem: SAFETY ADULT  Goal: Patient will remain free of falls  Description: INTERVENTIONS:  - Educate patient/family on patient safety including physical limitations  - Instruct patient to call for assistance with activity   - Consult OT/PT to assist with strengthening/mobility   - Keep Call bell within reach  - Keep bed low and locked with side rails adjusted as appropriate  - Keep care items and personal belongings within reach  - Initiate and maintain comfort rounds  - Make Fall Risk Sign visible to staff  - Offer Toileting every Hours, in advance of need  - Initiate/Maintain alarm  - Obtain necessary fall risk management equipment:   - Apply yellow socks and bracelet for high fall risk patients  - Consider moving patient to room near nurses station  Outcome: Progressing  Goal: Maintain or return to baseline ADL function  Description: INTERVENTIONS:  -  Assess patient's ability to carry out ADLs; assess patient's baseline for ADL function and identify physical deficits which impact ability to perform ADLs (bathing, care of mouth/teeth, toileting, grooming, dressing, etc.)  - Assess/evaluate cause of self-care deficits   - Assess range of motion  - Assess patient's mobility; develop plan if impaired  - Assess patient's need for assistive devices and provide as appropriate  - Encourage maximum independence but intervene and supervise when necessary  - Involve family in performance of ADLs  - Assess for home care needs following discharge   - Consider OT consult to assist with ADL evaluation and planning for discharge  - Provide patient education as appropriate  Outcome: Progressing  Goal: Maintains/Returns to pre admission functional level  Description: INTERVENTIONS:  - Perform BMAT or MOVE assessment daily.   - Set and communicate daily mobility goal to care team and patient/family/caregiver. - Collaborate with rehabilitation services on mobility goals if consulted  - Perform Range of Motion  times a day. - Reposition patient every  hours.   - Dangle patient  times a day  - Stand patient  times a day  - Ambulate patient  times a day  - Out of bed to chair  times a day   - Out of bed for meals  times a day  - Out of bed for toileting  - Record patient progress and toleration of activity level   Outcome: Progressing     Problem: DISCHARGE PLANNING  Goal: Discharge to home or other facility with appropriate resources  Description: INTERVENTIONS:  - Identify barriers to discharge w/patient and caregiver  - Arrange for needed discharge resources and transportation as appropriate  - Identify discharge learning needs (meds, wound care, etc.)  - Arrange for interpretive services to assist at discharge as needed  - Refer to Case Management Department for coordinating discharge planning if the patient needs post-hospital services based on physician/advanced practitioner order or complex needs related to functional status, cognitive ability, or social support system  Outcome: Progressing     Problem: Knowledge Deficit  Goal: Patient/family/caregiver demonstrates understanding of disease process, treatment plan, medications, and discharge instructions  Description: Complete learning assessment and assess knowledge base. Interventions:  - Provide teaching at level of understanding  - Provide teaching via preferred learning methods  Outcome: Progressing     Problem: NEUROSENSORY - ADULT  Goal: Achieves stable or improved neurological status  Description: INTERVENTIONS  - Monitor and report changes in neurological status  - Monitor vital signs such as temperature, blood pressure, glucose, and any other labs ordered   - Initiate measures to prevent increased intracranial pressure  - Monitor for seizure activity and implement precautions if appropriate      Outcome: Progressing  Goal: Achieves maximal functionality and self care  Description: INTERVENTIONS  - Monitor swallowing and airway patency with patient fatigue and changes in neurological status  - Encourage and assist patient to increase activity and self care.    - Encourage visually impaired, hearing impaired and aphasic patients to use assistive/communication devices  Outcome: Progressing     Problem: SAFETY,RESTRAINT: NV/NON-SELF DESTRUCTIVE BEHAVIOR  Goal: Remains free of harm/injury (restraint for non violent/non self-detsructive behavior)  Description: INTERVENTIONS:  - Instruct patient/family regarding restraint use   - Assess and monitor physiologic and psychological status   - Provide interventions and comfort measures to meet assessed patient needs   - Identify and implement measures to help patient regain control  - Assess readiness for release of restraint   Outcome: Progressing  Goal: Returns to optimal restraint-free functioning  Description: INTERVENTIONS:  - Assess the patient's behavior and symptoms that indicate continued need for restraint  - Identify and implement measures to help patient regain control  - Assess readiness for release of restraint   Outcome: Progressing     Problem: Nutrition/Hydration-ADULT  Goal: Nutrient/Hydration intake appropriate for improving, restoring or maintaining nutritional needs  Description: Monitor and assess patient's nutrition/hydration status for malnutrition. Collaborate with interdisciplinary team and initiate plan and interventions as ordered. Monitor patient's weight and dietary intake as ordered or per policy. Utilize nutrition screening tool and intervene as necessary. Determine patient's food preferences and provide high-protein, high-caloric foods as appropriate.      INTERVENTIONS:  - Monitor oral intake, urinary output, labs, and treatment plans  - Assess nutrition and hydration status and recommend course of action  - Evaluate amount of meals eaten  - Assist patient with eating if necessary   - Allow adequate time for meals  - Recommend/ encourage appropriate diets, oral nutritional supplements, and vitamin/mineral supplements  - Order, calculate, and assess calorie counts as needed  - Recommend, monitor, and adjust tube feedings and TPN/PPN based on assessed needs  - Assess need for intravenous fluids  - Provide specific nutrition/hydration education as appropriate  - Include patient/family/caregiver in decisions related to nutrition  Outcome: Progressing     Problem: Prexisting or High Potential for Compromised Skin Integrity  Goal: Skin integrity is maintained or improved  Description: INTERVENTIONS:  - Identify patients at risk for skin breakdown  - Assess and monitor skin integrity  - Assess and monitor nutrition and hydration status  - Monitor labs   - Assess for incontinence   - Turn and reposition patient  - Assist with mobility/ambulation  - Relieve pressure over bony prominences  - Avoid friction and shearing  - Provide appropriate hygiene as needed including keeping skin clean and dry  - Evaluate need for skin moisturizer/barrier cream  - Collaborate with interdisciplinary team   - Patient/family teaching  - Consider wound care consult   Outcome: Progressing

## 2023-07-17 NOTE — PROGRESS NOTES
[facility]  Progress Note  Name: Chika Triplett  MRN: [id number]  Unit/Bed#: -01 I Date of Admission: 7/13/2023   Date of Service: 7/17/2023 I Hospital Day: 4    Assessment/Plan   Urinary retention  Assessment & Plan  Noted to have urinary retention in the ED and a Clemons catheter was placed  Will attempt voiding trial and discontinue Clemons as encephalopathy improves    Goals of care, counseling/discussion  Assessment & Plan  Overall guarded prognosis given dementia dysphagia with aspiration risk  GI following, hoping with rx of hypernatremia he will be able to return to pureed diet. If he continues to have poor p.o. intake -discussed tube feed option, aspiration risk, hospice  They will review his living will regarding tube feed option      Hypophosphatemia  Assessment & Plan  Normalized with repletion  Monitor    Aspiration pneumonitis (HCC)  Assessment & Plan  · Known history of dysphagia  · Imaging concerning for aspiration pneumonitis  · Received IV antibiotics in the ED, hold further antibiotics presently  · Procalcitonin levels noted  · Monitor counts temperatures  · Aspiration precautions        Hypokalemia  Assessment & Plan  Replete  Monitor    Hypernatremia  Assessment & Plan  Hyponatremia present on admission likely due to poor p.o. intake dehydration  Improving. Continue hypotonic IV fluids  Nephrology following  Monitor closely        Moderate protein-calorie malnutrition ([address])  Assessment & Plan  Malnutrition Findings: Body mass index is 20.97 kg/m².        Dementia [facility])  Assessment & Plan  History of dementia  Continue memantine  Delirium precautions  Reorientation  Sleep hygiene      Elevated troponin  Assessment & Plan  Elevated troponins noted  Likely non-MI troponin elevation  Cardiology inputs noted    Fall  Assessment & Plan  Patient had a fall about 3 days back  At high risk for falls  Imaging revealed inferior pubic ramus fracture  Orthopedic inputs noted  Analgesics  Safe ambulation  Fall precautions  Physical therapy      Dysphagia  Assessment & Plan  History of dysphagia  Presently n.p.o. state  Encephalopathy is improving  We will request speech therapy to reassess  Discussed with GI  Present precautions      Hypercalcemia  Assessment & Plan  Mild hypercalcemia  Elevated PTH levels noted  Continue IV fluid hydration  Outpatient      * Acute metabolic encephalopathy  Assessment & Plan  Patient is a resident of [facility], presents with altered mental status  Encephalopathy is likely multifactorial  Appears to be improving  Noted to have hypernatremia hypercalcemia  Receiving IV fluids  Avoid neurotoxins  Optimize metabolic parameters  Monitor closely                VTE  Prophylaxis:   Pharmacologic: in place  Mechanical VTE Prophylaxis in Place: Yes    Patient Centered Rounds: I have performed bedside rounds with nursing staff today. Discussions with Specialists or Other Care Team Provider: case management    Education and Discussions with Family / Patient: deferred to GI who will discuss PEG    Current Length of Stay: 4 day(s)    Current Patient Status: Inpatient        Code Status: Level 1 - Full Code    Discharge Plan: Pt will require continued inpatient hospitalization. Subjective:   Pt denies acute complaints  Limited ROS d/t dementia. Patient is seen and examined at bedside. Objective:     Vitals:   Temp (24hrs), Av.4 °F (36.3 °C), Min:96.4 °F (35.8 °C), Max:98.2 °F (36.8 °C)    Temp:  [96.4 °F (35.8 °C)-98.2 °F (36.8 °C)] 96.9 °F (36.1 °C)  HR:  [63-88] 63  Resp:  [16-18] 17  BP: (159-177)/(65-91) 160/66  SpO2:  [96 %-97 %] 97 %  Body mass index is 20.97 kg/m². Input and Output Summary (last 24 hours):        Intake/Output Summary (Last 24 hours) at 2023 1102  Last data filed at 2023 0657  Gross per 24 hour   Intake --   Output 2100 ml   Net -2100 ml       Physical Exam:       GEN: No acute distress, comfortable, elderly male. HEEENT: No JVD, PERRLA, no scleral icterus  RESP: Lungs clear to auscultation bilaterally  CV: RRR, +s1/s2   ABD: SOFT NON TENDER, POSITIVE BOWEL SOUNDS, NO DISTENTION  PSYCH: CALM, apparent dementia. NEURO: Mentation baseline, NO FOCAL DEFICITS  SKIN: NO RASH  EXTREM: NO EDEMA    Additional Data:     Labs:    Results from last 7 days   Lab Units 07/17/23  0429   WBC Thousand/uL 8.82   HEMOGLOBIN g/dL 11.9*   HEMATOCRIT % 38.4   PLATELETS Thousands/uL 202   NEUTROS PCT % 67   LYMPHS PCT % 22   MONOS PCT % 8   EOS PCT % 2     Results from last 7 days   Lab Units 07/17/23  0429 07/14/23  0212 07/13/23  1014   SODIUM mmol/L 148*   < > 154*   POTASSIUM mmol/L 2.7*   < > 2.8*   CHLORIDE mmol/L 113*   < > 117*   CO2 mmol/L 30   < > 34*   BUN mg/dL 15   < > 23   CREATININE mg/dL 0.93   < > 1.10   ANION GAP mmol/L 5   < > 3   CALCIUM mg/dL 10.7*   < > 11.8*   ALBUMIN g/dL  --   --  3.2*   TOTAL BILIRUBIN mg/dL  --   --  0.49   ALK PHOS U/L  --   --  59   ALT U/L  --   --  14   AST U/L  --   --  17   GLUCOSE RANDOM mg/dL 166*   < > 199*    < > = values in this interval not displayed. Results from last 7 days   Lab Units 07/13/23  1014   INR  1.08     Results from last 7 days   Lab Units 07/17/23  0654 07/17/23  0006 07/16/23  1756   POC GLUCOSE mg/dl 124 153* 122         Results from last 7 days   Lab Units 07/15/23  0537 07/13/23  1041   LACTIC ACID mmol/L  --  2.0   PROCALCITONIN ng/ml 0.07 0.05       Lines/Drains:  Invasive Devices     Peripheral Intravenous Line  Duration           Peripheral IV 07/17/23 Left;Upper Arm <1 day          Drain  Duration           Urethral Catheter Latex 16 Fr. 3 days                Telemetry:   Telemetry Orders (From admission, onward)             24 Hour Telemetry Monitoring  Continuous x 24 Hours (Telem)        Expiring   Question:  Reason for 24 Hour Telemetry  Answer:  Metabolic/electrolyte disturbance with high probability of dysrhythmia.  K level <3 or >6 OR KCL infusion >10mEq/hr                    * I Have Reviewed All Lab Data Listed Above. Imaging:     Results for orders placed during the hospital encounter of 07/13/23    XR Trauma chest portable    Narrative  CHEST    INDICATION:   TRAUMA. Injury status post fall occurred 3 days ago    COMPARISON: 3/13/2023. EXAM PERFORMED/VIEWS:  XR CHEST PORTABLE      FINDINGS: Hypoventilation is noted. Loop recorder appears to be present. Cardiomediastinal silhouette appears unremarkable. The patient is status post median sternotomy. Hypoventilation appears to be present at the bases with subsegmental atelectasis. No evidence of pneumothorax or effusion on this semierect study. No definite infiltrate elsewhere. Nathalia Geraldine Healed rib fractures are noted on the right. .    Impression  Hypoventilation with atelectatic changes at the bases. No definite pneumothorax. Workstation performed: [id number]    Results for orders placed during the hospital encounter of 05/25/18    XR chest 2 views    Narrative  CHEST    INDICATION:   falls. "PATIENT HAS HAD MULTIPLE FALL IN THE LAST COUPLE OF DAYS, STATES NO SOB OR CHEST PAIN"    COMPARISON:  Two-view chest 5/21/2018. EXAM PERFORMED/VIEWS:  XR CHEST PA & LATERAL  dual      FINDINGS:    Median sternotomy wires. Cardiomediastinal silhouette appears unremarkable. The lungs are clear. No pneumothorax or pleural effusion. Osseous structures appear within normal limits for patient age. Impression  No acute cardiopulmonary disease. Workstation performed: [id number]      *I have reviewed all imaging reports listed above      Recent Cultures (last 7 days):     Results from last 7 days   Lab Units 07/13/23  1041   BLOOD CULTURE  No Growth at 72 hrs. No Growth at 72 hrs.        Last 24 Hours Medication List:   Current Facility-Administered Medications   Medication Dose Route Frequency Provider Last Rate   • calcium carbonate  1,000 mg Oral Daily PRN Remedios Howell ELOY Jenkins PA-C     • dextrose 5 % with KCl 20 mEq/L  100 mL/hr Intravenous Continuous MICHELE Ryan     • enoxaparin  40 mg Subcutaneous Daily Angelo Whiteside PA-C     • hydrALAZINE  5 mg Intravenous Q6H PRN Alramarielle Po, MD     • metoprolol  2.5 mg Intravenous Q6H Alray Po, MD     • ondansetron  4 mg Intravenous Q6H PRN Sheldon Donaldson PA-C     • potassium chloride  20 mEq Intravenous Q2H Gasper Richardson PA-C 20 mEq (07/17/23 1052)        Today, Patient Was Seen By: Radha Haider MD    ** Please Note: Dictation voice to text software may have been used in the creation of this document.  **

## 2023-07-17 NOTE — ASSESSMENT & PLAN NOTE
Overall guarded prognosis given dementia dysphagia with aspiration risk  GI following, hoping with rx of hypernatremia he will be able to return to pureed diet.   If he continues to have poor p.o. intake -discussed tube feed option, aspiration risk, hospice  They will review his living will regarding tube feed option

## 2023-07-17 NOTE — PLAN OF CARE
Problem: Potential for Falls  Goal: Patient will remain free of falls  Description: INTERVENTIONS:  - Educate patient/family on patient safety including physical limitations  - Instruct patient to call for assistance with activity   - Consult OT/PT to assist with strengthening/mobility   - Keep Call bell within reach  - Keep bed low and locked with side rails adjusted as appropriate  - Keep care items and personal belongings within reach  - Initiate and maintain comfort rounds  - Make Fall Risk Sign visible to staff  - Offer Toileting every Hours, in advance of need  - Initiate/Maintain alarm  - Obtain necessary fall risk management equipment:   - Apply yellow socks and bracelet for high fall risk patients  - Consider moving patient to room near nurses station  Outcome: Progressing     Problem: INFECTION - ADULT  Goal: Absence or prevention of progression during hospitalization  Description: INTERVENTIONS:  - Assess and monitor for signs and symptoms of infection  - Monitor lab/diagnostic results  - Monitor all insertion sites, i.e. indwelling lines, tubes, and drains  - Monitor endotracheal if appropriate and nasal secretions for changes in amount and color  - [place] appropriate cooling/warming therapies per order  - Administer medications as ordered  - Instruct and encourage patient and family to use good hand hygiene technique  - Identify and instruct in appropriate isolation precautions for identified infection/condition  Outcome: Progressing     Problem: PAIN - ADULT  Goal: Verbalizes/displays adequate comfort level or baseline comfort level  Description: Interventions:  - Encourage patient to monitor pain and request assistance  - Assess pain using appropriate pain scale  - Administer analgesics based on type and severity of pain and evaluate response  - Implement non-pharmacological measures as appropriate and evaluate response  - Consider cultural and social influences on pain and pain management  - Notify physician/advanced practitioner if interventions unsuccessful or patient reports new pain  Outcome: Progressing     Problem: NEUROSENSORY - ADULT  Goal: Achieves stable or improved neurological status  Description: INTERVENTIONS  - Monitor and report changes in neurological status  - Monitor vital signs such as temperature, blood pressure, glucose, and any other labs ordered   - Initiate measures to prevent increased intracranial pressure  - Monitor for seizure activity and implement precautions if appropriate      Outcome: Progressing     Problem: Knowledge Deficit  Goal: Patient/family/caregiver demonstrates understanding of disease process, treatment plan, medications, and discharge instructions  Description: Complete learning assessment and assess knowledge base.   Interventions:  - Provide teaching at level of understanding  - Provide teaching via preferred learning methods  Outcome: Progressing     Problem: DISCHARGE PLANNING  Goal: Discharge to home or other facility with appropriate resources  Description: INTERVENTIONS:  - Identify barriers to discharge w/patient and caregiver  - Arrange for needed discharge resources and transportation as appropriate  - Identify discharge learning needs (meds, wound care, etc.)  - Arrange for interpretive services to assist at discharge as needed  - Refer to Case Management Department for coordinating discharge planning if the patient needs post-hospital services based on physician/advanced practitioner order or complex needs related to functional status, cognitive ability, or social support system  Outcome: Progressing

## 2023-07-17 NOTE — ASSESSMENT & PLAN NOTE
Patient is a resident of [facility], presents with altered mental status  Encephalopathy is likely multifactorial  Appears to be improving  Noted to have hypernatremia hypercalcemia  Receiving IV fluids  Avoid neurotoxins  Optimize metabolic parameters  Monitor closely

## 2023-07-18 LAB
ALBUMIN SERPL BCP-MCNC: 3 G/DL (ref 3.5–5)
ALP SERPL-CCNC: 55 U/L (ref 34–104)
ALT SERPL W P-5'-P-CCNC: 19 U/L (ref 7–52)
ANION GAP SERPL CALCULATED.3IONS-SCNC: 2 MMOL/L
AST SERPL W P-5'-P-CCNC: 28 U/L (ref 13–39)
BACTERIA BLD CULT: NORMAL
BACTERIA BLD CULT: NORMAL
BASOPHILS # BLD AUTO: 0.01 THOUSANDS/ÂΜL (ref 0–0.1)
BASOPHILS NFR BLD AUTO: 0 % (ref 0–1)
BILIRUB SERPL-MCNC: 0.79 MG/DL (ref 0.2–1)
BUN SERPL-MCNC: 13 MG/DL (ref 5–25)
CALCIUM ALBUM COR SERPL-MCNC: 11.7 MG/DL (ref 8.3–10.1)
CALCIUM SERPL-MCNC: 10.9 MG/DL (ref 8.4–10.2)
CHLORIDE SERPL-SCNC: 111 MMOL/L (ref 96–108)
CO2 SERPL-SCNC: 30 MMOL/L (ref 21–32)
CREAT SERPL-MCNC: 0.83 MG/DL (ref 0.6–1.3)
EOSINOPHIL # BLD AUTO: 0.15 THOUSAND/ÂΜL (ref 0–0.61)
EOSINOPHIL NFR BLD AUTO: 2 % (ref 0–6)
ERYTHROCYTE [DISTWIDTH] IN BLOOD BY AUTOMATED COUNT: 14 % (ref 11.6–15.1)
GFR SERPL CREATININE-BSD FRML MDRD: 77 ML/MIN/1.73SQ M
GLUCOSE SERPL-MCNC: 102 MG/DL (ref 65–140)
GLUCOSE SERPL-MCNC: 102 MG/DL (ref 65–140)
GLUCOSE SERPL-MCNC: 115 MG/DL (ref 65–140)
GLUCOSE SERPL-MCNC: 137 MG/DL (ref 65–140)
HCT VFR BLD AUTO: 38.2 % (ref 36.5–49.3)
HGB BLD-MCNC: 12.1 G/DL (ref 12–17)
IMM GRANULOCYTES # BLD AUTO: 0.04 THOUSAND/UL (ref 0–0.2)
IMM GRANULOCYTES NFR BLD AUTO: 0 % (ref 0–2)
LYMPHOCYTES # BLD AUTO: 1.8 THOUSANDS/ÂΜL (ref 0.6–4.47)
LYMPHOCYTES NFR BLD AUTO: 18 % (ref 14–44)
MCH RBC QN AUTO: 26.2 PG (ref 26.8–34.3)
MCHC RBC AUTO-ENTMCNC: 31.7 G/DL (ref 31.4–37.4)
MCV RBC AUTO: 83 FL (ref 82–98)
MONOCYTES # BLD AUTO: 0.76 THOUSAND/ÂΜL (ref 0.17–1.22)
MONOCYTES NFR BLD AUTO: 8 % (ref 4–12)
NEUTROPHILS # BLD AUTO: 7.1 THOUSANDS/ÂΜL (ref 1.85–7.62)
NEUTS SEG NFR BLD AUTO: 72 % (ref 43–75)
NRBC BLD AUTO-RTO: 0 /100 WBCS
PLATELET # BLD AUTO: 194 THOUSANDS/UL (ref 149–390)
PMV BLD AUTO: 10.5 FL (ref 8.9–12.7)
POTASSIUM SERPL-SCNC: 3.5 MMOL/L (ref 3.5–5.3)
PROT SERPL-MCNC: 5.8 G/DL (ref 6.4–8.4)
RBC # BLD AUTO: 4.61 MILLION/UL (ref 3.88–5.62)
SODIUM SERPL-SCNC: 143 MMOL/L (ref 135–147)
WBC # BLD AUTO: 9.86 THOUSAND/UL (ref 4.31–10.16)

## 2023-07-18 PROCEDURE — 85025 COMPLETE CBC W/AUTO DIFF WBC: CPT | Performed by: HOSPITALIST

## 2023-07-18 PROCEDURE — 82948 REAGENT STRIP/BLOOD GLUCOSE: CPT

## 2023-07-18 PROCEDURE — 97530 THERAPEUTIC ACTIVITIES: CPT

## 2023-07-18 PROCEDURE — 80053 COMPREHEN METABOLIC PANEL: CPT | Performed by: HOSPITALIST

## 2023-07-18 PROCEDURE — 99232 SBSQ HOSP IP/OBS MODERATE 35: CPT | Performed by: HOSPITALIST

## 2023-07-18 PROCEDURE — 99232 SBSQ HOSP IP/OBS MODERATE 35: CPT | Performed by: INTERNAL MEDICINE

## 2023-07-18 RX ORDER — POTASSIUM CHLORIDE 14.9 MG/ML
20 INJECTION INTRAVENOUS
Status: COMPLETED | OUTPATIENT
Start: 2023-07-18 | End: 2023-07-19

## 2023-07-18 RX ORDER — CINACALCET 30 MG/1
30 TABLET, FILM COATED ORAL
Status: DISCONTINUED | OUTPATIENT
Start: 2023-07-18 | End: 2023-07-24

## 2023-07-18 RX ADMIN — METOPROLOL TARTRATE 2.5 MG: 5 INJECTION INTRAVENOUS at 21:21

## 2023-07-18 RX ADMIN — METOPROLOL TARTRATE 2.5 MG: 5 INJECTION INTRAVENOUS at 09:10

## 2023-07-18 RX ADMIN — CINACALCET 30 MG: 30 TABLET, FILM COATED ORAL at 17:45

## 2023-07-18 RX ADMIN — DEXTROSE AND POTASSIUM CHLORIDE 100 ML/HR: 5; .15 SOLUTION INTRAVENOUS at 12:06

## 2023-07-18 RX ADMIN — DEXTROSE AND POTASSIUM CHLORIDE 100 ML/HR: 5; .15 SOLUTION INTRAVENOUS at 02:17

## 2023-07-18 RX ADMIN — POTASSIUM CHLORIDE 20 MEQ: 14.9 INJECTION, SOLUTION INTRAVENOUS at 13:41

## 2023-07-18 RX ADMIN — METOPROLOL TARTRATE 2.5 MG: 5 INJECTION INTRAVENOUS at 15:58

## 2023-07-18 RX ADMIN — ENOXAPARIN SODIUM 40 MG: 100 INJECTION SUBCUTANEOUS at 09:10

## 2023-07-18 RX ADMIN — POTASSIUM CHLORIDE 20 MEQ: 14.9 INJECTION, SOLUTION INTRAVENOUS at 15:58

## 2023-07-18 RX ADMIN — METOPROLOL TARTRATE 2.5 MG: 5 INJECTION INTRAVENOUS at 04:11

## 2023-07-18 NOTE — ASSESSMENT & PLAN NOTE
Overall guarded prognosis given dementia dysphagia with aspiration risk  GI following   Patient has transitioned back to puree diet. Adequate oral intake reported per patients primary nurse.

## 2023-07-18 NOTE — OCCUPATIONAL THERAPY NOTE
Occupational Therapy Tx Note     Patient Name: Rafaela Moeller  [id number] Date: 7/18/2023  Problem List  Principal Problem:    Acute metabolic encephalopathy  Active Problems:    Hypercalcemia    Dysphagia    Fall    Elevated troponin    Dementia (HCC)    Moderate protein-calorie malnutrition (HCC)    Hypernatremia    Hypokalemia    Aspiration pneumonitis (HCC)    Hypophosphatemia    Goals of care, counseling/discussion    Urinary retention          07/18/23 1437   OT Last Visit   OT Visit Date 07/18/23   Note Type   Note Type Treatment   Pain Assessment   Pain Assessment Tool FLACC   Pain Rating: FLACC (Rest) - Face 0   Pain Rating: FLACC (Rest) - Legs 1   Pain Rating: FLACC (Rest) - Activity 0   Pain Rating: FLACC (Rest) - Cry 0   Pain Rating: FLACC (Rest) - Consolability 0   Score: FLACC (Rest) 1   Pain Rating: FLACC (Activity) - Face 0   Pain Rating: FLACC (Activity) - Legs 1   Pain Rating: FLACC (Activity) - Activity 1   Pain Rating: FLACC (Activity) - Cry 0   Pain Rating: FLACC (Activity) - Consolability 0   Score: FLACC (Activity) 2   Restrictions/Precautions   Weight Bearing Precautions Per Order Yes   LUE Weight Bearing Per Order WBAT   Other Precautions Cognitive; Bed Alarm; Fall Risk;Telemetry;Multiple lines  (IV pole, little, 4 bed rails)   Lifestyle   Reciprocal Relationships Wife present   ADL   Toileting Comments No ADL needs this session   Bed Mobility   Rolling R 2  Maximal assistance   Additional items Assist x 1; Increased time required;Verbal cues;LE management   Rolling L 2  Maximal assistance   Additional items Assist x 1; Increased time required;LE management;Verbal cues   Supine to Sit 2  Maximal assistance   Additional items Assist x 1; Increased time required;LE management;Verbal cues   Sit to Supine 2  Maximal assistance   Additional items Assist x 1; Increased time required;Verbal cues;LE management   Additional Comments Pt unable to maintain upright/midline position whe sitting EOB.  Heavy lean to L. Total A to maintain sitting balance. Unable to tolerate >10 seconds. Transfers   Sit to Stand Unable to assess   Subjective   Subjective Pt able to say hello & goodbye, counting numbers throughout session   Cognition   Overall Cognitive Status Impaired   Arousal/Participation Arousable   Attention LUIS   Orientation Level Oriented to person;Disoriented to place; Disoriented to time;Disoriented to situation   Memory Unable to assess   Following Commands Unable to follow one step commands   Comments Pt fidgety. Does not follow commands. Activity Tolerance   Activity Tolerance Other (Comment)  (Cognition)   Medical Staff Made Aware TYRESE Hassan   Assessment   Assessment Pt seen for OT tx session with focus on functional balance, functional mobility, and transfer safety. Pt without ADL needs this session. Patient agreeable to OT treatment session. Pt received supine in bed. Pt required Max A x1 for all bed mobility. Session largely limited d/t pts cognition & ability to follow commands. Pt impulsive with poor trunk control. Patient continues to be functioning below baseline level, occupational performance remains limited secondary to factors listed above, and pt at increased risk for falls and injury. The patient's raw score on the AM-PAC Daily Activity inpatient short form is 7, standardized score is 17.31, less than 39.4. Patients at this level are likely to benefit from DC to post-acute rehabilitation services. Please refer to the recommendation of the Occupational Therapist for safe DC planning. From OT standpoint, recommendation at time of D/C would be DC with Level II resources. Patient to benefit from continued Occupational Therapy treatment while in the hospital to address deficits as defined above and maximize level of functional independence with ADLs and functional mobility. Pt left supine in bed with call bell in reach, tray table in reach, needs met, bed alarm activated, RN informed.    Plan Treatment Interventions ADL retraining;Functional transfer training;UE strengthening/ROM; Cognitive reorientation;Patient/family training;Continued evaluation; Activityengagement   Goal Expiration Date 07/23/23   OT Treatment Day 1   OT Frequency 3-5x/wk   Recommendation   UB Rehab Discharge Recommendation (PT/OT) Level 2   AM-PAC Daily Activity Inpatient   Lower Body Dressing 1   Bathing 1   Toileting 1   Upper Body Dressing 1   Grooming 1   Eating 2   Daily Activity Raw Score 7   Turning Head Towards Sound 2   Follow Simple Instructions 1   Low Function Daily Activity Raw Score 10   Low Function Daily Activity Standardized Score  17.31   AM-PAC Applied Cognition Inpatient   Following a Speech/Presentation 1   Understanding Ordinary Conversation 1   Taking Medications 1   Remembering Where Things Are Placed or Put Away 1   Remembering List of 4-5 Errands 1   Taking Care of Complicated Tasks 1   Applied Cognition Raw Score 6   Applied Cognition Standardized Score 7.69   End of Consult   Education Provided Yes;Family or social support of family present for education by provider   Patient Position at End of Consult Supine;Bed/Chair alarm activated; All needs within reach   Nurse Communication Nurse aware of consult     Dany Anamika OTR/L

## 2023-07-18 NOTE — PLAN OF CARE
Problem: Potential for Falls  Goal: Patient will remain free of falls  Description: INTERVENTIONS:  - Educate patient/family on patient safety including physical limitations  - Instruct patient to call for assistance with activity   - Consult OT/PT to assist with strengthening/mobility   - Keep Call bell within reach  - Keep bed low and locked with side rails adjusted as appropriate  - Keep care items and personal belongings within reach  - Initiate and maintain comfort rounds  - Make Fall Risk Sign visible to staff  - Offer Toileting every Hours, in advance of need  - Initiate/Maintain alarm  - Obtain necessary fall risk management equipment:   - Apply yellow socks and bracelet for high fall risk patients  - Consider moving patient to room near nurses station  Outcome: Progressing     Problem: MOBILITY - ADULT  Goal: Maintain or return to baseline ADL function  Description: INTERVENTIONS:  -  Assess patient's ability to carry out ADLs; assess patient's baseline for ADL function and identify physical deficits which impact ability to perform ADLs (bathing, care of mouth/teeth, toileting, grooming, dressing, etc.)  - Assess/evaluate cause of self-care deficits   - Assess range of motion  - Assess patient's mobility; develop plan if impaired  - Assess patient's need for assistive devices and provide as appropriate  - Encourage maximum independence but intervene and supervise when necessary  - Involve family in performance of ADLs  - Assess for home care needs following discharge   - Consider OT consult to assist with ADL evaluation and planning for discharge  - Provide patient education as appropriate  Outcome: Progressing  Goal: Maintains/Returns to pre admission functional level  Description: INTERVENTIONS:  - Perform BMAT or MOVE assessment daily.   - Set and communicate daily mobility goal to care team and patient/family/caregiver.    - Collaborate with rehabilitation services on mobility goals if consulted  - Perform Range of Motion  times a day. - Reposition patient every  hours.   - Dangle patient  times a day  - Stand patient  times a day  - Ambulate patient  times a day  - Out of bed to chair  times a day   - Out of bed for meals  times a day  - Out of bed for toileting  - Record patient progress and toleration of activity level   Outcome: Progressing     Problem: PAIN - ADULT  Goal: Verbalizes/displays adequate comfort level or baseline comfort level  Description: Interventions:  - Encourage patient to monitor pain and request assistance  - Assess pain using appropriate pain scale  - Administer analgesics based on type and severity of pain and evaluate response  - Implement non-pharmacological measures as appropriate and evaluate response  - Consider cultural and social influences on pain and pain management  - Notify physician/advanced practitioner if interventions unsuccessful or patient reports new pain  Outcome: Progressing     Problem: INFECTION - ADULT  Goal: Absence or prevention of progression during hospitalization  Description: INTERVENTIONS:  - Assess and monitor for signs and symptoms of infection  - Monitor lab/diagnostic results  - Monitor all insertion sites, i.e. indwelling lines, tubes, and drains  - Monitor endotracheal if appropriate and nasal secretions for changes in amount and color  - [place] appropriate cooling/warming therapies per order  - Administer medications as ordered  - Instruct and encourage patient and family to use good hand hygiene technique  - Identify and instruct in appropriate isolation precautions for identified infection/condition  Outcome: Progressing  Goal: Absence of fever/infection during neutropenic period  Description: INTERVENTIONS:  - Monitor WBC    Outcome: Progressing     Problem: SAFETY ADULT  Goal: Patient will remain free of falls  Description: INTERVENTIONS:  - Educate patient/family on patient safety including physical limitations  - Instruct patient to call for assistance with activity   - Consult OT/PT to assist with strengthening/mobility   - Keep Call bell within reach  - Keep bed low and locked with side rails adjusted as appropriate  - Keep care items and personal belongings within reach  - Initiate and maintain comfort rounds  - Make Fall Risk Sign visible to staff  - Offer Toileting every Hours, in advance of need  - Initiate/Maintain alarm  - Obtain necessary fall risk management equipment:   - Apply yellow socks and bracelet for high fall risk patients  - Consider moving patient to room near nurses station  Outcome: Progressing  Goal: Maintain or return to baseline ADL function  Description: INTERVENTIONS:  -  Assess patient's ability to carry out ADLs; assess patient's baseline for ADL function and identify physical deficits which impact ability to perform ADLs (bathing, care of mouth/teeth, toileting, grooming, dressing, etc.)  - Assess/evaluate cause of self-care deficits   - Assess range of motion  - Assess patient's mobility; develop plan if impaired  - Assess patient's need for assistive devices and provide as appropriate  - Encourage maximum independence but intervene and supervise when necessary  - Involve family in performance of ADLs  - Assess for home care needs following discharge   - Consider OT consult to assist with ADL evaluation and planning for discharge  - Provide patient education as appropriate  Outcome: Progressing  Goal: Maintains/Returns to pre admission functional level  Description: INTERVENTIONS:  - Perform BMAT or MOVE assessment daily.   - Set and communicate daily mobility goal to care team and patient/family/caregiver. - Collaborate with rehabilitation services on mobility goals if consulted  - Perform Range of Motion  times a day. - Reposition patient every  hours.   - Dangle patient  times a day  - Stand patient  times a day  - Ambulate patient  times a day  - Out of bed to chair  times a day   - Out of bed for meals  times a day  - Out of bed for toileting  - Record patient progress and toleration of activity level   Outcome: Progressing     Problem: DISCHARGE PLANNING  Goal: Discharge to home or other facility with appropriate resources  Description: INTERVENTIONS:  - Identify barriers to discharge w/patient and caregiver  - Arrange for needed discharge resources and transportation as appropriate  - Identify discharge learning needs (meds, wound care, etc.)  - Arrange for interpretive services to assist at discharge as needed  - Refer to Case Management Department for coordinating discharge planning if the patient needs post-hospital services based on physician/advanced practitioner order or complex needs related to functional status, cognitive ability, or social support system  Outcome: Progressing     Problem: Knowledge Deficit  Goal: Patient/family/caregiver demonstrates understanding of disease process, treatment plan, medications, and discharge instructions  Description: Complete learning assessment and assess knowledge base. Interventions:  - Provide teaching at level of understanding  - Provide teaching via preferred learning methods  Outcome: Progressing     Problem: NEUROSENSORY - ADULT  Goal: Achieves stable or improved neurological status  Description: INTERVENTIONS  - Monitor and report changes in neurological status  - Monitor vital signs such as temperature, blood pressure, glucose, and any other labs ordered   - Initiate measures to prevent increased intracranial pressure  - Monitor for seizure activity and implement precautions if appropriate      Outcome: Progressing  Goal: Achieves maximal functionality and self care  Description: INTERVENTIONS  - Monitor swallowing and airway patency with patient fatigue and changes in neurological status  - Encourage and assist patient to increase activity and self care.    - Encourage visually impaired, hearing impaired and aphasic patients to use assistive/communication devices  Outcome: Progressing     Problem: SAFETY,RESTRAINT: NV/NON-SELF DESTRUCTIVE BEHAVIOR  Goal: Remains free of harm/injury (restraint for non violent/non self-detsructive behavior)  Description: INTERVENTIONS:  - Instruct patient/family regarding restraint use   - Assess and monitor physiologic and psychological status   - Provide interventions and comfort measures to meet assessed patient needs   - Identify and implement measures to help patient regain control  - Assess readiness for release of restraint   Outcome: Progressing  Goal: Returns to optimal restraint-free functioning  Description: INTERVENTIONS:  - Assess the patient's behavior and symptoms that indicate continued need for restraint  - Identify and implement measures to help patient regain control  - Assess readiness for release of restraint   Outcome: Progressing     Problem: Nutrition/Hydration-ADULT  Goal: Nutrient/Hydration intake appropriate for improving, restoring or maintaining nutritional needs  Description: Monitor and assess patient's nutrition/hydration status for malnutrition. Collaborate with interdisciplinary team and initiate plan and interventions as ordered. Monitor patient's weight and dietary intake as ordered or per policy. Utilize nutrition screening tool and intervene as necessary. Determine patient's food preferences and provide high-protein, high-caloric foods as appropriate.      INTERVENTIONS:  - Monitor oral intake, urinary output, labs, and treatment plans  - Assess nutrition and hydration status and recommend course of action  - Evaluate amount of meals eaten  - Assist patient with eating if necessary   - Allow adequate time for meals  - Recommend/ encourage appropriate diets, oral nutritional supplements, and vitamin/mineral supplements  - Order, calculate, and assess calorie counts as needed  - Recommend, monitor, and adjust tube feedings and TPN/PPN based on assessed needs  - Assess need for intravenous fluids  - Provide specific nutrition/hydration education as appropriate  - Include patient/family/caregiver in decisions related to nutrition  Outcome: Progressing     Problem: Prexisting or High Potential for Compromised Skin Integrity  Goal: Skin integrity is maintained or improved  Description: INTERVENTIONS:  - Identify patients at risk for skin breakdown  - Assess and monitor skin integrity  - Assess and monitor nutrition and hydration status  - Monitor labs   - Assess for incontinence   - Turn and reposition patient  - Assist with mobility/ambulation  - Relieve pressure over bony prominences  - Avoid friction and shearing  - Provide appropriate hygiene as needed including keeping skin clean and dry  - Evaluate need for skin moisturizer/barrier cream  - Collaborate with interdisciplinary team   - Patient/family teaching  - Consider wound care consult   Outcome: Progressing

## 2023-07-18 NOTE — PROGRESS NOTES
[facility]  Progress Note  Name: Sherryle Ku  MRN: [id number]  Unit/Bed#: -01 I Date of Admission: 7/13/2023   Date of Service: 7/18/2023 I Hospital Day: 5    Assessment/Plan   Urinary retention  Assessment & Plan  Noted to have urinary retention in the ED and a Clemons catheter was placed  Will attempt voiding trial and discontinue Clemons as encephalopathy improves    Goals of care, counseling/discussion  Assessment & Plan  Overall guarded prognosis given dementia dysphagia with aspiration risk  GI following   Patient has transitioned back to puree diet. Adequate oral intake reported per patients primary nurse. Hypophosphatemia  Assessment & Plan  Normalized with repletion  Monitor    Aspiration pneumonitis (HCC)  Assessment & Plan  · Known history of dysphagia  · Imaging concerning for aspiration pneumonitis  · Received IV antibiotics in the ED, hold further antibiotics presently  · Procalcitonin levels noted  · Monitor counts temperatures  · Aspiration precautions        Hypokalemia  Assessment & Plan  Replete  Monitor    Hypernatremia  Assessment & Plan  Hyponatremia present on admission likely due to poor p.o. intake dehydration  Resolving. Continue hypotonic IV fluids  Nephrology following  Monitor closely        Moderate protein-calorie malnutrition ([address])  Assessment & Plan  Malnutrition Findings: Body mass index is 20.97 kg/m².        Dementia [facility])  Assessment & Plan  History of dementia  Continue memantine  Delirium precautions  Reorientation  Sleep hygiene      Elevated troponin  Assessment & Plan  Elevated troponins noted  Likely non-MI troponin elevation  Cardiology inputs noted    Fall  Assessment & Plan  Patient had a fall about 3 days back  At high risk for falls  Imaging revealed inferior pubic ramus fracture  Orthopedic inputs noted  Analgesics  Safe ambulation  Fall precautions  Physical therapy      Dysphagia  Assessment & Plan  History of dysphagia  Improved w/ treatment of electrolyte issues. Present precautions      Hypercalcemia  Assessment & Plan  Mild hypercalcemia  Elevated PTH levels noted  Endocrine consulted. * Acute metabolic encephalopathy  Assessment & Plan  Patient is a resident of [facility], presents with altered mental status  Encephalopathy is likely multifactorial  Appears to be improving  Noted to have hypernatremia hypercalcemia  Receiving IV fluids  Avoid neurotoxins  Optimize metabolic parameters  Monitor closely             VTE  Prophylaxis:   Pharmacologic: in place  Mechanical VTE Prophylaxis in Place: Yes    Patient Centered Rounds: I have performed bedside rounds with nursing staff today. Discussions with Specialists or Other Care Team Provider: case management    Education and Discussions with Family / Patient: attempted Madelyn Erwin on landline and mobile. Current Length of Stay: 5 day(s)    Current Patient Status: Inpatient        Code Status: Level 1 - Full Code    Discharge Plan: Pt will require continued inpatient hospitalization. Subjective:     Pt required soft mitts restraints  Now calmer      Patient is seen and examined at bedside. Limited ROS d/t dementia. No clear complaints. Objective:     Vitals:   Temp (24hrs), Av.4 °F (36.3 °C), Min:97.1 °F (36.2 °C), Max:98 °F (36.7 °C)    Temp:  [97.1 °F (36.2 °C)-98 °F (36.7 °C)] 98 °F (36.7 °C)  HR:  [67-72] 72  Resp:  [14-18] 14  BP: (122-129)/(62-76) 129/74  SpO2:  [87 %-100 %] 87 %  Body mass index is 20.97 kg/m². Input and Output Summary (last 24 hours):        Intake/Output Summary (Last 24 hours) at 2023 1438  Last data filed at 2023 1317  Gross per 24 hour   Intake 2428.33 ml   Output 1650 ml   Net 778.33 ml       Physical Exam:       GEN: No acute distress, comfortable, ill appearing  HEEENT: No JVD, PERRLA, no scleral icterus  RESP: Lungs clear to auscultation bilaterally  CV: RRR, +s1/s2   ABD: SOFT NON TENDER, POSITIVE BOWEL SOUNDS, NO DISTENTION  PSYCH: CALM, apparent dementia. NEURO: Mentation baseline, NO FOCAL DEFICITS  SKIN: NO RASH  EXTREM: NO EDEMA; mitts applied. Additional Data:     Labs:    Results from last 7 days   Lab Units 23  0410   WBC Thousand/uL 9.86   HEMOGLOBIN g/dL 12.1   HEMATOCRIT % 38.2   PLATELETS Thousands/uL 194   NEUTROS PCT % 72   LYMPHS PCT % 18   MONOS PCT % 8   EOS PCT % 2     Results from last 7 days   Lab Units 23  0410   SODIUM mmol/L 143   POTASSIUM mmol/L 3.5   CHLORIDE mmol/L 111*   CO2 mmol/L 30   BUN mg/dL 13   CREATININE mg/dL 0.83   ANION GAP mmol/L 2   CALCIUM mg/dL 10.9*   ALBUMIN g/dL 3.0*   TOTAL BILIRUBIN mg/dL 0.79   ALK PHOS U/L 55   ALT U/L 19   AST U/L 28   GLUCOSE RANDOM mg/dL 137     Results from last 7 days   Lab Units 23  1014   INR  1.08     Results from last 7 days   Lab Units 23  1206 23  0559 23  1313 23  0654 23  0006 23  1756   POC GLUCOSE mg/dl 115 102 123 124 153* 122         Results from last 7 days   Lab Units 07/15/23  0537 23  1041   LACTIC ACID mmol/L  --  2.0   PROCALCITONIN ng/ml 0.07 0.05       Lines/Drains:  Invasive Devices     Peripheral Intravenous Line  Duration           Peripheral IV 23 Left;Upper Arm 1 day          Drain  Duration           Urethral Catheter Latex 16 Fr. 5 days                Telemetry:   Telemetry Orders (From admission, onward)             24 Hour Telemetry Monitoring  Continuous x 24 Hours (Telem)           Question:  Reason for 24 Hour Telemetry  Answer:  Metabolic/electrolyte disturbance with high probability of dysrhythmia. K level <3 or >6 OR KCL infusion >10mEq/hr                    * I Have Reviewed All Lab Data Listed Above. Imaging:     Results for orders placed during the hospital encounter of 23    XR Trauma chest portable    Narrative  CHEST    INDICATION:   TRAUMA.  Injury status post fall occurred 3 days ago    COMPARISON: 3/13/2023. EXAM PERFORMED/VIEWS:  XR CHEST PORTABLE      FINDINGS: Hypoventilation is noted. Loop recorder appears to be present. Cardiomediastinal silhouette appears unremarkable. The patient is status post median sternotomy. Hypoventilation appears to be present at the bases with subsegmental atelectasis. No evidence of pneumothorax or effusion on this semierect study. No definite infiltrate elsewhere. [facility] are noted on the right. .    Impression  Hypoventilation with atelectatic changes at the bases. No definite pneumothorax. Workstation performed: [id number]    Results for orders placed during the hospital encounter of 05/25/18    XR chest 2 views    Narrative  CHEST    INDICATION:   falls. "PATIENT HAS HAD MULTIPLE FALL IN THE LAST COUPLE OF DAYS, STATES NO SOB OR CHEST PAIN"    COMPARISON:  Two-view chest 5/21/2018. EXAM PERFORMED/VIEWS:  XR CHEST PA & LATERAL  dual      FINDINGS:    Median sternotomy wires. Cardiomediastinal silhouette appears unremarkable. The lungs are clear. No pneumothorax or pleural effusion. Osseous structures appear within normal limits for patient age. Impression  No acute cardiopulmonary disease. Workstation performed: [id number]      *I have reviewed all imaging reports listed above      Recent Cultures (last 7 days):     Results from last 7 days   Lab Units 07/13/23  1041   BLOOD CULTURE  No Growth After 4 Days. No Growth After 4 Days.        Last 24 Hours Medication List:   Current Facility-Administered Medications   Medication Dose Route Frequency Provider Last Rate   • cinacalcet  30 mg Oral After Jos Merino MD     • dextrose 5 % with KCl 20 mEq/L  75 mL/hr Intravenous Continuous Mundo Fatima MD 75 mL/hr (07/18/23 1340)   • enoxaparin  40 mg Subcutaneous Daily Berto Jenkins PA-C     • hydrALAZINE  5 mg Intravenous Q6H PRN James Locke MD     • metoprolol  2.5 mg Intravenous Q6H Michael Peralta MD     • OLANZapine  5 mg Intramuscular Q3H PRN Shania Aguirre MD     • ondansetron  4 mg Intravenous Q6H PRN Anand Sr PA-C     • potassium chloride  20 mEq Intravenous Q2H Jordan Lunsford MD 20 mEq (07/18/23 1341)        Today, Patient Was Seen By: Shania Aguirre MD    ** Please Note: Dictation voice to text software may have been used in the creation of this document.  **

## 2023-07-18 NOTE — ASSESSMENT & PLAN NOTE
Hyponatremia present on admission likely due to poor p.o. intake dehydration  Resolving.   Continue hypotonic IV fluids  Nephrology following  Monitor closely

## 2023-07-18 NOTE — PROGRESS NOTES
NEPHROLOGY PROGRESS NOTE   Travis Medina 80 y.o. male MRN: [id number]  Unit/Bed#: -01 Encounter: [id number]  Reason for Consult: Hypernatremia    ASSESSMENT AND PLAN:  Hypernatremia  -Suspect secondary to poor free water intake  -Sodium level improving 143 today  -Decrease IV D5W 75 mill per hour, encourage p.o. free water intake. -BMP in a.m.     Hypokalemia  -Improving with replacement, serum potassium 3.5 today  -We will give 40 meq potassium chloride once today  -Serum magnesium stable  -BMP to monitor     Hypercalcemia  -Vitamin D 25-hydroxy level 36,   -Suspect PTH mediated hypercalcemia, component of primary hyperparathyroidism  -Corrected serum calcium 11.7 today, will start Sensipar 30 mg daily.  -Consider endocrine evaluation.     Hypertension  -Blood pressure improved on most recent readings.  -Now started on dysphagia thin liquid diet. If able to tolerate p.o. intake, consider changing to p.o. regimen. Currently on IV metoprolol standing dose. -Continue IV hydralazine as needed     Moderate left-sided hydronephrosis and hydroureter, with some mild changes on the right, markedly distended bladder  -Currently has Clemons catheter  -Consider eventual repeat renal ultrasound. Serum creatinine remains stable 0.9     Suspected aspiration pneumonia based on CT scan, now remains on dysphagia thin liquid diet.  -Ongoing discussion noted regarding PEG tube placement as per GI. Discussed above plan in detail with primary team and they agree with above recommendations. SUBJECTIVE:  Patient seen and examined at bedside. Does not answer any questions at the time of encounter.     OBJECTIVE:  Current Weight: Weight - Scale: 64.4 kg (142 lb)  Vitals:    07/18/23 0804   BP: 129/74   Pulse: 72   Resp: 14   Temp: 98 °F (36.7 °C)   SpO2: (!) 87%       Intake/Output Summary (Last 24 hours) at 7/18/2023 1313  Last data filed at 7/18/2023 1206  Gross per 24 hour   Intake 2308.33 ml   Output 1650 ml Net 658.33 ml     Wt Readings from Last 3 Encounters:   07/13/23 64.4 kg (142 lb)   06/13/23 64.4 kg (142 lb)   05/09/23 64.4 kg (142 lb)     Temp Readings from Last 3 Encounters:   07/18/23 98 °F (36.7 °C)   03/16/23 (!) 97 °F (36.1 °C)   03/02/23 98.1 °F (36.7 °C) (Oral)     BP Readings from Last 3 Encounters:   07/18/23 129/74   06/13/23 124/60   03/30/23 (!) 124/44     Pulse Readings from Last 3 Encounters:   07/18/23 72   06/13/23 72   03/30/23 65        Physical Examination:  Eyes: No conjunctival pallor present  Neck: No obvious lymphadenopathy appreciated  Respiratory: Bilateral air entry present  CVS: No significant edema  GI: Soft, nondistended  CNS: Opens eyes, does not answer questions  Skin: No new rash  Musculoskeletal: No obvious new gross deformity noted    Medications:    Current Facility-Administered Medications:   •  calcium carbonate (TUMS) chewable tablet 1,000 mg, 1,000 mg, Oral, Daily PRN, Ranjit Jenkins PA-C  •  dextrose 5 % with KCl 20 mEq/L infusion (premix), 100 mL/hr, Intravenous, Continuous, MICHELE Borja, Last Rate: 100 mL/hr at 07/18/23 1206, 100 mL/hr at 07/18/23 1206  •  enoxaparin (LOVENOX) subcutaneous injection 40 mg, 40 mg, Subcutaneous, Daily, Ranjit Jenkins PA-C, 40 mg at 07/18/23 7928  •  hydrALAZINE (APRESOLINE) injection 5 mg, 5 mg, Intravenous, Q6H PRN, Alma Matthew MD  •  metoprolol (LOPRESSOR) injection 2.5 mg, 2.5 mg, Intravenous, Q6H, Alma Matthew MD, 2.5 mg at 07/18/23 0910  •  OLANZapine (ZyPREXA) IM injection 5 mg, 5 mg, Intramuscular, Q3H PRN, Uli Porras MD, 5 mg at 07/17/23 2150  •  ondansetron (ZOFRAN) injection 4 mg, 4 mg, Intravenous, Q6H PRN, Bernardino Medrano PA-C    Laboratory Results:  Results from last 7 days   Lab Units 07/18/23  0410 07/17/23  1120 07/17/23  0429 07/16/23  1614 07/16/23  0535 07/15/23  2011 07/15/23  0537 07/14/23  1845 07/14/23  0212 07/13/23  1014   WBC Thousand/uL 9.86  --  8.82 --   --   --  11.42*  --  11.79* 9.23   HEMOGLOBIN g/dL 12.1  --  11.9*  --   --   --  12.6  --  12.0 12.3   HEMATOCRIT % 38.2  --  38.4  --   --   --  40.6  --  38.1 38.5   PLATELETS Thousands/uL 194  --  202  --   --   --  227  --  231 232   SODIUM mmol/L 143 147 148* 151* 154* 156* 157*   < > 157* 154*   POTASSIUM mmol/L 3.5 3.3* 2.7* 3.0* 2.8* 2.8* 3.1*   < > 3.0* 2.8*   CHLORIDE mmol/L 111* 112* 113* 119* 120* 120* 121*   < > 124* 117*   CO2 mmol/L 30 31 30 25 28 32 32   < > 27 34*   BUN mg/dL 13 13 15 18 20 22 21   < > 18 23   CREATININE mg/dL 0.83 0.91 0.93 0.92 1.00 0.99 1.03   < > 0.97 1.10   CALCIUM mg/dL 10.9* 10.7* 10.7* 10.7* 10.9* 11.2* 11.8*   < > 10.5* 11.8*   MAGNESIUM mg/dL  --   --   --   --  2.2  --   --   --   --  2.0   PHOSPHORUS mg/dL  --   --   --   --   --   --  3.0  --   --  1.8*    < > = values in this interval not displayed. CTA ED chest PE study   Final Result by Jodie Stone MD (07/13 6766)      Compromised by respiratory motion, particularly in the lower lobes, but with no definite acute pulmonary emboli. Mild bilateral lower lobe bronchiectasis with tubular opacities in the right lower lobe due to mucus and debris in the bronchi. Given debris in the superior segment right lower lobe bronchi, patchy groundglass opacity in the right upper lobe, and    tree-in-bud nodularity in both lower lobes, this is likely due to aspiration. Workstation performed: [id number]         TRAUMA - CT head wo contrast   Final Result by Shannan Rhodes MD (07/13 6252)      No acute intracranial abnormality. The study was marked in [facility] for immediate notification. Workstation performed: [id number]         TRAUMA - CT spine cervical wo contrast   Final Result by Shannan Rhodes MD (07/13 1107)      No cervical spine fracture or traumatic malalignment. Degenerative changes. New groundglass opacities in the right lung apex.  Please see chest CT for further result. The study was marked in [facility] for immediate notification. Workstation performed: [id number]         TRAUMA - CT chest abdomen pelvis w contrast   Final Result by Aung Silver MD (07/13 7161)      No evidence of solid organ trauma or acute fracture with the exception of L3 where there is compression fracture new since 2019 although it is age indeterminate based on imaging. Correlation with any additional back pain is advised. There is a left inferior pubic ramus fracture with some sclerosis which is partially healed suggesting this is recent more likely than acute and again should be correlated with any pain in the area. No definite pelvic fracture is seen elsewhere. Infiltrates in the right lower lobe with evidence of mucous secretions opacifying right lower lobe bronchi. There is additional opacities in the right upper lobe and left lower lobe. Findings are suspicious for aspiration pneumonia. Branching opacity may    represent mucous debris although this could also represent poor enhancement of the right lower lobe vasculature that could be seen in a pulmonary embolus. If there is high index of suspicion a follow-up CTA or lung scan may be useful. Marked bladder distention should be correlated for any recent voiding. Clemons catheter may be useful. 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 This could be correlated with distal pulses. This was discussed with JAVIER Spencer at 11:05 a.m. Workstation performed: [id number]         XR Trauma chest portable   Final Result by Aung Silver MD (07/13 9950)      Hypoventilation with atelectatic changes at the bases. No definite pneumothorax. Workstation performed: [id number]             Portions of the record may have been created with voice recognition software.  Occasional wrong word or "sound a like" substitutions may have occurred due to the inherent limitations of voice recognition software. Read the chart carefully and recognize, using context, where substitutions have occurred.

## 2023-07-18 NOTE — PLAN OF CARE
Problem: OCCUPATIONAL THERAPY ADULT  Goal: Performs self-care activities at highest level of function for planned discharge setting. See evaluation for individualized goals. Description: Treatment Interventions: ADL retraining, Functional transfer training, UE strengthening/ROM, Cognitive reorientation, Patient/family training, Continued evaluation, Activityengagement          See flowsheet documentation for full assessment, interventions and recommendations. Outcome: Not Progressing  Note: Limitation: Decreased ADL status, Decreased Safe judgement during ADL, Decreased endurance, Decreased self-care trans, Decreased high-level ADLs, Decreased cognition  Prognosis: Poor  Assessment: Pt seen for OT tx session with focus on functional balance, functional mobility, and transfer safety. Pt without ADL needs this session. Patient agreeable to OT treatment session. Pt received supine in bed. Pt required Max A x1 for all bed mobility. Session largely limited d/t pts cognition & ability to follow commands. Pt impulsive with poor trunk control. Patient continues to be functioning below baseline level, occupational performance remains limited secondary to factors listed above, and pt at increased risk for falls and injury. The patient's raw score on the AM-PAC Daily Activity inpatient short form is 7, standardized score is 17.31, less than 39.4. Patients at this level are likely to benefit from DC to post-acute rehabilitation services. Please refer to the recommendation of the Occupational Therapist for safe DC planning. From OT standpoint, recommendation at time of D/C would be DC with Level II resources. Patient to benefit from continued Occupational Therapy treatment while in the hospital to address deficits as defined above and maximize level of functional independence with ADLs and functional mobility.  Pt left supine in bed with call bell in reach, tray table in reach, needs met, bed alarm activated, RN informed.

## 2023-07-18 NOTE — ASSESSMENT & PLAN NOTE
Patient is a resident of [facility], presents with altered mental status  Encephalopathy is likely multifactorial  Appears to be improving  Noted to have hypernatremia hypercalcemia  Receiving IV fluids  Avoid neurotoxins  Optimize metabolic parameters  Monitor closely

## 2023-07-18 NOTE — PLAN OF CARE
Problem: Potential for Falls  Goal: Patient will remain free of falls  Description: INTERVENTIONS:  - Educate patient/family on patient safety including physical limitations  - Instruct patient to call for assistance with activity   - Consult OT/PT to assist with strengthening/mobility   - Keep Call bell within reach  - Keep bed low and locked with side rails adjusted as appropriate  - Keep care items and personal belongings within reach  - Initiate and maintain comfort rounds  - Make Fall Risk Sign visible to staff  - Offer Toileting every Hours, in advance of need  - Initiate/Maintain alarm  - Obtain necessary fall risk management equipment:   - Apply yellow socks and bracelet for high fall risk patients  - Consider moving patient to room near nurses station  Outcome: Progressing     Problem: MOBILITY - ADULT  Goal: Maintains/Returns to pre admission functional level  Description: INTERVENTIONS:  - Perform BMAT or MOVE assessment daily.   - Set and communicate daily mobility goal to care team and patient/family/caregiver. - Collaborate with rehabilitation services on mobility goals if consulted  - Perform Range of Motion  times a day. - Reposition patient every  hours.   - Dangle patient  times a day  - Stand patient  times a day  - Ambulate patient  times a day  - Out of bed to chair  times a day   - Out of bed for meals  times a day  - Out of bed for toileting  - Record patient progress and toleration of activity level   Outcome: Progressing

## 2023-07-19 ENCOUNTER — APPOINTMENT (INPATIENT)
Dept: RADIOLOGY | Facility: HOSPITAL | Age: 88
DRG: 177 | End: 2023-07-19
Payer: MEDICARE

## 2023-07-19 ENCOUNTER — APPOINTMENT (INPATIENT)
Dept: CT IMAGING | Facility: HOSPITAL | Age: 88
DRG: 177 | End: 2023-07-19
Attending: HOSPITALIST
Payer: MEDICARE

## 2023-07-19 PROBLEM — J69.0 ASPIRATION PNEUMONITIS (HCC): Status: RESOLVED | Noted: 2023-07-13 | Resolved: 2023-07-19

## 2023-07-19 LAB
AMMONIA PLAS-SCNC: 19 UMOL/L (ref 18–72)
ANION GAP SERPL CALCULATED.3IONS-SCNC: 2 MMOL/L
ARTERIAL PATENCY WRIST A: YES
BASE EXCESS BLDA CALC-SCNC: 0.2 MMOL/L
BASE EXCESS BLDA CALC-SCNC: 2 MMOL/L (ref -2–3)
BUN SERPL-MCNC: 14 MG/DL (ref 5–25)
CA-I BLD-SCNC: 1.36 MMOL/L (ref 1.12–1.32)
CA-I BLD-SCNC: 1.49 MMOL/L (ref 1.12–1.32)
CALCIUM SERPL-MCNC: 10.5 MG/DL (ref 8.4–10.2)
CHLORIDE SERPL-SCNC: 109 MMOL/L (ref 96–108)
CO2 SERPL-SCNC: 28 MMOL/L (ref 21–32)
CREAT SERPL-MCNC: 0.9 MG/DL (ref 0.6–1.3)
ERYTHROCYTE [DISTWIDTH] IN BLOOD BY AUTOMATED COUNT: 14 % (ref 11.6–15.1)
GFR SERPL CREATININE-BSD FRML MDRD: 74 ML/MIN/1.73SQ M
GLUCOSE SERPL-MCNC: 102 MG/DL (ref 65–140)
GLUCOSE SERPL-MCNC: 114 MG/DL (ref 65–140)
GLUCOSE SERPL-MCNC: 118 MG/DL (ref 65–140)
GLUCOSE SERPL-MCNC: 127 MG/DL (ref 65–140)
GLUCOSE SERPL-MCNC: 394 MG/DL (ref 65–140)
GLUCOSE SERPL-MCNC: 73 MG/DL (ref 65–140)
GLUCOSE SERPL-MCNC: 83 MG/DL (ref 65–140)
HCO3 BLDA-SCNC: 23.9 MMOL/L (ref 22–28)
HCO3 BLDA-SCNC: 27 MMOL/L (ref 22–28)
HCT VFR BLD AUTO: 36.6 % (ref 36.5–49.3)
HCT VFR BLD CALC: 29 % (ref 36.5–49.3)
HGB BLD-MCNC: 11.7 G/DL (ref 12–17)
HGB BLDA-MCNC: 9.9 G/DL (ref 12–17)
MCH RBC QN AUTO: 26.5 PG (ref 26.8–34.3)
MCHC RBC AUTO-ENTMCNC: 32 G/DL (ref 31.4–37.4)
MCV RBC AUTO: 83 FL (ref 82–98)
NASAL CANNULA: 2
O2 CT BLDA-SCNC: 16.7 ML/DL (ref 16–23)
OXYHGB MFR BLDA: 95.9 % (ref 94–97)
PCO2 BLD: 28 MMOL/L (ref 21–32)
PCO2 BLD: 41.7 MM HG (ref 36–44)
PCO2 BLDA: 35.6 MM HG (ref 36–44)
PH BLD: 7.42 [PH] (ref 7.35–7.45)
PH BLDA: 7.45 [PH] (ref 7.35–7.45)
PLATELET # BLD AUTO: 185 THOUSANDS/UL (ref 149–390)
PMV BLD AUTO: 10.8 FL (ref 8.9–12.7)
PO2 BLD: >400 MM HG (ref 75–129)
PO2 BLDA: 91 MM HG (ref 75–129)
POTASSIUM BLD-SCNC: 3.9 MMOL/L (ref 3.5–5.3)
POTASSIUM SERPL-SCNC: 4.1 MMOL/L (ref 3.5–5.3)
RBC # BLD AUTO: 4.41 MILLION/UL (ref 3.88–5.62)
SODIUM BLD-SCNC: 137 MMOL/L (ref 136–145)
SODIUM SERPL-SCNC: 139 MMOL/L (ref 135–147)
SPECIMEN SOURCE: ABNORMAL
SPECIMEN SOURCE: ABNORMAL
WBC # BLD AUTO: 9.09 THOUSAND/UL (ref 4.31–10.16)

## 2023-07-19 PROCEDURE — 82948 REAGENT STRIP/BLOOD GLUCOSE: CPT

## 2023-07-19 PROCEDURE — 85027 COMPLETE CBC AUTOMATED: CPT | Performed by: PHYSICIAN ASSISTANT

## 2023-07-19 PROCEDURE — 80048 BASIC METABOLIC PNL TOTAL CA: CPT | Performed by: HOSPITALIST

## 2023-07-19 PROCEDURE — 82805 BLOOD GASES W/O2 SATURATION: CPT | Performed by: PHYSICIAN ASSISTANT

## 2023-07-19 PROCEDURE — 93005 ELECTROCARDIOGRAM TRACING: CPT

## 2023-07-19 PROCEDURE — 82607 VITAMIN B-12: CPT | Performed by: PHYSICIAN ASSISTANT

## 2023-07-19 PROCEDURE — 85014 HEMATOCRIT: CPT

## 2023-07-19 PROCEDURE — 70450 CT HEAD/BRAIN W/O DYE: CPT

## 2023-07-19 PROCEDURE — 82140 ASSAY OF AMMONIA: CPT | Performed by: PHYSICIAN ASSISTANT

## 2023-07-19 PROCEDURE — 82803 BLOOD GASES ANY COMBINATION: CPT

## 2023-07-19 PROCEDURE — 99232 SBSQ HOSP IP/OBS MODERATE 35: CPT | Performed by: PHYSICIAN ASSISTANT

## 2023-07-19 PROCEDURE — NC001 PR NO CHARGE: Performed by: PHYSICIAN ASSISTANT

## 2023-07-19 PROCEDURE — 99232 SBSQ HOSP IP/OBS MODERATE 35: CPT | Performed by: HOSPITALIST

## 2023-07-19 PROCEDURE — 84295 ASSAY OF SERUM SODIUM: CPT

## 2023-07-19 PROCEDURE — 99222 1ST HOSP IP/OBS MODERATE 55: CPT | Performed by: INTERNAL MEDICINE

## 2023-07-19 PROCEDURE — 82330 ASSAY OF CALCIUM: CPT | Performed by: HOSPITALIST

## 2023-07-19 PROCEDURE — 71045 X-RAY EXAM CHEST 1 VIEW: CPT

## 2023-07-19 PROCEDURE — 82947 ASSAY GLUCOSE BLOOD QUANT: CPT

## 2023-07-19 PROCEDURE — 84132 ASSAY OF SERUM POTASSIUM: CPT

## 2023-07-19 PROCEDURE — 99232 SBSQ HOSP IP/OBS MODERATE 35: CPT | Performed by: INTERNAL MEDICINE

## 2023-07-19 PROCEDURE — 82330 ASSAY OF CALCIUM: CPT

## 2023-07-19 PROCEDURE — G1004 CDSM NDSC: HCPCS

## 2023-07-19 RX ORDER — DEXTROSE MONOHYDRATE 25 G/50ML
INJECTION, SOLUTION INTRAVENOUS
Status: DISPENSED
Start: 2023-07-19 | End: 2023-07-20

## 2023-07-19 RX ORDER — DEXTROSE MONOHYDRATE 25 G/50ML
25 INJECTION, SOLUTION INTRAVENOUS ONCE
Status: DISCONTINUED | OUTPATIENT
Start: 2023-07-19 | End: 2023-07-24 | Stop reason: HOSPADM

## 2023-07-19 RX ADMIN — ENOXAPARIN SODIUM 40 MG: 100 INJECTION SUBCUTANEOUS at 08:05

## 2023-07-19 RX ADMIN — METOPROLOL TARTRATE 2.5 MG: 5 INJECTION INTRAVENOUS at 23:31

## 2023-07-19 RX ADMIN — CINACALCET 30 MG: 30 TABLET, FILM COATED ORAL at 17:31

## 2023-07-19 RX ADMIN — DEXTROSE AND POTASSIUM CHLORIDE 50 ML/HR: 5; .15 SOLUTION INTRAVENOUS at 17:29

## 2023-07-19 RX ADMIN — METOPROLOL TARTRATE 2.5 MG: 5 INJECTION INTRAVENOUS at 08:05

## 2023-07-19 RX ADMIN — METOPROLOL TARTRATE 2.5 MG: 5 INJECTION INTRAVENOUS at 17:29

## 2023-07-19 RX ADMIN — METOPROLOL TARTRATE 2.5 MG: 5 INJECTION INTRAVENOUS at 03:00

## 2023-07-19 NOTE — NURSING NOTE
Pt very lethargic, unable to feed. O2 sats difficult to read as connection difficulty reading. Called critical care who assessed and ordered blood gas. Tested several locations for better results without success. Requested second assessment with same result. Rapid response called at 14:15.

## 2023-07-19 NOTE — ASSESSMENT & PLAN NOTE
[facility] Dermatology Note      Dermatology Problem List:  1.Relevant medical history : breast cancer s/p bilateral mastectomy and chemotherapy  2. History of telogen effluvium  3. History of SK  4. History of perleche  5. AKs: cryo    Encounter Date: Oct 30, 2018    CC:  Chief Complaint   Patient presents with     Skin Check     area of concern right and left cheek, left thigh and finger webs no hx skin cancer     Eczema     uses hydrocortisone with flares         History of Present Illness:  Ms. Chanel Israel is a 54 year old female who presents as a referral from Dr. Hicks.. She was last seen by Dr. Ospina in 2015 for hair loss. She currently uses Head and Shoulders shampoo, which keeps her scalp under control. She also uses hydrocortisone 2.5% occasionally for eczema. She has a spot of concern on her right cheek that is red. It has been there a couple months but she thinks it maybe irritated because she just had a facial. She also is concerned about a spot on her left hand, a bump that just appeared recently. She wonders if it is a wart. She has a rough spot on the left cheek. It is not tender and has not bled. No other concerns addressed today.    Past Medical History:   Patient Active Problem List   Diagnosis     Personal history of breast cancer     S/P bilateral mastectomy     Urge incontinence of urine     Overweight (BMI 25.0-29.9)     Mild recurrent major depression (H)     Family history of diabetes mellitus (DM)     Family history of thyroid disease     Plantar fasciitis, bilateral     Seasonal allergies     Mixed hyperlipidemia     Chronic rhinitis     Breast implant asymmetry     ACP (advance care planning)     Lymphedema     Family history of ischemic heart disease     Seasonal allergic rhinitis     Acquired hypothyroidism     Glaucoma suspect, bilateral     Family history of glaucoma     Age-related nuclear cataract of both eyes     Seasonal allergic rhinitis due  Mild hypercalcemia  Elevated PTH levels noted  Endocrine consulted.  Cont sensipar and hydration to other allergic trigger, unspecified chronicity     Family history of breast cancer in sister     Family history of skin cancer     Actinic keratoses     Hyperlipidemia LDL goal <160     Past Medical History:   Diagnosis Date     Asthma, mild intermittent      Breast cancer (H)      Depression      Endometriosis      Heart murmur     Patient reported.     PONV (postoperative nausea and vomiting)      Past Surgical History:   Procedure Laterality Date     ADENOIDECTOMY       COLONOSCOPY N/A 2014    Procedure: COLONOSCOPY;  Surgeon: Duane, William Charles, MD;  Location: [facility] OR     COSMETIC SURGERY       CYSTOSCOPY       HC REMOVAL OF ANAL FISSURE       MASTECTOMY, SIMPLE RT/LT/MABLE       RECONSTRUCT BREAST BILATERAL       TONSILLECTOMY         Social History:  Patient  reports that she has never smoked. She has never used smokeless tobacco. She reports that she does not drink alcohol or use illicit drugs. Patient works as a special eduction teacher.     Family History:  Family History   Problem Relation Age of Onset     Diabetes Mother      Thyroid Disease Mother      Glaucoma Mother      Macular Degeneration Mother      Hypertension Mother      Diabetes Sister      Thyroid Disease Sister      Depression Sister      Endometrial Cancer Sister 51     Cancer Father      skin cancer     Glaucoma Father      Hypertension Father      Coronary Artery Disease Brother       at 43     Cerebrovascular Disease Maternal Grandfather    Family history of NMSC. Family history of eczema.    Medications:  Current Outpatient Prescriptions   Medication Sig Dispense Refill     ASPIRIN PO Take 81 mg by mouth daily       Calcium Carbonate-Vitamin D (CALCIUM + D PO) Take by mouth 2 times daily       Coenzyme Q10 (CO Q 10) 100 MG CAPS Take 1 capsule by mouth daily        Cyanocobalamin (VITAMIN B-12 PO) Take by mouth daily       Flaxseed, Linseed, (FLAX SEED OIL) 1000 MG capsule Take 2 capsules (2,000 mg) by mouth 2 times  daily       fluocinolone (SYNALAR) 0.025 % cream Apply topically 2 times daily Apply to affected areas of mouth as needed. 60 g 5     fluticasone (FLONASE) 50 MCG/ACT nasal spray Reported on 5/1/2017  0     Glucosamine-Chondroitin-MSM (TRIPLE FLEX PO) Take by mouth 2 times daily       hydrocortisone 2.5 % cream Apply topically as needed       L-GLUTAMINE 1 capsule 2 times daily        MAGNESIUM PO Take 1 capsule by mouth daily        Multiple Vitamin (MULTI-VITAMIN PO) Take by mouth daily        Multiple Vitamins-Minerals (ZINC PO) Take by mouth daily       NIACIN CR PO Take 500 mg by mouth       order for DME Compression sleeve to bilateral UE. 2 Units 6     Probiotic Product (PROBIOTIC DAILY PO) Take 1 capsule by mouth 2 times daily       sertraline (ZOLOFT) 50 MG tablet Take 1 tablet (50 mg) by mouth daily 90 tablet 3     SYNTHROID 75 MCG tablet Take 1 tablet (75 mcg) by mouth daily 90 tablet 1       Allergies   Allergen Reactions     Erythromycin Nausea       Review of Systems:  -Constitutional: Patient is otherwise feeling well, in usual state of health.   -Skin: As above in HPI. No additional skin concerns.    Physical exam:  Vitals: There were no vitals taken for this visit.  GEN: This is a well developed, well-nourished female in no acute distress, in a pleasant mood.    SKIN: Total skin excluding the undergarment areas was performed. The exam included the head/face, neck, both arms, chest, back, abdomen, both legs, digits and/or nails and buttocks.   -Roland Type I/II  -Numerous brown macules on sun exposed areas.  -3 mm nonspecific pink papule between second third fingers on left dorsal hand near the MCPs (patient area of concern)  -There are erythematous macules with overyling adherent scale on the right jawline and left cheek.   -There are dome shaped bright red papules on the trunk.   -There are waxy stuck on tan to brown papules on the trunk, and extremities.  - Multiple regular brown pigmented  macules and papules are identified   -No other lesions of concern on areas examined.     Impression/Plan:  1. Sun damaged skin with solar lentigines    Recommend sunscreens SPF #30 or greater, protective clothing and avoidance of tanning beds. Reapply every 2 hours.     Recommended yearly skin checks given degree of sun damage    2. 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 Patient to report changes.     Patient reassured of the benign nature of these lesions.    3. Actinic keratosis, left and right cheek. Discussed precancerous nature of these lesions. Recommended treatment to prevent progression to a squamous cell carcinoma.     Cryotherapy procedure note: After verbal consent and discussion of risks and benefits including but no limited to dyspigmentation/scar, blister, and pain, 2 was(were) treated with 1-2mm freeze border for 2 cycles with liquid nitrogen. Post cryotherapy instructions were provided.     4. Multiple clinically benign nevi    No further intervention required. Patient to report changes.     Patient reassured of the benign nature of these lesions.    Follow-up in 1 year, earlier for new or changing lesions.       Staff Involved:  Scribe/Staff    Scribe Disclosure  I, Padma Monge, am serving as a scribe to document services personally performed by Dr. Laly Santiago MD, based on data collection and the provider's statements to me.     Provider Disclosure:   The documentation recorded by the scribe accurately reflects the services I personally performed and the decisions made by me.    Laly Santiago MD    Department of Dermatology  [facility]: Phone: [phone], Fax:[phone]  [facility]: Phone: [phone], Fax: [phone]

## 2023-07-19 NOTE — PHYSICAL THERAPY NOTE
PHYSICAL THERAPY CANCELLATION NOTE      Patient Name: Loida Queen  XRHBS'Y Date: 7/19/2023 07/19/23 3142   Note Type   Note type Cancelled Session   Cancel Reasons Medical status   Additional Comments Pt previously on PT caseload, pt w/ RR this PM, not medically approriate as he is hypoxic.  Will cancel for today         Baljit Lees, PT, DPT

## 2023-07-19 NOTE — PROGRESS NOTES
NEPHROLOGY PROGRESS NOTE   Fernie Spine 80 y.o. male MRN: [id number]  Unit/Bed#: -01 Encounter: [id number]  Reason for Consult: Hypernatremia    ASSESSMENT AND PLAN:  Hypernatremia  -Suspect secondary to poor free water intake  -Sodium level improving  139 today.  -Discussed with nurse, his p.o. intake remains very poor. We will continue IV fluid due to same and reduce rate to 50 mill per hour. -BMP in a.m.     Hypokalemia, resolved with replacement.  -Serum magnesium stable  -BMP to monitor     Hypercalcemia  -Vitamin D 25-hydroxy level 36,   -Suspect PTH mediated hypercalcemia, component of primary hyperparathyroidism  -Appreciate endocrine input. Serum calcium slightly improved 10.5 today. Ionized calcium 1.36 slightly higher.  -Continue Sensipar 30 mg daily.     Hypertension  -Blood pressure acceptable. -Due to poor p.o. intake, remains on IV metoprolol standing -Continue IV hydralazine as needed     Moderate left-sided hydronephrosis and hydroureter, with some mild changes on the right, markedly distended bladder  -Currently has Clemons catheter  -Consider eventual repeat renal ultrasound.  Serum creatinine remains stable 0.9     Suspected aspiration pneumonia based on CT scan, now remains on dysphagia thin liquid diet.  -Ongoing discussion noted regarding PEG tube placement as per GI.     Discussed above plan in detail with primary team and they agree with above recommendations. SUBJECTIVE:  Patient seen and examined at bedside.   Remains overall lethargic at the time of encounter    OBJECTIVE:  Current Weight: Weight - Scale: 64.4 kg (142 lb)  Vitals:    07/19/23 1031   BP: 128/99   Pulse: 64   Resp: 17   Temp:    SpO2: 99%       Intake/Output Summary (Last 24 hours) at 7/19/2023 1401  Last data filed at 7/19/2023 0951  Gross per 24 hour   Intake --   Output 2600 ml   Net -2600 ml     Wt Readings from Last 3 Encounters:   07/13/23 64.4 kg (142 lb)   06/13/23 64.4 kg (142 lb)   05/09/23 64.4 kg (142 lb)     Temp Readings from Last 3 Encounters:   07/19/23 (!) 96.5 °F (35.8 °C) (Axillary)   03/16/23 (!) 97 °F (36.1 °C)   03/02/23 98.1 °F (36.7 °C) (Oral)     BP Readings from Last 3 Encounters:   07/19/23 128/99   06/13/23 124/60   03/30/23 (!) 124/44     Pulse Readings from Last 3 Encounters:   07/19/23 64   06/13/23 72   03/30/23 65        Physical Examination:  Eyes: Mild conjunctival pallor present  Neck: No obvious lymphadenopathy appreciated  Respiratory: Bilateral air entry present  CVS: No significant edema  GI: Soft, nondistended  CNS: Lethargic  Skin: No new rash  Musculoskeletal: No obvious new gross deformity noted    Medications:    Current Facility-Administered Medications:   •  cinacalcet (SENSIPAR) tablet 30 mg, 30 mg, Oral, After Daniele Lomas MD, 30 mg at 07/18/23 1745  •  dextrose 5 % with KCl 20 mEq/L infusion (premix), 75 mL/hr, Intravenous, Continuous, Mundo Erickson MD, Last Rate: 75 mL/hr at 07/18/23 1340, 75 mL/hr at 07/18/23 1340  •  enoxaparin (LOVENOX) subcutaneous injection 40 mg, 40 mg, Subcutaneous, Daily, Anand Sr PA-C, 40 mg at 07/19/23 8037  •  hydrALAZINE (APRESOLINE) injection 5 mg, 5 mg, Intravenous, Q6H PRN, Michael Peralta MD  •  metoprolol (LOPRESSOR) injection 2.5 mg, 2.5 mg, Intravenous, Q6H, Michael Peralta MD, 2.5 mg at 07/19/23 0805  •  OLANZapine (ZyPREXA) IM injection 5 mg, 5 mg, Intramuscular, Q3H PRN, Shania Aguirre MD, 5 mg at 07/17/23 2150  •  ondansetron (ZOFRAN) injection 4 mg, 4 mg, Intravenous, Q6H PRN, Anand Sr PA-C    Laboratory Results:  Results from last 7 days   Lab Units 07/19/23  1307 07/19/23  0409 07/18/23  0410 07/17/23  1120 07/17/23  0429 07/16/23  1614 07/16/23  0535 07/15/23  2011 07/15/23  0537 07/14/23  1845 07/14/23  0212 07/13/23  1014   WBC Thousand/uL 9.09  --  9.86  --  8.82  --   --   --  11.42*  --  11.79* 9.23   HEMOGLOBIN g/dL 11.7*  --  12.1  --  11.9*  --   -- --  12.6  --  12.0 12.3   HEMATOCRIT % 36.6  --  38.2  --  38.4  --   --   --  40.6  --  38.1 38.5   PLATELETS Thousands/uL 185  --  194  --  202  --   --   --  227  --  231 232   SODIUM mmol/L  --  139 143 147 148* 151* 154* 156* 157*   < > 157* 154*   POTASSIUM mmol/L  --  4.1 3.5 3.3* 2.7* 3.0* 2.8* 2.8* 3.1*   < > 3.0* 2.8*   CHLORIDE mmol/L  --  109* 111* 112* 113* 119* 120* 120* 121*   < > 124* 117*   CO2 mmol/L  --  28 30 31 30 25 28 32 32   < > 27 34*   BUN mg/dL  --  14 13 13 15 18 20 22 21   < > 18 23   CREATININE mg/dL  --  0.90 0.83 0.91 0.93 0.92 1.00 0.99 1.03   < > 0.97 1.10   CALCIUM mg/dL  --  10.5* 10.9* 10.7* 10.7* 10.7* 10.9* 11.2* 11.8*   < > 10.5* 11.8*   MAGNESIUM mg/dL  --   --   --   --   --   --  2.2  --   --   --   --  2.0   PHOSPHORUS mg/dL  --   --   --   --   --   --   --   --  3.0  --   --  1.8*    < > = values in this interval not displayed. CT head wo contrast   Final Result by Maribel Pitt MD (07/19 1321)      No acute intracranial hemorrhage seen no mass effect or midline shift seen      Area of encephalomalacia left frontal region   Residual meningioma measuring 1.5 x 1.3 cm in the right parafalcine region, stable                  Workstation performed: [id number]         CTA ED chest PE study   Final Result by Tahira Anderson MD (07/13 1226)      Compromised by respiratory motion, particularly in the lower lobes, but with no definite acute pulmonary emboli. Mild bilateral lower lobe bronchiectasis with tubular opacities in the right lower lobe due to mucus and debris in the bronchi. Given debris in the superior segment right lower lobe bronchi, patchy groundglass opacity in the right upper lobe, and    tree-in-bud nodularity in both lower lobes, this is likely due to aspiration. Workstation performed: [id number]         TRAUMA - CT head wo contrast   Final Result by Tamra Urias MD (07/13 1059)      No acute intracranial abnormality.       The study was marked in [facility] for immediate notification. Workstation performed: [id number]         TRAUMA - CT spine cervical wo contrast   Final Result by Freya Don MD (07/13 1107)      No cervical spine fracture or traumatic malalignment. Degenerative changes. New groundglass opacities in the right lung apex. Please see chest CT for further result. The study was marked in [facility] for immediate notification. Workstation performed: [id number]         TRAUMA - CT chest abdomen pelvis w contrast   Final Result by Freya Don MD (07/13 1121)      No evidence of solid organ trauma or acute fracture with the exception of L3 where there is compression fracture new since 2019 although it is age indeterminate based on imaging. Correlation with any additional back pain is advised. There is a left inferior pubic ramus fracture with some sclerosis which is partially healed suggesting this is recent more likely than acute and again should be correlated with any pain in the area. No definite pelvic fracture is seen elsewhere. Infiltrates in the right lower lobe with evidence of mucous secretions opacifying right lower lobe bronchi. There is additional opacities in the right upper lobe and left lower lobe. Findings are suspicious for aspiration pneumonia. Branching opacity may    represent mucous debris although this could also represent poor enhancement of the right lower lobe vasculature that could be seen in a pulmonary embolus. If there is high index of suspicion a follow-up CTA or lung scan may be useful. Marked bladder distention should be correlated for any recent voiding. Clemons catheter may be useful. 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 This could be correlated with distal pulses. This was discussed with JAVIER So at 11:05 a.m.       Workstation performed: [id number]         XR Trauma chest portable   Final Result by Edith Cox MD (07/13 1035)      Hypoventilation with atelectatic changes at the bases. No definite pneumothorax. Workstation performed: [id number]             Portions of the record may have been created with voice recognition software. Occasional wrong word or "sound a like" substitutions may have occurred due to the inherent limitations of voice recognition software. Read the chart carefully and recognize, using context, where substitutions have occurred.

## 2023-07-19 NOTE — RAPID RESPONSE
Rapid Response Note  Geneva Fernandez 80 y.o. male MRN: [id number]  Unit/Bed#: -01 Encounter: [id number]    Rapid Response Notification(s):   Response called date/time:  7/19/2023 2:10 PM  Response team arrival date/time:  7/19/2023 2:11 PM  Response end date/time:  7/19/2023 2:26 PM  Level of care:  Medsur  Rapid response location:  [facility] unit ( 325)  Primary reason for rapid response call:  Acute change in O2 sat    Rapid Response Intervention(s):   Airway:  Positioning  Breathing:  Oxygen  Circulation:  Electrocardiogram  Fluids administered:  None  Medications administered:  D50       Assessment:   · Low pulse ox    Plan:   · Concern for hypoxia due to low pulse oximetry and cyanotic nailbeds. Patient was in no acute distress resting on a nonrebreather. Concern for in-accurate pulse ox. ABG obtained with PO2 greater than 400  · Discussed with respiratory; trial clip-on ear pulse ox  · Continue 4 L nasal cannula which she was on prior to rapid response  · Blood sugar in the 70s and unable to take p.o. give half an amp of dextrose. Continue every 6 hours blood glucose checks  · No need for escalation in care at this time     Rapid Response Outcome:   Transfer:  Remain on floor  Primary service notified of transfer: Yes    Code Status: Level 1 (Full Code)         Background/Situation:   Geneva Fernandez is a 80 y.o. male with past medical history significant for CAD, CVA, hypertension, hyperlipidemia, dementia, narcolepsy, prostate cancer who initially presented on 7/13 with altered mental status following a fall. He was found to have significant metabolic derangements including hypokalemia, hypocalcemia, hypophosphatemia. Today he is a rapid response activation due to a low pulse oximetry. Upon my arrival patient was resting comfortably in bed with nonrebreather in place.   Blood glucose 70s and he was given half an amp of dextrose, EKG unremarkable, blood pressure and respiratory rate normal.  His pulse oximetry was reading in the 70s and cyanotic nailbeds, however patient was in no respiratory distress. He remained on nonrebreather while i-STAT was obtained. PO2 on i-STAT read greater than 400. Chest x-ray ordered with no significant change from previous. Review of Systems   Unable to perform ROS: Mental status change       Objective:   Vitals:    07/19/23 0951 07/19/23 1031 07/19/23 1415 07/19/23 1417   BP: 123/54 128/99 135/59 146/61   BP Location:       Pulse: 66 64 71 72   Resp:  17     Temp:       TempSrc:       SpO2: 97% 99% (!) 81% (!) 76%   Weight:       Height:         Physical Exam  Vitals reviewed. Constitutional:       General: He is not in acute distress. Appearance: He is not ill-appearing, toxic-appearing or diaphoretic. Comments: Resting comfortably in bed in no acute distress   HENT:      Head: Normocephalic and atraumatic. Nose: Nose normal.      Mouth/Throat:      Mouth: Mucous membranes are dry. Eyes:      Pupils: Pupils are equal, round, and reactive to light. Cardiovascular:      Rate and Rhythm: Normal rate and regular rhythm. Heart sounds: No murmur heard. No friction rub. No gallop. Pulmonary:      Breath sounds: No wheezing, rhonchi or rales. Abdominal:      General: Abdomen is flat. There is no distension. Palpations: Abdomen is soft. Tenderness: There is no abdominal tenderness. There is no guarding or rebound. Musculoskeletal:      Right lower leg: No edema. Left lower leg: No edema. Skin:     Capillary Refill: Capillary refill takes less than 2 seconds. Comments: Nailbeds cyanotic in upper extremities   Neurological:      General: No focal deficit present. Comments: Follows commands in all 4 extremities with no focal deficits. Opens eyes and yells out to sternal rub. Able to state his name.   Quickly falls back to sleep without stimulation         Portions of the record may have been created with voice recognition software. Occasional wrong word or "sound a like" substitutions may have occurred due to the inherent limitations of voice recognition software. Read the chart carefully and recognize, using context, where substitutions have occurred.     Cesario Simpson PA-C

## 2023-07-19 NOTE — CONSULTS
Consultation - Reno Tinsley 80 y.o. male MRN: [id number]    Unit/Bed#: -01 Encounter: [id number]      Assessment/Plan     Assessment: This is a 80y.o.-year-old male with hypercalcemia. 1.  Hypercalcemia with elevated parathyroid hormone level. By definition, he does have primary hyperparathyroidism. No evidence of kidney stones on CAT scan. Unclear if he has any other complications of hyperparathyroidism such as increased 24-hour urine calcium or osteoporosis though suspect he likely has osteoporosis, this could be just age-related. Calcium level is improving with gentle hydration. Primary treatment is continuing to keep hydrated. Likely not a surgical candidate given his debilitated state at this time. If discharged and improvement in physical status, could consider at. Agree at this point with the use of Sensipar to keep his calcium and parathyroid levels under control. Continue Sensipar 30 mg daily and gentle hydration. Try to avoid dehydration. 2.  Hyponatremia. Likely due to dehydration. Treatment per nephrology. 3.  Urinary retention. Treatment per primary team and urology. 4.  Hypokalemia and hypophosphatemia. Under replacement. 5.  Metabolic encephalopathy. Treatment per primary team.    Plan:  1. Continue gentle IV hydration. 2.  Continue Sensipar 30 mg daily. Inpatient consult to Endocrinology  Consult performed by: Onesimo Camarena MD  Consult ordered by: Shania Aguirre MD          CC: Hypercalcemia, parathyroid consult    History of Present Illness     HPI: Reno Tinsley is a 80y.o. year old male h hypercalcemia found on admission for change in mental status post fall. Endocrine is asked to consult regarding hypercalcemia. Old records indicate he has had mild hypercalcemia since at least December 2021 calcium level was 10.5. Admission calcium was 12.4 corrected.   At that time, he was also significantly dehydrated which will worsen hypercalcemia. Parathyroid hormone level was noted to be elevated. Vitamin D is normal.  He was started on gentle IV hydration with D5 and potassium. Calcium level has decreased to 11.3 corrected as of today. He was started on Sensipar 30 mg daily per nephrology yesterday. He reportedly has dementia and is oriented to only person. He does have a pubic ramus fracture which could be due to his recent fall. He also has a history of dysphagia. He was lethargic and did not wake up some review of systems is limited. Review of Systems     Review of systems limited as he did not wake up for my visit. Reportedly has dementia, dysphagia, oriented to only person.     Historical Information   Past Medical History:   Diagnosis Date   • Anxiety    • Arthritis    • Coronary artery disease    • Coronary artery disease involving native coronary artery of native heart without angina pectoris    • Depression    • Fracture    • Hypercholesterolemia    • Meningioma ([address]) 11/1999    brain tumor   • Meningioma (HCC)    • Narcolepsy     daytime drowsiness and dozing off occasionally   • Orthostatic hypotension    • Palpitations    • Prostate CA (HCC)    • Prostate cancer ([address])    • Stroke [facility])      Past Surgical History:   Procedure Laterality Date   • BACK SURGERY     • BRAIN SURGERY  11/08/1999   • BRAIN SURGERY     • CARDIAC SURGERY     • CORONARY ARTERY BYPASS GRAFT      x5   • CORONARY ARTERY BYPASS GRAFT       Social History   Social History     Substance and Sexual Activity   Alcohol Use Not Currently    Comment: (history)     Social History     Substance and Sexual Activity   Drug Use No     Social History     Tobacco Use   Smoking Status Never   Smokeless Tobacco Never     Family History:   Family History   Problem Relation Age of Onset   • Hypertension Mother         benign essential   • Cancer Mother    • Osteoporosis Mother    • Suicidality Father    • Diabetes Family    • Heart disease Family    • Neuropathy Family         peripheral   • Prostate cancer Family    • Thyroid disease Family        Meds/Allergies   Current Facility-Administered Medications   Medication Dose Route Frequency Provider Last Rate Last Admin   • cinacalcet (SENSIPAR) tablet 30 mg  30 mg Oral After Germania Donis MD   30 mg at 07/18/23 1745   • dextrose 5 % with KCl 20 mEq/L infusion (premix)  75 mL/hr Intravenous Continuous Mundo Zuleyka Martinez MD 75 mL/hr at 07/18/23 1340 75 mL/hr at 07/18/23 1340   • enoxaparin (LOVENOX) subcutaneous injection 40 mg  40 mg Subcutaneous Daily Val Jenkins PA-C   40 mg at 07/19/23 3804   • hydrALAZINE (APRESOLINE) injection 5 mg  5 mg Intravenous Q6H PRN Mary Ness MD       • metoprolol (LOPRESSOR) injection 2.5 mg  2.5 mg Intravenous Q6H Mary Ness MD   2.5 mg at 07/19/23 0805   • OLANZapine (ZyPREXA) IM injection 5 mg  5 mg Intramuscular Q3H PRN Avni Johnson MD   5 mg at 07/17/23 2150   • ondansetron (ZOFRAN) injection 4 mg  4 mg Intravenous Q6H PRN Sabrina Teague PA-C         Allergies   Allergen Reactions   • Aricept [Donepezil] Confusion     confusion   • Atorvastatin Myalgia, Dizziness and Headache   • Niacin Other (See Comments)     unknown       Objective   Vitals: Blood pressure 131/60, pulse 73, temperature (!) 96.5 °F (35.8 °C), temperature source Axillary, resp. rate (!) 23, height 5' 9" (1.753 m), weight 64.4 kg (142 lb), SpO2 98 %. Intake/Output Summary (Last 24 hours) at 7/19/2023 0920  Last data filed at 7/19/2023 0410  Gross per 24 hour   Intake 2428.33 ml   Output 2100 ml   Net 328.33 ml     Invasive Devices     Peripheral Intravenous Line  Duration           Peripheral IV 07/17/23 Left;Upper Arm 2 days          Drain  Duration           Urethral Catheter Latex 16 Fr. 5 days                Physical Exam  Vitals reviewed. Constitutional:       Appearance: He is well-developed. He is ill-appearing.    HENT:      Head: Normocephalic and atraumatic. Neck:      Thyroid: No thyromegaly. Vascular: No carotid bruit. Comments: Thyroid normal in size. No masses of the neck. Cardiovascular:      Rate and Rhythm: Normal rate and regular rhythm. Heart sounds: Normal heart sounds. No murmur heard. Pulmonary:      Effort: Pulmonary effort is normal.      Breath sounds: Normal breath sounds. No wheezing. Abdominal:      General: Bowel sounds are normal.      Palpations: Abdomen is soft. Tenderness: There is no abdominal tenderness. Musculoskeletal:         General: No deformity. Cervical back: Neck supple. Right lower leg: No edema. Left lower leg: No edema. Lymphadenopathy:      Cervical: No cervical adenopathy. Skin:     General: Skin is warm and dry. Findings: No erythema or rash. Neurological:      Deep Tendon Reflexes: Reflexes are normal and symmetric. Comments: Nonresponsive to my visit, only moaning. Did not awake with loud noises from machinery in the room. Did not awake with sternal rub. Patellar deep tendon reflexes diminished. The history was obtained from the review of the chart. Lab Results:     Blood work on admission on 7/13/2023 showed a calcium of 11.8 with an albumin of 3.2 and an elevated sodium with a corrected calcium of 12.4. Creatinine was 1.1. Phosphorus was 1.8. Blood work on 7/14/2023 showed a intact parathyroid hormone level of 171.9.  25-hydroxy vitamin D level 36.2. TSH 1.259. Blood work on 7/18/2023 showed a calcium of 10.9 with an albumin of 3, calcium corrected to 11.7. Blood work on 7/19/2023 showed a calcium of 10.5 and utilizing his albumin of 3 from yesterday, corrected calcium 11.3.       Lab Results   Component Value Date    WBC 9.86 07/18/2023    HGB 12.1 07/18/2023    HCT 38.2 07/18/2023    MCV 83 07/18/2023     07/18/2023     Lab Results   Component Value Date/Time    BUN 14 07/19/2023 04:09 AM    BUN 13 06/09/2015 10:46 AM    NA 136 05/08/2015 11:30 AM    K 4.1 07/19/2023 04:09 AM    K 4.0 05/08/2015 11:30 AM     (H) 07/19/2023 04:09 AM     05/08/2015 11:30 AM    CO2 28 07/19/2023 04:09 AM    CO2 30 10/19/2022 09:42 AM    CREATININE 0.90 07/19/2023 04:09 AM    CREATININE 0.98 06/09/2015 10:46 AM    AST 28 07/18/2023 04:10 AM    AST 27 06/09/2015 10:46 AM    ALT 19 07/18/2023 04:10 AM    ALT 32 06/09/2015 10:46 AM    TP 5.8 (L) 07/18/2023 04:10 AM    ALB 3.0 (L) 07/18/2023 04:10 AM    ALB 3.6 06/09/2015 10:46 AM     No results for input(s): "CHOL", "HDL", "LDL", "TRIG", "VLDL" in the last 72 hours. No results found for: "Yon De La Cruz", "Nakita Del Rio"  POC Glucose (mg/dl)   Date Value   07/19/2023 102   07/18/2023 102       Imaging Studies: I have personally reviewed pertinent reports. Portions of the record may have been created with voice recognition software.

## 2023-07-19 NOTE — PROGRESS NOTES
[facility]  Progress Note  Name: Toya Garcia  MRN: [id number]  Unit/Bed#: -01 I Date of Admission: 7/13/2023   Date of Service: 7/19/2023 I Hospital Day: 6    Assessment/Plan   Urinary retention  Assessment & Plan  Noted to have urinary retention in the ED and a Clemons catheter was placed  Will attempt voiding trial and discontinue Clemons as encephalopathy improves    Goals of care, counseling/discussion  Assessment & Plan  Overall guarded prognosis given dementia dysphagia with aspiration risk  GI following   Patient has transitioned back to puree diet. Adequate oral intake reported per patients primary nurse. Hypophosphatemia  Assessment & Plan  Normalized with repletion  Monitor    Hypokalemia  Assessment & Plan  Replete  Monitor    Hypernatremia  Assessment & Plan  Hyponatremia present on admission likely due to poor p.o. intake dehydration  Resolving. Continue hypotonic IV fluids  Nephrology following  Monitor closely        Moderate protein-calorie malnutrition ([address])  Assessment & Plan  Malnutrition Findings: Body mass index is 20.97 kg/m². Dementia [facility])  Assessment & Plan  History of dementia  Continue memantine  Delirium precautions  Reorientation  Sleep hygiene      Elevated troponin  Assessment & Plan  Elevated troponins noted  Likely non-MI troponin elevation  Cardiology inputs noted    Fall  Assessment & Plan  Patient had a fall about 3 days back  At high risk for falls  Imaging revealed inferior pubic ramus fracture  Orthopedic inputs noted  Analgesics  Safe ambulation  Fall precautions  Physical therapy      Dysphagia  Assessment & Plan  History of dysphagia  Improved w/ treatment of electrolyte issues. Present precautions      Hypercalcemia  Assessment & Plan  Mild hypercalcemia  Elevated PTH levels noted  Endocrine consulted.  Cont sensipar and hydration      * Acute metabolic encephalopathy  Assessment & Plan  Patient is a resident of [facility], presents with altered mental status  Encephalopathy is likely multifactorial  Appears to be improving  Noted to have hypernatremia hypercalcemia  Receiving IV fluids  Avoid neurotoxins  Optimize metabolic parameters  Monitor closely   again waxing and waning. Will check CT head for completeness. Suspect metabolic. Aspiration pneumonitis (HCC)-resolved as of 2023  Assessment & Plan  · Known history of dysphagia  · Imaging concerning for aspiration pneumonitis  · Received IV antibiotics in the ED, hold further antibiotics presently  · Procalcitonin levels noted  · Monitor counts temperatures  · Aspiration precautions                  VTE  Prophylaxis:   Pharmacologic: in place  Mechanical VTE Prophylaxis in Place: Yes    Patient Centered Rounds: I have performed bedside rounds with nursing staff today. Discussions with Specialists or Other Care Team Provider: case management    Education and Discussions with Family / Patient: pt wife via telephone      Current Length of Stay: 6 day(s)    Current Patient Status: Inpatient        Code Status: Level 1 - Full Code    Discharge Plan: Pt will require continued inpatient hospitalization. Subjective:   Pt was found to have decreased responsiveness  Reactive to painful stimuli but otherwise difficult to rouse. Patient is seen and examined at bedside. No ROS obtained d/t acuity. Objective:     Vitals:   Temp (24hrs), Av.1 °F (36.2 °C), Min:96.5 °F (35.8 °C), Max:98.2 °F (36.8 °C)    Temp:  [96.5 °F (35.8 °C)-98.2 °F (36.8 °C)] 96.5 °F (35.8 °C)  HR:  [64-93] 64  Resp:  [15-23] 17  BP: (110-134)/(52-99) 128/99  SpO2:  [94 %-99 %] 99 %  Body mass index is 20.97 kg/m². Input and Output Summary (last 24 hours): Intake/Output Summary (Last 24 hours) at 2023 1258  Last data filed at 2023 0951  Gross per 24 hour   Intake 120 ml   Output 2600 ml   Net -2480 ml       Physical Exam:       GEN:elderly male, chronically ill.   HEEENT: No JVD, PERRLA, no scleral icterus  RESP: Lungs clear to auscultation bilaterally  CV: RRR, +s1/s2   ABD: SOFT NON TENDER, POSITIVE BOWEL SOUNDS, NO DISTENTION  PSYCH: CALM  NEURO: lethargic, rouses to pain. GCS9. SKIN: NO RASH  EXTREM: NO EDEMA    Additional Data:     Labs:    Results from last 7 days   Lab Units 07/18/23  0410   WBC Thousand/uL 9.86   HEMOGLOBIN g/dL 12.1   HEMATOCRIT % 38.2   PLATELETS Thousands/uL 194   NEUTROS PCT % 72   LYMPHS PCT % 18   MONOS PCT % 8   EOS PCT % 2     Results from last 7 days   Lab Units 07/19/23  0409 07/18/23  0410   SODIUM mmol/L 139 143   POTASSIUM mmol/L 4.1 3.5   CHLORIDE mmol/L 109* 111*   CO2 mmol/L 28 30   BUN mg/dL 14 13   CREATININE mg/dL 0.90 0.83   ANION GAP mmol/L 2 2   CALCIUM mg/dL 10.5* 10.9*   ALBUMIN g/dL  --  3.0*   TOTAL BILIRUBIN mg/dL  --  0.79   ALK PHOS U/L  --  55   ALT U/L  --  19   AST U/L  --  28   GLUCOSE RANDOM mg/dL 114 137     Results from last 7 days   Lab Units 07/13/23  1014   INR  1.08     Results from last 7 days   Lab Units 07/19/23  1059 07/19/23  0620 07/18/23  2122 07/18/23  1206 07/18/23  0559 07/17/23  1313 07/17/23  0654 07/17/23  0006 07/16/23  1756   POC GLUCOSE mg/dl 118 102 102 115 102 123 124 153* 122         Results from last 7 days   Lab Units 07/15/23  0537 07/13/23  1041   LACTIC ACID mmol/L  --  2.0   PROCALCITONIN ng/ml 0.07 0.05       Lines/Drains:  Invasive Devices     Peripheral Intravenous Line  Duration           Peripheral IV 07/17/23 Left;Upper Arm 2 days          Drain  Duration           Urethral Catheter Latex 16 Fr. 6 days                Telemetry:        * I Have Reviewed All Lab Data Listed Above. Imaging:     Results for orders placed during the hospital encounter of 07/13/23    XR Trauma chest portable    Narrative  CHEST    INDICATION:   TRAUMA. Injury status post fall occurred 3 days ago    COMPARISON: 3/13/2023.     EXAM PERFORMED/VIEWS:  XR CHEST PORTABLE      FINDINGS: Hypoventilation is noted. Loop recorder appears to be present. Cardiomediastinal silhouette appears unremarkable. The patient is status post median sternotomy. Hypoventilation appears to be present at the bases with subsegmental atelectasis. No evidence of pneumothorax or effusion on this semierect study. No definite infiltrate elsewhere. Kera Primes Healed rib fractures are noted on the right. .    Impression  Hypoventilation with atelectatic changes at the bases. No definite pneumothorax. Workstation performed: [id number]    Results for orders placed during the hospital encounter of 05/25/18    XR chest 2 views    Narrative  CHEST    INDICATION:   falls. "PATIENT HAS HAD MULTIPLE FALL IN THE LAST COUPLE OF DAYS, STATES NO SOB OR CHEST PAIN"    COMPARISON:  Two-view chest 5/21/2018. EXAM PERFORMED/VIEWS:  XR CHEST PA & LATERAL  dual      FINDINGS:    Median sternotomy wires. Cardiomediastinal silhouette appears unremarkable. The lungs are clear. No pneumothorax or pleural effusion. Osseous structures appear within normal limits for patient age. Impression  No acute cardiopulmonary disease. Workstation performed: [id number]      *I have reviewed all imaging reports listed above      Recent Cultures (last 7 days):     Results from last 7 days   Lab Units 07/13/23  1041   BLOOD CULTURE  No Growth After 5 Days. No Growth After 5 Days.        Last 24 Hours Medication List:   Current Facility-Administered Medications   Medication Dose Route Frequency Provider Last Rate   • cinacalcet  30 mg Oral After Jeannette Yarbrough MD     • dextrose 5 % with KCl 20 mEq/L  75 mL/hr Intravenous Continuous Mundo Faby Sanz MD 75 mL/hr (07/18/23 1340)   • enoxaparin  40 mg Subcutaneous Daily Mariah Jenkins PA-C     • hydrALAZINE  5 mg Intravenous Q6H PRN Azul Avalos MD     • metoprolol  2.5 mg Intravenous Q6H Azul Avalos MD     • OLANZapine  5 mg Intramuscular Q3H PRN Jordan Au MD     • ondansetron  4 mg Intravenous Q6H PRN [address], PA-C          Today, Patient Was Seen By: Gill Miller MD    ** Please Note: Dictation voice to text software may have been used in the creation of this document.  **

## 2023-07-19 NOTE — ASSESSMENT & PLAN NOTE
Patient is a resident of [facility], presents with altered mental status  Encephalopathy is likely multifactorial  Appears to be improving  Noted to have hypernatremia hypercalcemia  Receiving IV fluids  Avoid neurotoxins  Optimize metabolic parameters  Monitor closely  7/19 again waxing and waning. Will check CT head for completeness. Suspect metabolic.

## 2023-07-19 NOTE — PROGRESS NOTES
Deterioration Index Critical Care Recommendations  Room #: 325  Deterioration index score: 61.34%    Critical Care recommends   · Check ammonia, abg, B12, blood glucose  · If no improvement in mental status, check CTH  · Hold any sedating medications  · q1h neuro checks for 4 hours, then q4h neuro checks  · Discussed with nursing that if patient doesn't improve by the afternoon, may upgrade to SD2 for closer neurologic monitoring  · Of note, patient does have a history of narcolepsy. ? Consider Nuvigil    Spoke with Dr. Tiarra Aponte and RN from primary team    Brief summary:   Critical care was brought to the patient's bedside via deterioration index alert. The alert was concerning for depressed GCS (9). Upon my evaluation patient resting comfortably in bed. Awoke to his name stating, "Hm?" but did not open his eyes. Does not consistently follow commands but localizes to pain in all extremities. No focal deficits appreciated. Lifted his head off the pillow and grimaced with saline drops into his eyes (GCS 8-9). No recent sedating medications per MAR. Discussed with nursing that yesterday the patient was able to eat his whole breakfast and was trying to get out of bed. Recent TSH normal.    Please contact critical care via [employer] with any questions or concerns.

## 2023-07-20 LAB
ALBUMIN SERPL BCP-MCNC: 3.2 G/DL (ref 3.5–5)
ALP SERPL-CCNC: 65 U/L (ref 34–104)
ALT SERPL W P-5'-P-CCNC: 29 U/L (ref 7–52)
ANION GAP SERPL CALCULATED.3IONS-SCNC: 6 MMOL/L
AST SERPL W P-5'-P-CCNC: 43 U/L (ref 13–39)
BASOPHILS # BLD AUTO: 0.03 THOUSANDS/ÂΜL (ref 0–0.1)
BASOPHILS NFR BLD AUTO: 0 % (ref 0–1)
BILIRUB SERPL-MCNC: 0.7 MG/DL (ref 0.2–1)
BUN SERPL-MCNC: 12 MG/DL (ref 5–25)
CALCIUM ALBUM COR SERPL-MCNC: 11 MG/DL (ref 8.3–10.1)
CALCIUM SERPL-MCNC: 10.4 MG/DL (ref 8.4–10.2)
CHLORIDE SERPL-SCNC: 106 MMOL/L (ref 96–108)
CO2 SERPL-SCNC: 26 MMOL/L (ref 21–32)
CREAT SERPL-MCNC: 0.88 MG/DL (ref 0.6–1.3)
EOSINOPHIL # BLD AUTO: 0.15 THOUSAND/ÂΜL (ref 0–0.61)
EOSINOPHIL NFR BLD AUTO: 1 % (ref 0–6)
ERYTHROCYTE [DISTWIDTH] IN BLOOD BY AUTOMATED COUNT: 14 % (ref 11.6–15.1)
GFR SERPL CREATININE-BSD FRML MDRD: 75 ML/MIN/1.73SQ M
GLUCOSE SERPL-MCNC: 109 MG/DL (ref 65–140)
GLUCOSE SERPL-MCNC: 109 MG/DL (ref 65–140)
GLUCOSE SERPL-MCNC: 112 MG/DL (ref 65–140)
GLUCOSE SERPL-MCNC: 114 MG/DL (ref 65–140)
GLUCOSE SERPL-MCNC: 115 MG/DL (ref 65–140)
HCT VFR BLD AUTO: 38.8 % (ref 36.5–49.3)
HGB BLD-MCNC: 12.5 G/DL (ref 12–17)
IMM GRANULOCYTES # BLD AUTO: 0.09 THOUSAND/UL (ref 0–0.2)
IMM GRANULOCYTES NFR BLD AUTO: 1 % (ref 0–2)
LYMPHOCYTES # BLD AUTO: 2.26 THOUSANDS/ÂΜL (ref 0.6–4.47)
LYMPHOCYTES NFR BLD AUTO: 17 % (ref 14–44)
MCH RBC QN AUTO: 26.6 PG (ref 26.8–34.3)
MCHC RBC AUTO-ENTMCNC: 32.2 G/DL (ref 31.4–37.4)
MCV RBC AUTO: 83 FL (ref 82–98)
MONOCYTES # BLD AUTO: 1.03 THOUSAND/ÂΜL (ref 0.17–1.22)
MONOCYTES NFR BLD AUTO: 8 % (ref 4–12)
NEUTROPHILS # BLD AUTO: 9.56 THOUSANDS/ÂΜL (ref 1.85–7.62)
NEUTS SEG NFR BLD AUTO: 73 % (ref 43–75)
NRBC BLD AUTO-RTO: 0 /100 WBCS
PLATELET # BLD AUTO: 218 THOUSANDS/UL (ref 149–390)
PMV BLD AUTO: 11.1 FL (ref 8.9–12.7)
POTASSIUM SERPL-SCNC: 4 MMOL/L (ref 3.5–5.3)
PROT SERPL-MCNC: 6.5 G/DL (ref 6.4–8.4)
RBC # BLD AUTO: 4.7 MILLION/UL (ref 3.88–5.62)
SODIUM SERPL-SCNC: 138 MMOL/L (ref 135–147)
VIT B12 SERPL-MCNC: 314 PG/ML (ref 180–914)
WBC # BLD AUTO: 13.12 THOUSAND/UL (ref 4.31–10.16)

## 2023-07-20 PROCEDURE — 97530 THERAPEUTIC ACTIVITIES: CPT

## 2023-07-20 PROCEDURE — 85025 COMPLETE CBC W/AUTO DIFF WBC: CPT | Performed by: HOSPITALIST

## 2023-07-20 PROCEDURE — 92526 ORAL FUNCTION THERAPY: CPT

## 2023-07-20 PROCEDURE — 82948 REAGENT STRIP/BLOOD GLUCOSE: CPT

## 2023-07-20 PROCEDURE — 80053 COMPREHEN METABOLIC PANEL: CPT | Performed by: HOSPITALIST

## 2023-07-20 PROCEDURE — 99232 SBSQ HOSP IP/OBS MODERATE 35: CPT | Performed by: INTERNAL MEDICINE

## 2023-07-20 PROCEDURE — 99232 SBSQ HOSP IP/OBS MODERATE 35: CPT | Performed by: HOSPITALIST

## 2023-07-20 RX ADMIN — METOPROLOL TARTRATE 2.5 MG: 5 INJECTION INTRAVENOUS at 15:32

## 2023-07-20 RX ADMIN — CINACALCET 30 MG: 30 TABLET, FILM COATED ORAL at 17:29

## 2023-07-20 RX ADMIN — METOPROLOL TARTRATE 2.5 MG: 5 INJECTION INTRAVENOUS at 20:30

## 2023-07-20 RX ADMIN — ENOXAPARIN SODIUM 40 MG: 100 INJECTION SUBCUTANEOUS at 08:47

## 2023-07-20 RX ADMIN — DEXTROSE AND POTASSIUM CHLORIDE 50 ML/HR: 5; .15 SOLUTION INTRAVENOUS at 12:19

## 2023-07-20 RX ADMIN — METOPROLOL TARTRATE 2.5 MG: 5 INJECTION INTRAVENOUS at 08:55

## 2023-07-20 RX ADMIN — METOPROLOL TARTRATE 2.5 MG: 5 INJECTION INTRAVENOUS at 05:20

## 2023-07-20 NOTE — PROGRESS NOTES
[address] [facility]  Progress Note  Name: Marin Johnson  MRN: [id number]  Unit/Bed#: -01 I Date of Admission: 7/13/2023   Date of Service: 7/20/2023 I Hospital Day: 7    Assessment/Plan   Urinary retention  Assessment & Plan  Noted to have urinary retention in the ED and a Clemons catheter was placed  Will attempt voiding trial and discontinue Clemons as encephalopathy improves    Goals of care, counseling/discussion  Assessment & Plan  Overall guarded prognosis given dementia dysphagia with aspiration risk  GI following   Patient has transitioned back to puree diet. Obtain calorie counts. Hypophosphatemia  Assessment & Plan  Normalized with repletion  Monitor    Hypokalemia  Assessment & Plan  Replete  Monitor    Hypernatremia  Assessment & Plan  Hyponatremia present on admission likely due to poor p.o. intake dehydration  Resolving. Continue hypotonic IV fluids  Nephrology following  Monitor closely        Moderate protein-calorie malnutrition ([address])  Assessment & Plan  Malnutrition Findings: Body mass index is 20.97 kg/m². Dementia [facility])  Assessment & Plan  History of dementia  Continue memantine  Delirium precautions  Reorientation  Sleep hygiene      Elevated troponin  Assessment & Plan  Elevated troponins noted  Likely non-MI troponin elevation  Cardiology inputs noted    Fall  Assessment & Plan  Patient had a fall about 3 days back  At high risk for falls  Imaging revealed inferior pubic ramus fracture  Orthopedic inputs noted  Analgesics  Safe ambulation  Fall precautions  Physical therapy      Dysphagia  Assessment & Plan  History of dysphagia  Improved w/ treatment of electrolyte issues. Present precautions      Hypercalcemia  Assessment & Plan  Mild hypercalcemia  Elevated PTH levels noted  Endocrine consulted.  Cont sensipar and IV hydration      * Acute metabolic encephalopathy  Assessment & Plan  Patient is a resident of [facility], presents with altered mental status  Encephalopathy is likely multifactorial  Appears to be improving  Noted to have hypernatremia hypercalcemia  Receiving IV fluids  Avoid neurotoxins  Optimize metabolic parameters  Monitor closely   again waxing and waning. CT head neg. Suspect metabolic. Improved              VTE  Prophylaxis:   Pharmacologic: in place  Mechanical VTE Prophylaxis in Place: Yes    Patient Centered Rounds: I have performed bedside rounds with nursing staff today. Discussions with Specialists or Other Care Team Provider: case management    Education and Discussions with Family / Patient: pt wife      Current Length of Stay: 7 day(s)    Current Patient Status: Inpatient        Code Status: Level 1 - Full Code    Discharge Plan: Pt will require continued inpatient hospitalization. Subjective:   Pt more alert  Unclear if taking in good oral intake. Patient is seen and examined at bedside. All other ROS are negative. Objective:     Vitals:   Temp (24hrs), Av.5 °F (36.4 °C), Min:97.2 °F (36.2 °C), Max:97.8 °F (36.6 °C)    Temp:  [97.2 °F (36.2 °C)-97.8 °F (36.6 °C)] 97.8 °F (36.6 °C)  HR:  [64-81] 75  Resp:  [16-18] 16  BP: (118-146)/(46-99) 121/46  SpO2:  [76 %-100 %] 96 %  Body mass index is 20.97 kg/m². Input and Output Summary (last 24 hours): Intake/Output Summary (Last 24 hours) at 2023 0952  Last data filed at 2023 0320  Gross per 24 hour   Intake --   Output 1475 ml   Net -1475 ml       Physical Exam:       GEN: No acute distress, comfortable, ill appearing  HEEENT: No JVD, PERRLA, no scleral icterus  RESP: Lungs clear to auscultation bilaterally  CV: RRR, +s1/s2   ABD: SOFT NON TENDER, POSITIVE BOWEL SOUNDS, NO DISTENTION  PSYCH: CALM, apparent dementia.   NEURO: Mentation baseline, answers simple questions, NO FOCAL DEFICITS  SKIN: NO RASH  EXTREM: NO EDEMA    Additional Data:     Labs:    Results from last 7 days   Lab Units 23  0346   WBC Thousand/uL 13.12*   HEMOGLOBIN g/dL 12.5   HEMATOCRIT % 38.8   PLATELETS Thousands/uL 218   NEUTROS PCT % 73   LYMPHS PCT % 17   MONOS PCT % 8   EOS PCT % 1     Results from last 7 days   Lab Units 07/20/23  0346   SODIUM mmol/L 138   POTASSIUM mmol/L 4.0   CHLORIDE mmol/L 106   CO2 mmol/L 26   BUN mg/dL 12   CREATININE mg/dL 0.88   ANION GAP mmol/L 6   CALCIUM mg/dL 10.4*   ALBUMIN g/dL 3.2*   TOTAL BILIRUBIN mg/dL 0.70   ALK PHOS U/L 65   ALT U/L 29   AST U/L 43*   GLUCOSE RANDOM mg/dL 109     Results from last 7 days   Lab Units 07/13/23  1014   INR  1.08     Results from last 7 days   Lab Units 07/20/23  0755 07/20/23  0614 07/19/23  2341 07/19/23  1759 07/19/23  1412 07/19/23  1059 07/19/23  0620 07/18/23  2122 07/18/23  1206 07/18/23  0559 07/17/23  1313 07/17/23  0654   POC GLUCOSE mg/dl 114 112 83 127 73 118 102 102 115 102 123 124         Results from last 7 days   Lab Units 07/15/23  0537 07/13/23  1041   LACTIC ACID mmol/L  --  2.0   PROCALCITONIN ng/ml 0.07 0.05       Lines/Drains:  Invasive Devices     Peripheral Intravenous Line  Duration           Peripheral IV 07/17/23 Left;Upper Arm 3 days          Drain  Duration           Urethral Catheter Latex 16 Fr. 6 days                Telemetry:        * I Have Reviewed All Lab Data Listed Above. Imaging:     Results for orders placed during the hospital encounter of 07/13/23    XR chest portable    Narrative  CHEST    INDICATION:   hypoxia. COMPARISON: CXR and chest CT 7/13/2023. EXAM PERFORMED/VIEWS:  XR CHEST PORTABLE. FINDINGS:    Cardiomediastinal silhouette normal. CABG. Loop recorder in the left chest wall. Mild bibasilar atelectasis. No effusion or pneumothorax. Skinfold over both hemithoraces. Upper abdomen normal. Old right rib fracture. Impression  Mild bibasilar atelectasis. Workstation performed: [id number]    Results for orders placed during the hospital encounter of 05/25/18    XR chest 2 views    Narrative  CHEST    INDICATION:   falls. "PATIENT HAS HAD MULTIPLE FALL IN THE LAST COUPLE OF DAYS, STATES NO SOB OR CHEST PAIN"    COMPARISON:  Two-view chest 5/21/2018. EXAM PERFORMED/VIEWS:  XR CHEST PA & LATERAL  dual      FINDINGS:    Median sternotomy wires. Cardiomediastinal silhouette appears unremarkable. The lungs are clear. No pneumothorax or pleural effusion. Osseous structures appear within normal limits for patient age. Impression  No acute cardiopulmonary disease. Workstation performed: [id number]      *I have reviewed all imaging reports listed above      Recent Cultures (last 7 days):     Results from last 7 days   Lab Units 07/13/23  1041   BLOOD CULTURE  No Growth After 5 Days. No Growth After 5 Days. Last 24 Hours Medication List:   Current Facility-Administered Medications   Medication Dose Route Frequency Provider Last Rate   • cinacalcet  30 mg Oral After Jeannette Yarbrough MD     • dextrose 5 % with KCl 20 mEq/L  50 mL/hr Intravenous Continuous Mundo Faby Sanz MD 50 mL/hr (07/19/23 1729)   • dextrose  25 mL Intravenous Once Ashish Grullon PA-C     • enoxaparin  40 mg Subcutaneous Daily Ekta Gillis PA-C     • hydrALAZINE  5 mg Intravenous Q6H PRN Azul Avalos MD     • metoprolol  2.5 mg Intravenous Q6H Azul Avalos MD     • OLANZapine  5 mg Intramuscular Q3H PRN Olga Coombs MD     • ondansetron  4 mg Intravenous Q6H PRN Ekta Gillis PA-C          Today, Patient Was Seen By: Olga Coombs MD    ** Please Note: Dictation voice to text software may have been used in the creation of this document.  **

## 2023-07-20 NOTE — ASSESSMENT & PLAN NOTE
Patient is a resident of [facility], presents with altered mental status  Encephalopathy is likely multifactorial  Appears to be improving  Noted to have hypernatremia hypercalcemia  Receiving IV fluids  Avoid neurotoxins  Optimize metabolic parameters  Monitor closely  7/19 again waxing and waning. CT head neg. Suspect metabolic. Improved

## 2023-07-20 NOTE — PROGRESS NOTES
NEPHROLOGY PROGRESS NOTE   Fernie Spine 80 y.o. male MRN: [id number]  Unit/Bed#: -01 Encounter: [id number]  Reason for Consult: Hypernatremia    ASSESSMENT AND PLAN:  Hypernatremia  -Suspect secondary to poor free water intake  -Sodium level now improved and remained stable 138 today.  -Discussed with nurse, his p.o. intake remains very poor. We will continue maintenance IV fluid due to same 50 mill per hour. -BMP in a.m.     Hypokalemia, resolved with replacement.  -Serum magnesium stable  -BMP to monitor     Hypercalcemia  -Vitamin D 25-hydroxy level 36,   -Suspect PTH mediated hypercalcemia, component of primary hyperparathyroidism  -Appreciate endocrine input. Serum calcium slowly improving corrected value 11 today. Ionized calcium 1.36  -Continue Sensipar 30 mg daily.     Hypertension  -Blood pressure acceptable with occasional lower readings noted. Decrease IV metoprolol to 3 times daily dosing.  -Due to poor p.o. intake, remains on IV metoprolol standing     Moderate left-sided hydronephrosis and hydroureter, with some mild changes on the right, markedly distended bladder  -Currently has Clemons catheter  -Consider eventual repeat renal ultrasound.  Serum creatinine remains stable 0.8     Suspected aspiration pneumonia based on CT scan, now remains on dysphagia thin liquid diet.  -Ongoing discussion noted regarding PEG tube placement as per GI. Discussed above plan in detail with primary team and they agree with above recommendations. SUBJECTIVE:  Patient seen and examined at bedside.   Patient remains noncommunicative and does not answer any questions    OBJECTIVE:  Current Weight: Weight - Scale: 64.4 kg (142 lb)  Vitals:    07/20/23 1419   BP: (!) 103/45   Pulse: 66   Resp:    Temp: (!) 96.2 °F (35.7 °C)   SpO2: 99%       Intake/Output Summary (Last 24 hours) at 7/20/2023 1427  Last data filed at 7/20/2023 1243  Gross per 24 hour   Intake 0 ml   Output 1950 ml   Net -1950 ml     Wt Readings from Last 3 Encounters:   07/13/23 64.4 kg (142 lb)   06/13/23 64.4 kg (142 lb)   05/09/23 64.4 kg (142 lb)     Temp Readings from Last 3 Encounters:   07/20/23 (!) 96.2 °F (35.7 °C) (Axillary)   03/16/23 (!) 97 °F (36.1 °C)   03/02/23 98.1 °F (36.7 °C) (Oral)     BP Readings from Last 3 Encounters:   07/20/23 (!) 103/45   06/13/23 124/60   03/30/23 (!) 124/44     Pulse Readings from Last 3 Encounters:   07/20/23 66   06/13/23 72   03/30/23 65        Physical Examination:  Eyes: No conjunctival pallor present  Neck: No obvious lymphadenopathy appreciated  Respiratory: Bilateral air entry present  CVS: No significant edema  GI: Nondistended  CNS: Does not answer any questions, confused, lethargic  Skin: No new rash  Musculoskeletal: No obvious new gross deformity noted    Medications:    Current Facility-Administered Medications:   •  cinacalcet (SENSIPAR) tablet 30 mg, 30 mg, Oral, After Thomas Bradley MD, 30 mg at 07/19/23 1731  •  dextrose 5 % with KCl 20 mEq/L infusion (premix), 50 mL/hr, Intravenous, Continuous, Mundo Erickson MD, Last Rate: 50 mL/hr at 07/20/23 1219, 50 mL/hr at 07/20/23 1219  •  dextrose 50 % IV solution 25 mL, 25 mL, Intravenous, Once, Ivy Veloz PA-C  •  enoxaparin (LOVENOX) subcutaneous injection 40 mg, 40 mg, Subcutaneous, Daily, Aziza Barrera PA-C, 40 mg at 07/20/23 8357  •  hydrALAZINE (APRESOLINE) injection 5 mg, 5 mg, Intravenous, Q6H PRN, Elliot Matthew MD  •  metoprolol (LOPRESSOR) injection 2.5 mg, 2.5 mg, Intravenous, Q6H, Elliot Matthew MD, 2.5 mg at 07/20/23 0855  •  OLANZapine (ZyPREXA) IM injection 5 mg, 5 mg, Intramuscular, Q3H PRN, Angel Bradley MD, 5 mg at 07/17/23 2150  •  ondansetron (ZOFRAN) injection 4 mg, 4 mg, Intravenous, Q6H PRN, Aziza Barrera PA-C    Laboratory Results:  Results from last 7 days   Lab Units 07/20/23  0346 07/19/23  1423 07/19/23  1307 07/19/23  0409 07/18/23  0410 07/17/23  1120 07/17/23  0429 07/16/23  1614 07/16/23  0535 07/15/23  2011 07/15/23  0537 07/14/23  1845 07/14/23  0212   WBC Thousand/uL 13.12*  --  9.09  --  9.86  --  8.82  --   --   --  11.42*  --  11.79*   HEMOGLOBIN g/dL 12.5  --  11.7*  --  12.1  --  11.9*  --   --   --  12.6  --  12.0   I STAT HEMOGLOBIN g/dl  --  9.9*  --   --   --   --   --   --   --   --   --   --   --    HEMATOCRIT % 38.8  --  36.6  --  38.2  --  38.4  --   --   --  40.6  --  38.1   HEMATOCRIT, ISTAT %  --  29*  --   --   --   --   --   --   --   --   --   --   --    PLATELETS Thousands/uL 218  --  185  --  194  --  202  --   --   --  227  --  231   SODIUM mmol/L 138  --   --  139 143 147 148* 151* 154*   < > 157*   < > 157*   POTASSIUM mmol/L 4.0  --   --  4.1 3.5 3.3* 2.7* 3.0* 2.8*   < > 3.1*   < > 3.0*   CHLORIDE mmol/L 106  --   --  109* 111* 112* 113* 119* 120*   < > 121*   < > 124*   CO2 mmol/L 26  --   --  28 30 31 30 25 28   < > 32   < > 27   CO2, I-STAT mmol/L  --  28  --   --   --   --   --   --   --   --   --   --   --    BUN mg/dL 12  --   --  14 13 13 15 18 20   < > 21   < > 18   CREATININE mg/dL 0.88  --   --  0.90 0.83 0.91 0.93 0.92 1.00   < > 1.03   < > 0.97   CALCIUM mg/dL 10.4*  --   --  10.5* 10.9* 10.7* 10.7* 10.7* 10.9*   < > 11.8*   < > 10.5*   MAGNESIUM mg/dL  --   --   --   --   --   --   --   --  2.2  --   --   --   --    PHOSPHORUS mg/dL  --   --   --   --   --   --   --   --   --   --  3.0  --   --    GLUCOSE, ISTAT mg/dl  --  394*  --   --   --   --   --   --   --   --   --   --   --     < > = values in this interval not displayed. XR chest portable   Final Result by Darylene Gentles, MD (07/20 1899)      Mild bibasilar atelectasis.                Workstation performed: [id number]         CT head wo contrast   Final Result by Adna Martinez MD (07/19 0965)      No acute intracranial hemorrhage seen no mass effect or midline shift seen      Area of encephalomalacia left frontal region   Residual meningioma measuring 1.5 x 1.3 cm in the right parafalcine region, stable                  Workstation performed: [id number]         CTA ED chest PE study   Final Result by Noe Haywood MD (07/13 1226)      Compromised by respiratory motion, particularly in the lower lobes, but with no definite acute pulmonary emboli. Mild bilateral lower lobe bronchiectasis with tubular opacities in the right lower lobe due to mucus and debris in the bronchi. Given debris in the superior segment right lower lobe bronchi, patchy groundglass opacity in the right upper lobe, and    tree-in-bud nodularity in both lower lobes, this is likely due to aspiration. Workstation performed: [id number]         TRAUMA - CT head wo contrast   Final Result by Sheldon Pratt MD (07/13 1059)      No acute intracranial abnormality. The study was marked in [facility] for immediate notification. Workstation performed: [id number]         TRAUMA - CT spine cervical wo contrast   Final Result by Sheldon Pratt MD (07/13 1107)      No cervical spine fracture or traumatic malalignment. Degenerative changes. New groundglass opacities in the right lung apex. Please see chest CT for further result. The study was marked in [facility] for immediate notification. Workstation performed: [id number]         TRAUMA - CT chest abdomen pelvis w contrast   Final Result by Sheldon Pratt MD (07/13 1121)      No evidence of solid organ trauma or acute fracture with the exception of L3 where there is compression fracture new since 2019 although it is age indeterminate based on imaging. Correlation with any additional back pain is advised. There is a left inferior pubic ramus fracture with some sclerosis which is partially healed suggesting this is recent more likely than acute and again should be correlated with any pain in the area. No definite pelvic fracture is seen elsewhere.       Infiltrates in the right lower lobe with evidence of mucous secretions opacifying right lower lobe bronchi. There is additional opacities in the right upper lobe and left lower lobe. Findings are suspicious for aspiration pneumonia. Branching opacity may    represent mucous debris although this could also represent poor enhancement of the right lower lobe vasculature that could be seen in a pulmonary embolus. If there is high index of suspicion a follow-up CTA or lung scan may be useful. Marked bladder distention should be correlated for any recent voiding. Clemons catheter may be useful. 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 This could be correlated with distal pulses. This was discussed with Kathline Eisenmenger, PA at 11:05 a.m. Workstation performed: [id number]         XR Trauma chest portable   Final Result by Jeb Nunez MD (07/13 1035)      Hypoventilation with atelectatic changes at the bases. No definite pneumothorax. Workstation performed: [id number]             Portions of the record may have been created with voice recognition software. Occasional wrong word or "sound a like" substitutions may have occurred due to the inherent limitations of voice recognition software. Read the chart carefully and recognize, using context, where substitutions have occurred.

## 2023-07-20 NOTE — PLAN OF CARE
Problem: PAIN - ADULT  Goal: Verbalizes/displays adequate comfort level or baseline comfort level  Description: Interventions:  - Encourage patient to monitor pain and request assistance  - Assess pain using appropriate pain scale  - Administer analgesics based on type and severity of pain and evaluate response  - Implement non-pharmacological measures as appropriate and evaluate response  - Consider cultural and social influences on pain and pain management  - Notify physician/advanced practitioner if interventions unsuccessful or patient reports new pain  Outcome: Progressing     Problem: INFECTION - ADULT  Goal: Absence or prevention of progression during hospitalization  Description: INTERVENTIONS:  - Assess and monitor for signs and symptoms of infection  - Monitor lab/diagnostic results  - Monitor all insertion sites, i.e. indwelling lines, tubes, and drains  - Monitor endotracheal if appropriate and nasal secretions for changes in amount and color  - [place] appropriate cooling/warming therapies per order  - Administer medications as ordered  - Instruct and encourage patient and family to use good hand hygiene technique  - Identify and instruct in appropriate isolation precautions for identified infection/condition  Outcome: Progressing     Problem: NEUROSENSORY - ADULT  Goal: Achieves stable or improved neurological status  Description: INTERVENTIONS  - Monitor and report changes in neurological status  - Monitor vital signs such as temperature, blood pressure, glucose, and any other labs ordered   - Initiate measures to prevent increased intracranial pressure  - Monitor for seizure activity and implement precautions if appropriate      Outcome: Progressing     Problem: Knowledge Deficit  Goal: Patient/family/caregiver demonstrates understanding of disease process, treatment plan, medications, and discharge instructions  Description: Complete learning assessment and assess knowledge base.  Interventions:  - Provide teaching at level of understanding  - Provide teaching via preferred learning methods  Outcome: Progressing     Problem: SAFETY,RESTRAINT: NV/NON-SELF DESTRUCTIVE BEHAVIOR  Goal: Remains free of harm/injury (restraint for non violent/non self-detsructive behavior)  Description: INTERVENTIONS:  - Instruct patient/family regarding restraint use   - Assess and monitor physiologic and psychological status   - Provide interventions and comfort measures to meet assessed patient needs   - Identify and implement measures to help patient regain control  - Assess readiness for release of restraint   Outcome: Progressing

## 2023-07-20 NOTE — PHYSICAL THERAPY NOTE
PHYSICAL THERAPY TREATMENT NOTE    Patient Name: Teresa PERALTAVHUDezH Date: 7/20/2023 07/20/23 1445   PT Last Visit   PT Visit Date 07/20/23   Note Type   Note Type Treatment   Pain Assessment   Pain Assessment Tool FLACC   Pain Rating: FLACC (Rest) - Face 0   Pain Rating: FLACC (Rest) - Legs 0   Pain Rating: FLACC (Rest) - Activity 0   Pain Rating: FLACC (Rest) - Cry 0   Pain Rating: FLACC (Rest) - Consolability 0   Score: FLACC (Rest) 0   Pain Rating: FLACC (Activity) - Face 0   Pain Rating: FLACC (Activity) - Legs 0   Pain Rating: FLACC (Activity) - Activity 0   Pain Rating: FLACC (Activity) - Cry 0   Pain Rating: FLACC (Activity) - Consolability 0   Score: FLACC (Activity) 0   Restrictions/Precautions   Other Precautions Cognitive; Bed Alarm;Multiple lines;O2;Fall Risk   General   Chart Reviewed Yes   Family/Caregiver Present No   Cognition   Overall Cognitive Status Impaired   Arousal/Participation Poorly responsive   Attention LUIS   Orientation Level Disoriented to situation;Disoriented to time;Disoriented to place;Oriented to person   Memory Unable to assess   Following Commands Unable to follow one step commands   Comments Pt very lethargic, will intermittently open eyes   Bed Mobility   Supine to Sit 2  Maximal assistance   Additional items Assist x 1; Increased time required;Verbal cues   Sit to Supine 4  Minimal assistance   Additional items Assist x 1; Increased time required   Additional Comments Pt is able to sit EOB x 10 min w/ intermittent CS <> mod A x 1 to maintain sitting EOB.    Transfers   Sit to Stand Unable to assess  (not appropriate to trial STS due to lack of command following)   Balance   Static Sitting Poor +   Activity Tolerance   Activity Tolerance Other (Comment)  (significantly limited due to cognition)   Medical Staff Made Aware  [facility]   Nurse Made Aware RN Dorthy Cheadle   Assessment   Problem List Decreased strength;Decreased endurance; Impaired balance;Decreased mobility; Decreased cognition; Impaired judgement;Decreased safety awareness   Assessment Pt seen for PT intervention, focus of session working on sitting and increasing arousal for SLP to perform feeding tasks. Pt is able to sit EOB x 10 min w/ intermittent CS <> mod A x 1 to maintain sitting EOB. Pt is able to sit EOB w/ CS but keeps neck in flexion. Pt also intermittently, purposefully completely flexing forward (performs x 3), and is able to pick trunk back up. Pt then gets himself back in bed. Goals   Patient Goals none stated   [facility] Expiration Date 07/24/23   Short Term Goal #1 Patient will: Perform all bed mobility tasks w/ supervision to improve pt's independence w/ repositioning for decrease risk of skin breakdown and Perform all transfers w/ min A x1 consistently from various height surfaces in order to improve I w/ engagement w/ real-world environments/situations, pt will be able to tolerate at least 30 min of functional mobility tasks while maintaining alertness   PT Treatment Day 1   Plan   Treatment/Interventions Functional transfer training;LE strengthening/ROM; Endurance training; Therapeutic exercise;Cognitive reorientation;Patient/family training;Equipment eval/education; Bed mobility;Gait training   PT Frequency 2-3x/wk   Recommendation   UB Rehab Discharge Recommendation (PT/OT) Level 2   Equipment Recommended Wheelchair;Walker   AM-PAC Basic Mobility Inpatient   Turning in Flat Bed Without Bedrails 3   Lying on Back to Sitting on Edge of Flat Bed Without Bedrails 2   Moving Bed to Chair 1   Standing Up From Chair Using Arms 1   Walk in Room 1   Climb 3-5 Stairs With Railing 1   Basic Mobility Inpatient Raw Score 9   Turning Head Towards Sound 1   Follow Simple Instructions 1   Low Function Basic Mobility Raw Score  11   Low Function Basic Mobility Standardized Score  16.55   Highest Level Of Mobility   -[facility] Goal 3: Sit at edge of bed   -[facility] Achieved 3: Sit at edge of bed   End of Consult   Patient Position at End of Consult Supine;Bed/Chair alarm activated; All needs within reach       The patient's AM-PAC Basic Mobility Inpatient Short Form Raw Score is 9. A Raw score of less than or equal to 16 suggests the patient may benefit from discharge to post-acute rehabilitation services. Please also refer to the recommendation of the Physical Therapist for safe discharge planning. Pt would continue to benefit from skilled PT during this admission in order to progress patient towards goals to decrease risk of falls and maximize independence.      Madiha Zimmer, PT, DPT

## 2023-07-20 NOTE — PLAN OF CARE
Problem: PHYSICAL THERAPY ADULT  Goal: Performs mobility at highest level of function for planned discharge setting. See evaluation for individualized goals. Description: Treatment/Interventions: Functional transfer training, LE strengthening/ROM, Therapeutic exercise, Endurance training, Cognitive reorientation, Patient/family training, Equipment eval/education, Bed mobility  Equipment Recommended: Wheelchair, Kenrick Sands       See flowsheet documentation for full assessment, interventions and recommendations. Note:    Problem List: Decreased strength, Decreased endurance, Impaired balance, Decreased mobility, Decreased cognition, Impaired judgement, Decreased safety awareness  Assessment: Pt seen for PT intervention, focus of session working on sitting and increasing arousal for SLP to perform feeding tasks. Pt is able to sit EOB x 10 min w/ intermittent CS <> mod A x 1 to maintain sitting EOB. Pt is able to sit EOB w/ CS but keeps neck in flexion. Pt also intermittently, purposefully completely flexing forward (performs x 3), and is able to pick trunk back up. Pt then gets himself back in bed. See flowsheet documentation for full assessment.

## 2023-07-20 NOTE — ASSESSMENT & PLAN NOTE
Overall guarded prognosis given dementia dysphagia with aspiration risk  GI following   Patient has transitioned back to puree diet. Obtain calorie counts.

## 2023-07-20 NOTE — ASSESSMENT & PLAN NOTE
Elevated troponins noted  Likely non-MI troponin elevation  Cardiology inputs noted no dyspnea/no wheezing/no cough/no hemoptysis/no pleuritic chest pain

## 2023-07-20 NOTE — SPEECH THERAPY NOTE
Speech Language/Pathology    Speech/Language Pathology Progress Note    Patient Name: Danny Hair  XXCWS'G Date: 7/20/2023     Problem List  Principal Problem:    Acute metabolic encephalopathy  Active Problems:    Hypercalcemia    Dysphagia    Fall    Elevated troponin    Dementia (HCC)    Moderate protein-calorie malnutrition (HCC)    Hypernatremia    Hypokalemia    Hypophosphatemia    Goals of care, counseling/discussion    Urinary retention       Past Medical History  Past Medical History:   Diagnosis Date   • Anxiety    • Arthritis    • Coronary artery disease    • Coronary artery disease involving native coronary artery of native heart without angina pectoris    • Depression    • Fracture    • Hypercholesterolemia    • Meningioma ([address]) 11/1999    brain tumor   • Meningioma (HCC)    • Narcolepsy     daytime drowsiness and dozing off occasionally   • Orthostatic hypotension    • Palpitations    • Prostate CA (HCC)    • Prostate cancer ([address])    • Stroke [facility])         Past Surgical History  Past Surgical History:   Procedure Laterality Date   • BACK SURGERY     • BRAIN SURGERY  11/08/1999   • BRAIN SURGERY     • CARDIAC SURGERY     • CORONARY ARTERY BYPASS GRAFT      x5   • CORONARY ARTERY BYPASS GRAFT           Subjective:  Pt lethargic, requires sternal rub and repositioning by PT. Per RN, ate 1/2 cup of cream of wheat today. Current Diet:  Puree w/ thin     Objective:  Pt seen for dx dysphagia tx. Poor arousal for participation in tx. W/ max stimulation and repositioning w/ PT, pt opens oral cavity for small amounts of puree. Prolonged oral holding w/ slowed/weak lingual transport. Eventually, pt ceases oral manipulation and loses material anteriorly. Small amounts of thin liquids piped into oral cavity. Approx 50% of bolus spills anteriorly. No immediate s/s aspiration though suspect only min amounts transferred.  Oral care w/ suction provided and trials concluded for pt safety given increased lethargy. Assessment:  Pt w/ poor JADE and min PO intake. Min amounts taken today w/ severe oral difficulties and oral holding. No overt s/s aspiration.      Plan/Recommendations:  Continue current diet as tolerated when awake/alert, ?need for alt means nutrition if within [address] [place] if pt continue w/ poor alertness and poor intake   Frequent/thorough oral care  ST f/u as able/appropriate

## 2023-07-21 LAB
ANION GAP SERPL CALCULATED.3IONS-SCNC: 5 MMOL/L
ATRIAL RATE: 66 BPM
BUN SERPL-MCNC: 12 MG/DL (ref 5–25)
CA-I BLD-SCNC: 1.39 MMOL/L (ref 1.12–1.32)
CALCIUM SERPL-MCNC: 10.6 MG/DL (ref 8.4–10.2)
CHLORIDE SERPL-SCNC: 103 MMOL/L (ref 96–108)
CO2 SERPL-SCNC: 29 MMOL/L (ref 21–32)
CREAT SERPL-MCNC: 1.01 MG/DL (ref 0.6–1.3)
GFR SERPL CREATININE-BSD FRML MDRD: 65 ML/MIN/1.73SQ M
GLUCOSE SERPL-MCNC: 130 MG/DL (ref 65–140)
GLUCOSE SERPL-MCNC: 144 MG/DL (ref 65–140)
GLUCOSE SERPL-MCNC: 157 MG/DL (ref 65–140)
GLUCOSE SERPL-MCNC: 184 MG/DL (ref 65–140)
GLUCOSE SERPL-MCNC: 92 MG/DL (ref 65–140)
GLUCOSE SERPL-MCNC: 93 MG/DL (ref 65–140)
P AXIS: 58 DEGREES
POTASSIUM SERPL-SCNC: 3.8 MMOL/L (ref 3.5–5.3)
PR INTERVAL: 158 MS
QRS AXIS: -57 DEGREES
QRSD INTERVAL: 114 MS
QT INTERVAL: 442 MS
QTC INTERVAL: 463 MS
SODIUM SERPL-SCNC: 137 MMOL/L (ref 135–147)
T WAVE AXIS: -21 DEGREES
VENTRICULAR RATE: 66 BPM

## 2023-07-21 PROCEDURE — 92526 ORAL FUNCTION THERAPY: CPT

## 2023-07-21 PROCEDURE — 99232 SBSQ HOSP IP/OBS MODERATE 35: CPT | Performed by: INTERNAL MEDICINE

## 2023-07-21 PROCEDURE — 93010 ELECTROCARDIOGRAM REPORT: CPT | Performed by: INTERNAL MEDICINE

## 2023-07-21 PROCEDURE — 82330 ASSAY OF CALCIUM: CPT | Performed by: INTERNAL MEDICINE

## 2023-07-21 PROCEDURE — 82948 REAGENT STRIP/BLOOD GLUCOSE: CPT

## 2023-07-21 PROCEDURE — 80048 BASIC METABOLIC PNL TOTAL CA: CPT | Performed by: INTERNAL MEDICINE

## 2023-07-21 PROCEDURE — 99232 SBSQ HOSP IP/OBS MODERATE 35: CPT | Performed by: HOSPITALIST

## 2023-07-21 RX ADMIN — ENOXAPARIN SODIUM 40 MG: 100 INJECTION SUBCUTANEOUS at 09:28

## 2023-07-21 RX ADMIN — CINACALCET 30 MG: 30 TABLET, FILM COATED ORAL at 18:31

## 2023-07-21 RX ADMIN — OLANZAPINE 5 MG: 10 INJECTION, POWDER, LYOPHILIZED, FOR SOLUTION INTRAMUSCULAR at 21:13

## 2023-07-21 RX ADMIN — METOPROLOL TARTRATE 2.5 MG: 5 INJECTION INTRAVENOUS at 04:40

## 2023-07-21 RX ADMIN — METOPROLOL TARTRATE 2.5 MG: 5 INJECTION INTRAVENOUS at 14:34

## 2023-07-21 RX ADMIN — DEXTROSE AND POTASSIUM CHLORIDE 50 ML/HR: 5; .15 SOLUTION INTRAVENOUS at 07:27

## 2023-07-21 RX ADMIN — METOPROLOL TARTRATE 2.5 MG: 5 INJECTION INTRAVENOUS at 20:51

## 2023-07-21 RX ADMIN — METOPROLOL TARTRATE 2.5 MG: 5 INJECTION INTRAVENOUS at 09:21

## 2023-07-21 NOTE — PLAN OF CARE
Problem: Potential for Falls  Goal: Patient will remain free of falls  Description: INTERVENTIONS:  - Educate patient/family on patient safety including physical limitations  - Instruct patient to call for assistance with activity   - Consult OT/PT to assist with strengthening/mobility   - Keep Call bell within reach  - Keep bed low and locked with side rails adjusted as appropriate  - Keep care items and personal belongings within reach  - Initiate and maintain comfort rounds  - Make Fall Risk Sign visible to staff  - Offer Toileting every Hours, in advance of need  - Initiate/Maintain alarm  - Obtain necessary fall risk management equipment:   - Apply yellow socks and bracelet for high fall risk patients  - Consider moving patient to room near nurses station  Outcome: Progressing     Problem: MOBILITY - ADULT  Goal: Maintain or return to baseline ADL function  Description: INTERVENTIONS:  -  Assess patient's ability to carry out ADLs; assess patient's baseline for ADL function and identify physical deficits which impact ability to perform ADLs (bathing, care of mouth/teeth, toileting, grooming, dressing, etc.)  - Assess/evaluate cause of self-care deficits   - Assess range of motion  - Assess patient's mobility; develop plan if impaired  - Assess patient's need for assistive devices and provide as appropriate  - Encourage maximum independence but intervene and supervise when necessary  - Involve family in performance of ADLs  - Assess for home care needs following discharge   - Consider OT consult to assist with ADL evaluation and planning for discharge  - Provide patient education as appropriate  Outcome: Progressing  Goal: Maintains/Returns to pre admission functional level  Description: INTERVENTIONS:  - Perform BMAT or MOVE assessment daily.   - Set and communicate daily mobility goal to care team and patient/family/caregiver.    - Collaborate with rehabilitation services on mobility goals if consulted  - Perform Range of Motion  times a day. - Reposition patient every  hours.   - Dangle patient  times a day  - Stand patient  times a day  - Ambulate patient  times a day  - Out of bed to chair  times a day   - Out of bed for meals  times a day  - Out of bed for toileting  - Record patient progress and toleration of activity level   Outcome: Progressing     Problem: PAIN - ADULT  Goal: Verbalizes/displays adequate comfort level or baseline comfort level  Description: Interventions:  - Encourage patient to monitor pain and request assistance  - Assess pain using appropriate pain scale  - Administer analgesics based on type and severity of pain and evaluate response  - Implement non-pharmacological measures as appropriate and evaluate response  - Consider cultural and social influences on pain and pain management  - Notify physician/advanced practitioner if interventions unsuccessful or patient reports new pain  Outcome: Progressing     Problem: INFECTION - ADULT  Goal: Absence or prevention of progression during hospitalization  Description: INTERVENTIONS:  - Assess and monitor for signs and symptoms of infection  - Monitor lab/diagnostic results  - Monitor all insertion sites, i.e. indwelling lines, tubes, and drains  - Monitor endotracheal if appropriate and nasal secretions for changes in amount and color  - [place] appropriate cooling/warming therapies per order  - Administer medications as ordered  - Instruct and encourage patient and family to use good hand hygiene technique  - Identify and instruct in appropriate isolation precautions for identified infection/condition  Outcome: Progressing  Goal: Absence of fever/infection during neutropenic period  Description: INTERVENTIONS:  - Monitor WBC    Outcome: Progressing     Problem: SAFETY ADULT  Goal: Patient will remain free of falls  Description: INTERVENTIONS:  - Educate patient/family on patient safety including physical limitations  - Instruct patient to call for assistance with activity   - Consult OT/PT to assist with strengthening/mobility   - Keep Call bell within reach  - Keep bed low and locked with side rails adjusted as appropriate  - Keep care items and personal belongings within reach  - Initiate and maintain comfort rounds  - Make Fall Risk Sign visible to staff  - Offer Toileting every Hours, in advance of need  - Initiate/Maintain alarm  - Obtain necessary fall risk management equipment:   - Apply yellow socks and bracelet for high fall risk patients  - Consider moving patient to room near nurses station  Outcome: Progressing  Goal: Maintain or return to baseline ADL function  Description: INTERVENTIONS:  -  Assess patient's ability to carry out ADLs; assess patient's baseline for ADL function and identify physical deficits which impact ability to perform ADLs (bathing, care of mouth/teeth, toileting, grooming, dressing, etc.)  - Assess/evaluate cause of self-care deficits   - Assess range of motion  - Assess patient's mobility; develop plan if impaired  - Assess patient's need for assistive devices and provide as appropriate  - Encourage maximum independence but intervene and supervise when necessary  - Involve family in performance of ADLs  - Assess for home care needs following discharge   - Consider OT consult to assist with ADL evaluation and planning for discharge  - Provide patient education as appropriate  Outcome: Progressing  Goal: Maintains/Returns to pre admission functional level  Description: INTERVENTIONS:  - Perform BMAT or MOVE assessment daily.   - Set and communicate daily mobility goal to care team and patient/family/caregiver. - Collaborate with rehabilitation services on mobility goals if consulted  - Perform Range of Motion  times a day. - Reposition patient every  hours.   - Dangle patient  times a day  - Stand patient  times a day  - Ambulate patient  times a day  - Out of bed to chair  times a day   - Out of bed for meals  times a day  - Out of bed for toileting  - Record patient progress and toleration of activity level   Outcome: Progressing     Problem: DISCHARGE PLANNING  Goal: Discharge to home or other facility with appropriate resources  Description: INTERVENTIONS:  - Identify barriers to discharge w/patient and caregiver  - Arrange for needed discharge resources and transportation as appropriate  - Identify discharge learning needs (meds, wound care, etc.)  - Arrange for interpretive services to assist at discharge as needed  - Refer to Case Management Department for coordinating discharge planning if the patient needs post-hospital services based on physician/advanced practitioner order or complex needs related to functional status, cognitive ability, or social support system  Outcome: Progressing     Problem: Knowledge Deficit  Goal: Patient/family/caregiver demonstrates understanding of disease process, treatment plan, medications, and discharge instructions  Description: Complete learning assessment and assess knowledge base. Interventions:  - Provide teaching at level of understanding  - Provide teaching via preferred learning methods  Outcome: Progressing     Problem: NEUROSENSORY - ADULT  Goal: Achieves stable or improved neurological status  Description: INTERVENTIONS  - Monitor and report changes in neurological status  - Monitor vital signs such as temperature, blood pressure, glucose, and any other labs ordered   - Initiate measures to prevent increased intracranial pressure  - Monitor for seizure activity and implement precautions if appropriate      Outcome: Progressing  Goal: Achieves maximal functionality and self care  Description: INTERVENTIONS  - Monitor swallowing and airway patency with patient fatigue and changes in neurological status  - Encourage and assist patient to increase activity and self care.    - Encourage visually impaired, hearing impaired and aphasic patients to use assistive/communication devices  Outcome: Progressing     Problem: SAFETY,RESTRAINT: NV/NON-SELF DESTRUCTIVE BEHAVIOR  Goal: Remains free of harm/injury (restraint for non violent/non self-detsructive behavior)  Description: INTERVENTIONS:  - Instruct patient/family regarding restraint use   - Assess and monitor physiologic and psychological status   - Provide interventions and comfort measures to meet assessed patient needs   - Identify and implement measures to help patient regain control  - Assess readiness for release of restraint   Outcome: Progressing  Goal: Returns to optimal restraint-free functioning  Description: INTERVENTIONS:  - Assess the patient's behavior and symptoms that indicate continued need for restraint  - Identify and implement measures to help patient regain control  - Assess readiness for release of restraint   Outcome: Progressing     Problem: Nutrition/Hydration-ADULT  Goal: Nutrient/Hydration intake appropriate for improving, restoring or maintaining nutritional needs  Description: Monitor and assess patient's nutrition/hydration status for malnutrition. Collaborate with interdisciplinary team and initiate plan and interventions as ordered. Monitor patient's weight and dietary intake as ordered or per policy. Utilize nutrition screening tool and intervene as necessary. Determine patient's food preferences and provide high-protein, high-caloric foods as appropriate.      INTERVENTIONS:  - Monitor oral intake, urinary output, labs, and treatment plans  - Assess nutrition and hydration status and recommend course of action  - Evaluate amount of meals eaten  - Assist patient with eating if necessary   - Allow adequate time for meals  - Recommend/ encourage appropriate diets, oral nutritional supplements, and vitamin/mineral supplements  - Order, calculate, and assess calorie counts as needed  - Recommend, monitor, and adjust tube feedings and TPN/PPN based on assessed needs  - Assess need for intravenous fluids  - Provide specific nutrition/hydration education as appropriate  - Include patient/family/caregiver in decisions related to nutrition  Outcome: Progressing     Problem: Prexisting or High Potential for Compromised Skin Integrity  Goal: Skin integrity is maintained or improved  Description: INTERVENTIONS:  - Identify patients at risk for skin breakdown  - Assess and monitor skin integrity  - Assess and monitor nutrition and hydration status  - Monitor labs   - Assess for incontinence   - Turn and reposition patient  - Assist with mobility/ambulation  - Relieve pressure over bony prominences  - Avoid friction and shearing  - Provide appropriate hygiene as needed including keeping skin clean and dry  - Evaluate need for skin moisturizer/barrier cream  - Collaborate with interdisciplinary team   - Patient/family teaching  - Consider wound care consult   Outcome: Progressing

## 2023-07-21 NOTE — ASSESSMENT & PLAN NOTE
Patient is a resident of [facility], presents with altered mental status  Encephalopathy is likely multifactorial  Appears to be improving  Noted to have hypernatremia hypercalcemia  Receiving IV fluids  Avoid neurotoxins  Optimize metabolic parameters  Monitor closely  7/19 again waxing and waning. CT head neg. Suspect metabolic. Improved

## 2023-07-21 NOTE — PROGRESS NOTES
[facility]  Progress Note  Name: Ann Marie Bhatia  MRN: [id number]  Unit/Bed#: -01 I Date of Admission: 7/13/2023   Date of Service: 7/21/2023 I Hospital Day: 8    Assessment/Plan   Urinary retention  Assessment & Plan  Noted to have urinary retention in the ED and a Clemons catheter was placed  Will attempt voiding trial and discontinue Clemons as encephalopathy improves    Goals of care, counseling/discussion  Assessment & Plan  Overall guarded prognosis given dementia dysphagia with aspiration risk  GI following   Patient has transitioned back to puree diet. Obtain calorie counts. Unclear if patient/family would want PEG if oral intake poor or if patient is candidate for such procedure. Hypophosphatemia  Assessment & Plan  Normalized with repletion  Monitor    Hypokalemia  Assessment & Plan  Replete  Monitor    Hypernatremia  Assessment & Plan  Hyponatremia present on admission likely due to poor p.o. intake dehydration  Resolving. Continue hypotonic IV fluids  Nephrology following  Monitor closely        Moderate protein-calorie malnutrition ([address])  Assessment & Plan  Malnutrition Findings: Body mass index is 20.97 kg/m². Dementia [facility])  Assessment & Plan  History of dementia  Continue memantine  Delirium precautions  Reorientation  Sleep hygiene      Elevated troponin  Assessment & Plan  Elevated troponins noted  Likely non-MI troponin elevation  Cardiology inputs noted    Fall  Assessment & Plan  Patient had a fall about 3 days back  At high risk for falls  Imaging revealed inferior pubic ramus fracture  Orthopedic inputs noted  Analgesics  Safe ambulation  Fall precautions  Physical therapy      Dysphagia  Assessment & Plan  History of dysphagia  Improved w/ treatment of electrolyte issues. Present precautions      Hypercalcemia  Assessment & Plan  Mild hypercalcemia  Elevated PTH levels noted  Endocrine consulted.  Cont sensipar and IV hydration      * Acute metabolic encephalopathy  Assessment & Plan  Patient is a resident of [facility], presents with altered mental status  Encephalopathy is likely multifactorial  Appears to be improving  Noted to have hypernatremia hypercalcemia  Receiving IV fluids  Avoid neurotoxins  Optimize metabolic parameters  Monitor closely   again waxing and waning. CT head neg. Suspect metabolic. Improved               VTE  Prophylaxis:   Pharmacologic: in place  Mechanical VTE Prophylaxis in Place: Yes    Patient Centered Rounds: I have performed bedside rounds with nursing staff today. Discussions with Specialists or Other Care Team Provider: case management    Education and Discussions with Family / Patient: pt wife      Current Length of Stay: 8 day(s)    Current Patient Status: Inpatient        Code Status: Level 1 - Full Code    Discharge Plan: Pt will require continued inpatient hospitalization. Subjective:     Pt eating well today  Denies complaints    Patient is seen and examined at bedside. Limited ROS d/t dementia   Objective:     Vitals:   Temp (24hrs), Av.8 °F (36 °C), Min:96.2 °F (35.7 °C), Max:97.7 °F (36.5 °C)    Temp:  [96.2 °F (35.7 °C)-97.7 °F (36.5 °C)] 96.5 °F (35.8 °C)  HR:  [65-81] 81  Resp:  [20] 20  BP: (103-144)/(45-66) 141/66  SpO2:  [94 %-100 %] 97 %  Body mass index is 20.97 kg/m². Input and Output Summary (last 24 hours): Intake/Output Summary (Last 24 hours) at 2023 1103  Last data filed at 2023 0901  Gross per 24 hour   Intake 1420 ml   Output 500 ml   Net 920 ml       Physical Exam:       GEN: No acute distress, comfortable  HEEENT: No JVD, PERRLA, no scleral icterus  RESP: Lungs clear to auscultation bilaterally  CV: RRR, +s1/s2   ABD: SOFT NON TENDER, POSITIVE BOWEL SOUNDS, NO DISTENTION  PSYCH: CALM, apparent dementia.    NEURO: Mentation baseline, NO FOCAL DEFICITS  SKIN: NO RASH  EXTREM: NO EDEMA    Additional Data:     Labs:    Results from last 7 days   Lab Units 07/20/23  0346   WBC Thousand/uL 13.12*   HEMOGLOBIN g/dL 12.5   HEMATOCRIT % 38.8   PLATELETS Thousands/uL 218   NEUTROS PCT % 73   LYMPHS PCT % 17   MONOS PCT % 8   EOS PCT % 1     Results from last 7 days   Lab Units 07/21/23  0533 07/20/23  0346   SODIUM mmol/L 137 138   POTASSIUM mmol/L 3.8 4.0   CHLORIDE mmol/L 103 106   CO2 mmol/L 29 26   BUN mg/dL 12 12   CREATININE mg/dL 1.01 0.88   ANION GAP mmol/L 5 6   CALCIUM mg/dL 10.6* 10.4*   ALBUMIN g/dL  --  3.2*   TOTAL BILIRUBIN mg/dL  --  0.70   ALK PHOS U/L  --  65   ALT U/L  --  29   AST U/L  --  43*   GLUCOSE RANDOM mg/dL 92 109         Results from last 7 days   Lab Units 07/21/23  0649 07/21/23  0119 07/20/23  1645 07/20/23  1159 07/20/23  0755 07/20/23  0614 07/19/23  2341 07/19/23  1759 07/19/23  1412 07/19/23  1059 07/19/23  0620 07/18/23  2122   POC GLUCOSE mg/dl 93 184* 115 109 114 112 83 127 73 118 102 102         Results from last 7 days   Lab Units 07/15/23  0537   PROCALCITONIN ng/ml 0.07       Lines/Drains:  Invasive Devices     Peripheral Intravenous Line  Duration           Peripheral IV 07/21/23 Right;Ventral (anterior) Forearm <1 day          Drain  Duration           Urethral Catheter Latex 16 Fr. 7 days                Telemetry:        * I Have Reviewed All Lab Data Listed Above. Imaging:     Results for orders placed during the hospital encounter of 07/13/23    XR chest portable    Narrative  CHEST    INDICATION:   hypoxia. COMPARISON: CXR and chest CT 7/13/2023. EXAM PERFORMED/VIEWS:  XR CHEST PORTABLE. FINDINGS:    Cardiomediastinal silhouette normal. CABG. Loop recorder in the left chest wall. Mild bibasilar atelectasis. No effusion or pneumothorax. Skinfold over both hemithoraces. Upper abdomen normal. Old right rib fracture. Impression  Mild bibasilar atelectasis.           Workstation performed: [id number]    Results for orders placed during the hospital encounter of 05/25/18    XR chest 2 views    Narrative  CHEST    INDICATION:   falls. "PATIENT HAS HAD MULTIPLE FALL IN THE LAST COUPLE OF DAYS, STATES NO SOB OR CHEST PAIN"    COMPARISON:  Two-view chest 5/21/2018. EXAM PERFORMED/VIEWS:  XR CHEST PA & LATERAL  dual      FINDINGS:    Median sternotomy wires. Cardiomediastinal silhouette appears unremarkable. The lungs are clear. No pneumothorax or pleural effusion. Osseous structures appear within normal limits for patient age. Impression  No acute cardiopulmonary disease. Workstation performed: [id number]      *I have reviewed all imaging reports listed above      Recent Cultures (last 7 days):           Last 24 Hours Medication List:   Current Facility-Administered Medications   Medication Dose Route Frequency Provider Last Rate   • cinacalcet  30 mg Oral After Reyes Woody MD     • dextrose 5 % with KCl 20 mEq/L  50 mL/hr Intravenous Continuous Mundo Olvera MD 50 mL/hr (07/21/23 0727)   • dextrose  25 mL Intravenous Once Eligio Can PA-C     • enoxaparin  40 mg Subcutaneous Daily Ayesha Orozco PA-C     • hydrALAZINE  5 mg Intravenous Q6H PRN Sai Sanz MD     • metoprolol  2.5 mg Intravenous Q6H Sai Sanz MD     • OLANZapine  5 mg Intramuscular Q3H PRN Sobeida Gillespie MD     • ondansetron  4 mg Intravenous Q6H PRN Ayesha Orozco PA-C          Today, Patient Was Seen By: Sobeida Gillespie MD    ** Please Note: Dictation voice to text software may have been used in the creation of this document.  **

## 2023-07-21 NOTE — NUTRITION
07/21/23 1233   Recommendations/Interventions   Summary Calorie Counts posted in room per MD request. Calorie Count Day 1 (Thursday 7/20/23): 3 meals, 378 kcal/ 0 gm protein. Calorie Count Day 2 (Friday 7/21/23) underway: pt fed self for brk, Brk alone today 480 kcal, 4 gm protein. Seems more alert today, talking to this RD and self feeding lunch. Interventions/Recommendations Supplement adjust   Intervention Comments Per RD protocol add Magic Cup BID. Education Assessment   Education Education not indicated at this time   Patient Nutrition Goals   Goal Adequate hydration; Increase kcal/PRO intake   Goal Status Not met; Extended;Met   Timeframe to complete goal by next f/u   Nutrition Complexity Risk   Nutrition complexity level Moderate risk   Nutrition review: 07/23/23  (dar count)   Follow up date 07/25/23

## 2023-07-21 NOTE — PROGRESS NOTES
NEPHROLOGY PROGRESS NOTE   Cathie Javier 80 y.o. male MRN: [id number]  Unit/Bed#: -01 Encounter: [id number]  Reason for Consult: Hypernatremia    ASSESSMENT AND PLAN:  Hypernatremia  -Suspect secondary to poor free water intake  -Sodium level  remained stable 137 today.    -Discussed with floor nurse and his p.o. intake seems to be better today and tolerating liquid.    -We will discontinue IV fluid. Continue to encourage p.o. fluid intake.      Hypokalemia, resolved with replacement.  -Serum magnesium stable  -BMP to monitor     Hypercalcemia  -Vitamin D 25-hydroxy level 36,   -Suspect PTH mediated hypercalcemia, component of primary hyperparathyroidism  -Appreciate endocrine input.  Serum calcium slightly higher with ionized calcium 1.39. Continue to monitor this closely. If continue to worsen further, may consider increasing Sensipar to 60 mg daily.      Hypertension  -Blood pressure acceptable  -Due to poor p.o. intake, remains on IV metoprolol standing     Moderate left-sided hydronephrosis and hydroureter, with some mild changes on the right, markedly distended bladder  -Currently has Clemons catheter  -Consider eventual repeat renal ultrasound.  Serum creatinine remains stable 0.8     Suspected aspiration pneumonia based on CT scan, now remains on dysphagia thin liquid diet.  -Ongoing discussion noted regarding PEG tube placement as per GI.     Discussed above plan in detail with primary team and they agree with above recommendations. SUBJECTIVE:  Patient seen and examined at bedside. Remains overall confused.     OBJECTIVE:  Current Weight: Weight - Scale: 64.4 kg (142 lb)  Vitals:    07/21/23 1454   BP: 142/70   Pulse: (!) 118   Resp: 20   Temp:    SpO2:        Intake/Output Summary (Last 24 hours) at 7/21/2023 1509  Last data filed at 7/21/2023 1454  Gross per 24 hour   Intake 1420 ml   Output 425 ml   Net 995 ml     Wt Readings from Last 3 Encounters:   07/13/23 64.4 kg (142 lb) 06/13/23 64.4 kg (142 lb)   05/09/23 64.4 kg (142 lb)     Temp Readings from Last 3 Encounters:   07/21/23 (!) 96.5 °F (35.8 °C) (Axillary)   03/16/23 (!) 97 °F (36.1 °C)   03/02/23 98.1 °F (36.7 °C) (Oral)     BP Readings from Last 3 Encounters:   07/21/23 142/70   06/13/23 124/60   03/30/23 (!) 124/44     Pulse Readings from Last 3 Encounters:   07/21/23 (!) 118   06/13/23 72   03/30/23 65        Physical Examination:  Eyes: No conjunctival pallor present  Neck: No obvious lymphadenopathy appreciated  Respiratory: Bilateral air entry present  CVS: No significant edema  GI: Nondistended  CNS: Opens eyes, does not answer most questions.   Skin: No new rash  Musculoskeletal: No obvious new gross deformity noted    Medications:    Current Facility-Administered Medications:   •  cinacalcet (SENSIPAR) tablet 30 mg, 30 mg, Oral, After Angie Clark MD, 30 mg at 07/20/23 1729  •  dextrose 5 % with KCl 20 mEq/L infusion (premix), 50 mL/hr, Intravenous, Continuous, Mundo Pickett MD, Last Rate: 50 mL/hr at 07/21/23 0727, 50 mL/hr at 07/21/23 0727  •  dextrose 50 % IV solution 25 mL, 25 mL, Intravenous, Once, Suresh Cross PA-C  •  enoxaparin (LOVENOX) subcutaneous injection 40 mg, 40 mg, Subcutaneous, Daily, Iveth Jenkins PA-C, 40 mg at 07/21/23 9438  •  hydrALAZINE (APRESOLINE) injection 5 mg, 5 mg, Intravenous, Q6H PRN, Nica Posey MD  •  metoprolol (LOPRESSOR) injection 2.5 mg, 2.5 mg, Intravenous, Q6H, Nica Posey MD, 2.5 mg at 07/21/23 1434  •  OLANZapine (ZyPREXA) IM injection 5 mg, 5 mg, Intramuscular, Q3H PRN, Erick Gaines MD, 5 mg at 07/17/23 2150  •  ondansetron (ZOFRAN) injection 4 mg, 4 mg, Intravenous, Q6H PRN, Baldev Darby PA-C    Laboratory Results:  Results from last 7 days   Lab Units 07/21/23  0533 07/20/23  0346 07/19/23  1423 07/19/23  1307 07/19/23  0409 07/18/23  0410 07/17/23  1120 07/17/23  0429 07/16/23  1614 07/16/23  0535 07/15/23  2011 07/15/23  0537   WBC Thousand/uL  --  13.12*  --  9.09  --  9.86  --  8.82  --   --   --  11.42*   HEMOGLOBIN g/dL  --  12.5  --  11.7*  --  12.1  --  11.9*  --   --   --  12.6   I STAT HEMOGLOBIN g/dl  --   --  9.9*  --   --   --   --   --   --   --   --   --    HEMATOCRIT %  --  38.8  --  36.6  --  38.2  --  38.4  --   --   --  40.6   HEMATOCRIT, ISTAT %  --   --  29*  --   --   --   --   --   --   --   --   --    PLATELETS Thousands/uL  --  218  --  185  --  194  --  202  --   --   --  227   SODIUM mmol/L 137 138  --   --  139 143 147 148* 151* 154*   < > 157*   POTASSIUM mmol/L 3.8 4.0  --   --  4.1 3.5 3.3* 2.7* 3.0* 2.8*   < > 3.1*   CHLORIDE mmol/L 103 106  --   --  109* 111* 112* 113* 119* 120*   < > 121*   CO2 mmol/L 29 26  --   --  28 30 31 30 25 28   < > 32   CO2, I-STAT mmol/L  --   --  28  --   --   --   --   --   --   --   --   --    BUN mg/dL 12 12  --   --  14 13 13 15 18 20   < > 21   CREATININE mg/dL 1.01 0.88  --   --  0.90 0.83 0.91 0.93 0.92 1.00   < > 1.03   CALCIUM mg/dL 10.6* 10.4*  --   --  10.5* 10.9* 10.7* 10.7* 10.7* 10.9*   < > 11.8*   MAGNESIUM mg/dL  --   --   --   --   --   --   --   --   --  2.2  --   --    PHOSPHORUS mg/dL  --   --   --   --   --   --   --   --   --   --   --  3.0   GLUCOSE, ISTAT mg/dl  --   --  394*  --   --   --   --   --   --   --   --   --     < > = values in this interval not displayed. XR chest portable   Final Result by Abel Marroquin MD (07/20 5965)      Mild bibasilar atelectasis.                Workstation performed: [id number]         CT head wo contrast   Final Result by Pacheco Rao MD (07/19 1321)      No acute intracranial hemorrhage seen no mass effect or midline shift seen      Area of encephalomalacia left frontal region   Residual meningioma measuring 1.5 x 1.3 cm in the right parafalcine region, stable                  Workstation performed: [id number]         CTA ED chest PE study   Final Result by Abel Marroquin MD (07/13 1226)      Compromised by respiratory motion, particularly in the lower lobes, but with no definite acute pulmonary emboli. Mild bilateral lower lobe bronchiectasis with tubular opacities in the right lower lobe due to mucus and debris in the bronchi. Given debris in the superior segment right lower lobe bronchi, patchy groundglass opacity in the right upper lobe, and    tree-in-bud nodularity in both lower lobes, this is likely due to aspiration. Workstation performed: [id number]         TRAUMA - CT head wo contrast   Final Result by Neri Villalta MD (07/13 1059)      No acute intracranial abnormality. The study was marked in [facility] for immediate notification. Workstation performed: [id number]         TRAUMA - CT spine cervical wo contrast   Final Result by Neri Villalta MD (07/13 1107)      No cervical spine fracture or traumatic malalignment. Degenerative changes. New groundglass opacities in the right lung apex. Please see chest CT for further result. The study was marked in [facility] for immediate notification. Workstation performed: [id number]         TRAUMA - CT chest abdomen pelvis w contrast   Final Result by Neri Villalta MD (07/13 1121)      No evidence of solid organ trauma or acute fracture with the exception of L3 where there is compression fracture new since 2019 although it is age indeterminate based on imaging. Correlation with any additional back pain is advised. There is a left inferior pubic ramus fracture with some sclerosis which is partially healed suggesting this is recent more likely than acute and again should be correlated with any pain in the area. No definite pelvic fracture is seen elsewhere. Infiltrates in the right lower lobe with evidence of mucous secretions opacifying right lower lobe bronchi. There is additional opacities in the right upper lobe and left lower lobe.  Findings are suspicious for aspiration pneumonia. Branching opacity may    represent mucous debris although this could also represent poor enhancement of the right lower lobe vasculature that could be seen in a pulmonary embolus. If there is high index of suspicion a follow-up CTA or lung scan may be useful. Marked bladder distention should be correlated for any recent voiding. Clemons catheter may be useful. 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 This could be correlated with distal pulses. This was discussed with JAVIER Watson at 11:05 a.m. Workstation performed: [id number]         XR Trauma chest portable   Final Result by Taya Hollingsworth MD (07/13 1035)      Hypoventilation with atelectatic changes at the bases. No definite pneumothorax. Workstation performed: [id number]             Portions of the record may have been created with voice recognition software. Occasional wrong word or "sound a like" substitutions may have occurred due to the inherent limitations of voice recognition software. Read the chart carefully and recognize, using context, where substitutions have occurred.

## 2023-07-21 NOTE — ASSESSMENT & PLAN NOTE
Overall guarded prognosis given dementia dysphagia with aspiration risk  GI following   Patient has transitioned back to puree diet. Obtain calorie counts. Unclear if patient/family would want PEG if oral intake poor or if patient is candidate for such procedure.

## 2023-07-21 NOTE — SPEECH THERAPY NOTE
Speech Language/Pathology    Speech/Language Pathology Progress Note    Patient Name: Princess Hernandez  SRFFX'V Date: 7/21/2023     Problem List  Principal Problem:    Acute metabolic encephalopathy  Active Problems:    Hypercalcemia    Dysphagia    Fall    Elevated troponin    Dementia (HCC)    Moderate protein-calorie malnutrition (HCC)    Hypernatremia    Hypokalemia    Hypophosphatemia    Goals of care, counseling/discussion    Urinary retention       Past Medical History  Past Medical History:   Diagnosis Date   • Anxiety    • Arthritis    • Coronary artery disease    • Coronary artery disease involving native coronary artery of native heart without angina pectoris    • Depression    • Fracture    • Hypercholesterolemia    • Meningioma ([address]) 11/1999    brain tumor   • Meningioma (HCC)    • Narcolepsy     daytime drowsiness and dozing off occasionally   • Orthostatic hypotension    • Palpitations    • Prostate CA ([address])    • Prostate cancer ([address])    • Stroke [facility])         Past Surgical History  Past Surgical History:   Procedure Laterality Date   • BACK SURGERY     • BRAIN SURGERY  11/08/1999   • BRAIN SURGERY     • CARDIAC SURGERY     • CORONARY ARTERY BYPASS GRAFT      x5   • CORONARY ARTERY BYPASS GRAFT           Subjective:  Pt fully awake and alert, talkative. Per RN, self-fed breakfast.     Current Diet:  Puree/thin     Objective:  Pt seen for dx dysphagia tx f/u. Pt awake for full participation in tx and self-feed 4 oz puree and approx 3 oz thin liquids via straw. Adequate labial seal for retrieval and oral containment. Prompt lingual manipulation and transfer of all. No significant oral residue. No overt s/s aspiration, though pt does have h/o silent aspiration which pt/family have been accepting of risk of oral diet in the past. Pt's mentation has been waxing/waning w/ subsequent impact on oropharyngeal swallow skill.  S/w pt's wife via telephone regarding pt's current clinical presentation, h/o dysphagia/silent aspiration, and potential associated risks w/ aspiration. Pt's wife demonstrates full understanding of pt's risk of aspiration, states at this time she is accepting of risk of potential silent aspiration and wishes to continue w/ oral feeding/baseline diet w/ strategies to minimize risk. Assessment: At this time, pt appears to be tolerating baseline diet puree/thin liquids though unknown risk of potential silent aspiration.      Plan/Recommendations:  Continue puree/thin as tolerated   Frequent/thorough oral care  ST to f/u peripherally/as needed, x1/week as able

## 2023-07-22 LAB
ALBUMIN SERPL BCP-MCNC: 3.3 G/DL (ref 3.5–5)
ALP SERPL-CCNC: 70 U/L (ref 34–104)
ALT SERPL W P-5'-P-CCNC: 24 U/L (ref 7–52)
ANION GAP SERPL CALCULATED.3IONS-SCNC: 4 MMOL/L
AST SERPL W P-5'-P-CCNC: 27 U/L (ref 13–39)
BASOPHILS # BLD AUTO: 0.02 THOUSANDS/ÂΜL (ref 0–0.1)
BASOPHILS NFR BLD AUTO: 0 % (ref 0–1)
BILIRUB SERPL-MCNC: 0.44 MG/DL (ref 0.2–1)
BUN SERPL-MCNC: 22 MG/DL (ref 5–25)
CALCIUM ALBUM COR SERPL-MCNC: 11.7 MG/DL (ref 8.3–10.1)
CALCIUM SERPL-MCNC: 11.1 MG/DL (ref 8.4–10.2)
CHLORIDE SERPL-SCNC: 109 MMOL/L (ref 96–108)
CO2 SERPL-SCNC: 27 MMOL/L (ref 21–32)
CREAT SERPL-MCNC: 1 MG/DL (ref 0.6–1.3)
EOSINOPHIL # BLD AUTO: 0.1 THOUSAND/ÂΜL (ref 0–0.61)
EOSINOPHIL NFR BLD AUTO: 1 % (ref 0–6)
ERYTHROCYTE [DISTWIDTH] IN BLOOD BY AUTOMATED COUNT: 14.2 % (ref 11.6–15.1)
GFR SERPL CREATININE-BSD FRML MDRD: 65 ML/MIN/1.73SQ M
GLUCOSE SERPL-MCNC: 103 MG/DL (ref 65–140)
GLUCOSE SERPL-MCNC: 106 MG/DL (ref 65–140)
GLUCOSE SERPL-MCNC: 118 MG/DL (ref 65–140)
GLUCOSE SERPL-MCNC: 122 MG/DL (ref 65–140)
GLUCOSE SERPL-MCNC: 123 MG/DL (ref 65–140)
GLUCOSE SERPL-MCNC: 146 MG/DL (ref 65–140)
HCT VFR BLD AUTO: 38.9 % (ref 36.5–49.3)
HGB BLD-MCNC: 12.6 G/DL (ref 12–17)
IMM GRANULOCYTES # BLD AUTO: 0.09 THOUSAND/UL (ref 0–0.2)
IMM GRANULOCYTES NFR BLD AUTO: 1 % (ref 0–2)
LYMPHOCYTES # BLD AUTO: 2.71 THOUSANDS/ÂΜL (ref 0.6–4.47)
LYMPHOCYTES NFR BLD AUTO: 27 % (ref 14–44)
MCH RBC QN AUTO: 26.5 PG (ref 26.8–34.3)
MCHC RBC AUTO-ENTMCNC: 32.4 G/DL (ref 31.4–37.4)
MCV RBC AUTO: 82 FL (ref 82–98)
MONOCYTES # BLD AUTO: 1.09 THOUSAND/ÂΜL (ref 0.17–1.22)
MONOCYTES NFR BLD AUTO: 11 % (ref 4–12)
NEUTROPHILS # BLD AUTO: 5.88 THOUSANDS/ÂΜL (ref 1.85–7.62)
NEUTS SEG NFR BLD AUTO: 60 % (ref 43–75)
NRBC BLD AUTO-RTO: 0 /100 WBCS
PLATELET # BLD AUTO: 256 THOUSANDS/UL (ref 149–390)
PMV BLD AUTO: 11.7 FL (ref 8.9–12.7)
POTASSIUM SERPL-SCNC: 4.1 MMOL/L (ref 3.5–5.3)
PROT SERPL-MCNC: 6.9 G/DL (ref 6.4–8.4)
RBC # BLD AUTO: 4.75 MILLION/UL (ref 3.88–5.62)
SODIUM SERPL-SCNC: 140 MMOL/L (ref 135–147)
WBC # BLD AUTO: 9.89 THOUSAND/UL (ref 4.31–10.16)

## 2023-07-22 PROCEDURE — 80053 COMPREHEN METABOLIC PANEL: CPT | Performed by: HOSPITALIST

## 2023-07-22 PROCEDURE — 82948 REAGENT STRIP/BLOOD GLUCOSE: CPT

## 2023-07-22 PROCEDURE — 99233 SBSQ HOSP IP/OBS HIGH 50: CPT | Performed by: INTERNAL MEDICINE

## 2023-07-22 PROCEDURE — 85025 COMPLETE CBC W/AUTO DIFF WBC: CPT | Performed by: HOSPITALIST

## 2023-07-22 PROCEDURE — 99232 SBSQ HOSP IP/OBS MODERATE 35: CPT | Performed by: HOSPITALIST

## 2023-07-22 RX ORDER — DEXTROSE MONOHYDRATE 50 MG/ML
50 INJECTION, SOLUTION INTRAVENOUS CONTINUOUS
Status: DISCONTINUED | OUTPATIENT
Start: 2023-07-22 | End: 2023-07-24

## 2023-07-22 RX ADMIN — ENOXAPARIN SODIUM 40 MG: 100 INJECTION SUBCUTANEOUS at 09:08

## 2023-07-22 RX ADMIN — METOPROLOL TARTRATE 2.5 MG: 5 INJECTION INTRAVENOUS at 03:28

## 2023-07-22 RX ADMIN — METOPROLOL TARTRATE 2.5 MG: 5 INJECTION INTRAVENOUS at 09:08

## 2023-07-22 RX ADMIN — METOPROLOL TARTRATE 2.5 MG: 5 INJECTION INTRAVENOUS at 23:40

## 2023-07-22 RX ADMIN — METOPROLOL TARTRATE 2.5 MG: 5 INJECTION INTRAVENOUS at 15:51

## 2023-07-22 RX ADMIN — DEXTROSE 50 ML/HR: 5 SOLUTION INTRAVENOUS at 09:10

## 2023-07-22 NOTE — ASSESSMENT & PLAN NOTE
Overall guarded prognosis given dementia dysphagia with aspiration risk  GI following   Patient has transitioned back to puree diet. Obtain calorie counts. Unclear if patient/family would want PEG if oral intake poor or if patient is candidate for such procedure. Unclear if patient is meeting nutritional needs d/t waxing and waning metabolic encephalopathy, dementia. Palliative care consulted.  Apparently [facility] will visit the patient family 7/23 to have a family meeting outside the hospital.

## 2023-07-22 NOTE — PLAN OF CARE
Problem: Potential for Falls  Goal: Patient will remain free of falls  Description: INTERVENTIONS:  - Educate patient/family on patient safety including physical limitations  - Instruct patient to call for assistance with activity   - Consult OT/PT to assist with strengthening/mobility   - Keep Call bell within reach  - Keep bed low and locked with side rails adjusted as appropriate  - Keep care items and personal belongings within reach  - Initiate and maintain comfort rounds  - Make Fall Risk Sign visible to staff  - Offer Toileting every Hours, in advance of need  - Initiate/Maintain alarm  - Obtain necessary fall risk management equipment:   - Apply yellow socks and bracelet for high fall risk patients  - Consider moving patient to room near nurses station  Outcome: Not Progressing     Problem: SAFETY ADULT  Goal: Patient will remain free of falls  Description: INTERVENTIONS:  - Educate patient/family on patient safety including physical limitations  - Instruct patient to call for assistance with activity   - Consult OT/PT to assist with strengthening/mobility   - Keep Call bell within reach  - Keep bed low and locked with side rails adjusted as appropriate  - Keep care items and personal belongings within reach  - Initiate and maintain comfort rounds  - Make Fall Risk Sign visible to staff  - Offer Toileting every Hours, in advance of need  - Initiate/Maintain alarm  - Obtain necessary fall risk management equipment:   - Apply yellow socks and bracelet for high fall risk patients  - Consider moving patient to room near nurses station  Outcome: Not Progressing  Goal: Maintain or return to baseline ADL function  Description: INTERVENTIONS:  -  Assess patient's ability to carry out ADLs; assess patient's baseline for ADL function and identify physical deficits which impact ability to perform ADLs (bathing, care of mouth/teeth, toileting, grooming, dressing, etc.)  - Assess/evaluate cause of self-care deficits - Assess range of motion  - Assess patient's mobility; develop plan if impaired  - Assess patient's need for assistive devices and provide as appropriate  - Encourage maximum independence but intervene and supervise when necessary  - Involve family in performance of ADLs  - Assess for home care needs following discharge   - Consider OT consult to assist with ADL evaluation and planning for discharge  - Provide patient education as appropriate  Outcome: Not Progressing  Goal: Maintains/Returns to pre admission functional level  Description: INTERVENTIONS:  - Perform BMAT or MOVE assessment daily.   - Set and communicate daily mobility goal to care team and patient/family/caregiver. - Collaborate with rehabilitation services on mobility goals if consulted  - Perform Range of Motion  times a day. - Reposition patient every  hours.   - Dangle patient  times a day  - Stand patient  times a day  - Ambulate patient  times a day  - Out of bed to chair  times a day   - Out of bed for meals  times a day  - Out of bed for toileting  - Record patient progress and toleration of activity level   Outcome: Not Progressing

## 2023-07-22 NOTE — ASSESSMENT & PLAN NOTE
Patient is a resident of [facility], presents with altered mental status  Encephalopathy is likely multifactorial  Appears to be improving  Noted to have hypernatremia hypercalcemia  Receiving IV fluids  Avoid neurotoxins  Optimize metabolic parameters  Monitor closely  7/19 again waxing and waning. CT head neg. Suspect metabolic. Improved

## 2023-07-22 NOTE — PLAN OF CARE
Problem: INFECTION - ADULT  Goal: Absence or prevention of progression during hospitalization  Description: INTERVENTIONS:  - Assess and monitor for signs and symptoms of infection  - Monitor lab/diagnostic results  - Monitor all insertion sites, i.e. indwelling lines, tubes, and drains  - Monitor endotracheal if appropriate and nasal secretions for changes in amount and color  - Moore appropriate cooling/warming therapies per order  - Administer medications as ordered  - Instruct and encourage patient and family to use good hand hygiene technique  - Identify and instruct in appropriate isolation precautions for identified infection/condition  Outcome: Progressing

## 2023-07-22 NOTE — PROGRESS NOTES
NEPHROLOGY PROGRESS NOTE   Kane Perez 80 y.o. male MRN: [id number]  Unit/Bed#: -01 Encounter: [id number]      HPI/24hr EVENTS:    -80-year-old male past medical history of hypertension, dysphagia, hypercalcemia, dementia. Presented with altered mental status, nephrology consult for management of hypercalcemia and hypernatremia    -Mild increase in sodium level calcium levels    ASSESSMENT/PLAN:    Hypernatremia  -Due to poor oral intake of water, has had persistent dysphagia issues  -Prior required aggressive hydration with D5W, this was taken off over the past few days, unfortunately his sodium level is climbing, will restart D5W at 50 ml/hr  -Ongoing goals of care discussions as patient does not appear to be taking in adequate calories and fluids    Hypercalcemia  -PTH mediated concern for primary hyperparathyroidism  -Vitamin D 25-36,   -Endocrinology consulted  -Calcium had improved to 10.4 most recently mildly increased to 11.1, likely also due to some on volume depletion as patient has not been taking in adequate fluids  -Restarted IV fluids, if remain elevated tomorrow would increase Sensipar from 30 mg daily to 60mg daily    Hypertension  -On metoprolol 2.5 mg every 6 hours  -Recent blood pressure trends are acceptable    Urinary retention   -Prior imaging on admission CT AP with moderate left-sided hydronephrosis and hydroureter, distended bladder  -Currently has Clemons catheter  -Consider eventual follow-up imaging    Dysphagia  -Concern for aspiration pneumonia, ongoing speech therapy and goals of care discussions regarding PEG tube placement    SUBJECTIVE:  Altered mental status, unable to communicate    ROS:  Review of Systems   Constitutional: Negative. Respiratory: Negative. Cardiovascular: Negative. Gastrointestinal: Negative. Genitourinary: Negative. Neurological: Negative.        A complete 10 point review of systems was performed and found to be negative unless otherwise noted above or in the HPI. OBJECTIVE:  Current Weight: Weight - Scale: 64.4 kg (142 lb)  Vitals:    07/21/23 2047 07/22/23 0300 07/22/23 0814 07/22/23 0905   BP: 134/60 158/70 133/71    BP Location:       Pulse: (!) 106 81  81   Resp: 20  17    Temp: (!) 96.6 °F (35.9 °C)      TempSrc: Axillary      SpO2: 95%   94%   Weight:       Height:           Intake/Output Summary (Last 24 hours) at 7/22/2023 1011  Last data filed at 7/22/2023 0529  Gross per 24 hour   Intake 390 ml   Output 1500 ml   Net -1110 ml     Physical Exam  Vitals and nursing note reviewed. Constitutional:       General: He is not in acute distress. Appearance: He is not toxic-appearing or diaphoretic. Comments: Awake sitting in bed, has food in his mouth, frail-appearing   HENT:      Head: Normocephalic and atraumatic. Nose: Nose normal.      Mouth/Throat:      Mouth: Mucous membranes are dry. Pharynx: Oropharynx is clear. Eyes:      General: No scleral icterus. Cardiovascular:      Rate and Rhythm: Normal rate and regular rhythm. Pulses: Normal pulses. Pulmonary:      Effort: Pulmonary effort is normal. No respiratory distress. Breath sounds: No wheezing. Comments: Poor inspiratory effort  Abdominal:      General: Abdomen is flat. Genitourinary:     Comments: Clemons catheter with clear yellow urine  Musculoskeletal:      Cervical back: Neck supple. Right lower leg: No edema. Left lower leg: No edema. Skin:     General: Skin is warm and dry. Capillary Refill: Capillary refill takes less than 2 seconds. Neurological:      Mental Status: He is alert.       Comments: Does not follow verbal commands, does not communicate verbally, occasionally groans          Medications:    Current Facility-Administered Medications:   •  cinacalcet (SENSIPAR) tablet 30 mg, 30 mg, Oral, After German Casas MD, 30 mg at 07/21/23 1831  •  dextrose 5 % infusion, 50 mL/hr, Intravenous, Continuous, MICHELE Mcintosh, Last Rate: 50 mL/hr at 07/22/23 0910, 50 mL/hr at 07/22/23 0910  •  dextrose 50 % IV solution 25 mL, 25 mL, Intravenous, Once, Mane Gutierrez PA-C  •  enoxaparin (LOVENOX) subcutaneous injection 40 mg, 40 mg, Subcutaneous, Daily, Cathryn Jenkins PA-C, 40 mg at 07/22/23 7081  •  hydrALAZINE (APRESOLINE) injection 5 mg, 5 mg, Intravenous, Q6H PRN, Earnest Quinones MD  •  metoprolol (LOPRESSOR) injection 2.5 mg, 2.5 mg, Intravenous, Q6H, Earnest Quinones MD, 2.5 mg at 07/22/23 0908  •  OLANZapine (ZyPREXA) IM injection 5 mg, 5 mg, Intramuscular, Q3H PRN, Irby Schwab, MD, 5 mg at 07/21/23 2113  •  [facility]ron [facility] injection 4 mg, 4 mg, Intravenous, Q6H PRN, Denver Hyman PA-C    Laboratory Results:  Results from last 7 days   Lab Units 07/22/23  0454 07/21/23  0533 07/20/23  0346 07/19/23  1423 07/19/23  1307 07/19/23  0409 07/18/23  0410 07/17/23  1120 07/17/23  0429 07/16/23  1614 07/16/23  0535   WBC Thousand/uL 9.89  --  13.12*  --  9.09  --  9.86  --  8.82  --   --    HEMOGLOBIN g/dL 12.6  --  12.5  --  11.7*  --  12.1  --  11.9*  --   --    I STAT HEMOGLOBIN g/dl  --   --   --  9.9*  --   --   --   --   --   --   --    HEMATOCRIT % 38.9  --  38.8  --  36.6  --  38.2  --  38.4  --   --    HEMATOCRIT, ISTAT %  --   --   --  29*  --   --   --   --   --   --   --    PLATELETS Thousands/uL 256  --  218  --  185  --  194  --  202  --   --    POTASSIUM mmol/L 4.1 3.8 4.0  --   --  4.1 3.5 3.3* 2.7*   < > 2.8*   CHLORIDE mmol/L 109* 103 106  --   --  109* 111* 112* 113*   < > 120*   CO2 mmol/L 27 29 26  --   --  28 30 31 30   < > 28   CO2, I-STAT mmol/L  --   --   --  28  --   --   --   --   --   --   --    BUN mg/dL 22 12 12  --   --  14 13 13 15   < > 20   CREATININE mg/dL 1.00 1.01 0.88  --   --  0.90 0.83 0.91 0.93   < > 1.00   CALCIUM mg/dL 11.1* 10.6* 10.4*  --   --  10.5* 10.9* 10.7* 10.7*   < > 10.9*   MAGNESIUM mg/dL  --   --   --   -- --   --   --   --   --   --  2.2   GLUCOSE, ISTAT mg/dl  --   --   --  394*  --   --   --   --   --   --   --     < > = values in this interval not displayed. I have personally reviewed the blood work as stated above and in my note. I have personally reviewed internal medicine, speech therapy note.

## 2023-07-22 NOTE — PROGRESS NOTES
[address] [facility]  Progress Note  Name: William Ortega  MRN: [id number]  Unit/Bed#: -01 I Date of Admission: 7/13/2023   Date of Service: 7/22/2023 I Hospital Day: 9    Assessment/Plan   Urinary retention  Assessment & Plan  Noted to have urinary retention in the ED and a Clemons catheter was placed  Will attempt voiding trial and discontinue Clemons as encephalopathy improves    Goals of care, counseling/discussion  Assessment & Plan  Overall guarded prognosis given dementia dysphagia with aspiration risk  GI following   Patient has transitioned back to puree diet. Obtain calorie counts. Unclear if patient/family would want PEG if oral intake poor or if patient is candidate for such procedure. Unclear if patient is meeting nutritional needs d/t waxing and waning metabolic encephalopathy, dementia. Palliative care consulted. Apparently [facility] will visit the patient family 7/23 to have a family meeting outside the hospital.          Hypophosphatemia  Assessment & Plan  Normalized with repletion  Monitor    Hypokalemia  Assessment & Plan  Replete  Monitor    Hypernatremia  Assessment & Plan  Hyponatremia present on admission likely due to poor p.o. intake dehydration  Resolving. Continue hypotonic IV fluids  Nephrology following  Monitor closely        Moderate protein-calorie malnutrition ([address])  Assessment & Plan  Malnutrition Findings: Body mass index is 20.97 kg/m².        Dementia [facility])  Assessment & Plan  History of dementia  Continue memantine  Delirium precautions  Reorientation  Sleep hygiene      Elevated troponin  Assessment & Plan  Elevated troponins noted  Likely non-MI troponin elevation  Cardiology inputs noted    Fall  Assessment & Plan  Patient had a fall about 3 days back  At high risk for falls  Imaging revealed inferior pubic ramus fracture  Orthopedic inputs noted  Analgesics  Safe ambulation  Fall precautions  Physical therapy      Dysphagia  Assessment & Plan  History of dysphagia  Improved w/ treatment of electrolyte issues. Present precautions      Hypercalcemia  Assessment & Plan  Mild hypercalcemia  Elevated PTH levels noted  Endocrine consulted. Cont sensipar and IV hydration  Possible plan to increase sensipar. * Acute metabolic encephalopathy  Assessment & Plan  Patient is a resident of [address] [place] [address], presents with altered mental status  Encephalopathy is likely multifactorial  Appears to be improving  Noted to have hypernatremia hypercalcemia  Receiving IV fluids  Avoid neurotoxins  Optimize metabolic parameters  Monitor closely   again waxing and waning. CT head neg. Suspect metabolic. Improved                 VTE  Prophylaxis:   Pharmacologic: in place  Mechanical VTE Prophylaxis in Place: Yes    Patient Centered Rounds: I have performed bedside rounds with nursing staff today. Discussions with Specialists or Other Care Team Provider: case management    Education and Discussions with Family / Patient: pt wife      Current Length of Stay: 9 day(s)    Current Patient Status: Inpatient        Code Status: Level 1 - Full Code    Discharge Plan: Pt will require continued inpatient hospitalization. Subjective:   Pt less alert today  Did not eat as much  Yesterday had a good day    Patient is seen and examined at bedside. All other ROS are negative. Objective:     Vitals:   Temp (24hrs), Av.6 °F (35.9 °C), Min:96.6 °F (35.9 °C), Max:96.6 °F (35.9 °C)    Temp:  [96.6 °F (35.9 °C)] 96.6 °F (35.9 °C)  HR:  [] 81  Resp:  [17-20] 17  BP: (133-158)/(60-71) 133/71  SpO2:  [94 %-95 %] 94 %  Body mass index is 20.97 kg/m². Input and Output Summary (last 24 hours): Intake/Output Summary (Last 24 hours) at 2023 1101  Last data filed at 2023 0800  Gross per 24 hour   Intake 390 ml   Output 1500 ml   Net -1110 ml       Physical Exam:       GEN: No acute distress, comfortable, frail appearing, elderly male.    HEEENT: No JVD, PERRLA, no scleral icterus  RESP: Lungs clear to auscultation bilaterally  CV: RRR, +s1/s2   ABD: SOFT NON TENDER, POSITIVE BOWEL SOUNDS, NO DISTENTION  PSYCH: CALM, apparent dementia. NEURO: Mentation baseline, NO FOCAL DEFICITS  SKIN: NO RASH  EXTREM: NO EDEMA    Additional Data:     Labs:    Results from last 7 days   Lab Units 07/22/23  0454   WBC Thousand/uL 9.89   HEMOGLOBIN g/dL 12.6   HEMATOCRIT % 38.9   PLATELETS Thousands/uL 256   NEUTROS PCT % 60   LYMPHS PCT % 27   MONOS PCT % 11   EOS PCT % 1     Results from last 7 days   Lab Units 07/22/23  0454   SODIUM mmol/L 140   POTASSIUM mmol/L 4.1   CHLORIDE mmol/L 109*   CO2 mmol/L 27   BUN mg/dL 22   CREATININE mg/dL 1.00   ANION GAP mmol/L 4   CALCIUM mg/dL 11.1*   ALBUMIN g/dL 3.3*   TOTAL BILIRUBIN mg/dL 0.44   ALK PHOS U/L 70   ALT U/L 24   AST U/L 27   GLUCOSE RANDOM mg/dL 103         Results from last 7 days   Lab Units 07/22/23  0620 07/22/23  0007 07/21/23  1800 07/21/23  1621 07/21/23  1149 07/21/23  0649 07/21/23  0119 07/20/23  1645 07/20/23  1159 07/20/23  0755 07/20/23  0614 07/19/23  2341   POC GLUCOSE mg/dl 106 122 144* 157* 130 93 184* 115 109 114 112 83               Lines/Drains:  Invasive Devices     Peripheral Intravenous Line  Duration           Peripheral IV 07/21/23 Right;Ventral (anterior) Forearm 1 day          Drain  Duration           Urethral Catheter Latex 16 Fr. 8 days                Telemetry:        * I Have Reviewed All Lab Data Listed Above. Imaging:     Results for orders placed during the hospital encounter of 07/13/23    XR chest portable    Narrative  CHEST    INDICATION:   hypoxia. COMPARISON: CXR and chest CT 7/13/2023. EXAM PERFORMED/VIEWS:  XR CHEST PORTABLE. FINDINGS:    Cardiomediastinal silhouette normal. CABG. Loop recorder in the left chest wall. Mild bibasilar atelectasis. No effusion or pneumothorax. Skinfold over both hemithoraces.   Upper abdomen normal. Old right rib fracture. Impression  Mild bibasilar atelectasis. Workstation performed: [id number]    Results for orders placed during the hospital encounter of 05/25/18    XR chest 2 views    Narrative  CHEST    INDICATION:   falls. "PATIENT HAS HAD MULTIPLE FALL IN THE LAST COUPLE OF DAYS, STATES NO SOB OR CHEST PAIN"    COMPARISON:  Two-view chest 5/21/2018. EXAM PERFORMED/VIEWS:  XR CHEST PA & LATERAL  dual      FINDINGS:    Median sternotomy wires. Cardiomediastinal silhouette appears unremarkable. The lungs are clear. No pneumothorax or pleural effusion. Osseous structures appear within normal limits for patient age. Impression  No acute cardiopulmonary disease. Workstation performed: [id number]      *I have reviewed all imaging reports listed above      Recent Cultures (last 7 days):           Last 24 Hours Medication List:   Current Facility-Administered Medications   Medication Dose Route Frequency Provider Last Rate   • cinacalcet  30 mg Oral After Josh Teixeira MD     • dextrose  50 mL/hr Intravenous Continuous Carly Lovjasmyn, CRNP 50 mL/hr (07/22/23 0910)   • dextrose  25 mL Intravenous Once Emma Soto PA-C     • enoxaparin  40 mg Subcutaneous Daily Christine Pacheco PA-C     • hydrALAZINE  5 mg Intravenous Q6H PRN Kacy Pappas MD     • metoprolol  2.5 mg Intravenous Q6H Kacy Pappas MD     • OLANZapine  5 mg Intramuscular Q3H PRN Kyle Flores MD     • ondansetron  4 mg Intravenous Q6H PRN Christine Pacheco PA-C          Today, Patient Was Seen By: Kyle Flores MD    ** Please Note: Dictation voice to text software may have been used in the creation of this document.  **

## 2023-07-22 NOTE — ASSESSMENT & PLAN NOTE
Mild hypercalcemia  Elevated PTH levels noted  Endocrine consulted. Cont sensipar and IV hydration  Possible plan to increase sensipar.

## 2023-07-23 LAB
ANION GAP SERPL CALCULATED.3IONS-SCNC: 6 MMOL/L
BASOPHILS # BLD AUTO: 0.04 THOUSANDS/ÂΜL (ref 0–0.1)
BASOPHILS NFR BLD AUTO: 1 % (ref 0–1)
BUN SERPL-MCNC: 19 MG/DL (ref 5–25)
CA-I BLD-SCNC: 1.34 MMOL/L (ref 1.12–1.32)
CALCIUM SERPL-MCNC: 10.6 MG/DL (ref 8.4–10.2)
CHLORIDE SERPL-SCNC: 106 MMOL/L (ref 96–108)
CO2 SERPL-SCNC: 29 MMOL/L (ref 21–32)
CREAT SERPL-MCNC: 0.99 MG/DL (ref 0.6–1.3)
EOSINOPHIL # BLD AUTO: 0.09 THOUSAND/ÂΜL (ref 0–0.61)
EOSINOPHIL NFR BLD AUTO: 1 % (ref 0–6)
ERYTHROCYTE [DISTWIDTH] IN BLOOD BY AUTOMATED COUNT: 14.6 % (ref 11.6–15.1)
GFR SERPL CREATININE-BSD FRML MDRD: 66 ML/MIN/1.73SQ M
GLUCOSE SERPL-MCNC: 123 MG/DL (ref 65–140)
GLUCOSE SERPL-MCNC: 136 MG/DL (ref 65–140)
GLUCOSE SERPL-MCNC: 137 MG/DL (ref 65–140)
GLUCOSE SERPL-MCNC: 141 MG/DL (ref 65–140)
GLUCOSE SERPL-MCNC: 211 MG/DL (ref 65–140)
HCT VFR BLD AUTO: 36.1 % (ref 36.5–49.3)
HGB BLD-MCNC: 11.4 G/DL (ref 12–17)
IMM GRANULOCYTES # BLD AUTO: 0.07 THOUSAND/UL (ref 0–0.2)
IMM GRANULOCYTES NFR BLD AUTO: 1 % (ref 0–2)
LYMPHOCYTES # BLD AUTO: 1.98 THOUSANDS/ÂΜL (ref 0.6–4.47)
LYMPHOCYTES NFR BLD AUTO: 24 % (ref 14–44)
MCH RBC QN AUTO: 26 PG (ref 26.8–34.3)
MCHC RBC AUTO-ENTMCNC: 31.6 G/DL (ref 31.4–37.4)
MCV RBC AUTO: 82 FL (ref 82–98)
MONOCYTES # BLD AUTO: 0.94 THOUSAND/ÂΜL (ref 0.17–1.22)
MONOCYTES NFR BLD AUTO: 11 % (ref 4–12)
NEUTROPHILS # BLD AUTO: 5.09 THOUSANDS/ÂΜL (ref 1.85–7.62)
NEUTS SEG NFR BLD AUTO: 62 % (ref 43–75)
NRBC BLD AUTO-RTO: 0 /100 WBCS
PLATELET # BLD AUTO: 170 THOUSANDS/UL (ref 149–390)
PMV BLD AUTO: 11.5 FL (ref 8.9–12.7)
POTASSIUM SERPL-SCNC: 4 MMOL/L (ref 3.5–5.3)
RBC # BLD AUTO: 4.38 MILLION/UL (ref 3.88–5.62)
SODIUM SERPL-SCNC: 141 MMOL/L (ref 135–147)
WBC # BLD AUTO: 8.21 THOUSAND/UL (ref 4.31–10.16)

## 2023-07-23 PROCEDURE — 82948 REAGENT STRIP/BLOOD GLUCOSE: CPT

## 2023-07-23 PROCEDURE — 82330 ASSAY OF CALCIUM: CPT | Performed by: HOSPITALIST

## 2023-07-23 PROCEDURE — 85025 COMPLETE CBC W/AUTO DIFF WBC: CPT | Performed by: HOSPITALIST

## 2023-07-23 PROCEDURE — 80048 BASIC METABOLIC PNL TOTAL CA: CPT | Performed by: HOSPITALIST

## 2023-07-23 PROCEDURE — 99232 SBSQ HOSP IP/OBS MODERATE 35: CPT | Performed by: NURSE PRACTITIONER

## 2023-07-23 PROCEDURE — 99232 SBSQ HOSP IP/OBS MODERATE 35: CPT | Performed by: HOSPITALIST

## 2023-07-23 RX ADMIN — METOPROLOL TARTRATE 2.5 MG: 5 INJECTION INTRAVENOUS at 04:33

## 2023-07-23 RX ADMIN — DEXTROSE 50 ML/HR: 5 SOLUTION INTRAVENOUS at 10:39

## 2023-07-23 RX ADMIN — METOPROLOL TARTRATE 2.5 MG: 5 INJECTION INTRAVENOUS at 08:44

## 2023-07-23 RX ADMIN — METOPROLOL TARTRATE 2.5 MG: 5 INJECTION INTRAVENOUS at 21:19

## 2023-07-23 RX ADMIN — ENOXAPARIN SODIUM 40 MG: 100 INJECTION SUBCUTANEOUS at 08:44

## 2023-07-23 RX ADMIN — METOPROLOL TARTRATE 2.5 MG: 5 INJECTION INTRAVENOUS at 17:24

## 2023-07-23 NOTE — ASSESSMENT & PLAN NOTE
Patient had a fall   At high risk for falls  Imaging revealed inferior pubic ramus fracture  Orthopedic inputs noted  Analgesics  Safe ambulation  Fall precautions  Physical therapy

## 2023-07-23 NOTE — PROGRESS NOTES
[address] [facility]  Progress Note  Name: Carmella Lou  MRN: [id number]  Unit/Bed#: -01 I Date of Admission: 7/13/2023   Date of Service: 7/23/2023 I Hospital Day: 10    Assessment/Plan   Urinary retention  Assessment & Plan  Noted to have urinary retention in the ED and a Clemons catheter was placed  Will attempt voiding trial and discontinue Clemons as encephalopathy improves    Goals of care, counseling/discussion  Assessment & Plan  Overall guarded prognosis given dementia dysphagia with aspiration risk  GI following   Patient has transitioned back to puree diet. Obtain calorie counts. Unclear if patient/family would want PEG if oral intake poor or if patient is candidate for such procedure. Unclear if patient is meeting nutritional needs d/t waxing and waning metabolic encephalopathy, dementia. Palliative care consulted. Apparently [facility] will visit the patient family 7/23 to have a family meeting outside the hospital.          Hypophosphatemia  Assessment & Plan  Normalized with repletion  Monitor    Hypokalemia  Assessment & Plan  Replete  Monitor    Hypernatremia  Assessment & Plan  Hyponatremia present on admission likely due to poor p.o. intake dehydration  Resolving. Continue hypotonic IV fluids  Nephrology following  Monitor closely        Moderate protein-calorie malnutrition ([address])  Assessment & Plan  Malnutrition Findings: Body mass index is 20.97 kg/m².        Dementia [facility])  Assessment & Plan  History of dementia  Continue memantine  Delirium precautions  Reorientation  Sleep hygiene      Elevated troponin  Assessment & Plan  Elevated troponins noted  Likely non-MI troponin elevation  Cardiology inputs noted    Fall  Assessment & Plan  Patient had a fall   At high risk for falls  Imaging revealed inferior pubic ramus fracture  Orthopedic inputs noted  Analgesics  Safe ambulation  Fall precautions  Physical therapy      Dysphagia  Assessment & Plan  History of dysphagia  Improved w/ treatment of electrolyte issues. Present precautions      Hypercalcemia  Assessment & Plan  Mild hypercalcemia  Elevated PTH levels noted  Endocrine consulted. Cont sensipar and IV hydration  Possible plan to increase sensipar. * Acute metabolic encephalopathy  Assessment & Plan  Patient is a resident of [facility], presents with altered mental status  Encephalopathy is likely multifactorial  Appears to be improving  Noted to have hypernatremia hypercalcemia  Receiving IV fluids  Avoid neurotoxins  Optimize metabolic parameters  Monitor closely   again waxing and waning. CT head neg. Suspect metabolic. Improved                 VTE  Prophylaxis:   Pharmacologic: in place  Mechanical VTE Prophylaxis in Place: Yes    Patient Centered Rounds: I have performed bedside rounds with nursing staff today. Discussions with Specialists or Other Care Team Provider: case management    Education and Discussions with Family / Patient: attempted wife on landline and mobile. Current Length of Stay: 10 day(s)    Current Patient Status: Inpatient        Code Status: Level 1 - Full Code    Discharge Plan: Pt will require continued inpatient hospitalization. Subjective:     Pt again encephalopathic   Not much oral intake   Not providing ROS d/t acuity. Patient is seen and examined at bedside. Objective:     Vitals:   Temp (24hrs), Av.6 °F (36.4 °C), Min:97.5 °F (36.4 °C), Max:97.7 °F (36.5 °C)    Temp:  [97.5 °F (36.4 °C)-97.7 °F (36.5 °C)] 97.7 °F (36.5 °C)  HR:  [72-93] 89  Resp:  [14-17] 17  BP: (118-152)/(58-91) 152/71  SpO2:  [61 %-100 %] 100 %  Body mass index is 20.97 kg/m². Input and Output Summary (last 24 hours):        Intake/Output Summary (Last 24 hours) at 2023 1004  Last data filed at 2023 2350  Gross per 24 hour   Intake 540 ml   Output 700 ml   Net -160 ml       Physical Exam:       GEN: No acute distress, comfortable, frail, elderly male.  Ples [place]: No JVD, PERRLA, no scleral icterus  RESP: Lungs clear to auscultation bilaterally  CV: RRR, +s1/s2   ABD: SOFT NON TENDER, POSITIVE BOWEL SOUNDS, NO DISTENTION  PSYCH: CALM  NEURO: encephalopathic. SKIN: NO RASH  EXTREM: NO EDEMA    Additional Data:     Labs:    Results from last 7 days   Lab Units 07/23/23  0515   WBC Thousand/uL 8.21   HEMOGLOBIN g/dL 11.4*   HEMATOCRIT % 36.1*   PLATELETS Thousands/uL 170   NEUTROS PCT % 62   LYMPHS PCT % 24   MONOS PCT % 11   EOS PCT % 1     Results from last 7 days   Lab Units 07/23/23  0515 07/22/23  0454   SODIUM mmol/L 141 140   POTASSIUM mmol/L 4.0 4.1   CHLORIDE mmol/L 106 109*   CO2 mmol/L 29 27   BUN mg/dL 19 22   CREATININE mg/dL 0.99 1.00   ANION GAP mmol/L 6 4   CALCIUM mg/dL 10.6* 11.1*   ALBUMIN g/dL  --  3.3*   TOTAL BILIRUBIN mg/dL  --  0.44   ALK PHOS U/L  --  70   ALT U/L  --  24   AST U/L  --  27   GLUCOSE RANDOM mg/dL 123 103         Results from last 7 days   Lab Units 07/23/23  0514 07/22/23  2347 07/22/23  1554 07/22/23  1138 07/22/23  0620 07/22/23  0007 07/21/23  1800 07/21/23  1621 07/21/23  1149 07/21/23  0649 07/21/23  0119 07/20/23  1645   POC GLUCOSE mg/dl 136 123 118 146* 106 122 144* 157* 130 93 184* 115               Lines/Drains:  Invasive Devices     Peripheral Intravenous Line  Duration           Peripheral IV 07/22/23 Dorsal (posterior); Left Forearm <1 day          Drain  Duration           Urethral Catheter Latex 16 Fr. 9 days                Telemetry:        * I Have Reviewed All Lab Data Listed Above. Imaging:     Results for orders placed during the hospital encounter of 07/13/23    XR chest portable    Narrative  CHEST    INDICATION:   hypoxia. COMPARISON: CXR and chest CT 7/13/2023. EXAM PERFORMED/VIEWS:  XR CHEST PORTABLE. FINDINGS:    Cardiomediastinal silhouette normal. CABG. Loop recorder in the left chest wall. Mild bibasilar atelectasis. No effusion or pneumothorax.  Skinfold over both hemithoraces. Upper abdomen normal. Old right rib fracture. Impression  Mild bibasilar atelectasis. Workstation performed: [id number]    Results for orders placed during the hospital encounter of 05/25/18    XR chest 2 views    Narrative  CHEST    INDICATION:   falls. "PATIENT HAS HAD MULTIPLE FALL IN THE LAST COUPLE OF DAYS, STATES NO SOB OR CHEST PAIN"    COMPARISON:  Two-view chest 5/21/2018. EXAM PERFORMED/VIEWS:  XR CHEST PA & LATERAL  dual      FINDINGS:    Median sternotomy wires. Cardiomediastinal silhouette appears unremarkable. The lungs are clear. No pneumothorax or pleural effusion. Osseous structures appear within normal limits for patient age. Impression  No acute cardiopulmonary disease. Workstation performed: [id number]      *I have reviewed all imaging reports listed above      Recent Cultures (last 7 days):           Last 24 Hours Medication List:   Current Facility-Administered Medications   Medication Dose Route Frequency Provider Last Rate   • cinacalcet  30 mg Oral After Josh Teixeira MD     • dextrose  50 mL/hr Intravenous Continuous MICHELE Leon 50 mL/hr (07/22/23 7698)   • dextrose  25 mL Intravenous Once Emma Soto PA-C     • enoxaparin  40 mg Subcutaneous Daily Christine Pacheco PA-C     • hydrALAZINE  5 mg Intravenous Q6H PRN Kacy Pappas MD     • metoprolol  2.5 mg Intravenous Q6H Kacy Pappas MD     • OLANZapine  5 mg Intramuscular Q3H PRN Kyle Flores MD     • ondansetron  4 mg Intravenous Q6H PRN Christine Pacheco PA-C          Today, Patient Was Seen By: Kyle Flores MD    ** Please Note: Dictation voice to text software may have been used in the creation of this document.  **

## 2023-07-23 NOTE — PLAN OF CARE
Problem: Potential for Falls  Goal: Patient will remain free of falls  Description: INTERVENTIONS:  - Educate patient/family on patient safety including physical limitations  - Instruct patient to call for assistance with activity   - Consult OT/PT to assist with strengthening/mobility   - Keep Call bell within reach  - Keep bed low and locked with side rails adjusted as appropriate  - Keep care items and personal belongings within reach  - Initiate and maintain comfort rounds  - Make Fall Risk Sign visible to staff  - Offer Toileting every Hours, in advance of need  - Initiate/Maintain alarm  - Obtain necessary fall risk management equipment:   - Apply yellow socks and bracelet for high fall risk patients  - Consider moving patient to room near nurses station  Outcome: Not Progressing     Problem: MOBILITY - ADULT  Goal: Maintain or return to baseline ADL function  Description: INTERVENTIONS:  -  Assess patient's ability to carry out ADLs; assess patient's baseline for ADL function and identify physical deficits which impact ability to perform ADLs (bathing, care of mouth/teeth, toileting, grooming, dressing, etc.)  - Assess/evaluate cause of self-care deficits   - Assess range of motion  - Assess patient's mobility; develop plan if impaired  - Assess patient's need for assistive devices and provide as appropriate  - Encourage maximum independence but intervene and supervise when necessary  - Involve family in performance of ADLs  - Assess for home care needs following discharge   - Consider OT consult to assist with ADL evaluation and planning for discharge  - Provide patient education as appropriate  Outcome: Not Progressing  Goal: Maintains/Returns to pre admission functional level  Description: INTERVENTIONS:  - Perform BMAT or MOVE assessment daily.   - Set and communicate daily mobility goal to care team and patient/family/caregiver.    - Collaborate with rehabilitation services on mobility goals if consulted  - Perform Range of Motion  times a day. - Reposition patient every  hours.   - Dangle patient  times a day  - Stand patient  times a day  - Ambulate patient  times a day  - Out of bed to chair  times a day   - Out of bed for meals  times a day  - Out of bed for toileting  - Record patient progress and toleration of activity level   Outcome: Not Progressing     Problem: PAIN - ADULT  Goal: Verbalizes/displays adequate comfort level or baseline comfort level  Description: Interventions:  - Encourage patient to monitor pain and request assistance  - Assess pain using appropriate pain scale  - Administer analgesics based on type and severity of pain and evaluate response  - Implement non-pharmacological measures as appropriate and evaluate response  - Consider cultural and social influences on pain and pain management  - Notify physician/advanced practitioner if interventions unsuccessful or patient reports new pain  Outcome: Not Progressing     Problem: INFECTION - ADULT  Goal: Absence or prevention of progression during hospitalization  Description: INTERVENTIONS:  - Assess and monitor for signs and symptoms of infection  - Monitor lab/diagnostic results  - Monitor all insertion sites, i.e. indwelling lines, tubes, and drains  - Monitor endotracheal if appropriate and nasal secretions for changes in amount and color  - [place] appropriate cooling/warming therapies per order  - Administer medications as ordered  - Instruct and encourage patient and family to use good hand hygiene technique  - Identify and instruct in appropriate isolation precautions for identified infection/condition  Outcome: Not Progressing  Goal: Absence of fever/infection during neutropenic period  Description: INTERVENTIONS:  - Monitor WBC    Outcome: Not Progressing     Problem: SAFETY ADULT  Goal: Patient will remain free of falls  Description: INTERVENTIONS:  - Educate patient/family on patient safety including physical limitations  - Instruct patient to call for assistance with activity   - Consult OT/PT to assist with strengthening/mobility   - Keep Call bell within reach  - Keep bed low and locked with side rails adjusted as appropriate  - Keep care items and personal belongings within reach  - Initiate and maintain comfort rounds  - Make Fall Risk Sign visible to staff  - Offer Toileting every Hours, in advance of need  - Initiate/Maintain alarm  - Obtain necessary fall risk management equipment:   - Apply yellow socks and bracelet for high fall risk patients  - Consider moving patient to room near nurses station  Outcome: Not Progressing  Goal: Maintain or return to baseline ADL function  Description: INTERVENTIONS:  -  Assess patient's ability to carry out ADLs; assess patient's baseline for ADL function and identify physical deficits which impact ability to perform ADLs (bathing, care of mouth/teeth, toileting, grooming, dressing, etc.)  - Assess/evaluate cause of self-care deficits   - Assess range of motion  - Assess patient's mobility; develop plan if impaired  - Assess patient's need for assistive devices and provide as appropriate  - Encourage maximum independence but intervene and supervise when necessary  - Involve family in performance of ADLs  - Assess for home care needs following discharge   - Consider OT consult to assist with ADL evaluation and planning for discharge  - Provide patient education as appropriate  Outcome: Not Progressing  Goal: Maintains/Returns to pre admission functional level  Description: INTERVENTIONS:  - Perform BMAT or MOVE assessment daily.   - Set and communicate daily mobility goal to care team and patient/family/caregiver. - Collaborate with rehabilitation services on mobility goals if consulted  - Perform Range of Motion  times a day. - Reposition patient every  hours.   - Dangle patient  times a day  - Stand patient  times a day  - Ambulate patient  times a day  - Out of bed to chair  times a day   - Out of bed for meals  times a day  - Out of bed for toileting  - Record patient progress and toleration of activity level   Outcome: Not Progressing     Problem: DISCHARGE PLANNING  Goal: Discharge to home or other facility with appropriate resources  Description: INTERVENTIONS:  - Identify barriers to discharge w/patient and caregiver  - Arrange for needed discharge resources and transportation as appropriate  - Identify discharge learning needs (meds, wound care, etc.)  - Arrange for interpretive services to assist at discharge as needed  - Refer to Case Management Department for coordinating discharge planning if the patient needs post-hospital services based on physician/advanced practitioner order or complex needs related to functional status, cognitive ability, or social support system  Outcome: Not Progressing     Problem: Knowledge Deficit  Goal: Patient/family/caregiver demonstrates understanding of disease process, treatment plan, medications, and discharge instructions  Description: Complete learning assessment and assess knowledge base. Interventions:  - Provide teaching at level of understanding  - Provide teaching via preferred learning methods  Outcome: Not Progressing     Problem: NEUROSENSORY - ADULT  Goal: Achieves stable or improved neurological status  Description: INTERVENTIONS  - Monitor and report changes in neurological status  - Monitor vital signs such as temperature, blood pressure, glucose, and any other labs ordered   - Initiate measures to prevent increased intracranial pressure  - Monitor for seizure activity and implement precautions if appropriate      Outcome: Not Progressing  Goal: Achieves maximal functionality and self care  Description: INTERVENTIONS  - Monitor swallowing and airway patency with patient fatigue and changes in neurological status  - Encourage and assist patient to increase activity and self care.    - Encourage visually impaired, hearing impaired and aphasic patients to use assistive/communication devices  Outcome: Not Progressing     Problem: SAFETY,RESTRAINT: NV/NON-SELF DESTRUCTIVE BEHAVIOR  Goal: Remains free of harm/injury (restraint for non violent/non self-detsructive behavior)  Description: INTERVENTIONS:  - Instruct patient/family regarding restraint use   - Assess and monitor physiologic and psychological status   - Provide interventions and comfort measures to meet assessed patient needs   - Identify and implement measures to help patient regain control  - Assess readiness for release of restraint   Outcome: Not Progressing  Goal: Returns to optimal restraint-free functioning  Description: INTERVENTIONS:  - Assess the patient's behavior and symptoms that indicate continued need for restraint  - Identify and implement measures to help patient regain control  - Assess readiness for release of restraint   Outcome: Not Progressing     Problem: Nutrition/Hydration-ADULT  Goal: Nutrient/Hydration intake appropriate for improving, restoring or maintaining nutritional needs  Description: Monitor and assess patient's nutrition/hydration status for malnutrition. Collaborate with interdisciplinary team and initiate plan and interventions as ordered. Monitor patient's weight and dietary intake as ordered or per policy. Utilize nutrition screening tool and intervene as necessary. Determine patient's food preferences and provide high-protein, high-caloric foods as appropriate.      INTERVENTIONS:  - Monitor oral intake, urinary output, labs, and treatment plans  - Assess nutrition and hydration status and recommend course of action  - Evaluate amount of meals eaten  - Assist patient with eating if necessary   - Allow adequate time for meals  - Recommend/ encourage appropriate diets, oral nutritional supplements, and vitamin/mineral supplements  - Order, calculate, and assess calorie counts as needed  - Recommend, monitor, and adjust tube feedings and TPN/PPN based on assessed needs  - Assess need for intravenous fluids  - Provide specific nutrition/hydration education as appropriate  - Include patient/family/caregiver in decisions related to nutrition  Outcome: Not Progressing     Problem: Prexisting or High Potential for Compromised Skin Integrity  Goal: Skin integrity is maintained or improved  Description: INTERVENTIONS:  - Identify patients at risk for skin breakdown  - Assess and monitor skin integrity  - Assess and monitor nutrition and hydration status  - Monitor labs   - Assess for incontinence   - Turn and reposition patient  - Assist with mobility/ambulation  - Relieve pressure over bony prominences  - Avoid friction and shearing  - Provide appropriate hygiene as needed including keeping skin clean and dry  - Evaluate need for skin moisturizer/barrier cream  - Collaborate with interdisciplinary team   - Patient/family teaching  - Consider wound care consult   Outcome: Not Progressing

## 2023-07-23 NOTE — PROGRESS NOTES
NEPHROLOGY PROGRESS NOTE   Aidee Arredondo 80 y.o. male MRN: [id number]  Unit/Bed#: -01 Encounter: [id number]      HPI/24hr EVENTS:    -66-year-old male past medical history of hypertension, dysphagia, hypercalcemia, dementia. Presented with altered mental status, nephrology consult for management of hypercalcemia and hypernatremia    -No oral intake, sodium minimally changed, calcium mildly improved    ASSESSMENT/PLAN:    Hypernatremia  -Due to poor oral intake of water, has had persistent dysphagia issues  -Prior required aggressive hydration with D5W, this was taken off over the past few days, unfortunately his sodium level increased while off hypotonic IV fluid, he was restarted on D5W at 50 ml/hr, continue same  -Ongoing goals of care discussions as patient does not appear to be taking in adequate calories and fluids     Hypercalcemia  -PTH mediated concern for primary hyperparathyroidism  -Vitamin D 25-36,   -Endocrinology consulted  -Calcium had improved to 10.4 most recently mildly increased to 10.6, likely also due to some on volume depletion as patient has not been taking in adequate fluids  -Restarted IV fluids, patient did not receive his evening Sensipar due to mental status     Hypertension  -On metoprolol 2.5 mg every 6 hours  -Recent blood pressure trends are acceptable     Urinary retention   -Prior imaging on admission CT AP with moderate left-sided hydronephrosis and hydroureter, distended bladder  -Currently has Clemons catheter  -Consider eventual follow-up imaging     Dysphagia  -Concern for aspiration pneumonia, ongoing speech therapy and goals of care discussions regarding PEG tube placement    SUBJECTIVE:  Nonverbal    ROS:  Review of Systems   Unable to perform ROS: Mental status change      A complete 10 point review of systems was performed and found to be negative unless otherwise noted above or in the HPI.     OBJECTIVE:  Current Weight: Weight - Scale: 64.4 kg (142 lb)  Vitals:    07/23/23 0230 07/23/23 0422 07/23/23 0841 07/23/23 0845   BP:  118/58 152/71    BP Location:       Pulse:  93 89    Resp:   17    Temp:       TempSrc:       SpO2: (!) 61% 98% 100% 100%   Weight:       Height:           Intake/Output Summary (Last 24 hours) at 7/23/2023 1020  Last data filed at 7/22/2023 2350  Gross per 24 hour   Intake 540 ml   Output 700 ml   Net -160 ml     Physical Exam  Vitals and nursing note reviewed. Constitutional:       General: He is not in acute distress. Appearance: He is ill-appearing. He is not toxic-appearing or diaphoretic. Comments: Patient does not respond to verbal stimuli, does not interact, frail-appearing   HENT:      Head: Normocephalic and atraumatic. Nose: Nose normal.      Mouth/Throat:      Mouth: Mucous membranes are dry. Eyes:      General: No scleral icterus. Cardiovascular:      Rate and Rhythm: Normal rate and regular rhythm. Pulses: Normal pulses. Pulmonary:      Effort: No respiratory distress. Breath sounds: No wheezing or rales. Abdominal:      General: Abdomen is flat. Palpations: Abdomen is soft. Genitourinary:     Comments: Clemons catheter with clear yellow urine  Musculoskeletal:      Cervical back: Neck supple. Right lower leg: No edema. Left lower leg: No edema. Skin:     General: Skin is warm and dry. Capillary Refill: Capillary refill takes less than 2 seconds.    Neurological:      Comments: Does not respond to verbal stimuli, does not follow commands, does not track/interact          Medications:    Current Facility-Administered Medications:   •  cinacalcet (SENSIPAR) tablet 30 mg, 30 mg, Oral, After Reyes Woody MD, 30 mg at 07/21/23 1831  •  dextrose 5 % infusion, 50 mL/hr, Intravenous, Continuous, MICHELE Helm, Last Rate: 50 mL/hr at 07/22/23 2343, 50 mL/hr at 07/22/23 2343  •  dextrose 50 % IV solution 25 mL, 25 mL, Intravenous, Once, Eligio Can, LOGAN  •  enoxaparin (LOVENOX) subcutaneous injection 40 mg, 40 mg, Subcutaneous, Daily, Cathryn Jenkins PA-C, 40 mg at 07/23/23 3409  •  hydrALAZINE (APRESOLINE) injection 5 mg, 5 mg, Intravenous, Q6H PRN, Earnest Quinones MD  •  metoprolol (LOPRESSOR) injection 2.5 mg, 2.5 mg, Intravenous, Q6H, Earnest Quinones MD, 2.5 mg at 07/23/23 0844  •  OLANZapine (ZyPREXA) IM injection 5 mg, 5 mg, Intramuscular, Q3H PRN, Irby Schwab, MD, 5 mg at 07/21/23 2113  •  ondansetron [facility]) injection 4 mg, 4 mg, Intravenous, Q6H PRN, Denver Hyman PA-C    Laboratory Results:  Results from last 7 days   Lab Units 07/23/23  0515 07/22/23  0454 07/21/23  0533 07/20/23  0346 07/19/23  1423 07/19/23  1307 07/19/23  0409 07/18/23  0410 07/17/23  1120 07/17/23  0429   WBC Thousand/uL 8.21 9.89  --  13.12*  --  9.09  --  9.86  --  8.82   HEMOGLOBIN g/dL 11.4* 12.6  --  12.5  --  11.7*  --  12.1  --  11.9*   I STAT HEMOGLOBIN g/dl  --   --   --   --  9.9*  --   --   --   --   --    HEMATOCRIT % 36.1* 38.9  --  38.8  --  36.6  --  38.2  --  38.4   HEMATOCRIT, ISTAT %  --   --   --   --  29*  --   --   --   --   --    PLATELETS Thousands/uL 170 256  --  218  --  185  --  194  --  202   POTASSIUM mmol/L 4.0 4.1 3.8 4.0  --   --  4.1 3.5 3.3* 2.7*   CHLORIDE mmol/L 106 109* 103 106  --   --  109* 111* 112* 113*   CO2 mmol/L 29 27 29 26  --   --  28 30 31 30   CO2, I-STAT mmol/L  --   --   --   --  28  --   --   --   --   --    BUN mg/dL 19 22 12 12  --   --  14 13 13 15   CREATININE mg/dL 0.99 1.00 1.01 0.88  --   --  0.90 0.83 0.91 0.93   CALCIUM mg/dL 10.6* 11.1* 10.6* 10.4*  --   --  10.5* 10.9* 10.7* 10.7*   GLUCOSE, ISTAT mg/dl  --   --   --   --  394*  --   --   --   --   --        I have personally reviewed the blood work as stated above and in my note. I have personally reviewed internal medicine note.

## 2023-07-23 NOTE — ASSESSMENT & PLAN NOTE
Patient is a resident of [facility], presents with altered mental status  Encephalopathy is likely multifactorial  Appears to be improving  Noted to have hypernatremia hypercalcemia  Receiving IV fluids  Avoid neurotoxins  Optimize metabolic parameters  Monitor closely  7/19 again waxing and waning. CT head neg. Suspect metabolic. Improved

## 2023-07-23 NOTE — NUTRITION
07/23/23 1900   PES Statement   Problem Continue previous diagnosis   Energy Balance (1) Inadequate energy intake NI-1.2   Related to Decreased appetite;Confusion   As evidenced by: Intake < 25%   Recommendations/Interventions   Malnutrition/BMI Present Yes  (Previously documented by MD)   Summary Calorie count on 07/22 displays one meal consumed B: 1/2c oatmeal ( 0%), scrambeld eggs ( 5%), ham steak (0%) Which computes to ~ 9kcal/< 1gm protein. No meal calorie count on 07/23. However, per nursing, pt has been sleeping the entire day and has eaten nothing including oral supplments. Per nursing, pt's family is due for a discussion with hospice as to whether start TF's or place on hospice on 07/24. Please consult RD for TF rec's if that is the decision made. Pt is at high risk for refeeding syndrome. Interventions/Recommendations Continue current diet order;Supplement continue   Education Assessment   Education Education not indicated at this time   Patient Nutrition Goals   Goal Adequate hydration; Increase kcal/PRO intake   Goal Status Not met; Extended   Timeframe to complete goal by next f/u   Nutrition Complexity Risk   Nutrition complexity level High risk  (Adjusted risk to high as pt is now malnourished and 0% meal completion.)   Nutrition review: 08/03/23   Follow up date 07/27/23

## 2023-07-23 NOTE — PLAN OF CARE
Problem: PAIN - ADULT  Goal: Verbalizes/displays adequate comfort level or baseline comfort level  Description: Interventions:  - Encourage patient to monitor pain and request assistance  - Assess pain using appropriate pain scale  - Administer analgesics based on type and severity of pain and evaluate response  - Implement non-pharmacological measures as appropriate and evaluate response  - Consider cultural and social influences on pain and pain management  - Notify physician/advanced practitioner if interventions unsuccessful or patient reports new pain  7/23/2023 0851 by Alex Dumont RN  Outcome: Progressing  7/23/2023 0851 by Alex Dumont RN  Outcome: Progressing

## 2023-07-24 VITALS
WEIGHT: 142 LBS | HEART RATE: 80 BPM | OXYGEN SATURATION: 92 % | RESPIRATION RATE: 23 BRPM | DIASTOLIC BLOOD PRESSURE: 67 MMHG | BODY MASS INDEX: 21.03 KG/M2 | SYSTOLIC BLOOD PRESSURE: 134 MMHG | TEMPERATURE: 97.8 F | HEIGHT: 69 IN

## 2023-07-24 LAB
ALBUMIN SERPL BCP-MCNC: 3.2 G/DL (ref 3.5–5)
ALP SERPL-CCNC: 65 U/L (ref 34–104)
ALT SERPL W P-5'-P-CCNC: 17 U/L (ref 7–52)
ANION GAP SERPL CALCULATED.3IONS-SCNC: 4 MMOL/L
AST SERPL W P-5'-P-CCNC: 19 U/L (ref 13–39)
BASOPHILS # BLD AUTO: 0.04 THOUSANDS/ÂΜL (ref 0–0.1)
BASOPHILS NFR BLD AUTO: 1 % (ref 0–1)
BILIRUB SERPL-MCNC: 0.61 MG/DL (ref 0.2–1)
BUN SERPL-MCNC: 13 MG/DL (ref 5–25)
CALCIUM ALBUM COR SERPL-MCNC: 11.8 MG/DL (ref 8.3–10.1)
CALCIUM SERPL-MCNC: 11.2 MG/DL (ref 8.4–10.2)
CHLORIDE SERPL-SCNC: 103 MMOL/L (ref 96–108)
CO2 SERPL-SCNC: 29 MMOL/L (ref 21–32)
CREAT SERPL-MCNC: 1.01 MG/DL (ref 0.6–1.3)
EOSINOPHIL # BLD AUTO: 0.13 THOUSAND/ÂΜL (ref 0–0.61)
EOSINOPHIL NFR BLD AUTO: 2 % (ref 0–6)
ERYTHROCYTE [DISTWIDTH] IN BLOOD BY AUTOMATED COUNT: 14.6 % (ref 11.6–15.1)
GFR SERPL CREATININE-BSD FRML MDRD: 65 ML/MIN/1.73SQ M
GLUCOSE SERPL-MCNC: 114 MG/DL (ref 65–140)
GLUCOSE SERPL-MCNC: 123 MG/DL (ref 65–140)
GLUCOSE SERPL-MCNC: 141 MG/DL (ref 65–140)
HCT VFR BLD AUTO: 35.9 % (ref 36.5–49.3)
HGB BLD-MCNC: 11.4 G/DL (ref 12–17)
IMM GRANULOCYTES # BLD AUTO: 0.04 THOUSAND/UL (ref 0–0.2)
IMM GRANULOCYTES NFR BLD AUTO: 1 % (ref 0–2)
LYMPHOCYTES # BLD AUTO: 3.32 THOUSANDS/ÂΜL (ref 0.6–4.47)
LYMPHOCYTES NFR BLD AUTO: 38 % (ref 14–44)
MCH RBC QN AUTO: 26.6 PG (ref 26.8–34.3)
MCHC RBC AUTO-ENTMCNC: 31.8 G/DL (ref 31.4–37.4)
MCV RBC AUTO: 84 FL (ref 82–98)
MONOCYTES # BLD AUTO: 1.01 THOUSAND/ÂΜL (ref 0.17–1.22)
MONOCYTES NFR BLD AUTO: 12 % (ref 4–12)
NEUTROPHILS # BLD AUTO: 4.19 THOUSANDS/ÂΜL (ref 1.85–7.62)
NEUTS SEG NFR BLD AUTO: 46 % (ref 43–75)
NRBC BLD AUTO-RTO: 0 /100 WBCS
PLATELET # BLD AUTO: 270 THOUSANDS/UL (ref 149–390)
PMV BLD AUTO: 11.1 FL (ref 8.9–12.7)
POTASSIUM SERPL-SCNC: 3.7 MMOL/L (ref 3.5–5.3)
PROT SERPL-MCNC: 6.6 G/DL (ref 6.4–8.4)
RBC # BLD AUTO: 4.29 MILLION/UL (ref 3.88–5.62)
SODIUM SERPL-SCNC: 136 MMOL/L (ref 135–147)
WBC # BLD AUTO: 8.73 THOUSAND/UL (ref 4.31–10.16)

## 2023-07-24 PROCEDURE — 82948 REAGENT STRIP/BLOOD GLUCOSE: CPT

## 2023-07-24 PROCEDURE — 80053 COMPREHEN METABOLIC PANEL: CPT | Performed by: HOSPITALIST

## 2023-07-24 PROCEDURE — 99223 1ST HOSP IP/OBS HIGH 75: CPT | Performed by: PHYSICIAN ASSISTANT

## 2023-07-24 PROCEDURE — 99232 SBSQ HOSP IP/OBS MODERATE 35: CPT | Performed by: INTERNAL MEDICINE

## 2023-07-24 PROCEDURE — 85025 COMPLETE CBC W/AUTO DIFF WBC: CPT | Performed by: HOSPITALIST

## 2023-07-24 PROCEDURE — 99239 HOSP IP/OBS DSCHRG MGMT >30: CPT | Performed by: FAMILY MEDICINE

## 2023-07-24 RX ORDER — CINACALCET 60 MG/1
60 TABLET, FILM COATED ORAL
Qty: 30 TABLET | Refills: 0 | Status: SHIPPED | OUTPATIENT
Start: 2023-07-24 | End: 2023-08-03 | Stop reason: CLARIF

## 2023-07-24 RX ORDER — CINACALCET 30 MG/1
60 TABLET, FILM COATED ORAL
Status: DISCONTINUED | OUTPATIENT
Start: 2023-07-24 | End: 2023-07-24 | Stop reason: HOSPADM

## 2023-07-24 RX ADMIN — METOPROLOL TARTRATE 2.5 MG: 5 INJECTION INTRAVENOUS at 03:22

## 2023-07-24 RX ADMIN — ENOXAPARIN SODIUM 40 MG: 100 INJECTION SUBCUTANEOUS at 08:34

## 2023-07-24 RX ADMIN — DEXTROSE 50 ML/HR: 5 SOLUTION INTRAVENOUS at 06:12

## 2023-07-24 RX ADMIN — METOPROLOL TARTRATE 2.5 MG: 5 INJECTION INTRAVENOUS at 08:34

## 2023-07-24 NOTE — PLAN OF CARE
Problem: Potential for Falls  Goal: Patient will remain free of falls  Description: INTERVENTIONS:  - Educate patient/family on patient safety including physical limitations  - Instruct patient to call for assistance with activity   - Consult OT/PT to assist with strengthening/mobility   - Keep Call bell within reach  - Keep bed low and locked with side rails adjusted as appropriate  - Keep care items and personal belongings within reach  - Initiate and maintain comfort rounds  - Make Fall Risk Sign visible to staff  - Offer Toileting every Hours, in advance of need  - Initiate/Maintain alarm  - Obtain necessary fall risk management equipment:   - Apply yellow socks and bracelet for high fall risk patients  - Consider moving patient to room near nurses station  Outcome: Progressing     Problem: MOBILITY - ADULT  Goal: Maintain or return to baseline ADL function  Description: INTERVENTIONS:  -  Assess patient's ability to carry out ADLs; assess patient's baseline for ADL function and identify physical deficits which impact ability to perform ADLs (bathing, care of mouth/teeth, toileting, grooming, dressing, etc.)  - Assess/evaluate cause of self-care deficits   - Assess range of motion  - Assess patient's mobility; develop plan if impaired  - Assess patient's need for assistive devices and provide as appropriate  - Encourage maximum independence but intervene and supervise when necessary  - Involve family in performance of ADLs  - Assess for home care needs following discharge   - Consider OT consult to assist with ADL evaluation and planning for discharge  - Provide patient education as appropriate  Outcome: Progressing  Goal: Maintains/Returns to pre admission functional level  Description: INTERVENTIONS:  - Perform BMAT or MOVE assessment daily.   - Set and communicate daily mobility goal to care team and patient/family/caregiver.    - Collaborate with rehabilitation services on mobility goals if consulted  - Perform Range of Motion  times a day. - Reposition patient every  hours.   - Dangle patient  times a day  - Stand patient  times a day  - Ambulate patient  times a day  - Out of bed to chair  times a day   - Out of bed for meals  times a day  - Out of bed for toileting  - Record patient progress and toleration of activity level   Outcome: Progressing     Problem: PAIN - ADULT  Goal: Verbalizes/displays adequate comfort level or baseline comfort level  Description: Interventions:  - Encourage patient to monitor pain and request assistance  - Assess pain using appropriate pain scale  - Administer analgesics based on type and severity of pain and evaluate response  - Implement non-pharmacological measures as appropriate and evaluate response  - Consider cultural and social influences on pain and pain management  - Notify physician/advanced practitioner if interventions unsuccessful or patient reports new pain  Outcome: Progressing     Problem: INFECTION - ADULT  Goal: Absence or prevention of progression during hospitalization  Description: INTERVENTIONS:  - Assess and monitor for signs and symptoms of infection  - Monitor lab/diagnostic results  - Monitor all insertion sites, i.e. indwelling lines, tubes, and drains  - Monitor endotracheal if appropriate and nasal secretions for changes in amount and color  - [place] appropriate cooling/warming therapies per order  - Administer medications as ordered  - Instruct and encourage patient and family to use good hand hygiene technique  - Identify and instruct in appropriate isolation precautions for identified infection/condition  Outcome: Progressing  Goal: Absence of fever/infection during neutropenic period  Description: INTERVENTIONS:  - Monitor WBC    Outcome: Progressing     Problem: SAFETY ADULT  Goal: Patient will remain free of falls  Description: INTERVENTIONS:  - Educate patient/family on patient safety including physical limitations  - Instruct patient to call for assistance with activity   - Consult OT/PT to assist with strengthening/mobility   - Keep Call bell within reach  - Keep bed low and locked with side rails adjusted as appropriate  - Keep care items and personal belongings within reach  - Initiate and maintain comfort rounds  - Make Fall Risk Sign visible to staff  - Offer Toileting every Hours, in advance of need  - Initiate/Maintain alarm  - Obtain necessary fall risk management equipment:   - Apply yellow socks and bracelet for high fall risk patients  - Consider moving patient to room near nurses station  Outcome: Progressing  Goal: Maintain or return to baseline ADL function  Description: INTERVENTIONS:  -  Assess patient's ability to carry out ADLs; assess patient's baseline for ADL function and identify physical deficits which impact ability to perform ADLs (bathing, care of mouth/teeth, toileting, grooming, dressing, etc.)  - Assess/evaluate cause of self-care deficits   - Assess range of motion  - Assess patient's mobility; develop plan if impaired  - Assess patient's need for assistive devices and provide as appropriate  - Encourage maximum independence but intervene and supervise when necessary  - Involve family in performance of ADLs  - Assess for home care needs following discharge   - Consider OT consult to assist with ADL evaluation and planning for discharge  - Provide patient education as appropriate  Outcome: Progressing  Goal: Maintains/Returns to pre admission functional level  Description: INTERVENTIONS:  - Perform BMAT or MOVE assessment daily.   - Set and communicate daily mobility goal to care team and patient/family/caregiver. - Collaborate with rehabilitation services on mobility goals if consulted  - Perform Range of Motion  times a day. - Reposition patient every  hours.   - Dangle patient  times a day  - Stand patient  times a day  - Ambulate patient  times a day  - Out of bed to chair  times a day   - Out of bed for meals  times a day  - Out of bed for toileting  - Record patient progress and toleration of activity level   Outcome: Progressing     Problem: DISCHARGE PLANNING  Goal: Discharge to home or other facility with appropriate resources  Description: INTERVENTIONS:  - Identify barriers to discharge w/patient and caregiver  - Arrange for needed discharge resources and transportation as appropriate  - Identify discharge learning needs (meds, wound care, etc.)  - Arrange for interpretive services to assist at discharge as needed  - Refer to Case Management Department for coordinating discharge planning if the patient needs post-hospital services based on physician/advanced practitioner order or complex needs related to functional status, cognitive ability, or social support system  Outcome: Progressing     Problem: Knowledge Deficit  Goal: Patient/family/caregiver demonstrates understanding of disease process, treatment plan, medications, and discharge instructions  Description: Complete learning assessment and assess knowledge base. Interventions:  - Provide teaching at level of understanding  - Provide teaching via preferred learning methods  Outcome: Progressing     Problem: NEUROSENSORY - ADULT  Goal: Achieves stable or improved neurological status  Description: INTERVENTIONS  - Monitor and report changes in neurological status  - Monitor vital signs such as temperature, blood pressure, glucose, and any other labs ordered   - Initiate measures to prevent increased intracranial pressure  - Monitor for seizure activity and implement precautions if appropriate      Outcome: Progressing  Goal: Achieves maximal functionality and self care  Description: INTERVENTIONS  - Monitor swallowing and airway patency with patient fatigue and changes in neurological status  - Encourage and assist patient to increase activity and self care.    - Encourage visually impaired, hearing impaired and aphasic patients to use assistive/communication devices  Outcome: Progressing     Problem: SAFETY,RESTRAINT: NV/NON-SELF DESTRUCTIVE BEHAVIOR  Goal: Remains free of harm/injury (restraint for non violent/non self-detsructive behavior)  Description: INTERVENTIONS:  - Instruct patient/family regarding restraint use   - Assess and monitor physiologic and psychological status   - Provide interventions and comfort measures to meet assessed patient needs   - Identify and implement measures to help patient regain control  - Assess readiness for release of restraint   Outcome: Progressing  Goal: Returns to optimal restraint-free functioning  Description: INTERVENTIONS:  - Assess the patient's behavior and symptoms that indicate continued need for restraint  - Identify and implement measures to help patient regain control  - Assess readiness for release of restraint   Outcome: Progressing     Problem: Nutrition/Hydration-ADULT  Goal: Nutrient/Hydration intake appropriate for improving, restoring or maintaining nutritional needs  Description: Monitor and assess patient's nutrition/hydration status for malnutrition. Collaborate with interdisciplinary team and initiate plan and interventions as ordered. Monitor patient's weight and dietary intake as ordered or per policy. Utilize nutrition screening tool and intervene as necessary. Determine patient's food preferences and provide high-protein, high-caloric foods as appropriate.      INTERVENTIONS:  - Monitor oral intake, urinary output, labs, and treatment plans  - Assess nutrition and hydration status and recommend course of action  - Evaluate amount of meals eaten  - Assist patient with eating if necessary   - Allow adequate time for meals  - Recommend/ encourage appropriate diets, oral nutritional supplements, and vitamin/mineral supplements  - Order, calculate, and assess calorie counts as needed  - Recommend, monitor, and adjust tube feedings and TPN/PPN based on assessed needs  - Assess need for intravenous fluids  - Provide specific nutrition/hydration education as appropriate  - Include patient/family/caregiver in decisions related to nutrition  Outcome: Progressing     Problem: Prexisting or High Potential for Compromised Skin Integrity  Goal: Skin integrity is maintained or improved  Description: INTERVENTIONS:  - Identify patients at risk for skin breakdown  - Assess and monitor skin integrity  - Assess and monitor nutrition and hydration status  - Monitor labs   - Assess for incontinence   - Turn and reposition patient  - Assist with mobility/ambulation  - Relieve pressure over bony prominences  - Avoid friction and shearing  - Provide appropriate hygiene as needed including keeping skin clean and dry  - Evaluate need for skin moisturizer/barrier cream  - Collaborate with interdisciplinary team   - Patient/family teaching  - Consider wound care consult   Outcome: Progressing

## 2023-07-24 NOTE — OCCUPATIONAL THERAPY NOTE
Occupational Therapy DC Note     Patient Name: Lilliam Lynne  TYQGH'Y Date: 7/24/2023  Problem List  Principal Problem:    Acute metabolic encephalopathy  Active Problems:    Hypercalcemia    Dysphagia    Fall    Elevated troponin    Dementia (HCC)    Moderate protein-calorie malnutrition (HCC)    Hypernatremia    Hypokalemia    Hypophosphatemia    Goals of care, counseling/discussion    Urinary retention              07/24/23 1211   OT Last Visit   OT Visit Date 07/24/23   Note Type   Note type Cancelled Session   Cancel Reasons Other   Additional Comments Per Palliative note, pt to be returning to facility on hospice. No further acute OT needs indicated. Will DC OT orders.        Shannon Cid, OTR/L

## 2023-07-24 NOTE — PLAN OF CARE
Problem: Potential for Falls  Goal: Patient will remain free of falls  Description: INTERVENTIONS:  - Educate patient/family on patient safety including physical limitations  - Instruct patient to call for assistance with activity   - Consult OT/PT to assist with strengthening/mobility   - Keep Call bell within reach  - Keep bed low and locked with side rails adjusted as appropriate  - Keep care items and personal belongings within reach  - Initiate and maintain comfort rounds  - Make Fall Risk Sign visible to staff  - Offer Toileting every Hours, in advance of need  - Initiate/Maintain alarm  - Obtain necessary fall risk management equipment:   - Apply yellow socks and bracelet for high fall risk patients  - Consider moving patient to room near nurses station  Outcome: Progressing     Problem: MOBILITY - ADULT  Goal: Maintain or return to baseline ADL function  Description: INTERVENTIONS:  -  Assess patient's ability to carry out ADLs; assess patient's baseline for ADL function and identify physical deficits which impact ability to perform ADLs (bathing, care of mouth/teeth, toileting, grooming, dressing, etc.)  - Assess/evaluate cause of self-care deficits   - Assess range of motion  - Assess patient's mobility; develop plan if impaired  - Assess patient's need for assistive devices and provide as appropriate  - Encourage maximum independence but intervene and supervise when necessary  - Involve family in performance of ADLs  - Assess for home care needs following discharge   - Consider OT consult to assist with ADL evaluation and planning for discharge  - Provide patient education as appropriate  Outcome: Progressing  Goal: Maintains/Returns to pre admission functional level  Description: INTERVENTIONS:  - Perform BMAT or MOVE assessment daily.   - Set and communicate daily mobility goal to care team and patient/family/caregiver.    - Collaborate with rehabilitation services on mobility goals if consulted  - Perform Range of Motion  times a day. - Reposition patient every  hours.   - Dangle patient  times a day  - Stand patient  times a day  - Ambulate patient  times a day  - Out of bed to chair  times a day   - Out of bed for meals  times a day  - Out of bed for toileting  - Record patient progress and toleration of activity level   Outcome: Progressing     Problem: PAIN - ADULT  Goal: Verbalizes/displays adequate comfort level or baseline comfort level  Description: Interventions:  - Encourage patient to monitor pain and request assistance  - Assess pain using appropriate pain scale  - Administer analgesics based on type and severity of pain and evaluate response  - Implement non-pharmacological measures as appropriate and evaluate response  - Consider cultural and social influences on pain and pain management  - Notify physician/advanced practitioner if interventions unsuccessful or patient reports new pain  Outcome: Progressing     Problem: INFECTION - ADULT  Goal: Absence or prevention of progression during hospitalization  Description: INTERVENTIONS:  - Assess and monitor for signs and symptoms of infection  - Monitor lab/diagnostic results  - Monitor all insertion sites, i.e. indwelling lines, tubes, and drains  - Monitor endotracheal if appropriate and nasal secretions for changes in amount and color  - [place] appropriate cooling/warming therapies per order  - Administer medications as ordered  - Instruct and encourage patient and family to use good hand hygiene technique  - Identify and instruct in appropriate isolation precautions for identified infection/condition  Outcome: Progressing  Goal: Absence of fever/infection during neutropenic period  Description: INTERVENTIONS:  - Monitor WBC    Outcome: Progressing     Problem: SAFETY ADULT  Goal: Patient will remain free of falls  Description: INTERVENTIONS:  - Educate patient/family on patient safety including physical limitations  - Instruct patient to call for assistance with activity   - Consult OT/PT to assist with strengthening/mobility   - Keep Call bell within reach  - Keep bed low and locked with side rails adjusted as appropriate  - Keep care items and personal belongings within reach  - Initiate and maintain comfort rounds  - Make Fall Risk Sign visible to staff  - Offer Toileting every Hours, in advance of need  - Initiate/Maintain alarm  - Obtain necessary fall risk management equipment:   - Apply yellow socks and bracelet for high fall risk patients  - Consider moving patient to room near nurses station  Outcome: Progressing  Goal: Maintain or return to baseline ADL function  Description: INTERVENTIONS:  -  Assess patient's ability to carry out ADLs; assess patient's baseline for ADL function and identify physical deficits which impact ability to perform ADLs (bathing, care of mouth/teeth, toileting, grooming, dressing, etc.)  - Assess/evaluate cause of self-care deficits   - Assess range of motion  - Assess patient's mobility; develop plan if impaired  - Assess patient's need for assistive devices and provide as appropriate  - Encourage maximum independence but intervene and supervise when necessary  - Involve family in performance of ADLs  - Assess for home care needs following discharge   - Consider OT consult to assist with ADL evaluation and planning for discharge  - Provide patient education as appropriate  Outcome: Progressing  Goal: Maintains/Returns to pre admission functional level  Description: INTERVENTIONS:  - Perform BMAT or MOVE assessment daily.   - Set and communicate daily mobility goal to care team and patient/family/caregiver. - Collaborate with rehabilitation services on mobility goals if consulted  - Perform Range of Motion  times a day. - Reposition patient every  hours.   - Dangle patient  times a day  - Stand patient  times a day  - Ambulate patient  times a day  - Out of bed to chair  times a day   - Out of bed for meals  times a day  - Out of bed for toileting  - Record patient progress and toleration of activity level   Outcome: Progressing     Problem: DISCHARGE PLANNING  Goal: Discharge to home or other facility with appropriate resources  Description: INTERVENTIONS:  - Identify barriers to discharge w/patient and caregiver  - Arrange for needed discharge resources and transportation as appropriate  - Identify discharge learning needs (meds, wound care, etc.)  - Arrange for interpretive services to assist at discharge as needed  - Refer to Case Management Department for coordinating discharge planning if the patient needs post-hospital services based on physician/advanced practitioner order or complex needs related to functional status, cognitive ability, or social support system  Outcome: Progressing     Problem: Knowledge Deficit  Goal: Patient/family/caregiver demonstrates understanding of disease process, treatment plan, medications, and discharge instructions  Description: Complete learning assessment and assess knowledge base. Interventions:  - Provide teaching at level of understanding  - Provide teaching via preferred learning methods  Outcome: Progressing     Problem: NEUROSENSORY - ADULT  Goal: Achieves stable or improved neurological status  Description: INTERVENTIONS  - Monitor and report changes in neurological status  - Monitor vital signs such as temperature, blood pressure, glucose, and any other labs ordered   - Initiate measures to prevent increased intracranial pressure  - Monitor for seizure activity and implement precautions if appropriate      Outcome: Progressing  Goal: Achieves maximal functionality and self care  Description: INTERVENTIONS  - Monitor swallowing and airway patency with patient fatigue and changes in neurological status  - Encourage and assist patient to increase activity and self care.    - Encourage visually impaired, hearing impaired and aphasic patients to use assistive/communication devices  Outcome: Progressing     Problem: SAFETY,RESTRAINT: NV/NON-SELF DESTRUCTIVE BEHAVIOR  Goal: Remains free of harm/injury (restraint for non violent/non self-detsructive behavior)  Description: INTERVENTIONS:  - Instruct patient/family regarding restraint use   - Assess and monitor physiologic and psychological status   - Provide interventions and comfort measures to meet assessed patient needs   - Identify and implement measures to help patient regain control  - Assess readiness for release of restraint   Outcome: Progressing  Goal: Returns to optimal restraint-free functioning  Description: INTERVENTIONS:  - Assess the patient's behavior and symptoms that indicate continued need for restraint  - Identify and implement measures to help patient regain control  - Assess readiness for release of restraint   Outcome: Progressing     Problem: Nutrition/Hydration-ADULT  Goal: Nutrient/Hydration intake appropriate for improving, restoring or maintaining nutritional needs  Description: Monitor and assess patient's nutrition/hydration status for malnutrition. Collaborate with interdisciplinary team and initiate plan and interventions as ordered. Monitor patient's weight and dietary intake as ordered or per policy. Utilize nutrition screening tool and intervene as necessary. Determine patient's food preferences and provide high-protein, high-caloric foods as appropriate.      INTERVENTIONS:  - Monitor oral intake, urinary output, labs, and treatment plans  - Assess nutrition and hydration status and recommend course of action  - Evaluate amount of meals eaten  - Assist patient with eating if necessary   - Allow adequate time for meals  - Recommend/ encourage appropriate diets, oral nutritional supplements, and vitamin/mineral supplements  - Order, calculate, and assess calorie counts as needed  - Recommend, monitor, and adjust tube feedings and TPN/PPN based on assessed needs  - Assess need for intravenous fluids  - Provide specific nutrition/hydration education as appropriate  - Include patient/family/caregiver in decisions related to nutrition  Outcome: Progressing     Problem: Prexisting or High Potential for Compromised Skin Integrity  Goal: Skin integrity is maintained or improved  Description: INTERVENTIONS:  - Identify patients at risk for skin breakdown  - Assess and monitor skin integrity  - Assess and monitor nutrition and hydration status  - Monitor labs   - Assess for incontinence   - Turn and reposition patient  - Assist with mobility/ambulation  - Relieve pressure over bony prominences  - Avoid friction and shearing  - Provide appropriate hygiene as needed including keeping skin clean and dry  - Evaluate need for skin moisturizer/barrier cream  - Collaborate with interdisciplinary team   - Patient/family teaching  - Consider wound care consult   Outcome: Progressing

## 2023-07-24 NOTE — PROGRESS NOTES
NEPHROLOGY PROGRESS NOTE   Michael Doshi 80 y.o. male MRN: [id number]  Unit/Bed#: -01 Encounter: [id number]  Reason for Consult: Hyponatremia    ASSESSMENT/PLAN:  Hypernatremia: With chronic dysphagia complicated by aspiration pneumonia. -Currently on D5W. Previously D5 was discontinued with increasing sodium level. Will DC D5W today.  -Family not wishing to pursue tube feedings.  -Repeat a.m. sodium level 136.  -Currently on dysphagia diet with nutritional supplements. Continue to encourage oral intake as tolerated. Hypercalcemia: PTH mediated, concern for hyperparathyroidism. -, vitamin D 25 36.  -Endocrinology team following.   -Continues on IV hydration and recently started on Sensipar, increased to 60 mg daily. -A.M. calcium 11.2 with corrected calcium of 11.8. Urinary retention: Previous imaging with moderate left-sided hydro and hydroureter. Continues with indwelling Clemons catheter. Goals of care:  -Palliative care team following. Family wishing to pursue hospice at discharge, no interest in tube feedings or rehospitalization's. Other: Dementia, History of prostate cancer, CVA. Disposition: Requiring additional stay due to medical needs. SUBJECTIVE:  The patient is pleasantly confused, demented at baseline. He appears to be comfortable and without apparent distress at this time.     OBJECTIVE:  Current Weight: Weight - Scale: 64.4 kg (142 lb)  Vitals:    07/23/23 1900 07/23/23 2107 07/24/23 0321 07/24/23 0636   BP:  128/57 108/52 134/67   BP Location:    Right arm   Pulse: 75 70 69 80   Resp:  14  (!) 23   Temp:  (!) 97.4 °F (36.3 °C)  97.8 °F (36.6 °C)   TempSrc:    Axillary   SpO2: 100% 92% 96% 92%   Weight:       Height:           Intake/Output Summary (Last 24 hours) at 7/24/2023 1144  Last data filed at 7/24/2023 0815  Gross per 24 hour   Intake 60 ml   Output 800 ml   Net -740 ml     General: NAD, thin, frail appearing  Skin: warm, dry, intact, no rash  HEENT: Moist mucous membranes, sclera anicteric, normocephalic, atraumatic  Neck: No apparent JVD appreciated  Chest: lung sounds clear B/L, on RA   CVS:Regular rate and rhythm, no murmer   Abdomen: Soft, round, non-tender, +BS, little  Extremities: No B/L LE edema present  Neuro: alert, demented at baseline   Psych: appropriate mood and affect     Medications:    Current Facility-Administered Medications:   •  cinacalcet (SENSIPAR) tablet 60 mg, 60 mg, Oral, After Dinner, MICHELE Lopez  •  dextrose 5 % infusion, 50 mL/hr, Intravenous, Continuous, MICHELE Casanova, Last Rate: 50 mL/hr at 07/24/23 0612, 50 mL/hr at 07/24/23 0612  •  dextrose 50 % IV solution 25 mL, 25 mL, Intravenous, Once, Dada Montilla PA-C  •  enoxaparin (LOVENOX) subcutaneous injection 40 mg, 40 mg, Subcutaneous, Daily, Markus Jenkins PA-C, 40 mg at 07/24/23 8840  •  hydrALAZINE (APRESOLINE) injection 5 mg, 5 mg, Intravenous, Q6H PRN, Velma Singh MD  •  metoprolol (LOPRESSOR) injection 2.5 mg, 2.5 mg, Intravenous, Q6H, Velma Singh MD, 2.5 mg at 07/24/23 0834  •  OLANZapine (ZyPREXA) IM injection 5 mg, 5 mg, Intramuscular, Q3H PRN, Isidro Hector MD, 5 mg at 07/21/23 2113  •  ondansetron [facility]) injection 4 mg, 4 mg, Intravenous, Q6H PRN, Esperanza Huang PA-C    Laboratory Results:  Results from last 7 days   Lab Units 07/24/23  0440 07/23/23  0515 07/22/23  0454 07/21/23  0533 07/20/23  0346 07/19/23  1423   WBC Thousand/uL 8.73 8.21 9.89  --  13.12*  --    HEMOGLOBIN g/dL 11.4* 11.4* 12.6  --  12.5  --    I STAT HEMOGLOBIN g/dl  --   --   --   --   --  9.9*   HEMATOCRIT % 35.9* 36.1* 38.9  --  38.8  --    HEMATOCRIT, ISTAT %  --   --   --   --   --  29*   PLATELETS Thousands/uL 270 170 256  --  218  --    SODIUM mmol/L 136 141 140   < > 138  --    POTASSIUM mmol/L 3.7 4.0 4.1   < > 4.0  --    CHLORIDE mmol/L 103 106 109*   < > 106  --    CO2 mmol/L 29 29 27   < > 26  --    CO2, I-STAT mmol/L  -- --   --   --   --  28   BUN mg/dL 13 19 22   < > 12  --    CREATININE mg/dL 1.01 0.99 1.00   < > 0.88  --    CALCIUM mg/dL 11.2* 10.6* 11.1*   < > 10.4*  --    ALK PHOS U/L 65  --  70  --  65  --    ALT U/L 17  --  24  --  29  --    AST U/L 19  --  27  --  43*  --    GLUCOSE, ISTAT mg/dl  --   --   --   --   --  394*    < > = values in this interval not displayed.

## 2023-07-24 NOTE — CASE MANAGEMENT
Case Management Discharge Planning Note    Patient name Jak Huang  Location /-69 MRN [id number]  : 1933 Date 2023       Current Admission Date: 2023  Current Admission Diagnosis:Acute metabolic encephalopathy   Patient Active Problem List    Diagnosis Date Noted   • Urinary retention 2023   • Goals of care, counseling/discussion 07/15/2023   • Hypophosphatemia 2023   • Acute metabolic encephalopathy    • Hypernatremia 2023   • Hypokalemia 2023   • Closed fracture of left distal radius and ulna 2023   • Fall 2023   • Ambulatory dysfunction 2023   • Elevated troponin 2023   • History of CVA (cerebrovascular accident) 2023   • Dementia ([address]) 2023   • Primary hypertension 2023   • Moderate protein-calorie malnutrition ([address]) 2023   • Dysphagia 2023   • Multiple fractures of pelvis ([address]) 2023   • Fall 10/19/2022   • Moderate protein-calorie malnutrition ([address]) 2022   • Silent aspiration 08/10/2022   • Candida esophagitis ([address]) 2022   • Failure to thrive in adult 2022   • Dysphagia 2021   • Dementia without behavioral disturbance ([address]) 2021   • 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   • 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) ([address]) 2020   • Calcification of aorta ([address]) 2020   • Need for influenza vaccination 2020   • Depression 05/15/2020   • History of stroke 2020   • Acute CVA (cerebrovascular accident), suspected embolic in nature    • Hypercalcemia 2019   • History of resection of meningioma 2019   • Lumbar spondylosis 2019   • Lumbar radiculopathy 2019   • Convulsions ([address]) 2019   • Orthostatic hypotension 2018   • Contusion of rib on right side 2018   • Cognitive decline 2018   • History of meningioma 2018   • Idiopathic peripheral neuropathy 03/29/2018   • Iron deficiency anemia 03/29/2018   • Constipation 01/25/2018   • Iron deficiency 01/25/2018   • Other constipation 01/25/2018   • Vitamin B12 deficiency 07/21/2017   • Anemia 06/30/2017   • Insomnia 02/05/2016   • Arteriosclerotic cardiovascular disease 02/03/2015   • Esophageal reflux 09/30/2014   • Cervical spinal stenosis 12/30/2013   • Spinal stenosis of lumbar region with neurogenic claudication 11/05/2013   • Backache 06/05/2013   • Essential hypertension 06/03/2013   • Hyperlipidemia 06/03/2013   • Depression 04/01/2013      LOS (days): 11  Geometric Mean LOS (GMLOS) (days): 5.20  Days to GMLOS:-5.9     OBJECTIVE:  Risk of Unplanned Readmission Score: 28.27         Current admission status: Inpatient   Preferred Pharmacy:   [address], [place] [address]  Phone: [phone] Fax: [address], [address] [place] [address]  Phone: [phone] Fax: [address]  Phone: [phone] Fax: [phone]    Primary Care Provider: MICHELE Corral    Primary Insurance: MEDICARE  Secondary Insurance:     DISCHARGE DETAILS:    Treatment Team Recommendation: SNF  Discharge Destination Plan[de-identified] SNF  Transport at Discharge : [facility]  Dispatcher Contacted: Yes  Number/Name of Dispatcher: [facility]  Transported by [facility] and Unit #): [phone]  ETA of Transport (Date): 07/24/23  ETA of Transport (Time): 1500     Transfer Mode: Stretcher     Additional Comments: CM was informed by Yeny Marcus PA-C with palliative care that pt's family is requesting hospice at [facility]. Pt's wife has been in contact with [facility] to have services at [facility].  CM arranged with [facility] for a 3pm dc to [facility]. CM notified pt's wife Heather Ng at [facility], and Sheryl Duran with [facility] of dc time. Facility transfer form and CMN completed. CMN signed and placed in medical record bin. AIDIN referral sent to [facility] as this is pt's wife's preference. [facility] accepted. RG spoke to [facility]yl Dur[facility] who states they are meeting with pt's wife today at [facility] and will start services with pt when he arrives today.     Accepting Facility Name, [address] [place] [address] : [facility]  Receiving Facility/Agency Phone Number: [phone]  Facility/Agency Fax Number: [phone]

## 2023-07-24 NOTE — CONSULTS
Consultation - Palliative and Supportive Care   Lulu Garrido 80 y.o. male [id number]    Assessment/Problems Actively Addressed:  Goals of care counseling  Encounter for palliative and supportive care   Acute metabolic encephalopathy  Dementia  Dysphagia  Aspiration pneumonia  Hypernatremia  Hypercalcemia  Moderate protein calorie malnutrition  Urinary retention  Hx prostate cancer  Hx CVA    Plan:  1. Symptom management:  · Deferred to primary     2. Goals / Recommendations:    · I called Haley/wife to review goals of care  · After speaking with [facility] hosp[facility], family has decided to pursue HOSPICE at discharge. NO interest in feeding tube. NO interest in rehospitalizations. · Pt to return to [facility] on hospice care at discharge. Social support:  • Time spent providing supportive listening. • Validated patient/family experience              I have reviewed the patient's controlled substance dispensing history in the Prescription Drug Monitoring Program in compliance with the [facility] regulations before prescribing any controlled substances. Last refills - PDMP reviewed     Decisional apparatus:  Patient is not competent on exam today. If competence is lost, patient's substitute decision maker would default to spouse by [place] Act 169. Advance Directive/Living Will/POLST: none on file     We appreciate the invitation to be involved in this patient's care. We will continue to follow throughout this hospitalization. Please do not hesitate to reach our on call provider through our clinic answering service at [phone] should you have acute symptom control concerns. Sid Hogan PA-C  Palliative and Supportive Care  Clinic/Answering Service: [phone]  You can find me on [employer]!      IDENTIFICATION:  Inpatient consult to Palliative Care  Consult performed by: Sid Hogan PA-C  Consult ordered by: Chandu Hernandez MD        Physician Requesting Consult: Lizet Hall Fernanda Winn MD  Reason for Consult / Principal Problem: GOC counseling and SM secondary to dysphagia with aspiration. History of Present Illness:  Mahad Celaya is a 80 y.o. male who presents with altered mental status. Debby Aguilar resides at [facility] where he was found to have altered mental status after having a fall 3 days prior. Upon arrival to the ER, he was found to have significant electrolyte abnormalities, L3 compression fracture, and inferior pubic rami fracture which was also noted earlier. CT chest showed aspiration pneumonia. Debby Aguilar was admitted for further work-up and care. Throughout this admission, Debby Aguilar has had suboptimal fluid intake and calorie counts leading to ongoing electrolyte abnormalities. Due to poor speech evals, in the setting of aspiration pneumonia, Jese's family began discussing goals of care. Palliative and supportive care was consulted for goals of care clarification.     Review of Systems:   Review of Systems   Unable to perform ROS: mental status change       Past Medical History:   Diagnosis Date   • Anxiety    • Arthritis    • Coronary artery disease    • Coronary artery disease involving native coronary artery of native heart without angina pectoris    • Depression    • Fracture    • Hypercholesterolemia    • Meningioma ([address]) 11/1999    brain tumor   • Meningioma (HCC)    • Narcolepsy     daytime drowsiness and dozing off occasionally   • Orthostatic hypotension    • Palpitations    • Prostate CA (HCC)    • Prostate cancer ([address])    • Stroke [facility])      Past Surgical History:   Procedure Laterality Date   • BACK SURGERY     • BRAIN SURGERY  11/08/1999   • BRAIN SURGERY     • CARDIAC SURGERY     • CORONARY ARTERY BYPASS GRAFT      x5   • CORONARY ARTERY BYPASS GRAFT       Social History     Socioeconomic History   • Marital status: /Civil Union     Spouse name: Not on file   • Number of children: Not on file   • Years of education: Not on file   • Highest education level: Not on file   Occupational History   • Occupation: retired   Tobacco Use   • Smoking status: Never   • Smokeless tobacco: Never   Vaping Use   • Vaping Use: Never used   Substance and Sexual Activity   • Alcohol use: Not Currently     Comment: (history)   • Drug use: No   • Sexual activity: Not Currently   Other Topics Concern   • Not on file   Social History Narrative    ** Merged History Encounter **         ** Merged History Encounter **         ** Merged History Encounter **         Pt lives with wife     Social Determinants of Health     Financial Resource Strain: Not on file   Food Insecurity: No Food Insecurity (7/14/2023)    Hunger Vital Sign    • Worried About Running Out of Food in the Last Year: Never true    • Ran Out of Food in the Last Year: Never true   Transportation Needs: No Transportation Needs (7/14/2023)    PRAPARE - Transportation    • Lack of Transportation (Medical): No    • Lack of Transportation (Non-Medical):  No   Physical Activity: Not on file   Stress: Not on file   Social Connections: Not on file   Intimate Partner Violence: Not on file   Housing Stability: Low Risk  (7/14/2023)    Housing Stability Vital Sign    • Unable to Pay for Housing in the Last Year: No    • Number of Places Lived in the Last Year: 1    • Unstable Housing in the Last Year: No     Family History   Problem Relation Age of Onset   • Hypertension Mother         benign essential   • Cancer Mother    • Osteoporosis Mother    • Suicidality Father    • Diabetes Family    • Heart disease Family    • Neuropathy Family         peripheral   • Prostate cancer Family    • Thyroid disease Family        Medications:  all current active meds have been reviewed and current meds:   Current Facility-Administered Medications   Medication Dose Route Frequency   • cinacalcet (SENSIPAR) tablet 30 mg  30 mg Oral After Dinner   • dextrose 5 % infusion  50 mL/hr Intravenous Continuous   • dextrose 50 % IV solution 25 mL 25 mL Intravenous Once   • enoxaparin (LOVENOX) subcutaneous injection 40 mg  40 mg Subcutaneous Daily   • hydrALAZINE (APRESOLINE) injection 5 mg  5 mg Intravenous Q6H PRN   • metoprolol (LOPRESSOR) injection 2.5 mg  2.5 mg Intravenous Q6H   • OLANZapine (ZyPREXA) IM injection 5 mg  5 mg Intramuscular Q3H PRN   • ondansetron (ZOFRAN) injection 4 mg  4 mg Intravenous Q6H PRN       Allergies   Allergen Reactions   • Aricept [Donepezil] Confusion     confusion   • Atorvastatin Myalgia, Dizziness and Headache   • Niacin Other (See Comments)     unknown         Medications    Current Facility-Administered Medications:   •  cinacalcet (SENSIPAR) tablet 30 mg, 30 mg, Oral, After Poncho Gordon MD, 30 mg at 07/21/23 1831  •  dextrose 5 % infusion, 50 mL/hr, Intravenous, Continuous, MICHELE Angel, Last Rate: 50 mL/hr at 07/24/23 0612, 50 mL/hr at 07/24/23 0612  •  dextrose 50 % IV solution 25 mL, 25 mL, Intravenous, Once, Christina Smith PA-C  •  enoxaparin (LOVENOX) subcutaneous injection 40 mg, 40 mg, Subcutaneous, Daily, Aziza Barrera PA-C, 40 mg at 07/24/23 0324  •  hydrALAZINE (APRESOLINE) injection 5 mg, 5 mg, Intravenous, Q6H PRN, Vic Herrera MD  •  metoprolol (LOPRESSOR) injection 2.5 mg, 2.5 mg, Intravenous, Q6H, Vic Herrera MD, 2.5 mg at 07/24/23 0834  •  OLANZapine (ZyPREXA) IM injection 5 mg, 5 mg, Intramuscular, Q3H PRN, Liam Perez MD, 5 mg at 07/21/23 2113  •  ondansetron (ZOFRAN) injection 4 mg, 4 mg, Intravenous, Q6H PRN, Diya Jenkins PA-C    Objective  /67 (BP Location: Right arm)   Pulse 80   Temp 97.8 °F (36.6 °C) (Axillary)   Resp (!) 23   Ht 5' 9" (1.753 m)   Wt 64.4 kg (142 lb)   SpO2 92%   BMI 20.97 kg/m²     Physical Exam:  Physical Exam  Vitals and nursing note reviewed. Exam conducted with a chaperone present. Constitutional:       General: He is sleeping. He is not in acute distress. Appearance: He is cachectic. He is ill-appearing. He is not toxic-appearing. HENT:      Head: Normocephalic. Eyes:      General:         Right eye: No discharge. Left eye: No discharge. Pulmonary:      Effort: No respiratory distress. Abdominal:      General: Abdomen is flat. Musculoskeletal:      Cervical back: Neck supple. Right lower leg: No edema. Left lower leg: No edema. Skin:     Coloration: Skin is pale. Lab Results:   I have personally reviewed pertinent labs. , CBC:   Lab Results   Component Value Date    WBC 8.73 07/24/2023    HGB 11.4 (L) 07/24/2023    HCT 35.9 (L) 07/24/2023    MCV 84 07/24/2023     07/24/2023    RBC 4.29 07/24/2023    MCH 26.6 (L) 07/24/2023    MCHC 31.8 07/24/2023    RDW 14.6 07/24/2023    MPV 11.1 07/24/2023    NRBC 0 07/24/2023   , CMP:   Lab Results   Component Value Date    SODIUM 136 07/24/2023    K 3.7 07/24/2023     07/24/2023    CO2 29 07/24/2023    BUN 13 07/24/2023    CREATININE 1.01 07/24/2023    CALCIUM 11.2 (H) 07/24/2023    AST 19 07/24/2023    ALT 17 07/24/2023    ALKPHOS 65 07/24/2023    EGFR 65 07/24/2023     Imaging Studies: I have personally reviewed pertinent reports. EKG, Pathology, and Other Studies: I have personally reviewed pertinent reports. Counseling / Coordination of Care  Total floor / unit time spent today 75 minutes. Greater than 50% of total time was spent with the patient and / or family counseling and / or coordination of care. A description of the counseling / coordination of care: reviewed chart, reviewed lab values, reviewed imaging, provided medical updates, discussed palliative care and symptom management, discussed hospice care and comfort care, discussed goals of care, provided supportive listening, provided anticipatory guidance, provided psychosocial and emotional support, assessed competency and decision-making and facilitated interdisciplinary communication. Reviewed with SLIM team, RN and CM.     Portions of this document may have been created using dictation software and as such some "sound alike" terms may have been generated by the system. Do not hesitate to contact me with any questions or clarifications.

## 2023-07-25 ENCOUNTER — TELEPHONE (OUTPATIENT)
Dept: NEPHROLOGY | Facility: CLINIC | Age: 88
End: 2023-07-25

## 2023-07-25 NOTE — ASSESSMENT & PLAN NOTE
Overall guarded prognosis given dementia dysphagia with aspiration risk  GI following   Patient has transitioned back to puree diet. Obtain calorie counts. Unclear if patient/family would want PEG if oral intake poor or if patient is candidate for such procedure. Unclear if patient is meeting nutritional needs d/t waxing and waning metabolic encephalopathy, dementia. Palliative care consulted.    Patient was discharged to his nursing facility [facility] with hospice services

## 2023-07-25 NOTE — TELEPHONE ENCOUNTER
----- Message from Evens Moraes MD sent at 7/24/2023  3:08 PM EDT -----  Regarding: Hospital discharge labs  Please have the patient repeat renal function panel and ionized calcium in 1 week. Thank you.

## 2023-07-25 NOTE — DISCHARGE SUMMARY
[facility]  Discharge- Ludmila Rivero 1/16/1933, 80 y.o. male MRN: [id number]  Unit/Bed#: -01 Encounter: [id number]  Primary Care Provider: MICHELE Olmos   Date and time admitted to hospital: 7/13/2023 10:00 AM    Urinary retention  Assessment & Plan  Noted to have urinary retention in the ED and a Clemons catheter was placed      Goals of care, counseling/discussion  Assessment & Plan  Overall guarded prognosis given dementia dysphagia with aspiration risk  GI following   Patient has transitioned back to puree diet. Obtain calorie counts. Unclear if patient/family would want PEG if oral intake poor or if patient is candidate for such procedure. Unclear if patient is meeting nutritional needs d/t waxing and waning metabolic encephalopathy, dementia. Palliative care consulted. Patient was discharged to his nursing facility [facility] with hospice services    Hypophosphatemia  Assessment & Plan  Normalized with repletion  Monitor    Hypokalemia  Assessment & Plan  Repleted  Monitor    Hypernatremia  Assessment & Plan  Hyponatremia present on admission likely due to poor p.o. intake dehydration  Resolved  Nephrology following  Monitor closely        Moderate protein-calorie malnutrition ([address])  Assessment & Plan  Malnutrition Findings: Body mass index is 20.97 kg/m². Dementia [facility])  Assessment & Plan  History of dementia  Continue memantine  Delirium precautions  Reorientation  Sleep hygiene      Elevated troponin  Assessment & Plan  Elevated troponins noted  Likely non-MI troponin elevation  Cardiology inputs noted    Fall  Assessment & Plan  Patient had a fall   At high risk for falls  Imaging revealed inferior pubic ramus fracture  Orthopedic inputs noted  Analgesics  Safe ambulation  Fall precautions  Physical therapy      Dysphagia  Assessment & Plan  History of dysphagia  Improved w/ treatment of electrolyte issues.   Present precautions      Hypercalcemia  Assessment & Plan  Mild hypercalcemia  Elevated PTH levels noted  Endocrine consulted. Cont sensipar 60 mg daily        * Acute metabolic encephalopathy  Assessment & Plan  Patient is a resident of [facility], presents with altered mental status  Encephalopathy is likely multifactorial  Appears to be improving          Discharge Summary - [facility]    Patient Information: Aidee Arredondo 80 y.o. male MRN: [id number]  Unit/Bed#: -01 Encounter: [id number]    Discharging Physician / Practitioner: Nirmal Machuca MD  PCP: MICHELE Corral  Admission Date: 7/13/2023  Discharge Date: 07/24/23    Reason for Admission: Acute metabolic encephalopathy    Discharge Diagnoses:     Principal Problem:    Acute metabolic encephalopathy  Active Problems:    Hypercalcemia    Dysphagia    Fall    Elevated troponin    Dementia (HCC)    Moderate protein-calorie malnutrition ([address])    Hypernatremia    Hypokalemia    Hypophosphatemia    Goals of care, counseling/discussion    Urinary retention  Resolved Problems:    Aspiration pneumonitis ([address])      Consultations During Hospital Stay:  · Palliative care  · Endocrinology  · Nephrology  · Cardiology  · Gastroenterology  · Orthopedics    Procedures Performed:     · None    Significant Findings / Test Results:     · Calcium 11.2    Incidental Findings:   · Chest x-ray: Mild bibasilar atelectasis  · CT brain: Area of encephalomalacia left frontal region  Residual meningioma measuring 1.5 x 1.3 cm in the right parafalcine region, stable      Test Results Pending at Discharge (will require follow up): · None     Outpatient Tests Requested:  · None    Complications: None    Hospital Course:     Aidee Arredondo is a 80 y.o. male patient with PMH of CAD, CVA, HTN, HLD, dementia, prostate cancer who presents to [facility] with altered mental status following a fall 3 days ago.   Patient is unable to provide any history due to his altered mental status. On arrival to the ED, patient is nonverbal but more dynamically stable. He is not requiring any supplemental O2. His lab work has significant electrolyte disarrangement including hypernatremia, hypokalemia, hypercalcemia, hypophosphatemia, and mild hyperglycemia. Additionally, he has elevated troponin and BNP on arrival, with some ECG changes. Underwent CT imaging for his fall which demonstrated inferior pubic rami fracture (unclear chronicity, also present on imaging in March 2023), as well as a new L3 compression fracture. CT head was negative for any intracranial abnormalities. Also underwent CT chest/abdomen/pelvis which demonstrated signs consistent with aspiration. WBC and procalcitonin WNL. Received a dose of IV antibiotics. Will be admitted for further work-up and treatment for his metabolic encephalopathy as well as monitoring for development of aspiration pneumonia.     During the hospitalization, patient was found to have elevated calcium was managed by nephrologist and endocrinologist.  Patient received IV fluids and Sensipar. Sensipar was increased to 60 mg at the time of discharge. Patient is comfortable and appears to be back to the baseline. In terms of dysphagia and the need for feeding tube, patient's family has deferred that. Palliative care had discussions with patient's family and they have decided to proceed with hospice with [facility] without future hospitalizations or feeding tube. Condition at Discharge: stable     Discharge Day Visit / Exam:     Subjective: Patient was seen and examined at the bedside. He is pleasantly confused and appears comfortable.   Vitals: Blood Pressure: 134/67 (07/24/23 0636)  Pulse: 80 (07/24/23 0636)  Temperature: 97.8 °F (36.6 °C) (07/24/23 0636)  Temp Source: Axillary (07/24/23 0636)  Respirations: (!) 23 (07/24/23 0636)  Height: 5' 9" (175.3 cm) (07/13/23 1002)  Weight - Scale: 64.4 kg (142 lb) (07/13/23 1002)  SpO2: 92 % (07/24/23 4805)  Exam:   Physical Exam  Constitutional:       Comments: Patient is frail   HENT:      Head: Normocephalic and atraumatic. Cardiovascular:      Rate and Rhythm: Normal rate and regular rhythm. Heart sounds: Normal heart sounds. Pulmonary:      Effort: Pulmonary effort is normal. No respiratory distress. Breath sounds: Normal breath sounds. Abdominal:      Palpations: Abdomen is soft. Tenderness: There is no abdominal tenderness. Musculoskeletal:         General: No deformity. Skin:     General: Skin is warm. Findings: No erythema or rash. Neurological:      Mental Status: He is alert. Mental status is at baseline. He is disoriented. Discussion with Family: Patient's wife    Discharge instructions/Information to patient and family:   See after visit summary for information provided to patient and family. Provisions for Follow-Up Care:  See after visit summary for information related to follow-up care and any pertinent home health orders. Disposition:     Other [facility] at [address] to [facility] SNF:   · Not Applicable to this Patient - Not Applicable to this Patient    Planned Readmission: no     Discharge Statement:  I spent 50 minutes discharging the patient. This time was spent on the day of discharge. I had direct contact with the patient on the day of discharge. Greater than 50% of the total time was spent examining patient, answering all patient questions, arranging and discussing plan of care with patient as well as directly providing post-discharge instructions. Additional time then spent on discharge activities. Discharge Medications:  See after visit summary for reconciled discharge medications provided to patient and family.       ** Please Note: This note has been constructed using a voice recognition system **

## 2023-07-25 NOTE — ASSESSMENT & PLAN NOTE
Hyponatremia present on admission likely due to poor p.o. intake dehydration  Resolved  Nephrology following  Monitor closely

## 2023-07-25 NOTE — TELEPHONE ENCOUNTER
Called and spoke to Mayra Mckoy at [facility] who informed me that the patient is being placed on hospice as of this morning. She states that she will call back if the family changes their mind or wishes to continue care.

## 2023-07-25 NOTE — ASSESSMENT & PLAN NOTE
Patient is a resident of [facility], presents with altered mental status  Encephalopathy is likely multifactorial  Appears to be improving

## 2023-10-23 NOTE — PHYSICAL THERAPY NOTE
PHYSICAL THERAPY NOTE          Patient Name: Nataliia PENALOZA'S Date: 3/16/2023       03/16/23 1159   PT Last Visit   PT Visit Date 03/16/23   Note Type   Note Type Treatment   Pain Assessment   Pain Assessment Tool FLACC   Pain Location/Orientation Location: Pelvis   Hospital Pain Intervention(s) Repositioned; Ambulation/increased activity   Pain Rating: FLACC (Rest) - Face 0   Pain Rating: FLACC (Rest) - Legs 0   Pain Rating: FLACC (Rest) - Activity 0   Pain Rating: FLACC (Rest) - Cry 0   Pain Rating: FLACC (Rest) - Consolability 0   Score: FLACC (Rest) 0   Pain Rating: FLACC (Activity) - Face 1   Pain Rating: FLACC (Activity) - Legs 0   Pain Rating: FLACC (Activity) - Activity 1   Pain Rating: FLACC (Activity) - Cry 1   Pain Rating: FLACC (Activity) - Consolability 1   Score: FLACC (Activity) 4   Restrictions/Precautions   Weight Bearing Precautions Per Order Yes   RLE Weight Bearing Per Order FWB   LLE Weight Bearing Per Order WBAT  (recent L pelvic fx)   Other Precautions Chair Alarm; Bed Alarm;Multiple lines; Fall Risk;Pain;Cognitive   General   Chart Reviewed Yes   Response to Previous Treatment Patient with no complaints from previous session  Family/Caregiver Present No   Cognition   Overall Cognitive Status Impaired   Arousal/Participation Lethargic;Cooperative  (Pt lethargic upon arrival  Improved alertness with increased actvitity)   Attention Attends with cues to redirect   Orientation Level Oriented to person;Oriented to place   Memory Decreased recall of precautions   Following Commands Follows one step commands with increased time or repetition  (Pt able to follow one step commands but unable to follow two step commands)   Comments Pt anxious t/o session and would count or hum when anxiety increased   Bed Mobility   Supine to Sit 2  Maximal assistance   Additional items Assist x 2; Increased time required;Verbal cues;LE management   Sit to Supine Unable to assess   Additional Comments Upon arrival, pt lying supine in bed  Pt required max encouragement to participate in PT session  Pt remained in chair with legs elevated, call bell and all needs within reach at end of session  Transfers   Sit to Stand 3  Moderate assistance   Additional items Assist x 2; Increased time required;Verbal cues   Stand to Sit 3  Moderate assistance   Additional items Assist x 2; Increased time required;Verbal cues   Stand pivot 3  Moderate assistance   Additional items Assist x 2; Increased time required;Verbal cues   Additional Comments transfers [facility]   Ambulation/Elevation   Gait pattern Improper Weight shift; Forward Flexion;Decreased foot clearance; Excessively slow   Gait Assistance 3  Moderate assist   Additional items Assist x 2;Verbal cues   Assistive Device Other (Comment)  (hand held assist x2)   Distance 2' (bed to chair)   Balance   Static Sitting Fair   Dynamic Sitting Fair -   Static Standing Poor +   Dynamic Standing Poor   Ambulatory Poor   Endurance Deficit   Endurance Deficit Yes   Endurance Deficit Description fatigue, pain, deconditioning   Activity Tolerance   Activity Tolerance Patient limited by fatigue;Patient limited by pain   Medical Staff Made Aware OT Kong Cota- co-treatment 2* medical complexity and multiple comorbidities   Nurse Made Aware RN cleared pt for PT session   Exercises   Heelslides Sitting;10 reps;AROM; Bilateral  (with light manual resistance)   Knee AROM [employer]; Bilateral;10 reps  (1x10 BL LAQ with light manual resistance)   Ankle Pumps Sitting;10 reps;Bilateral;AROM   Balance Training Sitting  (Sitting balance completed EOB with S and occasional CGA-min A for 8 min)   UE Exercise Bilateral;AROM;5 reps; Sitting  (reaching outside TERRY for functional objects)   Balance training  pt sitting EOB ~8 min fluctuating between min A to close S level  Pt with increased foward flextion- TC   VC required to correct  Reaching x4 in all directions performed with min A required   Assessment   Prognosis Fair   Problem List Decreased strength;Decreased endurance; Impaired balance;Decreased mobility;Pain   Assessment Pt seen for PT treatment session this date  Therapy session focused on bed mobility, functional transfers, sitting tolerance/ balance, and and endurance training in order to improve overall mobility and independence  Pt requires assist of 2 for all mobility performed this date  Pt with increased lethargy at start of therapy session, pt awakens with increased time and as session progresses  Pt with increased anxiety this date, at first pt non -verbal, hums instead, however as anxiety lessens pt more verbal  Pt able to take short shuffling steps this date with Ax2 using HHA  Pt performed/ tolerated seated therex with no complaints  Pt making slow progress toward goals  Pt was left sitting OOB in recliner at the end of PT session with all needs in reach  Pt would benefit from continued PT services while in hospital to address remaining limitations  PT to continue to follow pt and recommends ST  The patient's AM-[facility] Basic Mobility Inpatient Short Form Raw Score is 8  A Raw score of less than or equal to 16 suggests the patient may benefit from discharge to post-acute rehabilitation services  Please also refer to the recommendation of the Physical Therapist for safe discharge planning  Goals   Patient Goals to rest   LTG Expiration Date 03/28/23   PT Treatment Day 1   Plan   Treatment/Interventions Functional transfer training;LE strengthening/ROM; Therapeutic exercise; Endurance training;Cognitive reorientation;Patient/family training;Equipment eval/education; Bed mobility;Gait training;OT;Spoke to nursing   PT Frequency 2-3x/wk   Recommendation   PT Discharge Recommendation Post acute rehabilitation services   Equipment Recommended Wheelchair;Walker   AM-PAC Basic Mobility Inpatient   Turning in Flat Bed Without Bedrails 2   Lying on Back to Sitting on Edge of Flat Bed Without Bedrails 2   Moving Bed to Chair 1   Standing Up From Chair Using Arms 1   Walk in Room 1   Climb 3-5 Stairs With Railing 1   Basic Mobility Inpatient Raw Score 8   Highest Level Of Mobility   -HLM Goal 3: Sit at edge of bed   JH-HLM Achieved 4: Move to chair/commode   Education   Education Provided Mobility training   Patient Reinforcement needed   End of Consult   Patient Position at End of Consult Bed/Chair alarm activated; All needs within reach; Bedside chair     Zuleyma Barba, PT, DPT normal...

## 2024-06-16 NOTE — PROGRESS NOTES
Daily Note     Today's date: 2018  Patient name: Nini Carroll  : 1933  MRN: [id number]  Referring provider: Desmond Chaudhari MD  Dx:   Encounter Diagnosis     ICD-10-CM    1  Gait abnormality R26 9                   Subjective: Patient reports improved ambulatory and functional independence over the weekend  Patient continues to report " a little bit" of rib pain from his fractures secondary to a fall  Objective: See treatment diary below  BP: 120/58    Assessment: Tolerated treatment well  Patient demonstrated fatigue post treatment and exhibited good technique with therapeutic exercises  Improved ambulatory endurance but significant proximal LE weakness persists with prolonged ambulation with increasing Trendelenburg  Vitals stable throughout tx session  Added quad stretching which pt tolerated well  Plan: Continue per plan of care       Precautions: *ORTHOSTATIC HYPOTENSION, FALLS RISK, Recent change in cognitive status, Anxiety, History of M I, History of depression, History of prostate CA     Daily Treatment Diary   Exercise Diary        VG Squats  L5 3x10            -         Sit to stands  10x chair  2x10 low mat  3x10 low mat  2x10 low mat 2x10 low mat  2x10 low mat  2x10 low mat 3x10 low mat  3x12 low mat     Foam balance  tandem 30"x3, FT 30"x3  tandem 3x30" FT 3x30"  FT 3x30"  FT  3x30"  FT 3x30"    -         Gait training  5'  450'  450'  450', 300'  450',300'  450',400' 250', 350' 450', 300'   525',450'     Balance course  5'  5'  5'  5'      -         Cog task with balance              -         Hip abduction with YTB     2x10 B/L 2x10 B/L    2x10 B/L 2x10 b/l   2x10 B/L 2x10 B/L     Hip Abduction machine        65# 2x10  65#  2x10    -         Mini Squats          2x10    -         Supine bridges            2x10  2x10 2x10   2x10x5"     Slider abduction            2x10 B/L 2x10 ea                        BP                       Pre: 132/74                        Mid: 110/60                       Post: 126/58          Manual Quad stretching                  3x30" B/L   yes